# Patient Record
Sex: FEMALE | Race: WHITE | NOT HISPANIC OR LATINO | Employment: OTHER | URBAN - METROPOLITAN AREA
[De-identification: names, ages, dates, MRNs, and addresses within clinical notes are randomized per-mention and may not be internally consistent; named-entity substitution may affect disease eponyms.]

---

## 2018-07-10 ENCOUNTER — OFFICE VISIT CONVERTED (OUTPATIENT)
Dept: ORTHOPEDIC SURGERY | Facility: CLINIC | Age: 58
End: 2018-07-10
Attending: ORTHOPAEDIC SURGERY

## 2019-03-08 ENCOUNTER — HOSPITAL ENCOUNTER (OUTPATIENT)
Dept: URGENT CARE | Facility: CLINIC | Age: 59
Discharge: HOME | End: 2019-03-08
Attending: FAMILY MEDICINE

## 2019-08-26 ENCOUNTER — HOSPITAL ENCOUNTER (OUTPATIENT)
Dept: URGENT CARE | Facility: CLINIC | Age: 59
Discharge: HOME | End: 2019-08-26
Attending: EMERGENCY MEDICINE

## 2019-09-27 ENCOUNTER — HOSPITAL ENCOUNTER (OUTPATIENT)
Dept: GENERAL RADIOLOGY | Facility: HOSPITAL | Age: 59
Discharge: HOME | End: 2019-09-27
Attending: NURSE PRACTITIONER

## 2020-02-06 ENCOUNTER — HOSPITAL ENCOUNTER (OUTPATIENT)
Dept: URGENT CARE | Facility: CLINIC | Age: 60
Discharge: HOME | End: 2020-02-06
Attending: EMERGENCY MEDICINE

## 2020-09-23 ENCOUNTER — HOSPITAL ENCOUNTER (OUTPATIENT)
Dept: OTHER | Facility: HOSPITAL | Age: 60
Discharge: HOME | End: 2020-09-23
Attending: INTERNAL MEDICINE

## 2020-09-23 LAB
ALBUMIN SERPL-MCNC: 3 G/DL (ref 3.5–5)
ALBUMIN/GLOB SERPL: 0.8 {RATIO} (ref 1.4–2.6)
ALP SERPL-CCNC: 138 U/L (ref 53–141)
ALT SERPL-CCNC: 18 U/L (ref 10–40)
ANION GAP SERPL CALC-SCNC: 15 MMOL/L (ref 8–19)
AST SERPL-CCNC: 17 U/L (ref 15–50)
BASOPHILS # BLD AUTO: 0.02 10*3/UL (ref 0–0.2)
BASOPHILS NFR BLD AUTO: 0.4 % (ref 0–3)
BILIRUB SERPL-MCNC: 0.35 MG/DL (ref 0.2–1.3)
BNP SERPL-MCNC: 289 PG/ML (ref 0–900)
BUN SERPL-MCNC: 12 MG/DL (ref 5–25)
BUN/CREAT SERPL: 24 {RATIO} (ref 6–20)
CALCIUM SERPL-MCNC: 10.1 MG/DL (ref 8.7–10.4)
CHLORIDE SERPL-SCNC: 98 MMOL/L (ref 99–111)
CONV ABS IMM GRAN: 0.02 10*3/UL (ref 0–0.2)
CONV CO2: 26 MMOL/L (ref 22–32)
CONV IMMATURE GRAN: 0.4 % (ref 0–1.8)
CONV TOTAL PROTEIN: 6.7 G/DL (ref 6.3–8.2)
CREAT UR-MCNC: 0.51 MG/DL (ref 0.5–0.9)
DEPRECATED RDW RBC AUTO: 53 FL (ref 36.4–46.3)
EOSINOPHIL # BLD AUTO: 0.07 10*3/UL (ref 0–0.7)
EOSINOPHIL # BLD AUTO: 1.2 % (ref 0–7)
ERYTHROCYTE [DISTWIDTH] IN BLOOD BY AUTOMATED COUNT: 15.4 % (ref 11.7–14.4)
GFR SERPLBLD BASED ON 1.73 SQ M-ARVRAT: >60 ML/MIN/{1.73_M2}
GLOBULIN UR ELPH-MCNC: 3.7 G/DL (ref 2–3.5)
GLUCOSE SERPL-MCNC: 394 MG/DL (ref 65–99)
HCT VFR BLD AUTO: 38.6 % (ref 37–47)
HGB BLD-MCNC: 12.7 G/DL (ref 12–16)
LYMPHOCYTES # BLD AUTO: 1.18 10*3/UL (ref 1–5)
LYMPHOCYTES NFR BLD AUTO: 20.8 % (ref 20–45)
MCH RBC QN AUTO: 30.5 PG (ref 27–31)
MCHC RBC AUTO-ENTMCNC: 32.9 G/DL (ref 33–37)
MCV RBC AUTO: 92.8 FL (ref 81–99)
MONOCYTES # BLD AUTO: 0.4 10*3/UL (ref 0.2–1.2)
MONOCYTES NFR BLD AUTO: 7.1 % (ref 3–10)
NEUTROPHILS # BLD AUTO: 3.97 10*3/UL (ref 2–8)
NEUTROPHILS NFR BLD AUTO: 70.1 % (ref 30–85)
NRBC CBCN: 0 % (ref 0–0.7)
OSMOLALITY SERPL CALC.SUM OF ELEC: 296 MOSM/KG (ref 273–304)
PLATELET # BLD AUTO: 138 10*3/UL (ref 130–400)
PMV BLD AUTO: 11.3 FL (ref 9.4–12.3)
POTASSIUM SERPL-SCNC: 4.2 MMOL/L (ref 3.5–5.3)
RBC # BLD AUTO: 4.16 10*6/UL (ref 4.2–5.4)
SODIUM SERPL-SCNC: 135 MMOL/L (ref 135–147)
WBC # BLD AUTO: 5.66 10*3/UL (ref 4.8–10.8)

## 2020-11-27 ENCOUNTER — HOSPITAL ENCOUNTER (OUTPATIENT)
Dept: OTHER | Facility: HOSPITAL | Age: 60
Discharge: HOME | End: 2020-11-27
Attending: INTERNAL MEDICINE

## 2020-11-27 LAB
ALBUMIN SERPL-MCNC: 3.5 G/DL (ref 3.5–5)
ALBUMIN/GLOB SERPL: 1.1 {RATIO} (ref 1.4–2.6)
ALP SERPL-CCNC: 130 U/L (ref 43–160)
ALT SERPL-CCNC: 45 U/L (ref 10–40)
ANION GAP SERPL CALC-SCNC: 18 MMOL/L (ref 8–19)
AST SERPL-CCNC: 33 U/L (ref 15–50)
BILIRUB SERPL-MCNC: 0.57 MG/DL (ref 0.2–1.3)
BNP SERPL-MCNC: 455 PG/ML (ref 0–900)
BUN SERPL-MCNC: 19 MG/DL (ref 5–25)
BUN/CREAT SERPL: 30 {RATIO} (ref 6–20)
CALCIUM SERPL-MCNC: 9.7 MG/DL (ref 8.7–10.4)
CHLORIDE SERPL-SCNC: 97 MMOL/L (ref 99–111)
CONV CO2: 24 MMOL/L (ref 22–32)
CONV TOTAL PROTEIN: 6.8 G/DL (ref 6.3–8.2)
CREAT UR-MCNC: 0.63 MG/DL (ref 0.5–0.9)
GFR SERPLBLD BASED ON 1.73 SQ M-ARVRAT: >60 ML/MIN/{1.73_M2}
GLOBULIN UR ELPH-MCNC: 3.3 G/DL (ref 2–3.5)
GLUCOSE SERPL-MCNC: 385 MG/DL (ref 65–99)
OSMOLALITY SERPL CALC.SUM OF ELEC: 298 MOSM/KG (ref 273–304)
POTASSIUM SERPL-SCNC: 3.7 MMOL/L (ref 3.5–5.3)
SODIUM SERPL-SCNC: 135 MMOL/L (ref 135–147)

## 2020-12-12 ENCOUNTER — HOSPITAL ENCOUNTER (OUTPATIENT)
Dept: MRI IMAGING | Facility: HOSPITAL | Age: 60
Discharge: HOME | End: 2020-12-12
Attending: ORTHOPAEDIC SURGERY

## 2021-03-24 ENCOUNTER — OFFICE VISIT CONVERTED (OUTPATIENT)
Dept: NEUROLOGY | Facility: CLINIC | Age: 61
End: 2021-03-24
Attending: NURSE PRACTITIONER

## 2021-05-10 NOTE — H&P
"   History and Physical      Patient Name: Italia Olvera   Patient ID: [id number]   Sex: Female   YOB: 1960    Referring Provider: Carloz Shoemaker MD    Visit Date: March 24, 2021    Provider: LUCINA See   Location: [facility]   Location Address: [address] [place] [address]  [id number]   Location Phone: [phone]          Chief Complaint     Pt is here with c/o legs giving out and being \"paralyzed\"       History Of Present Illness  Italia Olvera is a 60 year old /White female who presents today to [facility] today referred from Carloz Shoemaker MD. States she's been having frequent falls. Legs will just \"get weak\" and \"go paralyzed\" and she will fall. Falls 1-2 times per week. States that she will be standing up and then legs will start to shake and then she will lose sensation in her legs and will fall. Endorses paresthesia in bilateral legs and arms. States she's been diagnosed with neuropathy. Is a diabetic. States she was issued a power wheelchair 11 years ago for the same complaint, but states symptoms are worsening.      Lab Review:   A1c: 13.6       Past Medical History  Arthritis; Bladder disorder; Cancer; chronic back pain; Chronic Obstructive Pulmonary Disease; Depression; Diabetes; GERD; High blood pressure; Hypertension; Knee injury; Limb Swelling; Neuropathy; Osteoarthritis of Knee; Pain: Hip; PCL/ACL tear/rupture; Reflux; Renal Failure; Right Pain: Knee; Rotator cuff tendinitis, right; Seasonal allergies; Shortness of Breath; Shoulder bursitis, right; Shoulder tendinitis         Past Surgical History  Appendectomy; Artificial Joints/Limbs; Bladder Repair; Cholecstectomy; Exploratory; Face surgery; Gallbladder; Hernia; Hip replacement, right; Hysterectomy; Joint Surgery; Knee replacement, left         Medication List  amiloride-hydrochlorothiazide 5-50 mg oral tablet; bisoprolol-hydrochlorothiazide 5-6.25 mg oral " tablet; Eliquis 5 mg oral tablet; fluconazole 200 mg oral tablet; Humulin 70/30 U-100 KwikPen 100 unit/mL (70-30) subcutaneous insulin pen; Klor-Con M20 20 mEq oral tablet,ER particles/crystals; Lasix 80 mg oral tablet; losartan 100 mg oral tablet; metformin 500 mg oral tablet extended release 24hr; Neurontin 600 mg oral tablet; nortriptyline 25 mg oral capsule; Percocet  mg oral tablet; Zanaflex 4 mg oral capsule         Allergy List  NO KNOWN DRUG ALLERGIES       Allergies Reconciled  Family Medical History  Breast Neoplasm, Malignant; Stroke; Heart Disease; Cancer, Unspecified; Diabetes, unspecified type; Throat Cancer; Colon Cancer; Lung cancer; Diabetes; Family history of certain chronic disabling diseases; arthritis; Osteoporosis         Social History  Alcohol Use (Never); Claustophobic (Unknown); lives alone; lives with spouse; .; Recreational Drug Use (Never); Tobacco (Current some day); Working         Review of Systems  · Constitutional  o Denies  o : chills, excessive sweating, fatigue, fever, sycope/passing out, weight gain, weight loss  · Eyes  o Denies  o : changes in vision, blurry vision, double vision  · HENT  o Denies  o : loss of hearing, ringing in the ears, ear aches, sore throat, nasal congestion, sinus pain, nose bleeds, seasonal allergies  · Cardiovascular  o Denies  o : blood clots, swollen legs, anemia, easy burising or bleeding, transfusions  · Respiratory  o Denies  o : shortness of breath, dry cough, productive cough, pneumonia, COPD  · Gastrointestinal  o Denies  o : difficulty swallowing, reflux  · Genitourinary  o Denies  o : incontinence  · Neurologic  o Admits  o : headache, loss of balance, falls, dizziness/vertigo, difficulty with dexterity, weakness  o Denies  o : seizure, stroke, tremor, difficulty with sleep, numbness/tingling/paresthesia , difficulty with coordination  · Musculoskeletal  o Denies  o : neck stiffness/pain, swollen lymph nodes, muscle aches, joint  "pain, weakness, spasms, sciatica, pain radiating in arm, pain radiating in leg, low back pain  · Endocrine  o Denies  o : diabetes, thyroid disorder  · Psychiatric  o Denies  o : anxiety, depression      Vitals  Date Time BP Position Site L\R Cuff Size HR RR TEMP (F) WT  HT  BMI kg/m2 BSA m2 O2 Sat FR L/min FiO2 HC       03/24/2021 11:36 /110 Sitting    98 - R   184lbs 0oz 5'  6\" 29.7 1.97             Physical Examination  · Constitutional  o Appearance  o : well-nourished, well groomed, in no apparent distress  · Eyes  o Pupils and Irises  o : Pupils equal, round, and reactive to light and accommodation bilaterally  · Respiratory  o Auscultation of Lungs  o : Lungs were clear to ascultation bilaterally. No wheezes, rhonchi or rales were appreciated.  · Cardiovascular  o Heart  o :   § Auscultation of Heart  § : Regular rate and rhythm, no murmurs, gallops or rubs were appreciated.  o Peripheral Vascular System  o :   § Extremities  § : No peripheral edema was appreciated  · Musculoskeletal  o General  o : Normal bulk and normal tone.  · Neurologic  o Mental Status Examination  o :   § Orientation  § : Alert and oriented to person, place, and time,  § Speech/Language  § : Intact naming, comprehension, and repetition. No dysarthria.  § Memory  § : Immediate recall is 3/3. Recall at 5 minutes is 3/3.   § Attention  § : Attention span is intact to serial 7 examination and finger tapping.   § Fund of Knowledge  § : Adequate fund of knowledge  o Cranial Nerves  o : Pupils are equal, round and reactive to light. Extraocular movements are intact. Visual fields are full. Fundoscopic examination reveals sharp disc bilaterally. Sensation in the V1-V3 distribution is intact and symmetric. Muscles of mastication are strong and symmetric. Muscles of facial expression are strong and symmetric. Hearing is intact. Palatal raise is intact and symmetric. Uvula is midline. Shoulder shrug is strong. Tongue protrudes in the " midline.  o Motor Examination  o :   § RUE Strength  § : strength 4/5   § LUE Strength  § : strength 4/5   § RLE Strength  § : strength 4/5   § LLE Strength  § : strength 4/5   o Reflexes  o : 1+ reflexes to BLE. Negative Bull. Negative Babinski.   o Sensation  o : Decreased pinprick to BLE, increasing mid thigh. Decreased pinprick to BUE, increasing mid upper arm.   o Gait and Station  o :   § Gait Screening  § : Ambulates with walker. Steppage gait  o Coordination  o : Intact finger to nose and heel to shin. Rapid alternating movements are intact in the upper and lower extremities.          Assessment  · Imbalance     781.2/R26.89  Will order EMG and labs for further evaluation. However exam is extremely consistent with severe diabetic polyneuropathy. Likely the cause of imbalance, falls and muscle weakness. A1c 13.6, highly unmanaged, with long history of diabetes.   · Falls     E888.9/W19.XXXA  · Muscle weakness     728.87/M62.81  · Paresthesia     782.0/R20.2      Plan  · Orders  o ACO-17: Screened for tobacco use AND received tobacco cessation intervention (4004F) - - 03/26/2021  o CBC with Auto Diff [facility] (17148) - 781.2/R26.89, E888.9/W19.XXXA, 728.87/M62.81, 782.0/R20.2 - 03/24/2021  o CMP [facility] (35869) - 781.2/R26.89, E888.9/W19.XXXA, 728.87/M62.81, 782.0/R20.2 - 03/24/2021  o Folate level (78639) - 781.2/R26.89, E888.9/W19.XXXA, 728.87/M62.81, 782.0/R20.2 - 03/24/2021  o Homocysteine (84227) - 781.2/R26.89, E888.9/W19.XXXA, 728.87/M62.81, 782.0/R20.2 - 03/24/2021  o Methylmalonic acid (41321) - 781.2/R26.89, E888.9/W19.XXXA, 728.87/M62.81, 782.0/R20.2 - 03/24/2021  o SPEP (serum protein electrophoresis) (01643) - 781.2/R26.89, E888.9/W19.XXXA, 728.87/M62.81, 782.0/R20.2 - 03/24/2021  o Vitamin B1 level (43721) - 781.2/R26.89, E888.9/W19.XXXA, 728.87/M62.81, 782.0/R20.2 - 03/24/2021  o Vitamin B12 level (15019) - 781.2/R26.89, E888.9/W19.XXXA, 728.87/M62.81, 782.0/R20.2 -  03/24/2021  · Medications  o Medications have been Reconciled  o Transition of Care or Provider Policy  · Instructions  o Encouraged to follow-up with Primary Care Provider for preventative care.  o Call or Return if symptoms worsen or persist.   o Follow up in 2 months.  o after EMG  o Total time spent with patient and coordinating patient care was 45 minutes.   · Disposition  o Call or Return if symptoms worsen or persist.            Electronically Signed by: Shirley Murguia APRN -Author on March 26, 2021 01:33:01 PM

## 2021-05-14 VITALS
BODY MASS INDEX: 29.57 KG/M2 | HEIGHT: 66 IN | DIASTOLIC BLOOD PRESSURE: 110 MMHG | WEIGHT: 184 LBS | HEART RATE: 98 BPM | SYSTOLIC BLOOD PRESSURE: 193 MMHG

## 2021-05-16 VITALS — BODY MASS INDEX: 29.25 KG/M2 | OXYGEN SATURATION: 98 % | WEIGHT: 182 LBS | HEIGHT: 66 IN | HEART RATE: 97 BPM

## 2021-06-02 ENCOUNTER — HOSPITAL ENCOUNTER (INPATIENT)
Dept: MEDSURG UNIT | Facility: HOSPITAL | Age: 61
LOS: 10 days | Discharge: NURSING, REHAB OR LONG-TERM CARE | End: 2021-06-12
Attending: INTERNAL MEDICINE | Admitting: INTERNAL MEDICINE

## 2021-06-02 DIAGNOSIS — R26.2 DIFFICULTY WALKING: Primary | ICD-10-CM

## 2021-06-02 DIAGNOSIS — Z78.9 DECREASED ACTIVITIES OF DAILY LIVING (ADL): ICD-10-CM

## 2021-06-02 LAB
ALBUMIN SERPL-MCNC: 2.9 G/DL (ref 3.5–5)
ALBUMIN/GLOB SERPL: 0.7 {RATIO} (ref 1.4–2.6)
ALP SERPL-CCNC: 144 U/L (ref 43–160)
ALT SERPL-CCNC: 39 U/L (ref 10–40)
ANION GAP SERPL CALC-SCNC: 16 MMOL/L (ref 8–19)
AST SERPL-CCNC: 60 U/L (ref 15–50)
BASOPHILS # BLD AUTO: 0.06 10*3/UL (ref 0–0.2)
BASOPHILS NFR BLD AUTO: 0.2 % (ref 0–3)
BILIRUB SERPL-MCNC: 1.08 MG/DL (ref 0.2–1.3)
BNP SERPL-MCNC: 1925 PG/ML (ref 0–900)
BUN SERPL-MCNC: 20 MG/DL (ref 5–25)
BUN/CREAT SERPL: 34 {RATIO} (ref 6–20)
CALCIUM SERPL-MCNC: 9.5 MG/DL (ref 8.7–10.4)
CHLORIDE SERPL-SCNC: 98 MMOL/L (ref 99–111)
CONV ABS IMM GRAN: 0.2 10*3/UL (ref 0–0.2)
CONV CO2: 24 MMOL/L (ref 22–32)
CONV IMMATURE GRAN: 0.7 % (ref 0–1.8)
CONV LACTIC ACID BLOOD (SECOND SPECIMEN): 1.7 MMOL/L (ref 0.7–2.1)
CONV TOTAL PROTEIN: 7.3 G/DL (ref 6.3–8.2)
CREAT UR-MCNC: 0.59 MG/DL (ref 0.5–0.9)
D-LACTATE SERPL-SCNC: 2.6 MMOL/L (ref 0.7–2.1)
DEPRECATED RDW RBC AUTO: 62.4 FL (ref 36.4–46.3)
EOSINOPHIL # BLD AUTO: 0.06 10*3/UL (ref 0–0.7)
EOSINOPHIL # BLD AUTO: 0.2 % (ref 0–7)
ERYTHROCYTE [DISTWIDTH] IN BLOOD BY AUTOMATED COUNT: 23.9 % (ref 11.7–14.4)
GFR SERPLBLD BASED ON 1.73 SQ M-ARVRAT: >60 ML/MIN/{1.73_M2}
GLOBULIN UR ELPH-MCNC: 4.4 G/DL (ref 2–3.5)
GLUCOSE BLD-MCNC: 298 MG/DL (ref 65–99)
GLUCOSE BLD-MCNC: 322 MG/DL (ref 65–99)
GLUCOSE SERPL-MCNC: 261 MG/DL (ref 65–99)
HCT VFR BLD AUTO: 50.5 % (ref 37–47)
HGB BLD-MCNC: 15 G/DL (ref 12–16)
LYMPHOCYTES # BLD AUTO: 1.02 10*3/UL (ref 1–5)
LYMPHOCYTES NFR BLD AUTO: 3.8 % (ref 20–45)
MCH RBC QN AUTO: 22.8 PG (ref 27–31)
MCHC RBC AUTO-ENTMCNC: 29.7 G/DL (ref 33–37)
MCV RBC AUTO: 76.7 FL (ref 81–99)
MONOCYTES # BLD AUTO: 1.07 10*3/UL (ref 0.2–1.2)
MONOCYTES NFR BLD AUTO: 3.9 % (ref 3–10)
NEUTROPHILS # BLD AUTO: 24.73 10*3/UL (ref 2–8)
NEUTROPHILS NFR BLD AUTO: 91.2 % (ref 30–85)
NRBC CBCN: 0 % (ref 0–0.7)
OSMOLALITY SERPL CALC.SUM OF ELEC: 290 MOSM/KG (ref 273–304)
PLATELET # BLD AUTO: 101 10*3/UL (ref 130–400)
PMV BLD AUTO: ABNORMAL FL (ref 9.4–12.3)
POTASSIUM SERPL-SCNC: 4.4 MMOL/L (ref 3.5–5.3)
PROCALCITONIN SERPL-MCNC: 0.22 NG/ML (ref 0–4)
RBC # BLD AUTO: 6.58 10*6/UL (ref 4.2–5.4)
SARS-COV-2 RNA SPEC QL NAA+PROBE: NOT DETECTED
SODIUM SERPL-SCNC: 134 MMOL/L (ref 135–147)
TROPONIN T SERPL-MCNC: 0.04 NG/ML (ref 0–0.1)
WBC # BLD AUTO: 27.14 10*3/UL (ref 4.8–10.8)

## 2021-06-02 PROCEDURE — 9990

## 2021-06-02 PROCEDURE — U0003 INFECTIOUS AGENT DETECTION BY NUCLEIC ACID (DNA OR RNA); SEVERE ACUTE RESPIRATORY SYNDROME CORONAVIRUS 2 (SARS-COV-2) (CORONAVIRUS DISEASE [COVID-19]), AMPLIFIED PROBE TECHNIQUE, MAKING USE OF HIGH THROUGHPUT TECHNOLOGIES AS DESCRIBED BY CMS-2020-01-R: HCPCS

## 2021-06-03 LAB
ALBUMIN SERPL-MCNC: 2 G/DL (ref 3.5–5)
ALBUMIN/GLOB SERPL: 0.5 {RATIO} (ref 1.4–2.6)
ALP SERPL-CCNC: 100 U/L (ref 43–160)
ALT SERPL-CCNC: 48 U/L (ref 10–40)
ANION GAP SERPL CALC-SCNC: 15 MMOL/L (ref 8–19)
APTT BLD: 27 S (ref 22.2–34.2)
AST SERPL-CCNC: 67 U/L (ref 15–50)
BASOPHILS # BLD AUTO: 0.07 10*3/UL (ref 0–0.2)
BASOPHILS NFR BLD AUTO: 0.4 % (ref 0–3)
BILIRUB SERPL-MCNC: 0.54 MG/DL (ref 0.2–1.3)
BUN SERPL-MCNC: 22 MG/DL (ref 5–25)
BUN/CREAT SERPL: 37 {RATIO} (ref 6–20)
CALCIUM SERPL-MCNC: 9.8 MG/DL (ref 8.7–10.4)
CHLORIDE SERPL-SCNC: 102 MMOL/L (ref 99–111)
CHOLEST SERPL-MCNC: 186 MG/DL (ref 107–200)
CHOLEST/HDLC SERPL: 3.2 {RATIO} (ref 3–6)
CONV ABS IMM GRAN: 0.22 10*3/UL (ref 0–0.2)
CONV CO2: 24 MMOL/L (ref 22–32)
CONV IMMATURE GRAN: 1.1 % (ref 0–1.8)
CONV TOTAL PROTEIN: 5.8 G/DL (ref 6.3–8.2)
CREAT UR-MCNC: 0.6 MG/DL (ref 0.5–0.9)
CRP SERPL-MCNC: 112.7 MG/L (ref 0–5)
D-LACTATE SERPL-SCNC: 3.5 MMOL/L (ref 0.7–2.1)
DEPRECATED RDW RBC AUTO: 61.7 FL (ref 36.4–46.3)
EOSINOPHIL # BLD AUTO: 0 % (ref 0–7)
EOSINOPHIL # BLD AUTO: 0 10*3/UL (ref 0–0.7)
ERYTHROCYTE [DISTWIDTH] IN BLOOD BY AUTOMATED COUNT: 23.6 % (ref 11.7–14.4)
ERYTHROCYTE [SEDIMENTATION RATE] IN BLOOD: 16 MM/H (ref 0–30)
EST. AVERAGE GLUCOSE BLD GHB EST-MCNC: 283 MG/DL
FERRITIN SERPL-MCNC: 168 NG/ML (ref 10–200)
GFR SERPLBLD BASED ON 1.73 SQ M-ARVRAT: >60 ML/MIN/{1.73_M2}
GLOBULIN UR ELPH-MCNC: 3.8 G/DL (ref 2–3.5)
GLUCOSE BLD-MCNC: 163 MG/DL (ref 65–99)
GLUCOSE BLD-MCNC: 230 MG/DL (ref 65–99)
GLUCOSE BLD-MCNC: 265 MG/DL (ref 65–99)
GLUCOSE BLD-MCNC: 290 MG/DL (ref 65–99)
GLUCOSE SERPL-MCNC: 196 MG/DL (ref 65–99)
HBA1C MFR BLD: 11.5 % (ref 3.5–5.7)
HCT VFR BLD AUTO: 44.9 % (ref 37–47)
HDLC SERPL-MCNC: 58 MG/DL (ref 40–60)
HGB BLD-MCNC: 13.3 G/DL (ref 12–16)
INR PPP: 1.17 (ref 2–3)
LDH SERPL-CCNC: 842 U/L (ref 120–240)
LDLC SERPL CALC-MCNC: 107 MG/DL (ref 70–100)
LYMPHOCYTES # BLD AUTO: 0.33 10*3/UL (ref 1–5)
LYMPHOCYTES NFR BLD AUTO: 1.7 % (ref 20–45)
MAGNESIUM SERPL-MCNC: 1.86 MG/DL (ref 1.6–2.3)
MCH RBC QN AUTO: 22.4 PG (ref 27–31)
MCHC RBC AUTO-ENTMCNC: 29.6 G/DL (ref 33–37)
MCV RBC AUTO: 75.6 FL (ref 81–99)
MONOCYTES # BLD AUTO: 0.96 10*3/UL (ref 0.2–1.2)
MONOCYTES NFR BLD AUTO: 4.9 % (ref 3–10)
NEUTROPHILS # BLD AUTO: 18.12 10*3/UL (ref 2–8)
NEUTROPHILS NFR BLD AUTO: 91.9 % (ref 30–85)
NRBC CBCN: 0 % (ref 0–0.7)
OSMOLALITY SERPL CALC.SUM OF ELEC: 293 MOSM/KG (ref 273–304)
PLATELET # BLD AUTO: 106 10*3/UL (ref 130–400)
PMV BLD AUTO: ABNORMAL FL (ref 9.4–12.3)
POTASSIUM SERPL-SCNC: 4.1 MMOL/L (ref 3.5–5.3)
PROCALCITONIN SERPL-MCNC: 0.4 NG/ML (ref 0–4)
PROTHROMBIN TIME: 12.5 S (ref 9.4–12)
RBC # BLD AUTO: 5.94 10*6/UL (ref 4.2–5.4)
SODIUM SERPL-SCNC: 137 MMOL/L (ref 135–147)
TRIGL SERPL-MCNC: 106 MG/DL (ref 40–150)
VLDLC SERPL-MCNC: 21 MG/DL (ref 5–37)
WBC # BLD AUTO: 19.7 10*3/UL (ref 4.8–10.8)

## 2021-06-03 PROCEDURE — 9990

## 2021-06-03 PROCEDURE — G0463 HOSPITAL OUTPT CLINIC VISIT: HCPCS

## 2021-06-04 PROBLEM — G62.9 NEUROPATHY: Status: ACTIVE | Noted: 2021-06-04

## 2021-06-04 PROBLEM — J18.9 CAP (COMMUNITY ACQUIRED PNEUMONIA): Status: ACTIVE | Noted: 2021-06-04

## 2021-06-04 PROBLEM — G89.29 CHRONIC BACK PAIN: Status: ACTIVE | Noted: 2021-06-04

## 2021-06-04 PROBLEM — F32.A DEPRESSION: Status: ACTIVE | Noted: 2021-06-04

## 2021-06-04 PROBLEM — J44.1 COPD WITH ACUTE EXACERBATION: Status: ACTIVE | Noted: 2021-06-04

## 2021-06-04 PROBLEM — E11.9 T2DM (TYPE 2 DIABETES MELLITUS): Status: ACTIVE | Noted: 2021-06-04

## 2021-06-04 PROBLEM — M54.9 CHRONIC BACK PAIN: Status: ACTIVE | Noted: 2021-06-04

## 2021-06-04 PROBLEM — J44.9 CHRONIC OBSTRUCTIVE PULMONARY DISEASE: Status: ACTIVE | Noted: 2021-06-04

## 2021-06-04 PROBLEM — I10 HYPERTENSION: Status: ACTIVE | Noted: 2021-06-04

## 2021-06-04 PROBLEM — F31.9 BIPOLAR DISORDER (HCC): Status: ACTIVE | Noted: 2021-06-04

## 2021-06-04 PROBLEM — N32.9 BLADDER DISORDER: Status: ACTIVE | Noted: 2021-06-04

## 2021-06-04 LAB
ALBUMIN SERPL-MCNC: 2.3 G/DL (ref 3.5–5)
ALBUMIN/GLOB SERPL: 0.6 {RATIO} (ref 1.4–2.6)
ALP SERPL-CCNC: 103 U/L (ref 43–160)
ALT SERPL-CCNC: 49 U/L (ref 10–40)
ANION GAP SERPL CALC-SCNC: 16 MMOL/L (ref 8–19)
AST SERPL-CCNC: 48 U/L (ref 15–50)
BILIRUB SERPL-MCNC: 0.3 MG/DL (ref 0.2–1.3)
BUN SERPL-MCNC: 29 MG/DL (ref 5–25)
BUN/CREAT SERPL: 56 {RATIO} (ref 6–20)
BURR CELLS BLD QL SMEAR: SLIGHT
C3 FRG RBC-MCNC: SLIGHT
CALCIUM SERPL-MCNC: 9.9 MG/DL (ref 8.7–10.4)
CHLORIDE SERPL-SCNC: 104 MMOL/L (ref 99–111)
CONV ANISOCYTES: NORMAL
CONV CO2: 24 MMOL/L (ref 22–32)
CONV TOTAL PROTEIN: 6.2 G/DL (ref 6.3–8.2)
CREAT UR-MCNC: 0.52 MG/DL (ref 0.5–0.9)
GFR SERPLBLD BASED ON 1.73 SQ M-ARVRAT: >60 ML/MIN/{1.73_M2}
GLOBULIN UR ELPH-MCNC: 3.9 G/DL (ref 2–3.5)
GLUCOSE BLD-MCNC: 127 MG/DL (ref 65–99)
GLUCOSE BLD-MCNC: 272 MG/DL (ref 65–99)
GLUCOSE BLD-MCNC: 352 MG/DL (ref 65–99)
GLUCOSE BLD-MCNC: 390 MG/DL (ref 65–99)
GLUCOSE BLD-MCNC: 61 MG/DL (ref 65–99)
GLUCOSE SERPL-MCNC: 173 MG/DL (ref 65–99)
INR PPP: 1.3 (ref 2–3)
Lab: <0.4 U/ML
MICROCYTES BLD QL: SLIGHT
OSMOLALITY SERPL CALC.SUM OF ELEC: 298 MOSM/KG (ref 273–304)
PLATELET # BLD AUTO: 123 10*3/UL (ref 130–400)
POTASSIUM SERPL-SCNC: 5 MMOL/L (ref 3.5–5.3)
PROTHROMBIN TIME: 13.7 S (ref 9.4–12)
SODIUM SERPL-SCNC: 139 MMOL/L (ref 135–147)

## 2021-06-04 PROCEDURE — 9990

## 2021-06-04 RX ORDER — POTASSIUM CHLORIDE 750 MG/1
20 CAPSULE, EXTENDED RELEASE ORAL DAILY
Status: DISCONTINUED | OUTPATIENT
Start: 2021-06-05 | End: 2021-06-12 | Stop reason: HOSPADM

## 2021-06-04 RX ORDER — NICOTINE 21 MG/24HR
1 PATCH, TRANSDERMAL 24 HOURS TRANSDERMAL
Status: DISCONTINUED | OUTPATIENT
Start: 2021-06-05 | End: 2021-06-12 | Stop reason: HOSPADM

## 2021-06-04 RX ORDER — SODIUM CHLORIDE 0.9 % (FLUSH) 0.9 %
3 SYRINGE (ML) INJECTION AS NEEDED
Status: DISCONTINUED | OUTPATIENT
Start: 2021-06-05 | End: 2021-06-12 | Stop reason: HOSPADM

## 2021-06-04 RX ORDER — ACETAMINOPHEN 325 MG/1
650 TABLET ORAL EVERY 4 HOURS PRN
Status: DISCONTINUED | OUTPATIENT
Start: 2021-06-05 | End: 2021-06-12 | Stop reason: HOSPADM

## 2021-06-04 RX ORDER — IPRATROPIUM BROMIDE AND ALBUTEROL SULFATE 2.5; .5 MG/3ML; MG/3ML
3 SOLUTION RESPIRATORY (INHALATION)
Status: DISCONTINUED | OUTPATIENT
Start: 2021-06-05 | End: 2021-06-05 | Stop reason: ALTCHOICE

## 2021-06-04 RX ORDER — INSULIN HUMAN 100 [IU]/ML
45 INJECTION, SUSPENSION SUBCUTANEOUS 2 TIMES DAILY
COMMUNITY
End: 2021-07-22 | Stop reason: HOSPADM

## 2021-06-04 RX ORDER — BISOPROLOL FUMARATE AND HYDROCHLOROTHIAZIDE 10; 6.25 MG/1; MG/1
1 TABLET ORAL 2 TIMES DAILY
COMMUNITY
End: 2021-07-22 | Stop reason: HOSPADM

## 2021-06-04 RX ORDER — GABAPENTIN 600 MG/1
600 TABLET ORAL 3 TIMES DAILY
COMMUNITY
End: 2021-07-22 | Stop reason: HOSPADM

## 2021-06-04 RX ORDER — ALBUTEROL SULFATE 2.5 MG/.5ML
3 SOLUTION RESPIRATORY (INHALATION) EVERY 4 HOURS PRN
COMMUNITY
End: 2021-07-22 | Stop reason: HOSPADM

## 2021-06-04 RX ORDER — HYDROXYZINE PAMOATE 25 MG/1
25 CAPSULE ORAL 3 TIMES DAILY PRN
Status: ON HOLD | COMMUNITY
End: 2021-06-14

## 2021-06-04 RX ORDER — TIZANIDINE 4 MG/1
4 TABLET ORAL 3 TIMES DAILY PRN
Status: ON HOLD | COMMUNITY
End: 2021-06-14

## 2021-06-04 RX ORDER — HYDROXYZINE PAMOATE 50 MG/1
50 CAPSULE ORAL EVERY 6 HOURS PRN
Status: DISCONTINUED | OUTPATIENT
Start: 2021-06-05 | End: 2021-06-04

## 2021-06-04 RX ORDER — AMILORIDE HYDROCHLORIDE 5 MG/1
10 TABLET ORAL DAILY
Status: ON HOLD | COMMUNITY
End: 2021-06-14

## 2021-06-04 RX ORDER — DEXTROSE MONOHYDRATE 100 MG/ML
25 INJECTION, SOLUTION INTRAVENOUS
Status: DISCONTINUED | OUTPATIENT
Start: 2021-06-05 | End: 2021-06-12 | Stop reason: HOSPADM

## 2021-06-04 RX ORDER — WARFARIN SODIUM 3 MG/1
3 TABLET ORAL
Status: DISCONTINUED | OUTPATIENT
Start: 2021-06-05 | End: 2021-06-05

## 2021-06-04 RX ORDER — OXYCODONE AND ACETAMINOPHEN 10; 325 MG/1; MG/1
1 TABLET ORAL EVERY 6 HOURS SCHEDULED
Status: DISCONTINUED | OUTPATIENT
Start: 2021-06-05 | End: 2021-06-04

## 2021-06-04 RX ORDER — METHYLPREDNISOLONE SODIUM SUCCINATE 40 MG/ML
40 INJECTION, POWDER, LYOPHILIZED, FOR SOLUTION INTRAMUSCULAR; INTRAVENOUS EVERY 6 HOURS
Status: DISCONTINUED | OUTPATIENT
Start: 2021-06-05 | End: 2021-06-07

## 2021-06-04 RX ORDER — FUROSEMIDE 80 MG
80 TABLET ORAL 2 TIMES DAILY
Status: ON HOLD | COMMUNITY
End: 2021-06-14

## 2021-06-04 RX ORDER — NORTRIPTYLINE HYDROCHLORIDE 25 MG/1
25 CAPSULE ORAL NIGHTLY
Status: DISCONTINUED | OUTPATIENT
Start: 2021-06-05 | End: 2021-06-12 | Stop reason: HOSPADM

## 2021-06-04 RX ORDER — SODIUM CHLORIDE 9 MG/ML
30 INJECTION, SOLUTION INTRAVENOUS EVERY MORNING
Status: DISCONTINUED | OUTPATIENT
Start: 2021-06-05 | End: 2021-06-12 | Stop reason: HOSPADM

## 2021-06-04 RX ORDER — LOSARTAN POTASSIUM 50 MG/1
100 TABLET ORAL
Status: DISCONTINUED | OUTPATIENT
Start: 2021-06-05 | End: 2021-06-12 | Stop reason: HOSPADM

## 2021-06-04 RX ORDER — NORTRIPTYLINE HYDROCHLORIDE 25 MG/1
25 CAPSULE ORAL NIGHTLY
Status: ON HOLD | COMMUNITY
End: 2021-06-14

## 2021-06-04 RX ORDER — SERTRALINE HYDROCHLORIDE 100 MG/1
100 TABLET, FILM COATED ORAL DAILY
COMMUNITY
End: 2021-07-22 | Stop reason: HOSPADM

## 2021-06-04 RX ORDER — WARFARIN SODIUM 2 MG/1
2 TABLET ORAL
COMMUNITY
End: 2021-07-22 | Stop reason: HOSPADM

## 2021-06-04 RX ORDER — OXYCODONE AND ACETAMINOPHEN 10; 325 MG/1; MG/1
1 TABLET ORAL
COMMUNITY
End: 2021-07-22 | Stop reason: HOSPADM

## 2021-06-04 RX ORDER — ONDANSETRON 2 MG/ML
4 INJECTION INTRAMUSCULAR; INTRAVENOUS EVERY 4 HOURS PRN
Status: DISCONTINUED | OUTPATIENT
Start: 2021-06-05 | End: 2021-06-12 | Stop reason: HOSPADM

## 2021-06-04 RX ORDER — ONDANSETRON 4 MG/1
4 TABLET, FILM COATED ORAL EVERY 6 HOURS PRN
Status: DISCONTINUED | OUTPATIENT
Start: 2021-06-05 | End: 2021-06-12 | Stop reason: HOSPADM

## 2021-06-04 RX ORDER — LOSARTAN POTASSIUM 100 MG/1
100 TABLET ORAL DAILY
COMMUNITY
End: 2021-07-22 | Stop reason: HOSPADM

## 2021-06-04 RX ORDER — HYDROXYZINE PAMOATE 25 MG/1
25 CAPSULE ORAL 3 TIMES DAILY
Status: DISCONTINUED | OUTPATIENT
Start: 2021-06-05 | End: 2021-06-12 | Stop reason: HOSPADM

## 2021-06-04 RX ORDER — ALBUTEROL SULFATE 90 UG/1
2 AEROSOL, METERED RESPIRATORY (INHALATION) EVERY 6 HOURS PRN
COMMUNITY
End: 2021-07-22 | Stop reason: HOSPADM

## 2021-06-04 RX ORDER — GUAIFENESIN/DEXTROMETHORPHAN 100-10MG/5
5 SYRUP ORAL EVERY 6 HOURS PRN
Status: DISCONTINUED | OUTPATIENT
Start: 2021-06-05 | End: 2021-06-12 | Stop reason: HOSPADM

## 2021-06-04 RX ORDER — BISOPROLOL FUMARATE 5 MG/1
10 TABLET, FILM COATED ORAL
Status: DISCONTINUED | OUTPATIENT
Start: 2021-06-05 | End: 2021-06-12 | Stop reason: HOSPADM

## 2021-06-04 RX ORDER — NICOTINE POLACRILEX 4 MG
15 LOZENGE BUCCAL
Status: DISCONTINUED | OUTPATIENT
Start: 2021-06-05 | End: 2021-06-12 | Stop reason: HOSPADM

## 2021-06-04 RX ORDER — METOLAZONE 10 MG/1
10 TABLET ORAL DAILY
COMMUNITY
End: 2021-06-12 | Stop reason: HOSPADM

## 2021-06-04 RX ORDER — OXYCODONE AND ACETAMINOPHEN 10; 325 MG/1; MG/1
1 TABLET ORAL EVERY 6 HOURS PRN
Status: DISPENSED | OUTPATIENT
Start: 2021-06-05 | End: 2021-06-11

## 2021-06-04 RX ORDER — IPRATROPIUM BROMIDE AND ALBUTEROL SULFATE 2.5; .5 MG/3ML; MG/3ML
3 SOLUTION RESPIRATORY (INHALATION) EVERY 4 HOURS PRN
Status: DISCONTINUED | OUTPATIENT
Start: 2021-06-05 | End: 2021-06-12 | Stop reason: HOSPADM

## 2021-06-04 RX ORDER — POTASSIUM CHLORIDE 1.5 G/1.77G
40 POWDER, FOR SOLUTION ORAL DAILY
Status: ON HOLD | COMMUNITY
End: 2021-06-14

## 2021-06-04 RX ORDER — SODIUM CHLORIDE 0.9 % (FLUSH) 0.9 %
3 SYRINGE (ML) INJECTION EVERY 12 HOURS SCHEDULED
Status: DISCONTINUED | OUTPATIENT
Start: 2021-06-05 | End: 2021-06-12 | Stop reason: HOSPADM

## 2021-06-04 RX ORDER — SERTRALINE HYDROCHLORIDE 25 MG/1
25 TABLET, FILM COATED ORAL DAILY
Status: DISCONTINUED | OUTPATIENT
Start: 2021-06-05 | End: 2021-06-12 | Stop reason: HOSPADM

## 2021-06-04 RX ORDER — BUMETANIDE 2 MG/1
2 TABLET ORAL DAILY
COMMUNITY
End: 2021-06-12 | Stop reason: HOSPADM

## 2021-06-04 RX ORDER — BUMETANIDE 0.25 MG/ML
2 INJECTION INTRAMUSCULAR; INTRAVENOUS DAILY
Status: DISCONTINUED | OUTPATIENT
Start: 2021-06-05 | End: 2021-06-07

## 2021-06-05 PROBLEM — Z99.81 CHRONIC RESPIRATORY FAILURE WITH HYPOXIA, ON HOME OXYGEN THERAPY: Status: ACTIVE | Noted: 2021-06-05

## 2021-06-05 PROBLEM — J96.11 CHRONIC RESPIRATORY FAILURE WITH HYPOXIA: Status: ACTIVE | Noted: 2021-06-05

## 2021-06-05 PROBLEM — J96.11 CHRONIC RESPIRATORY FAILURE WITH HYPOXIA, ON HOME OXYGEN THERAPY: Status: ACTIVE | Noted: 2021-06-05

## 2021-06-05 PROBLEM — E66.3 OVERWEIGHT (BMI 25.0-29.9): Status: ACTIVE | Noted: 2021-06-05

## 2021-06-05 PROBLEM — J96.21 ACUTE ON CHRONIC RESPIRATORY FAILURE WITH HYPOXIA: Status: ACTIVE | Noted: 2021-06-05

## 2021-06-05 LAB
ALBUMIN SERPL-MCNC: 2.6 G/DL (ref 3.5–5.2)
ALBUMIN/GLOB SERPL: 0.7 G/DL
ALP SERPL-CCNC: 184 U/L (ref 39–117)
ALT SERPL W P-5'-P-CCNC: 80 U/L (ref 1–33)
ANION GAP SERPL CALCULATED.3IONS-SCNC: 10.7 MMOL/L (ref 5–15)
AST SERPL-CCNC: 68 U/L (ref 1–32)
BILIRUB SERPL-MCNC: 0.3 MG/DL (ref 0–1.2)
BUN SERPL-MCNC: 36 MG/DL (ref 8–23)
BUN/CREAT SERPL: 63.2 (ref 7–25)
CALCIUM SPEC-SCNC: 8.7 MG/DL (ref 8.6–10.5)
CHLORIDE SERPL-SCNC: 102 MMOL/L (ref 98–107)
CO2 SERPL-SCNC: 21.3 MMOL/L (ref 22–29)
CREAT SERPL-MCNC: 0.57 MG/DL (ref 0.57–1)
CRP SERPL-MCNC: 4.04 MG/DL (ref 0–0.5)
DEPRECATED RDW RBC AUTO: 65.1 FL (ref 37–54)
ERYTHROCYTE [DISTWIDTH] IN BLOOD BY AUTOMATED COUNT: 23.7 % (ref 12.3–15.4)
GFR SERPL CREATININE-BSD FRML MDRD: 108 ML/MIN/1.73
GLOBULIN UR ELPH-MCNC: 3.6 GM/DL
GLUCOSE BLDC GLUCOMTR-MCNC: 249 MG/DL (ref 70–130)
GLUCOSE BLDC GLUCOMTR-MCNC: 256 MG/DL (ref 70–130)
GLUCOSE BLDC GLUCOMTR-MCNC: 264 MG/DL (ref 70–130)
GLUCOSE BLDC GLUCOMTR-MCNC: 358 MG/DL (ref 70–130)
GLUCOSE SERPL-MCNC: 307 MG/DL (ref 65–99)
HCT VFR BLD AUTO: 46.1 % (ref 34–46.6)
HGB BLD-MCNC: 13.5 G/DL (ref 12–15.9)
INR PPP: 2.05 (ref 2–3)
MCH RBC QN AUTO: 22.9 PG (ref 26.6–33)
MCHC RBC AUTO-ENTMCNC: 29.3 G/DL (ref 31.5–35.7)
MCV RBC AUTO: 78.1 FL (ref 79–97)
PLATELET # BLD AUTO: 158 10*3/MM3 (ref 140–450)
PMV BLD AUTO: 10.7 FL (ref 6–12)
POTASSIUM SERPL-SCNC: 4.9 MMOL/L (ref 3.5–5.2)
PROCALCITONIN SERPL-MCNC: 0.17 NG/ML (ref 0–0.25)
PROT SERPL-MCNC: 6.2 G/DL (ref 6–8.5)
PROTHROMBIN TIME: 20.8 SECONDS (ref 9.4–12)
RBC # BLD AUTO: 5.9 10*6/MM3 (ref 3.77–5.28)
SODIUM SERPL-SCNC: 134 MMOL/L (ref 136–145)
WBC # BLD AUTO: 20.08 10*3/MM3 (ref 3.4–10.8)

## 2021-06-05 PROCEDURE — 84145 PROCALCITONIN (PCT): CPT | Performed by: INTERNAL MEDICINE

## 2021-06-05 PROCEDURE — 25010000002 CEFTRIAXONE PER 250 MG: Performed by: INTERNAL MEDICINE

## 2021-06-05 PROCEDURE — 85027 COMPLETE CBC AUTOMATED: CPT | Performed by: INTERNAL MEDICINE

## 2021-06-05 PROCEDURE — 94799 UNLISTED PULMONARY SVC/PX: CPT

## 2021-06-05 PROCEDURE — 25010000002 METHYLPREDNISOLONE PER 40 MG: Performed by: INTERNAL MEDICINE

## 2021-06-05 PROCEDURE — 63710000001 INSULIN LISPRO (HUMAN) PER 5 UNITS: Performed by: INTERNAL MEDICINE

## 2021-06-05 PROCEDURE — 82962 GLUCOSE BLOOD TEST: CPT

## 2021-06-05 PROCEDURE — 86140 C-REACTIVE PROTEIN: CPT | Performed by: INTERNAL MEDICINE

## 2021-06-05 PROCEDURE — 25010000002 AZITHROMYCIN PER 500 MG: Performed by: INTERNAL MEDICINE

## 2021-06-05 PROCEDURE — 80053 COMPREHEN METABOLIC PANEL: CPT | Performed by: INTERNAL MEDICINE

## 2021-06-05 PROCEDURE — 63710000001 INSULIN DETEMIR PER 5 UNITS: Performed by: INTERNAL MEDICINE

## 2021-06-05 PROCEDURE — 85610 PROTHROMBIN TIME: CPT | Performed by: INTERNAL MEDICINE

## 2021-06-05 RX ORDER — WARFARIN SODIUM 2.5 MG/1
2.5 TABLET ORAL
Status: DISCONTINUED | OUTPATIENT
Start: 2021-06-05 | End: 2021-06-06

## 2021-06-05 RX ORDER — IPRATROPIUM BROMIDE AND ALBUTEROL SULFATE 2.5; .5 MG/3ML; MG/3ML
3 SOLUTION RESPIRATORY (INHALATION)
Status: DISCONTINUED | OUTPATIENT
Start: 2021-06-05 | End: 2021-06-05

## 2021-06-05 RX ADMIN — METHYLPREDNISOLONE SODIUM SUCCINATE 40 MG: 40 INJECTION, POWDER, FOR SOLUTION INTRAMUSCULAR; INTRAVENOUS at 06:03

## 2021-06-05 RX ADMIN — HYDROXYZINE PAMOATE 25 MG: 25 CAPSULE ORAL at 20:41

## 2021-06-05 RX ADMIN — NICOTINE 1 PATCH: 21 PATCH, EXTENDED RELEASE TRANSDERMAL at 08:26

## 2021-06-05 RX ADMIN — INSULIN LISPRO 8 UNITS: 100 INJECTION, SOLUTION INTRAVENOUS; SUBCUTANEOUS at 17:12

## 2021-06-05 RX ADMIN — METHYLPREDNISOLONE SODIUM SUCCINATE 40 MG: 40 INJECTION, POWDER, FOR SOLUTION INTRAMUSCULAR; INTRAVENOUS at 12:31

## 2021-06-05 RX ADMIN — IPRATROPIUM BROMIDE AND ALBUTEROL SULFATE 3 ML: .5; 3 SOLUTION RESPIRATORY (INHALATION) at 08:21

## 2021-06-05 RX ADMIN — CEFTRIAXONE 1 G: 10 INJECTION, POWDER, FOR SOLUTION INTRAVENOUS at 08:10

## 2021-06-05 RX ADMIN — INSULIN LISPRO 5 UNITS: 100 INJECTION, SOLUTION INTRAVENOUS; SUBCUTANEOUS at 08:24

## 2021-06-05 RX ADMIN — OXYCODONE HYDROCHLORIDE AND ACETAMINOPHEN 1 TABLET: 10; 325 TABLET ORAL at 11:59

## 2021-06-05 RX ADMIN — HYDROXYZINE PAMOATE 25 MG: 25 CAPSULE ORAL at 16:45

## 2021-06-05 RX ADMIN — OXYCODONE HYDROCHLORIDE AND ACETAMINOPHEN 1 TABLET: 10; 325 TABLET ORAL at 06:04

## 2021-06-05 RX ADMIN — LOSARTAN POTASSIUM 100 MG: 50 TABLET, FILM COATED ORAL at 08:18

## 2021-06-05 RX ADMIN — INSULIN LISPRO 8 UNITS: 100 INJECTION, SOLUTION INTRAVENOUS; SUBCUTANEOUS at 12:31

## 2021-06-05 RX ADMIN — HYDROXYZINE PAMOATE 25 MG: 25 CAPSULE ORAL at 08:35

## 2021-06-05 RX ADMIN — POTASSIUM CHLORIDE 20 MEQ: 10 CAPSULE, COATED, EXTENDED RELEASE ORAL at 08:22

## 2021-06-05 RX ADMIN — OXYCODONE HYDROCHLORIDE AND ACETAMINOPHEN 1 TABLET: 10; 325 TABLET ORAL at 22:08

## 2021-06-05 RX ADMIN — SODIUM CHLORIDE 30 ML/HR: 9 INJECTION, SOLUTION INTRAVENOUS at 07:55

## 2021-06-05 RX ADMIN — BUMETANIDE 2 MG: 0.25 INJECTION INTRAMUSCULAR; INTRAVENOUS at 08:25

## 2021-06-05 RX ADMIN — NORTRIPTYLINE HYDROCHLORIDE 25 MG: 25 CAPSULE ORAL at 20:41

## 2021-06-05 RX ADMIN — WARFARIN SODIUM 2.5 MG: 2.5 TABLET ORAL at 17:13

## 2021-06-05 RX ADMIN — BISOPROLOL FUMARATE 10 MG: 5 TABLET ORAL at 08:22

## 2021-06-05 RX ADMIN — SERTRALINE HYDROCHLORIDE 25 MG: 25 TABLET ORAL at 08:23

## 2021-06-05 RX ADMIN — Medication: at 08:20

## 2021-06-05 RX ADMIN — SODIUM CHLORIDE, PRESERVATIVE FREE 3 ML: 5 INJECTION INTRAVENOUS at 20:42

## 2021-06-05 RX ADMIN — METHYLPREDNISOLONE SODIUM SUCCINATE 40 MG: 40 INJECTION, POWDER, FOR SOLUTION INTRAMUSCULAR; INTRAVENOUS at 17:13

## 2021-06-05 RX ADMIN — INSULIN DETEMIR 50 UNITS: 100 INJECTION, SOLUTION SUBCUTANEOUS at 20:40

## 2021-06-05 RX ADMIN — SODIUM CHLORIDE, PRESERVATIVE FREE 3 ML: 5 INJECTION INTRAVENOUS at 07:54

## 2021-06-05 RX ADMIN — Medication: at 20:41

## 2021-06-05 RX ADMIN — AZITHROMYCIN 500 MG: 500 INJECTION, POWDER, LYOPHILIZED, FOR SOLUTION INTRAVENOUS at 09:03

## 2021-06-05 NOTE — H&P
Patient: JUANCHO CHATMAN     Acct: [id number]     Report: #[id number]  MR #:  [id number]     DOS: 2021     : 1960  DICTATING: Carloz Shoemaker  ***Signed***  --------------------------------------------------------------------------------------------------------------------        DATE: 21      Primary Care Provider      Carloz Shoemaker      Chief Complaint      * Shortness of breath            History of Present Illness      * The patient is a 60-year-old woman, she came to the emergency department       complaints of shortness of breath, loss of taste and loss of smell      * She has been experiencing loss of taste and decrease in sense of smell for the      past 1 week but she neglected to mention this at her office appointment       yesterday      * Symptoms worsened this morning, she felt cold but did not have fever and has       been experiencing fatigue      * Patient has longstanding history of COPD and has chronic cough but this       morning there was an increase in the intensity of the cough, shortness of       breath, chest tightness and wheezing      * In the emergency department she was noted to have hypoxia on room air which       improved after the institution of supplemental oxygen      * Hemogram revealed leukocytosis and chest x-ray demonstrated multifocal       infiltrates      * The patient was seen in the office yesterday, at that time she complained of       shortness of breath and swelling of the feet, she did not complain of fever,       chills or increasing cough or wheezing      * Chest x-ray was done in the office and it showed mild pulmonary edema and       therefore Zaroxolyn was prescribed and the usual Lasix was changed to Bumex      * Currently, the patient states that she is feeling weak and extremely cold and       has audible wheezing            VTE Prophylaxis      VTE Prophylaxis ordered:  Yes            Past Medical History      * Chronic hypoxic  respiratory failure      * COPD      * Chronic combined systolic and diastolic heart failure with ejection fraction       of 25%      * Type 2 diabetes mellitus with hyperglycemia and polyneuropathy      * Lumbar disc disease with chronic back pain      * Hyperlipidemia      * Hypertension with heart disease      * Paroxysmal atrial fibrillation            Past Surgical History      * Hysterectomy            Pyschiatric History      * Bipolar disorder            Social History      * The patient is  and lives with her       * She smokes a pack of cigarettes for the past 46 years            Family History      * Daughter has bipolar disorder            Allergies      Coded Allergies:             NO KNOWN DRUG ALLERGIES (Verified  Allergy, Unknown, 6/2/21)            Home Medications      Home Meds      Reported Medications      Insulin Human Isophan/Regular (HumuLIN 70/30 VIAL) 100 Units/Ml Vial, 45 UNITS     SUBQ BID INSULIN, #1 VIAL 0 Refills         6/2/21      aMILoride HCL (aMILoride HCL) 5 Mg Tab, 10 MG PO QDAY, #60 TAB         6/2/21      Potassium Chloride (K-Dur*) 20 Meq Tab.er.prt, 40 MEQ PO QDAY, #30 TAB 0 Refills         6/2/21      hydrOXYzine Pamoate (hydrOXYzine Pamoate) 25 Mg Cap, 25 MG PO TID PRN for     ANXIETY, #60 CAP 0 Refills         6/2/21      Furosemide* (Lasix*) 80 Mg Tablet, 80 MG PO BID@09,17, #60 TAB 0 Refills         6/2/21      MDI-Albuterol (Proair HFA) 8.5 Gm Hfa.aer.ad, 2 PUFFS INH Q6H PRN for SHORTNESS     OF BREATH, #1 MDI 0 Refills         6/2/21      Losartan Potassium (Losartan*) 100 Mg Tablet, 100 MG PO QDAY, #30 TAB 0 Refills         6/2/21      Sertraline HCl (Sertraline*) 25 Mg Tablet, 25 MG PO QDAY, TAB         6/2/21      Bumetanide (BUMETANIDE) 2 Mg Tablet, 2 MG PO QDAY, #30 TAB         6/2/21      Warfarin Sod (Coumadin*) 2 Mg Tablet, 1 TAB PO QDAY         6/2/21      metOLazone (metOLazone) 10 Mg Tablet, 10 MG PO QDAY for 5 Days, #5 TAB         6/2/21       metFORMIN HCl (metFORMIN HCl) 500 Mg Tablet, 1000 MG PO BID, #120 TAB 0 Refills         6/2/21      Gabapentin (Gabapentin) 600 Mg Tablet, 600 MG PO TID, #30 TAB 0 Refills         10/14/20      NEB-Albuterol Sulf (Albuterol) 2.5 Mg/3 Ml Vial.neb, 3 ml IH Q4H PRN for     SHORTNESS OF BREATH         8/30/20      Nortriptyline HCl (Aventyl) 25 Mg Capsule, 25 MG PO HS         8/30/20      oxyCODONE-Acetaminophen  Mg (oxyCODONE-Acetaminophen  Mg) 1 Each     Tablet, 1 TAB PO Q5H PRN for pain         8/30/20      Bisoprolol-Hctz 10-6.25 Mg (Bisoprolol-Hctz 10-6.25 Mg) 1 Each Tablet, 1 TAB PO     BID         8/30/20      tiZANidine HCl (Zanaflex) 4 Mg Tab, 4 MG PO TID PRN for MUSCLE SPASMS, TAB 0     Refills         4/18/10      Discontinued Reported Medications      Apixaban (Eliquis) 5 Mg Tablet, 5 MG PO BID for 30 Days, #60 TAB         6/2/21      aMILoride HCL (aMILoride HCL) 5 Mg Tab, 10 MG PO QDAY, #60 TAB         10/14/20      Potassium Chloride (K-Dur*) 20 Meq Tab.er.prt, 20 MEQ PO BID         8/30/20      amLODIPine (amLODIPine) 10 Mg Tablet, 10 MG PO QDAY, #30 TAB 0 Refills         5/31/18      Albuterol (Proair HFA) 8.5 Gm Inh, 2 PUFFS INH RTQ6H WA PRN for SHORTNESS OF     BREATH, INH         6/14/13      Furosemide* (Lasix*) 80 Mg Tablet, 80 MG PO BID, TAB 0 Refills         4/18/10            Review of Systems      General:  Fatigue      HEENT:  No Dysphagia      Respiratory:  Cough, Dyspnea, Sputum Changes, Wheezing, Congestion, Chest     Tightness      Cardiovascular:  No Chest Pain, No Palpitations; MARLOW; No Orthopnea      Gastrointestinal:  No Abdominal Pain      Genitourinary:  No Dysuria      Musculoskeletal:  No Joint Effusions      Endocrine:  No Heat Intolerance; Cold Intolerance      Hematologic:  No Bleeding      Psychiatric:  Anxiety      Neurologic:  No Confusion      Skin:  No Rash            Exam            Vital Signs              Date Time  Temp Pulse Resp B/P (MAP) Pulse Ox O2  Delivery O2 Flow Rate FiO2             6/3/21 07:40 98.4 83 18 166/72 (94) 95 Nasal Cannula 5.00             HEENT:  Atraumatic      Neck:  Supple      Cardiovascular:  RRR      Lungs:  Diminshed Breath Sounds, Wheezes Bilaterally      Abdomen:  Normal BS all 4 Quadrants      Constitutional:  Ill appearing      Lymphatic:  No Neck      Extremities:  No Pulses Positive all 4 Ext, No Edema      Neurological:  Mental Status WNL, Alert+Ox3      Psychiatric:  Mood Normal; No Affect Normal (Anxious)      Skin:  Rash      Genitourinary:  No Bladder Distention      Lab Results                                      Laboratory Tests      6/2/21 13:19            6/3/21 05:03            Laboratory Tests      6/2/21 20:28: Capillary Blood Glucose (Chem) 298      6/2/21 23:31: Capillary Blood Glucose (Chem) 322      6/3/21 07:30: Capillary Blood Glucose (Chem) 163            Impression/Problem List      * Community-acquired pneumonia, less likely to be secondary to COVID-19 given       the negative Covid test      * Acute exacerbation of chronic respiratory failure, secondary to above      * Acute exacerbation of COPD      * Leukocytosis      * Chronic combined systolic and diastolic heart failure      * Cirrhosis of liver      * Type 2 diabetes mellitus, uncontrolled      * Lumbar disc disease with chronic back pain      * Peripheral neuropathy      * Hypertension      * Bipolar disorder      Problems:        (1) Community acquired pneumonia      (2) Acute exacerbation of chronic obstructive bronchitis      (3) Acute-on-chronic respiratory failure      (4) COPD (chronic obstructive pulmonary disease)      (5) Diabetes            Plan      * Admit to regular floor      * Repeat Covid test in the morning as the suspicion for this is still high given      the constellation of symptoms and the rapidity of their onset      * Start Rocephin and Zithromax      * Start Solu-Medrol 40 mg IV every 6 hours      * CT scan of the chest, this  was done and confirmed the presence of multifocal       infiltrates      * Start DuoNeb every 4 hours      * Start Levemir 50 units now and nightly      * Give Humalog via sliding scale      * Start losartan 100 mg daily beginning today      * Give clonidine 0.2 mg p.o. now      * Start warfarin at 3 mg daily in the morning after pro time result is known, it      should be noted that patient was on Eliquis for atrial fibrillation, she       stopped taking this medicine a week ago as she was unable to obtain it       secondary to increase in the coinsurance from $47 to $147            Carloz Shoemaker                     Jun 2, 2021 21:21      Electronically signed by Carloz Shoemaker  06/03/2021 09:55     Disclaimer: Converted hospital document may not contain table formatting or lab diagrams. Please see [facility] for authenticated document.

## 2021-06-05 NOTE — PROGRESS NOTES
[facility]   Progress Note    Patient Name: Italia Olvera  : 1960  MRN: [id number]  Primary Care Physician: Carloz Shoemaker MD  Date of admission: 2021    Subjective   Subjective     Chief Complaint: Cough and shortness of air    History of Present Illness  Patient continues to complain of shortness of air, cough and wheezing.  She also complains of pain in the back which is in fact her primary complaint.  She states that she did not sleep well last night.  Cough is productive of yellow mucus.  Blood glucose is still high.  INR is therapeutic      Review of Systems   Constitutional: Negative for appetite change.   Respiratory: Negative for chest tightness, shortness of breath and wheezing.    Cardiovascular: Positive for chest pain and leg swelling.   Gastrointestinal: Negative.  Negative for constipation and nausea.   Musculoskeletal: Positive for myalgias.   Skin: Negative for rash.       Objective   Objective     Vitals:  Temp:  [98.4 °F (36.9 °C)-98.8 °F (37.1 °C)] 98.4 °F (36.9 °C)  Heart Rate:  [84-88] 88  Resp:  [18-20] 20  BP: (157-189)/(78-82) 157/81  Flow (L/min):  [2] 2    Physical Exam  Constitutional:       Appearance: Normal appearance. She is obese.   HENT:      Head: Normocephalic and atraumatic.   Cardiovascular:      Rate and Rhythm: Normal rate and regular rhythm.   Pulmonary:      Effort: No respiratory distress.      Breath sounds: No stridor. Wheezing present. No rales.   Chest:      Chest wall: No tenderness.   Musculoskeletal:         General: Normal range of motion.   Neurological:      Mental Status: She is alert.   Psychiatric:         Mood and Affect: Mood normal.         Behavior: Behavior normal.         Result Review    Result Review:  I have personally reviewed the results from the time of this admission to 21 12:01 PM EDT and agree with these findings:  [x]  Laboratory  []  Microbiology  []  Radiology  []  EKG/Telemetry   []  Cardiology/Vascular   []   Pathology  []  Old records  []  Other:    Assessment/Plan   Assessment / Plan   Acute on chronic respiratory failure with hypoxia.  Multifocal pneumonia  Acute exacerbation of COPD  Acute exacerbation of chronic back pain  Chronic combined systolic and diastolic heart failure with ejection fraction of 25%.  Type 2 diabetes mellitus with hyperglycemia requiring insulin  Anxiety  Bipolar disorder  Atrial fibrillation    Active Hospital Problems:  Active Hospital Problems    Diagnosis    • Chronic respiratory failure with hypoxia (CMS/HCC)    • Acute on chronic respiratory failure with hypoxia (CMS/HCC)    • Chronic respiratory failure with hypoxia, on home oxygen therapy (CMS/[facility])    • Overweight (BMI 25.0-29.9)    • CAP (community acquired pneumonia)    • COPD with acute exacerbation (CMS/HCC)    • T2DM (type 2 diabetes mellitus) (CMS/[facility])- uncontrolled    • Peripheral neuropathy    • Chronic back pain    • Bipolar disorder (CMS/[facility])    • Hypertension        Plan:  Optimize glycemic management.  Continue current pain regimen.  Continue Solu-Medrol  Decrease Coumadin to 2.5 mg daily  Increase activity                    Electronically signed by Carloz Shoemaker MD, 06/05/21, 12:01 PM EDT.

## 2021-06-06 LAB
BACTERIA UR CULT: NORMAL
GLUCOSE BLDC GLUCOMTR-MCNC: 220 MG/DL (ref 70–130)
GLUCOSE BLDC GLUCOMTR-MCNC: 233 MG/DL (ref 70–130)
GLUCOSE BLDC GLUCOMTR-MCNC: 278 MG/DL (ref 70–130)
GLUCOSE BLDC GLUCOMTR-MCNC: 372 MG/DL (ref 70–130)
INR PPP: 2.84 (ref 2–3)
PROTHROMBIN TIME: 28.8 SECONDS (ref 9.4–12)

## 2021-06-06 PROCEDURE — 85610 PROTHROMBIN TIME: CPT | Performed by: INTERNAL MEDICINE

## 2021-06-06 PROCEDURE — 63710000001 INSULIN LISPRO (HUMAN) PER 5 UNITS: Performed by: INTERNAL MEDICINE

## 2021-06-06 PROCEDURE — 25010000002 CEFTRIAXONE PER 250 MG: Performed by: INTERNAL MEDICINE

## 2021-06-06 PROCEDURE — 63710000001 INSULIN DETEMIR PER 5 UNITS: Performed by: INTERNAL MEDICINE

## 2021-06-06 PROCEDURE — 82962 GLUCOSE BLOOD TEST: CPT

## 2021-06-06 PROCEDURE — 25010000002 METHYLPREDNISOLONE PER 40 MG: Performed by: INTERNAL MEDICINE

## 2021-06-06 RX ORDER — WARFARIN SODIUM 2 MG/1
2 TABLET ORAL
Status: DISCONTINUED | OUTPATIENT
Start: 2021-06-07 | End: 2021-06-12 | Stop reason: HOSPADM

## 2021-06-06 RX ADMIN — LOSARTAN POTASSIUM 100 MG: 50 TABLET, FILM COATED ORAL at 08:04

## 2021-06-06 RX ADMIN — WARFARIN SODIUM 2.5 MG: 2.5 TABLET ORAL at 18:11

## 2021-06-06 RX ADMIN — INSULIN DETEMIR 50 UNITS: 100 INJECTION, SOLUTION SUBCUTANEOUS at 21:35

## 2021-06-06 RX ADMIN — OXYCODONE HYDROCHLORIDE AND ACETAMINOPHEN 1 TABLET: 10; 325 TABLET ORAL at 04:02

## 2021-06-06 RX ADMIN — SODIUM CHLORIDE, PRESERVATIVE FREE 3 ML: 5 INJECTION INTRAVENOUS at 08:07

## 2021-06-06 RX ADMIN — HYDROXYZINE PAMOATE 25 MG: 25 CAPSULE ORAL at 15:52

## 2021-06-06 RX ADMIN — METHYLPREDNISOLONE SODIUM SUCCINATE 40 MG: 40 INJECTION, POWDER, FOR SOLUTION INTRAMUSCULAR; INTRAVENOUS at 06:18

## 2021-06-06 RX ADMIN — INSULIN LISPRO 5 UNITS: 100 INJECTION, SOLUTION INTRAVENOUS; SUBCUTANEOUS at 17:54

## 2021-06-06 RX ADMIN — HYDROXYZINE PAMOATE 25 MG: 25 CAPSULE ORAL at 08:05

## 2021-06-06 RX ADMIN — INSULIN LISPRO 8 UNITS: 100 INJECTION, SOLUTION INTRAVENOUS; SUBCUTANEOUS at 12:07

## 2021-06-06 RX ADMIN — NICOTINE 1 PATCH: 21 PATCH, EXTENDED RELEASE TRANSDERMAL at 08:07

## 2021-06-06 RX ADMIN — SODIUM CHLORIDE, PRESERVATIVE FREE 3 ML: 5 INJECTION INTRAVENOUS at 21:36

## 2021-06-06 RX ADMIN — METHYLPREDNISOLONE SODIUM SUCCINATE 40 MG: 40 INJECTION, POWDER, FOR SOLUTION INTRAMUSCULAR; INTRAVENOUS at 12:07

## 2021-06-06 RX ADMIN — METHYLPREDNISOLONE SODIUM SUCCINATE 40 MG: 40 INJECTION, POWDER, FOR SOLUTION INTRAMUSCULAR; INTRAVENOUS at 17:54

## 2021-06-06 RX ADMIN — METHYLPREDNISOLONE SODIUM SUCCINATE 40 MG: 40 INJECTION, POWDER, FOR SOLUTION INTRAMUSCULAR; INTRAVENOUS at 01:00

## 2021-06-06 RX ADMIN — HYDROXYZINE PAMOATE 25 MG: 25 CAPSULE ORAL at 21:33

## 2021-06-06 RX ADMIN — NORTRIPTYLINE HYDROCHLORIDE 25 MG: 25 CAPSULE ORAL at 21:36

## 2021-06-06 RX ADMIN — BUMETANIDE 2 MG: 0.25 INJECTION INTRAMUSCULAR; INTRAVENOUS at 08:04

## 2021-06-06 RX ADMIN — CEFTRIAXONE 1 G: 10 INJECTION, POWDER, FOR SOLUTION INTRAVENOUS at 08:03

## 2021-06-06 RX ADMIN — OXYCODONE HYDROCHLORIDE AND ACETAMINOPHEN 1 TABLET: 10; 325 TABLET ORAL at 10:06

## 2021-06-06 RX ADMIN — SERTRALINE HYDROCHLORIDE 25 MG: 25 TABLET ORAL at 08:05

## 2021-06-06 RX ADMIN — INSULIN LISPRO 5 UNITS: 100 INJECTION, SOLUTION INTRAVENOUS; SUBCUTANEOUS at 08:05

## 2021-06-06 RX ADMIN — POTASSIUM CHLORIDE 20 MEQ: 10 CAPSULE, COATED, EXTENDED RELEASE ORAL at 08:04

## 2021-06-06 RX ADMIN — SODIUM CHLORIDE 30 ML/HR: 9 INJECTION, SOLUTION INTRAVENOUS at 06:28

## 2021-06-06 RX ADMIN — OXYCODONE HYDROCHLORIDE AND ACETAMINOPHEN 1 TABLET: 10; 325 TABLET ORAL at 17:53

## 2021-06-06 RX ADMIN — BISOPROLOL FUMARATE 10 MG: 5 TABLET ORAL at 08:05

## 2021-06-06 NOTE — PROGRESS NOTES
[facility]   Progress Note    Patient Name: Italia Olvera  : 1960  MRN: [id number]  Primary Care Physician: Carloz Shoemaker MD  Date of admission: 2021    Subjective   Subjective     Chief Complaint: Cough and shortness of air    History of Present Illness  She feels better today.  She reports decrease in cough and shortness of breath.  She does not complain of fever or chills.  Back pain is unchanged.  INR is therapeutic      Review of Systems   Constitutional: Negative for appetite change.   Respiratory: Negative for chest tightness, shortness of breath and wheezing.    Cardiovascular: Positive for chest pain and leg swelling.   Gastrointestinal: Negative.  Negative for constipation and nausea.   Musculoskeletal: Positive for myalgias.   Skin: Negative for rash.       Objective   Objective     Vitals:  Temp:  [97.7 °F (36.5 °C)-98.6 °F (37 °C)] 98.4 °F (36.9 °C)  Heart Rate:  [79-86] 79  Resp:  [16-20] 18  BP: (168-182)/(68-91) 179/78  Flow (L/min):  [1.5-2] 1.5    Physical Exam  Constitutional:       Appearance: Normal appearance. She is obese.   HENT:      Head: Normocephalic and atraumatic.   Cardiovascular:      Rate and Rhythm: Normal rate and regular rhythm.   Pulmonary:      Effort: No respiratory distress.      Breath sounds: No stridor. Wheezing present. No rales.   Chest:      Chest wall: No tenderness.   Musculoskeletal:         General: Normal range of motion.   Neurological:      Mental Status: She is alert.   Psychiatric:         Mood and Affect: Mood normal.         Behavior: Behavior normal.         Result Review    Result Review:  I have personally reviewed the results from the time of this admission to 21 12:01 PM EDT and agree with these findings:  [x]  Laboratory  []  Microbiology  []  Radiology  []  EKG/Telemetry   []  Cardiology/Vascular   []  Pathology  []  Old records  []  Other:    Assessment/Plan   Assessment / Plan   Acute on chronic respiratory failure with  hypoxia.  Multifocal pneumonia  Acute exacerbation of COPD  Acute exacerbation of chronic back pain  Chronic combined systolic and diastolic heart failure with ejection fraction of 25%.  Type 2 diabetes mellitus with hyperglycemia requiring insulin  Anxiety  Bipolar disorder  Atrial fibrillation    Active Hospital Problems:  Active Hospital Problems    Diagnosis    • Chronic respiratory failure with hypoxia (CMS/HCC)    • Acute on chronic respiratory failure with hypoxia (CMS/HCC)    • Chronic respiratory failure with hypoxia, on home oxygen therapy (CMS/HCC)    • Overweight (BMI 25.0-29.9)    • CAP (community acquired pneumonia)    • COPD with acute exacerbation (CMS/[facility])    • T2DM (type 2 diabetes mellitus) (CMS/[facility])- uncontrolled    • Peripheral neuropathy    • Chronic back pain    • Bipolar disorder (CMS/[facility])    • Hypertension        Plan:  Decrease to Coumadin 2 mg daily.  Continue Rocephin  Continue Solu-Medrol  Order physical therapy  Increase activity                  Electronically signed by Carloz Shoemaker MD, 21, 12:01 PM EDT.            [facility]   Progress Note    Patient Name: Italia Olvera  : 1960  MRN: [id number]  Primary Care Physician: Carloz Shoemaker MD  Date of admission: 2021    Subjective   Subjective             Review of Systems    Objective   Objective     Vitals:  Temp:  [97.3 °F (36.3 °C)-98.6 °F (37 °C)] 98.4 °F (36.9 °C)  Heart Rate:  [79-86] 79  Resp:  [16-20] 18  BP: (168-188)/(68-91) 179/78  Flow (L/min):  [1.5-2] 1.5    Physical Exam    Result Review    Result Review:  I have personally reviewed the results from the time of this admission to 21 5:07 PM EDT and agree with these findings:  []  Laboratory  []  Microbiology  []  Radiology  []  EKG/Telemetry   []  Cardiology/Vascular   []  Pathology  []  Old records  []  Other:    Assessment/Plan   Assessment / Plan       Active Hospital Problems:  Active Hospital Problems    Diagnosis    • Chronic  respiratory failure with hypoxia (CMS/HCC)    • Acute on chronic respiratory failure with hypoxia (CMS/HCC)    • Chronic respiratory failure with hypoxia, on home oxygen therapy (CMS/HCC)    • Overweight (BMI 25.0-29.9)    • CAP (community acquired pneumonia)    • COPD with acute exacerbation (CMS/HCC)    • T2DM (type 2 diabetes mellitus) (CMS/HCC)- uncontrolled    • Peripheral neuropathy    • Chronic back pain    • Bipolar disorder (CMS/HCC)    • Hypertension                            Electronically signed by Carloz Shoemaker MD, 06/06/21, 5:07 PM EDT.

## 2021-06-06 NOTE — PLAN OF CARE
Goal Outcome Evaluation:      Patient presented with less mood swings overnight. Appeared to remember more over the shift. Rested well overnight. Lungs unchanged. Patient weaned to 1.5 L O2 NC at 97%. BP slightly elevated overnight x2 prior to pain med administration. Denies all other complaints at this time.

## 2021-06-06 NOTE — PLAN OF CARE
Goal Outcome Evaluation:   PT STABLE THIS SHIFT. CONTINUES TO HAVE INTERMITTENT FORGETFULNESS CONCERNING HER MEDICATION SCHEDULE ESPECIALLY HER ANXIETY AND PAIN MEDICATION.  PT DOES HAVE FREQUENT MOOD SWINGS. PT CONTINUING TO HAVE LOWER EXTREMITY WEAKNESS.

## 2021-06-07 LAB
ALBUMIN SERPL-MCNC: 2.4 G/DL (ref 3.5–5.2)
ALBUMIN/GLOB SERPL: 0.7 G/DL
ALP SERPL-CCNC: 227 U/L (ref 39–117)
ALT SERPL W P-5'-P-CCNC: 107 U/L (ref 1–33)
ANION GAP SERPL CALCULATED.3IONS-SCNC: 13.2 MMOL/L (ref 5–15)
ANISOCYTOSIS BLD QL: NORMAL
AST SERPL-CCNC: 79 U/L (ref 1–32)
BACTERIA BLD CULT: NORMAL
BACTERIA BLD CULT: NORMAL
BASOPHILS # BLD AUTO: 0.02 10*3/MM3 (ref 0–0.2)
BASOPHILS NFR BLD AUTO: 0.2 % (ref 0–1.5)
BILIRUB SERPL-MCNC: 0.3 MG/DL (ref 0–1.2)
BUN SERPL-MCNC: 34 MG/DL (ref 8–23)
BUN/CREAT SERPL: 65.4 (ref 7–25)
BURR CELLS BLD QL SMEAR: NORMAL
CALCIUM SPEC-SCNC: 8.4 MG/DL (ref 8.6–10.5)
CHLORIDE SERPL-SCNC: 102 MMOL/L (ref 98–107)
CO2 SERPL-SCNC: 17.8 MMOL/L (ref 22–29)
CREAT SERPL-MCNC: 0.52 MG/DL (ref 0.57–1)
DACRYOCYTES BLD QL SMEAR: NORMAL
DEPRECATED RDW RBC AUTO: 66.5 FL (ref 37–54)
EOSINOPHIL # BLD AUTO: 0 10*3/MM3 (ref 0–0.4)
EOSINOPHIL NFR BLD AUTO: 0 % (ref 0.3–6.2)
ERYTHROCYTE [DISTWIDTH] IN BLOOD BY AUTOMATED COUNT: 24.1 % (ref 12.3–15.4)
GFR SERPL CREATININE-BSD FRML MDRD: 120 ML/MIN/1.73
GLOBULIN UR ELPH-MCNC: 3.3 GM/DL
GLUCOSE BLDC GLUCOMTR-MCNC: 282 MG/DL (ref 70–130)
GLUCOSE BLDC GLUCOMTR-MCNC: 304 MG/DL (ref 70–130)
GLUCOSE BLDC GLUCOMTR-MCNC: 304 MG/DL (ref 70–130)
GLUCOSE BLDC GLUCOMTR-MCNC: 369 MG/DL (ref 70–130)
GLUCOSE SERPL-MCNC: 323 MG/DL (ref 65–99)
HCT VFR BLD AUTO: 46.2 % (ref 34–46.6)
HGB BLD-MCNC: 13.1 G/DL (ref 12–15.9)
IMM GRANULOCYTES # BLD AUTO: 0.04 10*3/MM3 (ref 0–0.05)
IMM GRANULOCYTES NFR BLD AUTO: 0.4 % (ref 0–0.5)
INR PPP: 2.23 (ref 2–3)
LYMPHOCYTES # BLD AUTO: 0.26 10*3/MM3 (ref 0.7–3.1)
LYMPHOCYTES NFR BLD AUTO: 2.7 % (ref 19.6–45.3)
MACROCYTES BLD QL SMEAR: NORMAL
MCH RBC QN AUTO: 22.6 PG (ref 26.6–33)
MCHC RBC AUTO-ENTMCNC: 28.4 G/DL (ref 31.5–35.7)
MCV RBC AUTO: 79.8 FL (ref 79–97)
MICROCYTES BLD QL: NORMAL
MONOCYTES # BLD AUTO: 0.57 10*3/MM3 (ref 0.1–0.9)
MONOCYTES NFR BLD AUTO: 5.9 % (ref 5–12)
NEUTROPHILS NFR BLD AUTO: 8.85 10*3/MM3 (ref 1.7–7)
NEUTROPHILS NFR BLD AUTO: 90.8 % (ref 42.7–76)
NRBC BLD AUTO-RTO: 0 /100 WBC (ref 0–0.2)
OVALOCYTES BLD QL SMEAR: NORMAL
PLAT MORPH BLD: NORMAL
PLATELET # BLD AUTO: 107 10*3/MM3 (ref 140–450)
PMV BLD AUTO: ABNORMAL FL
POIKILOCYTOSIS BLD QL SMEAR: NORMAL
POTASSIUM SERPL-SCNC: 4.9 MMOL/L (ref 3.5–5.2)
PROT SERPL-MCNC: 5.7 G/DL (ref 6–8.5)
PROTHROMBIN TIME: 22.5 SECONDS (ref 9.4–12)
RBC # BLD AUTO: 5.79 10*6/MM3 (ref 3.77–5.28)
SODIUM SERPL-SCNC: 133 MMOL/L (ref 136–145)
WBC # BLD AUTO: 9.74 10*3/MM3 (ref 3.4–10.8)
WBC # BLD AUTO: 9.74 10*3/MM3 (ref 3.4–10.8)
WBC MORPH BLD: NORMAL

## 2021-06-07 PROCEDURE — 82962 GLUCOSE BLOOD TEST: CPT

## 2021-06-07 PROCEDURE — 85007 BL SMEAR W/DIFF WBC COUNT: CPT | Performed by: INTERNAL MEDICINE

## 2021-06-07 PROCEDURE — 94799 UNLISTED PULMONARY SVC/PX: CPT

## 2021-06-07 PROCEDURE — 63710000001 INSULIN LISPRO (HUMAN) PER 5 UNITS: Performed by: INTERNAL MEDICINE

## 2021-06-07 PROCEDURE — 80053 COMPREHEN METABOLIC PANEL: CPT | Performed by: INTERNAL MEDICINE

## 2021-06-07 PROCEDURE — 25010000002 METHYLPREDNISOLONE PER 40 MG: Performed by: INTERNAL MEDICINE

## 2021-06-07 PROCEDURE — 85610 PROTHROMBIN TIME: CPT | Performed by: INTERNAL MEDICINE

## 2021-06-07 PROCEDURE — 97161 PT EVAL LOW COMPLEX 20 MIN: CPT

## 2021-06-07 PROCEDURE — 63710000001 INSULIN DETEMIR PER 5 UNITS: Performed by: INTERNAL MEDICINE

## 2021-06-07 PROCEDURE — 25010000002 CEFTRIAXONE PER 250 MG: Performed by: INTERNAL MEDICINE

## 2021-06-07 PROCEDURE — 85025 COMPLETE CBC W/AUTO DIFF WBC: CPT | Performed by: INTERNAL MEDICINE

## 2021-06-07 PROCEDURE — 63710000001 PREDNISONE PER 1 MG: Performed by: INTERNAL MEDICINE

## 2021-06-07 RX ORDER — METOLAZONE 5 MG/1
10 TABLET ORAL ONCE
Status: COMPLETED | OUTPATIENT
Start: 2021-06-07 | End: 2021-06-07

## 2021-06-07 RX ORDER — LABETALOL HYDROCHLORIDE 5 MG/ML
20 INJECTION, SOLUTION INTRAVENOUS EVERY 4 HOURS PRN
Status: DISCONTINUED | OUTPATIENT
Start: 2021-06-07 | End: 2021-06-12 | Stop reason: HOSPADM

## 2021-06-07 RX ORDER — PREDNISONE 20 MG/1
20 TABLET ORAL 2 TIMES DAILY WITH MEALS
Status: DISCONTINUED | OUTPATIENT
Start: 2021-06-07 | End: 2021-06-09

## 2021-06-07 RX ORDER — FAMOTIDINE 20 MG/1
40 TABLET, FILM COATED ORAL
Status: COMPLETED | OUTPATIENT
Start: 2021-06-07 | End: 2021-06-09

## 2021-06-07 RX ORDER — LABETALOL HYDROCHLORIDE 5 MG/ML
20 INJECTION, SOLUTION INTRAVENOUS ONCE
Status: COMPLETED | OUTPATIENT
Start: 2021-06-07 | End: 2021-06-07

## 2021-06-07 RX ORDER — BUMETANIDE 1 MG/1
2 TABLET ORAL 2 TIMES DAILY
Status: DISCONTINUED | OUTPATIENT
Start: 2021-06-07 | End: 2021-06-09

## 2021-06-07 RX ADMIN — CEFTRIAXONE 1 G: 10 INJECTION, POWDER, FOR SOLUTION INTRAVENOUS at 09:32

## 2021-06-07 RX ADMIN — OXYCODONE HYDROCHLORIDE AND ACETAMINOPHEN 1 TABLET: 10; 325 TABLET ORAL at 14:48

## 2021-06-07 RX ADMIN — INSULIN LISPRO 10 UNITS: 100 INJECTION, SOLUTION INTRAVENOUS; SUBCUTANEOUS at 17:15

## 2021-06-07 RX ADMIN — METHYLPREDNISOLONE SODIUM SUCCINATE 40 MG: 40 INJECTION, POWDER, FOR SOLUTION INTRAMUSCULAR; INTRAVENOUS at 06:36

## 2021-06-07 RX ADMIN — OXYCODONE HYDROCHLORIDE AND ACETAMINOPHEN 1 TABLET: 10; 325 TABLET ORAL at 07:16

## 2021-06-07 RX ADMIN — SODIUM CHLORIDE, PRESERVATIVE FREE 3 ML: 5 INJECTION INTRAVENOUS at 08:19

## 2021-06-07 RX ADMIN — BUMETANIDE 2 MG: 0.25 INJECTION INTRAMUSCULAR; INTRAVENOUS at 08:17

## 2021-06-07 RX ADMIN — BUMETANIDE 2 MG: 1 TABLET ORAL at 17:14

## 2021-06-07 RX ADMIN — SODIUM CHLORIDE 30 ML/HR: 9 INJECTION, SOLUTION INTRAVENOUS at 07:16

## 2021-06-07 RX ADMIN — METHYLPREDNISOLONE SODIUM SUCCINATE 40 MG: 40 INJECTION, POWDER, FOR SOLUTION INTRAMUSCULAR; INTRAVENOUS at 00:56

## 2021-06-07 RX ADMIN — METOLAZONE 10 MG: 5 TABLET ORAL at 10:33

## 2021-06-07 RX ADMIN — INSULIN LISPRO 12 UNITS: 100 INJECTION, SOLUTION INTRAVENOUS; SUBCUTANEOUS at 12:28

## 2021-06-07 RX ADMIN — LOSARTAN POTASSIUM 100 MG: 50 TABLET, FILM COATED ORAL at 08:16

## 2021-06-07 RX ADMIN — INSULIN DETEMIR 75 UNITS: 100 INJECTION, SOLUTION SUBCUTANEOUS at 21:14

## 2021-06-07 RX ADMIN — PREDNISONE 20 MG: 20 TABLET ORAL at 17:14

## 2021-06-07 RX ADMIN — HYDROXYZINE PAMOATE 25 MG: 25 CAPSULE ORAL at 15:59

## 2021-06-07 RX ADMIN — HYDROXYZINE PAMOATE 25 MG: 25 CAPSULE ORAL at 08:17

## 2021-06-07 RX ADMIN — NORTRIPTYLINE HYDROCHLORIDE 25 MG: 25 CAPSULE ORAL at 21:06

## 2021-06-07 RX ADMIN — BISOPROLOL FUMARATE 10 MG: 5 TABLET ORAL at 08:16

## 2021-06-07 RX ADMIN — NICOTINE 1 PATCH: 21 PATCH, EXTENDED RELEASE TRANSDERMAL at 08:20

## 2021-06-07 RX ADMIN — SERTRALINE HYDROCHLORIDE 25 MG: 25 TABLET ORAL at 08:16

## 2021-06-07 RX ADMIN — WARFARIN SODIUM 2 MG: 2 TABLET ORAL at 15:59

## 2021-06-07 RX ADMIN — LABETALOL 20 MG/4 ML (5 MG/ML) INTRAVENOUS SYRINGE 20 MG: at 05:22

## 2021-06-07 RX ADMIN — OXYCODONE HYDROCHLORIDE AND ACETAMINOPHEN 1 TABLET: 10; 325 TABLET ORAL at 21:06

## 2021-06-07 RX ADMIN — LABETALOL 20 MG/4 ML (5 MG/ML) INTRAVENOUS SYRINGE 20 MG: at 10:34

## 2021-06-07 RX ADMIN — POTASSIUM CHLORIDE 20 MEQ: 10 CAPSULE, COATED, EXTENDED RELEASE ORAL at 08:16

## 2021-06-07 RX ADMIN — LABETALOL 20 MG/4 ML (5 MG/ML) INTRAVENOUS SYRINGE 20 MG: at 15:59

## 2021-06-07 RX ADMIN — INSULIN LISPRO 8 UNITS: 100 INJECTION, SOLUTION INTRAVENOUS; SUBCUTANEOUS at 08:17

## 2021-06-07 RX ADMIN — OXYCODONE HYDROCHLORIDE AND ACETAMINOPHEN 1 TABLET: 10; 325 TABLET ORAL at 00:57

## 2021-06-07 RX ADMIN — SODIUM CHLORIDE, PRESERVATIVE FREE 3 ML: 5 INJECTION INTRAVENOUS at 21:10

## 2021-06-07 RX ADMIN — LABETALOL 20 MG/4 ML (5 MG/ML) INTRAVENOUS SYRINGE 20 MG: at 21:05

## 2021-06-07 RX ADMIN — HYDROXYZINE PAMOATE 25 MG: 25 CAPSULE ORAL at 21:06

## 2021-06-07 NOTE — THERAPY EVALUATION
Acute Care - Physical Therapy Initial Evaluation   [place]     Patient Name: Italia Olvera  : 1960  MRN: [id number]  Today's Date: 2021           PT Assessment (last 12 hours)      PT Evaluation and Treatment     Row Name 21 1115          Physical Therapy Time and Intention    Subjective Information  no complaints  -CS     Document Type  evaluation  -CS     Mode of Treatment  individual therapy  -CS     Row Name 21 1115          General Information    Patient Profile Reviewed  yes  -CS     Prior Level of Function  independent:;w/c or scooter;min assist:;transfer;gait;ADL's  -CS     Equipment Currently Used at Home  oxygen;wheelchair, motorized;commode, 3-in-1;walker, rolling  -CS     Barriers to Rehab  physical barrier  -CS     Row Name 21 1115          Previous Level of Function/Home Environm    Household Ambulation, Premorbid Functional Level  needs assistive device for safe performance  -CS     Community Ambulation, Premorbid Functional Level  needs assistive device for safe performance  -CS     Row Name 21 1115          Living Environment    Current Living Arrangements  home/apartment/condo  -CS     Home Accessibility  wheelchair accessible  -CS     Lives With  spouse;grandchild(bruno)  -CS     Row Name 21 1115          Home Use of Assistive/Adaptive Equipment    Equipment Currently Used at Home  wheelchair, motorized;walker, rolling;oxygen  -CS     Row Name 21 1115          Cognition    Orientation Status (Cognition)  oriented x 3  -     Row Name 21 1115          Pain Scale: Word Pre/Post-Treatment    Pain: Word Scale, Pretreatment  0 - no pain  -CS     Posttreatment Pain Rating  0 - no pain  -CS     Row Name 21 1115          Range of Motion Comprehensive    General Range of Motion  bilateral lower extremity ROM WFL  -CS     Row Name 21 1115          Strength Comprehensive (MMT)    General Manual Muscle Testing (MMT) Assessment  lower  extremity strength deficits identified  -     Comment, General Manual Muscle Testing (MMT) Assessment  3+/5  -     Row Name 06/07/21 1115          Bed Mobility    Bed Mobility  bed mobility (all) activities  -     All Activities, [place] (Bed Mobility)  maximum assist (25% patient effort)  -     Assistive Device (Bed Mobility)  bed rails  -     Row Name 06/07/21 1115          Transfers    Transfers  sit-stand transfer;stand-sit transfer  -     Sit-Stand [place] (Transfers)  maximum assist (25% patient effort)  -     Stand-Sit [place] (Transfers)  maximum assist (25% patient effort)  -     Row Name 06/07/21 1115          Safety Issues, Functional Mobility    Impairments Affecting Function (Mobility)  strength;balance;endurance/activity tolerance  -     Row Name             Wound 06/03/21 1300 medial sacral spine Pressure Injury    Wound - Properties Group Placement Date: 06/03/21  -BJ Placement Time: 1300  -BJ Present on Hospital Admission: Y  -BJ Orientation: medial  -BJ Location: sacral spine  -BJ Primary Wound Type: Pressure inj  -BJ Stage, Pressure Injury : Stage 2  -BJ Additional Comments: normal saline; aquacel; mepilex changed 6/4/21  -BJ    Retired Wound - Properties Group Date first assessed: 06/03/21  -BJ Time first assessed: 1300  -BJ Present on Hospital Admission: Y  -BJ Location: sacral spine  -BJ Primary Wound Type: Pressure inj  -BJ Additional Comments: normal saline; aquacel; mepilex changed 6/4/21  -    Row Name 06/07/21 1115          Plan of Care Review    Plan of Care Reviewed With  patient  -CS     Progress  no change  -     Outcome Summary  Pt presents with limitations that impede his ability to safely and independently transfer and ambulate. The skills of a therapist will be required to safely and effectively implement the following treatment plan to restore maximal level of function.  -     Row Name 06/07/21 1115          Physical Therapy Goals    Transfer  Goal Selection (PT)  transfer, PT goal 1  -CS     Gait Training Goal Selection (PT)  gait training, PT goal 1  -CS     Strength Goal Selection (PT)  strength, PT goal 1  -CS     Row Name 06/07/21 1115          Transfer Goal 1 (PT)    Activity/Assistive Device (Transfer Goal 1, PT)  transfers, all;walker, rolling  -CS     [place]/Cues Needed (Transfer Goal 1, PT)  supervision required  -CS     Time Frame (Transfer Goal 1, PT)  10 days  -CS     Row Name 06/07/21 1115          Gait Training Goal 1 (PT)    Activity/Assistive Device (Gait Training Goal 1, PT)  gait (walking locomotion);walker, rolling  -CS     [place] (Gait Training Goal 1, PT)  contact guard assist  -CS     Distance (Gait Training Goal 1, PT)  25  -CS     Time Frame (Gait Training Goal 1, PT)  10 days  -CS     Row Name 06/07/21 1115          Strength Goal 1 (PT)    Strength Goal 1 (PT)  4+/5  -CS     Time Frame (Strength Goal 1, PT)  10 days  -CS     Row Name 06/07/21 1115          Therapy Assessment/Plan (PT)    Rehab Potential (PT)  good, to achieve stated therapy goals  -CS     Problem List (PT)  strength;mobility;balance  -CS     Row Name 06/07/21 1115          PT Evaluation Complexity    History, PT Evaluation Complexity  no personal factors and/or comorbidities  -CS     Examination of Body Systems (PT Eval Complexity)  total of 4 or more elements  -CS     Clinical Presentation (PT Evaluation Complexity)  stable  -CS     Clinical Decision Making (PT Evaluation Complexity)  low complexity  -CS     Overall Complexity (PT Evaluation Complexity)  low complexity  -CS       User Key  (r) = Recorded By, (t) = Taken By, (c) = Cosigned By    Initials Name Provider Type    Galilea Vences, RN Registered Nurse    CS Alix Miramontes, PT Physical Therapist        Physical Therapy Education                 Title: PT OT SLP Therapies (Done)     Topic: Physical Therapy (Done)     Point: Mobility training (Done)     Learning Progress  Summary           Patient Acceptance, E,TB, VU by  at 6/7/2021 1145                   Point: Home exercise program (Done)     Learning Progress Summary           Patient Acceptance, E,TB, VU by  at 6/7/2021 1145                   Point: Body mechanics (Done)     Learning Progress Summary           Patient Acceptance, E,TB, VU by  at 6/7/2021 1145                   Point: Precautions (Done)     Learning Progress Summary           Patient Acceptance, E,TB, VU by  at 6/7/2021 1145                               User Key     Initials Effective Dates Name Provider Type Discipline     04/25/21 -  Alix Miramontes, PT Physical Therapist PT              PT Recommendation and Plan  Anticipated Discharge Disposition (PT): inpatient rehabilitation facility, sub acute care setting  Planned Therapy Interventions (PT): gait training, transfer training, strengthening  Therapy Frequency (PT): daily  Plan of Care Reviewed With: patient  Progress: no change  Outcome Summary: Pt presents with limitations that impede his ability to safely and independently transfer and ambulate. The skills of a therapist will be required to safely and effectively implement the following treatment plan to restore maximal level of function.  Outcome Measures     Row Name 06/07/21 1137             How much help from another person do you currently need...    Turning from your back to your side while in flat bed without using bedrails?  2  -CS      Moving from lying on back to sitting on the side of a flat bed without bedrails?  2  -CS      Moving to and from a bed to a chair (including a wheelchair)?  2  -CS      Standing up from a chair using your arms (e.g., wheelchair, bedside chair)?  2  -CS      Climbing 3-5 steps with a railing?  1  -CS      To walk in hospital room?  2  -CS      AM-PAC 6 Clicks Score (PT)  11  -CS         Functional Assessment    Outcome Measure Options  AM-PAC 6 Clicks Basic Mobility (PT)  -        User Key  (r) =  Recorded By, (t) = Taken By, (c) = Cosigned By    Initials Name Provider Type    CS Alix Miramontes, PT Physical Therapist           Time Calculation:   PT Charges     Row Name 06/07/21 1108             Time Calculation    PT Received On  06/07/21  -CS      PT Goal Re-Cert Due Date  06/17/21  -CS         Timed Charges    20483 - PT Therapeutic Activity Minutes  --  -CS         Untimed Charges    PT Eval/Re-eval Minutes  48  -CS         Total Minutes    Timed Charges Total Minutes  --  -CS      Untimed Charges Total Minutes  48  -CS       Total Minutes  48  -CS        User Key  (r) = Recorded By, (t) = Taken By, (c) = Cosigned By    Initials Name Provider Type    Alix Powell, PT Physical Therapist        Therapy Charges for Today     Code Description Service Date Service Provider Modifiers Qty    43737174316 HC PT EVAL LOW COMPLEXITY 4 6/7/2021 Alix Miramontes PT GP 1          PT G-Codes  Outcome Measure Options: AM-PAC 6 Clicks Basic Mobility (PT)  AM-PAC 6 Clicks Score (PT): 11    Alix Miramontes PT  6/7/2021

## 2021-06-07 NOTE — PLAN OF CARE
Goal Outcome Evaluation:  Plan of Care Reviewed With: patient        Progress: no change  Outcome Summary: Pt presents with limitations that impede his ability to safely and independently transfer and ambulate. The skills of a therapist will be required to safely and effectively implement the following treatment plan to restore maximal level of function.

## 2021-06-07 NOTE — PLAN OF CARE
Problem: Fluid Imbalance (Pneumonia)  Goal: Fluid Balance  Outcome: Ongoing, Progressing     Problem: Infection (Pneumonia)  Goal: Resolution of Infection Signs and Symptoms  Outcome: Ongoing, Progressing     Problem: Respiratory Compromise (Pneumonia)  Goal: Effective Oxygenation and Ventilation  Outcome: Ongoing, Progressing     Problem: Pain Chronic (Persistent)  Goal: Acceptable Pain Control and Functional Ability  Outcome: Ongoing, Progressing     Problem: Wound  Goal: Optimal Wound Healing  Outcome: Ongoing, Progressing     Problem: Fall Injury Risk  Goal: Absence of Fall and Fall-Related Injury  Outcome: Ongoing, Progressing     Problem: Adult Inpatient Plan of Care  Goal: Plan of Care Review  Outcome: Ongoing, Progressing  Flowsheets (Taken 6/7/2021 1705)  Progress: no change  Plan of Care Reviewed With: patient  Outcome Summary: PT HAS BEEN HAVING HIGH BLOOD PRESSURE THROUGHOUT SHIFT. MD IS ADWARE OF PT HIGH BLOOD PRESSURE AND HE HAD PRN BLOOD PRESSURE TO BE GIVEN IF BP SYSTOLIC  IS ABOVE 160 Q4H. BEEN GIVING PRN BP AS ORDERED. PT STATES SHE IS NOT HAVING ANY OTHER S/S BESIDES HER ACID REFLUX AT LUNCH. MOHAN RN  Goal: Patient-Specific Goal (Individualized)  Outcome: Ongoing, Progressing  Goal: Absence of Hospital-Acquired Illness or Injury  Outcome: Ongoing, Progressing  Goal: Optimal Comfort and Wellbeing  Outcome: Ongoing, Progressing  Goal: Readiness for Transition of Care  Outcome: Ongoing, Progressing     Problem: Skin Injury Risk Increased  Goal: Skin Health and Integrity  Outcome: Ongoing, Progressing   Goal Outcome Evaluation:  Plan of Care Reviewed With: patient        Progress: no change  Outcome Summary: PT HAS BEEN HAVING HIGH BLOOD PRESSURE THROUGHOUT SHIFT. MD IS ADWARE OF PT HIGH BLOOD PRESSURE AND HE HAD PRN BLOOD PRESSURE TO BE GIVEN IF BP SYSTOLIC  IS ABOVE 160 Q4H. BEEN GIVING PRN BP AS ORDERED. PT STATES SHE IS NOT HAVING ANY OTHER S/S BESIDES HER ACID REFLUX AT LUNCH. AP RN

## 2021-06-07 NOTE — PROGRESS NOTES
[facility]   Progress Note    Patient Name: Italia Olvera  : 1960  MRN: [id number]  Primary Care Physician: Carloz Shoemaker MD  Date of admission: 2021    Subjective   Subjective     Chief Complaint:  Follow-up on multifocal pneumonia    History of Present Illness   Still complains of cough which is worse at night, shortness of breath and chest tightness is decreased  Blood pressure at 4:00 this morning was greater than 200 mmHg, labetalol 20 mg IV push was given  Complaints of back and joint pain  Blood glucose is still high  Leukocytosis which was present on admission has resolved        Review of Systems   Respiratory: Positive for cough, shortness of breath and wheezing. Negative for apnea, chest tightness and stridor.    Cardiovascular: Positive for leg swelling. Negative for chest pain.       Objective   Objective     Vitals:  Temp:  [97.5 °F (36.4 °C)-98.4 °F (36.9 °C)] 97.7 °F (36.5 °C)  Heart Rate:  [78-81] 78  Resp:  [18-20] 18  BP: (179-210)/(74-99) 200/90  Flow (L/min):  [1.5-2] 2    Physical Exam  Constitutional:       Appearance: Normal appearance.   HENT:      Head: Normocephalic and atraumatic.   Cardiovascular:      Rate and Rhythm: Normal rate and regular rhythm.      Pulses: Normal pulses.      Heart sounds: No gallop.    Pulmonary:      Effort: Pulmonary effort is normal.      Breath sounds: Wheezing present.   Skin:     General: Skin is warm.   Neurological:      Mental Status: She is alert.   Psychiatric:         Mood and Affect: Mood normal.         Behavior: Behavior normal.         Thought Content: Thought content normal.         Judgment: Judgment normal.         Result Review    Result Review:  I have personally reviewed the results from the time of this admission to 21 9:48 AM EDT and agree with these findings:  [x]  Laboratory  []  Microbiology  []  Radiology  []  EKG/Telemetry   []  Cardiology/Vascular   []  Pathology  []  Old records  []   Other:    Assessment/Plan   Assessment / Plan   Community-acquired pneumonia  Acute exacerbation of COPD  Acute on chronic respiratory failure with hypoxia  Uncontrolled blood pressure  Type 2 diabetes mellitus, uncontrolled  Chronic combined congestive heart failure  Paroxysmal atrial fibrillation  Chronic back pain  Anxiety  Bipolar disorder    Active Hospital Problems:  Active Hospital Problems    Diagnosis    • Chronic respiratory failure with hypoxia (CMS/[facility])    • Acute on chronic respiratory failure with hypoxia (CMS/[facility])    • Chronic respiratory failure with hypoxia, on home oxygen therapy (CMS/[facility])    • Overweight (BMI 25.0-29.9)    • CAP (community acquired pneumonia)    • COPD with acute exacerbation (CMS/[facility])    • T2DM (type 2 diabetes mellitus) (CMS/[facility])- uncontrolled    • Peripheral neuropathy    • Chronic back pain    • Bipolar disorder (CMS/[facility])    • Hypertension        Plan:  Stop Solu-Medrol  Start prednisone 20 mg p.o. twice daily  Continue Rocephin  Increase to Levemir 75 units at bedtime  Stop IV Bumex  Start Bumex 2 mg p.o. twice daily  Give Zaroxolyn 10 mg p.o. times once  Start labetalol 20 mg IV every 4 hours as needed if systolic blood pressures greater than 160 mmHg  Continue Coumadin at current dose  Increase activity                    Electronically signed by Carloz Shoemaker MD, 06/07/21, 9:48 AM EDT.

## 2021-06-07 NOTE — PLAN OF CARE
Goal Outcome Evaluation:        Outcome Summary: PT. BP ELEVATED, MD NOTIFIED, ONE TIME IV LABETALOL GIVEN.

## 2021-06-08 LAB
GLUCOSE BLDC GLUCOMTR-MCNC: 110 MG/DL (ref 70–130)
GLUCOSE BLDC GLUCOMTR-MCNC: 113 MG/DL (ref 70–130)
GLUCOSE BLDC GLUCOMTR-MCNC: 146 MG/DL (ref 70–130)
GLUCOSE BLDC GLUCOMTR-MCNC: 46 MG/DL (ref 70–130)
GLUCOSE BLDC GLUCOMTR-MCNC: 51 MG/DL (ref 70–130)
GLUCOSE BLDC GLUCOMTR-MCNC: 73 MG/DL (ref 70–130)
GLUCOSE BLDC GLUCOMTR-MCNC: 76 MG/DL (ref 70–130)
INR PPP: 2.46 (ref 2–3)
PROTHROMBIN TIME: 25 SECONDS (ref 9.4–12)

## 2021-06-08 PROCEDURE — 94760 N-INVAS EAR/PLS OXIMETRY 1: CPT

## 2021-06-08 PROCEDURE — 94799 UNLISTED PULMONARY SVC/PX: CPT

## 2021-06-08 PROCEDURE — 25010000002 ONDANSETRON PER 1 MG: Performed by: INTERNAL MEDICINE

## 2021-06-08 PROCEDURE — 63710000001 INSULIN DETEMIR PER 5 UNITS: Performed by: INTERNAL MEDICINE

## 2021-06-08 PROCEDURE — 63710000001 PREDNISONE PER 1 MG: Performed by: INTERNAL MEDICINE

## 2021-06-08 PROCEDURE — 82962 GLUCOSE BLOOD TEST: CPT

## 2021-06-08 PROCEDURE — 85610 PROTHROMBIN TIME: CPT | Performed by: INTERNAL MEDICINE

## 2021-06-08 PROCEDURE — 25010000002 CEFTRIAXONE PER 250 MG: Performed by: INTERNAL MEDICINE

## 2021-06-08 RX ADMIN — LABETALOL 20 MG/4 ML (5 MG/ML) INTRAVENOUS SYRINGE 20 MG: at 05:41

## 2021-06-08 RX ADMIN — POTASSIUM CHLORIDE 20 MEQ: 10 CAPSULE, COATED, EXTENDED RELEASE ORAL at 08:52

## 2021-06-08 RX ADMIN — CEFTRIAXONE 1 G: 10 INJECTION, POWDER, FOR SOLUTION INTRAVENOUS at 08:56

## 2021-06-08 RX ADMIN — LOSARTAN POTASSIUM 100 MG: 50 TABLET, FILM COATED ORAL at 08:51

## 2021-06-08 RX ADMIN — FAMOTIDINE 40 MG: 20 TABLET ORAL at 17:00

## 2021-06-08 RX ADMIN — NICOTINE 1 PATCH: 21 PATCH, EXTENDED RELEASE TRANSDERMAL at 08:51

## 2021-06-08 RX ADMIN — INSULIN DETEMIR 75 UNITS: 100 INJECTION, SOLUTION SUBCUTANEOUS at 22:13

## 2021-06-08 RX ADMIN — SODIUM CHLORIDE 30 ML/HR: 9 INJECTION, SOLUTION INTRAVENOUS at 06:51

## 2021-06-08 RX ADMIN — HYDROXYZINE PAMOATE 25 MG: 25 CAPSULE ORAL at 08:52

## 2021-06-08 RX ADMIN — PREDNISONE 20 MG: 20 TABLET ORAL at 18:00

## 2021-06-08 RX ADMIN — HYDROXYZINE PAMOATE 25 MG: 25 CAPSULE ORAL at 16:59

## 2021-06-08 RX ADMIN — SODIUM CHLORIDE, PRESERVATIVE FREE 3 ML: 5 INJECTION INTRAVENOUS at 20:50

## 2021-06-08 RX ADMIN — BUMETANIDE 2 MG: 1 TABLET ORAL at 08:52

## 2021-06-08 RX ADMIN — OXYCODONE HYDROCHLORIDE AND ACETAMINOPHEN 1 TABLET: 10; 325 TABLET ORAL at 03:50

## 2021-06-08 RX ADMIN — OXYCODONE HYDROCHLORIDE AND ACETAMINOPHEN 1 TABLET: 10; 325 TABLET ORAL at 10:13

## 2021-06-08 RX ADMIN — ONDANSETRON 4 MG: 2 INJECTION INTRAMUSCULAR; INTRAVENOUS at 12:19

## 2021-06-08 RX ADMIN — NORTRIPTYLINE HYDROCHLORIDE 25 MG: 25 CAPSULE ORAL at 20:48

## 2021-06-08 RX ADMIN — FAMOTIDINE 40 MG: 20 TABLET ORAL at 00:19

## 2021-06-08 RX ADMIN — WARFARIN SODIUM 2 MG: 2 TABLET ORAL at 17:00

## 2021-06-08 RX ADMIN — FAMOTIDINE 40 MG: 20 TABLET ORAL at 08:02

## 2021-06-08 RX ADMIN — BUMETANIDE 2 MG: 1 TABLET ORAL at 20:48

## 2021-06-08 RX ADMIN — SERTRALINE HYDROCHLORIDE 25 MG: 25 TABLET ORAL at 08:52

## 2021-06-08 RX ADMIN — PREDNISONE 20 MG: 20 TABLET ORAL at 08:02

## 2021-06-08 RX ADMIN — SODIUM CHLORIDE, PRESERVATIVE FREE 3 ML: 5 INJECTION INTRAVENOUS at 08:52

## 2021-06-08 RX ADMIN — HYDROXYZINE PAMOATE 25 MG: 25 CAPSULE ORAL at 20:49

## 2021-06-08 RX ADMIN — BISOPROLOL FUMARATE 10 MG: 5 TABLET ORAL at 08:52

## 2021-06-08 RX ADMIN — OXYCODONE HYDROCHLORIDE AND ACETAMINOPHEN 1 TABLET: 10; 325 TABLET ORAL at 21:36

## 2021-06-08 NOTE — NURSING NOTE
BLE 3+ pitting edema present with tightened skin. BLE and feet cleansed with NS and moisturizer applied. Wrapped with Kerlix and covered with Setopress at moderate compression. RLE rosario noted.   
Follow-up visit after discussion with Dr. Shoemaker last week. Patient states she is feels better. No respiratory distress noted. Oxygen by NC in place. Patient not currently with any weeping from right leg. Noticeable pitting edema remains present with significantly tightened skin. BLE and feet cleansed and then moisturizer applied. Gauze roll applied and then setopress under moderate compression applied. Recommending daily compression. Primary RN instructed on removal of dressing if any respiratory distress occurs.   Maxine Turner RN  
99

## 2021-06-08 NOTE — PLAN OF CARE
Goal Outcome Evaluation:              Outcome Summary: PT. CONT. TO HAVE ELEVATED BP, AND PRN LABETALOL GIVEN AS ORDERED. NEW ORDER RECEIVED FOR PEPCID AND PT. GIVEN 1ST DOSE THIS SHIFT. PT. CONT. TO C/O GENERALIZED PAIN AND WAS MEDICATED X2 FOR PAIN THIS SHIFT. CONT. TO ASSESS BP. AND CONT. POC. AR.RN

## 2021-06-08 NOTE — DISCHARGE PLACEMENT REQUEST
"Italia Olvera (60 y.o. Female)     Date of Birth Social Security Number Address Home Phone MRN    1960  [address] [place] [address] [phone] [id number]    [employer] Marital Status          Holiness        Admission Date Admission Type Admitting Provider Attending Provider Department, Room/Bed    21 Emergency Carloz Shoemaker MD Quadri, Syed R, MD [address], 3002/1    Discharge Date Discharge Disposition Discharge Destination                       Attending Provider: Carloz Shoemaker MD    Allergies: No Known Allergies    Isolation: None   Infection: None   Code Status: CPR    Ht: 167.6 cm (66\")   Wt: 78.8 kg (173 lb 11.6 oz)    Admission Cmt: None   Principal Problem: None                Active Insurance as of 2021     Primary Coverage     Payor Plan Insurance Group Employer/Plan Group    ANTHEM MEDICARE REPLACEMENT ANTH[employer] MEDICARE ADVANTAGE [id number]     Payor Plan Address Payor Plan Phone Number Payor Plan Fax Number Effective Dates    [address] [phone]  2020 - None Entered    [facility] [address]       Subscriber Name Subscriber Birth Date Member ID       ITALIA OLVERA 1960 [id number]                 Emergency Contacts      (Rel.) Home Phone Work Phone Mobile Phone    KASSANDRA OLVERA (Daughter) [phone] -- --    HANNA OLVERA (Spouse) [phone] -- --            Insurance Information                ANTHEM MEDICARE REPLACEMENT/ANTHEM MEDICARE ADVANTAGE Phone: [phone]    Subscriber: Wade Italia A Subscriber#: [id number]    Group#: [id number] Precert#:              History & Physical      H&P signed by Carloz Shoemaker MD at 21 0955         Patient: ITALIA OLVERA     Acct: [id number]     Report: #[id number]  MR #:  [id number]     DOS: 2021     : 1960  DICTATING: " Carloz Shoemaker  Signed  --------------------------------------------------------------------------------------------------------------------        DATE: 6/2/21      Primary Care Provider      Carloz Shoemaker      Chief Complaint      * Shortness of breath            History of Present Illness      * The patient is a 60-year-old woman, she came to the emergency department       complaints of shortness of breath, loss of taste and loss of smell      * She has been experiencing loss of taste and decrease in sense of smell for the      past 1 week but she neglected to mention this at her office appointment       yesterday      * Symptoms worsened this morning, she felt cold but did not have fever and has       been experiencing fatigue      * Patient has longstanding history of COPD and has chronic cough but this       morning there was an increase in the intensity of the cough, shortness of       breath, chest tightness and wheezing      * In the emergency department she was noted to have hypoxia on room air which       improved after the institution of supplemental oxygen      * Hemogram revealed leukocytosis and chest x-ray demonstrated multifocal       infiltrates      * The patient was seen in the office yesterday, at that time she complained of       shortness of breath and swelling of the feet, she did not complain of fever,       chills or increasing cough or wheezing      * Chest x-ray was done in the office and it showed mild pulmonary edema and       therefore Zaroxolyn was prescribed and the usual Lasix was changed to Bumex      * Currently, the patient states that she is feeling weak and extremely cold and       has audible wheezing            VTE Prophylaxis      VTE Prophylaxis ordered:  Yes            Past Medical History      * Chronic hypoxic respiratory failure      * COPD      * Chronic combined systolic and diastolic heart failure with ejection fraction       of 25%      * Type 2 diabetes mellitus with  hyperglycemia and polyneuropathy      * Lumbar disc disease with chronic back pain      * Hyperlipidemia      * Hypertension with heart disease      * Paroxysmal atrial fibrillation            Past Surgical History      * Hysterectomy            Pyschiatric History      * Bipolar disorder            Social History      * The patient is  and lives with her       * She smokes a pack of cigarettes for the past 46 years            Family History      * Daughter has bipolar disorder            Allergies      Coded Allergies:             NO KNOWN DRUG ALLERGIES (Verified  Allergy, Unknown, 6/2/21)            Home Medications      Home Meds      Reported Medications      Insulin Human Isophan/Regular (HumuLIN 70/30 VIAL) 100 Units/Ml Vial, 45 UNITS     SUBQ BID INSULIN, #1 VIAL 0 Refills         6/2/21      aMILoride HCL (aMILoride HCL) 5 Mg Tab, 10 MG PO QDAY, #60 TAB         6/2/21      Potassium Chloride (K-Dur*) 20 Meq Tab.er.prt, 40 MEQ PO QDAY, #30 TAB 0 Refills         6/2/21      hydrOXYzine Pamoate (hydrOXYzine Pamoate) 25 Mg Cap, 25 MG PO TID PRN for     ANXIETY, #60 CAP 0 Refills         6/2/21      Furosemide* (Lasix*) 80 Mg Tablet, 80 MG PO BID@09,17, #60 TAB 0 Refills         6/2/21      MDI-Albuterol (Proair HFA) 8.5 Gm Hfa.aer.ad, 2 PUFFS INH Q6H PRN for SHORTNESS     OF BREATH, #1 MDI 0 Refills         6/2/21      Losartan Potassium (Losartan*) 100 Mg Tablet, 100 MG PO QDAY, #30 TAB 0 Refills         6/2/21      Sertraline HCl (Sertraline*) 25 Mg Tablet, 25 MG PO QDAY, TAB         6/2/21      Bumetanide (BUMETANIDE) 2 Mg Tablet, 2 MG PO QDAY, #30 TAB         6/2/21      Warfarin Sod (Coumadin*) 2 Mg Tablet, 1 TAB PO QDAY         6/2/21      metOLazone (metOLazone) 10 Mg Tablet, 10 MG PO QDAY for 5 Days, #5 TAB         6/2/21      metFORMIN HCl (metFORMIN HCl) 500 Mg Tablet, 1000 MG PO BID, #120 TAB 0 Refills         6/2/21      Gabapentin (Gabapentin) 600 Mg Tablet, 600 MG PO TID, #30 TAB 0  Refills         10/14/20      NEB-Albuterol Sulf (Albuterol) 2.5 Mg/3 Ml Vial.neb, 3 ml IH Q4H PRN for     SHORTNESS OF BREATH         8/30/20      Nortriptyline HCl (Aventyl) 25 Mg Capsule, 25 MG PO HS         8/30/20      oxyCODONE-Acetaminophen  Mg (oxyCODONE-Acetaminophen  Mg) 1 Each     Tablet, 1 TAB PO Q5H PRN for pain         8/30/20      Bisoprolol-Hctz 10-6.25 Mg (Bisoprolol-Hctz 10-6.25 Mg) 1 Each Tablet, 1 TAB PO     BID         8/30/20      tiZANidine HCl (Zanaflex) 4 Mg Tab, 4 MG PO TID PRN for MUSCLE SPASMS, TAB 0     Refills         4/18/10      Discontinued Reported Medications      Apixaban (Eliquis) 5 Mg Tablet, 5 MG PO BID for 30 Days, #60 TAB         6/2/21      aMILoride HCL (aMILoride HCL) 5 Mg Tab, 10 MG PO QDAY, #60 TAB         10/14/20      Potassium Chloride (K-Dur*) 20 Meq Tab.er.prt, 20 MEQ PO BID         8/30/20      amLODIPine (amLODIPine) 10 Mg Tablet, 10 MG PO QDAY, #30 TAB 0 Refills         5/31/18      Albuterol (Proair HFA) 8.5 Gm Inh, 2 PUFFS INH RTQ6H WA PRN for SHORTNESS OF     BREATH, INH         6/14/13      Furosemide* (Lasix*) 80 Mg Tablet, 80 MG PO BID, TAB 0 Refills         4/18/10            Review of Systems      General:  Fatigue      HEENT:  No Dysphagia      Respiratory:  Cough, Dyspnea, Sputum Changes, Wheezing, Congestion, Chest     Tightness      Cardiovascular:  No Chest Pain, No Palpitations; MARLOW; No Orthopnea      Gastrointestinal:  No Abdominal Pain      Genitourinary:  No Dysuria      Musculoskeletal:  No Joint Effusions      Endocrine:  No Heat Intolerance; Cold Intolerance      Hematologic:  No Bleeding      Psychiatric:  Anxiety      Neurologic:  No Confusion      Skin:  No Rash            Exam            Vital Signs              Date Time  Temp Pulse Resp B/P (MAP) Pulse Ox O2 Delivery O2 Flow Rate FiO2             6/3/21 07:40 98.4 83 18 166/72 (94) 95 Nasal Cannula 5.00             HEENT:  Atraumatic      Neck:  Supple      Cardiovascular:   RRR      Lungs:  Diminshed Breath Sounds, Wheezes Bilaterally      Abdomen:  Normal BS all 4 Quadrants      Constitutional:  Ill appearing      Lymphatic:  No Neck      Extremities:  No Pulses Positive all 4 Ext, No Edema      Neurological:  Mental Status WNL, Alert+Ox3      Psychiatric:  Mood Normal; No Affect Normal (Anxious)      Skin:  Rash      Genitourinary:  No Bladder Distention      Lab Results                                      Laboratory Tests      6/2/21 13:19            6/3/21 05:03            Laboratory Tests      6/2/21 20:28: Capillary Blood Glucose (Chem) 298      6/2/21 23:31: Capillary Blood Glucose (Chem) 322      6/3/21 07:30: Capillary Blood Glucose (Chem) 163            Impression/Problem List      * Community-acquired pneumonia, less likely to be secondary to COVID-19 given       the negative Covid test      * Acute exacerbation of chronic respiratory failure, secondary to above      * Acute exacerbation of COPD      * Leukocytosis      * Chronic combined systolic and diastolic heart failure      * Cirrhosis of liver      * Type 2 diabetes mellitus, uncontrolled      * Lumbar disc disease with chronic back pain      * Peripheral neuropathy      * Hypertension      * Bipolar disorder      Problems:        (1) Community acquired pneumonia      (2) Acute exacerbation of chronic obstructive bronchitis      (3) Acute-on-chronic respiratory failure      (4) COPD (chronic obstructive pulmonary disease)      (5) Diabetes            Plan      * Admit to regular floor      * Repeat Covid test in the morning as the suspicion for this is still high given      the constellation of symptoms and the rapidity of their onset      * Start Rocephin and Zithromax      * Start Solu-Medrol 40 mg IV every 6 hours      * CT scan of the chest, this was done and confirmed the presence of multifocal       infiltrates      * Start DuoNeb every 4 hours      * Start Levemir 50 units now and nightly      * Give Humalog  via sliding scale      * Start losartan 100 mg daily beginning today      * Give clonidine 0.2 mg p.o. now      * Start warfarin at 3 mg daily in the morning after pro time result is known, it      should be noted that patient was on Eliquis for atrial fibrillation, she       stopped taking this medicine a week ago as she was unable to obtain it       secondary to increase in the coinsurance from $47 to $147            Carloz Shoemaker                     Jun 2, 2021 21:21      Electronically signed by Carloz Shoemaker  06/03/2021 09:55     Disclaimer: Converted hospital document may not contain table formatting or lab diagrams. Please see [facility] for authenticated document.    Electronically signed by Carloz Shoemaker MD at 06/03/21 0955       Vital Signs (last 3 days)     Date/Time   Temp   Temp src   Pulse   Resp   BP   Patient Position   SpO2    06/08/21 0736   97.5 (36.4)   Oral   69   17   174/66   Sitting   96    06/08/21 0310   98.1 (36.7)   Oral   75   18   171/74   Sitting   95    06/07/21 2300   98 (36.7)   Oral   76   18   (!) 187/79   Sitting   95    06/07/21 1929   99 (37.2)   Oral   74   18   163/71   Sitting   93    06/07/21 1641   --   --   --   --   171/69   Sitting   --    BP: gave prn blood pressure medication for 200/80.  at 06/07/21 1641    06/07/21 1558   97.8 (36.6)   Oral   86   18   (!) 200/80   Sitting   91    BP: RN AWARE OF BP  at 06/07/21 1558    06/07/21 1119   97.7 (36.5)   Oral   70   18   165/71   Sitting   90    06/07/21 1040   --   --   --   --   (!) 190/83   Sitting   --    BP: Gave PRN medication.  at 06/07/21 1040    06/07/21 0957   --   --   --   --   179/77   Sitting   --    06/07/21 0715   97.7 (36.5)   Oral   78   18   (!) 200/90   Sitting   96    BP: RN AWARE OF BP at 06/07/21 0715    06/07/21 0700   --   --   --   --   --   --   94    06/07/21 0534   --   --   --   --   --   --   92    06/07/21 0357   --   --   --   --   (!) 210/98   Sitting   --    BP: RN  notified at 21 0357    21 0339   97.7 (36.5)   Oral   79   18   (!) 203/99   Sitting   92    BP: rn notified.  at 21 0339    212   98.1 (36.7)   Oral   79   18   180/74   Sitting   91    BP: RN NOTIFIED.  at 21 2232    21 1917   97.5 (36.4)   Oral   81   18   (!) 197/81   Sitting   96    21 1446   98.4 (36.9)   Oral   79   18   179/78   --   94    21 1057   97.7 (36.5)   Oral   80   20   180/88   Sitting   97    BP: RN AWARE OF BP [facility] at 21 1057    21 0937   --   --   --   --   175/76   Sitting   --    BP: RECHECK - RN AWARE OF BP [facility] at 21 0937    21 0827   97.9 (36.6)   Oral   83   20   (!) 182/73   Sitting   97    BP: RN AWARE OF BP [facility] at 21 0827    21 0800   --   --   --   --   --   --   97    21 0413   98.3 (36.8)   Oral   83   18   (!) 181/91   Sitting   97    BP: RN NOTIFIED. at 21 0413    21 2240   98.6 (37)   Oral   83   16   168/68   Lying   94    21 2100   --   --   86   --   --   --   97    21 1909   97.3 (36.3)   Oral   84   18   (!) 188/82   Sitting   98    BP: RN NOTIFIED ABOUT BP.  at 21 1909    21 1900   --   --   --   --   --   --   94    21 1526   97.7 (36.5)   Oral   77   18   167/76   Sitting   96    21 1048   98.4 (36.9)   Oral   88   20   157/81   Lying   91    21 0821   --   --   85   18   --   Sitting   91    21 0700   98.4 (36.9)   Oral   84   18   175/82   Sitting   94    06/05/21 0328   98.8 (37.1)   Oral   84   20   (!) 189/78   Sitting   95                 Physician Progress Notes (last 48 hours) (Notes from 21 1102 through 21 1102)      Carloz Shoemaker MD at 21 0948             [facility]   Progress Note    Patient Name: Italia Olvera  : 1960  MRN: [id number]  Primary Care Physician: Carloz Shoemaker MD  Date of admission: 2021    Subjective   Subjective     Chief Complaint:  Follow-up on  multifocal pneumonia    History of Present Illness   Still complains of cough which is worse at night, shortness of breath and chest tightness is decreased  Blood pressure at 4:00 this morning was greater than 200 mmHg, labetalol 20 mg IV push was given  Complaints of back and joint pain  Blood glucose is still high  Leukocytosis which was present on admission has resolved        Review of Systems   Respiratory: Positive for cough, shortness of breath and wheezing. Negative for apnea, chest tightness and stridor.    Cardiovascular: Positive for leg swelling. Negative for chest pain.       Objective   Objective     Vitals:  Temp:  [97.5 °F (36.4 °C)-98.4 °F (36.9 °C)] 97.7 °F (36.5 °C)  Heart Rate:  [78-81] 78  Resp:  [18-20] 18  BP: (179-210)/(74-99) 200/90  Flow (L/min):  [1.5-2] 2    Physical Exam  Constitutional:       Appearance: Normal appearance.   HENT:      Head: Normocephalic and atraumatic.   Cardiovascular:      Rate and Rhythm: Normal rate and regular rhythm.      Pulses: Normal pulses.      Heart sounds: No gallop.    Pulmonary:      Effort: Pulmonary effort is normal.      Breath sounds: Wheezing present.   Skin:     General: Skin is warm.   Neurological:      Mental Status: She is alert.   Psychiatric:         Mood and Affect: Mood normal.         Behavior: Behavior normal.         Thought Content: Thought content normal.         Judgment: Judgment normal.         Result Review    Result Review:  I have personally reviewed the results from the time of this admission to 06/07/21 9:48 AM EDT and agree with these findings:  [x]  Laboratory  []  Microbiology  []  Radiology  []  EKG/Telemetry   []  Cardiology/Vascular   []  Pathology  []  Old records  []  Other:    Assessment/Plan   Assessment / Plan   Community-acquired pneumonia  Acute exacerbation of COPD  Acute on chronic respiratory failure with hypoxia  Uncontrolled blood pressure  Type 2 diabetes mellitus, uncontrolled  Chronic combined congestive  heart failure  Paroxysmal atrial fibrillation  Chronic back pain  Anxiety  Bipolar disorder    Active Hospital Problems:  Active Hospital Problems    Diagnosis    • Chronic respiratory failure with hypoxia (CMS/[facility])    • Acute on chronic respiratory failure with hypoxia (CMS/[facility])    • Chronic respiratory failure with hypoxia, on home oxygen therapy (CMS/[facility])    • Overweight (BMI 25.0-29.9)    • CAP (community acquired pneumonia)    • COPD with acute exacerbation (CMS/[facility])    • T2DM (type 2 diabetes mellitus) (CMS/[facility])- uncontrolled    • Peripheral neuropathy    • Chronic back pain    • Bipolar disorder (CMS/[facility])    • Hypertension        Plan:  Stop Solu-Medrol  Start prednisone 20 mg p.o. twice daily  Continue Rocephin  Increase to Levemir 75 units at bedtime  Stop IV Bumex  Start Bumex 2 mg p.o. twice daily  Give Zaroxolyn 10 mg p.o. times once  Start labetalol 20 mg IV every 4 hours as needed if systolic blood pressures greater than 160 mmHg  Continue Coumadin at current dose  Increase activity                    Electronically signed by Carloz Shoemaker MD, 21, 9:48 AM EDT.             Electronically signed by Carloz Shoemaker MD at 21 0954     Carloz Shoemaker MD at 21 1707             [facility]   Progress Note    Patient Name: Italia Olvera  : 1960  MRN: [id number]  Primary Care Physician: Carloz Shoemaker MD  Date of admission: 2021    Subjective   Subjective     Chief Complaint: Cough and shortness of air    History of Present Illness  She feels better today.  She reports decrease in cough and shortness of breath.  She does not complain of fever or chills.  Back pain is unchanged.  INR is therapeutic      Review of Systems   Constitutional: Negative for appetite change.   Respiratory: Negative for chest tightness, shortness of breath and wheezing.    Cardiovascular: Positive for chest pain and leg swelling.   Gastrointestinal: Negative.  Negative for constipation and nausea.    Musculoskeletal: Positive for myalgias.   Skin: Negative for rash.       Objective   Objective     Vitals:  Temp:  [97.7 °F (36.5 °C)-98.6 °F (37 °C)] 98.4 °F (36.9 °C)  Heart Rate:  [79-86] 79  Resp:  [16-20] 18  BP: (168-182)/(68-91) 179/78  Flow (L/min):  [1.5-2] 1.5    Physical Exam  Constitutional:       Appearance: Normal appearance. She is obese.   HENT:      Head: Normocephalic and atraumatic.   Cardiovascular:      Rate and Rhythm: Normal rate and regular rhythm.   Pulmonary:      Effort: No respiratory distress.      Breath sounds: No stridor. Wheezing present. No rales.   Chest:      Chest wall: No tenderness.   Musculoskeletal:         General: Normal range of motion.   Neurological:      Mental Status: She is alert.   Psychiatric:         Mood and Affect: Mood normal.         Behavior: Behavior normal.         Result Review    Result Review:  I have personally reviewed the results from the time of this admission to 06/05/21 12:01 PM EDT and agree with these findings:  [x]  Laboratory  []  Microbiology  []  Radiology  []  EKG/Telemetry   []  Cardiology/Vascular   []  Pathology  []  Old records  []  Other:    Assessment/Plan   Assessment / Plan   Acute on chronic respiratory failure with hypoxia.  Multifocal pneumonia  Acute exacerbation of COPD  Acute exacerbation of chronic back pain  Chronic combined systolic and diastolic heart failure with ejection fraction of 25%.  Type 2 diabetes mellitus with hyperglycemia requiring insulin  Anxiety  Bipolar disorder  Atrial fibrillation    Active Hospital Problems:  Active Hospital Problems    Diagnosis    • Chronic respiratory failure with hypoxia (CMS/HCC)    • Acute on chronic respiratory failure with hypoxia (CMS/HCC)    • Chronic respiratory failure with hypoxia, on home oxygen therapy (CMS/HCC)    • Overweight (BMI 25.0-29.9)    • CAP (community acquired pneumonia)    • COPD with acute exacerbation (CMS/HCC)    • T2DM (type 2 diabetes mellitus) (CMS/[facility])-  uncontrolled    • Peripheral neuropathy    • Chronic back pain    • Bipolar disorder (CMS/HCC)    • Hypertension        Plan:  Decrease to Coumadin 2 mg daily.  Continue Rocephin  Continue Solu-Medrol  Order physical therapy  Increase activity                  Electronically signed by Carloz Shoemaker MD, 21, 12:01 PM EDT.            [facility]   Progress Note    Patient Name: Italia Olvera  : 1960  MRN: [id number]  Primary Care Physician: Carloz Shoemaker MD  Date of admission: 2021    Subjective   Subjective             Review of Systems    Objective   Objective     Vitals:  Temp:  [97.3 °F (36.3 °C)-98.6 °F (37 °C)] 98.4 °F (36.9 °C)  Heart Rate:  [79-86] 79  Resp:  [16-20] 18  BP: (168-188)/(68-91) 179/78  Flow (L/min):  [1.5-2] 1.5    Physical Exam    Result Review    Result Review:  I have personally reviewed the results from the time of this admission to 21 5:07 PM EDT and agree with these findings:  []  Laboratory  []  Microbiology  []  Radiology  []  EKG/Telemetry   []  Cardiology/Vascular   []  Pathology  []  Old records  []  Other:    Assessment/Plan   Assessment / Plan       Active Hospital Problems:  Active Hospital Problems    Diagnosis    • Chronic respiratory failure with hypoxia (CMS/HCC)    • Acute on chronic respiratory failure with hypoxia (CMS/HCC)    • Chronic respiratory failure with hypoxia, on home oxygen therapy (CMS/HCC)    • Overweight (BMI 25.0-29.9)    • CAP (community acquired pneumonia)    • COPD with acute exacerbation (CMS/HCC)    • T2DM (type 2 diabetes mellitus) (CMS/HCC)- uncontrolled    • Peripheral neuropathy    • Chronic back pain    • Bipolar disorder (CMS/HCC)    • Hypertension                            Electronically signed by Carloz Shoemaker MD, 21, 5:07 PM EDT.             Electronically signed by Carloz Shoemaker MD at 21 1930       Consult Notes (last 72 hours) (Notes from 21 1103 through 21 1103)    No notes of this  type exist for this encounter.            Physical Therapy Notes (last 72 hours) (Notes from 21 1103 through 21 1103)      Alix Miramontes, PT at 21 1143  Version 1 of        Goal Outcome Evaluation:  Plan of Care Reviewed With: patient        Progress: no change  Outcome Summary: Pt presents with limitations that impede his ability to safely and independently transfer and ambulate. The skills of a therapist will be required to safely and effectively implement the following treatment plan to restore maximal level of function.    Electronically signed by Alix Miramontes PT at 21 1143     Alix Miramontes PT at 21 1146  Version 1 of 1         [facility] Care - Physical Therapy Initial Evaluation   [place]     Patient Name: Italia Olvera  : 1960  MRN: [id number]  Today's Date: 2021           PT Assessment (last 12 hours)      PT Evaluation and Treatment     Row Name 21 1115          Physical Therapy Time and Intention    Subjective Information  no complaints  -CS     Document Type  evaluation  -CS     Mode of Treatment  individual therapy  -CS     Row Name 21 1115          General Information    Patient Profile Reviewed  yes  -CS     Prior Level of Function  independent:;w/c or scooter;min assist:;transfer;gait;ADL's  -CS     Equipment Currently Used at Home  oxygen;wheelchair, motorized;commode, 3-in-1;walker, rolling  -CS     Barriers to Rehab  physical barrier  -CS     Row Name 21 1115          Previous Level of Function/Home Environm    Household Ambulation, Premorbid Functional Level  needs assistive device for safe performance  -CS     Community Ambulation, Premorbid Functional Level  needs assistive device for safe performance  -CS     Row Name 21 1115          Living Environment    Current Living Arrangements  home/apartment/condo  -CS     Home Accessibility  wheelchair accessible  -CS     Lives With  spouse;grandchild(bruno)  -CS     Row Name  06/07/21 1115          Home Use of Assistive/Adaptive Equipment    Equipment Currently Used at Home  wheelchair, motorized;walker, rolling;oxygen  -     Row Name 06/07/21 1115          Cognition    Orientation Status (Cognition)  oriented x 3  -[facility] Name 06/07/21 1115          Pain Scale: Word Pre/Post-Treatment    Pain: Word Scale, Pretreatment  0 - no pain  -     Posttreatment Pain Rating  0 - no pain  -     Row Name 06/07/21 1115          Range of Motion Comprehensive    General Range of Motion  bilateral lower extremity ROM WFL  -[facility] Name 06/07/21 1115          Strength Comprehensive (MMT)    General Manual Muscle Testing (MMT) Assessment  lower extremity strength deficits identified  -     Comment, General Manual Muscle Testing (MMT) Assessment  3+/5  -[facility] Name 06/07/21 1115          Bed Mobility    Bed Mobility  bed mobility (all) activities  -     All Activities, [place] (Bed Mobility)  maximum assist (25% patient effort)  -     Assistive Device (Bed Mobility)  bed rails  -[facility] Name 06/07/21 1115          Transfers    Transfers  sit-stand transfer;stand-sit transfer  -     Sit-Stand [place] (Transfers)  maximum assist (25% patient effort)  -     Stand-Sit [place] (Transfers)  maximum assist (25% patient effort)  -     Row Name 06/07/21 1115          Safety Issues, Functional Mobility    Impairments Affecting Function (Mobility)  strength;balance;endurance/activity tolerance  -     Row Name             Wound 06/03/21 1300 medial sacral spine Pressure Injury    Wound - Properties Group Placement Date: 06/03/21 -BJ Placement Time: 1300  -BJ Present on Hospital Admission: Y  -BJ Orientation: medial  -BJ Location: sacral spine  -BJ Primary Wound Type: Pressure inj  -BJ Stage, Pressure Injury : Stage 2  -BJ Additional Comments: normal saline; aquacel; mepilex changed 6/4/21  -BJ    Retired Wound - Properties Group Date first assessed: 06/03/21  -BJ Time  first assessed: 1300  -BJ Present on Hospital Admission: Y  -BJ Location: sacral spine  -BJ Primary Wound Type: Pressure inj  -BJ Additional Comments: normal saline; aquacel; mepilex changed 6/4/21  -BJ    Row Name 06/07/21 1115          Plan of Care Review    Plan of Care Reviewed With  patient  -CS     Progress  no change  -CS     Outcome Summary  Pt presents with limitations that impede his ability to safely and independently transfer and ambulate. The skills of a therapist will be required to safely and effectively implement the following treatment plan to restore maximal level of function.  -     Row Name 06/07/21 1115          Physical Therapy Goals    Transfer Goal Selection (PT)  transfer, PT goal 1  -CS     Gait Training Goal Selection (PT)  gait training, PT goal 1  -CS     Strength Goal Selection (PT)  strength, PT goal 1  -     Row Name 06/07/21 1115          Transfer Goal 1 (PT)    Activity/Assistive Device (Transfer Goal 1, PT)  transfers, all;walker, rolling  -CS     Lares Level/Cues Needed (Transfer Goal 1, PT)  supervision required  -CS     Time Frame (Transfer Goal 1, PT)  10 days  -[facility] Name 06/07/21 1115          Gait Training Goal 1 (PT)    Activity/Assistive Device (Gait Training Goal 1, PT)  gait (walking locomotion);walker, rolling  -CS     Lares Level (Gait Training Goal 1, PT)  contact guard assist  -CS     Distance (Gait Training Goal 1, PT)  25  -CS     Time Frame (Gait Training Goal 1, PT)  10 days  -     Row Name 06/07/21 1115          Strength Goal 1 (PT)    Strength Goal 1 (PT)  4+/5  -CS     Time Frame (Strength Goal 1, PT)  10 days  -     Row Name 06/07/21 1115          Therapy Assessment/Plan (PT)    Rehab Potential (PT)  good, to achieve stated therapy goals  -CS     Problem List (PT)  strength;mobility;balance  -CS     Row Name 06/07/21 1115          PT Evaluation Complexity    History, PT Evaluation Complexity  no personal factors and/or comorbidities   -CS     Examination of Body Systems (PT Eval Complexity)  total of 4 or more elements  -CS     Clinical Presentation (PT Evaluation Complexity)  stable  -CS     Clinical Decision Making (PT Evaluation Complexity)  low complexity  -CS     Overall Complexity (PT Evaluation Complexity)  low complexity  -CS       User Key  (r) = Recorded By, (t) = Taken By, (c) = Cosigned By    Initials Name Provider Type    Galilea Vences, RN Registered Nurse    Alix Powell PT Physical Therapist        Physical Therapy Education                 Title: PT OT SLP Therapies (Done)     Topic: Physical Therapy (Done)     Point: Mobility training (Done)     Learning Progress Summary           Patient Acceptance, E,TB, VU by  at 6/7/2021 1145                   Point: Home exercise program (Done)     Learning Progress Summary           Patient Acceptance, E,TB, VU by  at 6/7/2021 1145                   Point: Body mechanics (Done)     Learning Progress Summary           Patient Acceptance, E,TB, VU by  at 6/7/2021 1145                   Point: Precautions (Done)     Learning Progress Summary           Patient Acceptance, E,TB, VU by  at 6/7/2021 1145                               User Key     Initials Effective Dates Name Provider Type Discipline     04/25/21 -  Alix Miramontes PT Physical Therapist PT              PT Recommendation and Plan  Anticipated Discharge Disposition (PT): inpatient rehabilitation facility, sub acute care setting  Planned Therapy Interventions (PT): gait training, transfer training, strengthening  Therapy Frequency (PT): daily  Plan of Care Reviewed With: patient  Progress: no change  Outcome Summary: Pt presents with limitations that impede his ability to safely and independently transfer and ambulate. The skills of a therapist will be required to safely and effectively implement the following treatment plan to restore maximal level of function.  Outcome Measures     Row Name 06/07/21 1137              How much help from another person do you currently need...    Turning from your back to your side while in flat bed without using bedrails?  2  -CS      Moving from lying on back to sitting on the side of a flat bed without bedrails?  2  -CS      Moving to and from a bed to a chair (including a wheelchair)?  2  -CS      Standing up from a chair using your arms (e.g., wheelchair, bedside chair)?  2  -CS      Climbing 3-5 steps with a railing?  1  -CS      To walk in hospital room?  2  -CS      AM-PAC 6 Clicks Score (PT)  11  -CS         Functional Assessment    Outcome Measure Options  AM-PAC 6 Clicks Basic Mobility (PT)  -CS        User Key  (r) = Recorded By, (t) = Taken By, (c) = Cosigned By    Initials Name Provider Type    Alix Powell PT Physical Therapist           Time Calculation:   PT Charges     Row Name 06/07/21 1108             Time Calculation    PT Received On  06/07/21  -CS      PT Goal Re-Cert Due Date  06/17/21  -CS         Timed Charges    79719 - PT Therapeutic Activity Minutes  --  -CS         Untimed Charges    PT Eval/Re-eval Minutes  48  -CS         Total Minutes    Timed Charges Total Minutes  --  -CS      Untimed Charges Total Minutes  48  -CS       Total Minutes  48  -CS        User Key  (r) = Recorded By, (t) = Taken By, (c) = Cosigned By    Initials Name Provider Type    Alix Powell PT Physical Therapist        Therapy Charges for Today     Code Description Service Date Service Provider Modifiers Qty    [id number] HC PT EVAL LOW COMPLEXITY 4 6/7/2021 Alix Miramontes PT GP 1          PT G-Codes  Outcome Measure Options: AM-PAC 6 Clicks Basic Mobility (PT)  AM-PAC 6 Clicks Score (PT): 11    Alix Miramontes PT  6/7/2021      Electronically signed by Alix Miramontes PT at 06/07/21 1147       Occupational Therapy Notes (last 72 hours) (Notes from 06/05/21 1103 through 06/08/21 1103)    No notes exist for this encounter.         Speech Language Pathology Notes (last  72 hours) (Notes from 06/05/21 1103 through 06/08/21 1103)    No notes exist for this encounter.

## 2021-06-08 NOTE — PLAN OF CARE
Goal Outcome Evaluation: Blood glucose 46 this morning. Protocol followed, blood glucose returned to normal value within 30 minutes. Wound care completed. Patient tolerated well.

## 2021-06-09 LAB
GLUCOSE BLDC GLUCOMTR-MCNC: 104 MG/DL (ref 70–130)
GLUCOSE BLDC GLUCOMTR-MCNC: 109 MG/DL (ref 70–130)
GLUCOSE BLDC GLUCOMTR-MCNC: 119 MG/DL (ref 70–130)
GLUCOSE BLDC GLUCOMTR-MCNC: 130 MG/DL (ref 70–130)
GLUCOSE BLDC GLUCOMTR-MCNC: 134 MG/DL (ref 70–130)
GLUCOSE BLDC GLUCOMTR-MCNC: 27 MG/DL (ref 70–130)
GLUCOSE BLDC GLUCOMTR-MCNC: 325 MG/DL (ref 70–130)
GLUCOSE BLDC GLUCOMTR-MCNC: 395 MG/DL (ref 70–130)
GLUCOSE BLDC GLUCOMTR-MCNC: 40 MG/DL (ref 70–130)
GLUCOSE BLDC GLUCOMTR-MCNC: 45 MG/DL (ref 70–130)
GLUCOSE BLDC GLUCOMTR-MCNC: 51 MG/DL (ref 70–130)
GLUCOSE BLDC GLUCOMTR-MCNC: 56 MG/DL (ref 70–130)
GLUCOSE BLDC GLUCOMTR-MCNC: 80 MG/DL (ref 70–130)
GLUCOSE BLDC GLUCOMTR-MCNC: 80 MG/DL (ref 70–130)
GLUCOSE BLDC GLUCOMTR-MCNC: 87 MG/DL (ref 70–130)
GLUCOSE BLDC GLUCOMTR-MCNC: 92 MG/DL (ref 70–130)
INR PPP: 1.9 (ref 2–3)
PROTHROMBIN TIME: 19.5 SECONDS (ref 9.4–12)

## 2021-06-09 PROCEDURE — 25010000002 GLUCAGON (HUMAN RECOMBINANT) 1 MG RECONSTITUTED SOLUTION: Performed by: INTERNAL MEDICINE

## 2021-06-09 PROCEDURE — 25010000002 METHYLPREDNISOLONE PER 40 MG: Performed by: INTERNAL MEDICINE

## 2021-06-09 PROCEDURE — 94799 UNLISTED PULMONARY SVC/PX: CPT

## 2021-06-09 PROCEDURE — 85610 PROTHROMBIN TIME: CPT | Performed by: INTERNAL MEDICINE

## 2021-06-09 PROCEDURE — 25010000002 CEFTRIAXONE PER 250 MG: Performed by: INTERNAL MEDICINE

## 2021-06-09 PROCEDURE — 63710000001 INSULIN LISPRO (HUMAN) PER 5 UNITS: Performed by: INTERNAL MEDICINE

## 2021-06-09 PROCEDURE — 82962 GLUCOSE BLOOD TEST: CPT

## 2021-06-09 PROCEDURE — 63710000001 INSULIN DETEMIR PER 5 UNITS: Performed by: INTERNAL MEDICINE

## 2021-06-09 RX ORDER — DEXTROSE MONOHYDRATE 100 MG/ML
INJECTION, SOLUTION INTRAVENOUS
Status: COMPLETED
Start: 2021-06-09 | End: 2021-06-09

## 2021-06-09 RX ORDER — METHYLPREDNISOLONE SODIUM SUCCINATE 40 MG/ML
40 INJECTION, POWDER, LYOPHILIZED, FOR SOLUTION INTRAMUSCULAR; INTRAVENOUS EVERY 8 HOURS
Status: DISCONTINUED | OUTPATIENT
Start: 2021-06-09 | End: 2021-06-10

## 2021-06-09 RX ORDER — DEXTROSE MONOHYDRATE 100 MG/ML
75 INJECTION, SOLUTION INTRAVENOUS CONTINUOUS
Status: DISCONTINUED | OUTPATIENT
Start: 2021-06-09 | End: 2021-06-09

## 2021-06-09 RX ADMIN — GLUCAGON HYDROCHLORIDE 1 MG: KIT at 05:52

## 2021-06-09 RX ADMIN — IPRATROPIUM BROMIDE AND ALBUTEROL SULFATE 3 ML: .5; 3 SOLUTION RESPIRATORY (INHALATION) at 11:51

## 2021-06-09 RX ADMIN — HYDROXYZINE PAMOATE 25 MG: 25 CAPSULE ORAL at 20:03

## 2021-06-09 RX ADMIN — OXYCODONE HYDROCHLORIDE AND ACETAMINOPHEN 1 TABLET: 10; 325 TABLET ORAL at 12:43

## 2021-06-09 RX ADMIN — POTASSIUM CHLORIDE 20 MEQ: 10 CAPSULE, COATED, EXTENDED RELEASE ORAL at 11:56

## 2021-06-09 RX ADMIN — DEXTROSE MONOHYDRATE 25 G: 100 INJECTION, SOLUTION INTRAVENOUS at 09:15

## 2021-06-09 RX ADMIN — DEXTROSE MONOHYDRATE 25 G: 100 INJECTION, SOLUTION INTRAVENOUS at 08:10

## 2021-06-09 RX ADMIN — OXYCODONE HYDROCHLORIDE AND ACETAMINOPHEN 1 TABLET: 10; 325 TABLET ORAL at 18:55

## 2021-06-09 RX ADMIN — HYDROXYZINE PAMOATE 25 MG: 25 CAPSULE ORAL at 15:52

## 2021-06-09 RX ADMIN — DEXTROSE MONOHYDRATE 250 ML: 100 INJECTION, SOLUTION INTRAVENOUS at 08:05

## 2021-06-09 RX ADMIN — WARFARIN SODIUM 2 MG: 2 TABLET ORAL at 15:52

## 2021-06-09 RX ADMIN — SODIUM CHLORIDE, PRESERVATIVE FREE 3 ML: 5 INJECTION INTRAVENOUS at 20:29

## 2021-06-09 RX ADMIN — SERTRALINE HYDROCHLORIDE 25 MG: 25 TABLET ORAL at 11:57

## 2021-06-09 RX ADMIN — METHYLPREDNISOLONE SODIUM SUCCINATE 40 MG: 40 INJECTION, POWDER, FOR SOLUTION INTRAMUSCULAR; INTRAVENOUS at 18:55

## 2021-06-09 RX ADMIN — DEXTROSE MONOHYDRATE 25 G: 100 INJECTION, SOLUTION INTRAVENOUS at 09:11

## 2021-06-09 RX ADMIN — SODIUM CHLORIDE, PRESERVATIVE FREE 3 ML: 5 INJECTION INTRAVENOUS at 19:59

## 2021-06-09 RX ADMIN — INSULIN LISPRO 6 UNITS: 100 INJECTION, SOLUTION INTRAVENOUS; SUBCUTANEOUS at 18:57

## 2021-06-09 RX ADMIN — METHYLPREDNISOLONE SODIUM SUCCINATE 40 MG: 40 INJECTION, POWDER, FOR SOLUTION INTRAMUSCULAR; INTRAVENOUS at 11:56

## 2021-06-09 RX ADMIN — SODIUM CHLORIDE 30 ML/HR: 9 INJECTION, SOLUTION INTRAVENOUS at 11:59

## 2021-06-09 RX ADMIN — INSULIN DETEMIR 25 UNITS: 100 INJECTION, SOLUTION SUBCUTANEOUS at 20:23

## 2021-06-09 RX ADMIN — LOSARTAN POTASSIUM 100 MG: 50 TABLET, FILM COATED ORAL at 11:57

## 2021-06-09 RX ADMIN — FAMOTIDINE 40 MG: 20 TABLET ORAL at 11:58

## 2021-06-09 RX ADMIN — BISOPROLOL FUMARATE 10 MG: 5 TABLET ORAL at 11:57

## 2021-06-09 RX ADMIN — DEXTROSE MONOHYDRATE 25 G: 100 INJECTION, SOLUTION INTRAVENOUS at 06:13

## 2021-06-09 RX ADMIN — NICOTINE 1 PATCH: 21 PATCH, EXTENDED RELEASE TRANSDERMAL at 12:00

## 2021-06-09 RX ADMIN — CEFTRIAXONE 1 G: 10 INJECTION, POWDER, FOR SOLUTION INTRAVENOUS at 11:57

## 2021-06-09 RX ADMIN — SODIUM CHLORIDE, PRESERVATIVE FREE 3 ML: 5 INJECTION INTRAVENOUS at 12:02

## 2021-06-09 RX ADMIN — NORTRIPTYLINE HYDROCHLORIDE 25 MG: 25 CAPSULE ORAL at 20:03

## 2021-06-09 NOTE — PROGRESS NOTES
[facility]   Progress Note    Patient Name: Italia Olvera  : 1960  MRN: [id number]  Primary Care Physician: Carloz Shoemaker MD  Date of admission: 2021    Subjective   Subjective     Chief Complaint:  Follow-up on pneumonia    History of Present Illness   The patient developed profound hypoglycemia this morning and was noted to be lethargic and less responsive.  D10 was given with only marginal increase in blood glucose.  When I examined the patient she was still lethargic but was able to respond to my questions  Wheezing appears to have increased      Review of Systems   Constitutional: Negative for activity change and appetite change.   Endocrine: Negative for polyphagia and polyuria.   Neurological: Positive for seizures. Negative for tremors and facial asymmetry.   Psychiatric/Behavioral: Positive for confusion and decreased concentration.       Objective   Objective     Vitals:  Temp:  [97.3 °F (36.3 °C)-98.1 °F (36.7 °C)] 97.5 °F (36.4 °C)  Heart Rate:  [65-71] 71  Resp:  [16-20] 20  BP: (128-160)/(50-64) 160/50  Flow (L/min):  [2] 2    Physical Exam  Constitutional:       General: She is in acute distress.      Appearance: She is diaphoretic. She is not toxic-appearing.   HENT:      Head: Normocephalic.   Eyes:      General:         Right eye: No discharge.   Cardiovascular:      Rate and Rhythm: Normal rate. Rhythm irregular.   Pulmonary:      Effort: Pulmonary effort is normal.   Neurological:      General: No focal deficit present.      Mental Status: She is lethargic.      Sensory: No sensory deficit.         Result Review    Result Review:  I have personally reviewed the results from the time of this admission to 21 7:49 AM EDT and agree with these findings:  [x]  Laboratory  []  Microbiology  []  Radiology  []  EKG/Telemetry   []  Cardiology/Vascular   []  Pathology  []  Old records  []  Other:    Assessment/Plan   Assessment / Plan   Severe hypoglycemia  Metabolic  encephalopathy secondary to above  Type 2 diabetes mellitus  Multifocal pneumonia, community-acquired  Exacerbation of COPD  Exacerbation of chronic respiratory failure with hypoxia  Hypertension  Paroxysmal atrial fibrillation    Active Hospital Problems:  Active Hospital Problems    Diagnosis    • Chronic respiratory failure with hypoxia (CMS/HCC)    • Acute on chronic respiratory failure with hypoxia (CMS/[facility])    • Chronic respiratory failure with hypoxia, on home oxygen therapy (CMS/[facility])    • Overweight (BMI 25.0-29.9)    • CAP (community acquired pneumonia)    • COPD with acute exacerbation (CMS/[facility])    • T2DM (type 2 diabetes mellitus) (CMS/[facility])- uncontrolled    • Peripheral neuropathy    • Chronic back pain    • Bipolar disorder (CMS/[facility])    • Hypertension        Plan:  Stop subcutaneous insulin  Continuous D10 infusion  Resume Solu-Medrol and start prednisone  Continue Rocephin                Electronically signed by Carloz Shoemaker MD, 06/09/21, 7:49 AM EDT.

## 2021-06-09 NOTE — SIGNIFICANT NOTE
06/09/21 1305   Wound 06/03/21 1300 medial sacral spine Pressure Injury   Placement Date/Time: 06/03/21 1300   Present on Hospital Admission: Yes  Orientation: medial  Location: sacral spine  Primary Wound Type: Pressure Injury  Stage, Pressure Injury : Stage 2  Additional Comments: normal saline; aquacel; mepilex changed 6...   Dressing Appearance intact   Base pink;moist   Red (%), Wound Tissue Color 100   Periwound redness   Edges irregular;open   Wound Length (cm) 1 cm   Wound Width (cm) 0.5 cm   Wound Depth (cm) 0.1 cm   Dressing Care   (aquacel and optifoam in place. Peeled and resealed. )   Periwound Care barrier ointment applied   Wound Check Follow-up: Patient seen for follow-up on pressure injury on coccyx as well as to inquire about compression tolerance. Patient with compression to BLE intact. Tolerating without difficulties. Recommending to continue skin care and compression therapy. Patient with pressure injury to medial coccyx. Stage II with pink moist tissue. No drainage visible to existing dressing. Periwound is reddened and blanchable. Patient has had some incontinent episodes over the past two days. Bed saturated this assessment requiring full linen change. Skin cleansed with wipes. Barrier ointment applied to periwound and buttocks. Aquacel AG and Optifoam resealed over wound. Recommending to continue current treatment. Patient has skin protection sleeves to bilateral arms to assist with protection of thin fragile skin and also to prevent skin tears. Recommending to continue use. Maxine Turner RN

## 2021-06-09 NOTE — CONSULTS
"Nutrition Services    Patient Name: Italia Olvera  YOB: 1960  MRN: [id number]  Admission date: 6/2/2021      CLINICAL NUTRITION ASSESSMENT      Reason for Assessment  LOS     H&P:    Past Medical History:   Diagnosis Date   • Anxiety    • Asthma    • Bipolar 1 disorder (CMS/[facility])     untreated   • CHF (congestive heart failure) (CMS/[facility])    • Diabetes mellitus (CMS/[facility])    • DJD (degenerative joint disease)    • GERD (gastroesophageal reflux disease)    • Hypertension    • Osteoporosis    • Peripheral neuropathy         Current Problems:   Active Hospital Problems    Diagnosis    • Chronic respiratory failure with hypoxia (CMS/[facility])    • Acute on chronic respiratory failure with hypoxia (CMS/[facility])    • Chronic respiratory failure with hypoxia, on home oxygen therapy (CMS/[facility])    • Overweight (BMI 25.0-29.9)    • CAP (community acquired pneumonia)    • COPD with acute exacerbation (CMS/[facility])    • T2DM (type 2 diabetes mellitus) (CMS/[facility])- uncontrolled    • Peripheral neuropathy    • Chronic back pain    • Bipolar disorder (CMS/[facility])    • Hypertension         Nutrition/Diet History         Narrative     Patient reviewed related to LOS x 7 days. No nutrition intervention indicated at this time.      Anthropometrics        Current Height, Weight Height: 167.6 cm (66\")  Weight: 78.8 kg (173 lb 11.6 oz)        Admit Height, Weight Flowsheet Rows      First Filed Value   Admission Height  167.6 cm (66\") Documented at 06/04/2021 1231   Admission Weight  78.8 kg (173 lb 11.6 oz) Documented at 06/04/2021 1231             Ideal Body Weight (IBW) Ideal Body Weight (IBW) (kg): 59.58   % Ideal Body Weight % Ideal Body Weight: 132.27        Weight Hx  Wt Readings from Last 30 Encounters:   06/04/21 1231 78.8 kg (173 lb 11.6 oz)   03/24/21 0000 83.5 kg (184 lb)   07/10/18 0000 82.6 kg (182 lb)        BMI kg/m2 Body mass index is 28.04 kg/m².         Labs/Medications         Pertinent Labs Reviewed.   Results from last " 7 days   Lab Units 06/07/21  0622 06/05/21  0613 06/04/21  0851   SODIUM mmol/L 133* 134* 139   POTASSIUM mmol/L 4.9 4.9 5.0   CHLORIDE mmol/L 102 102 104   TOTAL CO2 mmol/L  --   --  24   CO2 mmol/L 17.8* 21.3*  --    BUN mg/dL 34* 36* 29*   CREATININE mg/dL 0.52* 0.57 0.52   CALCIUM mg/dL 8.4* 8.7 9.9   BILIRUBIN mg/dL 0.3 0.3 0.30   ALK PHOS U/L 227* 184* 103   ALT (SGPT) U/L 107* 80* 49*   AST (SGOT) U/L 79* 68* 48   GLUCOSE mg/dL 323* 307*  --      Results from last 7 days   Lab Units 06/07/21  0622 06/03/21  0503   MAGNESIUM mg/dL  --  1.86   HEMOGLOBIN g/dL 13.1 13.3   HEMATOCRIT % 46.2 44.9   TRIGLYCERIDES mg/dL  --  106     Coronavirus (COVID-19)   Date Value Ref Range Status   06/02/2021 NOT DETECTED NA Final     Comment:     The SARS-CoV-2 assay is a real-time, RT-PCR test intended  for the qualitative detection of nucleic acid from the  SARS-CoV-2 in respiratory specimens from individuals,  testing performed at [facility].       Lab Results   Component Value Date    HGBA1C 11.5 (H) 06/03/2021         Pertinent Medications Reviewed.       Current Nutrition Orders & Evaluation of Intake       Oral Nutrition     Current PO Diet Diet Regular; Consistent Carbohydrate   Supplement    PO Evaluation     % PO Intake %       Nutrition Diagnosis         Nutrition Dx Problem 1 No nutrition diagnosis at this time.       Nutrition Intervention        RD Action No nutrition intervention indicated.      Monitor/Evaluation        Monitor Per protocol     Electronically signed by:  Henri Talbert RD  06/09/21 13:06 EDT

## 2021-06-09 NOTE — PLAN OF CARE
Goal Outcome Evaluation:  Plan of Care Reviewed With: patient   PT VERY SLEEPY TONIGHT. PT NOW WETTING/SOILING THE BED RATHER THAN GETTING OOB TO USE BSC.. GENERALIZED C/O PAIN UNCHANGED.  PRN PERCOCET X 1. VSS.      Progress: improving

## 2021-06-09 NOTE — PROGRESS NOTES
[facility]   Progress Note    Patient Name: Italia Olvera  : 1960  MRN: [id number]  Primary Care Physician: Carloz Shoemaker MD  Date of admission: 2021    Subjective   Subjective     Chief Complaint  Follow-up on pneumonia    History of Present Illness   Cough and shortness of breath is improved  Glucose is better controlled        Review of Systems   Constitutional: Negative for activity change and appetite change.   Respiratory: Positive for cough.        Objective   Objective     Vitals:  Temp:  [97.5 °F (36.4 °C)-98.1 °F (36.7 °C)] 98.1 °F (36.7 °C)  Heart Rate:  [65-76] 67  Resp:  [16-18] 16  BP: (128-187)/(56-79) 137/58  Flow (L/min):  [2] 2    Physical Exam  HENT:      Head: Normocephalic.   Cardiovascular:      Rate and Rhythm: Normal rate and regular rhythm.   Pulmonary:      Effort: Pulmonary effort is normal.      Breath sounds: Wheezing present. No rales.   Musculoskeletal:      Cervical back: Normal range of motion.   Neurological:      Mental Status: She is alert.         Result Review    Result Review:  I have personally reviewed the results from the time of this admission to 21 8:58 PM EDT and agree with these findings:  [x]  Laboratory  []  Microbiology  []  Radiology  []  EKG/Telemetry   []  Cardiology/Vascular   []  Pathology  []  Old records  []  Other:    Assessment/Plan   Assessment / Plan       Active Hospital Problems:  Active Hospital Problems    Diagnosis    • Chronic respiratory failure with hypoxia (CMS/HCC)    • Acute on chronic respiratory failure with hypoxia (CMS/HCC)    • Chronic respiratory failure with hypoxia, on home oxygen therapy (CMS/HCC)    • Overweight (BMI 25.0-29.9)    • CAP (community acquired pneumonia)    • COPD with acute exacerbation (CMS/HCC)    • T2DM (type 2 diabetes mellitus) (CMS/[facility])- uncontrolled    • Peripheral neuropathy    • Chronic back pain    • Bipolar disorder (CMS/HCC)    • Hypertension        Plan:   Decrease  Levemir  Continue Rocephin  Continue prednisone  Increase activity  Refer to skilled nursing facilities                    Electronically signed by Carloz Shoemaker MD, 06/08/21, 8:58 PM EDT.

## 2021-06-09 NOTE — PLAN OF CARE
Goal Outcome Evaluation:         Pt hypoglycemic this shift. Received 3 bags iv D10 250 mls. IV cont D10 ordered. Pt's blood sugar at 80 30 minutes after infusion and 56 at 1 hour after D10 continuous began. Blood sugar stabilized once pt was awake enough to take in oral nutrition.

## 2021-06-10 LAB
GLUCOSE BLDC GLUCOMTR-MCNC: 302 MG/DL (ref 70–130)
GLUCOSE BLDC GLUCOMTR-MCNC: 343 MG/DL (ref 70–130)
GLUCOSE BLDC GLUCOMTR-MCNC: 391 MG/DL (ref 70–130)
GLUCOSE BLDC GLUCOMTR-MCNC: 470 MG/DL (ref 70–130)
INR PPP: 1.65 (ref 2–3)
PROTHROMBIN TIME: 17.1 SECONDS (ref 9.4–12)
SARS-COV-2 RNA RESP QL NAA+PROBE: NOT DETECTED

## 2021-06-10 PROCEDURE — 63710000001 INSULIN DETEMIR PER 5 UNITS: Performed by: INTERNAL MEDICINE

## 2021-06-10 PROCEDURE — 94799 UNLISTED PULMONARY SVC/PX: CPT

## 2021-06-10 PROCEDURE — 82962 GLUCOSE BLOOD TEST: CPT

## 2021-06-10 PROCEDURE — U0003 INFECTIOUS AGENT DETECTION BY NUCLEIC ACID (DNA OR RNA); SEVERE ACUTE RESPIRATORY SYNDROME CORONAVIRUS 2 (SARS-COV-2) (CORONAVIRUS DISEASE [COVID-19]), AMPLIFIED PROBE TECHNIQUE, MAKING USE OF HIGH THROUGHPUT TECHNOLOGIES AS DESCRIBED BY CMS-2020-01-R: HCPCS | Performed by: INTERNAL MEDICINE

## 2021-06-10 PROCEDURE — 97166 OT EVAL MOD COMPLEX 45 MIN: CPT

## 2021-06-10 PROCEDURE — 85610 PROTHROMBIN TIME: CPT | Performed by: INTERNAL MEDICINE

## 2021-06-10 PROCEDURE — 94760 N-INVAS EAR/PLS OXIMETRY 1: CPT

## 2021-06-10 PROCEDURE — 63710000001 INSULIN LISPRO (HUMAN) PER 5 UNITS: Performed by: INTERNAL MEDICINE

## 2021-06-10 PROCEDURE — 25010000002 METHYLPREDNISOLONE PER 40 MG: Performed by: INTERNAL MEDICINE

## 2021-06-10 RX ORDER — METHYLPREDNISOLONE SODIUM SUCCINATE 40 MG/ML
20 INJECTION, POWDER, LYOPHILIZED, FOR SOLUTION INTRAMUSCULAR; INTRAVENOUS EVERY 8 HOURS
Status: DISCONTINUED | OUTPATIENT
Start: 2021-06-11 | End: 2021-06-12 | Stop reason: HOSPADM

## 2021-06-10 RX ADMIN — INSULIN LISPRO 6 UNITS: 100 INJECTION, SOLUTION INTRAVENOUS; SUBCUTANEOUS at 12:55

## 2021-06-10 RX ADMIN — HYDROXYZINE PAMOATE 25 MG: 25 CAPSULE ORAL at 20:38

## 2021-06-10 RX ADMIN — OXYCODONE HYDROCHLORIDE AND ACETAMINOPHEN 1 TABLET: 10; 325 TABLET ORAL at 01:23

## 2021-06-10 RX ADMIN — SODIUM CHLORIDE 30 ML/HR: 9 INJECTION, SOLUTION INTRAVENOUS at 07:30

## 2021-06-10 RX ADMIN — LABETALOL 20 MG/4 ML (5 MG/ML) INTRAVENOUS SYRINGE 20 MG: at 05:16

## 2021-06-10 RX ADMIN — INSULIN DETEMIR 25 UNITS: 100 INJECTION, SOLUTION SUBCUTANEOUS at 20:37

## 2021-06-10 RX ADMIN — NORTRIPTYLINE HYDROCHLORIDE 25 MG: 25 CAPSULE ORAL at 20:39

## 2021-06-10 RX ADMIN — BISOPROLOL FUMARATE 10 MG: 5 TABLET ORAL at 10:54

## 2021-06-10 RX ADMIN — OXYCODONE HYDROCHLORIDE AND ACETAMINOPHEN 1 TABLET: 10; 325 TABLET ORAL at 14:03

## 2021-06-10 RX ADMIN — INSULIN LISPRO 6 UNITS: 100 INJECTION, SOLUTION INTRAVENOUS; SUBCUTANEOUS at 17:30

## 2021-06-10 RX ADMIN — HYDROXYZINE PAMOATE 25 MG: 25 CAPSULE ORAL at 16:27

## 2021-06-10 RX ADMIN — POTASSIUM CHLORIDE 20 MEQ: 10 CAPSULE, COATED, EXTENDED RELEASE ORAL at 10:54

## 2021-06-10 RX ADMIN — METHYLPREDNISOLONE SODIUM SUCCINATE 40 MG: 40 INJECTION, POWDER, FOR SOLUTION INTRAMUSCULAR; INTRAVENOUS at 12:55

## 2021-06-10 RX ADMIN — OXYCODONE HYDROCHLORIDE AND ACETAMINOPHEN 1 TABLET: 10; 325 TABLET ORAL at 07:54

## 2021-06-10 RX ADMIN — LABETALOL 20 MG/4 ML (5 MG/ML) INTRAVENOUS SYRINGE 20 MG: at 15:47

## 2021-06-10 RX ADMIN — METHYLPREDNISOLONE SODIUM SUCCINATE 40 MG: 40 INJECTION, POWDER, FOR SOLUTION INTRAMUSCULAR; INTRAVENOUS at 01:23

## 2021-06-10 RX ADMIN — OXYCODONE HYDROCHLORIDE AND ACETAMINOPHEN 1 TABLET: 10; 325 TABLET ORAL at 20:39

## 2021-06-10 RX ADMIN — NICOTINE 1 PATCH: 21 PATCH, EXTENDED RELEASE TRANSDERMAL at 10:55

## 2021-06-10 RX ADMIN — HYDROXYZINE PAMOATE 25 MG: 25 CAPSULE ORAL at 10:03

## 2021-06-10 RX ADMIN — METHYLPREDNISOLONE SODIUM SUCCINATE 40 MG: 40 INJECTION, POWDER, FOR SOLUTION INTRAMUSCULAR; INTRAVENOUS at 17:30

## 2021-06-10 RX ADMIN — INSULIN LISPRO 6 UNITS: 100 INJECTION, SOLUTION INTRAVENOUS; SUBCUTANEOUS at 10:54

## 2021-06-10 RX ADMIN — SODIUM CHLORIDE 30 ML/HR: 9 INJECTION, SOLUTION INTRAVENOUS at 05:17

## 2021-06-10 RX ADMIN — WARFARIN SODIUM 2 MG: 2 TABLET ORAL at 16:31

## 2021-06-10 RX ADMIN — SODIUM CHLORIDE, PRESERVATIVE FREE 3 ML: 5 INJECTION INTRAVENOUS at 09:00

## 2021-06-10 RX ADMIN — INSULIN DETEMIR 25 UNITS: 100 INJECTION, SOLUTION SUBCUTANEOUS at 22:02

## 2021-06-10 RX ADMIN — SODIUM CHLORIDE, PRESERVATIVE FREE 3 ML: 5 INJECTION INTRAVENOUS at 20:39

## 2021-06-10 RX ADMIN — LOSARTAN POTASSIUM 100 MG: 50 TABLET, FILM COATED ORAL at 10:54

## 2021-06-10 RX ADMIN — SERTRALINE HYDROCHLORIDE 25 MG: 25 TABLET ORAL at 10:55

## 2021-06-10 NOTE — PLAN OF CARE
Goal Outcome Evaluation:  Plan of Care Reviewed With: patient        Progress: no change  Outcome Summary: Patient presents with limitations affecting strength, activity tolerance, and balance impacting patient's ability to return home safely and independently.  The skills of a therapist will be required to safely and effectively implement the following treatment plan to restore maximal level of function

## 2021-06-10 NOTE — PROGRESS NOTES
[facility]   Progress Note    Patient Name: Italia Olvera  : 1960  MRN: [id number]  Primary Care Physician: Carloz Shoemaker MD  Date of admission: 2021    Subjective   Subjective     Chief Complaint  Follow-up on pneumonia  History of Present Illness    Feels better  She is fully alert and awake  Still complains of cough and chest tightness  Blood glucose is high  Hypoglycemia has resolved      Review of Systems   Constitutional: Negative.    Respiratory: Positive for chest tightness and shortness of breath. Negative for apnea.    Musculoskeletal: Positive for arthralgias, back pain and myalgias.   Psychiatric/Behavioral: Positive for agitation. Negative for suicidal ideas.       Objective   Objective     Vitals:  Temp:  [97.7 °F (36.5 °C)-98.6 °F (37 °C)] 97.9 °F (36.6 °C)  Heart Rate:  [68-74] 68  Resp:  [16-18] 16  BP: (144-192)/(58-83) 160/63  Flow (L/min):  [2] 2    Physical Exam    Result Review    Result Review:  I have personally reviewed the results from the time of this admission to 06/10/21 8:46 AM EDT and agree with these findings:  []  Laboratory  []  Microbiology  []  Radiology  []  EKG/Telemetry   []  Cardiology/Vascular   []  Pathology  []  Old records  []  Other:    Assessment/Plan   Assessment / Plan   Multifocal pneumonia  Acute exacerbation of COPD  Type 2 diabetes mellitus  Hypertension  Atrial fibrillation    Active Hospital Problems:  Active Hospital Problems    Diagnosis    • Chronic respiratory failure with hypoxia (CMS/HCC)    • Acute on chronic respiratory failure with hypoxia (CMS/HCC)    • Chronic respiratory failure with hypoxia, on home oxygen therapy (CMS/[facility])    • Overweight (BMI 25.0-29.9)    • CAP (community acquired pneumonia)    • COPD with acute exacerbation (CMS/HCC)    • T2DM (type 2 diabetes mellitus) (CMS/[facility])- uncontrolled    • Peripheral neuropathy    • Chronic back pain    • Bipolar disorder (CMS/HCC)    • Hypertension        Plan:   Resume  Levemir  Resume Humalog  Continue Rocephin  Decrease to Solu-Medrol 20 mg IV every 8 hours  Transfer to skilled nursing facility when approved by insurance                    Electronically signed by Carloz Shoemaker MD, 06/10/21, 8:46 AM EDT.

## 2021-06-10 NOTE — THERAPY EVALUATION
Patient Name: Italia Olvera  : 1960    MRN: [id number]                              Today's Date: 6/10/2021       Admit Date: 2021    Visit Dx:     ICD-10-CM ICD-9-CM   1. Difficulty walking  R26.2 719.7   2. Decreased activities of daily living (ADL)  Z78.9 V49.89     Patient Active Problem List   Diagnosis   • Bladder disorder   • Depression   • Hypertension   • Peripheral neuropathy   • Osteoarthritis of knee   • COPD with acute exacerbation (CMS/[facility])   • Chronic back pain   • CAP (community acquired pneumonia)   • T2DM (type 2 diabetes mellitus) (CMS/HCC)- uncontrolled   • Bipolar disorder (CMS/[facility])   • Chronic respiratory failure with hypoxia (CMS/[facility])   • Acute on chronic respiratory failure with hypoxia (CMS/[facility])   • Chronic respiratory failure with hypoxia, on home oxygen therapy (CMS/[facility])   • Overweight (BMI 25.0-29.9)     Past Medical History:   Diagnosis Date   • Anxiety    • Asthma    • Bipolar 1 disorder (CMS/[facility])     untreated   • CHF (congestive heart failure) (CMS/HCC)    • Diabetes mellitus (CMS/[facility])    • DJD (degenerative joint disease)    • GERD (gastroesophageal reflux disease)    • Hypertension    • Osteoporosis    • Peripheral neuropathy      Past Surgical History:   Procedure Laterality Date   • APPENDECTOMY     • CHOLECYSTECTOMY     • ENDOSCOPY     • HYSTERECTOMY     • JOINT REPLACEMENT       General Information     Row Name 06/10/21 0819          OT Time and Intention    Document Type  evaluation  -PG     Mode of Treatment  individual therapy;occupational therapy  -PG     Row Name 06/10/21 0819          General Information    Patient Profile Reviewed  yes  -PG     Prior Level of Function  mod assist: Patient states she has not been walking since the last 2 weeks and using wheelchair only  -PG     Existing Precautions/Restrictions  fall  -PG     Barriers to Rehab  physical barrier  -PG     Row Name 06/10/21 0819          Occupational Profile    Reason for Services/Referral  (Occupational Profile)  Decreased ADL performance  -PG     Row Name 06/10/21 0819          Living Environment    Lives With  child(bruno), adult;spouse  -PG     Row Name 06/10/21 0819          Cognition    Orientation Status (Cognition)  oriented x 3  -PG     Row Name 06/10/21 0819          Safety Issues, Functional Mobility    Safety Issues Affecting Function (Mobility)  ability to follow commands;awareness of need for assistance  -PG     Impairments Affecting Function (Mobility)  balance;endurance/activity tolerance;strength  -PG       User Key  (r) = Recorded By, (t) = Taken By, (c) = Cosigned By    Initials Name Provider Type    PG Jensen Chambers OT Occupational Therapist          Mobility/ADL's     Row Name 06/10/21 0833 06/10/21 0821       Transfers    Transfers  --  -- Stand pivot transfer to recliner  -PG    Sit-Stand [place] (Transfers)  moderate assist (50% patient effort);maximum assist (25% patient effort)  -PG  moderate assist (50% patient effort)  -PG    Row Name 06/10/21 0821          Activities of Daily Living    BADL Assessment/Intervention  -- Upper body self-care supervision, lower body dressing and bathing maximal assist/dependent, toileting dependent  -PG       User Key  (r) = Recorded By, (t) = Taken By, (c) = Cosigned By    Initials Name Provider Type    PG Jensen Chambers OT Occupational Therapist        Obj/Interventions     Row Name 06/10/21 0824          Sensory Assessment (Somatosensory)    Sensory Assessment (Somatosensory)  sensation intact  -PG     Row Name 06/10/21 0824          Vision Assessment/Intervention    Visual Impairment/Limitations  corrective lenses for reading  -PG     Row Name 06/10/21 0824          Range of Motion Comprehensive    General Range of Motion  bilateral upper extremity ROM WFL  -PG     Row Name 06/10/21 0824          Strength Comprehensive (MMT)    Comment, General Manual Muscle Testing (MMT) Assessment  Bilateral upper extremity strength 4 -/5  -PG     Row  Name 06/10/21 0824          Motor Skills    Motor Skills  coordination;functional endurance  -PG     Coordination  WFL  -PG     Functional Endurance  fair minus  -PG     Row Name 06/10/21 0824          Balance    Balance Assessment  standing static balance  -PG     Static Standing Balance  supported;moderate impairment  -PG       User Key  (r) = Recorded By, (t) = Taken By, (c) = Cosigned By    Initials Name Provider Type    PG Jensen Chambers, OT Occupational Therapist        Goals/Plan     Row Name 06/10/21 0827          Transfer Goal 1 (OT)    Activity/Assistive Device (Transfer Goal 1, OT)  transfers, all  -PG     Ward Level/Cues Needed (Transfer Goal 1, OT)  minimum assist (75% or more patient effort)  -PG     Time Frame (Transfer Goal 1, OT)  long term goal (LTG);10 days  -PG     Row Name 06/10/21 0827          Bathing Goal 1 (OT)    Activity/Device (Bathing Goal 1, OT)  bathing skills, all  -PG     Ward Level/Cues Needed (Bathing Goal 1, OT)  minimum assist (75% or more patient effort)  -PG     Time Frame (Bathing Goal 1, OT)  long term goal (LTG);10 days  -PG     Row Name 06/10/21 0827          Dressing Goal 1 (OT)    Activity/Device (Dressing Goal 1, OT)  dressing skills, all  -PG     Ward/Cues Needed (Dressing Goal 1, OT)  minimum assist (75% or more patient effort);moderate assist (50-74% patient effort)  -PG     Time Frame (Dressing Goal 1, OT)  long term goal (LTG);10 days  -PG     Row Name 06/10/21 0827          Toileting Goal 1 (OT)    Activity/Device (Toileting Goal 1, OT)  toileting skills, all  -PG     Ward Level/Cues Needed (Toileting Goal 1, OT)  minimum assist (75% or more patient effort)  -PG     Time Frame (Toileting Goal 1, OT)  long term goal (LTG);10 days  -PG     Row Name 06/10/21 0827          Grooming Goal 1 (OT)    Activity/Device (Grooming Goal 1, OT)  grooming skills, all  -PG     Ward (Grooming Goal 1, OT)  set-up required  -PG     Time Frame  (Grooming Goal 1, OT)  long term goal (LTG);10 days  -PG     Row Name 06/10/21 0827          Strength Goal 1 (OT)    Strength Goal 1 (OT)  Patient will improve bilateral upper extremity strength to 4/5 to support ADL activities  -PG     Time Frame (Strength Goal 1, OT)  long term goal (LTG);10 days  -PG     Row Name 06/10/21 0827          Therapy Assessment/Plan (OT)    Planned Therapy Interventions (OT)  activity tolerance training;patient/caregiver education/training;strengthening exercise;transfer/mobility retraining;BADL retraining  -PG       User Key  (r) = Recorded By, (t) = Taken By, (c) = Cosigned By    Initials Name Provider Type    PG Jensen Chambers, POLLY Occupational Therapist        Clinical Impression     Row Name 06/10/21 0826          Pain Assessment    Additional Documentation  Pain Scale: Numbers Pre/Post-Treatment (Group)  -PG     Row Name 06/10/21 0826          Pain Scale: Numbers Pre/Post-Treatment    Pretreatment Pain Rating  10/10  -PG     Posttreatment Pain Rating  10/10  -PG     Pain Location  back  -PG     Pre/Posttreatment Pain Comment  Nursing notified  -PG     Row Name 06/10/21 0826          Plan of Care Review    Plan of Care Reviewed With  patient  -PG     Progress  no change  -PG     Outcome Summary  Patient presents with limitations affecting strength, activity tolerance, and balance impacting patient's ability to return home safely and independently.  The skills of a therapist will be required to safely and effectively implement the following treatment plan to restore maximal level of function  -PG     Row Name 06/10/21 0826          Therapy Assessment/Plan (OT)    Patient/Family Therapy Goal Statement (OT)  Return home independently and be able to walk again  -PG     Rehab Potential (OT)  good, to achieve stated therapy goals  -PG     Criteria for Skilled Therapeutic Interventions Met (OT)  yes;meets criteria;skilled treatment is necessary  -PG     Therapy Frequency (OT)  5 times/wk   -PG     Row Name 06/10/21 0826          Therapy Plan Review/Discharge Plan (OT)    Anticipated Discharge Disposition (OT)  sub acute care setting  -PG       User Key  (r) = Recorded By, (t) = Taken By, (c) = Cosigned By    Initials Name Provider Type    PG Jensen Chambers OT Occupational Therapist        Outcome Measures     Row Name 06/10/21 0830          How much help from another is currently needed...    Putting on and taking off regular lower body clothing?  1  -PG     Bathing (including washing, rinsing, and drying)  1  -PG     Toileting (which includes using toilet bed pan or urinal)  1  -PG     Putting on and taking off regular upper body clothing  1  -PG     Taking care of personal grooming (such as brushing teeth)  2  -PG     Eating meals  3  -PG     AM-PAC 6 Clicks Score (OT)  9  -PG     Row Name 06/10/21 0830          Functional Assessment    Outcome Measure Options  AM-PAC 6 Clicks Daily Activity (OT);Optimal Instrument  -PG     Row Name 06/10/21 0830          Optimal Instrument    Optimal Instrument  Optimal - 3  -PG     Bending/Stooping  4  -PG     Standing  4  -PG     Reaching  2  -PG     From the list, choose the 3 activities you would most like to be able to do without any difficulty  Bending/stooping;Standing;Reaching  -PG     Total Score Optimal - 3  10  -PG       User Key  (r) = Recorded By, (t) = Taken By, (c) = Cosigned By    Initials Name Provider Type    PG Jensen Chambers OT Occupational Therapist        Occupational Therapy Education                 Title: PT OT SLP Therapies (Done)     Topic: Occupational Therapy (Done)     Point: Precautions (Done)     Description:   Instruct learner(s) on prescribed precautions during self-care and functional transfers.              Learning Progress Summary           Patient Acceptance, E,D, VU,DU by PG at 6/10/2021 0832                   Point: Body mechanics (Done)     Description:   Instruct learner(s) on proper positioning and spine alignment during  self-care, functional mobility activities and/or exercises.              Learning Progress Summary           Patient Acceptance, E,D, VU,DU by PG at 6/10/2021 0832                               User Key     Initials Effective Dates Name Provider Type Discipline    PG 03/31/21 -  Jensen Chambers OT Occupational Therapist OT              OT Recommendation and Plan  Planned Therapy Interventions (OT): activity tolerance training, patient/caregiver education/training, strengthening exercise, transfer/mobility retraining, BADL retraining  Therapy Frequency (OT): 5 times/wk  Plan of Care Review  Plan of Care Reviewed With: patient  Progress: no change  Outcome Summary: Patient presents with limitations affecting strength, activity tolerance, and balance impacting patient's ability to return home safely and independently.  The skills of a therapist will be required to safely and effectively implement the following treatment plan to restore maximal level of function     Time Calculation:   Time Calculation- OT     Row Name 06/10/21 0833             Time Calculation- OT    OT Received On  06/10/21  -PG      OT Goal Re-Cert Due Date  06/19/21  -PG         Untimed Charges    OT Eval/Re-eval Minutes  30  -PG         Total Minutes    Untimed Charges Total Minutes  30  -PG       Total Minutes  30  -PG        User Key  (r) = Recorded By, (t) = Taken By, (c) = Cosigned By    Initials Name Provider Type    PG Jensen Chambers OT Occupational Therapist        Therapy Charges for Today     Code Description Service Date Service Provider Modifiers Qty    [id number] HC OT EVAL MOD COMPLEXITY 2 6/10/2021 Jensen Chambers OT GO 1               Jensen Chambers OT  6/10/2021

## 2021-06-10 NOTE — PLAN OF CARE
Goal Outcome Evaluation:  Plan of Care Reviewed With: patient        Progress: improving  HTN THIS AM, MANUAL /80. IV LABETALOL X 1 GIVEN.   PATIENT CONTINUES TO REQUEST PAIN AND ANTI-ANXIETY MEDS AS FREQUENTLY AS Q1H THRU THE NIGHT.   BS AT . PT STILL REQUESTING FREQUENT SNACKS/EXTRA FOOD DESPITE TEACHING.

## 2021-06-10 NOTE — PLAN OF CARE
Goal Outcome Evaluation:      Pt stable this shift. Hyperglycemic and hypertensive. Medicated per emar. Referrals sent to rehab facilities in [place]. Pt to dc to rehab upon negative covid result and acceptance to facility.

## 2021-06-10 NOTE — DISCHARGE PLACEMENT REQUEST
"Italia Olvera (60 y.o. Female)     Date of Birth Social Security Number Address Home Phone MRN    1960  [address] [place] [address] [phone] [id number]    Restoration Marital Status          Religious        Admission Date Admission Type Admitting Provider Attending Provider Department, Room/Bed    21 Emergency Carloz Shoemaker MD Quadri, Syed R, MD [address], 3002/1    Discharge Date Discharge Disposition Discharge Destination                       Attending Provider: Carloz Shoemaker MD    Allergies: No Known Allergies    Isolation: None   Infection: COVID Screen (preop/placement) (21)   Code Status: CPR    Ht: 167.6 cm (66\")   Wt: 80.1 kg (176 lb 9.4 oz)    Admission Cmt: None   Principal Problem: None                Active Insurance as of 2021     Primary Coverage     Payor Plan Insurance Group Employer/Plan Group    [facility] MEDICARE REPLACEMENT [facility] MEDICARE ADVANTAGE [id number]     Payor Plan Address Payor Plan Phone Number Payor Plan Fax Number Effective Dates    [address] [phone]  2020 - None Entered    [facility] [id number]       Subscriber Name Subscriber Birth Date Member ID       ITALIA OLVERA 1960 [id number]                 Emergency Contacts      (Rel.) Home Phone Work Phone Mobile Phone    KASSANDRA OLVERA (Daughter) [phone] -- --    HANNA OLVERA (Spouse) [phone] -- --               Occupational Therapy Notes (last 24 hours) (Notes from 2112 through 06/10/21 09)      Jensen Chambers, OT at 06/10/21 0835          Patient Name: Italia Olvera  : 1960    MRN: [id number]                              Today's Date: 6/10/2021       Admit Date: 2021    Visit Dx:     ICD-10-CM ICD-9-CM   1. Difficulty walking  R26.2 719.7   2. Decreased activities of daily living (ADL)  Z78.9 V49.89     Patient Active Problem List   Diagnosis   • Bladder disorder   • Depression   • " Hypertension   • Peripheral neuropathy   • Osteoarthritis of knee   • COPD with acute exacerbation (CMS/[facility])   • Chronic back pain   • CAP (community acquired pneumonia)   • T2DM (type 2 diabetes mellitus) (CMS/[facility])- uncontrolled   • Bipolar disorder (CMS/[facility])   • Chronic respiratory failure with hypoxia (CMS/[facility])   • Acute on chronic respiratory failure with hypoxia (CMS/[facility])   • Chronic respiratory failure with hypoxia, on home oxygen therapy (CMS/[facility])   • Overweight (BMI 25.0-29.9)     Past Medical History:   Diagnosis Date   • Anxiety    • Asthma    • Bipolar 1 disorder (CMS/[facility])     untreated   • CHF (congestive heart failure) (CMS/[facility])    • Diabetes mellitus (CMS/[facility])    • DJD (degenerative joint disease)    • GERD (gastroesophageal reflux disease)    • Hypertension    • Osteoporosis    • Peripheral neuropathy      Past Surgical History:   Procedure Laterality Date   • APPENDECTOMY     • CHOLECYSTECTOMY     • ENDOSCOPY     • HYSTERECTOMY     • JOINT REPLACEMENT       General Information     Row Name 06/10/21 0819          OT Time and Intention    Document Type  evaluation  -PG     Mode of Treatment  individual therapy;occupational therapy  -PG     Row Name 06/10/21 0819          General Information    Patient Profile Reviewed  yes  -PG     Prior Level of Function  mod assist: Patient states she has not been walking since the last 2 weeks and using wheelchair only  -PG     Existing Precautions/Restrictions  fall  -PG     Barriers to Rehab  physical barrier  -PG     Row Name 06/10/21 0819          Occupational Profile    Reason for Services/Referral (Occupational Profile)  Decreased ADL performance  -PG     Row Name 06/10/21 0819          Living Environment    Lives With  child(bruno), adult;spouse  -PG     Row Name 06/10/21 0819          Cognition    Orientation Status (Cognition)  oriented x 3  -PG     Row Name 06/10/21 0819          Safety Issues, Functional Mobility    Safety Issues Affecting Function  (Mobility)  ability to follow commands;awareness of need for assistance  -PG     Impairments Affecting Function (Mobility)  balance;endurance/activity tolerance;strength  -PG       User Key  (r) = Recorded By, (t) = Taken By, (c) = Cosigned By    Initials Name Provider Type    PG Jensen Chambers OT Occupational Therapist          Mobility/ADL's     Row Name 06/10/21 0833 06/10/21 0821       Transfers    Transfers  --  -- Stand pivot transfer to recliner  -PG    Sit-Stand [place] (Transfers)  moderate assist (50% patient effort);maximum assist (25% patient effort)  -PG  moderate assist (50% patient effort)  -PG    Row Name 06/10/21 0821          Activities of Daily Living    BADL Assessment/Intervention  -- Upper body self-care supervision, lower body dressing and bathing maximal assist/dependent, toileting dependent  -PG       User Key  (r) = Recorded By, (t) = Taken By, (c) = Cosigned By    Initials Name Provider Type    PG Jensen Chambers OT Occupational Therapist        Obj/Interventions     Row Name 06/10/21 0824          Sensory Assessment (Somatosensory)    Sensory Assessment (Somatosensory)  sensation intact  -PG     Row Name 06/10/21 0824          Vision Assessment/Intervention    Visual Impairment/Limitations  corrective lenses for reading  -PG     Row Name 06/10/21 0824          Range of Motion Comprehensive    General Range of Motion  bilateral upper extremity ROM WFL  -PG     Row Name 06/10/21 0824          Strength Comprehensive (MMT)    Comment, General Manual Muscle Testing (MMT) Assessment  Bilateral upper extremity strength 4 -/5  -PG     Row Name 06/10/21 0824          Motor Skills    Motor Skills  coordination;functional endurance  -PG     Coordination  WFL  -PG     Functional Endurance  fair minus  -PG     Row Name 06/10/21 0824          Balance    Balance Assessment  standing static balance  -PG     Static Standing Balance  supported;moderate impairment  -PG       User Key  (r) = Recorded By,  (t) = Taken By, (c) = Cosigned By    Initials Name Provider Type    PG Jensen Chambers, OT Occupational Therapist        Goals/Plan     Row Name 06/10/21 0827          Transfer Goal 1 (OT)    Activity/Assistive Device (Transfer Goal 1, OT)  transfers, all  -PG     [place] Level/Cues Needed (Transfer Goal 1, OT)  minimum assist (75% or more patient effort)  -PG     Time Frame (Transfer Goal 1, OT)  long term goal (LTG);10 days  -PG     Row Name 06/10/21 0827          Bathing Goal 1 (OT)    Activity/Device (Bathing Goal 1, OT)  bathing skills, all  -PG     [place] Level/Cues Needed (Bathing Goal 1, OT)  minimum assist (75% or more patient effort)  -PG     Time Frame (Bathing Goal 1, OT)  long term goal (LTG);10 days  -PG     Row Name 06/10/21 0827          Dressing Goal 1 (OT)    Activity/Device (Dressing Goal 1, OT)  dressing skills, all  -PG     [place]/Cues Needed (Dressing Goal 1, OT)  minimum assist (75% or more patient effort);moderate assist (50-74% patient effort)  -PG     Time Frame (Dressing Goal 1, OT)  long term goal (LTG);10 days  -PG     Row Name 06/10/21 0827          Toileting Goal 1 (OT)    Activity/Device (Toileting Goal 1, OT)  toileting skills, all  -PG     [place] Level/Cues Needed (Toileting Goal 1, OT)  minimum assist (75% or more patient effort)  -PG     Time Frame (Toileting Goal 1, OT)  long term goal (LTG);10 days  -PG     Row Name 06/10/21 0827          Grooming Goal 1 (OT)    Activity/Device (Grooming Goal 1, OT)  grooming skills, all  -PG     [place] (Grooming Goal 1, OT)  set-up required  -PG     Time Frame (Grooming Goal 1, OT)  long term goal (LTG);10 days  -PG     Row Name 06/10/21 0827          Strength Goal 1 (OT)    Strength Goal 1 (OT)  Patient will improve bilateral upper extremity strength to 4/5 to support ADL activities  -PG     Time Frame (Strength Goal 1, OT)  long term goal (LTG);10 days  -PG     Row Name 06/10/21 0827          Therapy Assessment/Plan  (OT)    Planned Therapy Interventions (OT)  activity tolerance training;patient/caregiver education/training;strengthening exercise;transfer/mobility retraining;BADL retraining  -PG       User Key  (r) = Recorded By, (t) = Taken By, (c) = Cosigned By    Initials Name Provider Type    PG Jensen Chambers OT Occupational Therapist        Clinical Impression     Row Name 06/10/21 0826          Pain Assessment    Additional Documentation  Pain Scale: Numbers Pre/Post-Treatment (Group)  -PG     Row Name 06/10/21 0826          Pain Scale: Numbers Pre/Post-Treatment    Pretreatment Pain Rating  10/10  -PG     Posttreatment Pain Rating  10/10  -PG     Pain Location  back  -PG     Pre/Posttreatment Pain Comment  Nursing notified  -PG     [facility] Name 06/10/21 0826          Plan of Care Review    Plan of Care Reviewed With  patient  -PG     Progress  no change  -PG     Outcome Summary  Patient presents with limitations affecting strength, activity tolerance, and balance impacting patient's ability to return home safely and independently.  The skills of a therapist will be required to safely and effectively implement the following treatment plan to restore maximal level of function  -PG     Row Name 06/10/21 0826          Therapy Assessment/Plan (OT)    Patient/Family Therapy Goal Statement (OT)  Return home independently and be able to walk again  -PG     Rehab Potential (OT)  good, to achieve stated therapy goals  -PG     Criteria for Skilled Therapeutic Interventions Met (OT)  yes;meets criteria;skilled treatment is necessary  -PG     Therapy Frequency (OT)  5 times/wk  -PG     Row Name 06/10/21 0826          Therapy Plan Review/Discharge Plan (OT)    Anticipated Discharge Disposition (OT)  sub acute care setting  -PG       User Key  (r) = Recorded By, (t) = Taken By, (c) = Cosigned By    Initials Name Provider Type    PG Jensen Chambers OT Occupational Therapist        Outcome Measures     Row Name 06/10/21 0830          How much  help from another is currently needed...    Putting on and taking off regular lower body clothing?  1  -PG     Bathing (including washing, rinsing, and drying)  1  -PG     Toileting (which includes using toilet bed pan or urinal)  1  -PG     Putting on and taking off regular upper body clothing  1  -PG     Taking care of personal grooming (such as brushing teeth)  2  -PG     Eating meals  3  -PG     AM-PAC 6 Clicks Score (OT)  9  -PG     Row Name 06/10/21 0830          Functional Assessment    Outcome Measure Options  AM-PAC 6 Clicks Daily Activity (OT);Optimal Instrument  -PG     Row Name 06/10/21 0830          Optimal Instrument    Optimal Instrument  Optimal - 3  -PG     Bending/Stooping  4  -PG     Standing  4  -PG     Reaching  2  -PG     From the list, choose the 3 activities you would most like to be able to do without any difficulty  Bending/stooping;Standing;Reaching  -PG     Total Score Optimal - 3  10  -PG       User Key  (r) = Recorded By, (t) = Taken By, (c) = Cosigned By    Initials Name Provider Type    PG Jensen Chambers OT Occupational Therapist        Occupational Therapy Education                 Title: PT OT SLP Therapies (Done)     Topic: Occupational Therapy (Done)     Point: Precautions (Done)     Description:   Instruct learner(s) on prescribed precautions during self-care and functional transfers.              Learning Progress Summary           Patient Acceptance, E,DKENDRICK DU by PG at 6/10/2021 0832                   Point: Body mechanics (Done)     Description:   Instruct learner(s) on proper positioning and spine alignment during self-care, functional mobility activities and/or exercises.              Learning Progress Summary           Patient Acceptance, E,KENDRICK ACEVES DU by PG at 6/10/2021 0832                               User Key     Initials Effective Dates Name Provider Type Discipline    PG 03/31/21 -  Jensen Chambers OT Occupational Therapist OT              OT Recommendation and  Plan  Planned Therapy Interventions (OT): activity tolerance training, patient/caregiver education/training, strengthening exercise, transfer/mobility retraining, BADL retraining  Therapy Frequency (OT): 5 times/wk  Plan of Care Review  Plan of Care Reviewed With: patient  Progress: no change  Outcome Summary: Patient presents with limitations affecting strength, activity tolerance, and balance impacting patient's ability to return home safely and independently.  The skills of a therapist will be required to safely and effectively implement the following treatment plan to restore maximal level of function     Time Calculation:   Time Calculation- OT     Row Name 06/10/21 0833             Time Calculation- OT    OT Received On  06/10/21  -PG      OT Goal Re-Cert Due Date  06/19/21  -PG         Untimed Charges    OT Eval/Re-eval Minutes  30  -PG         Total Minutes    Untimed Charges Total Minutes  30  -PG       Total Minutes  30  -PG        User Key  (r) = Recorded By, (t) = Taken By, (c) = Cosigned By    Initials Name Provider Type    PG Jensen Chambers OT Occupational Therapist        Therapy Charges for Today     Code Description Service Date Service Provider Modifiers Qty    [id number]  OT EVAL MOD COMPLEXITY 2 6/10/2021 Jensen Chambers OT GO 1               Jensen Chambers OT  6/10/2021    Electronically signed by Jensen Chambers OT at 06/10/21 0835     Jensen Chambers OT at 06/10/21 0832        Goal Outcome Evaluation:  Plan of Care Reviewed With: patient        Progress: no change  Outcome Summary: Patient presents with limitations affecting strength, activity tolerance, and balance impacting patient's ability to return home safely and independently.  The skills of a therapist will be required to safely and effectively implement the following treatment plan to restore maximal level of function    Electronically signed by Jensen Chambers OT at 06/10/21 0848

## 2021-06-11 LAB
GLUCOSE BLDC GLUCOMTR-MCNC: 272 MG/DL (ref 70–130)
GLUCOSE BLDC GLUCOMTR-MCNC: 316 MG/DL (ref 70–130)
GLUCOSE BLDC GLUCOMTR-MCNC: 330 MG/DL (ref 70–130)
GLUCOSE BLDC GLUCOMTR-MCNC: 362 MG/DL (ref 70–130)
GLUCOSE BLDC GLUCOMTR-MCNC: 447 MG/DL (ref 70–130)
INR PPP: 2.16 (ref 2–3)
PROTHROMBIN TIME: 21.8 SECONDS (ref 9.4–12)

## 2021-06-11 PROCEDURE — 25010000002 METHYLPREDNISOLONE PER 40 MG: Performed by: INTERNAL MEDICINE

## 2021-06-11 PROCEDURE — 94799 UNLISTED PULMONARY SVC/PX: CPT

## 2021-06-11 PROCEDURE — 97110 THERAPEUTIC EXERCISES: CPT

## 2021-06-11 PROCEDURE — 82962 GLUCOSE BLOOD TEST: CPT

## 2021-06-11 PROCEDURE — 63710000001 INSULIN DETEMIR PER 5 UNITS: Performed by: INTERNAL MEDICINE

## 2021-06-11 PROCEDURE — 63710000001 INSULIN LISPRO (HUMAN) PER 5 UNITS: Performed by: INTERNAL MEDICINE

## 2021-06-11 PROCEDURE — 85610 PROTHROMBIN TIME: CPT | Performed by: INTERNAL MEDICINE

## 2021-06-11 RX ORDER — POLYETHYLENE GLYCOL 3350 17 G/17G
17 POWDER, FOR SOLUTION ORAL DAILY
Status: DISCONTINUED | OUTPATIENT
Start: 2021-06-11 | End: 2021-06-12 | Stop reason: HOSPADM

## 2021-06-11 RX ORDER — BISACODYL 10 MG
10 SUPPOSITORY, RECTAL RECTAL DAILY
Status: DISCONTINUED | OUTPATIENT
Start: 2021-06-11 | End: 2021-06-12 | Stop reason: HOSPADM

## 2021-06-11 RX ORDER — PREDNISONE 50 MG/1
50 TABLET ORAL DAILY
Qty: 7 TABLET | Refills: 0
Start: 2021-06-11 | End: 2021-07-22 | Stop reason: HOSPADM

## 2021-06-11 RX ADMIN — HYDROXYZINE PAMOATE 25 MG: 25 CAPSULE ORAL at 09:04

## 2021-06-11 RX ADMIN — OXYCODONE HYDROCHLORIDE AND ACETAMINOPHEN 1 TABLET: 10; 325 TABLET ORAL at 02:47

## 2021-06-11 RX ADMIN — NICOTINE 1 PATCH: 21 PATCH, EXTENDED RELEASE TRANSDERMAL at 09:00

## 2021-06-11 RX ADMIN — METHYLPREDNISOLONE SODIUM SUCCINATE 20 MG: 40 INJECTION, POWDER, FOR SOLUTION INTRAMUSCULAR; INTRAVENOUS at 09:02

## 2021-06-11 RX ADMIN — WARFARIN SODIUM 2 MG: 2 TABLET ORAL at 16:52

## 2021-06-11 RX ADMIN — LABETALOL 20 MG/4 ML (5 MG/ML) INTRAVENOUS SYRINGE 20 MG: at 01:34

## 2021-06-11 RX ADMIN — INSULIN DETEMIR 40 UNITS: 100 INJECTION, SOLUTION SUBCUTANEOUS at 21:38

## 2021-06-11 RX ADMIN — SODIUM CHLORIDE, PRESERVATIVE FREE 3 ML: 5 INJECTION INTRAVENOUS at 21:40

## 2021-06-11 RX ADMIN — HYDROXYZINE PAMOATE 25 MG: 25 CAPSULE ORAL at 21:36

## 2021-06-11 RX ADMIN — SODIUM CHLORIDE, PRESERVATIVE FREE 3 ML: 5 INJECTION INTRAVENOUS at 09:01

## 2021-06-11 RX ADMIN — OXYCODONE HYDROCHLORIDE AND ACETAMINOPHEN 1 TABLET: 10; 325 TABLET ORAL at 09:03

## 2021-06-11 RX ADMIN — INSULIN LISPRO 6 UNITS: 100 INJECTION, SOLUTION INTRAVENOUS; SUBCUTANEOUS at 12:29

## 2021-06-11 RX ADMIN — HYDROXYZINE PAMOATE 25 MG: 25 CAPSULE ORAL at 15:34

## 2021-06-11 RX ADMIN — POLYETHYLENE GLYCOL 3350 17 G: 17 POWDER, FOR SOLUTION ORAL at 09:04

## 2021-06-11 RX ADMIN — OXYCODONE HYDROCHLORIDE AND ACETAMINOPHEN 1 TABLET: 10; 325 TABLET ORAL at 21:36

## 2021-06-11 RX ADMIN — POTASSIUM CHLORIDE 20 MEQ: 10 CAPSULE, COATED, EXTENDED RELEASE ORAL at 09:02

## 2021-06-11 RX ADMIN — SERTRALINE HYDROCHLORIDE 25 MG: 25 TABLET ORAL at 09:01

## 2021-06-11 RX ADMIN — BISOPROLOL FUMARATE 10 MG: 5 TABLET ORAL at 09:03

## 2021-06-11 RX ADMIN — METHYLPREDNISOLONE SODIUM SUCCINATE 20 MG: 40 INJECTION, POWDER, FOR SOLUTION INTRAMUSCULAR; INTRAVENOUS at 01:42

## 2021-06-11 RX ADMIN — NORTRIPTYLINE HYDROCHLORIDE 25 MG: 25 CAPSULE ORAL at 21:36

## 2021-06-11 RX ADMIN — OXYCODONE HYDROCHLORIDE AND ACETAMINOPHEN 1 TABLET: 10; 325 TABLET ORAL at 15:34

## 2021-06-11 RX ADMIN — METHYLPREDNISOLONE SODIUM SUCCINATE 20 MG: 40 INJECTION, POWDER, FOR SOLUTION INTRAMUSCULAR; INTRAVENOUS at 17:40

## 2021-06-11 RX ADMIN — INSULIN LISPRO 6 UNITS: 100 INJECTION, SOLUTION INTRAVENOUS; SUBCUTANEOUS at 09:04

## 2021-06-11 RX ADMIN — INSULIN LISPRO 6 UNITS: 100 INJECTION, SOLUTION INTRAVENOUS; SUBCUTANEOUS at 17:40

## 2021-06-11 RX ADMIN — LABETALOL 20 MG/4 ML (5 MG/ML) INTRAVENOUS SYRINGE 20 MG: at 21:36

## 2021-06-11 RX ADMIN — LOSARTAN POTASSIUM 100 MG: 50 TABLET, FILM COATED ORAL at 09:03

## 2021-06-11 NOTE — ED PROVIDER NOTES
Patient: JUANCHO CHATMAN      Clinical Report - Physicians/Mid Levels   MRN: [id number]     [facility]  VisitID: [id number]     [address] [place] [address]   [phone]  60y, F     Registration Date/Time: 06/02/2021 12:49      Time Seen: 13:49 06/02/2021; upon arrival, in room.    Arrived- By ambulance.  Historian- patient.      HISTORY OF PRESENT ILLNESS  Chief Complaint: DYSPNEA.  This started today and is still present.  It has   been constant.  The dyspnea is described as moderate and is worsened by   exertion.  The patient has had a moderate dry cough.  No fever, sweating   episodes, chills or chest pain.      (Pt reposts that she is normally on 2.5 L O2 at home.    Pt reposts that she lost her taste and smell.).      REVIEW OF SYSTEMS  No chills, fever, muscle aches, sweats or photophobia.  No ear pain,   epistaxis, chest pain, abdominal pain or diarrhea.  No nausea, vomiting, back   pain, neck pain or skin rash.  No weakness or difficulty with urination.  The   patient has had a moderate cough productive of sputum.  She has had moderate   difficulty breathing.      PAST HISTORY  See nurses notes.      Problems:  COPD - Chronic Obstructive Pulmonary Disease.  Congestive Heart Failure.  Acute  Coronary  Syndrome.  Hypertension.  Hyperglycemia.      Additional Surgeries:  Appendectomy.  Cholecystectomy.  Facial.  Hip Surgery.  Hysterectomy.  Knee Surgery.      Medications:  metOLazone Oral (Tablet 10 mg).  Warfarin Sodium Oral.  Bumetanide Oral.  tiZANidine HCl Oral (Tablet 4 mg).  Cozaar Oral (Tablet 100 mg).  metFORMIN HCl Oral (Tablet 500 mg).  Nortriptyline HCl Oral (Capsule 25 mg).  Gabapentin (PHN) Oral (Tablet 600 mg) 600 MG,  BID.PRN (Route: Oral).    Allergies:  No Known Drug Allergy.      FAMILY HISTORY  No significant family medical history.      ADDITIONAL NOTES  The nursing notes have been reviewed.      PHYSICAL EXAM  Vital Signs: 06/02/2021 12:58 BP:  155/67. MAP: 96. HR: 85. RR: 22. O2   saturation: 90%. Temp: 98.4 F.  Have been reviewed.    Appearance: Alert.  No acute distress.    Eyes: Pupils equal, round and reactive to light.  Eyes normal inspection.    ENT: Airway intact.  Ears normal.  Nose normal.  Pharynx normal.  Uvula   midline.    Neck: Normal inspection.  No jugular venous distention.  Neck supple.    CVS: Normal heart rate and rhythm.  Heart sounds normal.    Respiratory: No respiratory distress.  Painless inspiration.  Moderate   bilateral rhonchi present diffusely.    Abdomen: Soft and nontender.    Skin: Skin warm and dry.  Normal skin color.  No rash.    Extremities: Bilateral 1+ pitting edema of the lower extremities.    Extremities exhibit normal ROM.    Neuro: Oriented X 3.  No motor deficit.  No sensory deficit.      LABS, X-RAYS, AND EKG  EKG: Normal sinus rhythm.  Rate: 81.  Normal QRS complex.  Normal axis.    Normal ST and T waves.  No ST elevation.  Prior EKG unavailable.  The study   has been interpreted contemporaneously by me.  The study has been   independently viewed by me.  The EKG appears to be a good tracing.  I agree   with and confirm the computer reading of the EKG.  Interpretation time: 13:49   06/02/2021.    Laboratory Tests: Laboratory tests have been ordered, with results reviewed   and considered in the medical decision making process.     CBC w Diff:    (RONDA: 06/02/2021 13:19)\     09\( MsgRcvd 06/02/2021 13:38) Final   results         **Test**                                **Result**      **Flag**      **Units**      **(Reference)**       WBC                                     27.14           H           K/ul       (4.80-10.80)         RBC                                     6.58            H           M/ul       (4.20-5.40)          HGB                                     15.0                        g/dl       (12.0-16.0)          HCT                                     50.5            H           %           (37.0-47.0)          MCV                                     76.7            L           fl         (81.0-99.0)          MCH                                     22.8            L           pcg        (27.0-31.0)          MCHC                                    29.7            L           g/dL       (33.0-37.0)          RDW                                     23.9            H           %          (11.7-14.4)          Platelets                               101             L           K/ul       (130-400)            MPV                                     see below                   fl         (9.4-12.3)           Unable to obtain result due to sample interference.         Gran percent                            91.2            H           %          (30.0-85.0)          Lymphocytes percent                     3.8             L           %          (20.0-45.0)          Monocytes percent                       3.9                         %          (3.0-10.0)           Eosinophils percent                     0.2                         %          (0.0-7.0)            Basophils percent                       0.2                         %          (0.0-3.0)            Gran# (Neutrophil #)                    24.73           H           K/ul       (2.00-8.00)          Lymph#                                  1.02                        K/ul       (1.00-5.00)          Monos#                                  1.07                        K/ul       (0.20-1.20)          Eos#                                    0.06                        K/ul       (0.00-0.70)          Baso#                                   0.06                        K/ul       (0.00-0.20)          Imm. Gran. percent                      0.7                         %          (0.0-1.8)            Imm. Gran. #                            0.20                        K/ul       (0.00-0.20)          RDW-SD                                  62.4            H            fl         (36.4-46.3)          NRBC  percent                           0.00                        %          (0.00-0.70)       Lactate, Serum:    (RONDA: 06/02/2021 13:19)\     09\( MsgRcvd 06/02/2021 13:49)   Final results         **Test**                                **Result**      **Flag**      **Units**      **(Reference)**       Lactic Acid Blood                       2.6             H           mmol/L     (0.7-2.1)         CMP:    (RONDA: 06/02/2021 13:19)\     09\( MsgRcvd 06/02/2021 13:49) Final results         **Test**                                **Result**      **Flag**      **Units**      **(Reference)**       Glucose                                 261             H           mg/dL      (65-99)              BUN                                     20                          mg/dl      (5-25)               Creatinine                              0.59                        mg/dl      (0.50-0.90)          Bun/Creat. Rat                          34              H           Ratio      (6-20)               Glomerular Filt.Rate                    >60                           ml/min/1.73m2   (>60)                Interpretative Data:------------------------------------STAGE                  GFRStage 1                90 mL/min or greaterStage 2                60-89   mL/minStage 3                30-59 mL/minStage 4                15-29   mL/minValue <60 mL/min for 3 or more months is defined as CKD.         Sodium                                  134             L           mmol/L     (135-147)            Potassium                               4.4                         mmol/L     (3.5-5.3)            Chloride                                98              L           mmol/L     ()             Carbon Dioxide                          24                          mmol/L     (22-32)              Anion Gap                               16                          mmol/l     (8-19)                Osmolality Calc.                        290                         mOsm/kg    (273-304)            Total Protein                           7.3                         g/dl       (6.3-8.2)            If Patient is receiving dextran as a blood volume expanderresult may show a   potential interference.         Albumin                                 2.9             L           g/dl       (3.5-5.0)            Globulin                                4.4             H           g/dl       (2.0-3.5)            A/G Ratio                               0.7             L           RATIO      (1.4-2.6)            Calcium                                 9.5                         mg/dl      (8.7-10.4)           Calcium value was calculated to correct for low albumin result.         Alkaline Phos.                          144                         U/L        ()             ALT (SGPT)                              39                          U/L        (10-40)              AST (SGOT)                              60              H           U/L        (15-50)              Total Bilirubin                         1.08                        mg/dL      (0.20-1.30)       B-Type proNatriuretic Peptide:    (RONDA: 06/02/2021 13:19)\     09\( MsgRcvd   06/02/2021 13:57) Final results         **Test**                                **Result**      **Flag**      **Units**      **(Reference)**       B-Type proNatriuretic Peptide           1925            H           pg/mL      (0-900)              For Adults <50 NT-proBNP values >450 pg/ml are consistentwith CHF.For adults   50 to 75 a diagnostic NT-proBNP cutoff of 900 pg/mlhas been suggested in the   absence of renal failure.For adults over 75 a diagnostic NT-proBNP cutoff of   1800 pg/mlhas been suggested in absence of renal failure.In addition, a   NT-proBNP of <300 pg/mL effectively rules outacute congestive failure with   99% negative predictive value.A cutoff of 1200 pg/ml for  patients with an   eGFR <60 yields adiagnostic sensitivity and specificity of 89% and 72%   foracute CHF.      Cardiac Panel:    (RONDA: 2021 13:19)\     09\( [facility] [place] Final   results         **Test**                                **Result**      **Flag**      **Units**      **(Reference)**       Troponin T                              0.04                        ng/mL      (0.00-0.10)          Normal   <0.01 ng/mLIndeterminate  0.01-0.10 ng/mLSerial determinations   needed to rule-in AMIAbnormal  >0.10 ng/mLElevations in Troponin-T may be   seen in renal failure andneuromuscular disease.      Chest 1V Port:    (RONDA: 2021 13:36)\     09\( MsgRcvd 2021 13:40) Final   results         **Exam**                                       Reason for Exam:                                               SPECIAL INSTRUCTIONS:                                               Transport :        Portable                                       Chief Complaint                                               --          [employer]                                         --              [employer]                                         --           PACS RADIOLOGY REPORT                                         --                                        -- Patient: JUANCHO CHATMAN   Acct: #[id number]   Report:   #[id number]                                         --                                        -- UNIT #: [id number]    DOS: 21 1258                                         -- INSURANCE:[employer] [employer] [facility] [place]   ORDER #:[id number]                    -- LOCATION:[place]     : 1960                                         --                                        -- PROVIDERS                                         -- ADMITTING:     ATTENDING:                                          -- FAMILY:     ORDERING:  MISAEL ANDREWS                                             -- OTHER:    DICTATING:  Zackary Christian MD                                         --                                        -- REQ #:[id number]   EXAM:CXR1 - CHEST 1 View AP PA                                  -- REASON FOR EXAM:  Shortness of Breath                                         -- REASON FOR VISIT:  DIFF BREATHING/CHILLS/HA/LOSS OF TASTE--MEAD                    --                                        -- *******Signed******                                            -- PROCEDURE:   CHEST AP/PA SINGLE VIEW                                         --                                        --  COMPARISON:   [facility], CT, CHEST W/ CONTRAST,   8/31/2020, 18:13.  [facility]                                          --  [facility], MR, MRI LEFT SHOULDER WO CONTRAST, 12/12/2020, 10:52.  [facility], CR,                                          --  CHEST AP/PA 1 VIEW, 10/13/2020, 21:39.                                         --                                        --  INDICATIONS:   Shortness of Breath/chills/headache/loss of   taste/rattling of lungs                                         --                                        --  FINDINGS:                                            --  Patchy basilar predominant opacities in both lungs with interstitial   thickening, likely                                          --  atypical/viral pneumonia.  No pneumothorax.  Unchanged cardiomediastinal   contours.  Stable                                          --  benign-appearing cystic changes in the proximal left humerus.  No acute   osseous abnormality is                                          --  identified.                                         --                                        --  CONCLUSION:   Patchy lung opacities, likely atypical/viral pneumonia.             --                                        --                                        --                                         --                                        --                                        --   ESEQUIEL MEJIA MD                                                --  Electronically Signed and Approved By: ESEQUIEL MEJIA MD on 6/02/2021 at   13:36                                            --                                        --                                        --                                        --                                        --                                        -- Until signed, this is an unconfirmed preliminary report that may contain           -- errors and is subject to change.                                         --                                        --                                        --                                        --                                        -- COLOT:                                         -- D:06/02/21 1336                                      EKG:    (RONDA: 06/02/2021 12:59)\     09\( MsgRcvd 06/02/2021 13:00) New Order         **Exam**                                       Reason for Exam:                                               SPECIAL INSTRUCTIONS:                                               Transport :        Portable                                       Chief Complaint                                              .      PROGRESS AND PROCEDURES  Course of Care: White blood cell count is 27,000.  Lactic acid is 2.6.  The   patient´s CMP was reviewed and shows no abnormalities of critical concern. Of   note, the patient´s sodium and potassium are acceptable. The patient´s liver   enzymes are unremarkable. The patient´s renal function (creatinine) is   preserved. The patient has a normal anion gap.  BNP is 1925.  Troponin is   negative.  Chest x-ray shows patchy lung opacities likely atypical/viral   pneumonia.  Patient was swabbed for Covid in the emergency department.  Case   was discussed with Dr. Shoemaker  who agrees with admission.      Critical care performed (45 minutes). Time is exclusive of separately   billable procedures. Time includes: direct patient care, patient   reassessment, coordination of patient care, interpretation of data   (laboratory data and pulse oximetry), review of patient's medical records,   medical consultation, family consultation regarding treatment decisions and   documentation of patient care.    Differential Diagnosis:  I considered pulmonary etiology, cardiac etiology, metabolic etiology and   other etiology as a possible cause of dyspnea in this patient. This is a   partial list of diagnoses considered.      Above considerations are based on history, physical exam, past history,   social history, family history, reassessment, laboratory data, imaging   studies data and EKG.      Differential diagnosis was discussed with patient.      CLINICAL IMPRESSION  Pneumonia.    Hypoxia.                (Electronically signed by DEVON GOYAL M.D. 06/10/2021 20:34)             Addenda for JUANCHO CHATMAN    MRN: [id number]   VisitID:   [id number]     Date:    06/02/2021 6/2/2021 13:57  By signing my name below, I, Silverio Diaz, attest that this documentation   has been prepared under the direction of Dr. Devon Goyal.    (Electronically signed by SILVERIO DIAZ  - 6/2/2021 13:57)  Disclaimer: Converted hospital document may not contain table formatting or lab diagrams. Please see [facility] for authenticated document.

## 2021-06-11 NOTE — THERAPY TREATMENT NOTE
Acute Care - Physical Therapy Treatment Note   [facility]     Patient Name: Italia Olvera  : 1960  MRN: [id number]  Today's Date: 2021           PT Assessment (last 12 hours)      PT Evaluation and Treatment     Row Name 21 1147          Physical Therapy Time and Intention    Subjective Information  complains of;weakness;fatigue;pain  -DK     Document Type  therapy note (daily note)  -DK     Mode of Treatment  individual therapy;physical therapy  -DK     Patient Effort  fair  -DK     Symptoms Noted During/After Treatment  fatigue;increased pain  -DK     Row Name 21 1147          Cognition    Affect/Mental Status (Cognitive)  confused;low arousal/lethargic  -DK     Orientation Status (Cognition)  oriented to;person;place;situation  -DK     Follows Commands (Cognition)  follows one-step commands;25-49% accuracy;delayed response/completion;physical/tactile prompts required;repetition of directions required;verbal cues/prompting required  -DK     Cognitive Function (Cognitive)  attention deficit  -DK     Attention Deficit (Cognitive)  minimal deficit;concentration;arousal/alertness  -DK     Personal Safety Interventions  nonskid shoes/slippers when out of bed;supervised activity  -DK     Row Name 21 1147          Pain Scale: Numbers Pre/Post-Treatment    Pretreatment Pain Rating  2/10  -DK     Posttreatment Pain Rating  2/10  -DK     Row Name 21 1147          Pain Scale: Word Pre/Post-Treatment    Pain: Word Scale, Pretreatment  2 - mild pain  -DK     Posttreatment Pain Rating  2 - mild pain  -DK     Pain Location - Orientation  generalized  -DK     Pain Intervention(s)  Distraction;Therapeutic presence  -DK     Row Name 21 1147          Motor Skills    Motor Skills  therapeutic exercise  -DK     Coordination  WFL  -DK     Therapeutic Exercise  hip;knee;ankle  -     Row Name 21 1147          Hip (Therapeutic Exercise)    Hip (Therapeutic Exercise)  AROM (active range  of motion)  -     Hip AROM (Therapeutic Exercise)  bilateral;flexion;extension;aBduction;aDduction;sitting;10 repetitions  -DK     Row Name 06/11/21 1147          Knee (Therapeutic Exercise)    Knee (Therapeutic Exercise)  AROM (active range of motion)  -     Knee AROM (Therapeutic Exercise)  bilateral;flexion;extension;LAQ (long arc quad);sitting;10 repetitions;2 sets  -DK     Row Name 06/11/21 1147          Ankle (Therapeutic Exercise)    Ankle (Therapeutic Exercise)  AROM (active range of motion)  -     Ankle AROM (Therapeutic Exercise)  bilateral;dorsiflexion;plantarflexion;sitting;10 repetitions;2 sets  -DK     Row Name             Wound 06/03/21 1300 medial sacral spine Pressure Injury    Wound - Properties Group Placement Date: 06/03/21 -BJ Placement Time: 1300  -BJ Present on Hospital Admission: Y  -BJ Orientation: medial  -BJ Location: sacral spine  -BJ Primary Wound Type: Pressure inj  -BJ Stage, Pressure Injury : Stage 2  -BJ Additional Comments: normal saline; aquacel; mepilex changed 6/4/21  -BJ    Retired Wound - Properties Group Date first assessed: 06/03/21 -BJ Time first assessed: 1300  -BJ Present on Hospital Admission: Y  -BJ Location: sacral spine  -BJ Primary Wound Type: Pressure inj  -BJ Additional Comments: normal saline; aquacel; mepilex changed 6/4/21  -BJ    Row Name             Wound Right proximal arm    Wound - Properties Group Side: Right  -TF Orientation: proximal  -TF Location: arm  -TF    Retired Wound - Properties Group Side: Right  -TF Location: arm  -TF    Row Name             Wound Left lower arm Abrasion    Wound - Properties Group Side: Left  -TF Orientation: lower  -TF Location: arm  -TF Primary Wound Type: Abrasion  -TF Additional Comments: multiple scattered scabs and bruising  -TF    Retired Wound - Properties Group Side: Left  -TF Location: arm  -TF Primary Wound Type: Abrasion  -TF Additional Comments: multiple scattered scabs and bruising  -TF    Row Name              Wound Right lower arm Abrasion    Wound - Properties Group Side: Right  -TF Orientation: lower  -TF Location: arm  -TF Primary Wound Type: Abrasion  -TF Additional Comments: scattered scabs and bruising  -TF    Retired Wound - Properties Group Side: Right  -TF Location: arm  -TF Primary Wound Type: Abrasion  -TF Additional Comments: scattered scabs and bruising  -TF    Row Name             Wound 06/10/21 0143 Right proximal arm Skin Tear    Wound - Properties Group Placement Date: 06/10/21  -TF Placement Time: 0143  -TF Present on Hospital Admission: N  -TF Side: Right  -TF Orientation: proximal  -TF Location: arm  -TF Primary Wound Type: Skin tear  -TF Additional Comments: tegaderm from iv removal casuse skin to tear. cleaned with ns, adapatic and mepilex applied.  -TF    Retired Wound - Properties Group Date first assessed: 06/10/21  -TF Time first assessed: 0143  -TF Present on Hospital Admission: N  -TF Side: Right  -TF Location: arm  -TF Primary Wound Type: Skin tear  -TF Additional Comments: tegaderm from iv removal casuse skin to tear. cleaned with ns, adapatic and mepilex applied.  -TF    Row Name 06/11/21 1147          Plan of Care Review    Plan of Care Reviewed With  patient  -DK     Progress  improving  -DK     Row Name 06/11/21 1147          Positioning and Restraints    Pre-Treatment Position  in bed  -DK     Post Treatment Position  bed  -DK     In Bed  sitting EOB;call light within reach;encouraged to call for assist;exit alarm on;side rails up x2  -DK     Row Name 06/11/21 1147          Therapy Assessment/Plan (PT)    Rehab Potential (PT)  fair, will monitor progress closely  -DK     Criteria for Skilled Interventions Met (PT)  skilled treatment is necessary  -DK     Problem List (PT)  balance;cognition;strength;pain;hearing  -DK     Activity Limitations Related to Problem List (PT)  unable to transfer safely;unable to ambulate safely  -DK     Row Name 06/11/21 1147          Progress Summary  (PT)    Progress Toward Functional Goals (PT)  progress toward functional goals is fair  -DK       User Key  (r) = Recorded By, (t) = Taken By, (c) = Cosigned By    Initials Name Provider Type    TF Tequila Pereira, RN Registered Nurse    Janay Briggs, PTA Physical Therapy Assistant    Galilea Vences RN Registered Nurse        Physical Therapy Education                 Title: PT OT SLP Therapies (Done)     Topic: Physical Therapy (Done)     Point: Mobility training (Done)     Learning Progress Summary           Patient Acceptance, E,D, VU,DU by PG at 6/10/2021 0832    Acceptance, E,TB, VU by CS at 6/7/2021 1145                   Point: Home exercise program (Done)     Learning Progress Summary           Patient Acceptance, E,D, VU,DU by  at 6/10/2021 0832    Acceptance, E,TB, VU by  at 6/7/2021 1145                   Point: Body mechanics (Done)     Learning Progress Summary           Patient Acceptance, E,D, VU,DU by PG at 6/10/2021 0832    Acceptance, E,TB, VU by CS at 6/7/2021 1145                   Point: Precautions (Done)     Learning Progress Summary           Patient Acceptance, E,D, VU,DU by PG at 6/10/2021 0832    Acceptance, E,TB, VU by  at 6/7/2021 1145                               User Key     Initials Effective Dates Name Provider Type Discipline    PG 03/31/21 -  Jensen Chambers OT Occupational Therapist OT    CS 04/25/21 -  Alix Miramontes, MARY Physical Therapist PT              PT Recommendation and Plan  Planned Therapy Interventions (PT): balance training, gait training, strengthening, transfer training  Therapy Frequency (PT): daily  Progress Summary (PT)  Progress Toward Functional Goals (PT): progress toward functional goals is fair  Plan of Care Reviewed With: patient  Progress: improving  Outcome Measures     Row Name 06/11/21 1147             How much help from another person do you currently need...    Turning from your back to your side while in flat bed without using  bedrails?  4  -DK      Moving from lying on back to sitting on the side of a flat bed without bedrails?  4  -DK      Moving to and from a bed to a chair (including a wheelchair)?  2  -DK      Standing up from a chair using your arms (e.g., wheelchair, bedside chair)?  2  -DK      Climbing 3-5 steps with a railing?  2  -DK      To walk in hospital room?  2  -DK      AM-PAC 6 Clicks Score (PT)  16  -DK         Functional Assessment    Outcome Measure Options  AM-PAC 6 Clicks Basic Mobility (PT)  -DK        User Key  (r) = Recorded By, (t) = Taken By, (c) = Cosigned By    Initials Name Provider Type    Janay Briggs PTA Physical Therapy Assistant           Time Calculation:   PT Charges     Row Name 06/11/21 1154             Time Calculation    PT Received On  06/11/21  -DK      PT Goal Re-Cert Due Date  06/17/21  -DK         Timed Charges    86653 - PT Therapeutic Exercise Minutes  20  -DK      49790 - PT Therapeutic Activity Minutes  4  -DK         Total Minutes    Timed Charges Total Minutes  24  -DK       Total Minutes  24  -DK        User Key  (r) = Recorded By, (t) = Taken By, (c) = Cosigned By    Initials Name Provider Type    Janay Briggs PTA Physical Therapy Assistant        Therapy Charges for Today     Code Description Service Date Service Provider Modifiers Qty    32884282917 HC PT THER PROC EA 15 MIN 6/11/2021 Janay Smith PTA GP 2          PT G-Codes  Outcome Measure Options: AM-PAC 6 Clicks Basic Mobility (PT)  AM-PAC 6 Clicks Score (PT): 16  AM-PAC 6 Clicks Score (OT): 9    Janay Smith PTA  6/11/2021

## 2021-06-11 NOTE — PLAN OF CARE
Goal Outcome Evaluation:      Pt medicated with oxycodone q6 hrs prn back pain, pt has sat up in chair since early afternoon, pt is an assist of 2 when getting up to bedside toilet.  No c/o soa.

## 2021-06-11 NOTE — PLAN OF CARE
Goal Outcome Evaluation:  Plan of Care Reviewed With: patient        Progress: improving   AT 2100, REPEAT CHECK AT 0100 . RECEIVED EXTRA 25 UNITS LEVIMIR AS ONE TIME DOSE. HYPERTENSIVE AGAIN TONIGHT. IV LABETALOL X 1, WITH MINIMAL TO NO IMPROVEMENT IN BP.  PT HAS NOT SLEPT AT ALL TONIGHT. PERSISTENTLY CALLS FOR PAIN MEDS AND FOOD. UPSET THIS AM BECAUSE STAFF HAS LIMITED HER SNACK INTAKE SECONDARY TO ELEVATED GLUCOSE LEVELS.  PLACEMENT COVID SWAB NEGATIVE.

## 2021-06-12 VITALS
SYSTOLIC BLOOD PRESSURE: 176 MMHG | RESPIRATION RATE: 18 BRPM | BODY MASS INDEX: 28.38 KG/M2 | HEART RATE: 66 BPM | TEMPERATURE: 97.9 F | DIASTOLIC BLOOD PRESSURE: 66 MMHG | HEIGHT: 66 IN | OXYGEN SATURATION: 100 % | WEIGHT: 176.59 LBS

## 2021-06-12 LAB
GLUCOSE BLDC GLUCOMTR-MCNC: 198 MG/DL (ref 70–130)
GLUCOSE BLDC GLUCOMTR-MCNC: 254 MG/DL (ref 70–130)

## 2021-06-12 PROCEDURE — 82962 GLUCOSE BLOOD TEST: CPT

## 2021-06-12 PROCEDURE — 25010000002 METHYLPREDNISOLONE PER 40 MG: Performed by: INTERNAL MEDICINE

## 2021-06-12 PROCEDURE — 63710000001 INSULIN LISPRO (HUMAN) PER 5 UNITS: Performed by: INTERNAL MEDICINE

## 2021-06-12 PROCEDURE — 94799 UNLISTED PULMONARY SVC/PX: CPT

## 2021-06-12 PROCEDURE — 94760 N-INVAS EAR/PLS OXIMETRY 1: CPT

## 2021-06-12 RX ADMIN — POTASSIUM CHLORIDE 20 MEQ: 10 CAPSULE, COATED, EXTENDED RELEASE ORAL at 08:23

## 2021-06-12 RX ADMIN — HYDROXYZINE PAMOATE 25 MG: 25 CAPSULE ORAL at 08:23

## 2021-06-12 RX ADMIN — METHYLPREDNISOLONE SODIUM SUCCINATE 20 MG: 40 INJECTION, POWDER, FOR SOLUTION INTRAMUSCULAR; INTRAVENOUS at 02:16

## 2021-06-12 RX ADMIN — BISOPROLOL FUMARATE 10 MG: 5 TABLET ORAL at 08:23

## 2021-06-12 RX ADMIN — LABETALOL 20 MG/4 ML (5 MG/ML) INTRAVENOUS SYRINGE 20 MG: at 08:24

## 2021-06-12 RX ADMIN — INSULIN LISPRO 6 UNITS: 100 INJECTION, SOLUTION INTRAVENOUS; SUBCUTANEOUS at 12:43

## 2021-06-12 RX ADMIN — LOSARTAN POTASSIUM 100 MG: 50 TABLET, FILM COATED ORAL at 08:23

## 2021-06-12 RX ADMIN — SERTRALINE HYDROCHLORIDE 25 MG: 25 TABLET ORAL at 08:23

## 2021-06-12 RX ADMIN — NICOTINE 1 PATCH: 21 PATCH, EXTENDED RELEASE TRANSDERMAL at 08:28

## 2021-06-12 RX ADMIN — METHYLPREDNISOLONE SODIUM SUCCINATE 20 MG: 40 INJECTION, POWDER, FOR SOLUTION INTRAMUSCULAR; INTRAVENOUS at 10:30

## 2021-06-12 RX ADMIN — SODIUM CHLORIDE, PRESERVATIVE FREE 3 ML: 5 INJECTION INTRAVENOUS at 08:28

## 2021-06-12 RX ADMIN — INSULIN LISPRO 6 UNITS: 100 INJECTION, SOLUTION INTRAVENOUS; SUBCUTANEOUS at 08:22

## 2021-06-12 RX ADMIN — SODIUM CHLORIDE 30 ML/HR: 9 INJECTION, SOLUTION INTRAVENOUS at 06:26

## 2021-06-12 NOTE — PLAN OF CARE
Goal Outcome Evaluation:  Plan of Care Reviewed With: patient        Progress: improving  Outcome Summary: Pt has been awake throughout night asking for medications and snacks. Pt's blood sugar has been elevated, sugar free snacks have been provided. Pt to discharge today (6/12/21) to nursing home.

## 2021-06-12 NOTE — DISCHARGE SUMMARY
[facility]         DISCHARGE SUMMARY    Patient Name: Italia Olvera  : 1960  MRN: [id number]    Date of Admission: 2021  Date of Discharge:  21  Primary Care Physician: Carloz Shoemaker MD    Consults     No orders found from 2021 to 6/3/2021.          Presenting Problem:   PNEUMONIA,COMMUNITY ACQUIRED    Active and Resolved Hospital Problems:  Active Hospital Problems    Diagnosis POA   • Chronic respiratory failure with hypoxia (CMS/HCC) [J96.11] Yes   • Acute on chronic respiratory failure with hypoxia (CMS/HCC) [J96.21] Yes   • Chronic respiratory failure with hypoxia, on home oxygen therapy (CMS/[facility]) [J96.11, Z99.81] Not Applicable   • Overweight (BMI 25.0-29.9) [E66.3] Yes   • CAP (community acquired pneumonia) [J18.9] Yes   • COPD with acute exacerbation (CMS/[facility]) [J44.1] Yes   • T2DM (type 2 diabetes mellitus) (CMS/[facility])- uncontrolled [E11.9] Yes   • Peripheral neuropathy [G62.9] Yes   • Chronic back pain [M54.9, G89.29] Yes   • Bipolar disorder (CMS/[facility]) [F31.9] Yes   • Hypertension [I10] Yes      Resolved Hospital Problems   No resolved problems to display.         Hospital Course     Hospital Course:  Italia Olvera is a 60 y.o. female , She came to the emergency department with complaints of loss of taste, loss of smell and shortness of breath.  Covid 19 test was negative.  Chest x-ray and CT scan of the chest demonstrated multifocal infiltrates.  Patient was admitted to a regular floor with a diagnosis of community-acquired pneumonia, she was started on a regimen of IV Rocephin, Zithromax and Solu-Medrol.  Bronchodilators were given.  The patient has long-standing history of uncontrolled diabetes and with the institution of Solu-Medrol blood glucose escalated and this was treated with long-acting and short-acting subcutaneous insulin.  She developed hypoglycemia and D10 infusion was started  Dose of insulin was adjusted downwards  She also has a history of  chronic and combined congestive heart failure with an ejection fraction of 25%, Bumex and Zaroxolyn was started to treat the volume overload  The patient was able to ambulate in the room but demonstrated decreased strength and endurance and therefore physical therapy evaluation was ordered and they recommended continued therapies  She was referred to skilled nursing facilities and was accepted by [facility]  Insurance approved the transfer, the patient is now clinically improved and stable and will therefore be discharged to the skilled nursing facility          Day of Discharge     Vital Signs:  Temp:  [97.6 °F (36.4 °C)-98.6 °F (37 °C)] 98 °F (36.7 °C)  Heart Rate:  [71-80] 73  Resp:  [18-20] 20  BP: (178-188)/(65-77) 180/65  Flow (L/min):  [2] 2  Physical Exam:      Pertinent  and/or Most Recent Results     LAB RESULTS:  Lab Results   Component Value Date    WBC 9.74 06/07/2021    WBC 9.74 06/07/2021    HGB 13.1 06/07/2021    HCT 46.2 06/07/2021    MCV 79.8 06/07/2021     (L) 06/07/2021     Lab Results   Component Value Date    GLUCOSE 323 (H) 06/07/2021    BUN 34 (H) 06/07/2021    CREATININE 0.52 (L) 06/07/2021    EGFRIFNONA 120 06/07/2021    BCR 65.4 (H) 06/07/2021    K 4.9 06/07/2021    CO2 17.8 (L) 06/07/2021    CALCIUM 8.4 (L) 06/07/2021    ALBUMIN 2.40 (L) 06/07/2021    LABIL2 0.6 (L) 06/04/2021    AST 79 (H) 06/07/2021     (H) 06/07/2021     Brief Urine Lab Results  (Last result in the past 365 days)      Color   Clarity   Blood   Leuk Est   Nitrite   Protein   CREAT   Urine HCG        08/30/20 1930   CLEAR TRACE NEGATIVE  Comment:  URINE MICROSCOPICS:    Only performed if any of the following are present:    Appearance is Sl Cloudy to Turbid; Protein is    30 to >/= 300 mg/dL; Occult Blood, Nitrite, or Leukocyte    Esterase is positive. Color is Red or Orange.   NEGATIVE               Microbiology Results (last 10 days)     Procedure Component Value - Date/Time    COVID PRE-OP  / PRE-PROCEDURE SCREENING ORDER (NO ISOLATION) - Swab, Nasopharynx [[id number]]  (Normal) Collected: 06/10/21 1126    Lab Status: Final result Specimen: Swab from Nasopharynx Updated: 06/10/21 1640    Narrative:      The following orders were created for panel order COVID PRE-OP / PRE-PROCEDURE SCREENING ORDER (NO ISOLATION) - Swab, Nasopharynx.  Procedure                               Abnormality         Status                     ---------                               -----------         ------                     COVID-19,CEPHEID,COR/FLY...[[id number]]  Normal              Final result                 Please view results for these tests on the individual orders.    COVID-19,CEPHEID,COR/FLY/PAD/ABNER IN-HOUSE(OR EMERGENT/ADD-ON),NP SWAB IN TRANSPORT MEDIA 3-4 HR TAT, RT-PCR - Swab, Nasopharynx [[id number]]  (Normal) Collected: 06/10/21 1126    Lab Status: Final result Specimen: Swab from Nasopharynx Updated: 06/10/21 1640     COVID19 Not Detected    Narrative:      Fact sheet for providers: https://www.fda.gov/media/526880/download     Fact sheet for patients: https://www.fda.gov/media/692618/download  Fact sheet for providers: https://www.fda.gov/media/304222/download     Fact sheet for patients: https://www.fda.gov/media/271801/download    Urine Culture - , [[id number]] Collected: 06/04/21 0610    Lab Status: Final result Updated: 06/11/21 1414     Urine Culture No Uropathogens Isolated.     Comment: No Uropathogens Isolated.           Narrative:      Site: CLEAN - URINE CULTURESource: URINE - MICRO SOURCE - URINE    CONV CORONAVIRUS COVID-19, REF - , [[id number]] Collected: 06/02/21 1455    Lab Status: Final result Updated: 06/11/21 1338     Coronavirus (COVID-19) NOT DETECTED NA      Comment: The SARS-CoV-2 assay is a real-time, RT-PCR test intended  for the qualitative detection of nucleic acid from the  SARS-CoV-2 in respiratory specimens from individuals,  testing performed at [facility].          Narrative:      Special Instructions:NEW ED ED 31Special Instructions:    Blood Culture - , [[id number]] Collected: 06/02/21 1319    Lab Status: Final result Updated: 06/11/21 1415     Blood Culture No Growth @1 Day.     Comment: No Growth @1 Day.  No Growth at Day 5.           Narrative:      PERIPHERALBloodSpecial Instructions:NEW ED ED 31SpecialInstructions:Blood    Blood Culture - , [[id number]] Collected: 06/02/21 1319    Lab Status: Final result Updated: 06/11/21 1415     Blood Culture No Growth @1 Day.     Comment: No Growth @1 Day.  No Growth at Day 5.           Narrative:      PERIPHERALBloodSpecial Instructions:NEW ED ED 31SpecialInstructions:Blood                           Labs Pending at Discharge:        Discharge Details        Discharge Medications      New Medications      Instructions Start Date   predniSONE 50 MG tablet  Commonly known as: DELTASONE   50 mg, Oral, Daily         Continue These Medications      Instructions Start Date   albuterol sulfate  (90 Base) MCG/ACT inhaler  Commonly known as: PROVENTIL HFA;VENTOLIN HFA;PROAIR HFA   2 puffs, Inhalation, Every 6 Hours PRN      Albuterol Sulfate 2.5 MG/0.5ML nebulizer solution   3 mL, Inhalation, Every 4 Hours PRN, NEB-ALBUTEROL SULF 2.5MG/3ML VIAL.NEB       aMILoride 5 MG tablet  Commonly known as: MIDAMOR   10 mg, Oral, Daily      bisoprolol-hydrochlorothiazide 10-6.25 MG per tablet  Commonly known as: ZIAC   1 tablet, Oral, 2 times daily      Cozaar 100 MG tablet  Generic drug: losartan   100 mg, Oral, Daily, LOSARTAN POTASSIUM 100MG       HumuLIN 70/30 KwikPen (70-30) 100 UNIT/ML suspension pen-injector  Generic drug: Insulin NPH Isophane & Regular   45 Units, Subcutaneous, 2 times daily      hydrOXYzine pamoate 25 MG capsule  Commonly known as: VISTARIL   25 mg, Oral, 3 Times Daily PRN      Lasix 80 MG tablet  Generic drug: furosemide   80 mg, Oral, 2 times daily      metFORMIN 500 MG tablet  Commonly known as: GLUCOPHAGE   1,000 mg,  Oral, 2 times daily      Neurontin 600 MG tablet  Generic drug: gabapentin   600 mg, Oral, 3 Times Daily      nortriptyline 25 MG capsule  Commonly known as: PAMELOR   25 mg, Oral, Nightly      Percocet  MG per tablet  Generic drug: oxyCODONE-acetaminophen   1 tablet, Oral, 1 TAB PO Q5H PRN PAIN      potassium chloride 20 MEQ packet  Commonly known as: KLOR-CON   40 mEq, Oral, Daily      sertraline 25 MG tablet  Commonly known as: ZOLOFT   25 mg, Oral, Daily      tiZANidine 4 MG tablet  Commonly known as: ZANAFLEX   4 mg, Oral, 3 Times Daily PRN      warfarin 2 MG tablet  Commonly known as: COUMADIN   2 mg, Oral, Daily Warfarin         Stop These Medications    bumetanide 2 MG tablet  Commonly known as: BUMEX     metOLazone 10 MG tablet  Commonly known as: ZAROXOLYN            No Known Allergies      Discharge Disposition:  Skilled Nursing Facility (DC - External)    Diet:  Hospital:  Diet Order   Procedures   • Diet Regular; Consistent Carbohydrate         Discharge Activity:         Future Appointments   Date Time Provider Department Center   6/29/2021  1:00 PM Ajay Roger MD [facility]   7/6/2021 10:15 AM Shirley Murguia APRN [facility]           Time spent on Discharge including face to face [id number] minutes    Electronically signed by Carloz Shoemaker MD, 06/11/21, 10:15 PM EDT.

## 2021-06-14 ENCOUNTER — HOSPITAL ENCOUNTER (INPATIENT)
Facility: HOSPITAL | Age: 61
LOS: 38 days | Discharge: NURSING, REHAB OR LONG-TERM CARE | End: 2021-07-22
Attending: EMERGENCY MEDICINE | Admitting: INTERNAL MEDICINE

## 2021-06-14 ENCOUNTER — APPOINTMENT (OUTPATIENT)
Dept: GENERAL RADIOLOGY | Facility: HOSPITAL | Age: 61
End: 2021-06-14

## 2021-06-14 DIAGNOSIS — M17.0 PRIMARY OSTEOARTHRITIS OF BOTH KNEES: Primary | ICD-10-CM

## 2021-06-14 DIAGNOSIS — R26.2 DIFFICULTY IN WALKING: ICD-10-CM

## 2021-06-14 DIAGNOSIS — E16.2 HYPOGLYCEMIA: ICD-10-CM

## 2021-06-14 DIAGNOSIS — R40.1 STUPOR: ICD-10-CM

## 2021-06-14 DIAGNOSIS — J96.01 ACUTE RESPIRATORY FAILURE WITH HYPOXIA (HCC): ICD-10-CM

## 2021-06-14 DIAGNOSIS — J18.9 MULTIFOCAL PNEUMONIA: ICD-10-CM

## 2021-06-14 DIAGNOSIS — D72.829 LEUKOCYTOSIS, UNSPECIFIED TYPE: ICD-10-CM

## 2021-06-14 LAB
ALBUMIN SERPL-MCNC: 2.6 G/DL (ref 3.5–5.2)
ALBUMIN/GLOB SERPL: 0.6 G/DL
ALP SERPL-CCNC: 206 U/L (ref 39–117)
ALT SERPL W P-5'-P-CCNC: 95 U/L (ref 1–33)
ANION GAP SERPL CALCULATED.3IONS-SCNC: 9.3 MMOL/L (ref 5–15)
ANISOCYTOSIS BLD QL: NORMAL
ARTERIAL PATENCY WRIST A: ABNORMAL
AST SERPL-CCNC: 53 U/L (ref 1–32)
BASE EXCESS BLDA CALC-SCNC: 3.7 MMOL/L (ref -2–2)
BASOPHILS # BLD AUTO: 0.07 10*3/MM3 (ref 0–0.2)
BASOPHILS NFR BLD AUTO: 0.2 % (ref 0–1.5)
BDY SITE: ABNORMAL
BILIRUB SERPL-MCNC: 1 MG/DL (ref 0–1.2)
BUN SERPL-MCNC: 28 MG/DL (ref 8–23)
BUN/CREAT SERPL: 58.3 (ref 7–25)
C3 FRG RBC-MCNC: NORMAL
CA-I BLDA-SCNC: 1.16 MMOL/L (ref 1.13–1.32)
CALCIUM SPEC-SCNC: 8.6 MG/DL (ref 8.6–10.5)
CHLORIDE BLDA-SCNC: 105 MMOL/L (ref 98–106)
CHLORIDE SERPL-SCNC: 103 MMOL/L (ref 98–107)
CO2 SERPL-SCNC: 30.7 MMOL/L (ref 22–29)
COHGB MFR BLD: 1.8 % (ref 0–1.5)
CREAT SERPL-MCNC: 0.48 MG/DL (ref 0.57–1)
D-LACTATE SERPL-SCNC: 1.8 MMOL/L (ref 0.5–2)
DEPRECATED RDW RBC AUTO: 65.2 FL (ref 37–54)
EOSINOPHIL # BLD AUTO: 0.02 10*3/MM3 (ref 0–0.4)
EOSINOPHIL NFR BLD AUTO: 0.1 % (ref 0.3–6.2)
ERYTHROCYTE [DISTWIDTH] IN BLOOD BY AUTOMATED COUNT: 23.9 % (ref 12.3–15.4)
FHHB: 6.8 % (ref 0–5)
GFR SERPL CREATININE-BSD FRML MDRD: 131 ML/MIN/1.73
GLOBULIN UR ELPH-MCNC: 4.1 GM/DL
GLUCOSE BLDA-MCNC: 70 MMOL/L (ref 65–99)
GLUCOSE BLDC GLUCOMTR-MCNC: 144 MG/DL (ref 70–130)
GLUCOSE BLDC GLUCOMTR-MCNC: 180 MG/DL (ref 70–130)
GLUCOSE BLDC GLUCOMTR-MCNC: 68 MG/DL (ref 70–130)
GLUCOSE SERPL-MCNC: 57 MG/DL (ref 65–99)
HCO3 BLDA-SCNC: 27.9 MMOL/L (ref 22–26)
HCT VFR BLD AUTO: 50.3 % (ref 34–46.6)
HGB BLD-MCNC: 14.9 G/DL (ref 12–15.9)
HGB BLDA-MCNC: 14 G/DL (ref 11.7–14.6)
HOLD SPECIMEN: NORMAL
HOLD SPECIMEN: NORMAL
IMM GRANULOCYTES # BLD AUTO: 0.29 10*3/MM3 (ref 0–0.05)
IMM GRANULOCYTES NFR BLD AUTO: 1 % (ref 0–0.5)
INHALED O2 CONCENTRATION: 100 %
INR PPP: 1.38 (ref 2–3)
LACTATE BLDA-SCNC: 1.31 MMOL/L (ref 0.5–2)
LARGE PLATELETS: NORMAL
LYMPHOCYTES # BLD AUTO: 1.64 10*3/MM3 (ref 0.7–3.1)
LYMPHOCYTES NFR BLD AUTO: 5.4 % (ref 19.6–45.3)
MCH RBC QN AUTO: 23.5 PG (ref 26.6–33)
MCHC RBC AUTO-ENTMCNC: 29.6 G/DL (ref 31.5–35.7)
MCV RBC AUTO: 79.3 FL (ref 79–97)
METHGB BLD QL: 0.3 % (ref 0–1.5)
MICROCYTES BLD QL: NORMAL
MODALITY: ABNORMAL
MONOCYTES # BLD AUTO: 1 10*3/MM3 (ref 0.1–0.9)
MONOCYTES NFR BLD AUTO: 3.3 % (ref 5–12)
NEUTROPHILS NFR BLD AUTO: 27.4 10*3/MM3 (ref 1.7–7)
NEUTROPHILS NFR BLD AUTO: 90 % (ref 42.7–76)
NOTE: ABNORMAL
NRBC BLD AUTO-RTO: 0 /100 WBC (ref 0–0.2)
NT-PROBNP SERPL-MCNC: 3494 PG/ML (ref 0–900)
OVALOCYTES BLD QL SMEAR: NORMAL
OXYHGB MFR BLDV: 91.1 % (ref 94–99)
PCO2 BLDA: 40.6 MM HG (ref 35–45)
PH BLDA: 7.46 PH UNITS (ref 7.35–7.45)
PLATELET # BLD AUTO: 177 10*3/MM3 (ref 140–450)
PMV BLD AUTO: 10.8 FL (ref 6–12)
PO2 BLD: 67 MM[HG] (ref 0–500)
PO2 BLDA: 66.7 MM HG (ref 80–100)
POTASSIUM BLDA-SCNC: 4.21 MMOL/L (ref 3.5–5)
POTASSIUM SERPL-SCNC: 4.2 MMOL/L (ref 3.5–5.2)
PROT SERPL-MCNC: 6.7 G/DL (ref 6–8.5)
PROTHROMBIN TIME: 14.5 SECONDS (ref 9.4–12)
QT INTERVAL: 421 MS
RBC # BLD AUTO: 6.34 10*6/MM3 (ref 3.77–5.28)
SAO2 % BLDCOA: 93.1 % (ref 95–99)
SMALL PLATELETS BLD QL SMEAR: ADEQUATE
SODIUM BLDA-SCNC: 140.7 MMOL/L (ref 136–146)
SODIUM SERPL-SCNC: 143 MMOL/L (ref 136–145)
TROPONIN T SERPL-MCNC: 0.02 NG/ML (ref 0–0.03)
WBC # BLD AUTO: 30.42 10*3/MM3 (ref 3.4–10.8)
WBC MORPH BLD: NORMAL
WHOLE BLOOD HOLD SPECIMEN: NORMAL

## 2021-06-14 PROCEDURE — 94799 UNLISTED PULMONARY SVC/PX: CPT

## 2021-06-14 PROCEDURE — 83050 HGB METHEMOGLOBIN QUAN: CPT | Performed by: EMERGENCY MEDICINE

## 2021-06-14 PROCEDURE — 85007 BL SMEAR W/DIFF WBC COUNT: CPT | Performed by: EMERGENCY MEDICINE

## 2021-06-14 PROCEDURE — 83605 ASSAY OF LACTIC ACID: CPT | Performed by: EMERGENCY MEDICINE

## 2021-06-14 PROCEDURE — 99285 EMERGENCY DEPT VISIT HI MDM: CPT

## 2021-06-14 PROCEDURE — 87147 CULTURE TYPE IMMUNOLOGIC: CPT | Performed by: EMERGENCY MEDICINE

## 2021-06-14 PROCEDURE — 82962 GLUCOSE BLOOD TEST: CPT

## 2021-06-14 PROCEDURE — 71045 X-RAY EXAM CHEST 1 VIEW: CPT

## 2021-06-14 PROCEDURE — 82375 ASSAY CARBOXYHB QUANT: CPT | Performed by: EMERGENCY MEDICINE

## 2021-06-14 PROCEDURE — 36600 WITHDRAWAL OF ARTERIAL BLOOD: CPT | Performed by: EMERGENCY MEDICINE

## 2021-06-14 PROCEDURE — 93005 ELECTROCARDIOGRAM TRACING: CPT | Performed by: EMERGENCY MEDICINE

## 2021-06-14 PROCEDURE — 85025 COMPLETE CBC W/AUTO DIFF WBC: CPT | Performed by: EMERGENCY MEDICINE

## 2021-06-14 PROCEDURE — 25010000002 VANCOMYCIN 5 G RECONSTITUTED SOLUTION: Performed by: EMERGENCY MEDICINE

## 2021-06-14 PROCEDURE — 25010000002 CEFEPIME PER 500 MG: Performed by: EMERGENCY MEDICINE

## 2021-06-14 PROCEDURE — 85610 PROTHROMBIN TIME: CPT | Performed by: EMERGENCY MEDICINE

## 2021-06-14 PROCEDURE — 94660 CPAP INITIATION&MGMT: CPT

## 2021-06-14 PROCEDURE — 87040 BLOOD CULTURE FOR BACTERIA: CPT | Performed by: EMERGENCY MEDICINE

## 2021-06-14 PROCEDURE — 84484 ASSAY OF TROPONIN QUANT: CPT | Performed by: EMERGENCY MEDICINE

## 2021-06-14 PROCEDURE — 80053 COMPREHEN METABOLIC PANEL: CPT | Performed by: EMERGENCY MEDICINE

## 2021-06-14 PROCEDURE — 87150 DNA/RNA AMPLIFIED PROBE: CPT | Performed by: EMERGENCY MEDICINE

## 2021-06-14 PROCEDURE — 25010000002 FUROSEMIDE PER 20 MG: Performed by: EMERGENCY MEDICINE

## 2021-06-14 PROCEDURE — 83880 ASSAY OF NATRIURETIC PEPTIDE: CPT | Performed by: EMERGENCY MEDICINE

## 2021-06-14 PROCEDURE — 82805 BLOOD GASES W/O2 SATURATION: CPT | Performed by: EMERGENCY MEDICINE

## 2021-06-14 RX ORDER — ARFORMOTEROL TARTRATE 15 UG/2ML
15 SOLUTION RESPIRATORY (INHALATION)
Status: DISCONTINUED | OUTPATIENT
Start: 2021-06-15 | End: 2021-07-22 | Stop reason: HOSPADM

## 2021-06-14 RX ORDER — SODIUM CHLORIDE 0.9 % (FLUSH) 0.9 %
10 SYRINGE (ML) INJECTION AS NEEDED
Status: DISCONTINUED | OUTPATIENT
Start: 2021-06-14 | End: 2021-06-14

## 2021-06-14 RX ORDER — GABAPENTIN 400 MG/1
400 CAPSULE ORAL EVERY 8 HOURS SCHEDULED
Status: DISCONTINUED | OUTPATIENT
Start: 2021-06-14 | End: 2021-06-27

## 2021-06-14 RX ORDER — BUMETANIDE 1 MG/1
1 TABLET ORAL 2 TIMES DAILY
Status: DISCONTINUED | OUTPATIENT
Start: 2021-06-15 | End: 2021-06-25

## 2021-06-14 RX ORDER — LOSARTAN POTASSIUM 50 MG/1
100 TABLET ORAL DAILY
Status: DISCONTINUED | OUTPATIENT
Start: 2021-06-15 | End: 2021-07-22 | Stop reason: HOSPADM

## 2021-06-14 RX ORDER — METHYLPREDNISOLONE SODIUM SUCCINATE 40 MG/ML
40 INJECTION, POWDER, LYOPHILIZED, FOR SOLUTION INTRAMUSCULAR; INTRAVENOUS EVERY 8 HOURS
Status: DISCONTINUED | OUTPATIENT
Start: 2021-06-15 | End: 2021-06-15

## 2021-06-14 RX ORDER — OXYCODONE AND ACETAMINOPHEN 10; 325 MG/1; MG/1
1 TABLET ORAL EVERY 4 HOURS PRN
Status: DISCONTINUED | OUTPATIENT
Start: 2021-06-14 | End: 2021-06-14 | Stop reason: SDUPTHER

## 2021-06-14 RX ORDER — SERTRALINE HYDROCHLORIDE 100 MG/1
100 TABLET, FILM COATED ORAL DAILY
Status: DISCONTINUED | OUTPATIENT
Start: 2021-06-15 | End: 2021-07-22 | Stop reason: HOSPADM

## 2021-06-14 RX ORDER — SODIUM CHLORIDE 0.9 % (FLUSH) 0.9 %
10 SYRINGE (ML) INJECTION AS NEEDED
Status: DISCONTINUED | OUTPATIENT
Start: 2021-06-14 | End: 2021-07-22 | Stop reason: HOSPADM

## 2021-06-14 RX ORDER — DEXTROSE MONOHYDRATE 25 G/50ML
25 INJECTION, SOLUTION INTRAVENOUS ONCE
Status: COMPLETED | OUTPATIENT
Start: 2021-06-14 | End: 2021-06-14

## 2021-06-14 RX ORDER — WARFARIN SODIUM 5 MG/1
5 TABLET ORAL
Status: DISCONTINUED | OUTPATIENT
Start: 2021-06-15 | End: 2021-06-16

## 2021-06-14 RX ORDER — OXYCODONE AND ACETAMINOPHEN 10; 325 MG/1; MG/1
1 TABLET ORAL EVERY 6 HOURS PRN
Status: DISCONTINUED | OUTPATIENT
Start: 2021-06-14 | End: 2021-06-20

## 2021-06-14 RX ORDER — DEXTROSE MONOHYDRATE 25 G/50ML
INJECTION, SOLUTION INTRAVENOUS
Status: COMPLETED
Start: 2021-06-14 | End: 2021-06-14

## 2021-06-14 RX ORDER — BUMETANIDE 2 MG/1
2 TABLET ORAL DAILY
COMMUNITY
End: 2021-07-22 | Stop reason: HOSPADM

## 2021-06-14 RX ORDER — FUROSEMIDE 10 MG/ML
80 INJECTION INTRAMUSCULAR; INTRAVENOUS ONCE
Status: COMPLETED | OUTPATIENT
Start: 2021-06-14 | End: 2021-06-14

## 2021-06-14 RX ADMIN — DEXTROSE MONOHYDRATE 25 G: 25 INJECTION, SOLUTION INTRAVENOUS at 10:22

## 2021-06-14 RX ADMIN — CEFEPIME HYDROCHLORIDE 2 G: 2 INJECTION, POWDER, FOR SOLUTION INTRAVENOUS at 11:29

## 2021-06-14 RX ADMIN — FUROSEMIDE 80 MG: 10 INJECTION, SOLUTION INTRAMUSCULAR; INTRAVENOUS at 10:21

## 2021-06-14 RX ADMIN — SERTRALINE HYDROCHLORIDE 100 MG: 100 TABLET ORAL at 22:40

## 2021-06-14 RX ADMIN — OXYCODONE HYDROCHLORIDE AND ACETAMINOPHEN 1 TABLET: 10; 325 TABLET ORAL at 20:31

## 2021-06-14 RX ADMIN — VANCOMYCIN HYDROCHLORIDE 1750 MG: 5 INJECTION, POWDER, LYOPHILIZED, FOR SOLUTION INTRAVENOUS at 11:29

## 2021-06-14 RX ADMIN — GABAPENTIN 400 MG: 400 CAPSULE ORAL at 22:31

## 2021-06-15 ENCOUNTER — APPOINTMENT (OUTPATIENT)
Dept: CT IMAGING | Facility: HOSPITAL | Age: 61
End: 2021-06-15

## 2021-06-15 ENCOUNTER — APPOINTMENT (OUTPATIENT)
Dept: CARDIOLOGY | Facility: HOSPITAL | Age: 61
End: 2021-06-15

## 2021-06-15 LAB
ANION GAP SERPL CALCULATED.3IONS-SCNC: 13.6 MMOL/L (ref 5–15)
BH CV ECHO MEAS - AI P1/2T: 332 MSEC
BH CV ECHO MEAS - AO MAX PG: 15 MMHG
BH CV ECHO MEAS - AO MEAN PG: 9 MMHG
BH CV ECHO MEAS - AO ROOT DIAM: 3.5 CM
BH CV ECHO MEAS - AO V2 MAX: 195 CM/SEC
BH CV ECHO MEAS - AVA(I,D): 1.7 CM2
BH CV ECHO MEAS - EDV(MOD-SP2): 144 ML
BH CV ECHO MEAS - EDV(MOD-SP4): 152 ML
BH CV ECHO MEAS - ESV(MOD-SP2): 55 ML
BH CV ECHO MEAS - ESV(MOD-SP4): 69 ML
BH CV ECHO MEAS - IVSD: 1.1 CM
BH CV ECHO MEAS - LA DIMENSION(2D): 4.6 CM
BH CV ECHO MEAS - LAT PEAK E' VEL: 3 CM/SEC
BH CV ECHO MEAS - LV V1 MAX: 87 CM/SEC
BH CV ECHO MEAS - LVIDD: 6 CM
BH CV ECHO MEAS - LVIDS: 5.2 CM
BH CV ECHO MEAS - LVPWD: 1.1 CM
BH CV ECHO MEAS - MED PEAK E' VEL: 3 CM/SEC
BH CV ECHO MEAS - MV A MAX VEL: 131 CM/SEC
BH CV ECHO MEAS - MV DEC TIME: 242 MSEC
BH CV ECHO MEAS - MV E MAX VEL: 103 CM/SEC
BH CV ECHO MEAS - MV E/A: 0.8
BH CV ECHO MEAS - RVSP: 19 MMHG
BH CV ECHO MEASUREMENTS AVERAGE E/E' RATIO: 34.33
BUN SERPL-MCNC: 38 MG/DL (ref 8–23)
BUN/CREAT SERPL: 63.3 (ref 7–25)
CALCIUM SPEC-SCNC: 8 MG/DL (ref 8.6–10.5)
CHLORIDE SERPL-SCNC: 98 MMOL/L (ref 98–107)
CO2 SERPL-SCNC: 21.4 MMOL/L (ref 22–29)
CREAT SERPL-MCNC: 0.6 MG/DL (ref 0.57–1)
GFR SERPL CREATININE-BSD FRML MDRD: 102 ML/MIN/1.73
GLUCOSE BLDC GLUCOMTR-MCNC: 470 MG/DL (ref 70–130)
GLUCOSE BLDC GLUCOMTR-MCNC: 496 MG/DL (ref 70–130)
GLUCOSE BLDC GLUCOMTR-MCNC: 500 MG/DL (ref 70–130)
GLUCOSE BLDC GLUCOMTR-MCNC: >500 MG/DL (ref 70–130)
GLUCOSE SERPL-MCNC: 601 MG/DL (ref 65–99)
GLUCOSE SERPL-MCNC: 748 MG/DL (ref 65–99)
HOLD SPECIMEN: NORMAL
INR PPP: 1.47 (ref 2–3)
INR PPP: 1.85 (ref 2–3)
IVRT: 71 MSEC
LEFT ATRIUM VOLUME INDEX: 49 ML/M2
MAXIMAL PREDICTED HEART RATE: 159 BPM
POTASSIUM SERPL-SCNC: 5.4 MMOL/L (ref 3.5–5.2)
PROTHROMBIN TIME: 15.4 SECONDS (ref 9.4–12)
PROTHROMBIN TIME: 18.9 SECONDS (ref 9.4–12)
SODIUM SERPL-SCNC: 133 MMOL/L (ref 136–145)
STRESS TARGET HR: 135 BPM

## 2021-06-15 PROCEDURE — 25010000002 METHYLPREDNISOLONE PER 40 MG: Performed by: INTERNAL MEDICINE

## 2021-06-15 PROCEDURE — 94660 CPAP INITIATION&MGMT: CPT

## 2021-06-15 PROCEDURE — 94799 UNLISTED PULMONARY SVC/PX: CPT

## 2021-06-15 PROCEDURE — 63710000001 INSULIN ISOPHANE HUMAN PER 5 UNITS: Performed by: INTERNAL MEDICINE

## 2021-06-15 PROCEDURE — 25010000002 VANCOMYCIN 5 G RECONSTITUTED SOLUTION: Performed by: INTERNAL MEDICINE

## 2021-06-15 PROCEDURE — 97161 PT EVAL LOW COMPLEX 20 MIN: CPT

## 2021-06-15 PROCEDURE — 94640 AIRWAY INHALATION TREATMENT: CPT

## 2021-06-15 PROCEDURE — 82962 GLUCOSE BLOOD TEST: CPT

## 2021-06-15 PROCEDURE — 71260 CT THORAX DX C+: CPT

## 2021-06-15 PROCEDURE — 93306 TTE W/DOPPLER COMPLETE: CPT

## 2021-06-15 PROCEDURE — 93306 TTE W/DOPPLER COMPLETE: CPT | Performed by: INTERNAL MEDICINE

## 2021-06-15 PROCEDURE — 63710000001 INSULIN REGULAR HUMAN PER 5 UNITS: Performed by: INTERNAL MEDICINE

## 2021-06-15 PROCEDURE — 25010000002 CEFEPIME PER 500 MG: Performed by: INTERNAL MEDICINE

## 2021-06-15 PROCEDURE — 87081 CULTURE SCREEN ONLY: CPT | Performed by: INTERNAL MEDICINE

## 2021-06-15 PROCEDURE — 63710000001 INSULIN ISOPHANE HUMAN PER 5 UNITS

## 2021-06-15 PROCEDURE — 0 IOPAMIDOL PER 1 ML: Performed by: INTERNAL MEDICINE

## 2021-06-15 PROCEDURE — 80048 BASIC METABOLIC PNL TOTAL CA: CPT | Performed by: INTERNAL MEDICINE

## 2021-06-15 PROCEDURE — 82947 ASSAY GLUCOSE BLOOD QUANT: CPT | Performed by: INTERNAL MEDICINE

## 2021-06-15 PROCEDURE — 85610 PROTHROMBIN TIME: CPT | Performed by: INTERNAL MEDICINE

## 2021-06-15 RX ORDER — METHYLPREDNISOLONE SODIUM SUCCINATE 40 MG/ML
20 INJECTION, POWDER, LYOPHILIZED, FOR SOLUTION INTRAMUSCULAR; INTRAVENOUS EVERY 8 HOURS
Status: DISCONTINUED | OUTPATIENT
Start: 2021-06-15 | End: 2021-06-21

## 2021-06-15 RX ORDER — BISOPROLOL FUMARATE AND HYDROCHLOROTHIAZIDE 10; 6.25 MG/1; MG/1
1 TABLET ORAL
Status: DISCONTINUED | OUTPATIENT
Start: 2021-06-16 | End: 2021-06-15

## 2021-06-15 RX ORDER — BISOPROLOL FUMARATE 5 MG/1
10 TABLET, FILM COATED ORAL
Status: DISCONTINUED | OUTPATIENT
Start: 2021-06-16 | End: 2021-06-20 | Stop reason: DRUGHIGH

## 2021-06-15 RX ORDER — HYDROXYZINE PAMOATE 25 MG/1
25 CAPSULE ORAL 4 TIMES DAILY PRN
Status: DISCONTINUED | OUTPATIENT
Start: 2021-06-15 | End: 2021-06-27

## 2021-06-15 RX ORDER — HYDROCHLOROTHIAZIDE 25 MG/1
6.25 TABLET ORAL DAILY
Status: DISCONTINUED | OUTPATIENT
Start: 2021-06-16 | End: 2021-06-24

## 2021-06-15 RX ORDER — NICOTINE 21 MG/24HR
1 PATCH, TRANSDERMAL 24 HOURS TRANSDERMAL
Status: DISCONTINUED | OUTPATIENT
Start: 2021-06-15 | End: 2021-06-25

## 2021-06-15 RX ADMIN — GABAPENTIN 400 MG: 400 CAPSULE ORAL at 05:34

## 2021-06-15 RX ADMIN — INSULIN HUMAN 30 UNITS: 100 INJECTION, SOLUTION PARENTERAL at 22:29

## 2021-06-15 RX ADMIN — BUMETANIDE 1 MG: 1 TABLET ORAL at 02:39

## 2021-06-15 RX ADMIN — INSULIN HUMAN 10 UNITS: 100 INJECTION, SOLUTION PARENTERAL at 17:41

## 2021-06-15 RX ADMIN — OXYCODONE HYDROCHLORIDE AND ACETAMINOPHEN 1 TABLET: 10; 325 TABLET ORAL at 11:44

## 2021-06-15 RX ADMIN — SODIUM CHLORIDE, PRESERVATIVE FREE 10 ML: 5 INJECTION INTRAVENOUS at 21:06

## 2021-06-15 RX ADMIN — OXYCODONE HYDROCHLORIDE AND ACETAMINOPHEN 1 TABLET: 10; 325 TABLET ORAL at 23:58

## 2021-06-15 RX ADMIN — BUMETANIDE 1 MG: 1 TABLET ORAL at 09:10

## 2021-06-15 RX ADMIN — WARFARIN SODIUM 5 MG: 5 TABLET ORAL at 02:39

## 2021-06-15 RX ADMIN — ARFORMOTEROL TARTRATE 15 MCG: 15 SOLUTION RESPIRATORY (INHALATION) at 19:34

## 2021-06-15 RX ADMIN — MICONAZOLE NITRATE: 2 POWDER TOPICAL at 21:06

## 2021-06-15 RX ADMIN — NICOTINE 1 PATCH: 21 PATCH, EXTENDED RELEASE TRANSDERMAL at 14:09

## 2021-06-15 RX ADMIN — SODIUM CHLORIDE, PRESERVATIVE FREE 10 ML: 5 INJECTION INTRAVENOUS at 09:11

## 2021-06-15 RX ADMIN — IOPAMIDOL 75 ML: 755 INJECTION, SOLUTION INTRAVENOUS at 11:10

## 2021-06-15 RX ADMIN — METHYLPREDNISOLONE SODIUM SUCCINATE 40 MG: 40 INJECTION, POWDER, FOR SOLUTION INTRAMUSCULAR; INTRAVENOUS at 01:01

## 2021-06-15 RX ADMIN — CEFEPIME HYDROCHLORIDE 2 G: 2 INJECTION, POWDER, FOR SOLUTION INTRAVENOUS at 18:20

## 2021-06-15 RX ADMIN — OXYCODONE HYDROCHLORIDE AND ACETAMINOPHEN 1 TABLET: 10; 325 TABLET ORAL at 05:34

## 2021-06-15 RX ADMIN — MICONAZOLE NITRATE: 2 POWDER TOPICAL at 13:10

## 2021-06-15 RX ADMIN — INSULIN HUMAN 5 UNITS: 100 INJECTION, SOLUTION PARENTERAL at 09:09

## 2021-06-15 RX ADMIN — OXYCODONE HYDROCHLORIDE AND ACETAMINOPHEN 1 TABLET: 10; 325 TABLET ORAL at 00:24

## 2021-06-15 RX ADMIN — INSULIN HUMAN 30 UNITS: 100 INJECTION, SUSPENSION SUBCUTANEOUS at 17:42

## 2021-06-15 RX ADMIN — WARFARIN SODIUM 5 MG: 5 TABLET ORAL at 17:41

## 2021-06-15 RX ADMIN — BUMETANIDE 1 MG: 1 TABLET ORAL at 21:06

## 2021-06-15 RX ADMIN — OXYCODONE HYDROCHLORIDE AND ACETAMINOPHEN 1 TABLET: 10; 325 TABLET ORAL at 17:41

## 2021-06-15 RX ADMIN — CEFEPIME HYDROCHLORIDE 2 G: 2 INJECTION, POWDER, FOR SOLUTION INTRAVENOUS at 13:11

## 2021-06-15 RX ADMIN — INSULIN HUMAN 25 UNITS: 100 INJECTION, SOLUTION PARENTERAL at 11:21

## 2021-06-15 RX ADMIN — ARFORMOTEROL TARTRATE 15 MCG: 15 SOLUTION RESPIRATORY (INHALATION) at 08:06

## 2021-06-15 RX ADMIN — HYDROXYZINE PAMOATE 25 MG: 25 CAPSULE ORAL at 23:46

## 2021-06-15 RX ADMIN — SERTRALINE HYDROCHLORIDE 100 MG: 100 TABLET ORAL at 09:10

## 2021-06-15 RX ADMIN — METHYLPREDNISOLONE SODIUM SUCCINATE 20 MG: 40 INJECTION, POWDER, FOR SOLUTION INTRAMUSCULAR; INTRAVENOUS at 17:43

## 2021-06-15 RX ADMIN — INSULIN HUMAN 30 UNITS: 100 INJECTION, SUSPENSION SUBCUTANEOUS at 11:43

## 2021-06-15 RX ADMIN — GABAPENTIN 400 MG: 400 CAPSULE ORAL at 13:08

## 2021-06-15 RX ADMIN — INSULIN HUMAN 10 UNITS: 100 INJECTION, SOLUTION PARENTERAL at 13:09

## 2021-06-15 RX ADMIN — VANCOMYCIN HYDROCHLORIDE 1250 MG: 5 INJECTION, POWDER, LYOPHILIZED, FOR SOLUTION INTRAVENOUS at 11:28

## 2021-06-15 RX ADMIN — GABAPENTIN 400 MG: 400 CAPSULE ORAL at 21:06

## 2021-06-15 RX ADMIN — METHYLPREDNISOLONE SODIUM SUCCINATE 40 MG: 40 INJECTION, POWDER, FOR SOLUTION INTRAMUSCULAR; INTRAVENOUS at 09:11

## 2021-06-15 RX ADMIN — LOSARTAN POTASSIUM 100 MG: 25 TABLET, FILM COATED ORAL at 09:10

## 2021-06-16 LAB
ANION GAP SERPL CALCULATED.3IONS-SCNC: 10.9 MMOL/L (ref 5–15)
BACTERIA BLD CULT: ABNORMAL
BACTERIA SPEC AEROBE CULT: ABNORMAL
BUN SERPL-MCNC: 34 MG/DL (ref 8–23)
BUN/CREAT SERPL: 57.6 (ref 7–25)
CALCIUM SPEC-SCNC: 7.6 MG/DL (ref 8.6–10.5)
CHLORIDE SERPL-SCNC: 109 MMOL/L (ref 98–107)
CO2 SERPL-SCNC: 21.1 MMOL/L (ref 22–29)
CREAT SERPL-MCNC: 0.59 MG/DL (ref 0.57–1)
GFR SERPL CREATININE-BSD FRML MDRD: 104 ML/MIN/1.73
GLUCOSE BLDC GLUCOMTR-MCNC: 298 MG/DL (ref 70–130)
GLUCOSE BLDC GLUCOMTR-MCNC: 322 MG/DL (ref 70–130)
GLUCOSE BLDC GLUCOMTR-MCNC: 366 MG/DL (ref 70–130)
GLUCOSE BLDC GLUCOMTR-MCNC: 370 MG/DL (ref 70–130)
GLUCOSE BLDC GLUCOMTR-MCNC: 450 MG/DL (ref 70–130)
GLUCOSE SERPL-MCNC: 466 MG/DL (ref 65–99)
GLUCOSE SERPL-MCNC: 535 MG/DL (ref 65–99)
GRAM STN SPEC: ABNORMAL
INR PPP: 3.72 (ref 2–3)
MRSA SPEC QL CULT: NORMAL
POTASSIUM SERPL-SCNC: 4.8 MMOL/L (ref 3.5–5.2)
PROTHROMBIN TIME: 37.3 SECONDS (ref 9.4–12)
SODIUM SERPL-SCNC: 141 MMOL/L (ref 136–145)
VANCOMYCIN TROUGH SERPL-MCNC: 16.18 MCG/ML (ref 5–20)

## 2021-06-16 PROCEDURE — 80048 BASIC METABOLIC PNL TOTAL CA: CPT | Performed by: INTERNAL MEDICINE

## 2021-06-16 PROCEDURE — 82962 GLUCOSE BLOOD TEST: CPT

## 2021-06-16 PROCEDURE — 94799 UNLISTED PULMONARY SVC/PX: CPT

## 2021-06-16 PROCEDURE — 63710000001 INSULIN ISOPHANE HUMAN PER 5 UNITS: Performed by: INTERNAL MEDICINE

## 2021-06-16 PROCEDURE — 63710000001 INSULIN REGULAR HUMAN PER 5 UNITS: Performed by: INTERNAL MEDICINE

## 2021-06-16 PROCEDURE — 85610 PROTHROMBIN TIME: CPT | Performed by: INTERNAL MEDICINE

## 2021-06-16 PROCEDURE — 63710000001 INSULIN ISOPHANE HUMAN PER 5 UNITS

## 2021-06-16 PROCEDURE — 25010000002 VANCOMYCIN 5 G RECONSTITUTED SOLUTION: Performed by: INTERNAL MEDICINE

## 2021-06-16 PROCEDURE — 25010000002 METHYLPREDNISOLONE PER 40 MG: Performed by: INTERNAL MEDICINE

## 2021-06-16 PROCEDURE — 63710000001 INSULIN DETEMIR PER 5 UNITS: Performed by: INTERNAL MEDICINE

## 2021-06-16 PROCEDURE — 82947 ASSAY GLUCOSE BLOOD QUANT: CPT | Performed by: INTERNAL MEDICINE

## 2021-06-16 PROCEDURE — 97110 THERAPEUTIC EXERCISES: CPT

## 2021-06-16 PROCEDURE — 25010000002 CEFEPIME 2 G/NS 100 ML SOLUTION: Performed by: INTERNAL MEDICINE

## 2021-06-16 PROCEDURE — 80202 ASSAY OF VANCOMYCIN: CPT | Performed by: INTERNAL MEDICINE

## 2021-06-16 PROCEDURE — 25010000002 CEFEPIME PER 500 MG: Performed by: INTERNAL MEDICINE

## 2021-06-16 RX ADMIN — GABAPENTIN 400 MG: 400 CAPSULE ORAL at 05:48

## 2021-06-16 RX ADMIN — VANCOMYCIN HYDROCHLORIDE 1250 MG: 5 INJECTION, POWDER, LYOPHILIZED, FOR SOLUTION INTRAVENOUS at 23:42

## 2021-06-16 RX ADMIN — INSULIN HUMAN 10 UNITS: 100 INJECTION, SOLUTION PARENTERAL at 12:51

## 2021-06-16 RX ADMIN — ARFORMOTEROL TARTRATE 15 MCG: 15 SOLUTION RESPIRATORY (INHALATION) at 19:54

## 2021-06-16 RX ADMIN — BUMETANIDE 1 MG: 1 TABLET ORAL at 21:25

## 2021-06-16 RX ADMIN — OXYCODONE HYDROCHLORIDE AND ACETAMINOPHEN 1 TABLET: 10; 325 TABLET ORAL at 16:06

## 2021-06-16 RX ADMIN — INSULIN HUMAN 45 UNITS: 100 INJECTION, SUSPENSION SUBCUTANEOUS at 17:07

## 2021-06-16 RX ADMIN — CEFEPIME HYDROCHLORIDE 2 G: 2 INJECTION, POWDER, FOR SOLUTION INTRAVENOUS at 11:11

## 2021-06-16 RX ADMIN — SERTRALINE HYDROCHLORIDE 100 MG: 100 TABLET ORAL at 09:39

## 2021-06-16 RX ADMIN — INSULIN DETEMIR 35 UNITS: 100 INJECTION, SOLUTION SUBCUTANEOUS at 22:46

## 2021-06-16 RX ADMIN — HYDROXYZINE PAMOATE 25 MG: 25 CAPSULE ORAL at 17:06

## 2021-06-16 RX ADMIN — LOSARTAN POTASSIUM 100 MG: 25 TABLET, FILM COATED ORAL at 09:39

## 2021-06-16 RX ADMIN — METHYLPREDNISOLONE SODIUM SUCCINATE 20 MG: 40 INJECTION, POWDER, FOR SOLUTION INTRAMUSCULAR; INTRAVENOUS at 16:05

## 2021-06-16 RX ADMIN — BUMETANIDE 1 MG: 1 TABLET ORAL at 09:39

## 2021-06-16 RX ADMIN — OXYCODONE HYDROCHLORIDE AND ACETAMINOPHEN 1 TABLET: 10; 325 TABLET ORAL at 09:45

## 2021-06-16 RX ADMIN — SODIUM CHLORIDE, PRESERVATIVE FREE 10 ML: 5 INJECTION INTRAVENOUS at 21:26

## 2021-06-16 RX ADMIN — INSULIN HUMAN 45 UNITS: 100 INJECTION, SUSPENSION SUBCUTANEOUS at 09:41

## 2021-06-16 RX ADMIN — HYDROXYZINE PAMOATE 25 MG: 25 CAPSULE ORAL at 12:51

## 2021-06-16 RX ADMIN — OXYCODONE HYDROCHLORIDE AND ACETAMINOPHEN 1 TABLET: 10; 325 TABLET ORAL at 22:02

## 2021-06-16 RX ADMIN — BISOPROLOL FUMARATE 10 MG: 5 TABLET ORAL at 09:39

## 2021-06-16 RX ADMIN — VANCOMYCIN HYDROCHLORIDE 1250 MG: 5 INJECTION, POWDER, LYOPHILIZED, FOR SOLUTION INTRAVENOUS at 12:51

## 2021-06-16 RX ADMIN — CEFEPIME HYDROCHLORIDE 2 G: 2 INJECTION, POWDER, FOR SOLUTION INTRAVENOUS at 05:14

## 2021-06-16 RX ADMIN — METHYLPREDNISOLONE SODIUM SUCCINATE 20 MG: 40 INJECTION, POWDER, FOR SOLUTION INTRAMUSCULAR; INTRAVENOUS at 09:40

## 2021-06-16 RX ADMIN — ARFORMOTEROL TARTRATE 15 MCG: 15 SOLUTION RESPIRATORY (INHALATION) at 07:51

## 2021-06-16 RX ADMIN — MICONAZOLE NITRATE: 2 POWDER TOPICAL at 21:27

## 2021-06-16 RX ADMIN — INSULIN HUMAN 30 UNITS: 100 INJECTION, SOLUTION PARENTERAL at 01:45

## 2021-06-16 RX ADMIN — INSULIN HUMAN 10 UNITS: 100 INJECTION, SOLUTION PARENTERAL at 09:42

## 2021-06-16 RX ADMIN — HYDROCHLOROTHIAZIDE 6.25 MG: 25 TABLET ORAL at 09:39

## 2021-06-16 RX ADMIN — GABAPENTIN 400 MG: 400 CAPSULE ORAL at 14:27

## 2021-06-16 RX ADMIN — INSULIN HUMAN 10 UNITS: 100 INJECTION, SOLUTION PARENTERAL at 17:07

## 2021-06-16 RX ADMIN — MICONAZOLE NITRATE: 2 POWDER TOPICAL at 09:42

## 2021-06-16 RX ADMIN — GABAPENTIN 400 MG: 400 CAPSULE ORAL at 21:25

## 2021-06-16 RX ADMIN — NICOTINE 1 PATCH: 21 PATCH, EXTENDED RELEASE TRANSDERMAL at 09:45

## 2021-06-16 RX ADMIN — CEFEPIME HYDROCHLORIDE 2 G: 2 INJECTION, POWDER, FOR SOLUTION INTRAVENOUS at 21:26

## 2021-06-16 RX ADMIN — VANCOMYCIN HYDROCHLORIDE 1250 MG: 5 INJECTION, POWDER, LYOPHILIZED, FOR SOLUTION INTRAVENOUS at 03:23

## 2021-06-16 RX ADMIN — METHYLPREDNISOLONE SODIUM SUCCINATE 20 MG: 40 INJECTION, POWDER, FOR SOLUTION INTRAMUSCULAR; INTRAVENOUS at 03:22

## 2021-06-17 LAB
GLUCOSE BLDC GLUCOMTR-MCNC: 125 MG/DL (ref 70–130)
GLUCOSE BLDC GLUCOMTR-MCNC: 159 MG/DL (ref 70–130)
GLUCOSE BLDC GLUCOMTR-MCNC: 168 MG/DL (ref 70–130)
GLUCOSE BLDC GLUCOMTR-MCNC: 176 MG/DL (ref 70–130)
GLUCOSE BLDC GLUCOMTR-MCNC: 201 MG/DL (ref 70–130)
GLUCOSE BLDC GLUCOMTR-MCNC: 269 MG/DL (ref 70–130)
INR PPP: 7.47 (ref 2–3)
PROTHROMBIN TIME: 77.6 SECONDS (ref 9.4–12)

## 2021-06-17 PROCEDURE — 51798 US URINE CAPACITY MEASURE: CPT

## 2021-06-17 PROCEDURE — 63710000001 INSULIN REGULAR HUMAN PER 5 UNITS: Performed by: INTERNAL MEDICINE

## 2021-06-17 PROCEDURE — 63710000001 INSULIN ISOPHANE HUMAN PER 5 UNITS: Performed by: INTERNAL MEDICINE

## 2021-06-17 PROCEDURE — 97530 THERAPEUTIC ACTIVITIES: CPT

## 2021-06-17 PROCEDURE — 25010000002 CEFEPIME PER 500 MG: Performed by: INTERNAL MEDICINE

## 2021-06-17 PROCEDURE — 63710000001 INSULIN ISOPHANE HUMAN PER 5 UNITS

## 2021-06-17 PROCEDURE — 94799 UNLISTED PULMONARY SVC/PX: CPT

## 2021-06-17 PROCEDURE — 85610 PROTHROMBIN TIME: CPT | Performed by: INTERNAL MEDICINE

## 2021-06-17 PROCEDURE — 51701 INSERT BLADDER CATHETER: CPT

## 2021-06-17 PROCEDURE — 82962 GLUCOSE BLOOD TEST: CPT

## 2021-06-17 PROCEDURE — 25010000002 METHYLPREDNISOLONE PER 40 MG: Performed by: INTERNAL MEDICINE

## 2021-06-17 PROCEDURE — 94669 MECHANICAL CHEST WALL OSCILL: CPT

## 2021-06-17 RX ADMIN — METHYLPREDNISOLONE SODIUM SUCCINATE 20 MG: 40 INJECTION, POWDER, FOR SOLUTION INTRAMUSCULAR; INTRAVENOUS at 00:59

## 2021-06-17 RX ADMIN — MICONAZOLE NITRATE: 2 POWDER TOPICAL at 21:36

## 2021-06-17 RX ADMIN — CEFEPIME HYDROCHLORIDE 2 G: 2 INJECTION, POWDER, FOR SOLUTION INTRAVENOUS at 18:40

## 2021-06-17 RX ADMIN — ARFORMOTEROL TARTRATE 15 MCG: 15 SOLUTION RESPIRATORY (INHALATION) at 18:55

## 2021-06-17 RX ADMIN — LOSARTAN POTASSIUM 100 MG: 25 TABLET, FILM COATED ORAL at 08:24

## 2021-06-17 RX ADMIN — INSULIN HUMAN 10 UNITS: 100 INJECTION, SOLUTION PARENTERAL at 08:26

## 2021-06-17 RX ADMIN — METHYLPREDNISOLONE SODIUM SUCCINATE 20 MG: 40 INJECTION, POWDER, FOR SOLUTION INTRAMUSCULAR; INTRAVENOUS at 17:19

## 2021-06-17 RX ADMIN — INSULIN HUMAN 50 UNITS: 100 INJECTION, SUSPENSION SUBCUTANEOUS at 18:41

## 2021-06-17 RX ADMIN — CEFEPIME HYDROCHLORIDE 2 G: 2 INJECTION, POWDER, FOR SOLUTION INTRAVENOUS at 10:26

## 2021-06-17 RX ADMIN — METHYLPREDNISOLONE SODIUM SUCCINATE 20 MG: 40 INJECTION, POWDER, FOR SOLUTION INTRAMUSCULAR; INTRAVENOUS at 08:24

## 2021-06-17 RX ADMIN — OXYCODONE HYDROCHLORIDE AND ACETAMINOPHEN 1 TABLET: 10; 325 TABLET ORAL at 06:32

## 2021-06-17 RX ADMIN — BUMETANIDE 1 MG: 1 TABLET ORAL at 21:36

## 2021-06-17 RX ADMIN — OXYCODONE HYDROCHLORIDE AND ACETAMINOPHEN 1 TABLET: 10; 325 TABLET ORAL at 18:40

## 2021-06-17 RX ADMIN — HYDROCHLOROTHIAZIDE 6.25 MG: 25 TABLET ORAL at 08:25

## 2021-06-17 RX ADMIN — GABAPENTIN 400 MG: 400 CAPSULE ORAL at 15:05

## 2021-06-17 RX ADMIN — MICONAZOLE NITRATE: 2 POWDER TOPICAL at 08:29

## 2021-06-17 RX ADMIN — NICOTINE 1 PATCH: 21 PATCH, EXTENDED RELEASE TRANSDERMAL at 08:24

## 2021-06-17 RX ADMIN — GABAPENTIN 400 MG: 400 CAPSULE ORAL at 21:36

## 2021-06-17 RX ADMIN — INSULIN HUMAN 45 UNITS: 100 INJECTION, SUSPENSION SUBCUTANEOUS at 08:27

## 2021-06-17 RX ADMIN — INSULIN HUMAN 12 UNITS: 100 INJECTION, SOLUTION PARENTERAL at 12:15

## 2021-06-17 RX ADMIN — BISOPROLOL FUMARATE 10 MG: 5 TABLET ORAL at 08:25

## 2021-06-17 RX ADMIN — GABAPENTIN 400 MG: 400 CAPSULE ORAL at 06:07

## 2021-06-17 RX ADMIN — SODIUM CHLORIDE, PRESERVATIVE FREE 10 ML: 5 INJECTION INTRAVENOUS at 01:00

## 2021-06-17 RX ADMIN — HYDROXYZINE PAMOATE 25 MG: 25 CAPSULE ORAL at 16:24

## 2021-06-17 RX ADMIN — ARFORMOTEROL TARTRATE 15 MCG: 15 SOLUTION RESPIRATORY (INHALATION) at 06:52

## 2021-06-17 RX ADMIN — SODIUM CHLORIDE, PRESERVATIVE FREE 10 ML: 5 INJECTION INTRAVENOUS at 08:26

## 2021-06-17 RX ADMIN — OXYCODONE HYDROCHLORIDE AND ACETAMINOPHEN 1 TABLET: 10; 325 TABLET ORAL at 12:16

## 2021-06-17 RX ADMIN — BUMETANIDE 1 MG: 1 TABLET ORAL at 08:26

## 2021-06-17 RX ADMIN — SERTRALINE HYDROCHLORIDE 100 MG: 100 TABLET ORAL at 08:26

## 2021-06-17 RX ADMIN — HYDROXYZINE PAMOATE 25 MG: 25 CAPSULE ORAL at 22:39

## 2021-06-17 RX ADMIN — SODIUM CHLORIDE, PRESERVATIVE FREE 10 ML: 5 INJECTION INTRAVENOUS at 21:36

## 2021-06-17 RX ADMIN — INSULIN HUMAN 12 UNITS: 100 INJECTION, SOLUTION PARENTERAL at 18:41

## 2021-06-17 RX ADMIN — CEFEPIME HYDROCHLORIDE 2 G: 2 INJECTION, POWDER, FOR SOLUTION INTRAVENOUS at 03:26

## 2021-06-17 RX ADMIN — HYDROXYZINE PAMOATE 25 MG: 25 CAPSULE ORAL at 08:25

## 2021-06-18 LAB
ALBUMIN SERPL-MCNC: 2.1 G/DL (ref 3.5–5.2)
ALBUMIN/GLOB SERPL: 0.7 G/DL
ALP SERPL-CCNC: 159 U/L (ref 39–117)
ALT SERPL W P-5'-P-CCNC: 69 U/L (ref 1–33)
ANION GAP SERPL CALCULATED.3IONS-SCNC: 6.5 MMOL/L (ref 5–15)
AST SERPL-CCNC: 50 U/L (ref 1–32)
BILIRUB SERPL-MCNC: 0.3 MG/DL (ref 0–1.2)
BUN SERPL-MCNC: 27 MG/DL (ref 8–23)
BUN/CREAT SERPL: 71.1 (ref 7–25)
CALCIUM SPEC-SCNC: 7.8 MG/DL (ref 8.6–10.5)
CHLORIDE SERPL-SCNC: 104 MMOL/L (ref 98–107)
CO2 SERPL-SCNC: 27.5 MMOL/L (ref 22–29)
CREAT SERPL-MCNC: 0.38 MG/DL (ref 0.57–1)
GFR SERPL CREATININE-BSD FRML MDRD: >150 ML/MIN/1.73
GLOBULIN UR ELPH-MCNC: 3.2 GM/DL
GLUCOSE BLDC GLUCOMTR-MCNC: 111 MG/DL (ref 70–130)
GLUCOSE BLDC GLUCOMTR-MCNC: 118 MG/DL (ref 70–130)
GLUCOSE BLDC GLUCOMTR-MCNC: 119 MG/DL (ref 70–130)
GLUCOSE BLDC GLUCOMTR-MCNC: 202 MG/DL (ref 70–130)
GLUCOSE BLDC GLUCOMTR-MCNC: 209 MG/DL (ref 70–130)
GLUCOSE BLDC GLUCOMTR-MCNC: 224 MG/DL (ref 70–130)
GLUCOSE BLDC GLUCOMTR-MCNC: 64 MG/DL (ref 70–130)
GLUCOSE BLDC GLUCOMTR-MCNC: 91 MG/DL (ref 70–130)
GLUCOSE BLDC GLUCOMTR-MCNC: 97 MG/DL (ref 70–130)
GLUCOSE SERPL-MCNC: 195 MG/DL (ref 65–99)
INR PPP: 2.43 (ref 2–3)
MAGNESIUM SERPL-MCNC: 1.9 MG/DL (ref 1.6–2.4)
POTASSIUM SERPL-SCNC: 3.9 MMOL/L (ref 3.5–5.2)
PROT SERPL-MCNC: 5.3 G/DL (ref 6–8.5)
PROTHROMBIN TIME: 24.5 SECONDS (ref 9.4–12)
SODIUM SERPL-SCNC: 138 MMOL/L (ref 136–145)
TSH SERPL DL<=0.05 MIU/L-ACNC: 0.47 UIU/ML (ref 0.27–4.2)

## 2021-06-18 PROCEDURE — 25010000002 METHYLPREDNISOLONE PER 40 MG: Performed by: INTERNAL MEDICINE

## 2021-06-18 PROCEDURE — 51798 US URINE CAPACITY MEASURE: CPT

## 2021-06-18 PROCEDURE — 80053 COMPREHEN METABOLIC PANEL: CPT | Performed by: INTERNAL MEDICINE

## 2021-06-18 PROCEDURE — 25010000002 CEFEPIME PER 500 MG: Performed by: INTERNAL MEDICINE

## 2021-06-18 PROCEDURE — 94760 N-INVAS EAR/PLS OXIMETRY 1: CPT

## 2021-06-18 PROCEDURE — 85610 PROTHROMBIN TIME: CPT | Performed by: INTERNAL MEDICINE

## 2021-06-18 PROCEDURE — 94799 UNLISTED PULMONARY SVC/PX: CPT

## 2021-06-18 PROCEDURE — 63710000001 INSULIN REGULAR HUMAN PER 5 UNITS: Performed by: INTERNAL MEDICINE

## 2021-06-18 PROCEDURE — 83735 ASSAY OF MAGNESIUM: CPT | Performed by: INTERNAL MEDICINE

## 2021-06-18 PROCEDURE — 82962 GLUCOSE BLOOD TEST: CPT

## 2021-06-18 PROCEDURE — 84443 ASSAY THYROID STIM HORMONE: CPT | Performed by: INTERNAL MEDICINE

## 2021-06-18 PROCEDURE — 94669 MECHANICAL CHEST WALL OSCILL: CPT

## 2021-06-18 PROCEDURE — 63710000001 INSULIN DETEMIR PER 5 UNITS: Performed by: INTERNAL MEDICINE

## 2021-06-18 RX ORDER — WARFARIN SODIUM 5 MG/1
5 TABLET ORAL
Status: DISCONTINUED | OUTPATIENT
Start: 2021-06-18 | End: 2021-06-19

## 2021-06-18 RX ORDER — DEXTROSE MONOHYDRATE 100 MG/ML
75 INJECTION, SOLUTION INTRAVENOUS CONTINUOUS
Status: DISCONTINUED | OUTPATIENT
Start: 2021-06-18 | End: 2021-06-18

## 2021-06-18 RX ADMIN — BUMETANIDE 1 MG: 1 TABLET ORAL at 09:18

## 2021-06-18 RX ADMIN — GABAPENTIN 400 MG: 400 CAPSULE ORAL at 06:00

## 2021-06-18 RX ADMIN — HYDROXYZINE PAMOATE 25 MG: 25 CAPSULE ORAL at 12:38

## 2021-06-18 RX ADMIN — CEFEPIME HYDROCHLORIDE 2 G: 2 INJECTION, POWDER, FOR SOLUTION INTRAVENOUS at 20:52

## 2021-06-18 RX ADMIN — GABAPENTIN 400 MG: 400 CAPSULE ORAL at 21:05

## 2021-06-18 RX ADMIN — CEFEPIME HYDROCHLORIDE 2 G: 2 INJECTION, POWDER, FOR SOLUTION INTRAVENOUS at 03:29

## 2021-06-18 RX ADMIN — WARFARIN SODIUM 5 MG: 5 TABLET ORAL at 17:22

## 2021-06-18 RX ADMIN — DEXTROSE MONOHYDRATE 75 ML/HR: 100 INJECTION, SOLUTION INTRAVENOUS at 01:18

## 2021-06-18 RX ADMIN — OXYCODONE HYDROCHLORIDE AND ACETAMINOPHEN 1 TABLET: 10; 325 TABLET ORAL at 18:54

## 2021-06-18 RX ADMIN — BISOPROLOL FUMARATE 10 MG: 5 TABLET ORAL at 09:18

## 2021-06-18 RX ADMIN — HYDROXYZINE PAMOATE 25 MG: 25 CAPSULE ORAL at 21:05

## 2021-06-18 RX ADMIN — INSULIN DETEMIR 25 UNITS: 100 INJECTION, SOLUTION SUBCUTANEOUS at 20:52

## 2021-06-18 RX ADMIN — ARFORMOTEROL TARTRATE 15 MCG: 15 SOLUTION RESPIRATORY (INHALATION) at 06:33

## 2021-06-18 RX ADMIN — METHYLPREDNISOLONE SODIUM SUCCINATE 20 MG: 40 INJECTION, POWDER, FOR SOLUTION INTRAMUSCULAR; INTRAVENOUS at 15:36

## 2021-06-18 RX ADMIN — HYDROXYZINE PAMOATE 25 MG: 25 CAPSULE ORAL at 06:01

## 2021-06-18 RX ADMIN — MICONAZOLE NITRATE: 2 POWDER TOPICAL at 20:54

## 2021-06-18 RX ADMIN — BUMETANIDE 1 MG: 1 TABLET ORAL at 20:52

## 2021-06-18 RX ADMIN — METHYLPREDNISOLONE SODIUM SUCCINATE 20 MG: 40 INJECTION, POWDER, FOR SOLUTION INTRAMUSCULAR; INTRAVENOUS at 01:04

## 2021-06-18 RX ADMIN — HYDROCHLOROTHIAZIDE 6.25 MG: 25 TABLET ORAL at 09:18

## 2021-06-18 RX ADMIN — NICOTINE 1 PATCH: 21 PATCH, EXTENDED RELEASE TRANSDERMAL at 09:20

## 2021-06-18 RX ADMIN — OXYCODONE HYDROCHLORIDE AND ACETAMINOPHEN 1 TABLET: 10; 325 TABLET ORAL at 04:07

## 2021-06-18 RX ADMIN — INSULIN HUMAN 6 UNITS: 100 INJECTION, SOLUTION PARENTERAL at 17:22

## 2021-06-18 RX ADMIN — INSULIN HUMAN 6 UNITS: 100 INJECTION, SOLUTION PARENTERAL at 12:38

## 2021-06-18 RX ADMIN — LOSARTAN POTASSIUM 100 MG: 25 TABLET, FILM COATED ORAL at 09:18

## 2021-06-18 RX ADMIN — OXYCODONE HYDROCHLORIDE AND ACETAMINOPHEN 1 TABLET: 10; 325 TABLET ORAL at 10:15

## 2021-06-18 RX ADMIN — SODIUM CHLORIDE, PRESERVATIVE FREE 10 ML: 5 INJECTION INTRAVENOUS at 09:22

## 2021-06-18 RX ADMIN — ARFORMOTEROL TARTRATE 15 MCG: 15 SOLUTION RESPIRATORY (INHALATION) at 20:19

## 2021-06-18 RX ADMIN — MICONAZOLE NITRATE: 2 POWDER TOPICAL at 09:21

## 2021-06-18 RX ADMIN — METHYLPREDNISOLONE SODIUM SUCCINATE 20 MG: 40 INJECTION, POWDER, FOR SOLUTION INTRAMUSCULAR; INTRAVENOUS at 09:17

## 2021-06-18 RX ADMIN — METHYLPREDNISOLONE SODIUM SUCCINATE 20 MG: 40 INJECTION, POWDER, FOR SOLUTION INTRAMUSCULAR; INTRAVENOUS at 23:43

## 2021-06-18 RX ADMIN — CEFEPIME HYDROCHLORIDE 2 G: 2 INJECTION, POWDER, FOR SOLUTION INTRAVENOUS at 11:40

## 2021-06-18 RX ADMIN — SERTRALINE HYDROCHLORIDE 100 MG: 100 TABLET ORAL at 09:18

## 2021-06-18 RX ADMIN — GABAPENTIN 400 MG: 400 CAPSULE ORAL at 15:36

## 2021-06-19 LAB
ALBUMIN SERPL-MCNC: 2.1 G/DL (ref 3.5–5.2)
ALBUMIN/GLOB SERPL: 0.7 G/DL
ALP SERPL-CCNC: 184 U/L (ref 39–117)
ALT SERPL W P-5'-P-CCNC: 68 U/L (ref 1–33)
ANION GAP SERPL CALCULATED.3IONS-SCNC: 7.1 MMOL/L (ref 5–15)
AST SERPL-CCNC: 47 U/L (ref 1–32)
BACTERIA SPEC AEROBE CULT: NORMAL
BASOPHILS # BLD AUTO: 0.03 10*3/MM3 (ref 0–0.2)
BASOPHILS NFR BLD AUTO: 0.2 % (ref 0–1.5)
BILIRUB SERPL-MCNC: 0.3 MG/DL (ref 0–1.2)
BUN SERPL-MCNC: 30 MG/DL (ref 8–23)
BUN/CREAT SERPL: 61.2 (ref 7–25)
CALCIUM SPEC-SCNC: 7.8 MG/DL (ref 8.6–10.5)
CHLORIDE SERPL-SCNC: 104 MMOL/L (ref 98–107)
CO2 SERPL-SCNC: 26.9 MMOL/L (ref 22–29)
CREAT SERPL-MCNC: 0.49 MG/DL (ref 0.57–1)
DEPRECATED RDW RBC AUTO: 63.8 FL (ref 37–54)
EOSINOPHIL # BLD AUTO: 0 10*3/MM3 (ref 0–0.4)
EOSINOPHIL NFR BLD AUTO: 0 % (ref 0.3–6.2)
ERYTHROCYTE [DISTWIDTH] IN BLOOD BY AUTOMATED COUNT: 23.2 % (ref 12.3–15.4)
GFR SERPL CREATININE-BSD FRML MDRD: 128 ML/MIN/1.73
GLOBULIN UR ELPH-MCNC: 3.2 GM/DL
GLUCOSE BLDC GLUCOMTR-MCNC: 164 MG/DL (ref 70–130)
GLUCOSE BLDC GLUCOMTR-MCNC: 194 MG/DL (ref 70–130)
GLUCOSE BLDC GLUCOMTR-MCNC: 196 MG/DL (ref 70–130)
GLUCOSE BLDC GLUCOMTR-MCNC: 265 MG/DL (ref 70–130)
GLUCOSE BLDC GLUCOMTR-MCNC: 272 MG/DL (ref 70–130)
GLUCOSE BLDC GLUCOMTR-MCNC: 294 MG/DL (ref 70–130)
GLUCOSE BLDC GLUCOMTR-MCNC: 317 MG/DL (ref 70–130)
GLUCOSE SERPL-MCNC: 188 MG/DL (ref 65–99)
HCT VFR BLD AUTO: 38.5 % (ref 34–46.6)
HGB BLD-MCNC: 11.4 G/DL (ref 12–15.9)
IMM GRANULOCYTES # BLD AUTO: 0.17 10*3/MM3 (ref 0–0.05)
IMM GRANULOCYTES NFR BLD AUTO: 0.9 % (ref 0–0.5)
INR PPP: 2.08 (ref 2–3)
LYMPHOCYTES # BLD AUTO: 0.4 10*3/MM3 (ref 0.7–3.1)
LYMPHOCYTES NFR BLD AUTO: 2.2 % (ref 19.6–45.3)
MCH RBC QN AUTO: 23.4 PG (ref 26.6–33)
MCHC RBC AUTO-ENTMCNC: 29.6 G/DL (ref 31.5–35.7)
MCV RBC AUTO: 78.9 FL (ref 79–97)
MONOCYTES # BLD AUTO: 0.67 10*3/MM3 (ref 0.1–0.9)
MONOCYTES NFR BLD AUTO: 3.7 % (ref 5–12)
NEUTROPHILS NFR BLD AUTO: 16.76 10*3/MM3 (ref 1.7–7)
NEUTROPHILS NFR BLD AUTO: 93 % (ref 42.7–76)
NRBC BLD AUTO-RTO: 0 /100 WBC (ref 0–0.2)
PLAT MORPH BLD: NORMAL
PLATELET # BLD AUTO: 59 10*3/MM3 (ref 140–450)
PMV BLD AUTO: ABNORMAL FL
POTASSIUM SERPL-SCNC: 3.9 MMOL/L (ref 3.5–5.2)
PROT SERPL-MCNC: 5.3 G/DL (ref 6–8.5)
PROTHROMBIN TIME: 21.1 SECONDS (ref 9.4–12)
RBC # BLD AUTO: 4.88 10*6/MM3 (ref 3.77–5.28)
RBC MORPH BLD: NORMAL
SODIUM SERPL-SCNC: 138 MMOL/L (ref 136–145)
WBC # BLD AUTO: 18.03 10*3/MM3 (ref 3.4–10.8)
WBC MORPH BLD: NORMAL

## 2021-06-19 PROCEDURE — 94799 UNLISTED PULMONARY SVC/PX: CPT

## 2021-06-19 PROCEDURE — 25010000002 CEFEPIME PER 500 MG: Performed by: INTERNAL MEDICINE

## 2021-06-19 PROCEDURE — 63710000001 INSULIN DETEMIR PER 5 UNITS: Performed by: INTERNAL MEDICINE

## 2021-06-19 PROCEDURE — 25010000002 METHYLPREDNISOLONE PER 40 MG: Performed by: INTERNAL MEDICINE

## 2021-06-19 PROCEDURE — 85610 PROTHROMBIN TIME: CPT | Performed by: INTERNAL MEDICINE

## 2021-06-19 PROCEDURE — 85025 COMPLETE CBC W/AUTO DIFF WBC: CPT | Performed by: INTERNAL MEDICINE

## 2021-06-19 PROCEDURE — 85007 BL SMEAR W/DIFF WBC COUNT: CPT | Performed by: INTERNAL MEDICINE

## 2021-06-19 PROCEDURE — 80053 COMPREHEN METABOLIC PANEL: CPT | Performed by: INTERNAL MEDICINE

## 2021-06-19 PROCEDURE — 97530 THERAPEUTIC ACTIVITIES: CPT

## 2021-06-19 PROCEDURE — 82962 GLUCOSE BLOOD TEST: CPT

## 2021-06-19 PROCEDURE — 63710000001 INSULIN REGULAR HUMAN PER 5 UNITS: Performed by: INTERNAL MEDICINE

## 2021-06-19 PROCEDURE — 94669 MECHANICAL CHEST WALL OSCILL: CPT

## 2021-06-19 RX ORDER — WARFARIN SODIUM 3 MG/1
6 TABLET ORAL
Status: DISCONTINUED | OUTPATIENT
Start: 2021-06-19 | End: 2021-06-20

## 2021-06-19 RX ADMIN — BUMETANIDE 1 MG: 1 TABLET ORAL at 20:46

## 2021-06-19 RX ADMIN — BISOPROLOL FUMARATE 10 MG: 5 TABLET ORAL at 08:39

## 2021-06-19 RX ADMIN — ARFORMOTEROL TARTRATE 15 MCG: 15 SOLUTION RESPIRATORY (INHALATION) at 06:34

## 2021-06-19 RX ADMIN — CEFEPIME HYDROCHLORIDE 2 G: 2 INJECTION, POWDER, FOR SOLUTION INTRAVENOUS at 15:11

## 2021-06-19 RX ADMIN — HYDROCHLOROTHIAZIDE 6.25 MG: 25 TABLET ORAL at 08:39

## 2021-06-19 RX ADMIN — OXYCODONE HYDROCHLORIDE AND ACETAMINOPHEN 1 TABLET: 10; 325 TABLET ORAL at 01:36

## 2021-06-19 RX ADMIN — GABAPENTIN 400 MG: 400 CAPSULE ORAL at 21:47

## 2021-06-19 RX ADMIN — MICONAZOLE NITRATE: 2 POWDER TOPICAL at 20:47

## 2021-06-19 RX ADMIN — BUMETANIDE 1 MG: 1 TABLET ORAL at 08:39

## 2021-06-19 RX ADMIN — LOSARTAN POTASSIUM 100 MG: 25 TABLET, FILM COATED ORAL at 08:38

## 2021-06-19 RX ADMIN — OXYCODONE HYDROCHLORIDE AND ACETAMINOPHEN 1 TABLET: 10; 325 TABLET ORAL at 17:05

## 2021-06-19 RX ADMIN — SERTRALINE HYDROCHLORIDE 100 MG: 100 TABLET ORAL at 08:38

## 2021-06-19 RX ADMIN — GABAPENTIN 400 MG: 400 CAPSULE ORAL at 15:12

## 2021-06-19 RX ADMIN — INSULIN DETEMIR 25 UNITS: 100 INJECTION, SOLUTION SUBCUTANEOUS at 20:47

## 2021-06-19 RX ADMIN — ARFORMOTEROL TARTRATE 15 MCG: 15 SOLUTION RESPIRATORY (INHALATION) at 18:42

## 2021-06-19 RX ADMIN — MICONAZOLE NITRATE: 2 POWDER TOPICAL at 13:02

## 2021-06-19 RX ADMIN — NICOTINE 1 PATCH: 21 PATCH, EXTENDED RELEASE TRANSDERMAL at 08:44

## 2021-06-19 RX ADMIN — INSULIN HUMAN 6 UNITS: 100 INJECTION, SOLUTION PARENTERAL at 08:46

## 2021-06-19 RX ADMIN — HYDROXYZINE PAMOATE 25 MG: 25 CAPSULE ORAL at 20:46

## 2021-06-19 RX ADMIN — INSULIN HUMAN 6 UNITS: 100 INJECTION, SOLUTION PARENTERAL at 13:02

## 2021-06-19 RX ADMIN — HYDROXYZINE PAMOATE 25 MG: 25 CAPSULE ORAL at 15:12

## 2021-06-19 RX ADMIN — OXYCODONE HYDROCHLORIDE AND ACETAMINOPHEN 1 TABLET: 10; 325 TABLET ORAL at 23:04

## 2021-06-19 RX ADMIN — WARFARIN SODIUM 6 MG: 3 TABLET ORAL at 18:38

## 2021-06-19 RX ADMIN — CEFEPIME HYDROCHLORIDE 2 G: 2 INJECTION, POWDER, FOR SOLUTION INTRAVENOUS at 02:52

## 2021-06-19 RX ADMIN — INSULIN HUMAN 6 UNITS: 100 INJECTION, SOLUTION PARENTERAL at 18:38

## 2021-06-19 RX ADMIN — SODIUM CHLORIDE, PRESERVATIVE FREE 10 ML: 5 INJECTION INTRAVENOUS at 08:40

## 2021-06-19 RX ADMIN — OXYCODONE HYDROCHLORIDE AND ACETAMINOPHEN 1 TABLET: 10; 325 TABLET ORAL at 08:39

## 2021-06-19 RX ADMIN — METHYLPREDNISOLONE SODIUM SUCCINATE 20 MG: 40 INJECTION, POWDER, FOR SOLUTION INTRAMUSCULAR; INTRAVENOUS at 15:12

## 2021-06-19 RX ADMIN — CEFEPIME HYDROCHLORIDE 2 G: 2 INJECTION, POWDER, FOR SOLUTION INTRAVENOUS at 21:48

## 2021-06-20 LAB
ALBUMIN SERPL-MCNC: 2.2 G/DL (ref 3.5–5.2)
ALBUMIN/GLOB SERPL: 0.7 G/DL
ALP SERPL-CCNC: 144 U/L (ref 39–117)
ALT SERPL W P-5'-P-CCNC: 68 U/L (ref 1–33)
ANION GAP SERPL CALCULATED.3IONS-SCNC: 8 MMOL/L (ref 5–15)
ANISOCYTOSIS BLD QL: NORMAL
ARTERIAL PATENCY WRIST A: ABNORMAL
AST SERPL-CCNC: 51 U/L (ref 1–32)
BASE EXCESS BLDA CALC-SCNC: 3.9 MMOL/L (ref -2–2)
BASOPHILS # BLD AUTO: 0.02 10*3/MM3 (ref 0–0.2)
BASOPHILS NFR BLD AUTO: 0.1 % (ref 0–1.5)
BDY SITE: ABNORMAL
BILIRUB SERPL-MCNC: 0.4 MG/DL (ref 0–1.2)
BUN SERPL-MCNC: 28 MG/DL (ref 8–23)
BUN/CREAT SERPL: 80 (ref 7–25)
CALCIUM SPEC-SCNC: 7.6 MG/DL (ref 8.6–10.5)
CHLORIDE SERPL-SCNC: 102 MMOL/L (ref 98–107)
CO2 SERPL-SCNC: 28 MMOL/L (ref 22–29)
COHGB MFR BLD: 0.5 % (ref 0–1.5)
CREAT SERPL-MCNC: 0.35 MG/DL (ref 0.57–1)
DEPRECATED RDW RBC AUTO: 65.5 FL (ref 37–54)
ELLIPTOCYTES BLD QL SMEAR: NORMAL
EOSINOPHIL # BLD AUTO: 0.02 10*3/MM3 (ref 0–0.4)
EOSINOPHIL NFR BLD AUTO: 0.1 % (ref 0.3–6.2)
ERYTHROCYTE [DISTWIDTH] IN BLOOD BY AUTOMATED COUNT: 23.3 % (ref 12.3–15.4)
FHHB: 4.7 % (ref 0–5)
GAS FLOW AIRWAY: 2 LPM
GFR SERPL CREATININE-BSD FRML MDRD: >150 ML/MIN/1.73
GLOBULIN UR ELPH-MCNC: 3 GM/DL
GLUCOSE BLDC GLUCOMTR-MCNC: 123 MG/DL (ref 70–130)
GLUCOSE BLDC GLUCOMTR-MCNC: 164 MG/DL (ref 70–130)
GLUCOSE BLDC GLUCOMTR-MCNC: 260 MG/DL (ref 70–130)
GLUCOSE BLDC GLUCOMTR-MCNC: 263 MG/DL (ref 70–130)
GLUCOSE BLDC GLUCOMTR-MCNC: 351 MG/DL (ref 70–130)
GLUCOSE SERPL-MCNC: 194 MG/DL (ref 65–99)
HCO3 BLDA-SCNC: 27.7 MMOL/L (ref 22–26)
HCT VFR BLD AUTO: 38.1 % (ref 34–46.6)
HGB BLD-MCNC: 11.3 G/DL (ref 12–15.9)
HGB BLDA-MCNC: 12.2 G/DL (ref 11.7–14.6)
IMM GRANULOCYTES # BLD AUTO: 0.06 10*3/MM3 (ref 0–0.05)
IMM GRANULOCYTES NFR BLD AUTO: 0.4 % (ref 0–0.5)
INHALED O2 CONCENTRATION: 28 %
INR PPP: 3.05 (ref 2–3)
LYMPHOCYTES # BLD AUTO: 0.71 10*3/MM3 (ref 0.7–3.1)
LYMPHOCYTES NFR BLD AUTO: 5.1 % (ref 19.6–45.3)
MCH RBC QN AUTO: 23.5 PG (ref 26.6–33)
MCHC RBC AUTO-ENTMCNC: 29.7 G/DL (ref 31.5–35.7)
MCV RBC AUTO: 79.4 FL (ref 79–97)
METHGB BLD QL: 0.3 % (ref 0–1.5)
MICROCYTES BLD QL: NORMAL
MODALITY: ABNORMAL
MONOCYTES # BLD AUTO: 0.59 10*3/MM3 (ref 0.1–0.9)
MONOCYTES NFR BLD AUTO: 4.2 % (ref 5–12)
NEUTROPHILS NFR BLD AUTO: 12.54 10*3/MM3 (ref 1.7–7)
NEUTROPHILS NFR BLD AUTO: 90.1 % (ref 42.7–76)
NRBC BLD AUTO-RTO: 0 /100 WBC (ref 0–0.2)
OXYHGB MFR BLDV: 94.5 % (ref 94–99)
PCO2 BLDA: 38.9 MM HG (ref 35–45)
PH BLDA: 7.47 PH UNITS (ref 7.35–7.45)
PLAT MORPH BLD: NORMAL
PLATELET # BLD AUTO: 134 10*3/MM3 (ref 140–450)
PMV BLD AUTO: 10.5 FL (ref 6–12)
PO2 BLD: 271 MM[HG] (ref 0–500)
PO2 BLDA: 75.8 MM HG (ref 80–100)
POIKILOCYTOSIS BLD QL SMEAR: NORMAL
POTASSIUM SERPL-SCNC: 4.7 MMOL/L (ref 3.5–5.2)
PROT SERPL-MCNC: 5.2 G/DL (ref 6–8.5)
PROTHROMBIN TIME: 30.6 SECONDS (ref 9.4–12)
RBC # BLD AUTO: 4.8 10*6/MM3 (ref 3.77–5.28)
SAO2 % BLDCOA: 95.3 % (ref 95–99)
SCHISTOCYTES BLD QL SMEAR: NORMAL
SODIUM SERPL-SCNC: 138 MMOL/L (ref 136–145)
WBC # BLD AUTO: 13.94 10*3/MM3 (ref 3.4–10.8)
WBC MORPH BLD: NORMAL

## 2021-06-20 PROCEDURE — 80053 COMPREHEN METABOLIC PANEL: CPT | Performed by: INTERNAL MEDICINE

## 2021-06-20 PROCEDURE — 85007 BL SMEAR W/DIFF WBC COUNT: CPT | Performed by: INTERNAL MEDICINE

## 2021-06-20 PROCEDURE — 83050 HGB METHEMOGLOBIN QUAN: CPT | Performed by: INTERNAL MEDICINE

## 2021-06-20 PROCEDURE — 82962 GLUCOSE BLOOD TEST: CPT

## 2021-06-20 PROCEDURE — 25010000002 CEFEPIME PER 500 MG: Performed by: INTERNAL MEDICINE

## 2021-06-20 PROCEDURE — 25010000002 METHYLPREDNISOLONE PER 40 MG: Performed by: INTERNAL MEDICINE

## 2021-06-20 PROCEDURE — 63710000001 INSULIN REGULAR HUMAN PER 5 UNITS: Performed by: INTERNAL MEDICINE

## 2021-06-20 PROCEDURE — 94799 UNLISTED PULMONARY SVC/PX: CPT

## 2021-06-20 PROCEDURE — 82375 ASSAY CARBOXYHB QUANT: CPT | Performed by: INTERNAL MEDICINE

## 2021-06-20 PROCEDURE — 94669 MECHANICAL CHEST WALL OSCILL: CPT

## 2021-06-20 PROCEDURE — 36600 WITHDRAWAL OF ARTERIAL BLOOD: CPT | Performed by: INTERNAL MEDICINE

## 2021-06-20 PROCEDURE — 63710000001 INSULIN DETEMIR PER 5 UNITS: Performed by: INTERNAL MEDICINE

## 2021-06-20 PROCEDURE — 82805 BLOOD GASES W/O2 SATURATION: CPT | Performed by: INTERNAL MEDICINE

## 2021-06-20 PROCEDURE — 85610 PROTHROMBIN TIME: CPT | Performed by: INTERNAL MEDICINE

## 2021-06-20 PROCEDURE — 85025 COMPLETE CBC W/AUTO DIFF WBC: CPT | Performed by: INTERNAL MEDICINE

## 2021-06-20 RX ORDER — OXYCODONE HYDROCHLORIDE AND ACETAMINOPHEN 5; 325 MG/1; MG/1
1 TABLET ORAL EVERY 6 HOURS PRN
Status: DISCONTINUED | OUTPATIENT
Start: 2021-06-20 | End: 2021-06-28

## 2021-06-20 RX ORDER — BISOPROLOL FUMARATE 5 MG/1
20 TABLET, FILM COATED ORAL
Status: DISCONTINUED | OUTPATIENT
Start: 2021-06-20 | End: 2021-06-28

## 2021-06-20 RX ORDER — BISOPROLOL FUMARATE 5 MG/1
20 TABLET, FILM COATED ORAL ONCE
Status: COMPLETED | OUTPATIENT
Start: 2021-06-20 | End: 2021-06-20

## 2021-06-20 RX ADMIN — INSULIN HUMAN 6 UNITS: 100 INJECTION, SOLUTION PARENTERAL at 12:45

## 2021-06-20 RX ADMIN — INSULIN HUMAN 6 UNITS: 100 INJECTION, SOLUTION PARENTERAL at 10:09

## 2021-06-20 RX ADMIN — GABAPENTIN 400 MG: 400 CAPSULE ORAL at 21:26

## 2021-06-20 RX ADMIN — BISOPROLOL FUMARATE 20 MG: 5 TABLET ORAL at 10:10

## 2021-06-20 RX ADMIN — INSULIN DETEMIR 25 UNITS: 100 INJECTION, SOLUTION SUBCUTANEOUS at 21:26

## 2021-06-20 RX ADMIN — OXYCODONE HYDROCHLORIDE AND ACETAMINOPHEN 1 TABLET: 5; 325 TABLET ORAL at 18:17

## 2021-06-20 RX ADMIN — LOSARTAN POTASSIUM 100 MG: 25 TABLET, FILM COATED ORAL at 10:11

## 2021-06-20 RX ADMIN — MICONAZOLE NITRATE: 2 POWDER TOPICAL at 12:23

## 2021-06-20 RX ADMIN — BUMETANIDE 1 MG: 1 TABLET ORAL at 21:26

## 2021-06-20 RX ADMIN — NICOTINE 1 PATCH: 21 PATCH, EXTENDED RELEASE TRANSDERMAL at 10:08

## 2021-06-20 RX ADMIN — HYDROCHLOROTHIAZIDE 6.25 MG: 25 TABLET ORAL at 10:09

## 2021-06-20 RX ADMIN — INSULIN HUMAN 6 UNITS: 100 INJECTION, SOLUTION PARENTERAL at 17:17

## 2021-06-20 RX ADMIN — HYDROXYZINE PAMOATE 25 MG: 25 CAPSULE ORAL at 21:26

## 2021-06-20 RX ADMIN — CEFEPIME HYDROCHLORIDE 2 G: 2 INJECTION, POWDER, FOR SOLUTION INTRAVENOUS at 02:54

## 2021-06-20 RX ADMIN — CEFEPIME HYDROCHLORIDE 2 G: 2 INJECTION, POWDER, FOR SOLUTION INTRAVENOUS at 12:19

## 2021-06-20 RX ADMIN — OXYCODONE HYDROCHLORIDE AND ACETAMINOPHEN 1 TABLET: 5; 325 TABLET ORAL at 12:19

## 2021-06-20 RX ADMIN — METHYLPREDNISOLONE SODIUM SUCCINATE 20 MG: 40 INJECTION, POWDER, FOR SOLUTION INTRAMUSCULAR; INTRAVENOUS at 17:16

## 2021-06-20 RX ADMIN — METHYLPREDNISOLONE SODIUM SUCCINATE 20 MG: 40 INJECTION, POWDER, FOR SOLUTION INTRAMUSCULAR; INTRAVENOUS at 00:23

## 2021-06-20 RX ADMIN — BISOPROLOL FUMARATE 20 MG: 5 TABLET ORAL at 04:37

## 2021-06-20 RX ADMIN — GABAPENTIN 400 MG: 400 CAPSULE ORAL at 17:15

## 2021-06-20 RX ADMIN — METHYLPREDNISOLONE SODIUM SUCCINATE 20 MG: 40 INJECTION, POWDER, FOR SOLUTION INTRAMUSCULAR; INTRAVENOUS at 10:08

## 2021-06-20 RX ADMIN — SODIUM CHLORIDE, PRESERVATIVE FREE 10 ML: 5 INJECTION INTRAVENOUS at 10:12

## 2021-06-20 RX ADMIN — ARFORMOTEROL TARTRATE 15 MCG: 15 SOLUTION RESPIRATORY (INHALATION) at 20:09

## 2021-06-20 RX ADMIN — MICONAZOLE NITRATE: 2 POWDER TOPICAL at 21:27

## 2021-06-20 RX ADMIN — BUMETANIDE 1 MG: 1 TABLET ORAL at 10:10

## 2021-06-20 RX ADMIN — CEFEPIME HYDROCHLORIDE 2 G: 2 INJECTION, POWDER, FOR SOLUTION INTRAVENOUS at 21:26

## 2021-06-20 RX ADMIN — ARFORMOTEROL TARTRATE 15 MCG: 15 SOLUTION RESPIRATORY (INHALATION) at 06:30

## 2021-06-20 RX ADMIN — SERTRALINE HYDROCHLORIDE 100 MG: 100 TABLET ORAL at 10:11

## 2021-06-21 LAB
GLUCOSE BLDC GLUCOMTR-MCNC: 323 MG/DL (ref 70–130)
GLUCOSE BLDC GLUCOMTR-MCNC: 327 MG/DL (ref 70–130)
GLUCOSE BLDC GLUCOMTR-MCNC: 365 MG/DL (ref 70–130)
GLUCOSE BLDC GLUCOMTR-MCNC: 411 MG/DL (ref 70–130)
INR PPP: 3.54 (ref 2–3)
PROTHROMBIN TIME: 35.9 SECONDS (ref 9.4–12)

## 2021-06-21 PROCEDURE — 94799 UNLISTED PULMONARY SVC/PX: CPT

## 2021-06-21 PROCEDURE — 63710000001 INSULIN DETEMIR PER 5 UNITS: Performed by: INTERNAL MEDICINE

## 2021-06-21 PROCEDURE — 25010000002 CEFEPIME PER 500 MG: Performed by: INTERNAL MEDICINE

## 2021-06-21 PROCEDURE — 94760 N-INVAS EAR/PLS OXIMETRY 1: CPT

## 2021-06-21 PROCEDURE — 63710000001 INSULIN REGULAR HUMAN PER 5 UNITS: Performed by: INTERNAL MEDICINE

## 2021-06-21 PROCEDURE — 94669 MECHANICAL CHEST WALL OSCILL: CPT

## 2021-06-21 PROCEDURE — 25010000002 METHYLPREDNISOLONE PER 40 MG: Performed by: INTERNAL MEDICINE

## 2021-06-21 PROCEDURE — 63710000001 INSULIN LISPRO (HUMAN) PER 5 UNITS: Performed by: INTERNAL MEDICINE

## 2021-06-21 PROCEDURE — 82962 GLUCOSE BLOOD TEST: CPT

## 2021-06-21 PROCEDURE — 85610 PROTHROMBIN TIME: CPT | Performed by: INTERNAL MEDICINE

## 2021-06-21 RX ORDER — PREDNISONE 20 MG/1
20 TABLET ORAL 2 TIMES DAILY WITH MEALS
Status: DISCONTINUED | OUTPATIENT
Start: 2021-06-22 | End: 2021-06-25

## 2021-06-21 RX ADMIN — OXYCODONE HYDROCHLORIDE AND ACETAMINOPHEN 1 TABLET: 5; 325 TABLET ORAL at 08:23

## 2021-06-21 RX ADMIN — OXYCODONE HYDROCHLORIDE AND ACETAMINOPHEN 1 TABLET: 5; 325 TABLET ORAL at 20:43

## 2021-06-21 RX ADMIN — HYDROXYZINE PAMOATE 25 MG: 25 CAPSULE ORAL at 23:42

## 2021-06-21 RX ADMIN — SERTRALINE HYDROCHLORIDE 100 MG: 100 TABLET ORAL at 08:18

## 2021-06-21 RX ADMIN — CEFEPIME HYDROCHLORIDE 2 G: 2 INJECTION, POWDER, FOR SOLUTION INTRAVENOUS at 18:21

## 2021-06-21 RX ADMIN — MICONAZOLE NITRATE: 2 POWDER TOPICAL at 08:23

## 2021-06-21 RX ADMIN — BUMETANIDE 1 MG: 1 TABLET ORAL at 08:19

## 2021-06-21 RX ADMIN — METHYLPREDNISOLONE SODIUM SUCCINATE 20 MG: 40 INJECTION, POWDER, FOR SOLUTION INTRAMUSCULAR; INTRAVENOUS at 00:46

## 2021-06-21 RX ADMIN — METHYLPREDNISOLONE SODIUM SUCCINATE 20 MG: 40 INJECTION, POWDER, FOR SOLUTION INTRAMUSCULAR; INTRAVENOUS at 16:27

## 2021-06-21 RX ADMIN — MICONAZOLE NITRATE: 2 POWDER TOPICAL at 20:41

## 2021-06-21 RX ADMIN — NICOTINE 1 PATCH: 21 PATCH, EXTENDED RELEASE TRANSDERMAL at 08:24

## 2021-06-21 RX ADMIN — BUMETANIDE 1 MG: 1 TABLET ORAL at 20:39

## 2021-06-21 RX ADMIN — LOSARTAN POTASSIUM 100 MG: 25 TABLET, FILM COATED ORAL at 08:30

## 2021-06-21 RX ADMIN — CEFEPIME HYDROCHLORIDE 2 G: 2 INJECTION, POWDER, FOR SOLUTION INTRAVENOUS at 03:16

## 2021-06-21 RX ADMIN — INSULIN LISPRO 10 UNITS: 100 INJECTION, SOLUTION INTRAVENOUS; SUBCUTANEOUS at 20:39

## 2021-06-21 RX ADMIN — METHYLPREDNISOLONE SODIUM SUCCINATE 20 MG: 40 INJECTION, POWDER, FOR SOLUTION INTRAMUSCULAR; INTRAVENOUS at 08:20

## 2021-06-21 RX ADMIN — CEFEPIME HYDROCHLORIDE 2 G: 2 INJECTION, POWDER, FOR SOLUTION INTRAVENOUS at 10:33

## 2021-06-21 RX ADMIN — BISOPROLOL FUMARATE 20 MG: 5 TABLET ORAL at 08:18

## 2021-06-21 RX ADMIN — INSULIN HUMAN 6 UNITS: 100 INJECTION, SOLUTION PARENTERAL at 08:30

## 2021-06-21 RX ADMIN — ARFORMOTEROL TARTRATE 15 MCG: 15 SOLUTION RESPIRATORY (INHALATION) at 19:35

## 2021-06-21 RX ADMIN — GABAPENTIN 400 MG: 400 CAPSULE ORAL at 21:57

## 2021-06-21 RX ADMIN — OXYCODONE HYDROCHLORIDE AND ACETAMINOPHEN 1 TABLET: 5; 325 TABLET ORAL at 14:15

## 2021-06-21 RX ADMIN — OXYCODONE HYDROCHLORIDE AND ACETAMINOPHEN 1 TABLET: 5; 325 TABLET ORAL at 00:46

## 2021-06-21 RX ADMIN — HYDROCHLOROTHIAZIDE 6.25 MG: 25 TABLET ORAL at 08:18

## 2021-06-21 RX ADMIN — INSULIN HUMAN 6 UNITS: 100 INJECTION, SOLUTION PARENTERAL at 17:24

## 2021-06-21 RX ADMIN — GABAPENTIN 400 MG: 400 CAPSULE ORAL at 13:17

## 2021-06-21 RX ADMIN — ARFORMOTEROL TARTRATE 15 MCG: 15 SOLUTION RESPIRATORY (INHALATION) at 07:59

## 2021-06-21 RX ADMIN — INSULIN HUMAN 6 UNITS: 100 INJECTION, SOLUTION PARENTERAL at 12:39

## 2021-06-21 RX ADMIN — INSULIN DETEMIR 35 UNITS: 100 INJECTION, SOLUTION SUBCUTANEOUS at 20:44

## 2021-06-22 PROBLEM — J96.01 ACUTE RESPIRATORY FAILURE WITH HYPOXIA: Status: ACTIVE | Noted: 2021-06-22

## 2021-06-22 PROBLEM — E16.2 HYPOGLYCEMIA: Status: ACTIVE | Noted: 2021-06-22

## 2021-06-22 LAB
GLUCOSE BLDC GLUCOMTR-MCNC: 110 MG/DL (ref 70–130)
GLUCOSE BLDC GLUCOMTR-MCNC: 131 MG/DL (ref 70–130)
GLUCOSE BLDC GLUCOMTR-MCNC: 143 MG/DL (ref 70–130)
GLUCOSE BLDC GLUCOMTR-MCNC: 298 MG/DL (ref 70–130)

## 2021-06-22 PROCEDURE — 82962 GLUCOSE BLOOD TEST: CPT

## 2021-06-22 PROCEDURE — 63710000001 INSULIN DETEMIR PER 5 UNITS: Performed by: INTERNAL MEDICINE

## 2021-06-22 PROCEDURE — 63710000001 PREDNISONE PER 1 MG: Performed by: INTERNAL MEDICINE

## 2021-06-22 PROCEDURE — 94669 MECHANICAL CHEST WALL OSCILL: CPT

## 2021-06-22 PROCEDURE — 97530 THERAPEUTIC ACTIVITIES: CPT

## 2021-06-22 PROCEDURE — 63710000001 INSULIN REGULAR HUMAN PER 5 UNITS: Performed by: INTERNAL MEDICINE

## 2021-06-22 PROCEDURE — 94799 UNLISTED PULMONARY SVC/PX: CPT

## 2021-06-22 PROCEDURE — 25010000002 CEFEPIME PER 500 MG: Performed by: INTERNAL MEDICINE

## 2021-06-22 RX ADMIN — ARFORMOTEROL TARTRATE 15 MCG: 15 SOLUTION RESPIRATORY (INHALATION) at 07:09

## 2021-06-22 RX ADMIN — OXYCODONE HYDROCHLORIDE AND ACETAMINOPHEN 1 TABLET: 5; 325 TABLET ORAL at 14:21

## 2021-06-22 RX ADMIN — OXYCODONE HYDROCHLORIDE AND ACETAMINOPHEN 1 TABLET: 5; 325 TABLET ORAL at 20:57

## 2021-06-22 RX ADMIN — BUMETANIDE 1 MG: 1 TABLET ORAL at 20:57

## 2021-06-22 RX ADMIN — ARFORMOTEROL TARTRATE 15 MCG: 15 SOLUTION RESPIRATORY (INHALATION) at 20:18

## 2021-06-22 RX ADMIN — LOSARTAN POTASSIUM 100 MG: 25 TABLET, FILM COATED ORAL at 08:44

## 2021-06-22 RX ADMIN — GABAPENTIN 400 MG: 400 CAPSULE ORAL at 06:05

## 2021-06-22 RX ADMIN — GABAPENTIN 400 MG: 400 CAPSULE ORAL at 14:20

## 2021-06-22 RX ADMIN — CEFEPIME HYDROCHLORIDE 2 G: 2 INJECTION, POWDER, FOR SOLUTION INTRAVENOUS at 11:19

## 2021-06-22 RX ADMIN — CEFEPIME HYDROCHLORIDE 2 G: 2 INJECTION, POWDER, FOR SOLUTION INTRAVENOUS at 03:15

## 2021-06-22 RX ADMIN — GABAPENTIN 400 MG: 400 CAPSULE ORAL at 20:58

## 2021-06-22 RX ADMIN — HYDROCHLOROTHIAZIDE 6.25 MG: 25 TABLET ORAL at 10:17

## 2021-06-22 RX ADMIN — BUMETANIDE 1 MG: 1 TABLET ORAL at 08:44

## 2021-06-22 RX ADMIN — PREDNISONE 20 MG: 20 TABLET ORAL at 08:44

## 2021-06-22 RX ADMIN — INSULIN DETEMIR 35 UNITS: 100 INJECTION, SOLUTION SUBCUTANEOUS at 21:00

## 2021-06-22 RX ADMIN — HYDROXYZINE PAMOATE 25 MG: 25 CAPSULE ORAL at 15:11

## 2021-06-22 RX ADMIN — INSULIN HUMAN 9 UNITS: 100 INJECTION, SOLUTION PARENTERAL at 17:20

## 2021-06-22 RX ADMIN — PREDNISONE 20 MG: 20 TABLET ORAL at 17:20

## 2021-06-22 RX ADMIN — OXYCODONE HYDROCHLORIDE AND ACETAMINOPHEN 1 TABLET: 5; 325 TABLET ORAL at 07:25

## 2021-06-22 RX ADMIN — BISOPROLOL FUMARATE 20 MG: 5 TABLET ORAL at 08:44

## 2021-06-22 RX ADMIN — NICOTINE 1 PATCH: 21 PATCH, EXTENDED RELEASE TRANSDERMAL at 09:56

## 2021-06-22 RX ADMIN — INSULIN HUMAN 9 UNITS: 100 INJECTION, SOLUTION PARENTERAL at 12:51

## 2021-06-22 RX ADMIN — INSULIN HUMAN 9 UNITS: 100 INJECTION, SOLUTION PARENTERAL at 08:43

## 2021-06-22 RX ADMIN — MICONAZOLE NITRATE: 2 POWDER TOPICAL at 08:45

## 2021-06-22 RX ADMIN — SERTRALINE HYDROCHLORIDE 100 MG: 100 TABLET ORAL at 08:44

## 2021-06-23 ENCOUNTER — APPOINTMENT (OUTPATIENT)
Dept: MRI IMAGING | Facility: HOSPITAL | Age: 61
End: 2021-06-23

## 2021-06-23 LAB
GLUCOSE BLDC GLUCOMTR-MCNC: 104 MG/DL (ref 70–130)
GLUCOSE BLDC GLUCOMTR-MCNC: 219 MG/DL (ref 70–130)
GLUCOSE BLDC GLUCOMTR-MCNC: 358 MG/DL (ref 70–130)
GLUCOSE BLDC GLUCOMTR-MCNC: 60 MG/DL (ref 70–130)
GLUCOSE BLDC GLUCOMTR-MCNC: 71 MG/DL (ref 70–130)

## 2021-06-23 PROCEDURE — 82962 GLUCOSE BLOOD TEST: CPT

## 2021-06-23 PROCEDURE — 63710000001 INSULIN REGULAR HUMAN PER 5 UNITS: Performed by: INTERNAL MEDICINE

## 2021-06-23 PROCEDURE — 72141 MRI NECK SPINE W/O DYE: CPT

## 2021-06-23 PROCEDURE — 94799 UNLISTED PULMONARY SVC/PX: CPT

## 2021-06-23 PROCEDURE — 97110 THERAPEUTIC EXERCISES: CPT

## 2021-06-23 PROCEDURE — 63710000001 INSULIN DETEMIR PER 5 UNITS: Performed by: INTERNAL MEDICINE

## 2021-06-23 PROCEDURE — 63710000001 PREDNISONE PER 1 MG: Performed by: INTERNAL MEDICINE

## 2021-06-23 PROCEDURE — 72148 MRI LUMBAR SPINE W/O DYE: CPT

## 2021-06-23 RX ADMIN — GABAPENTIN 400 MG: 400 CAPSULE ORAL at 13:35

## 2021-06-23 RX ADMIN — BISOPROLOL FUMARATE 20 MG: 5 TABLET ORAL at 09:47

## 2021-06-23 RX ADMIN — HYDROXYZINE PAMOATE 25 MG: 25 CAPSULE ORAL at 09:47

## 2021-06-23 RX ADMIN — OXYCODONE HYDROCHLORIDE AND ACETAMINOPHEN 1 TABLET: 5; 325 TABLET ORAL at 13:35

## 2021-06-23 RX ADMIN — SERTRALINE HYDROCHLORIDE 100 MG: 100 TABLET ORAL at 09:47

## 2021-06-23 RX ADMIN — NICOTINE 1 PATCH: 21 PATCH, EXTENDED RELEASE TRANSDERMAL at 09:51

## 2021-06-23 RX ADMIN — OXYCODONE HYDROCHLORIDE AND ACETAMINOPHEN 1 TABLET: 5; 325 TABLET ORAL at 22:39

## 2021-06-23 RX ADMIN — OXYCODONE HYDROCHLORIDE AND ACETAMINOPHEN 1 TABLET: 5; 325 TABLET ORAL at 03:05

## 2021-06-23 RX ADMIN — PREDNISONE 20 MG: 20 TABLET ORAL at 17:59

## 2021-06-23 RX ADMIN — GABAPENTIN 400 MG: 400 CAPSULE ORAL at 06:36

## 2021-06-23 RX ADMIN — HYDROXYZINE PAMOATE 25 MG: 25 CAPSULE ORAL at 20:53

## 2021-06-23 RX ADMIN — INSULIN DETEMIR 20 UNITS: 100 INJECTION, SOLUTION SUBCUTANEOUS at 22:39

## 2021-06-23 RX ADMIN — HYDROXYZINE PAMOATE 25 MG: 25 CAPSULE ORAL at 01:33

## 2021-06-23 RX ADMIN — ARFORMOTEROL TARTRATE 15 MCG: 15 SOLUTION RESPIRATORY (INHALATION) at 20:33

## 2021-06-23 RX ADMIN — BUMETANIDE 1 MG: 1 TABLET ORAL at 09:47

## 2021-06-23 RX ADMIN — HYDROCHLOROTHIAZIDE 6.25 MG: 25 TABLET ORAL at 09:52

## 2021-06-23 RX ADMIN — MICONAZOLE NITRATE: 2 POWDER TOPICAL at 09:46

## 2021-06-23 RX ADMIN — PREDNISONE 20 MG: 20 TABLET ORAL at 09:47

## 2021-06-23 RX ADMIN — MICONAZOLE NITRATE: 2 POWDER TOPICAL at 22:52

## 2021-06-23 RX ADMIN — ARFORMOTEROL TARTRATE 15 MCG: 15 SOLUTION RESPIRATORY (INHALATION) at 08:45

## 2021-06-23 RX ADMIN — GABAPENTIN 400 MG: 400 CAPSULE ORAL at 22:38

## 2021-06-23 RX ADMIN — INSULIN HUMAN 9 UNITS: 100 INJECTION, SOLUTION PARENTERAL at 17:59

## 2021-06-23 RX ADMIN — BUMETANIDE 1 MG: 1 TABLET ORAL at 22:38

## 2021-06-23 RX ADMIN — LOSARTAN POTASSIUM 100 MG: 25 TABLET, FILM COATED ORAL at 09:48

## 2021-06-24 ENCOUNTER — APPOINTMENT (OUTPATIENT)
Dept: MRI IMAGING | Facility: HOSPITAL | Age: 61
End: 2021-06-24

## 2021-06-24 LAB
GLUCOSE BLDC GLUCOMTR-MCNC: 152 MG/DL (ref 70–130)
GLUCOSE BLDC GLUCOMTR-MCNC: 174 MG/DL (ref 70–130)
GLUCOSE BLDC GLUCOMTR-MCNC: 204 MG/DL (ref 70–130)
GLUCOSE BLDC GLUCOMTR-MCNC: 262 MG/DL (ref 70–130)
INR PPP: 1.03 (ref 2–3)
PROTHROMBIN TIME: 11.2 SECONDS (ref 9.4–12)

## 2021-06-24 PROCEDURE — 70544 MR ANGIOGRAPHY HEAD W/O DYE: CPT

## 2021-06-24 PROCEDURE — 82175 ASSAY OF ARSENIC: CPT | Performed by: PSYCHIATRY & NEUROLOGY

## 2021-06-24 PROCEDURE — 83825 ASSAY OF MERCURY: CPT | Performed by: PSYCHIATRY & NEUROLOGY

## 2021-06-24 PROCEDURE — 63710000001 INSULIN REGULAR HUMAN PER 5 UNITS: Performed by: INTERNAL MEDICINE

## 2021-06-24 PROCEDURE — 94799 UNLISTED PULMONARY SVC/PX: CPT

## 2021-06-24 PROCEDURE — 82962 GLUCOSE BLOOD TEST: CPT

## 2021-06-24 PROCEDURE — 70551 MRI BRAIN STEM W/O DYE: CPT

## 2021-06-24 PROCEDURE — 70547 MR ANGIOGRAPHY NECK W/O DYE: CPT

## 2021-06-24 PROCEDURE — 0 GADOBENATE DIMEGLUMINE 529 MG/ML SOLUTION: Performed by: INTERNAL MEDICINE

## 2021-06-24 PROCEDURE — 86038 ANTINUCLEAR ANTIBODIES: CPT | Performed by: PSYCHIATRY & NEUROLOGY

## 2021-06-24 PROCEDURE — 83655 ASSAY OF LEAD: CPT | Performed by: PSYCHIATRY & NEUROLOGY

## 2021-06-24 PROCEDURE — 86225 DNA ANTIBODY NATIVE: CPT | Performed by: PSYCHIATRY & NEUROLOGY

## 2021-06-24 PROCEDURE — 63710000001 PREDNISONE PER 1 MG: Performed by: INTERNAL MEDICINE

## 2021-06-24 PROCEDURE — 63710000001 INSULIN DETEMIR PER 5 UNITS: Performed by: INTERNAL MEDICINE

## 2021-06-24 PROCEDURE — 94760 N-INVAS EAR/PLS OXIMETRY 1: CPT

## 2021-06-24 PROCEDURE — 85610 PROTHROMBIN TIME: CPT | Performed by: INTERNAL MEDICINE

## 2021-06-24 PROCEDURE — A9577 INJ MULTIHANCE: HCPCS | Performed by: INTERNAL MEDICINE

## 2021-06-24 RX ORDER — WARFARIN SODIUM 1 MG/1
1 TABLET ORAL
Status: COMPLETED | OUTPATIENT
Start: 2021-06-24 | End: 2021-06-24

## 2021-06-24 RX ORDER — SPIRONOLACTONE 25 MG/1
50 TABLET ORAL DAILY
Status: DISCONTINUED | OUTPATIENT
Start: 2021-06-25 | End: 2021-07-22 | Stop reason: HOSPADM

## 2021-06-24 RX ADMIN — HYDROXYZINE PAMOATE 25 MG: 25 CAPSULE ORAL at 09:06

## 2021-06-24 RX ADMIN — NICOTINE 1 PATCH: 21 PATCH, EXTENDED RELEASE TRANSDERMAL at 09:04

## 2021-06-24 RX ADMIN — ARFORMOTEROL TARTRATE 15 MCG: 15 SOLUTION RESPIRATORY (INHALATION) at 21:29

## 2021-06-24 RX ADMIN — GABAPENTIN 400 MG: 400 CAPSULE ORAL at 14:49

## 2021-06-24 RX ADMIN — PREDNISONE 20 MG: 20 TABLET ORAL at 09:04

## 2021-06-24 RX ADMIN — HYDROXYZINE PAMOATE 25 MG: 25 CAPSULE ORAL at 18:37

## 2021-06-24 RX ADMIN — MICONAZOLE NITRATE: 2 POWDER TOPICAL at 09:07

## 2021-06-24 RX ADMIN — GABAPENTIN 400 MG: 400 CAPSULE ORAL at 22:18

## 2021-06-24 RX ADMIN — OXYCODONE HYDROCHLORIDE AND ACETAMINOPHEN 1 TABLET: 5; 325 TABLET ORAL at 20:34

## 2021-06-24 RX ADMIN — SODIUM CHLORIDE, PRESERVATIVE FREE 10 ML: 5 INJECTION INTRAVENOUS at 20:34

## 2021-06-24 RX ADMIN — BUMETANIDE 1 MG: 1 TABLET ORAL at 20:34

## 2021-06-24 RX ADMIN — HYDROXYZINE PAMOATE 25 MG: 25 CAPSULE ORAL at 23:59

## 2021-06-24 RX ADMIN — PREDNISONE 20 MG: 20 TABLET ORAL at 18:38

## 2021-06-24 RX ADMIN — LOSARTAN POTASSIUM 100 MG: 25 TABLET, FILM COATED ORAL at 09:04

## 2021-06-24 RX ADMIN — SERTRALINE HYDROCHLORIDE 100 MG: 100 TABLET ORAL at 09:04

## 2021-06-24 RX ADMIN — BISOPROLOL FUMARATE 20 MG: 5 TABLET ORAL at 09:05

## 2021-06-24 RX ADMIN — ARFORMOTEROL TARTRATE 15 MCG: 15 SOLUTION RESPIRATORY (INHALATION) at 08:42

## 2021-06-24 RX ADMIN — BUMETANIDE 1 MG: 1 TABLET ORAL at 09:05

## 2021-06-24 RX ADMIN — OXYCODONE HYDROCHLORIDE AND ACETAMINOPHEN 1 TABLET: 5; 325 TABLET ORAL at 14:49

## 2021-06-24 RX ADMIN — GADOBENATE DIMEGLUMINE 18 ML: 529 INJECTION, SOLUTION INTRAVENOUS at 23:59

## 2021-06-24 RX ADMIN — WARFARIN SODIUM 1 MG: 1 TABLET ORAL at 22:17

## 2021-06-24 RX ADMIN — MICONAZOLE NITRATE 1 APPLICATION: 2 POWDER TOPICAL at 20:35

## 2021-06-24 RX ADMIN — INSULIN HUMAN 9 UNITS: 100 INJECTION, SOLUTION PARENTERAL at 09:06

## 2021-06-24 RX ADMIN — INSULIN HUMAN 9 UNITS: 100 INJECTION, SOLUTION PARENTERAL at 12:47

## 2021-06-24 RX ADMIN — INSULIN DETEMIR 20 UNITS: 100 INJECTION, SOLUTION SUBCUTANEOUS at 20:34

## 2021-06-24 RX ADMIN — HYDROCHLOROTHIAZIDE 6.25 MG: 25 TABLET ORAL at 09:05

## 2021-06-24 RX ADMIN — GABAPENTIN 400 MG: 400 CAPSULE ORAL at 06:32

## 2021-06-24 RX ADMIN — INSULIN HUMAN 9 UNITS: 100 INJECTION, SOLUTION PARENTERAL at 18:37

## 2021-06-25 LAB
ALBUMIN SERPL-MCNC: 2.2 G/DL (ref 3.5–5.2)
ALBUMIN/GLOB SERPL: 0.7 G/DL
ALP SERPL-CCNC: 176 U/L (ref 39–117)
ALT SERPL W P-5'-P-CCNC: 165 U/L (ref 1–33)
ANION GAP SERPL CALCULATED.3IONS-SCNC: 4.3 MMOL/L (ref 5–15)
AST SERPL-CCNC: 58 U/L (ref 1–32)
BILIRUB SERPL-MCNC: 0.4 MG/DL (ref 0–1.2)
BUN SERPL-MCNC: 25 MG/DL (ref 8–23)
BUN/CREAT SERPL: 92.6 (ref 7–25)
CALCIUM SPEC-SCNC: 8.1 MG/DL (ref 8.6–10.5)
CHLORIDE SERPL-SCNC: 108 MMOL/L (ref 98–107)
CO2 SERPL-SCNC: 29.7 MMOL/L (ref 22–29)
CREAT SERPL-MCNC: 0.27 MG/DL (ref 0.57–1)
DEPRECATED RDW RBC AUTO: 70 FL (ref 37–54)
ERYTHROCYTE [DISTWIDTH] IN BLOOD BY AUTOMATED COUNT: 24.2 % (ref 12.3–15.4)
GFR SERPL CREATININE-BSD FRML MDRD: >150 ML/MIN/1.73
GLOBULIN UR ELPH-MCNC: 3.1 GM/DL
GLUCOSE BLDC GLUCOMTR-MCNC: 162 MG/DL (ref 70–130)
GLUCOSE BLDC GLUCOMTR-MCNC: 296 MG/DL (ref 70–130)
GLUCOSE BLDC GLUCOMTR-MCNC: 333 MG/DL (ref 70–130)
GLUCOSE BLDC GLUCOMTR-MCNC: 91 MG/DL (ref 70–130)
GLUCOSE SERPL-MCNC: 142 MG/DL (ref 65–99)
HCT VFR BLD AUTO: 35.3 % (ref 34–46.6)
HGB BLD-MCNC: 10.5 G/DL (ref 12–15.9)
INR PPP: 0.99 (ref 2–3)
MCH RBC QN AUTO: 24 PG (ref 26.6–33)
MCHC RBC AUTO-ENTMCNC: 29.7 G/DL (ref 31.5–35.7)
MCV RBC AUTO: 80.8 FL (ref 79–97)
PLATELET # BLD AUTO: 86 10*3/MM3 (ref 140–450)
PMV BLD AUTO: 10.8 FL (ref 6–12)
POTASSIUM SERPL-SCNC: 3.6 MMOL/L (ref 3.5–5.2)
PROT SERPL-MCNC: 5.3 G/DL (ref 6–8.5)
PROTHROMBIN TIME: 10.9 SECONDS (ref 9.4–12)
RBC # BLD AUTO: 4.37 10*6/MM3 (ref 3.77–5.28)
SODIUM SERPL-SCNC: 142 MMOL/L (ref 136–145)
WBC # BLD AUTO: 7.3 10*3/MM3 (ref 3.4–10.8)

## 2021-06-25 PROCEDURE — 63710000001 INSULIN DETEMIR PER 5 UNITS: Performed by: INTERNAL MEDICINE

## 2021-06-25 PROCEDURE — 94799 UNLISTED PULMONARY SVC/PX: CPT

## 2021-06-25 PROCEDURE — 85027 COMPLETE CBC AUTOMATED: CPT | Performed by: INTERNAL MEDICINE

## 2021-06-25 PROCEDURE — 85610 PROTHROMBIN TIME: CPT | Performed by: INTERNAL MEDICINE

## 2021-06-25 PROCEDURE — 97164 PT RE-EVAL EST PLAN CARE: CPT

## 2021-06-25 PROCEDURE — 80053 COMPREHEN METABOLIC PANEL: CPT | Performed by: INTERNAL MEDICINE

## 2021-06-25 PROCEDURE — 63710000001 INSULIN REGULAR HUMAN PER 5 UNITS: Performed by: INTERNAL MEDICINE

## 2021-06-25 PROCEDURE — 63710000001 PREDNISONE PER 1 MG: Performed by: INTERNAL MEDICINE

## 2021-06-25 PROCEDURE — 97530 THERAPEUTIC ACTIVITIES: CPT

## 2021-06-25 PROCEDURE — 82962 GLUCOSE BLOOD TEST: CPT

## 2021-06-25 RX ORDER — WARFARIN SODIUM 1 MG/1
1 TABLET ORAL
Status: DISCONTINUED | OUTPATIENT
Start: 2021-06-25 | End: 2021-06-26

## 2021-06-25 RX ORDER — BUMETANIDE 1 MG/1
1 TABLET ORAL DAILY
Status: DISCONTINUED | OUTPATIENT
Start: 2021-06-25 | End: 2021-06-28

## 2021-06-25 RX ORDER — PREDNISONE 10 MG/1
10 TABLET ORAL DAILY
Status: DISCONTINUED | OUTPATIENT
Start: 2021-06-26 | End: 2021-06-29

## 2021-06-25 RX ORDER — WARFARIN SODIUM 1 MG
0.5 TABLET ORAL
Status: DISCONTINUED | OUTPATIENT
Start: 2021-06-25 | End: 2021-06-26

## 2021-06-25 RX ORDER — NICOTINE 21 MG/24HR
1 PATCH, TRANSDERMAL 24 HOURS TRANSDERMAL
Status: DISCONTINUED | OUTPATIENT
Start: 2021-06-25 | End: 2021-07-22 | Stop reason: HOSPADM

## 2021-06-25 RX ORDER — BUDESONIDE 0.5 MG/2ML
0.5 INHALANT ORAL
Status: DISCONTINUED | OUTPATIENT
Start: 2021-06-25 | End: 2021-07-10

## 2021-06-25 RX ADMIN — OXYCODONE HYDROCHLORIDE AND ACETAMINOPHEN 1 TABLET: 5; 325 TABLET ORAL at 18:00

## 2021-06-25 RX ADMIN — WARFARIN SODIUM 0.5 MG: 1 TABLET ORAL at 18:42

## 2021-06-25 RX ADMIN — INSULIN HUMAN 6 UNITS: 100 INJECTION, SOLUTION PARENTERAL at 11:35

## 2021-06-25 RX ADMIN — ARFORMOTEROL TARTRATE 15 MCG: 15 SOLUTION RESPIRATORY (INHALATION) at 21:07

## 2021-06-25 RX ADMIN — SODIUM CHLORIDE, PRESERVATIVE FREE 10 ML: 5 INJECTION INTRAVENOUS at 10:04

## 2021-06-25 RX ADMIN — OXYCODONE HYDROCHLORIDE AND ACETAMINOPHEN 1 TABLET: 5; 325 TABLET ORAL at 11:33

## 2021-06-25 RX ADMIN — INSULIN HUMAN 6 UNITS: 100 INJECTION, SOLUTION PARENTERAL at 18:01

## 2021-06-25 RX ADMIN — MICONAZOLE NITRATE: 2 POWDER TOPICAL at 21:28

## 2021-06-25 RX ADMIN — ARFORMOTEROL TARTRATE 15 MCG: 15 SOLUTION RESPIRATORY (INHALATION) at 09:21

## 2021-06-25 RX ADMIN — BISOPROLOL FUMARATE 20 MG: 5 TABLET ORAL at 09:58

## 2021-06-25 RX ADMIN — LOSARTAN POTASSIUM 100 MG: 25 TABLET, FILM COATED ORAL at 09:58

## 2021-06-25 RX ADMIN — HYDROXYZINE PAMOATE 25 MG: 25 CAPSULE ORAL at 21:24

## 2021-06-25 RX ADMIN — SPIRONOLACTONE 50 MG: 25 TABLET ORAL at 10:00

## 2021-06-25 RX ADMIN — NICOTINE 1 PATCH: 21 PATCH, EXTENDED RELEASE TRANSDERMAL at 10:02

## 2021-06-25 RX ADMIN — GABAPENTIN 400 MG: 400 CAPSULE ORAL at 05:03

## 2021-06-25 RX ADMIN — BUMETANIDE 1 MG: 1 TABLET ORAL at 09:58

## 2021-06-25 RX ADMIN — BUDESONIDE 0.5 MG: 0.5 INHALANT ORAL at 21:07

## 2021-06-25 RX ADMIN — BUDESONIDE 0.5 MG: 0.5 INHALANT ORAL at 12:39

## 2021-06-25 RX ADMIN — WARFARIN SODIUM 1 MG: 1 TABLET ORAL at 18:43

## 2021-06-25 RX ADMIN — GABAPENTIN 400 MG: 400 CAPSULE ORAL at 21:24

## 2021-06-25 RX ADMIN — INSULIN DETEMIR 17 UNITS: 100 INJECTION, SOLUTION SUBCUTANEOUS at 21:24

## 2021-06-25 RX ADMIN — GABAPENTIN 400 MG: 400 CAPSULE ORAL at 14:43

## 2021-06-25 RX ADMIN — NICOTINE 1 PATCH: 14 PATCH, EXTENDED RELEASE TRANSDERMAL at 11:38

## 2021-06-25 RX ADMIN — PREDNISONE 20 MG: 20 TABLET ORAL at 09:58

## 2021-06-25 RX ADMIN — MICONAZOLE NITRATE: 2 POWDER TOPICAL at 10:03

## 2021-06-25 RX ADMIN — SERTRALINE HYDROCHLORIDE 100 MG: 100 TABLET ORAL at 09:58

## 2021-06-26 LAB
DSDNA IGG SERPL IA-ACNC: NEGATIVE [IU]/ML
GLUCOSE BLDC GLUCOMTR-MCNC: 200 MG/DL (ref 70–130)
GLUCOSE BLDC GLUCOMTR-MCNC: 217 MG/DL (ref 70–130)
GLUCOSE BLDC GLUCOMTR-MCNC: 218 MG/DL (ref 70–130)
GLUCOSE BLDC GLUCOMTR-MCNC: 223 MG/DL (ref 70–130)
INR PPP: 0.96 (ref 2–3)
NUCLEAR IGG SER IA-RTO: NEGATIVE
PROTHROMBIN TIME: 10.6 SECONDS (ref 9.4–12)

## 2021-06-26 PROCEDURE — 94760 N-INVAS EAR/PLS OXIMETRY 1: CPT

## 2021-06-26 PROCEDURE — 85610 PROTHROMBIN TIME: CPT | Performed by: INTERNAL MEDICINE

## 2021-06-26 PROCEDURE — 63710000001 INSULIN REGULAR HUMAN PER 5 UNITS: Performed by: INTERNAL MEDICINE

## 2021-06-26 PROCEDURE — 63710000001 INSULIN DETEMIR PER 5 UNITS: Performed by: INTERNAL MEDICINE

## 2021-06-26 PROCEDURE — 94799 UNLISTED PULMONARY SVC/PX: CPT

## 2021-06-26 PROCEDURE — 63710000001 PREDNISONE PER 5 MG: Performed by: INTERNAL MEDICINE

## 2021-06-26 PROCEDURE — 82962 GLUCOSE BLOOD TEST: CPT

## 2021-06-26 RX ORDER — WARFARIN SODIUM 2 MG/1
2 TABLET ORAL
Status: DISCONTINUED | OUTPATIENT
Start: 2021-06-26 | End: 2021-06-26

## 2021-06-26 RX ADMIN — INSULIN HUMAN 6 UNITS: 100 INJECTION, SOLUTION PARENTERAL at 08:30

## 2021-06-26 RX ADMIN — NICOTINE 1 PATCH: 14 PATCH, EXTENDED RELEASE TRANSDERMAL at 08:32

## 2021-06-26 RX ADMIN — INSULIN HUMAN 6 UNITS: 100 INJECTION, SOLUTION PARENTERAL at 12:52

## 2021-06-26 RX ADMIN — SPIRONOLACTONE 50 MG: 25 TABLET ORAL at 08:31

## 2021-06-26 RX ADMIN — WARFARIN SODIUM 2 MG: 1 TABLET ORAL at 17:56

## 2021-06-26 RX ADMIN — INSULIN HUMAN 6 UNITS: 100 INJECTION, SOLUTION PARENTERAL at 17:55

## 2021-06-26 RX ADMIN — MICONAZOLE NITRATE: 2 POWDER TOPICAL at 08:32

## 2021-06-26 RX ADMIN — BUDESONIDE 0.5 MG: 0.5 INHALANT ORAL at 10:01

## 2021-06-26 RX ADMIN — GABAPENTIN 400 MG: 400 CAPSULE ORAL at 20:32

## 2021-06-26 RX ADMIN — OXYCODONE HYDROCHLORIDE AND ACETAMINOPHEN 1 TABLET: 5; 325 TABLET ORAL at 15:35

## 2021-06-26 RX ADMIN — PREDNISONE 10 MG: 10 TABLET ORAL at 08:29

## 2021-06-26 RX ADMIN — SERTRALINE HYDROCHLORIDE 100 MG: 100 TABLET ORAL at 08:30

## 2021-06-26 RX ADMIN — OXYCODONE HYDROCHLORIDE AND ACETAMINOPHEN 1 TABLET: 5; 325 TABLET ORAL at 01:32

## 2021-06-26 RX ADMIN — GABAPENTIN 400 MG: 400 CAPSULE ORAL at 14:18

## 2021-06-26 RX ADMIN — MICONAZOLE NITRATE 1 APPLICATION: 2 POWDER TOPICAL at 20:51

## 2021-06-26 RX ADMIN — GABAPENTIN 400 MG: 400 CAPSULE ORAL at 05:37

## 2021-06-26 RX ADMIN — SODIUM CHLORIDE, PRESERVATIVE FREE 10 ML: 5 INJECTION INTRAVENOUS at 20:32

## 2021-06-26 RX ADMIN — INSULIN DETEMIR 20 UNITS: 100 INJECTION, SOLUTION SUBCUTANEOUS at 20:51

## 2021-06-26 RX ADMIN — ARFORMOTEROL TARTRATE 15 MCG: 15 SOLUTION RESPIRATORY (INHALATION) at 09:55

## 2021-06-26 RX ADMIN — LOSARTAN POTASSIUM 100 MG: 25 TABLET, FILM COATED ORAL at 08:29

## 2021-06-26 RX ADMIN — OXYCODONE HYDROCHLORIDE AND ACETAMINOPHEN 1 TABLET: 5; 325 TABLET ORAL at 08:31

## 2021-06-26 RX ADMIN — ARFORMOTEROL TARTRATE 15 MCG: 15 SOLUTION RESPIRATORY (INHALATION) at 20:55

## 2021-06-26 RX ADMIN — BUDESONIDE 0.5 MG: 0.5 INHALANT ORAL at 20:55

## 2021-06-26 RX ADMIN — BUMETANIDE 1 MG: 1 TABLET ORAL at 08:30

## 2021-06-26 RX ADMIN — HYDROXYZINE PAMOATE 25 MG: 25 CAPSULE ORAL at 08:29

## 2021-06-26 RX ADMIN — HYDROXYZINE PAMOATE 25 MG: 25 CAPSULE ORAL at 20:32

## 2021-06-26 RX ADMIN — BISOPROLOL FUMARATE 20 MG: 5 TABLET ORAL at 08:30

## 2021-06-27 ENCOUNTER — APPOINTMENT (OUTPATIENT)
Dept: CT IMAGING | Facility: HOSPITAL | Age: 61
End: 2021-06-27

## 2021-06-27 LAB
ALBUMIN SERPL-MCNC: 2.1 G/DL (ref 3.5–5.2)
ALBUMIN/GLOB SERPL: 0.6 G/DL
ALP SERPL-CCNC: 168 U/L (ref 39–117)
ALT SERPL W P-5'-P-CCNC: 111 U/L (ref 1–33)
ANION GAP SERPL CALCULATED.3IONS-SCNC: 7.6 MMOL/L (ref 5–15)
ARTERIAL PATENCY WRIST A: POSITIVE
AST SERPL-CCNC: 47 U/L (ref 1–32)
BASE EXCESS BLDA CALC-SCNC: 0.7 MMOL/L (ref -2–2)
BDY SITE: ABNORMAL
BILIRUB SERPL-MCNC: 0.8 MG/DL (ref 0–1.2)
BUN SERPL-MCNC: 19 MG/DL (ref 8–23)
BUN/CREAT SERPL: 54.3 (ref 7–25)
CALCIUM SPEC-SCNC: 7.9 MG/DL (ref 8.6–10.5)
CHLORIDE SERPL-SCNC: 108 MMOL/L (ref 98–107)
CO2 SERPL-SCNC: 25.4 MMOL/L (ref 22–29)
COHGB MFR BLD: 1.3 % (ref 0–1.5)
CREAT SERPL-MCNC: 0.35 MG/DL (ref 0.57–1)
DEPRECATED RDW RBC AUTO: 74.6 FL (ref 37–54)
ERYTHROCYTE [DISTWIDTH] IN BLOOD BY AUTOMATED COUNT: 25.2 % (ref 12.3–15.4)
FHHB: 17.8 % (ref 0–5)
GAS FLOW AIRWAY: 3 LPM
GFR SERPL CREATININE-BSD FRML MDRD: >150 ML/MIN/1.73
GLOBULIN UR ELPH-MCNC: 3.8 GM/DL
GLUCOSE BLDC GLUCOMTR-MCNC: 108 MG/DL (ref 70–130)
GLUCOSE BLDC GLUCOMTR-MCNC: 190 MG/DL (ref 70–130)
GLUCOSE BLDC GLUCOMTR-MCNC: 254 MG/DL (ref 70–130)
GLUCOSE BLDC GLUCOMTR-MCNC: 96 MG/DL (ref 70–130)
GLUCOSE SERPL-MCNC: 151 MG/DL (ref 65–99)
HCO3 BLDA-SCNC: 22.4 MMOL/L (ref 22–26)
HCT VFR BLD AUTO: 35 % (ref 34–46.6)
HGB BLD-MCNC: 10.6 G/DL (ref 12–15.9)
HGB BLDA-MCNC: 10.9 G/DL (ref 11.7–14.6)
INHALED O2 CONCENTRATION: 32 %
MAGNESIUM SERPL-MCNC: 1.9 MG/DL (ref 1.6–2.4)
MCH RBC QN AUTO: 24.9 PG (ref 26.6–33)
MCHC RBC AUTO-ENTMCNC: 30.3 G/DL (ref 31.5–35.7)
MCV RBC AUTO: 82.4 FL (ref 79–97)
METHGB BLD QL: 0.3 % (ref 0–1.5)
MODALITY: ABNORMAL
OXYHGB MFR BLDV: 80.6 % (ref 94–99)
PCO2 BLDA: 26.9 MM HG (ref 35–45)
PH BLDA: 7.54 PH UNITS (ref 7.35–7.45)
PLATELET # BLD AUTO: 102 10*3/MM3 (ref 140–450)
PMV BLD AUTO: 10.7 FL (ref 6–12)
PO2 BLD: 132 MM[HG] (ref 0–500)
PO2 BLDA: 42.1 MM HG (ref 80–100)
POTASSIUM SERPL-SCNC: 4.1 MMOL/L (ref 3.5–5.2)
PROT SERPL-MCNC: 5.9 G/DL (ref 6–8.5)
RBC # BLD AUTO: 4.25 10*6/MM3 (ref 3.77–5.28)
SAO2 % BLDCOA: 81.9 % (ref 95–99)
SODIUM SERPL-SCNC: 141 MMOL/L (ref 136–145)
WBC # BLD AUTO: 10.31 10*3/MM3 (ref 3.4–10.8)

## 2021-06-27 PROCEDURE — 71275 CT ANGIOGRAPHY CHEST: CPT | Performed by: RADIOLOGY

## 2021-06-27 PROCEDURE — 94799 UNLISTED PULMONARY SVC/PX: CPT

## 2021-06-27 PROCEDURE — 80053 COMPREHEN METABOLIC PANEL: CPT | Performed by: INTERNAL MEDICINE

## 2021-06-27 PROCEDURE — 85027 COMPLETE CBC AUTOMATED: CPT | Performed by: INTERNAL MEDICINE

## 2021-06-27 PROCEDURE — 82805 BLOOD GASES W/O2 SATURATION: CPT | Performed by: INTERNAL MEDICINE

## 2021-06-27 PROCEDURE — 94762 N-INVAS EAR/PLS OXIMTRY CONT: CPT

## 2021-06-27 PROCEDURE — 94760 N-INVAS EAR/PLS OXIMETRY 1: CPT

## 2021-06-27 PROCEDURE — 71275 CT ANGIOGRAPHY CHEST: CPT

## 2021-06-27 PROCEDURE — 36600 WITHDRAWAL OF ARTERIAL BLOOD: CPT | Performed by: INTERNAL MEDICINE

## 2021-06-27 PROCEDURE — 82962 GLUCOSE BLOOD TEST: CPT

## 2021-06-27 PROCEDURE — 63710000001 INSULIN DETEMIR PER 5 UNITS: Performed by: INTERNAL MEDICINE

## 2021-06-27 PROCEDURE — 82375 ASSAY CARBOXYHB QUANT: CPT | Performed by: INTERNAL MEDICINE

## 2021-06-27 PROCEDURE — 83050 HGB METHEMOGLOBIN QUAN: CPT | Performed by: INTERNAL MEDICINE

## 2021-06-27 PROCEDURE — 83735 ASSAY OF MAGNESIUM: CPT | Performed by: INTERNAL MEDICINE

## 2021-06-27 PROCEDURE — 25010000002 ENOXAPARIN PER 10 MG: Performed by: PSYCHIATRY & NEUROLOGY

## 2021-06-27 PROCEDURE — 97110 THERAPEUTIC EXERCISES: CPT

## 2021-06-27 PROCEDURE — 63710000001 PREDNISONE PER 5 MG: Performed by: INTERNAL MEDICINE

## 2021-06-27 PROCEDURE — 0 IOPAMIDOL PER 1 ML: Performed by: INTERNAL MEDICINE

## 2021-06-27 RX ORDER — HYDROXYZINE PAMOATE 25 MG/1
25 CAPSULE ORAL 3 TIMES DAILY PRN
Status: DISCONTINUED | OUTPATIENT
Start: 2021-06-27 | End: 2021-06-27

## 2021-06-27 RX ORDER — ALBUTEROL SULFATE 2.5 MG/3ML
SOLUTION RESPIRATORY (INHALATION)
Status: COMPLETED
Start: 2021-06-27 | End: 2021-06-27

## 2021-06-27 RX ORDER — GABAPENTIN 100 MG/1
100 CAPSULE ORAL EVERY 8 HOURS SCHEDULED
Status: DISCONTINUED | OUTPATIENT
Start: 2021-06-27 | End: 2021-07-09

## 2021-06-27 RX ADMIN — BUDESONIDE 0.5 MG: 0.5 INHALANT ORAL at 09:40

## 2021-06-27 RX ADMIN — BUDESONIDE 0.5 MG: 0.5 INHALANT ORAL at 21:55

## 2021-06-27 RX ADMIN — LOSARTAN POTASSIUM 100 MG: 25 TABLET, FILM COATED ORAL at 09:49

## 2021-06-27 RX ADMIN — SPIRONOLACTONE 50 MG: 25 TABLET ORAL at 09:49

## 2021-06-27 RX ADMIN — OXYCODONE HYDROCHLORIDE AND ACETAMINOPHEN 1 TABLET: 5; 325 TABLET ORAL at 07:11

## 2021-06-27 RX ADMIN — PREDNISONE 10 MG: 10 TABLET ORAL at 09:49

## 2021-06-27 RX ADMIN — ARFORMOTEROL TARTRATE 15 MCG: 15 SOLUTION RESPIRATORY (INHALATION) at 09:31

## 2021-06-27 RX ADMIN — SODIUM CHLORIDE, PRESERVATIVE FREE 10 ML: 5 INJECTION INTRAVENOUS at 09:50

## 2021-06-27 RX ADMIN — INSULIN DETEMIR 20 UNITS: 100 INJECTION, SOLUTION SUBCUTANEOUS at 23:16

## 2021-06-27 RX ADMIN — ALBUTEROL SULFATE 2.5 MG: 2.5 SOLUTION RESPIRATORY (INHALATION) at 09:46

## 2021-06-27 RX ADMIN — GABAPENTIN 100 MG: 100 CAPSULE ORAL at 23:20

## 2021-06-27 RX ADMIN — BUMETANIDE 1 MG: 1 TABLET ORAL at 09:49

## 2021-06-27 RX ADMIN — ENOXAPARIN SODIUM 40 MG: 40 INJECTION SUBCUTANEOUS at 09:49

## 2021-06-27 RX ADMIN — BISOPROLOL FUMARATE 20 MG: 5 TABLET ORAL at 09:48

## 2021-06-27 RX ADMIN — ARFORMOTEROL TARTRATE 15 MCG: 15 SOLUTION RESPIRATORY (INHALATION) at 21:55

## 2021-06-27 RX ADMIN — GABAPENTIN 100 MG: 100 CAPSULE ORAL at 23:19

## 2021-06-27 RX ADMIN — NICOTINE 1 PATCH: 14 PATCH, EXTENDED RELEASE TRANSDERMAL at 09:47

## 2021-06-27 RX ADMIN — IOPAMIDOL 100 ML: 755 INJECTION, SOLUTION INTRAVENOUS at 17:53

## 2021-06-27 RX ADMIN — MICONAZOLE NITRATE: 2 POWDER TOPICAL at 23:21

## 2021-06-27 RX ADMIN — MICONAZOLE NITRATE: 2 POWDER TOPICAL at 09:49

## 2021-06-27 RX ADMIN — GABAPENTIN 100 MG: 100 CAPSULE ORAL at 14:49

## 2021-06-27 RX ADMIN — SERTRALINE HYDROCHLORIDE 100 MG: 100 TABLET ORAL at 09:48

## 2021-06-28 ENCOUNTER — APPOINTMENT (OUTPATIENT)
Dept: INTERVENTIONAL RADIOLOGY/VASCULAR | Facility: HOSPITAL | Age: 61
End: 2021-06-28

## 2021-06-28 LAB
APPEARANCE CSF: ABNORMAL
APPEARANCE CSF: CLEAR
ARSENIC BLD-MCNC: 7 UG/L (ref 2–23)
COLOR CSF: ABNORMAL
COLOR CSF: COLORLESS
CRYPTOC AG CSF QL LA: NEGATIVE
CRYPTOC AG CSF QL: NEGATIVE
GLUCOSE BLDC GLUCOMTR-MCNC: 181 MG/DL (ref 70–130)
GLUCOSE BLDC GLUCOMTR-MCNC: 207 MG/DL (ref 70–130)
GLUCOSE BLDC GLUCOMTR-MCNC: 284 MG/DL (ref 70–130)
GLUCOSE BLDC GLUCOMTR-MCNC: 355 MG/DL (ref 70–130)
GLUCOSE CSF-MCNC: 118 MG/DL (ref 40–70)
LEAD BLDV-MCNC: <1 UG/DL (ref 0–4)
MERCURY BLD-MCNC: <1 UG/L (ref 0–14.9)
NEUTROPHILS NFR CSF MICRO: 100 % (ref 0–5)
NUC CELL # CSF MANUAL: 2.5 /MM3 (ref 0–5)
NUC CELL # CSF MANUAL: 52.5 /MM3 (ref 0–5)
PROT CSF-MCNC: 29.7 MG/DL (ref 15–45)
RBC # CSF MANUAL: 150 /MM3
RBC # CSF MANUAL: ABNORMAL /MM3
TUBE # CSF: 1
TUBE # CSF: 4
XANTHOCHROMIA FLD QL: ABNORMAL
XANTHOCHROMIA FLD QL: NORMAL

## 2021-06-28 PROCEDURE — 86617 LYME DISEASE ANTIBODY: CPT | Performed by: PSYCHIATRY & NEUROLOGY

## 2021-06-28 PROCEDURE — 89051 BODY FLUID CELL COUNT: CPT | Performed by: PSYCHIATRY & NEUROLOGY

## 2021-06-28 PROCEDURE — 87899 AGENT NOS ASSAY W/OPTIC: CPT | Performed by: PSYCHIATRY & NEUROLOGY

## 2021-06-28 PROCEDURE — 25010000002 IMMUNE GLOBULIN (HUMAN) 30 GM/300ML SOLUTION: Performed by: PSYCHIATRY & NEUROLOGY

## 2021-06-28 PROCEDURE — 25010000002 DIPHENHYDRAMINE PER 50 MG: Performed by: PSYCHIATRY & NEUROLOGY

## 2021-06-28 PROCEDURE — 25010000002 FUROSEMIDE PER 20 MG: Performed by: INTERNAL MEDICINE

## 2021-06-28 PROCEDURE — 25010000002 HYDRALAZINE PER 20 MG: Performed by: INTERNAL MEDICINE

## 2021-06-28 PROCEDURE — 63710000001 PREDNISONE PER 5 MG: Performed by: INTERNAL MEDICINE

## 2021-06-28 PROCEDURE — 87327 CRYPTOCOCCUS NEOFORM AG IA: CPT | Performed by: PSYCHIATRY & NEUROLOGY

## 2021-06-28 PROCEDURE — 82438 ASSAY OTHER FLUID CHLORIDES: CPT | Performed by: PSYCHIATRY & NEUROLOGY

## 2021-06-28 PROCEDURE — 83916 OLIGOCLONAL BANDS: CPT | Performed by: PSYCHIATRY & NEUROLOGY

## 2021-06-28 PROCEDURE — 87476 LYME DIS DNA AMP PROBE: CPT | Performed by: PSYCHIATRY & NEUROLOGY

## 2021-06-28 PROCEDURE — 89050 BODY FLUID CELL COUNT: CPT | Performed by: PSYCHIATRY & NEUROLOGY

## 2021-06-28 PROCEDURE — 87075 CULTR BACTERIA EXCEPT BLOOD: CPT | Performed by: PSYCHIATRY & NEUROLOGY

## 2021-06-28 PROCEDURE — 87206 SMEAR FLUORESCENT/ACID STAI: CPT | Performed by: PSYCHIATRY & NEUROLOGY

## 2021-06-28 PROCEDURE — 84157 ASSAY OF PROTEIN OTHER: CPT | Performed by: PSYCHIATRY & NEUROLOGY

## 2021-06-28 PROCEDURE — 87070 CULTURE OTHR SPECIMN AEROBIC: CPT | Performed by: PSYCHIATRY & NEUROLOGY

## 2021-06-28 PROCEDURE — 82784 ASSAY IGA/IGD/IGG/IGM EACH: CPT | Performed by: PSYCHIATRY & NEUROLOGY

## 2021-06-28 PROCEDURE — 25010000002 DEXAMETHASONE PER 1 MG: Performed by: PSYCHIATRY & NEUROLOGY

## 2021-06-28 PROCEDURE — 94799 UNLISTED PULMONARY SVC/PX: CPT

## 2021-06-28 PROCEDURE — 82962 GLUCOSE BLOOD TEST: CPT

## 2021-06-28 PROCEDURE — 86780 TREPONEMA PALLIDUM: CPT | Performed by: PSYCHIATRY & NEUROLOGY

## 2021-06-28 PROCEDURE — 62328 DX LMBR SPI PNXR W/FLUOR/CT: CPT | Performed by: RADIOLOGY

## 2021-06-28 PROCEDURE — 63710000001 INSULIN REGULAR HUMAN PER 5 UNITS: Performed by: INTERNAL MEDICINE

## 2021-06-28 PROCEDURE — 86592 SYPHILIS TEST NON-TREP QUAL: CPT | Performed by: PSYCHIATRY & NEUROLOGY

## 2021-06-28 PROCEDURE — 87116 MYCOBACTERIA CULTURE: CPT | Performed by: PSYCHIATRY & NEUROLOGY

## 2021-06-28 PROCEDURE — 97110 THERAPEUTIC EXERCISES: CPT

## 2021-06-28 PROCEDURE — 82945 GLUCOSE OTHER FLUID: CPT | Performed by: PSYCHIATRY & NEUROLOGY

## 2021-06-28 PROCEDURE — 82040 ASSAY OF SERUM ALBUMIN: CPT | Performed by: PSYCHIATRY & NEUROLOGY

## 2021-06-28 PROCEDURE — 87015 SPECIMEN INFECT AGNT CONCNTJ: CPT | Performed by: PSYCHIATRY & NEUROLOGY

## 2021-06-28 PROCEDURE — 87102 FUNGUS ISOLATION CULTURE: CPT | Performed by: PSYCHIATRY & NEUROLOGY

## 2021-06-28 PROCEDURE — 63710000001 INSULIN DETEMIR PER 5 UNITS: Performed by: INTERNAL MEDICINE

## 2021-06-28 PROCEDURE — 30233S1 TRANSFUSION OF NONAUTOLOGOUS GLOBULIN INTO PERIPHERAL VEIN, PERCUTANEOUS APPROACH: ICD-10-PCS | Performed by: PSYCHIATRY & NEUROLOGY

## 2021-06-28 PROCEDURE — 86618 LYME DISEASE ANTIBODY: CPT | Performed by: PSYCHIATRY & NEUROLOGY

## 2021-06-28 PROCEDURE — 82042 OTHER SOURCE ALBUMIN QUAN EA: CPT | Performed by: PSYCHIATRY & NEUROLOGY

## 2021-06-28 PROCEDURE — 87205 SMEAR GRAM STAIN: CPT | Performed by: PSYCHIATRY & NEUROLOGY

## 2021-06-28 RX ORDER — LIDOCAINE HYDROCHLORIDE 20 MG/ML
INJECTION, SOLUTION INFILTRATION; PERINEURAL
Status: COMPLETED
Start: 2021-06-28 | End: 2021-06-28

## 2021-06-28 RX ORDER — DIPHENHYDRAMINE HYDROCHLORIDE 50 MG/ML
25 INJECTION INTRAMUSCULAR; INTRAVENOUS DAILY
Status: DISCONTINUED | OUTPATIENT
Start: 2021-06-29 | End: 2021-06-29

## 2021-06-28 RX ORDER — HYDRALAZINE HYDROCHLORIDE 20 MG/ML
20 INJECTION INTRAMUSCULAR; INTRAVENOUS EVERY 4 HOURS PRN
Status: DISCONTINUED | OUTPATIENT
Start: 2021-06-28 | End: 2021-07-22 | Stop reason: HOSPADM

## 2021-06-28 RX ORDER — DEXAMETHASONE SODIUM PHOSPHATE 10 MG/ML
10 INJECTION INTRAMUSCULAR; INTRAVENOUS ONCE
Status: COMPLETED | OUTPATIENT
Start: 2021-06-28 | End: 2021-06-28

## 2021-06-28 RX ORDER — METOLAZONE 5 MG/1
20 TABLET ORAL DAILY
Status: COMPLETED | OUTPATIENT
Start: 2021-06-28 | End: 2021-06-29

## 2021-06-28 RX ORDER — DEXAMETHASONE SODIUM PHOSPHATE 10 MG/ML
10 INJECTION INTRAMUSCULAR; INTRAVENOUS DAILY
Status: DISCONTINUED | OUTPATIENT
Start: 2021-06-29 | End: 2021-06-29

## 2021-06-28 RX ORDER — FUROSEMIDE 10 MG/ML
40 INJECTION INTRAMUSCULAR; INTRAVENOUS EVERY 6 HOURS
Status: DISCONTINUED | OUTPATIENT
Start: 2021-06-28 | End: 2021-06-29

## 2021-06-28 RX ORDER — OXYCODONE HYDROCHLORIDE AND ACETAMINOPHEN 5; 325 MG/1; MG/1
1 TABLET ORAL EVERY 8 HOURS PRN
Status: DISCONTINUED | OUTPATIENT
Start: 2021-06-28 | End: 2021-07-04

## 2021-06-28 RX ORDER — BISOPROLOL FUMARATE 5 MG/1
10 TABLET, FILM COATED ORAL
Status: DISCONTINUED | OUTPATIENT
Start: 2021-06-28 | End: 2021-06-29

## 2021-06-28 RX ORDER — DIPHENHYDRAMINE HYDROCHLORIDE 50 MG/ML
25 INJECTION INTRAMUSCULAR; INTRAVENOUS ONCE
Status: COMPLETED | OUTPATIENT
Start: 2021-06-28 | End: 2021-06-28

## 2021-06-28 RX ADMIN — METOLAZONE 20 MG: 5 TABLET ORAL at 13:42

## 2021-06-28 RX ADMIN — FUROSEMIDE 40 MG: 10 INJECTION INTRAMUSCULAR; INTRAVENOUS at 20:27

## 2021-06-28 RX ADMIN — DEXAMETHASONE SODIUM PHOSPHATE 10 MG: 10 INJECTION INTRAMUSCULAR; INTRAVENOUS at 17:11

## 2021-06-28 RX ADMIN — GABAPENTIN 100 MG: 100 CAPSULE ORAL at 14:49

## 2021-06-28 RX ADMIN — HYDRALAZINE HYDROCHLORIDE 20 MG: 20 INJECTION INTRAMUSCULAR; INTRAVENOUS at 21:22

## 2021-06-28 RX ADMIN — INSULIN DETEMIR 20 UNITS: 100 INJECTION, SOLUTION SUBCUTANEOUS at 20:27

## 2021-06-28 RX ADMIN — SERTRALINE HYDROCHLORIDE 100 MG: 100 TABLET ORAL at 09:23

## 2021-06-28 RX ADMIN — SPIRONOLACTONE 50 MG: 25 TABLET ORAL at 09:22

## 2021-06-28 RX ADMIN — BUDESONIDE 0.5 MG: 0.5 INHALANT ORAL at 21:12

## 2021-06-28 RX ADMIN — INSULIN HUMAN 6 UNITS: 100 INJECTION, SOLUTION PARENTERAL at 09:23

## 2021-06-28 RX ADMIN — NICOTINE 1 PATCH: 14 PATCH, EXTENDED RELEASE TRANSDERMAL at 09:25

## 2021-06-28 RX ADMIN — LIDOCAINE HYDROCHLORIDE 10 ML: 20 INJECTION, SOLUTION INFILTRATION; PERINEURAL at 11:20

## 2021-06-28 RX ADMIN — MICONAZOLE NITRATE: 2 POWDER TOPICAL at 09:55

## 2021-06-28 RX ADMIN — PREDNISONE 10 MG: 10 TABLET ORAL at 09:22

## 2021-06-28 RX ADMIN — BISOPROLOL FUMARATE 10 MG: 5 TABLET ORAL at 09:55

## 2021-06-28 RX ADMIN — INSULIN HUMAN 6 UNITS: 100 INJECTION, SOLUTION PARENTERAL at 17:48

## 2021-06-28 RX ADMIN — MICONAZOLE NITRATE: 2 POWDER TOPICAL at 20:28

## 2021-06-28 RX ADMIN — INSULIN HUMAN 6 UNITS: 100 INJECTION, SOLUTION PARENTERAL at 13:42

## 2021-06-28 RX ADMIN — SODIUM BICARBONATE 1 ML: 84 INJECTION, SOLUTION INTRAVENOUS at 11:20

## 2021-06-28 RX ADMIN — BUDESONIDE 0.5 MG: 0.5 INHALANT ORAL at 08:32

## 2021-06-28 RX ADMIN — LOSARTAN POTASSIUM 100 MG: 25 TABLET, FILM COATED ORAL at 09:23

## 2021-06-28 RX ADMIN — OXYCODONE HYDROCHLORIDE AND ACETAMINOPHEN 1 TABLET: 5; 325 TABLET ORAL at 13:42

## 2021-06-28 RX ADMIN — FUROSEMIDE 40 MG: 10 INJECTION INTRAMUSCULAR; INTRAVENOUS at 14:49

## 2021-06-28 RX ADMIN — IMMUNE GLOBULIN INFUSION (HUMAN) 30 G: 100 INJECTION, SOLUTION INTRAVENOUS; SUBCUTANEOUS at 14:36

## 2021-06-28 RX ADMIN — ARFORMOTEROL TARTRATE 15 MCG: 15 SOLUTION RESPIRATORY (INHALATION) at 08:32

## 2021-06-28 RX ADMIN — FUROSEMIDE 40 MG: 10 INJECTION INTRAMUSCULAR; INTRAVENOUS at 09:55

## 2021-06-28 RX ADMIN — DIPHENHYDRAMINE HYDROCHLORIDE 25 MG: 50 INJECTION INTRAMUSCULAR; INTRAVENOUS at 17:12

## 2021-06-28 RX ADMIN — ARFORMOTEROL TARTRATE 15 MCG: 15 SOLUTION RESPIRATORY (INHALATION) at 21:12

## 2021-06-29 ENCOUNTER — APPOINTMENT (OUTPATIENT)
Dept: GENERAL RADIOLOGY | Facility: HOSPITAL | Age: 61
End: 2021-06-29

## 2021-06-29 PROBLEM — I10 ESSENTIAL HYPERTENSION: Status: ACTIVE | Noted: 2021-06-29

## 2021-06-29 PROBLEM — E10.9 DM (DIABETES MELLITUS), TYPE 1: Status: ACTIVE | Noted: 2021-06-29

## 2021-06-29 PROBLEM — I49.8 VENTRICULAR BIGEMINY: Status: ACTIVE | Noted: 2021-06-29

## 2021-06-29 PROBLEM — I50.23 ACUTE ON CHRONIC SYSTOLIC CHF (CONGESTIVE HEART FAILURE): Status: ACTIVE | Noted: 2021-06-29

## 2021-06-29 LAB
ALB CSF/SERPL: 5 {RATIO} (ref 0–8)
ALB CSF/SERPL: 5 {RATIO} (ref 0–8)
ALBUMIN CSF-MCNC: 10 MG/DL (ref 8–37)
ALBUMIN CSF-MCNC: 11 MG/DL (ref 8–37)
ALBUMIN CSF-MCNC: 11 MG/DL (ref 8–37)
ALBUMIN SERPL-MCNC: 2.2 G/DL (ref 3.8–4.8)
ALBUMIN SERPL-MCNC: 2.4 G/DL (ref 3.5–5.2)
ALBUMIN SERPL-MCNC: 2.4 G/DL (ref 3.8–4.8)
ALBUMIN SERPL-MCNC: 2.4 G/DL (ref 3.8–4.8)
ALBUMIN/GLOB SERPL: 0.5 G/DL
ALP SERPL-CCNC: 181 U/L (ref 39–117)
ALT SERPL W P-5'-P-CCNC: 83 U/L (ref 1–33)
ANION GAP SERPL CALCULATED.3IONS-SCNC: 10.7 MMOL/L (ref 5–15)
ARTERIAL PATENCY WRIST A: POSITIVE
AST SERPL-CCNC: 26 U/L (ref 1–32)
BASE EXCESS BLDA CALC-SCNC: 4.2 MMOL/L (ref -2–2)
BASOPHILS # BLD AUTO: 0.01 10*3/MM3 (ref 0–0.2)
BASOPHILS NFR BLD AUTO: 0.2 % (ref 0–1.5)
BDY SITE: ABNORMAL
BILIRUB SERPL-MCNC: 0.6 MG/DL (ref 0–1.2)
BUN SERPL-MCNC: 23 MG/DL (ref 8–23)
BUN/CREAT SERPL: 52.3 (ref 7–25)
CALCIUM SPEC-SCNC: 9.1 MG/DL (ref 8.6–10.5)
CHLORIDE CSF-SCNC: 132 MMOL/L (ref 118–131)
CHLORIDE SERPL-SCNC: 102 MMOL/L (ref 98–107)
CK MB SERPL-CCNC: 2 NG/ML
CK SERPL-CCNC: 39 U/L (ref 20–180)
CO2 SERPL-SCNC: 24.3 MMOL/L (ref 22–29)
COHGB MFR BLD: 0.7 % (ref 0–1.5)
CREAT SERPL-MCNC: 0.44 MG/DL (ref 0.57–1)
DEPRECATED RDW RBC AUTO: 69 FL (ref 37–54)
EOSINOPHIL # BLD AUTO: 0 10*3/MM3 (ref 0–0.4)
EOSINOPHIL NFR BLD AUTO: 0 % (ref 0.3–6.2)
ERYTHROCYTE [DISTWIDTH] IN BLOOD BY AUTOMATED COUNT: 24.8 % (ref 12.3–15.4)
FHHB: 4.1 % (ref 0–5)
GAS FLOW AIRWAY: 4.5 LPM
GFR SERPL CREATININE-BSD FRML MDRD: 145 ML/MIN/1.73
GLOBULIN UR ELPH-MCNC: 4.9 GM/DL
GLUCOSE BLDC GLUCOMTR-MCNC: 324 MG/DL (ref 70–130)
GLUCOSE BLDC GLUCOMTR-MCNC: 349 MG/DL (ref 70–130)
GLUCOSE BLDC GLUCOMTR-MCNC: 356 MG/DL (ref 70–130)
GLUCOSE BLDC GLUCOMTR-MCNC: 378 MG/DL (ref 70–130)
GLUCOSE BLDC GLUCOMTR-MCNC: 434 MG/DL (ref 70–130)
GLUCOSE BLDC GLUCOMTR-MCNC: 473 MG/DL (ref 70–130)
GLUCOSE BLDC GLUCOMTR-MCNC: >500 MG/DL (ref 70–130)
GLUCOSE SERPL-MCNC: 372 MG/DL (ref 65–99)
HCO3 BLDA-SCNC: 27.1 MMOL/L (ref 22–26)
HCT VFR BLD AUTO: 36.4 % (ref 34–46.6)
HGB BLD-MCNC: 11.3 G/DL (ref 12–15.9)
HGB BLDA-MCNC: 11.8 G/DL (ref 11.7–14.6)
IGG CSF-MCNC: 2.2 MG/DL (ref 0–6.7)
IGG SERPL-MCNC: 900 MG/DL (ref 586–1602)
IGG SERPL-MCNC: 908 MG/DL (ref 586–1602)
IGG SERPL-MCNC: 919 MG/DL (ref 586–1602)
IGG SYNTH RATE SER+CSF CALC-MRATE: -1.6 MG/DAY
IGG SYNTH RATE SER+CSF CALC-MRATE: -1.8 MG/DAY
IGG SYNTH RATE SER+CSF CALC-MRATE: -1.9 MG/DAY
IGG/ALB CLEAR SER+CSF-RTO: 0.5 (ref 0–0.7)
IGG/ALB CLEAR SER+CSF-RTO: 0.5 (ref 0–0.7)
IGG/ALB CSF: 0.2 {RATIO} (ref 0–0.25)
IGG/ALB CSF: 0.2 {RATIO} (ref 0–0.25)
IMM GRANULOCYTES # BLD AUTO: 0.04 10*3/MM3 (ref 0–0.05)
IMM GRANULOCYTES NFR BLD AUTO: 0.6 % (ref 0–0.5)
INR PPP: 1.01 (ref 2–3)
LYMPHOCYTES # BLD AUTO: 0.45 10*3/MM3 (ref 0.7–3.1)
LYMPHOCYTES NFR BLD AUTO: 6.9 % (ref 19.6–45.3)
MAGNESIUM SERPL-MCNC: 1.9 MG/DL (ref 1.6–2.4)
MCH RBC QN AUTO: 24.4 PG (ref 26.6–33)
MCHC RBC AUTO-ENTMCNC: 31 G/DL (ref 31.5–35.7)
MCV RBC AUTO: 78.6 FL (ref 79–97)
METHGB BLD QL: 0.3 % (ref 0–1.5)
MODALITY: ABNORMAL
MONOCYTES # BLD AUTO: 0.1 10*3/MM3 (ref 0.1–0.9)
MONOCYTES NFR BLD AUTO: 1.5 % (ref 5–12)
NEUTROPHILS NFR BLD AUTO: 5.88 10*3/MM3 (ref 1.7–7)
NEUTROPHILS NFR BLD AUTO: 90.8 % (ref 42.7–76)
NRBC BLD AUTO-RTO: 0 /100 WBC (ref 0–0.2)
NT-PROBNP SERPL-MCNC: ABNORMAL PG/ML (ref 0–900)
OLIGOCLONAL BANDS.IT SER+CSF QL: ABNORMAL
OLIGOCLONAL BANDS.IT SER+CSF QL: NORMAL
OXYHGB MFR BLDV: 94.9 % (ref 94–99)
PCO2 BLDA: 34.6 MM HG (ref 35–45)
PH BLDA: 7.51 PH UNITS (ref 7.35–7.45)
PHOSPHATE SERPL-MCNC: 3.8 MG/DL (ref 2.5–4.5)
PLATELET # BLD AUTO: 119 10*3/MM3 (ref 140–450)
PMV BLD AUTO: 10.4 FL (ref 6–12)
PO2 BLDA: 84.9 MM HG (ref 80–100)
POTASSIUM SERPL-SCNC: 3.8 MMOL/L (ref 3.5–5.2)
PROT SERPL-MCNC: 7.3 G/DL (ref 6–8.5)
PROTHROMBIN TIME: 11.1 SECONDS (ref 9.4–12)
RBC # BLD AUTO: 4.63 10*6/MM3 (ref 3.77–5.28)
SAO2 % BLDCOA: 95.9 % (ref 95–99)
SODIUM SERPL-SCNC: 137 MMOL/L (ref 136–145)
TROPONIN T SERPL-MCNC: <0.01 NG/ML (ref 0–0.03)
TROPONIN T SERPL-MCNC: <0.01 NG/ML (ref 0–0.03)
WBC # BLD AUTO: 6.48 10*3/MM3 (ref 3.4–10.8)

## 2021-06-29 PROCEDURE — 85610 PROTHROMBIN TIME: CPT | Performed by: INTERNAL MEDICINE

## 2021-06-29 PROCEDURE — 63710000001 INSULIN DETEMIR PER 5 UNITS: Performed by: INTERNAL MEDICINE

## 2021-06-29 PROCEDURE — 83735 ASSAY OF MAGNESIUM: CPT | Performed by: INTERNAL MEDICINE

## 2021-06-29 PROCEDURE — 82962 GLUCOSE BLOOD TEST: CPT

## 2021-06-29 PROCEDURE — 71045 X-RAY EXAM CHEST 1 VIEW: CPT

## 2021-06-29 PROCEDURE — 63710000001 PREDNISONE PER 5 MG: Performed by: INTERNAL MEDICINE

## 2021-06-29 PROCEDURE — 85025 COMPLETE CBC W/AUTO DIFF WBC: CPT | Performed by: INTERNAL MEDICINE

## 2021-06-29 PROCEDURE — 93010 ELECTROCARDIOGRAM REPORT: CPT | Performed by: INTERNAL MEDICINE

## 2021-06-29 PROCEDURE — 25010000002 FUROSEMIDE PER 20 MG: Performed by: INTERNAL MEDICINE

## 2021-06-29 PROCEDURE — 82553 CREATINE MB FRACTION: CPT | Performed by: INTERNAL MEDICINE

## 2021-06-29 PROCEDURE — 94799 UNLISTED PULMONARY SVC/PX: CPT

## 2021-06-29 PROCEDURE — 84484 ASSAY OF TROPONIN QUANT: CPT | Performed by: INTERNAL MEDICINE

## 2021-06-29 PROCEDURE — 25010000002 HYDRALAZINE PER 20 MG: Performed by: INTERNAL MEDICINE

## 2021-06-29 PROCEDURE — 25010000002 DIPHENHYDRAMINE PER 50 MG: Performed by: PSYCHIATRY & NEUROLOGY

## 2021-06-29 PROCEDURE — 83050 HGB METHEMOGLOBIN QUAN: CPT | Performed by: INTERNAL MEDICINE

## 2021-06-29 PROCEDURE — 80053 COMPREHEN METABOLIC PANEL: CPT | Performed by: INTERNAL MEDICINE

## 2021-06-29 PROCEDURE — 83880 ASSAY OF NATRIURETIC PEPTIDE: CPT | Performed by: INTERNAL MEDICINE

## 2021-06-29 PROCEDURE — 93005 ELECTROCARDIOGRAM TRACING: CPT | Performed by: INTERNAL MEDICINE

## 2021-06-29 PROCEDURE — 82805 BLOOD GASES W/O2 SATURATION: CPT | Performed by: INTERNAL MEDICINE

## 2021-06-29 PROCEDURE — 82375 ASSAY CARBOXYHB QUANT: CPT | Performed by: INTERNAL MEDICINE

## 2021-06-29 PROCEDURE — 82550 ASSAY OF CK (CPK): CPT | Performed by: INTERNAL MEDICINE

## 2021-06-29 PROCEDURE — 25010000002 DEXAMETHASONE PER 1 MG: Performed by: PSYCHIATRY & NEUROLOGY

## 2021-06-29 PROCEDURE — 84100 ASSAY OF PHOSPHORUS: CPT | Performed by: INTERNAL MEDICINE

## 2021-06-29 PROCEDURE — 63710000001 INSULIN REGULAR HUMAN PER 5 UNITS: Performed by: INTERNAL MEDICINE

## 2021-06-29 PROCEDURE — 87040 BLOOD CULTURE FOR BACTERIA: CPT | Performed by: INTERNAL MEDICINE

## 2021-06-29 PROCEDURE — 94660 CPAP INITIATION&MGMT: CPT

## 2021-06-29 PROCEDURE — 25010000002 ENOXAPARIN PER 10 MG: Performed by: PSYCHIATRY & NEUROLOGY

## 2021-06-29 PROCEDURE — 36600 WITHDRAWAL OF ARTERIAL BLOOD: CPT | Performed by: INTERNAL MEDICINE

## 2021-06-29 RX ORDER — METOLAZONE 5 MG/1
20 TABLET ORAL ONCE
Status: DISCONTINUED | OUTPATIENT
Start: 2021-06-29 | End: 2021-06-29 | Stop reason: SDUPTHER

## 2021-06-29 RX ORDER — DEXTROSE MONOHYDRATE 25 G/50ML
10-50 INJECTION, SOLUTION INTRAVENOUS
Status: DISCONTINUED | OUTPATIENT
Start: 2021-06-29 | End: 2021-07-22 | Stop reason: HOSPADM

## 2021-06-29 RX ORDER — BISOPROLOL FUMARATE 5 MG/1
5 TABLET, FILM COATED ORAL
Status: DISCONTINUED | OUTPATIENT
Start: 2021-06-30 | End: 2021-07-05

## 2021-06-29 RX ORDER — SODIUM CHLORIDE 0.9 % (FLUSH) 0.9 %
3 SYRINGE (ML) INJECTION EVERY 12 HOURS SCHEDULED
Status: DISCONTINUED | OUTPATIENT
Start: 2021-06-29 | End: 2021-07-22 | Stop reason: HOSPADM

## 2021-06-29 RX ORDER — DEXTROSE MONOHYDRATE 100 MG/ML
50-250 INJECTION, SOLUTION INTRAVENOUS
Status: DISCONTINUED | OUTPATIENT
Start: 2021-06-29 | End: 2021-07-05

## 2021-06-29 RX ORDER — FUROSEMIDE 10 MG/ML
20 INJECTION INTRAMUSCULAR; INTRAVENOUS ONCE
Status: COMPLETED | OUTPATIENT
Start: 2021-06-29 | End: 2021-06-29

## 2021-06-29 RX ORDER — FUROSEMIDE 10 MG/ML
80 INJECTION INTRAMUSCULAR; INTRAVENOUS EVERY 12 HOURS
Status: DISCONTINUED | OUTPATIENT
Start: 2021-06-29 | End: 2021-07-01

## 2021-06-29 RX ORDER — SODIUM CHLORIDE 0.9 % (FLUSH) 0.9 %
10 SYRINGE (ML) INJECTION AS NEEDED
Status: DISCONTINUED | OUTPATIENT
Start: 2021-06-29 | End: 2021-07-22 | Stop reason: HOSPADM

## 2021-06-29 RX ORDER — NICOTINE POLACRILEX 4 MG
15 LOZENGE BUCCAL
Status: DISCONTINUED | OUTPATIENT
Start: 2021-06-29 | End: 2021-07-22 | Stop reason: HOSPADM

## 2021-06-29 RX ADMIN — OXYCODONE HYDROCHLORIDE AND ACETAMINOPHEN 1 TABLET: 5; 325 TABLET ORAL at 22:00

## 2021-06-29 RX ADMIN — ARFORMOTEROL TARTRATE 15 MCG: 15 SOLUTION RESPIRATORY (INHALATION) at 20:55

## 2021-06-29 RX ADMIN — ENOXAPARIN SODIUM 40 MG: 40 INJECTION SUBCUTANEOUS at 08:49

## 2021-06-29 RX ADMIN — FUROSEMIDE 40 MG: 10 INJECTION INTRAMUSCULAR; INTRAVENOUS at 03:56

## 2021-06-29 RX ADMIN — METOLAZONE 20 MG: 5 TABLET ORAL at 08:51

## 2021-06-29 RX ADMIN — FUROSEMIDE 80 MG: 10 INJECTION, SOLUTION INTRAMUSCULAR; INTRAVENOUS at 14:23

## 2021-06-29 RX ADMIN — GABAPENTIN 100 MG: 100 CAPSULE ORAL at 14:23

## 2021-06-29 RX ADMIN — PREDNISONE 10 MG: 10 TABLET ORAL at 08:53

## 2021-06-29 RX ADMIN — DEXAMETHASONE SODIUM PHOSPHATE 10 MG: 10 INJECTION INTRAMUSCULAR; INTRAVENOUS at 08:54

## 2021-06-29 RX ADMIN — SERTRALINE HYDROCHLORIDE 100 MG: 100 TABLET ORAL at 08:52

## 2021-06-29 RX ADMIN — INSULIN HUMAN 50 UNITS: 100 INJECTION, SOLUTION PARENTERAL at 23:34

## 2021-06-29 RX ADMIN — GABAPENTIN 100 MG: 100 CAPSULE ORAL at 22:00

## 2021-06-29 RX ADMIN — SERTRALINE HYDROCHLORIDE 100 MG: 100 TABLET ORAL at 08:53

## 2021-06-29 RX ADMIN — BUDESONIDE 0.5 MG: 0.5 INHALANT ORAL at 09:12

## 2021-06-29 RX ADMIN — OXYCODONE HYDROCHLORIDE AND ACETAMINOPHEN 1 TABLET: 5; 325 TABLET ORAL at 14:23

## 2021-06-29 RX ADMIN — INSULIN HUMAN 6 UNITS: 100 INJECTION, SOLUTION PARENTERAL at 09:22

## 2021-06-29 RX ADMIN — INSULIN HUMAN 6 UNITS: 100 INJECTION, SOLUTION PARENTERAL at 17:21

## 2021-06-29 RX ADMIN — DIPHENHYDRAMINE HYDROCHLORIDE 25 MG: 50 INJECTION INTRAMUSCULAR; INTRAVENOUS at 08:54

## 2021-06-29 RX ADMIN — SPIRONOLACTONE 50 MG: 25 TABLET ORAL at 08:52

## 2021-06-29 RX ADMIN — LOSARTAN POTASSIUM 100 MG: 25 TABLET, FILM COATED ORAL at 08:52

## 2021-06-29 RX ADMIN — INSULIN DETEMIR 35 UNITS: 100 INJECTION, SOLUTION SUBCUTANEOUS at 20:50

## 2021-06-29 RX ADMIN — ARFORMOTEROL TARTRATE 15 MCG: 15 SOLUTION RESPIRATORY (INHALATION) at 09:11

## 2021-06-29 RX ADMIN — MICONAZOLE NITRATE: 2 POWDER TOPICAL at 20:48

## 2021-06-29 RX ADMIN — OXYCODONE HYDROCHLORIDE AND ACETAMINOPHEN 1 TABLET: 5; 325 TABLET ORAL at 03:21

## 2021-06-29 RX ADMIN — BUDESONIDE 0.5 MG: 0.5 INHALANT ORAL at 20:55

## 2021-06-29 RX ADMIN — SODIUM CHLORIDE, PRESERVATIVE FREE 3 ML: 5 INJECTION INTRAVENOUS at 23:38

## 2021-06-29 RX ADMIN — MICONAZOLE NITRATE: 2 POWDER TOPICAL at 09:05

## 2021-06-29 RX ADMIN — INSULIN HUMAN 6 UNITS: 100 INJECTION, SOLUTION PARENTERAL at 12:05

## 2021-06-29 RX ADMIN — INSULIN HUMAN 25 UNITS: 100 INJECTION, SOLUTION PARENTERAL at 17:24

## 2021-06-29 RX ADMIN — HYDRALAZINE HYDROCHLORIDE 20 MG: 20 INJECTION INTRAMUSCULAR; INTRAVENOUS at 02:06

## 2021-06-29 RX ADMIN — FUROSEMIDE 20 MG: 10 INJECTION INTRAMUSCULAR; INTRAVENOUS at 01:21

## 2021-06-30 ENCOUNTER — APPOINTMENT (OUTPATIENT)
Dept: GENERAL RADIOLOGY | Facility: HOSPITAL | Age: 61
End: 2021-06-30

## 2021-06-30 LAB
ALBUMIN SERPL-MCNC: 2.6 G/DL (ref 3.5–5.2)
ALBUMIN/GLOB SERPL: 0.5 G/DL
ALP SERPL-CCNC: 190 U/L (ref 39–117)
ALT SERPL W P-5'-P-CCNC: 66 U/L (ref 1–33)
ANION GAP SERPL CALCULATED.3IONS-SCNC: 10.2 MMOL/L (ref 5–15)
ARTERIAL PATENCY WRIST A: ABNORMAL
AST SERPL-CCNC: 21 U/L (ref 1–32)
B BURGDOR AB CSF IA-ACNC: 0.14 LIV
B BURGDOR AB CSF IA-ACNC: 0.14 LIV
BASE EXCESS BLDA CALC-SCNC: 3.3 MMOL/L (ref -2–2)
BDY SITE: ABNORMAL
BILIRUB SERPL-MCNC: 0.3 MG/DL (ref 0–1.2)
BUN SERPL-MCNC: 38 MG/DL (ref 8–23)
BUN/CREAT SERPL: 71.7 (ref 7–25)
CA-I BLDA-SCNC: 1.27 MMOL/L (ref 1.13–1.32)
CALCIUM SPEC-SCNC: 9.3 MG/DL (ref 8.6–10.5)
CHLORIDE BLDA-SCNC: 104 MMOL/L (ref 98–106)
CHLORIDE SERPL-SCNC: 100 MMOL/L (ref 98–107)
CO2 SERPL-SCNC: 25.8 MMOL/L (ref 22–29)
COHGB MFR BLD: 0.1 % (ref 0–1.5)
CREAT SERPL-MCNC: 0.53 MG/DL (ref 0.57–1)
FHHB: 3.9 % (ref 0–5)
GAS FLOW AIRWAY: 4 LPM
GFR SERPL CREATININE-BSD FRML MDRD: 117 ML/MIN/1.73
GLOBULIN UR ELPH-MCNC: 4.9 GM/DL
GLUCOSE BLDA-MCNC: 249 MMOL/L (ref 65–99)
GLUCOSE BLDC GLUCOMTR-MCNC: 104 MG/DL (ref 70–130)
GLUCOSE BLDC GLUCOMTR-MCNC: 135 MG/DL (ref 70–130)
GLUCOSE BLDC GLUCOMTR-MCNC: 158 MG/DL (ref 70–130)
GLUCOSE BLDC GLUCOMTR-MCNC: 175 MG/DL (ref 70–130)
GLUCOSE BLDC GLUCOMTR-MCNC: 184 MG/DL (ref 70–130)
GLUCOSE BLDC GLUCOMTR-MCNC: 186 MG/DL (ref 70–130)
GLUCOSE BLDC GLUCOMTR-MCNC: 196 MG/DL (ref 70–130)
GLUCOSE BLDC GLUCOMTR-MCNC: 202 MG/DL (ref 70–130)
GLUCOSE BLDC GLUCOMTR-MCNC: 214 MG/DL (ref 70–130)
GLUCOSE BLDC GLUCOMTR-MCNC: 226 MG/DL (ref 70–130)
GLUCOSE BLDC GLUCOMTR-MCNC: 240 MG/DL (ref 70–130)
GLUCOSE BLDC GLUCOMTR-MCNC: 250 MG/DL (ref 70–130)
GLUCOSE BLDC GLUCOMTR-MCNC: 266 MG/DL (ref 70–130)
GLUCOSE BLDC GLUCOMTR-MCNC: 348 MG/DL (ref 70–130)
GLUCOSE BLDC GLUCOMTR-MCNC: 370 MG/DL (ref 70–130)
GLUCOSE BLDC GLUCOMTR-MCNC: 420 MG/DL (ref 70–130)
GLUCOSE BLDC GLUCOMTR-MCNC: 457 MG/DL (ref 70–130)
GLUCOSE BLDC GLUCOMTR-MCNC: 67 MG/DL (ref 70–130)
GLUCOSE BLDC GLUCOMTR-MCNC: 68 MG/DL (ref 70–130)
GLUCOSE SERPL-MCNC: 407 MG/DL (ref 65–99)
HCO3 BLDA-SCNC: 27 MMOL/L (ref 22–26)
HGB BLDA-MCNC: 10.6 G/DL (ref 11.7–14.6)
INHALED O2 CONCENTRATION: 36 %
LACTATE BLDA-SCNC: 1.75 MMOL/L (ref 0.5–2)
METHGB BLD QL: 0.3 % (ref 0–1.5)
MODALITY: ABNORMAL
NOTE: ABNORMAL
OXYHGB MFR BLDV: 95.7 % (ref 94–99)
PCO2 BLDA: 37.7 MM HG (ref 35–45)
PH BLDA: 7.47 PH UNITS (ref 7.35–7.45)
PO2 BLD: 256 MM[HG] (ref 0–500)
PO2 BLDA: 92.3 MM HG (ref 80–100)
POTASSIUM BLDA-SCNC: 2.96 MMOL/L (ref 3.5–5)
POTASSIUM SERPL-SCNC: 3.8 MMOL/L (ref 3.5–5.2)
PROT SERPL-MCNC: 7.5 G/DL (ref 6–8.5)
QT INTERVAL: 466 MS
REAGIN AB CSF QL: NON REACTIVE
SAO2 % BLDCOA: 96.1 % (ref 95–99)
SODIUM BLDA-SCNC: 138.5 MMOL/L (ref 136–146)
SODIUM SERPL-SCNC: 136 MMOL/L (ref 136–145)

## 2021-06-30 PROCEDURE — 82962 GLUCOSE BLOOD TEST: CPT

## 2021-06-30 PROCEDURE — 25010000002 FUROSEMIDE PER 20 MG: Performed by: INTERNAL MEDICINE

## 2021-06-30 PROCEDURE — 94799 UNLISTED PULMONARY SVC/PX: CPT

## 2021-06-30 PROCEDURE — 36600 WITHDRAWAL OF ARTERIAL BLOOD: CPT | Performed by: INTERNAL MEDICINE

## 2021-06-30 PROCEDURE — 97110 THERAPEUTIC EXERCISES: CPT

## 2021-06-30 PROCEDURE — 25010000002 METHYLPREDNISOLONE PER 125 MG: Performed by: INTERNAL MEDICINE

## 2021-06-30 PROCEDURE — 82805 BLOOD GASES W/O2 SATURATION: CPT | Performed by: INTERNAL MEDICINE

## 2021-06-30 PROCEDURE — 71045 X-RAY EXAM CHEST 1 VIEW: CPT

## 2021-06-30 PROCEDURE — 80053 COMPREHEN METABOLIC PANEL: CPT | Performed by: INTERNAL MEDICINE

## 2021-06-30 PROCEDURE — 83050 HGB METHEMOGLOBIN QUAN: CPT | Performed by: INTERNAL MEDICINE

## 2021-06-30 PROCEDURE — 82375 ASSAY CARBOXYHB QUANT: CPT | Performed by: INTERNAL MEDICINE

## 2021-06-30 PROCEDURE — 25010000002 ENOXAPARIN PER 10 MG: Performed by: INTERNAL MEDICINE

## 2021-06-30 PROCEDURE — 94760 N-INVAS EAR/PLS OXIMETRY 1: CPT

## 2021-06-30 RX ORDER — POTASSIUM CHLORIDE 1.5 G/1.77G
20 POWDER, FOR SOLUTION ORAL DAILY
Status: DISCONTINUED | OUTPATIENT
Start: 2021-06-30 | End: 2021-07-02

## 2021-06-30 RX ADMIN — OXYCODONE HYDROCHLORIDE AND ACETAMINOPHEN 1 TABLET: 5; 325 TABLET ORAL at 10:17

## 2021-06-30 RX ADMIN — BISOPROLOL FUMARATE 5 MG: 5 TABLET ORAL at 00:00

## 2021-06-30 RX ADMIN — OXYCODONE HYDROCHLORIDE AND ACETAMINOPHEN 1 TABLET: 5; 325 TABLET ORAL at 18:20

## 2021-06-30 RX ADMIN — SODIUM CHLORIDE 1000 MG: 9 INJECTION, SOLUTION INTRAVENOUS at 00:00

## 2021-06-30 RX ADMIN — ARFORMOTEROL TARTRATE 15 MCG: 15 SOLUTION RESPIRATORY (INHALATION) at 20:49

## 2021-06-30 RX ADMIN — BUDESONIDE 0.5 MG: 0.5 INHALANT ORAL at 08:22

## 2021-06-30 RX ADMIN — FUROSEMIDE 80 MG: 10 INJECTION, SOLUTION INTRAMUSCULAR; INTRAVENOUS at 15:37

## 2021-06-30 RX ADMIN — LOSARTAN POTASSIUM 100 MG: 25 TABLET, FILM COATED ORAL at 09:40

## 2021-06-30 RX ADMIN — INSULIN HUMAN 5.6 UNITS/HR: 1 INJECTION, SOLUTION INTRAVENOUS at 10:00

## 2021-06-30 RX ADMIN — NICOTINE 1 PATCH: 14 PATCH, EXTENDED RELEASE TRANSDERMAL at 09:41

## 2021-06-30 RX ADMIN — SODIUM CHLORIDE, PRESERVATIVE FREE 3 ML: 5 INJECTION INTRAVENOUS at 09:51

## 2021-06-30 RX ADMIN — GABAPENTIN 100 MG: 100 CAPSULE ORAL at 05:42

## 2021-06-30 RX ADMIN — MICONAZOLE NITRATE: 2 POWDER TOPICAL at 09:51

## 2021-06-30 RX ADMIN — GABAPENTIN 100 MG: 100 CAPSULE ORAL at 13:47

## 2021-06-30 RX ADMIN — FUROSEMIDE 80 MG: 10 INJECTION, SOLUTION INTRAMUSCULAR; INTRAVENOUS at 03:41

## 2021-06-30 RX ADMIN — ARFORMOTEROL TARTRATE 15 MCG: 15 SOLUTION RESPIRATORY (INHALATION) at 08:23

## 2021-06-30 RX ADMIN — BUDESONIDE 0.5 MG: 0.5 INHALANT ORAL at 20:49

## 2021-06-30 RX ADMIN — SERTRALINE HYDROCHLORIDE 100 MG: 100 TABLET ORAL at 09:40

## 2021-06-30 RX ADMIN — INSULIN HUMAN 8.3 UNITS/HR: 1 INJECTION, SOLUTION INTRAVENOUS at 02:29

## 2021-06-30 RX ADMIN — SPIRONOLACTONE 50 MG: 25 TABLET ORAL at 09:40

## 2021-06-30 RX ADMIN — ENOXAPARIN SODIUM 40 MG: 40 INJECTION SUBCUTANEOUS at 09:40

## 2021-07-01 ENCOUNTER — APPOINTMENT (OUTPATIENT)
Dept: GENERAL RADIOLOGY | Facility: HOSPITAL | Age: 61
End: 2021-07-01

## 2021-07-01 LAB
ALBUMIN SERPL-MCNC: 2.2 G/DL (ref 3.5–5.2)
ALBUMIN/GLOB SERPL: 0.6 G/DL
ALP SERPL-CCNC: 141 U/L (ref 39–117)
ALT SERPL W P-5'-P-CCNC: 49 U/L (ref 1–33)
ANION GAP SERPL CALCULATED.3IONS-SCNC: 8.4 MMOL/L (ref 5–15)
ARTERIAL PATENCY WRIST A: ABNORMAL
AST SERPL-CCNC: 28 U/L (ref 1–32)
BACTERIA SPEC AEROBE CULT: NORMAL
BASE EXCESS BLDA CALC-SCNC: 6.7 MMOL/L (ref -2–2)
BDY SITE: ABNORMAL
BILIRUB SERPL-MCNC: 0.2 MG/DL (ref 0–1.2)
BUN SERPL-MCNC: 45 MG/DL (ref 8–23)
BUN/CREAT SERPL: 104.7 (ref 7–25)
CA-I BLDA-SCNC: 1.2 MMOL/L (ref 1.13–1.32)
CALCIUM SPEC-SCNC: 8.8 MG/DL (ref 8.6–10.5)
CHLORIDE BLDA-SCNC: 105 MMOL/L (ref 98–106)
CHLORIDE SERPL-SCNC: 104 MMOL/L (ref 98–107)
CO2 SERPL-SCNC: 28.6 MMOL/L (ref 22–29)
COHGB MFR BLD: 0.3 % (ref 0–1.5)
CREAT SERPL-MCNC: 0.43 MG/DL (ref 0.57–1)
FHHB: 2.4 % (ref 0–5)
GAS FLOW AIRWAY: 2 LPM
GFR SERPL CREATININE-BSD FRML MDRD: 149 ML/MIN/1.73
GLOBULIN UR ELPH-MCNC: 4 GM/DL
GLUCOSE BLDA-MCNC: 96 MMOL/L (ref 65–99)
GLUCOSE BLDC GLUCOMTR-MCNC: 101 MG/DL (ref 70–130)
GLUCOSE BLDC GLUCOMTR-MCNC: 104 MG/DL (ref 70–130)
GLUCOSE BLDC GLUCOMTR-MCNC: 105 MG/DL (ref 70–130)
GLUCOSE BLDC GLUCOMTR-MCNC: 113 MG/DL (ref 70–130)
GLUCOSE BLDC GLUCOMTR-MCNC: 115 MG/DL (ref 70–130)
GLUCOSE BLDC GLUCOMTR-MCNC: 123 MG/DL (ref 70–130)
GLUCOSE BLDC GLUCOMTR-MCNC: 133 MG/DL (ref 70–130)
GLUCOSE BLDC GLUCOMTR-MCNC: 138 MG/DL (ref 70–130)
GLUCOSE BLDC GLUCOMTR-MCNC: 139 MG/DL (ref 70–130)
GLUCOSE BLDC GLUCOMTR-MCNC: 146 MG/DL (ref 70–130)
GLUCOSE BLDC GLUCOMTR-MCNC: 150 MG/DL (ref 70–130)
GLUCOSE BLDC GLUCOMTR-MCNC: 154 MG/DL (ref 70–130)
GLUCOSE BLDC GLUCOMTR-MCNC: 160 MG/DL (ref 70–130)
GLUCOSE BLDC GLUCOMTR-MCNC: 163 MG/DL (ref 70–130)
GLUCOSE BLDC GLUCOMTR-MCNC: 185 MG/DL (ref 70–130)
GLUCOSE BLDC GLUCOMTR-MCNC: 196 MG/DL (ref 70–130)
GLUCOSE BLDC GLUCOMTR-MCNC: 212 MG/DL (ref 70–130)
GLUCOSE BLDC GLUCOMTR-MCNC: 233 MG/DL (ref 70–130)
GLUCOSE BLDC GLUCOMTR-MCNC: 238 MG/DL (ref 70–130)
GLUCOSE BLDC GLUCOMTR-MCNC: 85 MG/DL (ref 70–130)
GLUCOSE BLDC GLUCOMTR-MCNC: 87 MG/DL (ref 70–130)
GLUCOSE BLDC GLUCOMTR-MCNC: 88 MG/DL (ref 70–130)
GLUCOSE BLDC GLUCOMTR-MCNC: 95 MG/DL (ref 70–130)
GLUCOSE SERPL-MCNC: 88 MG/DL (ref 65–99)
GRAM STN SPEC: NORMAL
GRAM STN SPEC: NORMAL
HCO3 BLDA-SCNC: 30.3 MMOL/L (ref 22–26)
HGB BLDA-MCNC: 10.6 G/DL (ref 11.7–14.6)
INHALED O2 CONCENTRATION: 28 %
LACTATE BLDA-SCNC: 1.21 MMOL/L (ref 0.5–2)
METHGB BLD QL: 0 % (ref 0–1.5)
MODALITY: ABNORMAL
OXYHGB MFR BLDV: 97.3 % (ref 94–99)
PCO2 BLDA: 39.5 MM HG (ref 35–45)
PH BLDA: 7.5 PH UNITS (ref 7.35–7.45)
PO2 BLD: 428 MM[HG] (ref 0–500)
PO2 BLDA: 119.7 MM HG (ref 80–100)
POTASSIUM BLDA-SCNC: 3.4 MMOL/L (ref 3.5–5)
POTASSIUM SERPL-SCNC: 3.3 MMOL/L (ref 3.5–5.2)
PROT SERPL-MCNC: 6.2 G/DL (ref 6–8.5)
SAO2 % BLDCOA: 97.6 % (ref 95–99)
SODIUM BLDA-SCNC: 140.7 MMOL/L (ref 136–146)
SODIUM SERPL-SCNC: 141 MMOL/L (ref 136–145)
T PALLIDUM AB CSF QL IF: NON REACTIVE

## 2021-07-01 PROCEDURE — 94799 UNLISTED PULMONARY SVC/PX: CPT

## 2021-07-01 PROCEDURE — 82375 ASSAY CARBOXYHB QUANT: CPT | Performed by: INTERNAL MEDICINE

## 2021-07-01 PROCEDURE — 82962 GLUCOSE BLOOD TEST: CPT

## 2021-07-01 PROCEDURE — 83050 HGB METHEMOGLOBIN QUAN: CPT | Performed by: INTERNAL MEDICINE

## 2021-07-01 PROCEDURE — 25010000002 FUROSEMIDE PER 20 MG: Performed by: INTERNAL MEDICINE

## 2021-07-01 PROCEDURE — 36600 WITHDRAWAL OF ARTERIAL BLOOD: CPT | Performed by: INTERNAL MEDICINE

## 2021-07-01 PROCEDURE — 82805 BLOOD GASES W/O2 SATURATION: CPT | Performed by: INTERNAL MEDICINE

## 2021-07-01 PROCEDURE — 80053 COMPREHEN METABOLIC PANEL: CPT | Performed by: INTERNAL MEDICINE

## 2021-07-01 PROCEDURE — 25010000002 ENOXAPARIN PER 10 MG: Performed by: INTERNAL MEDICINE

## 2021-07-01 PROCEDURE — 25010000003 METHYLPREDNISOLONE PER 125 MG: Performed by: INTERNAL MEDICINE

## 2021-07-01 PROCEDURE — 76000 FLUOROSCOPY <1 HR PHYS/QHP: CPT

## 2021-07-01 PROCEDURE — 97110 THERAPEUTIC EXERCISES: CPT

## 2021-07-01 PROCEDURE — 94660 CPAP INITIATION&MGMT: CPT

## 2021-07-01 RX ORDER — WARFARIN SODIUM 2 MG/1
2 TABLET ORAL
Status: DISCONTINUED | OUTPATIENT
Start: 2021-07-01 | End: 2021-07-02

## 2021-07-01 RX ORDER — FUROSEMIDE 10 MG/ML
40 INJECTION INTRAMUSCULAR; INTRAVENOUS
Status: DISCONTINUED | OUTPATIENT
Start: 2021-07-01 | End: 2021-07-02

## 2021-07-01 RX ORDER — HYDROXYZINE HYDROCHLORIDE 25 MG/1
25 TABLET, FILM COATED ORAL 3 TIMES DAILY PRN
Status: DISCONTINUED | OUTPATIENT
Start: 2021-07-01 | End: 2021-07-22 | Stop reason: HOSPADM

## 2021-07-01 RX ADMIN — SODIUM CHLORIDE 1000 MG: 9 INJECTION, SOLUTION INTRAVENOUS at 16:05

## 2021-07-01 RX ADMIN — OXYCODONE HYDROCHLORIDE AND ACETAMINOPHEN 1 TABLET: 5; 325 TABLET ORAL at 16:57

## 2021-07-01 RX ADMIN — OXYCODONE HYDROCHLORIDE AND ACETAMINOPHEN 1 TABLET: 5; 325 TABLET ORAL at 02:29

## 2021-07-01 RX ADMIN — GABAPENTIN 100 MG: 100 CAPSULE ORAL at 14:08

## 2021-07-01 RX ADMIN — BUDESONIDE 0.5 MG: 0.5 INHALANT ORAL at 19:27

## 2021-07-01 RX ADMIN — ENOXAPARIN SODIUM 40 MG: 40 INJECTION SUBCUTANEOUS at 09:13

## 2021-07-01 RX ADMIN — LOSARTAN POTASSIUM 100 MG: 25 TABLET, FILM COATED ORAL at 09:12

## 2021-07-01 RX ADMIN — BUDESONIDE 0.5 MG: 0.5 INHALANT ORAL at 08:48

## 2021-07-01 RX ADMIN — FUROSEMIDE 80 MG: 10 INJECTION, SOLUTION INTRAMUSCULAR; INTRAVENOUS at 02:29

## 2021-07-01 RX ADMIN — WARFARIN SODIUM 2 MG: 2 TABLET ORAL at 12:44

## 2021-07-01 RX ADMIN — SPIRONOLACTONE 50 MG: 25 TABLET ORAL at 09:12

## 2021-07-01 RX ADMIN — FUROSEMIDE 40 MG: 10 INJECTION, SOLUTION INTRAMUSCULAR; INTRAVENOUS at 18:20

## 2021-07-01 RX ADMIN — HYDROXYZINE HYDROCHLORIDE 25 MG: 25 TABLET, FILM COATED ORAL at 12:45

## 2021-07-01 RX ADMIN — BISOPROLOL FUMARATE 5 MG: 5 TABLET ORAL at 09:12

## 2021-07-01 RX ADMIN — OXYCODONE HYDROCHLORIDE AND ACETAMINOPHEN 1 TABLET: 5; 325 TABLET ORAL at 09:35

## 2021-07-01 RX ADMIN — GABAPENTIN 100 MG: 100 CAPSULE ORAL at 21:06

## 2021-07-01 RX ADMIN — GABAPENTIN 100 MG: 100 CAPSULE ORAL at 06:08

## 2021-07-01 RX ADMIN — MICONAZOLE NITRATE: 2 POWDER TOPICAL at 21:07

## 2021-07-01 RX ADMIN — NICOTINE 1 PATCH: 14 PATCH, EXTENDED RELEASE TRANSDERMAL at 09:12

## 2021-07-01 RX ADMIN — MICONAZOLE NITRATE: 2 POWDER TOPICAL at 11:02

## 2021-07-01 RX ADMIN — ARFORMOTEROL TARTRATE 15 MCG: 15 SOLUTION RESPIRATORY (INHALATION) at 08:48

## 2021-07-01 RX ADMIN — SODIUM CHLORIDE, PRESERVATIVE FREE 3 ML: 5 INJECTION INTRAVENOUS at 09:13

## 2021-07-01 RX ADMIN — SERTRALINE HYDROCHLORIDE 100 MG: 100 TABLET ORAL at 09:11

## 2021-07-01 RX ADMIN — ARFORMOTEROL TARTRATE 15 MCG: 15 SOLUTION RESPIRATORY (INHALATION) at 19:28

## 2021-07-02 LAB
ANION GAP SERPL CALCULATED.3IONS-SCNC: 9.3 MMOL/L (ref 5–15)
ARTERIAL PATENCY WRIST A: ABNORMAL
B BURGDOR DNA SPEC QL NAA+PROBE: NEGATIVE
B BURGDOR IGG PATRN CSF IB-IMP: NEGATIVE
B BURGDOR IGM PATRN CSF IB-IMP: NEGATIVE
B BURGDOR18KD IGG CSF QL IB: NORMAL
B BURGDOR23KD IGG CSF QL IB: NORMAL
B BURGDOR23KD IGM CSF QL IB: NORMAL
B BURGDOR28KD IGG CSF QL IB: NORMAL
B BURGDOR30KD IGG CSF QL IB: NORMAL
B BURGDOR39KD IGG CSF QL IB: NORMAL
B BURGDOR39KD IGM CSF QL IB: NORMAL
B BURGDOR41KD IGG CSF QL IB: NORMAL
B BURGDOR41KD IGM CSF QL IB: NORMAL
B BURGDOR45KD IGG CSF QL IB: NORMAL
B BURGDOR58KD IGG CSF QL IB: NORMAL
B BURGDOR66KD IGG CSF QL IB: NORMAL
B BURGDOR93KD IGG CSF QL IB: NORMAL
BASE EXCESS BLDA CALC-SCNC: 4.3 MMOL/L (ref -2–2)
BDY SITE: ABNORMAL
BUN SERPL-MCNC: 45 MG/DL (ref 8–23)
BUN/CREAT SERPL: 118.4 (ref 7–25)
CA-I BLDA-SCNC: 1.2 MMOL/L (ref 1.13–1.32)
CALCIUM SPEC-SCNC: 8.6 MG/DL (ref 8.6–10.5)
CHLORIDE BLDA-SCNC: 104 MMOL/L (ref 98–106)
CHLORIDE SERPL-SCNC: 103 MMOL/L (ref 98–107)
CO2 SERPL-SCNC: 27.7 MMOL/L (ref 22–29)
COHGB MFR BLD: 0.9 % (ref 0–1.5)
CREAT SERPL-MCNC: 0.38 MG/DL (ref 0.57–1)
DEPRECATED RDW RBC AUTO: 68.5 FL (ref 37–54)
ERYTHROCYTE [DISTWIDTH] IN BLOOD BY AUTOMATED COUNT: 23.8 % (ref 12.3–15.4)
FHHB: 3.9 % (ref 0–5)
GFR SERPL CREATININE-BSD FRML MDRD: >150 ML/MIN/1.73
GLUCOSE BLDA-MCNC: 125 MMOL/L (ref 65–99)
GLUCOSE BLDC GLUCOMTR-MCNC: 116 MG/DL (ref 70–130)
GLUCOSE BLDC GLUCOMTR-MCNC: 116 MG/DL (ref 70–130)
GLUCOSE BLDC GLUCOMTR-MCNC: 118 MG/DL (ref 70–130)
GLUCOSE BLDC GLUCOMTR-MCNC: 121 MG/DL (ref 70–130)
GLUCOSE BLDC GLUCOMTR-MCNC: 124 MG/DL (ref 70–130)
GLUCOSE BLDC GLUCOMTR-MCNC: 129 MG/DL (ref 70–130)
GLUCOSE BLDC GLUCOMTR-MCNC: 130 MG/DL (ref 70–130)
GLUCOSE BLDC GLUCOMTR-MCNC: 133 MG/DL (ref 70–130)
GLUCOSE BLDC GLUCOMTR-MCNC: 133 MG/DL (ref 70–130)
GLUCOSE BLDC GLUCOMTR-MCNC: 134 MG/DL (ref 70–130)
GLUCOSE BLDC GLUCOMTR-MCNC: 146 MG/DL (ref 70–130)
GLUCOSE BLDC GLUCOMTR-MCNC: 164 MG/DL (ref 70–130)
GLUCOSE BLDC GLUCOMTR-MCNC: 165 MG/DL (ref 70–130)
GLUCOSE BLDC GLUCOMTR-MCNC: 175 MG/DL (ref 70–130)
GLUCOSE BLDC GLUCOMTR-MCNC: 196 MG/DL (ref 70–130)
GLUCOSE BLDC GLUCOMTR-MCNC: 204 MG/DL (ref 70–130)
GLUCOSE BLDC GLUCOMTR-MCNC: 205 MG/DL (ref 70–130)
GLUCOSE BLDC GLUCOMTR-MCNC: 225 MG/DL (ref 70–130)
GLUCOSE BLDC GLUCOMTR-MCNC: 65 MG/DL (ref 70–130)
GLUCOSE SERPL-MCNC: 139 MG/DL (ref 65–99)
HCO3 BLDA-SCNC: 28.4 MMOL/L (ref 22–26)
HCT VFR BLD AUTO: 34.9 % (ref 34–46.6)
HGB BLD-MCNC: 10.7 G/DL (ref 12–15.9)
HGB BLDA-MCNC: 14.6 G/DL (ref 11.7–14.6)
INHALED O2 CONCENTRATION: 35 %
INR PPP: 1.09 (ref 2–3)
LACTATE BLDA-SCNC: 1.64 MMOL/L (ref 0.5–2)
MAGNESIUM SERPL-MCNC: 1.9 MG/DL (ref 1.6–2.4)
MCH RBC QN AUTO: 24.8 PG (ref 26.6–33)
MCHC RBC AUTO-ENTMCNC: 30.7 G/DL (ref 31.5–35.7)
MCV RBC AUTO: 81 FL (ref 79–97)
METHGB BLD QL: 0.3 % (ref 0–1.5)
MODALITY: ABNORMAL
NOTE: ABNORMAL
OXYHGB MFR BLDV: 94.9 % (ref 94–99)
PCO2 BLDA: 40.8 MM HG (ref 35–45)
PH BLDA: 7.46 PH UNITS (ref 7.35–7.45)
PLATELET # BLD AUTO: 138 10*3/MM3 (ref 140–450)
PMV BLD AUTO: 11.4 FL (ref 6–12)
PO2 BLD: 255 MM[HG] (ref 0–500)
PO2 BLDA: 89.1 MM HG (ref 80–100)
POTASSIUM BLDA-SCNC: 3.5 MMOL/L (ref 3.5–5)
POTASSIUM SERPL-SCNC: 3.5 MMOL/L (ref 3.5–5.2)
PROTHROMBIN TIME: 11.7 SECONDS (ref 9.4–12)
RBC # BLD AUTO: 4.31 10*6/MM3 (ref 3.77–5.28)
SAO2 % BLDCOA: 96.1 % (ref 95–99)
SODIUM BLDA-SCNC: 141.1 MMOL/L (ref 136–146)
SODIUM SERPL-SCNC: 140 MMOL/L (ref 136–145)
URATE SERPL-MCNC: 3.5 MG/DL (ref 2.4–5.7)
WBC # BLD AUTO: 6.8 10*3/MM3 (ref 3.4–10.8)

## 2021-07-02 PROCEDURE — 25010000003 METHYLPREDNISOLONE PER 125 MG: Performed by: INTERNAL MEDICINE

## 2021-07-02 PROCEDURE — 25010000002 FUROSEMIDE PER 20 MG: Performed by: INTERNAL MEDICINE

## 2021-07-02 PROCEDURE — 94799 UNLISTED PULMONARY SVC/PX: CPT

## 2021-07-02 PROCEDURE — 25010000002 ENOXAPARIN PER 10 MG: Performed by: INTERNAL MEDICINE

## 2021-07-02 PROCEDURE — 82962 GLUCOSE BLOOD TEST: CPT

## 2021-07-02 PROCEDURE — 85610 PROTHROMBIN TIME: CPT | Performed by: INTERNAL MEDICINE

## 2021-07-02 PROCEDURE — 94660 CPAP INITIATION&MGMT: CPT

## 2021-07-02 PROCEDURE — 82805 BLOOD GASES W/O2 SATURATION: CPT | Performed by: INTERNAL MEDICINE

## 2021-07-02 PROCEDURE — 63710000001 INSULIN DETEMIR PER 5 UNITS: Performed by: INTERNAL MEDICINE

## 2021-07-02 PROCEDURE — 85027 COMPLETE CBC AUTOMATED: CPT | Performed by: INTERNAL MEDICINE

## 2021-07-02 PROCEDURE — 80048 BASIC METABOLIC PNL TOTAL CA: CPT | Performed by: INTERNAL MEDICINE

## 2021-07-02 PROCEDURE — 83735 ASSAY OF MAGNESIUM: CPT | Performed by: INTERNAL MEDICINE

## 2021-07-02 PROCEDURE — 36600 WITHDRAWAL OF ARTERIAL BLOOD: CPT | Performed by: INTERNAL MEDICINE

## 2021-07-02 PROCEDURE — 84550 ASSAY OF BLOOD/URIC ACID: CPT | Performed by: INTERNAL MEDICINE

## 2021-07-02 PROCEDURE — 63710000001 PREDNISONE PER 5 MG: Performed by: INTERNAL MEDICINE

## 2021-07-02 PROCEDURE — 83050 HGB METHEMOGLOBIN QUAN: CPT | Performed by: INTERNAL MEDICINE

## 2021-07-02 PROCEDURE — 82375 ASSAY CARBOXYHB QUANT: CPT | Performed by: INTERNAL MEDICINE

## 2021-07-02 RX ORDER — POTASSIUM CHLORIDE 1.5 G/1.77G
40 POWDER, FOR SOLUTION ORAL DAILY
Status: DISCONTINUED | OUTPATIENT
Start: 2021-07-03 | End: 2021-07-22 | Stop reason: HOSPADM

## 2021-07-02 RX ORDER — WARFARIN SODIUM 2.5 MG/1
2.5 TABLET ORAL
Status: DISCONTINUED | OUTPATIENT
Start: 2021-07-02 | End: 2021-07-07

## 2021-07-02 RX ORDER — FUROSEMIDE 40 MG/1
40 TABLET ORAL DAILY
Status: DISCONTINUED | OUTPATIENT
Start: 2021-07-03 | End: 2021-07-05

## 2021-07-02 RX ADMIN — ARFORMOTEROL TARTRATE 15 MCG: 15 SOLUTION RESPIRATORY (INHALATION) at 21:05

## 2021-07-02 RX ADMIN — ARFORMOTEROL TARTRATE 15 MCG: 15 SOLUTION RESPIRATORY (INHALATION) at 08:30

## 2021-07-02 RX ADMIN — BUDESONIDE 0.5 MG: 0.5 INHALANT ORAL at 21:05

## 2021-07-02 RX ADMIN — SODIUM CHLORIDE 1000 MG: 9 INJECTION, SOLUTION INTRAVENOUS at 10:50

## 2021-07-02 RX ADMIN — POTASSIUM CHLORIDE 20 MEQ: 1.5 POWDER, FOR SOLUTION ORAL at 08:52

## 2021-07-02 RX ADMIN — OXYCODONE HYDROCHLORIDE AND ACETAMINOPHEN 1 TABLET: 5; 325 TABLET ORAL at 22:26

## 2021-07-02 RX ADMIN — BUDESONIDE 0.5 MG: 0.5 INHALANT ORAL at 08:30

## 2021-07-02 RX ADMIN — GABAPENTIN 100 MG: 100 CAPSULE ORAL at 06:02

## 2021-07-02 RX ADMIN — OXYCODONE HYDROCHLORIDE AND ACETAMINOPHEN 1 TABLET: 5; 325 TABLET ORAL at 13:59

## 2021-07-02 RX ADMIN — WARFARIN SODIUM 2.5 MG: 2.5 TABLET ORAL at 18:12

## 2021-07-02 RX ADMIN — GABAPENTIN 100 MG: 100 CAPSULE ORAL at 22:26

## 2021-07-02 RX ADMIN — OXYCODONE HYDROCHLORIDE AND ACETAMINOPHEN 1 TABLET: 5; 325 TABLET ORAL at 06:02

## 2021-07-02 RX ADMIN — BISOPROLOL FUMARATE 5 MG: 5 TABLET ORAL at 08:51

## 2021-07-02 RX ADMIN — MICONAZOLE NITRATE: 2 POWDER TOPICAL at 08:53

## 2021-07-02 RX ADMIN — SODIUM CHLORIDE, PRESERVATIVE FREE 3 ML: 5 INJECTION INTRAVENOUS at 08:54

## 2021-07-02 RX ADMIN — FUROSEMIDE 40 MG: 10 INJECTION, SOLUTION INTRAMUSCULAR; INTRAVENOUS at 18:11

## 2021-07-02 RX ADMIN — ENOXAPARIN SODIUM 40 MG: 40 INJECTION SUBCUTANEOUS at 08:52

## 2021-07-02 RX ADMIN — FUROSEMIDE 40 MG: 10 INJECTION, SOLUTION INTRAMUSCULAR; INTRAVENOUS at 08:52

## 2021-07-02 RX ADMIN — LOSARTAN POTASSIUM 100 MG: 25 TABLET, FILM COATED ORAL at 08:52

## 2021-07-02 RX ADMIN — GABAPENTIN 100 MG: 100 CAPSULE ORAL at 13:59

## 2021-07-02 RX ADMIN — INSULIN DETEMIR 25 UNITS: 100 INJECTION, SOLUTION SUBCUTANEOUS at 23:33

## 2021-07-02 RX ADMIN — SPIRONOLACTONE 50 MG: 25 TABLET ORAL at 08:51

## 2021-07-02 RX ADMIN — SERTRALINE HYDROCHLORIDE 100 MG: 100 TABLET ORAL at 08:52

## 2021-07-02 RX ADMIN — PREDNISONE 60 MG: 50 TABLET ORAL at 12:50

## 2021-07-02 RX ADMIN — MICONAZOLE NITRATE: 2 POWDER TOPICAL at 22:26

## 2021-07-02 RX ADMIN — NICOTINE 1 PATCH: 14 PATCH, EXTENDED RELEASE TRANSDERMAL at 08:54

## 2021-07-03 LAB
ARTERIAL PATENCY WRIST A: POSITIVE
BASE EXCESS BLDA CALC-SCNC: 5.3 MMOL/L (ref -2–2)
BDY SITE: ABNORMAL
CA-I BLDA-SCNC: 1.17 MMOL/L (ref 1.13–1.32)
CHLORIDE BLDA-SCNC: 107 MMOL/L (ref 98–106)
COHGB MFR BLD: 0.5 % (ref 0–1.5)
FHHB: 3.7 % (ref 0–5)
GAS FLOW AIRWAY: 4 LPM
GLUCOSE BLDA-MCNC: 195 MMOL/L (ref 65–99)
GLUCOSE BLDC GLUCOMTR-MCNC: 126 MG/DL (ref 70–130)
GLUCOSE BLDC GLUCOMTR-MCNC: 137 MG/DL (ref 70–130)
GLUCOSE BLDC GLUCOMTR-MCNC: 148 MG/DL (ref 70–130)
GLUCOSE BLDC GLUCOMTR-MCNC: 165 MG/DL (ref 70–130)
GLUCOSE BLDC GLUCOMTR-MCNC: 188 MG/DL (ref 70–130)
GLUCOSE BLDC GLUCOMTR-MCNC: 189 MG/DL (ref 70–130)
GLUCOSE BLDC GLUCOMTR-MCNC: 197 MG/DL (ref 70–130)
GLUCOSE BLDC GLUCOMTR-MCNC: 202 MG/DL (ref 70–130)
GLUCOSE BLDC GLUCOMTR-MCNC: 250 MG/DL (ref 70–130)
GLUCOSE BLDC GLUCOMTR-MCNC: 261 MG/DL (ref 70–130)
GLUCOSE BLDC GLUCOMTR-MCNC: 289 MG/DL (ref 70–130)
GLUCOSE BLDC GLUCOMTR-MCNC: 302 MG/DL (ref 70–130)
HCO3 BLDA-SCNC: 29.3 MMOL/L (ref 22–26)
HGB BLDA-MCNC: 11.8 G/DL (ref 11.7–14.6)
INHALED O2 CONCENTRATION: 36 %
INR PPP: 1.19 (ref 2–3)
LACTATE BLDA-SCNC: 2.61 MMOL/L (ref 0.5–2)
METHGB BLD QL: 0.3 % (ref 0–1.5)
MODALITY: ABNORMAL
NOTE: ABNORMAL
OXYHGB MFR BLDV: 95.5 % (ref 94–99)
PCO2 BLDA: 40.8 MM HG (ref 35–45)
PH BLDA: 7.47 PH UNITS (ref 7.35–7.45)
PO2 BLD: 257 MM[HG] (ref 0–500)
PO2 BLDA: 92.6 MM HG (ref 80–100)
POTASSIUM BLDA-SCNC: 3.32 MMOL/L (ref 3.5–5)
PROTHROMBIN TIME: 12.7 SECONDS (ref 9.4–12)
SAO2 % BLDCOA: 96.3 % (ref 95–99)
SODIUM BLDA-SCNC: 141.1 MMOL/L (ref 136–146)

## 2021-07-03 PROCEDURE — 25010000002 ENOXAPARIN PER 10 MG: Performed by: INTERNAL MEDICINE

## 2021-07-03 PROCEDURE — 99233 SBSQ HOSP IP/OBS HIGH 50: CPT | Performed by: INTERNAL MEDICINE

## 2021-07-03 PROCEDURE — 94660 CPAP INITIATION&MGMT: CPT

## 2021-07-03 PROCEDURE — 82962 GLUCOSE BLOOD TEST: CPT

## 2021-07-03 PROCEDURE — 63710000001 PREDNISONE PER 5 MG: Performed by: INTERNAL MEDICINE

## 2021-07-03 PROCEDURE — 94799 UNLISTED PULMONARY SVC/PX: CPT

## 2021-07-03 PROCEDURE — 36600 WITHDRAWAL OF ARTERIAL BLOOD: CPT | Performed by: INTERNAL MEDICINE

## 2021-07-03 PROCEDURE — 82375 ASSAY CARBOXYHB QUANT: CPT | Performed by: INTERNAL MEDICINE

## 2021-07-03 PROCEDURE — 63710000001 INSULIN LISPRO (HUMAN) PER 5 UNITS: Performed by: INTERNAL MEDICINE

## 2021-07-03 PROCEDURE — 63710000001 INSULIN DETEMIR PER 5 UNITS: Performed by: INTERNAL MEDICINE

## 2021-07-03 PROCEDURE — 83050 HGB METHEMOGLOBIN QUAN: CPT | Performed by: INTERNAL MEDICINE

## 2021-07-03 PROCEDURE — 85610 PROTHROMBIN TIME: CPT | Performed by: INTERNAL MEDICINE

## 2021-07-03 PROCEDURE — 82805 BLOOD GASES W/O2 SATURATION: CPT | Performed by: INTERNAL MEDICINE

## 2021-07-03 PROCEDURE — 94640 AIRWAY INHALATION TREATMENT: CPT

## 2021-07-03 RX ORDER — FAMOTIDINE 20 MG/1
20 TABLET, FILM COATED ORAL
Status: DISCONTINUED | OUTPATIENT
Start: 2021-07-03 | End: 2021-07-22 | Stop reason: HOSPADM

## 2021-07-03 RX ADMIN — FUROSEMIDE 40 MG: 40 TABLET ORAL at 09:10

## 2021-07-03 RX ADMIN — ARFORMOTEROL TARTRATE 15 MCG: 15 SOLUTION RESPIRATORY (INHALATION) at 07:38

## 2021-07-03 RX ADMIN — OXYCODONE HYDROCHLORIDE AND ACETAMINOPHEN 1 TABLET: 5; 325 TABLET ORAL at 15:06

## 2021-07-03 RX ADMIN — POTASSIUM CHLORIDE 40 MEQ: 1.5 POWDER, FOR SOLUTION ORAL at 09:13

## 2021-07-03 RX ADMIN — FAMOTIDINE 20 MG: 20 TABLET ORAL at 17:06

## 2021-07-03 RX ADMIN — GABAPENTIN 100 MG: 100 CAPSULE ORAL at 06:17

## 2021-07-03 RX ADMIN — INSULIN LISPRO 7 UNITS: 100 INJECTION, SOLUTION INTRAVENOUS; SUBCUTANEOUS at 17:54

## 2021-07-03 RX ADMIN — GABAPENTIN 100 MG: 100 CAPSULE ORAL at 21:03

## 2021-07-03 RX ADMIN — ARFORMOTEROL TARTRATE 15 MCG: 15 SOLUTION RESPIRATORY (INHALATION) at 21:11

## 2021-07-03 RX ADMIN — BUDESONIDE 0.5 MG: 0.5 INHALANT ORAL at 07:39

## 2021-07-03 RX ADMIN — NICOTINE 1 PATCH: 14 PATCH, EXTENDED RELEASE TRANSDERMAL at 09:11

## 2021-07-03 RX ADMIN — WARFARIN SODIUM 2.5 MG: 2.5 TABLET ORAL at 17:06

## 2021-07-03 RX ADMIN — ENOXAPARIN SODIUM 40 MG: 40 INJECTION SUBCUTANEOUS at 09:13

## 2021-07-03 RX ADMIN — PREDNISONE 60 MG: 50 TABLET ORAL at 08:18

## 2021-07-03 RX ADMIN — BISOPROLOL FUMARATE 5 MG: 5 TABLET ORAL at 09:09

## 2021-07-03 RX ADMIN — MICONAZOLE NITRATE: 2 POWDER TOPICAL at 21:07

## 2021-07-03 RX ADMIN — SODIUM CHLORIDE, PRESERVATIVE FREE 3 ML: 5 INJECTION INTRAVENOUS at 09:12

## 2021-07-03 RX ADMIN — INSULIN DETEMIR 25 UNITS: 100 INJECTION, SOLUTION SUBCUTANEOUS at 21:04

## 2021-07-03 RX ADMIN — LOSARTAN POTASSIUM 100 MG: 25 TABLET, FILM COATED ORAL at 09:10

## 2021-07-03 RX ADMIN — INSULIN LISPRO 7 UNITS: 100 INJECTION, SOLUTION INTRAVENOUS; SUBCUTANEOUS at 13:24

## 2021-07-03 RX ADMIN — HYDROXYZINE HYDROCHLORIDE 25 MG: 25 TABLET, FILM COATED ORAL at 21:03

## 2021-07-03 RX ADMIN — INSULIN LISPRO 7 UNITS: 100 INJECTION, SOLUTION INTRAVENOUS; SUBCUTANEOUS at 09:10

## 2021-07-03 RX ADMIN — SODIUM CHLORIDE, PRESERVATIVE FREE 3 ML: 5 INJECTION INTRAVENOUS at 21:03

## 2021-07-03 RX ADMIN — FAMOTIDINE 20 MG: 20 TABLET ORAL at 08:18

## 2021-07-03 RX ADMIN — MICONAZOLE NITRATE 1 APPLICATION: 2 POWDER TOPICAL at 09:17

## 2021-07-03 RX ADMIN — INSULIN DETEMIR 25 UNITS: 100 INJECTION, SOLUTION SUBCUTANEOUS at 09:13

## 2021-07-03 RX ADMIN — GABAPENTIN 100 MG: 100 CAPSULE ORAL at 14:24

## 2021-07-03 RX ADMIN — SPIRONOLACTONE 50 MG: 25 TABLET ORAL at 09:12

## 2021-07-03 RX ADMIN — BUDESONIDE 0.5 MG: 0.5 INHALANT ORAL at 21:11

## 2021-07-03 RX ADMIN — SERTRALINE HYDROCHLORIDE 100 MG: 100 TABLET ORAL at 09:12

## 2021-07-04 ENCOUNTER — APPOINTMENT (OUTPATIENT)
Dept: CT IMAGING | Facility: HOSPITAL | Age: 61
End: 2021-07-04

## 2021-07-04 LAB
ALBUMIN SERPL-MCNC: 2.5 G/DL (ref 3.5–5.2)
ALBUMIN/GLOB SERPL: 0.7 G/DL
ALP SERPL-CCNC: 148 U/L (ref 39–117)
ALT SERPL W P-5'-P-CCNC: 41 U/L (ref 1–33)
ANION GAP SERPL CALCULATED.3IONS-SCNC: 5 MMOL/L (ref 5–15)
ARTERIAL PATENCY WRIST A: POSITIVE
AST SERPL-CCNC: 30 U/L (ref 1–32)
BACTERIA SPEC AEROBE CULT: NORMAL
BACTERIA SPEC AEROBE CULT: NORMAL
BACTERIA SPEC ANAEROBE CULT: NORMAL
BASE EXCESS BLDA CALC-SCNC: 8.1 MMOL/L (ref -2–2)
BDY SITE: ABNORMAL
BILIRUB SERPL-MCNC: 0.3 MG/DL (ref 0–1.2)
BUN SERPL-MCNC: 37 MG/DL (ref 8–23)
BUN/CREAT SERPL: 71.2 (ref 7–25)
CALCIUM SPEC-SCNC: 8.3 MG/DL (ref 8.6–10.5)
CHLORIDE SERPL-SCNC: 110 MMOL/L (ref 98–107)
CO2 SERPL-SCNC: 28 MMOL/L (ref 22–29)
COHGB MFR BLD: 0.3 % (ref 0–1.5)
CREAT SERPL-MCNC: 0.52 MG/DL (ref 0.57–1)
FHHB: 6.7 % (ref 0–5)
GAS FLOW AIRWAY: 4 LPM
GFR SERPL CREATININE-BSD FRML MDRD: 120 ML/MIN/1.73
GLOBULIN UR ELPH-MCNC: 3.6 GM/DL
GLUCOSE BLDC GLUCOMTR-MCNC: 121 MG/DL (ref 70–130)
GLUCOSE BLDC GLUCOMTR-MCNC: 138 MG/DL (ref 70–130)
GLUCOSE BLDC GLUCOMTR-MCNC: 140 MG/DL (ref 70–130)
GLUCOSE BLDC GLUCOMTR-MCNC: 183 MG/DL (ref 70–130)
GLUCOSE BLDC GLUCOMTR-MCNC: 91 MG/DL (ref 70–130)
GLUCOSE SERPL-MCNC: 64 MG/DL (ref 65–99)
HCO3 BLDA-SCNC: 32.1 MMOL/L (ref 22–26)
HGB BLDA-MCNC: 11.3 G/DL (ref 11.7–14.6)
INHALED O2 CONCENTRATION: 36 %
INR PPP: 1.53 (ref 2–3)
LACTATE BLDA-SCNC: ABNORMAL MMOL/L
METHGB BLD QL: 0.3 % (ref 0–1.5)
MODALITY: ABNORMAL
OXYHGB MFR BLDV: 92.7 % (ref 94–99)
PCO2 BLDA: 42.1 MM HG (ref 35–45)
PH BLDA: 7.5 PH UNITS (ref 7.35–7.45)
PO2 BLD: 186 MM[HG] (ref 0–500)
PO2 BLDA: 67.1 MM HG (ref 80–100)
POTASSIUM SERPL-SCNC: 3.6 MMOL/L (ref 3.5–5.2)
PROT SERPL-MCNC: 6.1 G/DL (ref 6–8.5)
PROTHROMBIN TIME: 15.9 SECONDS (ref 9.4–12)
SAO2 % BLDCOA: 93.3 % (ref 95–99)
SODIUM SERPL-SCNC: 143 MMOL/L (ref 136–145)

## 2021-07-04 PROCEDURE — 99233 SBSQ HOSP IP/OBS HIGH 50: CPT | Performed by: INTERNAL MEDICINE

## 2021-07-04 PROCEDURE — 63710000001 INSULIN LISPRO (HUMAN) PER 5 UNITS: Performed by: INTERNAL MEDICINE

## 2021-07-04 PROCEDURE — 63710000001 PREDNISONE PER 5 MG: Performed by: INTERNAL MEDICINE

## 2021-07-04 PROCEDURE — 94799 UNLISTED PULMONARY SVC/PX: CPT

## 2021-07-04 PROCEDURE — 94660 CPAP INITIATION&MGMT: CPT

## 2021-07-04 PROCEDURE — 80053 COMPREHEN METABOLIC PANEL: CPT | Performed by: INTERNAL MEDICINE

## 2021-07-04 PROCEDURE — 85610 PROTHROMBIN TIME: CPT | Performed by: INTERNAL MEDICINE

## 2021-07-04 PROCEDURE — 83050 HGB METHEMOGLOBIN QUAN: CPT | Performed by: INTERNAL MEDICINE

## 2021-07-04 PROCEDURE — 82805 BLOOD GASES W/O2 SATURATION: CPT | Performed by: INTERNAL MEDICINE

## 2021-07-04 PROCEDURE — 82375 ASSAY CARBOXYHB QUANT: CPT | Performed by: INTERNAL MEDICINE

## 2021-07-04 PROCEDURE — 51703 INSERT BLADDER CATH COMPLEX: CPT

## 2021-07-04 PROCEDURE — 82962 GLUCOSE BLOOD TEST: CPT

## 2021-07-04 PROCEDURE — 36600 WITHDRAWAL OF ARTERIAL BLOOD: CPT | Performed by: INTERNAL MEDICINE

## 2021-07-04 PROCEDURE — 63710000001 INSULIN DETEMIR PER 5 UNITS: Performed by: INTERNAL MEDICINE

## 2021-07-04 PROCEDURE — 70450 CT HEAD/BRAIN W/O DYE: CPT

## 2021-07-04 PROCEDURE — 25010000002 HYDRALAZINE PER 20 MG: Performed by: INTERNAL MEDICINE

## 2021-07-04 RX ORDER — OXYCODONE HYDROCHLORIDE AND ACETAMINOPHEN 5; 325 MG/1; MG/1
1 TABLET ORAL EVERY 6 HOURS PRN
Status: DISCONTINUED | OUTPATIENT
Start: 2021-07-04 | End: 2021-07-22 | Stop reason: HOSPADM

## 2021-07-04 RX ORDER — POLYETHYLENE GLYCOL 3350 17 G/17G
17 POWDER, FOR SOLUTION ORAL 2 TIMES DAILY
Status: DISCONTINUED | OUTPATIENT
Start: 2021-07-04 | End: 2021-07-22 | Stop reason: HOSPADM

## 2021-07-04 RX ORDER — AMOXICILLIN 250 MG
1 CAPSULE ORAL 2 TIMES DAILY
Status: DISCONTINUED | OUTPATIENT
Start: 2021-07-04 | End: 2021-07-22 | Stop reason: HOSPADM

## 2021-07-04 RX ADMIN — BUDESONIDE 0.5 MG: 0.5 INHALANT ORAL at 08:37

## 2021-07-04 RX ADMIN — WARFARIN SODIUM 2.5 MG: 2.5 TABLET ORAL at 17:51

## 2021-07-04 RX ADMIN — SPIRONOLACTONE 50 MG: 25 TABLET ORAL at 09:37

## 2021-07-04 RX ADMIN — MICONAZOLE NITRATE: 2 POWDER TOPICAL at 09:39

## 2021-07-04 RX ADMIN — FUROSEMIDE 40 MG: 40 TABLET ORAL at 09:37

## 2021-07-04 RX ADMIN — NICOTINE 1 PATCH: 14 PATCH, EXTENDED RELEASE TRANSDERMAL at 09:38

## 2021-07-04 RX ADMIN — DOCUSATE SODIUM 50MG AND SENNOSIDES 8.6MG 1 TABLET: 8.6; 5 TABLET, FILM COATED ORAL at 22:09

## 2021-07-04 RX ADMIN — POLYETHYLENE GLYCOL 3350 17 G: 17 POWDER, FOR SOLUTION ORAL at 22:09

## 2021-07-04 RX ADMIN — FAMOTIDINE 20 MG: 20 TABLET ORAL at 17:51

## 2021-07-04 RX ADMIN — PREDNISONE 60 MG: 50 TABLET ORAL at 09:37

## 2021-07-04 RX ADMIN — INSULIN DETEMIR 25 UNITS: 100 INJECTION, SOLUTION SUBCUTANEOUS at 09:36

## 2021-07-04 RX ADMIN — INSULIN DETEMIR 25 UNITS: 100 INJECTION, SOLUTION SUBCUTANEOUS at 22:07

## 2021-07-04 RX ADMIN — ARFORMOTEROL TARTRATE 15 MCG: 15 SOLUTION RESPIRATORY (INHALATION) at 08:37

## 2021-07-04 RX ADMIN — MICONAZOLE NITRATE: 2 POWDER TOPICAL at 22:10

## 2021-07-04 RX ADMIN — BISOPROLOL FUMARATE 5 MG: 5 TABLET ORAL at 09:36

## 2021-07-04 RX ADMIN — POLYETHYLENE GLYCOL 3350 17 G: 17 POWDER, FOR SOLUTION ORAL at 14:58

## 2021-07-04 RX ADMIN — BUDESONIDE 0.5 MG: 0.5 INHALANT ORAL at 20:42

## 2021-07-04 RX ADMIN — SODIUM CHLORIDE, PRESERVATIVE FREE 3 ML: 5 INJECTION INTRAVENOUS at 22:10

## 2021-07-04 RX ADMIN — GABAPENTIN 100 MG: 100 CAPSULE ORAL at 06:30

## 2021-07-04 RX ADMIN — DOCUSATE SODIUM 50MG AND SENNOSIDES 8.6MG 1 TABLET: 8.6; 5 TABLET, FILM COATED ORAL at 14:58

## 2021-07-04 RX ADMIN — HYDROXYZINE HYDROCHLORIDE 25 MG: 25 TABLET, FILM COATED ORAL at 12:18

## 2021-07-04 RX ADMIN — GABAPENTIN 100 MG: 100 CAPSULE ORAL at 14:58

## 2021-07-04 RX ADMIN — OXYCODONE HYDROCHLORIDE AND ACETAMINOPHEN 1 TABLET: 5; 325 TABLET ORAL at 07:25

## 2021-07-04 RX ADMIN — SERTRALINE HYDROCHLORIDE 100 MG: 100 TABLET ORAL at 09:37

## 2021-07-04 RX ADMIN — LOSARTAN POTASSIUM 100 MG: 25 TABLET, FILM COATED ORAL at 09:37

## 2021-07-04 RX ADMIN — INSULIN LISPRO 7 UNITS: 100 INJECTION, SOLUTION INTRAVENOUS; SUBCUTANEOUS at 17:52

## 2021-07-04 RX ADMIN — GABAPENTIN 100 MG: 100 CAPSULE ORAL at 22:09

## 2021-07-04 RX ADMIN — OXYCODONE HYDROCHLORIDE AND ACETAMINOPHEN 1 TABLET: 5; 325 TABLET ORAL at 14:58

## 2021-07-04 RX ADMIN — SODIUM CHLORIDE, PRESERVATIVE FREE 3 ML: 5 INJECTION INTRAVENOUS at 09:38

## 2021-07-04 RX ADMIN — HYDRALAZINE HYDROCHLORIDE 20 MG: 20 INJECTION INTRAMUSCULAR; INTRAVENOUS at 03:59

## 2021-07-04 RX ADMIN — POTASSIUM CHLORIDE 40 MEQ: 1.5 POWDER, FOR SOLUTION ORAL at 09:40

## 2021-07-04 RX ADMIN — ARFORMOTEROL TARTRATE 15 MCG: 15 SOLUTION RESPIRATORY (INHALATION) at 20:42

## 2021-07-04 RX ADMIN — FAMOTIDINE 20 MG: 20 TABLET ORAL at 09:36

## 2021-07-05 LAB
GLUCOSE BLDC GLUCOMTR-MCNC: 142 MG/DL (ref 70–130)
GLUCOSE BLDC GLUCOMTR-MCNC: 171 MG/DL (ref 70–130)
GLUCOSE BLDC GLUCOMTR-MCNC: 209 MG/DL (ref 70–130)
GLUCOSE BLDC GLUCOMTR-MCNC: 267 MG/DL (ref 70–130)
GLUCOSE BLDC GLUCOMTR-MCNC: 34 MG/DL (ref 70–130)
GLUCOSE BLDC GLUCOMTR-MCNC: 353 MG/DL (ref 70–130)
GLUCOSE BLDC GLUCOMTR-MCNC: 395 MG/DL (ref 70–130)
GLUCOSE BLDC GLUCOMTR-MCNC: 402 MG/DL (ref 70–130)
GLUCOSE BLDC GLUCOMTR-MCNC: 42 MG/DL (ref 70–130)
GLUCOSE BLDC GLUCOMTR-MCNC: 42 MG/DL (ref 70–130)
GLUCOSE BLDC GLUCOMTR-MCNC: 44 MG/DL (ref 70–130)
GLUCOSE BLDC GLUCOMTR-MCNC: 79 MG/DL (ref 70–130)
GLUCOSE BLDC GLUCOMTR-MCNC: 87 MG/DL (ref 70–130)
INR PPP: 1.48 (ref 2–3)
PROTHROMBIN TIME: 15.5 SECONDS (ref 9.4–12)

## 2021-07-05 PROCEDURE — 94799 UNLISTED PULMONARY SVC/PX: CPT

## 2021-07-05 PROCEDURE — 94760 N-INVAS EAR/PLS OXIMETRY 1: CPT

## 2021-07-05 PROCEDURE — 94660 CPAP INITIATION&MGMT: CPT

## 2021-07-05 PROCEDURE — 63710000001 PREDNISONE PER 5 MG: Performed by: INTERNAL MEDICINE

## 2021-07-05 PROCEDURE — 63710000001 INSULIN LISPRO (HUMAN) PER 5 UNITS: Performed by: INTERNAL MEDICINE

## 2021-07-05 PROCEDURE — 85610 PROTHROMBIN TIME: CPT | Performed by: INTERNAL MEDICINE

## 2021-07-05 PROCEDURE — 82962 GLUCOSE BLOOD TEST: CPT

## 2021-07-05 PROCEDURE — 25010000002 HYDRALAZINE PER 20 MG: Performed by: INTERNAL MEDICINE

## 2021-07-05 PROCEDURE — 63710000001 INSULIN DETEMIR PER 5 UNITS: Performed by: INTERNAL MEDICINE

## 2021-07-05 RX ORDER — DEXTROSE MONOHYDRATE 100 MG/ML
50-250 INJECTION, SOLUTION INTRAVENOUS
Status: DISCONTINUED | OUTPATIENT
Start: 2021-07-05 | End: 2021-07-22 | Stop reason: HOSPADM

## 2021-07-05 RX ORDER — FUROSEMIDE 40 MG/1
80 TABLET ORAL DAILY
Status: DISCONTINUED | OUTPATIENT
Start: 2021-07-06 | End: 2021-07-09

## 2021-07-05 RX ORDER — DEXTROSE MONOHYDRATE 100 MG/ML
50 INJECTION, SOLUTION INTRAVENOUS CONTINUOUS
Status: DISCONTINUED | OUTPATIENT
Start: 2021-07-05 | End: 2021-07-05

## 2021-07-05 RX ORDER — BISOPROLOL FUMARATE 5 MG/1
10 TABLET, FILM COATED ORAL
Status: DISCONTINUED | OUTPATIENT
Start: 2021-07-06 | End: 2021-07-08

## 2021-07-05 RX ADMIN — DEXTROSE MONOHYDRATE 50 ML/HR: 100 INJECTION, SOLUTION INTRAVENOUS at 07:50

## 2021-07-05 RX ADMIN — INSULIN LISPRO 7 UNITS: 100 INJECTION, SOLUTION INTRAVENOUS; SUBCUTANEOUS at 17:18

## 2021-07-05 RX ADMIN — POTASSIUM CHLORIDE 40 MEQ: 1.5 POWDER, FOR SOLUTION ORAL at 09:59

## 2021-07-05 RX ADMIN — BUDESONIDE 0.5 MG: 0.5 INHALANT ORAL at 20:36

## 2021-07-05 RX ADMIN — BUDESONIDE 0.5 MG: 0.5 INHALANT ORAL at 09:28

## 2021-07-05 RX ADMIN — DEXTROSE MONOHYDRATE 250 ML: 100 INJECTION, SOLUTION INTRAVENOUS at 03:30

## 2021-07-05 RX ADMIN — ARFORMOTEROL TARTRATE 15 MCG: 15 SOLUTION RESPIRATORY (INHALATION) at 09:28

## 2021-07-05 RX ADMIN — FUROSEMIDE 40 MG: 40 TABLET ORAL at 09:59

## 2021-07-05 RX ADMIN — FAMOTIDINE 20 MG: 20 TABLET ORAL at 06:41

## 2021-07-05 RX ADMIN — WARFARIN SODIUM 2.5 MG: 2.5 TABLET ORAL at 17:19

## 2021-07-05 RX ADMIN — BISOPROLOL FUMARATE 5 MG: 5 TABLET ORAL at 10:43

## 2021-07-05 RX ADMIN — INSULIN LISPRO 7 UNITS: 100 INJECTION, SOLUTION INTRAVENOUS; SUBCUTANEOUS at 12:15

## 2021-07-05 RX ADMIN — NICOTINE 1 PATCH: 14 PATCH, EXTENDED RELEASE TRANSDERMAL at 10:05

## 2021-07-05 RX ADMIN — OXYCODONE HYDROCHLORIDE AND ACETAMINOPHEN 1 TABLET: 5; 325 TABLET ORAL at 10:51

## 2021-07-05 RX ADMIN — PREDNISONE 60 MG: 50 TABLET ORAL at 09:57

## 2021-07-05 RX ADMIN — DOCUSATE SODIUM 50MG AND SENNOSIDES 8.6MG 1 TABLET: 8.6; 5 TABLET, FILM COATED ORAL at 09:58

## 2021-07-05 RX ADMIN — FAMOTIDINE 20 MG: 20 TABLET ORAL at 17:18

## 2021-07-05 RX ADMIN — ARFORMOTEROL TARTRATE 15 MCG: 15 SOLUTION RESPIRATORY (INHALATION) at 20:36

## 2021-07-05 RX ADMIN — LOSARTAN POTASSIUM 100 MG: 25 TABLET, FILM COATED ORAL at 09:58

## 2021-07-05 RX ADMIN — GABAPENTIN 100 MG: 100 CAPSULE ORAL at 06:41

## 2021-07-05 RX ADMIN — GABAPENTIN 100 MG: 100 CAPSULE ORAL at 21:39

## 2021-07-05 RX ADMIN — MICONAZOLE NITRATE: 2 POWDER TOPICAL at 21:39

## 2021-07-05 RX ADMIN — SERTRALINE HYDROCHLORIDE 100 MG: 100 TABLET ORAL at 09:59

## 2021-07-05 RX ADMIN — HYDRALAZINE HYDROCHLORIDE 20 MG: 20 INJECTION INTRAMUSCULAR; INTRAVENOUS at 16:05

## 2021-07-05 RX ADMIN — SPIRONOLACTONE 50 MG: 25 TABLET ORAL at 09:58

## 2021-07-05 RX ADMIN — POLYETHYLENE GLYCOL 3350 17 G: 17 POWDER, FOR SOLUTION ORAL at 21:39

## 2021-07-05 RX ADMIN — POLYETHYLENE GLYCOL 3350 17 G: 17 POWDER, FOR SOLUTION ORAL at 09:59

## 2021-07-05 RX ADMIN — MICONAZOLE NITRATE: 2 POWDER TOPICAL at 10:06

## 2021-07-05 RX ADMIN — DEXTROSE 15 G: 15 GEL ORAL at 07:03

## 2021-07-05 RX ADMIN — INSULIN DETEMIR 25 UNITS: 100 INJECTION, SOLUTION SUBCUTANEOUS at 21:38

## 2021-07-05 RX ADMIN — DOCUSATE SODIUM 50MG AND SENNOSIDES 8.6MG 1 TABLET: 8.6; 5 TABLET, FILM COATED ORAL at 21:39

## 2021-07-05 RX ADMIN — OXYCODONE HYDROCHLORIDE AND ACETAMINOPHEN 1 TABLET: 5; 325 TABLET ORAL at 23:56

## 2021-07-06 LAB
GLUCOSE BLDC GLUCOMTR-MCNC: 114 MG/DL (ref 70–130)
GLUCOSE BLDC GLUCOMTR-MCNC: 163 MG/DL (ref 70–130)
GLUCOSE BLDC GLUCOMTR-MCNC: 290 MG/DL (ref 70–130)
GLUCOSE BLDC GLUCOMTR-MCNC: 370 MG/DL (ref 70–130)
INR PPP: 1.68 (ref 2–3)
PROTHROMBIN TIME: 17.3 SECONDS (ref 9.4–12)

## 2021-07-06 PROCEDURE — 63710000001 INSULIN DETEMIR PER 5 UNITS: Performed by: INTERNAL MEDICINE

## 2021-07-06 PROCEDURE — 97530 THERAPEUTIC ACTIVITIES: CPT

## 2021-07-06 PROCEDURE — 25010000002 HYDRALAZINE PER 20 MG: Performed by: INTERNAL MEDICINE

## 2021-07-06 PROCEDURE — 94799 UNLISTED PULMONARY SVC/PX: CPT

## 2021-07-06 PROCEDURE — 63710000001 INSULIN LISPRO (HUMAN) PER 5 UNITS: Performed by: INTERNAL MEDICINE

## 2021-07-06 PROCEDURE — 97110 THERAPEUTIC EXERCISES: CPT

## 2021-07-06 PROCEDURE — 63710000001 PREDNISONE PER 5 MG: Performed by: INTERNAL MEDICINE

## 2021-07-06 PROCEDURE — 82962 GLUCOSE BLOOD TEST: CPT

## 2021-07-06 PROCEDURE — 85610 PROTHROMBIN TIME: CPT | Performed by: INTERNAL MEDICINE

## 2021-07-06 RX ADMIN — INSULIN DETEMIR 25 UNITS: 100 INJECTION, SOLUTION SUBCUTANEOUS at 08:52

## 2021-07-06 RX ADMIN — LOSARTAN POTASSIUM 100 MG: 25 TABLET, FILM COATED ORAL at 08:51

## 2021-07-06 RX ADMIN — PREDNISONE 60 MG: 50 TABLET ORAL at 08:51

## 2021-07-06 RX ADMIN — FAMOTIDINE 20 MG: 20 TABLET ORAL at 06:32

## 2021-07-06 RX ADMIN — HYDRALAZINE HYDROCHLORIDE 20 MG: 20 INJECTION INTRAMUSCULAR; INTRAVENOUS at 21:02

## 2021-07-06 RX ADMIN — SODIUM CHLORIDE, PRESERVATIVE FREE 3 ML: 5 INJECTION INTRAVENOUS at 21:03

## 2021-07-06 RX ADMIN — GABAPENTIN 100 MG: 100 CAPSULE ORAL at 05:55

## 2021-07-06 RX ADMIN — NICOTINE 1 PATCH: 14 PATCH, EXTENDED RELEASE TRANSDERMAL at 09:39

## 2021-07-06 RX ADMIN — GABAPENTIN 100 MG: 100 CAPSULE ORAL at 21:03

## 2021-07-06 RX ADMIN — INSULIN LISPRO 7 UNITS: 100 INJECTION, SOLUTION INTRAVENOUS; SUBCUTANEOUS at 08:52

## 2021-07-06 RX ADMIN — BISOPROLOL FUMARATE 10 MG: 5 TABLET ORAL at 08:51

## 2021-07-06 RX ADMIN — POTASSIUM CHLORIDE 40 MEQ: 1.5 POWDER, FOR SOLUTION ORAL at 08:50

## 2021-07-06 RX ADMIN — MICONAZOLE NITRATE 1 APPLICATION: 2 POWDER TOPICAL at 08:52

## 2021-07-06 RX ADMIN — POLYETHYLENE GLYCOL 3350 17 G: 17 POWDER, FOR SOLUTION ORAL at 21:02

## 2021-07-06 RX ADMIN — FUROSEMIDE 80 MG: 40 TABLET ORAL at 08:51

## 2021-07-06 RX ADMIN — ARFORMOTEROL TARTRATE 15 MCG: 15 SOLUTION RESPIRATORY (INHALATION) at 21:12

## 2021-07-06 RX ADMIN — FAMOTIDINE 20 MG: 20 TABLET ORAL at 17:30

## 2021-07-06 RX ADMIN — BUDESONIDE 0.5 MG: 0.5 INHALANT ORAL at 10:30

## 2021-07-06 RX ADMIN — SERTRALINE HYDROCHLORIDE 100 MG: 100 TABLET ORAL at 08:51

## 2021-07-06 RX ADMIN — WARFARIN SODIUM 2.5 MG: 2.5 TABLET ORAL at 17:30

## 2021-07-06 RX ADMIN — GABAPENTIN 100 MG: 100 CAPSULE ORAL at 14:24

## 2021-07-06 RX ADMIN — ARFORMOTEROL TARTRATE 15 MCG: 15 SOLUTION RESPIRATORY (INHALATION) at 10:30

## 2021-07-06 RX ADMIN — SPIRONOLACTONE 50 MG: 25 TABLET ORAL at 08:51

## 2021-07-06 RX ADMIN — MICONAZOLE NITRATE: 2 POWDER TOPICAL at 22:55

## 2021-07-06 RX ADMIN — SODIUM CHLORIDE, PRESERVATIVE FREE 3 ML: 5 INJECTION INTRAVENOUS at 08:52

## 2021-07-06 RX ADMIN — OXYCODONE HYDROCHLORIDE AND ACETAMINOPHEN 1 TABLET: 5; 325 TABLET ORAL at 14:31

## 2021-07-06 RX ADMIN — DOCUSATE SODIUM 50MG AND SENNOSIDES 8.6MG 1 TABLET: 8.6; 5 TABLET, FILM COATED ORAL at 21:02

## 2021-07-06 RX ADMIN — BUDESONIDE 0.5 MG: 0.5 INHALANT ORAL at 21:12

## 2021-07-06 RX ADMIN — INSULIN LISPRO 7 UNITS: 100 INJECTION, SOLUTION INTRAVENOUS; SUBCUTANEOUS at 17:30

## 2021-07-06 RX ADMIN — INSULIN DETEMIR 25 UNITS: 100 INJECTION, SOLUTION SUBCUTANEOUS at 21:03

## 2021-07-07 LAB
GLUCOSE BLDC GLUCOMTR-MCNC: 106 MG/DL (ref 70–130)
GLUCOSE BLDC GLUCOMTR-MCNC: 122 MG/DL (ref 70–130)
GLUCOSE BLDC GLUCOMTR-MCNC: 162 MG/DL (ref 70–130)
GLUCOSE BLDC GLUCOMTR-MCNC: 168 MG/DL (ref 70–130)
GLUCOSE BLDC GLUCOMTR-MCNC: 95 MG/DL (ref 70–130)
GLUCOSE BLDC GLUCOMTR-MCNC: 96 MG/DL (ref 70–130)
INR PPP: 2.48 (ref 2–3)
PROTHROMBIN TIME: 25 SECONDS (ref 9.4–12)
SARS-COV-2 RNA RESP QL NAA+PROBE: NOT DETECTED

## 2021-07-07 PROCEDURE — 25010000002 HYDRALAZINE PER 20 MG: Performed by: INTERNAL MEDICINE

## 2021-07-07 PROCEDURE — 63710000001 INSULIN DETEMIR PER 5 UNITS: Performed by: INTERNAL MEDICINE

## 2021-07-07 PROCEDURE — 82962 GLUCOSE BLOOD TEST: CPT

## 2021-07-07 PROCEDURE — 94799 UNLISTED PULMONARY SVC/PX: CPT

## 2021-07-07 PROCEDURE — 85610 PROTHROMBIN TIME: CPT | Performed by: INTERNAL MEDICINE

## 2021-07-07 PROCEDURE — 63710000001 PREDNISONE PER 5 MG: Performed by: INTERNAL MEDICINE

## 2021-07-07 PROCEDURE — U0003 INFECTIOUS AGENT DETECTION BY NUCLEIC ACID (DNA OR RNA); SEVERE ACUTE RESPIRATORY SYNDROME CORONAVIRUS 2 (SARS-COV-2) (CORONAVIRUS DISEASE [COVID-19]), AMPLIFIED PROBE TECHNIQUE, MAKING USE OF HIGH THROUGHPUT TECHNOLOGIES AS DESCRIBED BY CMS-2020-01-R: HCPCS | Performed by: INTERNAL MEDICINE

## 2021-07-07 PROCEDURE — 63710000001 INSULIN LISPRO (HUMAN) PER 5 UNITS: Performed by: INTERNAL MEDICINE

## 2021-07-07 PROCEDURE — 97530 THERAPEUTIC ACTIVITIES: CPT

## 2021-07-07 RX ORDER — WARFARIN SODIUM 3 MG
1.5 TABLET ORAL
Status: DISCONTINUED | OUTPATIENT
Start: 2021-07-07 | End: 2021-07-08

## 2021-07-07 RX ADMIN — INSULIN LISPRO 7 UNITS: 100 INJECTION, SOLUTION INTRAVENOUS; SUBCUTANEOUS at 17:38

## 2021-07-07 RX ADMIN — WARFARIN SODIUM 1.5 MG: 3 TABLET ORAL at 17:38

## 2021-07-07 RX ADMIN — GABAPENTIN 100 MG: 100 CAPSULE ORAL at 06:14

## 2021-07-07 RX ADMIN — INSULIN LISPRO 7 UNITS: 100 INJECTION, SOLUTION INTRAVENOUS; SUBCUTANEOUS at 09:19

## 2021-07-07 RX ADMIN — ARFORMOTEROL TARTRATE 15 MCG: 15 SOLUTION RESPIRATORY (INHALATION) at 21:27

## 2021-07-07 RX ADMIN — SODIUM CHLORIDE, PRESERVATIVE FREE: 5 INJECTION INTRAVENOUS at 09:17

## 2021-07-07 RX ADMIN — FAMOTIDINE 20 MG: 20 TABLET ORAL at 17:38

## 2021-07-07 RX ADMIN — POLYETHYLENE GLYCOL 3350 17 G: 17 POWDER, FOR SOLUTION ORAL at 09:18

## 2021-07-07 RX ADMIN — DOCUSATE SODIUM 50MG AND SENNOSIDES 8.6MG 1 TABLET: 8.6; 5 TABLET, FILM COATED ORAL at 22:09

## 2021-07-07 RX ADMIN — SERTRALINE HYDROCHLORIDE 100 MG: 100 TABLET ORAL at 09:18

## 2021-07-07 RX ADMIN — INSULIN DETEMIR 25 UNITS: 100 INJECTION, SOLUTION SUBCUTANEOUS at 22:07

## 2021-07-07 RX ADMIN — FUROSEMIDE 80 MG: 40 TABLET ORAL at 09:15

## 2021-07-07 RX ADMIN — ARFORMOTEROL TARTRATE 15 MCG: 15 SOLUTION RESPIRATORY (INHALATION) at 10:13

## 2021-07-07 RX ADMIN — OXYCODONE HYDROCHLORIDE AND ACETAMINOPHEN 1 TABLET: 5; 325 TABLET ORAL at 23:07

## 2021-07-07 RX ADMIN — SPIRONOLACTONE 50 MG: 25 TABLET ORAL at 09:14

## 2021-07-07 RX ADMIN — GABAPENTIN 100 MG: 100 CAPSULE ORAL at 22:09

## 2021-07-07 RX ADMIN — INSULIN DETEMIR 25 UNITS: 100 INJECTION, SOLUTION SUBCUTANEOUS at 09:20

## 2021-07-07 RX ADMIN — POTASSIUM CHLORIDE 40 MEQ: 1.5 POWDER, FOR SOLUTION ORAL at 09:16

## 2021-07-07 RX ADMIN — SODIUM CHLORIDE, PRESERVATIVE FREE 3 ML: 5 INJECTION INTRAVENOUS at 22:09

## 2021-07-07 RX ADMIN — DOCUSATE SODIUM 50MG AND SENNOSIDES 8.6MG 1 TABLET: 8.6; 5 TABLET, FILM COATED ORAL at 09:16

## 2021-07-07 RX ADMIN — BUDESONIDE 0.5 MG: 0.5 INHALANT ORAL at 10:13

## 2021-07-07 RX ADMIN — PREDNISONE 60 MG: 50 TABLET ORAL at 09:14

## 2021-07-07 RX ADMIN — BISOPROLOL FUMARATE 10 MG: 5 TABLET ORAL at 09:15

## 2021-07-07 RX ADMIN — GABAPENTIN 100 MG: 100 CAPSULE ORAL at 16:22

## 2021-07-07 RX ADMIN — MICONAZOLE NITRATE: 2 POWDER TOPICAL at 09:19

## 2021-07-07 RX ADMIN — OXYCODONE HYDROCHLORIDE AND ACETAMINOPHEN 1 TABLET: 5; 325 TABLET ORAL at 13:14

## 2021-07-07 RX ADMIN — LOSARTAN POTASSIUM 100 MG: 25 TABLET, FILM COATED ORAL at 09:16

## 2021-07-07 RX ADMIN — MICONAZOLE NITRATE: 2 POWDER TOPICAL at 22:10

## 2021-07-07 RX ADMIN — HYDRALAZINE HYDROCHLORIDE 20 MG: 20 INJECTION INTRAMUSCULAR; INTRAVENOUS at 16:22

## 2021-07-07 RX ADMIN — INSULIN LISPRO 7 UNITS: 100 INJECTION, SOLUTION INTRAVENOUS; SUBCUTANEOUS at 12:41

## 2021-07-07 RX ADMIN — BUDESONIDE 0.5 MG: 0.5 INHALANT ORAL at 21:27

## 2021-07-07 RX ADMIN — FAMOTIDINE 20 MG: 20 TABLET ORAL at 09:15

## 2021-07-08 ENCOUNTER — APPOINTMENT (OUTPATIENT)
Dept: GENERAL RADIOLOGY | Facility: HOSPITAL | Age: 61
End: 2021-07-08

## 2021-07-08 ENCOUNTER — APPOINTMENT (OUTPATIENT)
Dept: CT IMAGING | Facility: HOSPITAL | Age: 61
End: 2021-07-08

## 2021-07-08 LAB
ALBUMIN SERPL-MCNC: 2 G/DL (ref 3.5–5.2)
ALBUMIN/GLOB SERPL: 0.5 G/DL
ALP SERPL-CCNC: 123 U/L (ref 39–117)
ALT SERPL W P-5'-P-CCNC: 27 U/L (ref 1–33)
ANION GAP SERPL CALCULATED.3IONS-SCNC: 10.4 MMOL/L (ref 5–15)
AST SERPL-CCNC: 30 U/L (ref 1–32)
BILIRUB SERPL-MCNC: 0.4 MG/DL (ref 0–1.2)
BUN SERPL-MCNC: 31 MG/DL (ref 8–23)
BUN/CREAT SERPL: 88.6 (ref 7–25)
CALCIUM SPEC-SCNC: 8.7 MG/DL (ref 8.6–10.5)
CHLORIDE SERPL-SCNC: 108 MMOL/L (ref 98–107)
CO2 SERPL-SCNC: 21.6 MMOL/L (ref 22–29)
CREAT SERPL-MCNC: 0.35 MG/DL (ref 0.57–1)
GFR SERPL CREATININE-BSD FRML MDRD: >150 ML/MIN/1.73
GLOBULIN UR ELPH-MCNC: 4.4 GM/DL
GLUCOSE BLDC GLUCOMTR-MCNC: 118 MG/DL (ref 70–130)
GLUCOSE BLDC GLUCOMTR-MCNC: 125 MG/DL (ref 70–130)
GLUCOSE BLDC GLUCOMTR-MCNC: 146 MG/DL (ref 70–130)
GLUCOSE BLDC GLUCOMTR-MCNC: 55 MG/DL (ref 70–130)
GLUCOSE BLDC GLUCOMTR-MCNC: 72 MG/DL (ref 70–130)
GLUCOSE SERPL-MCNC: 72 MG/DL (ref 65–99)
INR PPP: 2.96 (ref 2–3)
NT-PROBNP SERPL-MCNC: ABNORMAL PG/ML (ref 0–900)
POTASSIUM SERPL-SCNC: 4 MMOL/L (ref 3.5–5.2)
PROT SERPL-MCNC: 6.4 G/DL (ref 6–8.5)
PROTHROMBIN TIME: 30 SECONDS (ref 9.4–12)
SODIUM SERPL-SCNC: 140 MMOL/L (ref 136–145)

## 2021-07-08 PROCEDURE — 63710000001 INSULIN LISPRO (HUMAN) PER 5 UNITS: Performed by: INTERNAL MEDICINE

## 2021-07-08 PROCEDURE — 82962 GLUCOSE BLOOD TEST: CPT

## 2021-07-08 PROCEDURE — 83880 ASSAY OF NATRIURETIC PEPTIDE: CPT | Performed by: INTERNAL MEDICINE

## 2021-07-08 PROCEDURE — 63710000001 PREDNISONE PER 5 MG: Performed by: INTERNAL MEDICINE

## 2021-07-08 PROCEDURE — 94799 UNLISTED PULMONARY SVC/PX: CPT

## 2021-07-08 PROCEDURE — 93010 ELECTROCARDIOGRAM REPORT: CPT | Performed by: INTERNAL MEDICINE

## 2021-07-08 PROCEDURE — 97110 THERAPEUTIC EXERCISES: CPT

## 2021-07-08 PROCEDURE — 25010000002 HYDRALAZINE PER 20 MG: Performed by: INTERNAL MEDICINE

## 2021-07-08 PROCEDURE — 71250 CT THORAX DX C-: CPT

## 2021-07-08 PROCEDURE — 63710000001 INSULIN DETEMIR PER 5 UNITS: Performed by: INTERNAL MEDICINE

## 2021-07-08 PROCEDURE — 80053 COMPREHEN METABOLIC PANEL: CPT | Performed by: INTERNAL MEDICINE

## 2021-07-08 PROCEDURE — 93005 ELECTROCARDIOGRAM TRACING: CPT | Performed by: INTERNAL MEDICINE

## 2021-07-08 PROCEDURE — 85610 PROTHROMBIN TIME: CPT | Performed by: INTERNAL MEDICINE

## 2021-07-08 PROCEDURE — 71046 X-RAY EXAM CHEST 2 VIEWS: CPT

## 2021-07-08 RX ORDER — BISOPROLOL FUMARATE 5 MG/1
20 TABLET, FILM COATED ORAL
Status: DISCONTINUED | OUTPATIENT
Start: 2021-07-09 | End: 2021-07-22 | Stop reason: HOSPADM

## 2021-07-08 RX ORDER — NICOTINE 21 MG/24HR
1 PATCH, TRANSDERMAL 24 HOURS TRANSDERMAL
Qty: 30 PATCH | Refills: 0 | Status: CANCELLED | OUTPATIENT
Start: 2021-07-09 | End: 2021-08-08

## 2021-07-08 RX ORDER — BISOPROLOL FUMARATE 10 MG/1
10 TABLET, FILM COATED ORAL
Qty: 30 TABLET | Refills: 0 | Status: CANCELLED | OUTPATIENT
Start: 2021-07-09 | End: 2021-08-08

## 2021-07-08 RX ORDER — POTASSIUM CHLORIDE 1.5 G/1.77G
20 POWDER, FOR SOLUTION ORAL 2 TIMES DAILY
Qty: 60 PACKET | Refills: 0 | Status: CANCELLED | OUTPATIENT
Start: 2021-07-08 | End: 2021-08-07

## 2021-07-08 RX ORDER — WARFARIN SODIUM 1 MG/1
1 TABLET ORAL DAILY
Qty: 15 TABLET | Refills: 0 | Status: CANCELLED | OUTPATIENT
Start: 2021-07-08 | End: 2021-07-18

## 2021-07-08 RX ORDER — BISOPROLOL FUMARATE 10 MG/1
20 TABLET, FILM COATED ORAL
Qty: 60 TABLET | Refills: 0 | Status: CANCELLED | OUTPATIENT
Start: 2021-07-09 | End: 2021-08-08

## 2021-07-08 RX ORDER — PREDNISONE 20 MG/1
20 TABLET ORAL 2 TIMES DAILY
Qty: 14 TABLET | Refills: 0 | Status: CANCELLED | OUTPATIENT
Start: 2021-07-08

## 2021-07-08 RX ORDER — METOLAZONE 5 MG/1
10 TABLET ORAL ONCE
Status: COMPLETED | OUTPATIENT
Start: 2021-07-08 | End: 2021-07-08

## 2021-07-08 RX ORDER — INSULIN HUMAN 100 [IU]/ML
30 INJECTION, SUSPENSION SUBCUTANEOUS 2 TIMES DAILY
Qty: 18 ML | Refills: 0 | Status: CANCELLED | OUTPATIENT
Start: 2021-07-08 | End: 2021-08-07

## 2021-07-08 RX ORDER — AMOXICILLIN 250 MG
1 CAPSULE ORAL 2 TIMES DAILY
Qty: 60 TABLET | Refills: 1 | Status: CANCELLED | OUTPATIENT
Start: 2021-07-08

## 2021-07-08 RX ORDER — ARFORMOTEROL TARTRATE 15 UG/2ML
15 SOLUTION RESPIRATORY (INHALATION)
Qty: 120 ML | Refills: 0 | Status: CANCELLED | OUTPATIENT
Start: 2021-07-08 | End: 2021-08-07

## 2021-07-08 RX ORDER — GABAPENTIN 100 MG/1
100 CAPSULE ORAL 3 TIMES DAILY
Qty: 90 CAPSULE | Refills: 0 | Status: CANCELLED | OUTPATIENT
Start: 2021-07-08 | End: 2021-08-07

## 2021-07-08 RX ORDER — HYDROXYZINE HYDROCHLORIDE 25 MG/1
25 TABLET, FILM COATED ORAL EVERY 6 HOURS PRN
Qty: 80 TABLET | Refills: 0 | Status: CANCELLED | OUTPATIENT
Start: 2021-07-08 | End: 2021-07-28

## 2021-07-08 RX ORDER — BUDESONIDE 0.5 MG/2ML
0.5 INHALANT ORAL
Qty: 14 ML | Refills: 0 | Status: CANCELLED | OUTPATIENT
Start: 2021-07-08 | End: 2021-07-12

## 2021-07-08 RX ORDER — OXYCODONE HYDROCHLORIDE AND ACETAMINOPHEN 5; 325 MG/1; MG/1
1 TABLET ORAL EVERY 6 HOURS PRN
Qty: 30 TABLET | Refills: 0 | Status: CANCELLED | OUTPATIENT
Start: 2021-07-08 | End: 2021-07-13

## 2021-07-08 RX ORDER — BISOPROLOL FUMARATE 10 MG/1
20 TABLET, FILM COATED ORAL DAILY
Qty: 60 TABLET | Refills: 0 | Status: CANCELLED | OUTPATIENT
Start: 2021-07-08 | End: 2021-08-07

## 2021-07-08 RX ORDER — ALBUTEROL SULFATE 2.5 MG/3ML
2.5 SOLUTION RESPIRATORY (INHALATION)
Status: DISCONTINUED | OUTPATIENT
Start: 2021-07-08 | End: 2021-07-16

## 2021-07-08 RX ORDER — SPIRONOLACTONE 50 MG/1
50 TABLET, FILM COATED ORAL DAILY
Qty: 30 TABLET | Refills: 0 | Status: CANCELLED | OUTPATIENT
Start: 2021-07-09

## 2021-07-08 RX ORDER — FUROSEMIDE 80 MG
80 TABLET ORAL DAILY
Qty: 30 TABLET | Refills: 0 | Status: CANCELLED | OUTPATIENT
Start: 2021-07-09 | End: 2021-08-08

## 2021-07-08 RX ADMIN — SPIRONOLACTONE 50 MG: 25 TABLET ORAL at 09:05

## 2021-07-08 RX ADMIN — DOCUSATE SODIUM 50MG AND SENNOSIDES 8.6MG 1 TABLET: 8.6; 5 TABLET, FILM COATED ORAL at 09:06

## 2021-07-08 RX ADMIN — INSULIN DETEMIR 25 UNITS: 100 INJECTION, SOLUTION SUBCUTANEOUS at 21:25

## 2021-07-08 RX ADMIN — OXYCODONE HYDROCHLORIDE AND ACETAMINOPHEN 1 TABLET: 5; 325 TABLET ORAL at 21:33

## 2021-07-08 RX ADMIN — MICONAZOLE NITRATE: 2 POWDER TOPICAL at 09:07

## 2021-07-08 RX ADMIN — FUROSEMIDE 80 MG: 40 TABLET ORAL at 09:07

## 2021-07-08 RX ADMIN — MICONAZOLE NITRATE: 2 POWDER TOPICAL at 21:28

## 2021-07-08 RX ADMIN — GABAPENTIN 100 MG: 100 CAPSULE ORAL at 14:33

## 2021-07-08 RX ADMIN — NICOTINE 1 PATCH: 14 PATCH, EXTENDED RELEASE TRANSDERMAL at 09:04

## 2021-07-08 RX ADMIN — HYDRALAZINE HYDROCHLORIDE 20 MG: 20 INJECTION INTRAMUSCULAR; INTRAVENOUS at 12:27

## 2021-07-08 RX ADMIN — GABAPENTIN 100 MG: 100 CAPSULE ORAL at 06:08

## 2021-07-08 RX ADMIN — SODIUM CHLORIDE, PRESERVATIVE FREE 3 ML: 5 INJECTION INTRAVENOUS at 21:27

## 2021-07-08 RX ADMIN — PREDNISONE 60 MG: 50 TABLET ORAL at 09:05

## 2021-07-08 RX ADMIN — FAMOTIDINE 20 MG: 20 TABLET ORAL at 09:05

## 2021-07-08 RX ADMIN — FAMOTIDINE 20 MG: 20 TABLET ORAL at 16:49

## 2021-07-08 RX ADMIN — ARFORMOTEROL TARTRATE 15 MCG: 15 SOLUTION RESPIRATORY (INHALATION) at 09:36

## 2021-07-08 RX ADMIN — GABAPENTIN 100 MG: 100 CAPSULE ORAL at 21:27

## 2021-07-08 RX ADMIN — SERTRALINE HYDROCHLORIDE 100 MG: 100 TABLET ORAL at 09:05

## 2021-07-08 RX ADMIN — BUDESONIDE 0.5 MG: 0.5 INHALANT ORAL at 09:36

## 2021-07-08 RX ADMIN — ALBUTEROL SULFATE 2.5 MG: 2.5 SOLUTION RESPIRATORY (INHALATION) at 17:15

## 2021-07-08 RX ADMIN — LOSARTAN POTASSIUM 100 MG: 25 TABLET, FILM COATED ORAL at 09:06

## 2021-07-08 RX ADMIN — INSULIN LISPRO 7 UNITS: 100 INJECTION, SOLUTION INTRAVENOUS; SUBCUTANEOUS at 09:03

## 2021-07-08 RX ADMIN — DOCUSATE SODIUM 50MG AND SENNOSIDES 8.6MG 1 TABLET: 8.6; 5 TABLET, FILM COATED ORAL at 21:27

## 2021-07-08 RX ADMIN — METOLAZONE 10 MG: 5 TABLET ORAL at 12:10

## 2021-07-08 RX ADMIN — INSULIN DETEMIR 25 UNITS: 100 INJECTION, SOLUTION SUBCUTANEOUS at 09:02

## 2021-07-08 RX ADMIN — BISOPROLOL FUMARATE 10 MG: 5 TABLET ORAL at 09:08

## 2021-07-08 RX ADMIN — ALBUTEROL SULFATE 2.5 MG: 2.5 SOLUTION RESPIRATORY (INHALATION) at 12:01

## 2021-07-08 RX ADMIN — POLYETHYLENE GLYCOL 3350 17 G: 17 POWDER, FOR SOLUTION ORAL at 21:27

## 2021-07-08 RX ADMIN — SODIUM CHLORIDE, PRESERVATIVE FREE 3 ML: 5 INJECTION INTRAVENOUS at 09:03

## 2021-07-08 RX ADMIN — POTASSIUM CHLORIDE 40 MEQ: 1.5 POWDER, FOR SOLUTION ORAL at 09:07

## 2021-07-09 ENCOUNTER — ANESTHESIA EVENT (OUTPATIENT)
Dept: GASTROENTEROLOGY | Facility: HOSPITAL | Age: 61
End: 2021-07-09

## 2021-07-09 LAB
ALBUMIN SERPL-MCNC: 1.9 G/DL (ref 3.5–5.2)
ALBUMIN/GLOB SERPL: 0.4 G/DL
ALP SERPL-CCNC: 131 U/L (ref 39–117)
ALT SERPL W P-5'-P-CCNC: 28 U/L (ref 1–33)
AMMONIA BLD-SCNC: 97 UMOL/L (ref 11–51)
ANION GAP SERPL CALCULATED.3IONS-SCNC: 8.4 MMOL/L (ref 5–15)
ARTERIAL PATENCY WRIST A: POSITIVE
AST SERPL-CCNC: 33 U/L (ref 1–32)
BASE EXCESS BLDA CALC-SCNC: 1.8 MMOL/L (ref -2–2)
BDY SITE: ABNORMAL
BILIRUB SERPL-MCNC: 0.5 MG/DL (ref 0–1.2)
BUN SERPL-MCNC: 23 MG/DL (ref 8–23)
BUN/CREAT SERPL: 100 (ref 7–25)
CA-I BLDA-SCNC: 1.22 MMOL/L (ref 1.13–1.32)
CALCIUM SPEC-SCNC: 8.6 MG/DL (ref 8.6–10.5)
CHLORIDE BLDA-SCNC: 106 MMOL/L (ref 98–106)
CHLORIDE SERPL-SCNC: 105 MMOL/L (ref 98–107)
CO2 SERPL-SCNC: 25.6 MMOL/L (ref 22–29)
COHGB MFR BLD: 0.8 % (ref 0–1.5)
CREAT SERPL-MCNC: 0.23 MG/DL (ref 0.57–1)
D-LACTATE SERPL-SCNC: 1.3 MMOL/L (ref 0.5–2)
DEPRECATED RDW RBC AUTO: 71.2 FL (ref 37–54)
ERYTHROCYTE [DISTWIDTH] IN BLOOD BY AUTOMATED COUNT: 23.8 % (ref 12.3–15.4)
FHHB: 8.8 % (ref 0–5)
GFR SERPL CREATININE-BSD FRML MDRD: >150 ML/MIN/1.73
GLOBULIN UR ELPH-MCNC: 4.3 GM/DL
GLUCOSE BLDA-MCNC: 172 MMOL/L (ref 65–99)
GLUCOSE BLDC GLUCOMTR-MCNC: 151 MG/DL (ref 70–130)
GLUCOSE BLDC GLUCOMTR-MCNC: 169 MG/DL (ref 70–130)
GLUCOSE BLDC GLUCOMTR-MCNC: 188 MG/DL (ref 70–130)
GLUCOSE BLDC GLUCOMTR-MCNC: 20 MG/DL (ref 70–130)
GLUCOSE BLDC GLUCOMTR-MCNC: 209 MG/DL (ref 70–130)
GLUCOSE BLDC GLUCOMTR-MCNC: 271 MG/DL (ref 70–130)
GLUCOSE BLDC GLUCOMTR-MCNC: 73 MG/DL (ref 70–130)
GLUCOSE BLDC GLUCOMTR-MCNC: 74 MG/DL (ref 70–130)
GLUCOSE SERPL-MCNC: 108 MG/DL (ref 65–99)
HCO3 BLDA-SCNC: 25.4 MMOL/L (ref 22–26)
HCT VFR BLD AUTO: 34.3 % (ref 34–46.6)
HGB BLD-MCNC: 10.5 G/DL (ref 12–15.9)
HGB BLDA-MCNC: 10.9 G/DL (ref 11.7–14.6)
INHALED O2 CONCENTRATION: 35 %
INR PPP: 3.63 (ref 2–3)
LACTATE BLDA-SCNC: 1.33 MMOL/L (ref 0.5–2)
MCH RBC QN AUTO: 25.2 PG (ref 26.6–33)
MCHC RBC AUTO-ENTMCNC: 30.6 G/DL (ref 31.5–35.7)
MCV RBC AUTO: 82.5 FL (ref 79–97)
METHGB BLD QL: 0.3 % (ref 0–1.5)
MODALITY: ABNORMAL
NOTE: ABNORMAL
OXYHGB MFR BLDV: 90.1 % (ref 94–99)
PCO2 BLDA: 35.9 MM HG (ref 35–45)
PH BLDA: 7.47 PH UNITS (ref 7.35–7.45)
PLATELET # BLD AUTO: 238 10*3/MM3 (ref 140–450)
PMV BLD AUTO: 11.5 FL (ref 6–12)
PO2 BLD: 178 MM[HG] (ref 0–500)
PO2 BLDA: 62.3 MM HG (ref 80–100)
POTASSIUM BLDA-SCNC: 3.14 MMOL/L (ref 3.5–5)
POTASSIUM SERPL-SCNC: 3.5 MMOL/L (ref 3.5–5.2)
PROCALCITONIN SERPL-MCNC: 0.26 NG/ML (ref 0–0.25)
PROT SERPL-MCNC: 6.2 G/DL (ref 6–8.5)
PROTHROMBIN TIME: 36.8 SECONDS (ref 9.4–12)
QT INTERVAL: 426 MS
RBC # BLD AUTO: 4.16 10*6/MM3 (ref 3.77–5.28)
SAO2 % BLDCOA: 91.1 % (ref 95–99)
SODIUM BLDA-SCNC: 134.1 MMOL/L (ref 136–146)
SODIUM SERPL-SCNC: 139 MMOL/L (ref 136–145)
WBC # BLD AUTO: 16.83 10*3/MM3 (ref 3.4–10.8)

## 2021-07-09 PROCEDURE — 99233 SBSQ HOSP IP/OBS HIGH 50: CPT | Performed by: INTERNAL MEDICINE

## 2021-07-09 PROCEDURE — 94799 UNLISTED PULMONARY SVC/PX: CPT

## 2021-07-09 PROCEDURE — 85610 PROTHROMBIN TIME: CPT | Performed by: INTERNAL MEDICINE

## 2021-07-09 PROCEDURE — 25010000002 CEFEPIME PER 500 MG: Performed by: INTERNAL MEDICINE

## 2021-07-09 PROCEDURE — 36600 WITHDRAWAL OF ARTERIAL BLOOD: CPT | Performed by: INTERNAL MEDICINE

## 2021-07-09 PROCEDURE — 25010000002 GLUCAGON (HUMAN RECOMBINANT) 1 MG RECONSTITUTED SOLUTION: Performed by: INTERNAL MEDICINE

## 2021-07-09 PROCEDURE — 82805 BLOOD GASES W/O2 SATURATION: CPT | Performed by: INTERNAL MEDICINE

## 2021-07-09 PROCEDURE — 83050 HGB METHEMOGLOBIN QUAN: CPT | Performed by: INTERNAL MEDICINE

## 2021-07-09 PROCEDURE — 25010000002 VANCOMYCIN 5 G RECONSTITUTED SOLUTION: Performed by: INTERNAL MEDICINE

## 2021-07-09 PROCEDURE — 25010000003 PHYTONADIONE 10 MG/ML SOLUTION 1 ML AMPULE: Performed by: INTERNAL MEDICINE

## 2021-07-09 PROCEDURE — 25010000002 FUROSEMIDE PER 20 MG: Performed by: INTERNAL MEDICINE

## 2021-07-09 PROCEDURE — 63710000001 PREDNISONE PER 5 MG: Performed by: INTERNAL MEDICINE

## 2021-07-09 PROCEDURE — 63710000001 INSULIN DETEMIR PER 5 UNITS: Performed by: INTERNAL MEDICINE

## 2021-07-09 PROCEDURE — 83605 ASSAY OF LACTIC ACID: CPT | Performed by: INTERNAL MEDICINE

## 2021-07-09 PROCEDURE — 84145 PROCALCITONIN (PCT): CPT | Performed by: INTERNAL MEDICINE

## 2021-07-09 PROCEDURE — 25010000002 METHYLPREDNISOLONE PER 40 MG: Performed by: INTERNAL MEDICINE

## 2021-07-09 PROCEDURE — 82375 ASSAY CARBOXYHB QUANT: CPT | Performed by: INTERNAL MEDICINE

## 2021-07-09 PROCEDURE — 85705 THROMBOPLASTIN INHIBITION: CPT | Performed by: INTERNAL MEDICINE

## 2021-07-09 PROCEDURE — 82962 GLUCOSE BLOOD TEST: CPT

## 2021-07-09 PROCEDURE — 94660 CPAP INITIATION&MGMT: CPT

## 2021-07-09 PROCEDURE — 82140 ASSAY OF AMMONIA: CPT | Performed by: INTERNAL MEDICINE

## 2021-07-09 PROCEDURE — 80053 COMPREHEN METABOLIC PANEL: CPT | Performed by: INTERNAL MEDICINE

## 2021-07-09 PROCEDURE — 85027 COMPLETE CBC AUTOMATED: CPT | Performed by: INTERNAL MEDICINE

## 2021-07-09 RX ORDER — CLONIDINE HYDROCHLORIDE 0.1 MG/1
0.1 TABLET ORAL NIGHTLY
Status: DISCONTINUED | OUTPATIENT
Start: 2021-07-09 | End: 2021-07-12

## 2021-07-09 RX ORDER — LACTULOSE 10 G/15ML
30 SOLUTION ORAL 3 TIMES DAILY
Status: DISCONTINUED | OUTPATIENT
Start: 2021-07-09 | End: 2021-07-22 | Stop reason: HOSPADM

## 2021-07-09 RX ORDER — FUROSEMIDE 10 MG/ML
40 INJECTION INTRAMUSCULAR; INTRAVENOUS EVERY 12 HOURS
Status: DISCONTINUED | OUTPATIENT
Start: 2021-07-09 | End: 2021-07-12

## 2021-07-09 RX ORDER — METHYLPREDNISOLONE SODIUM SUCCINATE 40 MG/ML
40 INJECTION, POWDER, LYOPHILIZED, FOR SOLUTION INTRAMUSCULAR; INTRAVENOUS EVERY 8 HOURS
Status: DISCONTINUED | OUTPATIENT
Start: 2021-07-09 | End: 2021-07-12

## 2021-07-09 RX ADMIN — SERTRALINE HYDROCHLORIDE 100 MG: 100 TABLET ORAL at 10:04

## 2021-07-09 RX ADMIN — FUROSEMIDE 40 MG: 10 INJECTION, SOLUTION INTRAMUSCULAR; INTRAVENOUS at 12:38

## 2021-07-09 RX ADMIN — LACTULOSE 30 G: 20 SOLUTION ORAL at 12:40

## 2021-07-09 RX ADMIN — OXYCODONE HYDROCHLORIDE AND ACETAMINOPHEN 1 TABLET: 5; 325 TABLET ORAL at 20:07

## 2021-07-09 RX ADMIN — MICONAZOLE NITRATE: 2 POWDER TOPICAL at 10:07

## 2021-07-09 RX ADMIN — INSULIN DETEMIR 10 UNITS: 100 INJECTION, SOLUTION SUBCUTANEOUS at 20:06

## 2021-07-09 RX ADMIN — FUROSEMIDE 80 MG: 40 TABLET ORAL at 10:05

## 2021-07-09 RX ADMIN — CEFEPIME HYDROCHLORIDE 2 G: 2 INJECTION, POWDER, FOR SOLUTION INTRAVENOUS at 17:05

## 2021-07-09 RX ADMIN — VANCOMYCIN HYDROCHLORIDE 1500 MG: 5 INJECTION, POWDER, LYOPHILIZED, FOR SOLUTION INTRAVENOUS at 09:05

## 2021-07-09 RX ADMIN — BUDESONIDE 0.5 MG: 0.5 INHALANT ORAL at 20:44

## 2021-07-09 RX ADMIN — FAMOTIDINE 20 MG: 20 TABLET ORAL at 17:05

## 2021-07-09 RX ADMIN — BUDESONIDE 0.5 MG: 0.5 INHALANT ORAL at 07:10

## 2021-07-09 RX ADMIN — GLUCAGON HYDROCHLORIDE 1 MG: KIT at 05:54

## 2021-07-09 RX ADMIN — ALBUTEROL SULFATE 2.5 MG: 2.5 SOLUTION RESPIRATORY (INHALATION) at 20:44

## 2021-07-09 RX ADMIN — POTASSIUM CHLORIDE 40 MEQ: 1.5 POWDER, FOR SOLUTION ORAL at 10:04

## 2021-07-09 RX ADMIN — POLYETHYLENE GLYCOL 3350 17 G: 17 POWDER, FOR SOLUTION ORAL at 20:07

## 2021-07-09 RX ADMIN — ALBUTEROL SULFATE 2.5 MG: 2.5 SOLUTION RESPIRATORY (INHALATION) at 23:31

## 2021-07-09 RX ADMIN — ARFORMOTEROL TARTRATE 15 MCG: 15 SOLUTION RESPIRATORY (INHALATION) at 07:09

## 2021-07-09 RX ADMIN — LOSARTAN POTASSIUM 100 MG: 25 TABLET, FILM COATED ORAL at 12:38

## 2021-07-09 RX ADMIN — METHYLPREDNISOLONE SODIUM SUCCINATE 40 MG: 40 INJECTION, POWDER, FOR SOLUTION INTRAMUSCULAR; INTRAVENOUS at 12:38

## 2021-07-09 RX ADMIN — VANCOMYCIN HYDROCHLORIDE 1500 MG: 5 INJECTION, POWDER, LYOPHILIZED, FOR SOLUTION INTRAVENOUS at 21:38

## 2021-07-09 RX ADMIN — SPIRONOLACTONE 50 MG: 25 TABLET ORAL at 10:05

## 2021-07-09 RX ADMIN — LACTULOSE 30 G: 20 SOLUTION ORAL at 20:07

## 2021-07-09 RX ADMIN — METHYLPREDNISOLONE SODIUM SUCCINATE 40 MG: 40 INJECTION, POWDER, FOR SOLUTION INTRAMUSCULAR; INTRAVENOUS at 20:07

## 2021-07-09 RX ADMIN — NICOTINE 1 PATCH: 14 PATCH, EXTENDED RELEASE TRANSDERMAL at 10:03

## 2021-07-09 RX ADMIN — ARFORMOTEROL TARTRATE 15 MCG: 15 SOLUTION RESPIRATORY (INHALATION) at 20:44

## 2021-07-09 RX ADMIN — ALBUTEROL SULFATE 2.5 MG: 2.5 SOLUTION RESPIRATORY (INHALATION) at 07:09

## 2021-07-09 RX ADMIN — CLONIDINE HYDROCHLORIDE 0.1 MG: 0.1 TABLET ORAL at 21:42

## 2021-07-09 RX ADMIN — PHYTONADIONE 5 MG: 10 INJECTION, EMULSION INTRAMUSCULAR; INTRAVENOUS; SUBCUTANEOUS at 23:28

## 2021-07-09 RX ADMIN — PREDNISONE 60 MG: 50 TABLET ORAL at 10:06

## 2021-07-09 RX ADMIN — FAMOTIDINE 20 MG: 20 TABLET ORAL at 10:05

## 2021-07-09 RX ADMIN — POLYETHYLENE GLYCOL 3350 17 G: 17 POWDER, FOR SOLUTION ORAL at 10:02

## 2021-07-09 RX ADMIN — CEFEPIME HYDROCHLORIDE 2 G: 2 INJECTION, POWDER, FOR SOLUTION INTRAVENOUS at 12:38

## 2021-07-09 RX ADMIN — INSULIN DETEMIR 10 UNITS: 100 INJECTION, SOLUTION SUBCUTANEOUS at 10:02

## 2021-07-09 RX ADMIN — ALBUTEROL SULFATE 2.5 MG: 2.5 SOLUTION RESPIRATORY (INHALATION) at 13:33

## 2021-07-09 RX ADMIN — DOCUSATE SODIUM 50MG AND SENNOSIDES 8.6MG 1 TABLET: 8.6; 5 TABLET, FILM COATED ORAL at 10:05

## 2021-07-09 RX ADMIN — MICONAZOLE NITRATE: 2 POWDER TOPICAL at 21:38

## 2021-07-09 RX ADMIN — SODIUM CHLORIDE, PRESERVATIVE FREE 3 ML: 5 INJECTION INTRAVENOUS at 20:08

## 2021-07-09 RX ADMIN — DOCUSATE SODIUM 50MG AND SENNOSIDES 8.6MG 1 TABLET: 8.6; 5 TABLET, FILM COATED ORAL at 20:07

## 2021-07-09 RX ADMIN — SODIUM CHLORIDE: 9 INJECTION, SOLUTION INTRAVENOUS at 23:00

## 2021-07-09 RX ADMIN — BISOPROLOL FUMARATE 20 MG: 5 TABLET ORAL at 10:04

## 2021-07-10 ENCOUNTER — ANESTHESIA (OUTPATIENT)
Dept: PERIOP | Facility: HOSPITAL | Age: 61
End: 2021-07-10

## 2021-07-10 ENCOUNTER — ANESTHESIA EVENT (OUTPATIENT)
Dept: PERIOP | Facility: HOSPITAL | Age: 61
End: 2021-07-10

## 2021-07-10 ENCOUNTER — ANESTHESIA (OUTPATIENT)
Dept: GASTROENTEROLOGY | Facility: HOSPITAL | Age: 61
End: 2021-07-10

## 2021-07-10 ENCOUNTER — PREP FOR SURGERY (OUTPATIENT)
Dept: OTHER | Facility: HOSPITAL | Age: 61
End: 2021-07-10

## 2021-07-10 LAB
ALBUMIN SERPL-MCNC: 2.3 G/DL (ref 3.5–5.2)
AMMONIA BLD-SCNC: 41 UMOL/L (ref 11–51)
ANION GAP SERPL CALCULATED.3IONS-SCNC: 12.5 MMOL/L (ref 5–15)
ANION GAP SERPL CALCULATED.3IONS-SCNC: 12.5 MMOL/L (ref 5–15)
ANION GAP SERPL CALCULATED.3IONS-SCNC: 13.4 MMOL/L (ref 5–15)
ANISOCYTOSIS BLD QL: NORMAL
BASOPHILS # BLD AUTO: 0.04 10*3/MM3 (ref 0–0.2)
BASOPHILS NFR BLD AUTO: 0.4 % (ref 0–1.5)
BUN SERPL-MCNC: 29 MG/DL (ref 8–23)
BUN SERPL-MCNC: 29 MG/DL (ref 8–23)
BUN SERPL-MCNC: 30 MG/DL (ref 8–23)
BUN/CREAT SERPL: 81.1 (ref 7–25)
BUN/CREAT SERPL: 85.3 (ref 7–25)
BUN/CREAT SERPL: 90.6 (ref 7–25)
BURR CELLS BLD QL SMEAR: NORMAL
CALCIUM SPEC-SCNC: 8.4 MG/DL (ref 8.6–10.5)
CALCIUM SPEC-SCNC: 8.5 MG/DL (ref 8.6–10.5)
CALCIUM SPEC-SCNC: 8.6 MG/DL (ref 8.6–10.5)
CHLORIDE SERPL-SCNC: 106 MMOL/L (ref 98–107)
CHLORIDE SERPL-SCNC: 106 MMOL/L (ref 98–107)
CHLORIDE SERPL-SCNC: 109 MMOL/L (ref 98–107)
CO2 SERPL-SCNC: 20.5 MMOL/L (ref 22–29)
CO2 SERPL-SCNC: 23.6 MMOL/L (ref 22–29)
CO2 SERPL-SCNC: 24.5 MMOL/L (ref 22–29)
CREAT SERPL-MCNC: 0.32 MG/DL (ref 0.57–1)
CREAT SERPL-MCNC: 0.34 MG/DL (ref 0.57–1)
CREAT SERPL-MCNC: 0.37 MG/DL (ref 0.57–1)
DEPRECATED RDW RBC AUTO: 68.8 FL (ref 37–54)
EOSINOPHIL # BLD AUTO: 0 10*3/MM3 (ref 0–0.4)
EOSINOPHIL NFR BLD AUTO: 0 % (ref 0.3–6.2)
ERYTHROCYTE [DISTWIDTH] IN BLOOD BY AUTOMATED COUNT: 22.6 % (ref 12.3–15.4)
GFR SERPL CREATININE-BSD FRML MDRD: >150 ML/MIN/1.73
GLUCOSE BLDC GLUCOMTR-MCNC: 179 MG/DL (ref 70–130)
GLUCOSE BLDC GLUCOMTR-MCNC: 213 MG/DL (ref 70–130)
GLUCOSE BLDC GLUCOMTR-MCNC: 320 MG/DL (ref 70–130)
GLUCOSE BLDC GLUCOMTR-MCNC: 326 MG/DL (ref 70–130)
GLUCOSE BLDC GLUCOMTR-MCNC: 350 MG/DL (ref 70–130)
GLUCOSE SERPL-MCNC: 215 MG/DL (ref 65–99)
GLUCOSE SERPL-MCNC: 330 MG/DL (ref 65–99)
GLUCOSE SERPL-MCNC: 369 MG/DL (ref 65–99)
HCT VFR BLD AUTO: 30.6 % (ref 34–46.6)
HGB BLD-MCNC: 9.5 G/DL (ref 12–15.9)
HYPOCHROMIA BLD QL: NORMAL
IMM GRANULOCYTES # BLD AUTO: 0.11 10*3/MM3 (ref 0–0.05)
IMM GRANULOCYTES NFR BLD AUTO: 1.2 % (ref 0–0.5)
INR PPP: 2.78 (ref 2–3)
LYMPHOCYTES # BLD AUTO: 0.4 10*3/MM3 (ref 0.7–3.1)
LYMPHOCYTES NFR BLD AUTO: 4.4 % (ref 19.6–45.3)
MAGNESIUM SERPL-MCNC: 1.8 MG/DL (ref 1.6–2.4)
MCH RBC QN AUTO: 25.7 PG (ref 26.6–33)
MCHC RBC AUTO-ENTMCNC: 31 G/DL (ref 31.5–35.7)
MCV RBC AUTO: 82.7 FL (ref 79–97)
MONOCYTES # BLD AUTO: 0.29 10*3/MM3 (ref 0.1–0.9)
MONOCYTES NFR BLD AUTO: 3.2 % (ref 5–12)
NEUTROPHILS NFR BLD AUTO: 8.17 10*3/MM3 (ref 1.7–7)
NEUTROPHILS NFR BLD AUTO: 90.8 % (ref 42.7–76)
NRBC BLD AUTO-RTO: 0 /100 WBC (ref 0–0.2)
OVALOCYTES BLD QL SMEAR: NORMAL
PHOSPHATE SERPL-MCNC: 2.9 MG/DL (ref 2.5–4.5)
PLAT MORPH BLD: NORMAL
PLATELET # BLD AUTO: 154 10*3/MM3 (ref 140–450)
PMV BLD AUTO: 11.1 FL (ref 6–12)
POIKILOCYTOSIS BLD QL SMEAR: NORMAL
POTASSIUM SERPL-SCNC: 2.5 MMOL/L (ref 3.5–5.2)
POTASSIUM SERPL-SCNC: 2.7 MMOL/L (ref 3.5–5.2)
POTASSIUM SERPL-SCNC: 3.1 MMOL/L (ref 3.5–5.2)
PROCALCITONIN SERPL-MCNC: 1.13 NG/ML (ref 0–0.25)
PROTHROMBIN TIME: 27.9 SECONDS (ref 9.4–12)
QT INTERVAL: 428 MS
RBC # BLD AUTO: 3.7 10*6/MM3 (ref 3.77–5.28)
SODIUM SERPL-SCNC: 142 MMOL/L (ref 136–145)
SODIUM SERPL-SCNC: 143 MMOL/L (ref 136–145)
SODIUM SERPL-SCNC: 143 MMOL/L (ref 136–145)
VANCOMYCIN SERPL-MCNC: 18.8 MCG/ML (ref 5–40)
WBC # BLD AUTO: 9.01 10*3/MM3 (ref 3.4–10.8)
WBC MORPH BLD: NORMAL

## 2021-07-10 PROCEDURE — 94799 UNLISTED PULMONARY SVC/PX: CPT

## 2021-07-10 PROCEDURE — 36600 WITHDRAWAL OF ARTERIAL BLOOD: CPT | Performed by: INTERNAL MEDICINE

## 2021-07-10 PROCEDURE — 80069 RENAL FUNCTION PANEL: CPT | Performed by: INTERNAL MEDICINE

## 2021-07-10 PROCEDURE — 93010 ELECTROCARDIOGRAM REPORT: CPT | Performed by: INTERNAL MEDICINE

## 2021-07-10 PROCEDURE — 87102 FUNGUS ISOLATION CULTURE: CPT | Performed by: INTERNAL MEDICINE

## 2021-07-10 PROCEDURE — 99233 SBSQ HOSP IP/OBS HIGH 50: CPT | Performed by: INTERNAL MEDICINE

## 2021-07-10 PROCEDURE — 82962 GLUCOSE BLOOD TEST: CPT

## 2021-07-10 PROCEDURE — 25010000002 VANCOMYCIN 5 G RECONSTITUTED SOLUTION: Performed by: INTERNAL MEDICINE

## 2021-07-10 PROCEDURE — 009U3ZX DRAINAGE OF SPINAL CANAL, PERCUTANEOUS APPROACH, DIAGNOSTIC: ICD-10-PCS | Performed by: RADIOLOGY

## 2021-07-10 PROCEDURE — 0BC38ZZ EXTIRPATION OF MATTER FROM RIGHT MAIN BRONCHUS, VIA NATURAL OR ARTIFICIAL OPENING ENDOSCOPIC: ICD-10-PCS | Performed by: INTERNAL MEDICINE

## 2021-07-10 PROCEDURE — 87071 CULTURE AEROBIC QUANT OTHER: CPT | Performed by: INTERNAL MEDICINE

## 2021-07-10 PROCEDURE — 87205 SMEAR GRAM STAIN: CPT | Performed by: INTERNAL MEDICINE

## 2021-07-10 PROCEDURE — 84145 PROCALCITONIN (PCT): CPT | Performed by: INTERNAL MEDICINE

## 2021-07-10 PROCEDURE — 25010000002 CEFEPIME PER 500 MG: Performed by: INTERNAL MEDICINE

## 2021-07-10 PROCEDURE — 80202 ASSAY OF VANCOMYCIN: CPT | Performed by: INTERNAL MEDICINE

## 2021-07-10 PROCEDURE — 87206 SMEAR FLUORESCENT/ACID STAI: CPT | Performed by: INTERNAL MEDICINE

## 2021-07-10 PROCEDURE — 25010000003 POTASSIUM CHLORIDE 10 MEQ/100ML SOLUTION: Performed by: INTERNAL MEDICINE

## 2021-07-10 PROCEDURE — 85025 COMPLETE CBC W/AUTO DIFF WBC: CPT | Performed by: INTERNAL MEDICINE

## 2021-07-10 PROCEDURE — 63710000001 INSULIN DETEMIR PER 5 UNITS: Performed by: INTERNAL MEDICINE

## 2021-07-10 PROCEDURE — 94760 N-INVAS EAR/PLS OXIMETRY 1: CPT

## 2021-07-10 PROCEDURE — 88108 CYTOPATH CONCENTRATE TECH: CPT | Performed by: INTERNAL MEDICINE

## 2021-07-10 PROCEDURE — 83735 ASSAY OF MAGNESIUM: CPT | Performed by: INTERNAL MEDICINE

## 2021-07-10 PROCEDURE — 94660 CPAP INITIATION&MGMT: CPT

## 2021-07-10 PROCEDURE — 80048 BASIC METABOLIC PNL TOTAL CA: CPT | Performed by: INTERNAL MEDICINE

## 2021-07-10 PROCEDURE — 93005 ELECTROCARDIOGRAM TRACING: CPT | Performed by: ANESTHESIOLOGY

## 2021-07-10 PROCEDURE — 25010000002 HYDRALAZINE PER 20 MG: Performed by: INTERNAL MEDICINE

## 2021-07-10 PROCEDURE — 85007 BL SMEAR W/DIFF WBC COUNT: CPT | Performed by: INTERNAL MEDICINE

## 2021-07-10 PROCEDURE — 82375 ASSAY CARBOXYHB QUANT: CPT | Performed by: INTERNAL MEDICINE

## 2021-07-10 PROCEDURE — 25010000002 ONDANSETRON PER 1 MG: Performed by: NURSE ANESTHETIST, CERTIFIED REGISTERED

## 2021-07-10 PROCEDURE — 25010000002 METHYLPREDNISOLONE PER 40 MG: Performed by: INTERNAL MEDICINE

## 2021-07-10 PROCEDURE — 0BC78ZZ EXTIRPATION OF MATTER FROM LEFT MAIN BRONCHUS, VIA NATURAL OR ARTIFICIAL OPENING ENDOSCOPIC: ICD-10-PCS | Performed by: INTERNAL MEDICINE

## 2021-07-10 PROCEDURE — 31624 DX BRONCHOSCOPE/LAVAGE: CPT | Performed by: INTERNAL MEDICINE

## 2021-07-10 PROCEDURE — 83050 HGB METHEMOGLOBIN QUAN: CPT | Performed by: INTERNAL MEDICINE

## 2021-07-10 PROCEDURE — 0B9B8ZX DRAINAGE OF LEFT LOWER LOBE BRONCHUS, VIA NATURAL OR ARTIFICIAL OPENING ENDOSCOPIC, DIAGNOSTIC: ICD-10-PCS | Performed by: INTERNAL MEDICINE

## 2021-07-10 PROCEDURE — 25010000002 FUROSEMIDE PER 20 MG: Performed by: INTERNAL MEDICINE

## 2021-07-10 PROCEDURE — 25010000002 DEXAMETHASONE PER 1 MG: Performed by: NURSE ANESTHETIST, CERTIFIED REGISTERED

## 2021-07-10 PROCEDURE — 25010000002 PROPOFOL 10 MG/ML EMULSION: Performed by: NURSE ANESTHETIST, CERTIFIED REGISTERED

## 2021-07-10 PROCEDURE — 31645 BRNCHSC W/THER ASPIR 1ST: CPT | Performed by: INTERNAL MEDICINE

## 2021-07-10 PROCEDURE — 87116 MYCOBACTERIA CULTURE: CPT | Performed by: INTERNAL MEDICINE

## 2021-07-10 PROCEDURE — 85610 PROTHROMBIN TIME: CPT | Performed by: INTERNAL MEDICINE

## 2021-07-10 PROCEDURE — 82805 BLOOD GASES W/O2 SATURATION: CPT | Performed by: INTERNAL MEDICINE

## 2021-07-10 PROCEDURE — 25010000002 FENTANYL CITRATE (PF) 50 MCG/ML SOLUTION: Performed by: NURSE ANESTHETIST, CERTIFIED REGISTERED

## 2021-07-10 PROCEDURE — 82140 ASSAY OF AMMONIA: CPT | Performed by: INTERNAL MEDICINE

## 2021-07-10 RX ORDER — BUDESONIDE 0.5 MG/2ML
0.5 INHALANT ORAL
Status: DISPENSED | OUTPATIENT
Start: 2021-07-10 | End: 2021-07-11

## 2021-07-10 RX ORDER — ONDANSETRON 2 MG/ML
4 INJECTION INTRAMUSCULAR; INTRAVENOUS ONCE AS NEEDED
Status: DISCONTINUED | OUTPATIENT
Start: 2021-07-10 | End: 2021-07-10 | Stop reason: HOSPADM

## 2021-07-10 RX ORDER — SODIUM CHLORIDE 0.9 % (FLUSH) 0.9 %
10 SYRINGE (ML) INJECTION AS NEEDED
Status: DISCONTINUED | OUTPATIENT
Start: 2021-07-10 | End: 2021-07-22 | Stop reason: HOSPADM

## 2021-07-10 RX ORDER — PROPOFOL 10 MG/ML
VIAL (ML) INTRAVENOUS AS NEEDED
Status: DISCONTINUED | OUTPATIENT
Start: 2021-07-10 | End: 2021-07-10 | Stop reason: SURG

## 2021-07-10 RX ORDER — PROMETHAZINE HYDROCHLORIDE 12.5 MG/1
25 TABLET ORAL ONCE AS NEEDED
Status: DISCONTINUED | OUTPATIENT
Start: 2021-07-10 | End: 2021-07-10 | Stop reason: HOSPADM

## 2021-07-10 RX ORDER — SODIUM CHLORIDE, SODIUM LACTATE, POTASSIUM CHLORIDE, CALCIUM CHLORIDE 600; 310; 30; 20 MG/100ML; MG/100ML; MG/100ML; MG/100ML
9 INJECTION, SOLUTION INTRAVENOUS CONTINUOUS PRN
Status: DISCONTINUED | OUTPATIENT
Start: 2021-07-10 | End: 2021-07-22 | Stop reason: HOSPADM

## 2021-07-10 RX ORDER — MAGNESIUM HYDROXIDE 1200 MG/15ML
LIQUID ORAL AS NEEDED
Status: DISCONTINUED | OUTPATIENT
Start: 2021-07-10 | End: 2021-07-10 | Stop reason: HOSPADM

## 2021-07-10 RX ORDER — ROCURONIUM BROMIDE 10 MG/ML
INJECTION, SOLUTION INTRAVENOUS AS NEEDED
Status: DISCONTINUED | OUTPATIENT
Start: 2021-07-10 | End: 2021-07-10 | Stop reason: SURG

## 2021-07-10 RX ORDER — DEXAMETHASONE SODIUM PHOSPHATE 4 MG/ML
INJECTION, SOLUTION INTRA-ARTICULAR; INTRALESIONAL; INTRAMUSCULAR; INTRAVENOUS; SOFT TISSUE AS NEEDED
Status: DISCONTINUED | OUTPATIENT
Start: 2021-07-10 | End: 2021-07-10 | Stop reason: SURG

## 2021-07-10 RX ORDER — LIDOCAINE HYDROCHLORIDE 20 MG/ML
INJECTION, SOLUTION INFILTRATION; PERINEURAL AS NEEDED
Status: DISCONTINUED | OUTPATIENT
Start: 2021-07-10 | End: 2021-07-10 | Stop reason: SURG

## 2021-07-10 RX ORDER — POTASSIUM CHLORIDE 7.45 MG/ML
10 INJECTION INTRAVENOUS
Status: COMPLETED | OUTPATIENT
Start: 2021-07-10 | End: 2021-07-10

## 2021-07-10 RX ORDER — OXYCODONE HYDROCHLORIDE 5 MG/1
5 TABLET ORAL
Status: DISCONTINUED | OUTPATIENT
Start: 2021-07-10 | End: 2021-07-10 | Stop reason: HOSPADM

## 2021-07-10 RX ORDER — SODIUM CHLORIDE 9 MG/ML
INJECTION, SOLUTION INTRAVENOUS CONTINUOUS PRN
Status: DISCONTINUED | OUTPATIENT
Start: 2021-07-09 | End: 2021-07-10 | Stop reason: SURG

## 2021-07-10 RX ORDER — FENTANYL CITRATE 50 UG/ML
INJECTION, SOLUTION INTRAMUSCULAR; INTRAVENOUS AS NEEDED
Status: DISCONTINUED | OUTPATIENT
Start: 2021-07-10 | End: 2021-07-10 | Stop reason: SURG

## 2021-07-10 RX ORDER — SODIUM CHLORIDE 0.9 % (FLUSH) 0.9 %
10 SYRINGE (ML) INJECTION EVERY 12 HOURS SCHEDULED
Status: DISCONTINUED | OUTPATIENT
Start: 2021-07-10 | End: 2021-07-22 | Stop reason: HOSPADM

## 2021-07-10 RX ORDER — POTASSIUM CHLORIDE 7.45 MG/ML
10 INJECTION INTRAVENOUS
Status: DISCONTINUED | OUTPATIENT
Start: 2021-07-10 | End: 2021-07-10 | Stop reason: SDUPTHER

## 2021-07-10 RX ORDER — POTASSIUM CHLORIDE 750 MG/1
20 CAPSULE, EXTENDED RELEASE ORAL 2 TIMES DAILY WITH MEALS
Status: DISPENSED | OUTPATIENT
Start: 2021-07-10 | End: 2021-07-12

## 2021-07-10 RX ORDER — SODIUM CHLORIDE, SODIUM LACTATE, POTASSIUM CHLORIDE, CALCIUM CHLORIDE 600; 310; 30; 20 MG/100ML; MG/100ML; MG/100ML; MG/100ML
30 INJECTION, SOLUTION INTRAVENOUS CONTINUOUS
Status: DISCONTINUED | OUTPATIENT
Start: 2021-07-10 | End: 2021-07-11

## 2021-07-10 RX ORDER — MEPERIDINE HYDROCHLORIDE 25 MG/ML
12.5 INJECTION INTRAMUSCULAR; INTRAVENOUS; SUBCUTANEOUS
Status: DISCONTINUED | OUTPATIENT
Start: 2021-07-10 | End: 2021-07-10 | Stop reason: HOSPADM

## 2021-07-10 RX ORDER — ONDANSETRON 2 MG/ML
INJECTION INTRAMUSCULAR; INTRAVENOUS AS NEEDED
Status: DISCONTINUED | OUTPATIENT
Start: 2021-07-10 | End: 2021-07-10 | Stop reason: SURG

## 2021-07-10 RX ORDER — PROMETHAZINE HYDROCHLORIDE 25 MG/1
25 SUPPOSITORY RECTAL ONCE AS NEEDED
Status: DISCONTINUED | OUTPATIENT
Start: 2021-07-10 | End: 2021-07-10 | Stop reason: HOSPADM

## 2021-07-10 RX ADMIN — POTASSIUM CHLORIDE 10 MEQ: 10 INJECTION, SOLUTION INTRAVENOUS at 09:47

## 2021-07-10 RX ADMIN — MICONAZOLE NITRATE: 2 POWDER TOPICAL at 18:39

## 2021-07-10 RX ADMIN — MICONAZOLE NITRATE: 2 POWDER TOPICAL at 20:13

## 2021-07-10 RX ADMIN — OXYCODONE HYDROCHLORIDE AND ACETAMINOPHEN 1 TABLET: 5; 325 TABLET ORAL at 21:45

## 2021-07-10 RX ADMIN — SODIUM CHLORIDE, PRESERVATIVE FREE 10 ML: 5 INJECTION INTRAVENOUS at 20:09

## 2021-07-10 RX ADMIN — POLYETHYLENE GLYCOL 3350 17 G: 17 POWDER, FOR SOLUTION ORAL at 20:08

## 2021-07-10 RX ADMIN — METHYLPREDNISOLONE SODIUM SUCCINATE 40 MG: 40 INJECTION, POWDER, FOR SOLUTION INTRAMUSCULAR; INTRAVENOUS at 20:08

## 2021-07-10 RX ADMIN — CEFEPIME HYDROCHLORIDE 2 G: 2 INJECTION, POWDER, FOR SOLUTION INTRAVENOUS at 12:24

## 2021-07-10 RX ADMIN — PROPOFOL 100 MG: 10 INJECTION, EMULSION INTRAVENOUS at 14:35

## 2021-07-10 RX ADMIN — HYDRALAZINE HYDROCHLORIDE 20 MG: 20 INJECTION INTRAMUSCULAR; INTRAVENOUS at 11:25

## 2021-07-10 RX ADMIN — SODIUM CHLORIDE, PRESERVATIVE FREE 3 ML: 5 INJECTION INTRAVENOUS at 08:31

## 2021-07-10 RX ADMIN — POTASSIUM CHLORIDE 10 MEQ: 10 INJECTION, SOLUTION INTRAVENOUS at 10:55

## 2021-07-10 RX ADMIN — DOCUSATE SODIUM 50MG AND SENNOSIDES 8.6MG 1 TABLET: 8.6; 5 TABLET, FILM COATED ORAL at 20:09

## 2021-07-10 RX ADMIN — SODIUM CHLORIDE, PRESERVATIVE FREE 3 ML: 5 INJECTION INTRAVENOUS at 20:12

## 2021-07-10 RX ADMIN — VANCOMYCIN HYDROCHLORIDE 1250 MG: 5 INJECTION, POWDER, LYOPHILIZED, FOR SOLUTION INTRAVENOUS at 21:46

## 2021-07-10 RX ADMIN — DEXAMETHASONE SODIUM PHOSPHATE 4 MG: 4 INJECTION INTRA-ARTICULAR; INTRALESIONAL; INTRAMUSCULAR; INTRAVENOUS; SOFT TISSUE at 14:35

## 2021-07-10 RX ADMIN — METHYLPREDNISOLONE SODIUM SUCCINATE 40 MG: 40 INJECTION, POWDER, FOR SOLUTION INTRAMUSCULAR; INTRAVENOUS at 03:21

## 2021-07-10 RX ADMIN — FUROSEMIDE 40 MG: 10 INJECTION, SOLUTION INTRAMUSCULAR; INTRAVENOUS at 01:09

## 2021-07-10 RX ADMIN — ALBUTEROL SULFATE 2.5 MG: 2.5 SOLUTION RESPIRATORY (INHALATION) at 09:16

## 2021-07-10 RX ADMIN — ROCURONIUM BROMIDE 50 MG: 10 INJECTION INTRAVENOUS at 14:35

## 2021-07-10 RX ADMIN — LIDOCAINE HYDROCHLORIDE 100 MG: 20 INJECTION, SOLUTION INFILTRATION; PERINEURAL at 14:35

## 2021-07-10 RX ADMIN — POTASSIUM CHLORIDE 10 MEQ: 10 INJECTION, SOLUTION INTRAVENOUS at 12:21

## 2021-07-10 RX ADMIN — POTASSIUM CHLORIDE 10 MEQ: 10 INJECTION, SOLUTION INTRAVENOUS at 08:30

## 2021-07-10 RX ADMIN — FUROSEMIDE 40 MG: 10 INJECTION, SOLUTION INTRAMUSCULAR; INTRAVENOUS at 11:21

## 2021-07-10 RX ADMIN — INSULIN DETEMIR 10 UNITS: 100 INJECTION, SOLUTION SUBCUTANEOUS at 20:10

## 2021-07-10 RX ADMIN — FENTANYL CITRATE 100 MCG: 50 INJECTION INTRAMUSCULAR; INTRAVENOUS at 14:35

## 2021-07-10 RX ADMIN — ALBUTEROL SULFATE 2.5 MG: 2.5 SOLUTION RESPIRATORY (INHALATION) at 02:52

## 2021-07-10 RX ADMIN — CEFEPIME HYDROCHLORIDE 2 G: 2 INJECTION, POWDER, FOR SOLUTION INTRAVENOUS at 01:16

## 2021-07-10 RX ADMIN — METHYLPREDNISOLONE SODIUM SUCCINATE 40 MG: 40 INJECTION, POWDER, FOR SOLUTION INTRAMUSCULAR; INTRAVENOUS at 20:13

## 2021-07-10 RX ADMIN — SUGAMMADEX 200 MG: 100 INJECTION, SOLUTION INTRAVENOUS at 14:47

## 2021-07-10 RX ADMIN — CLONIDINE HYDROCHLORIDE 0.1 MG: 0.1 TABLET ORAL at 20:09

## 2021-07-10 RX ADMIN — ARFORMOTEROL TARTRATE 15 MCG: 15 SOLUTION RESPIRATORY (INHALATION) at 09:16

## 2021-07-10 RX ADMIN — HYDRALAZINE HYDROCHLORIDE 20 MG: 20 INJECTION INTRAMUSCULAR; INTRAVENOUS at 06:28

## 2021-07-10 RX ADMIN — ALBUTEROL SULFATE 2.5 MG: 2.5 SOLUTION RESPIRATORY (INHALATION) at 12:48

## 2021-07-10 RX ADMIN — METHYLPREDNISOLONE SODIUM SUCCINATE 40 MG: 40 INJECTION, POWDER, FOR SOLUTION INTRAMUSCULAR; INTRAVENOUS at 11:21

## 2021-07-10 RX ADMIN — ALBUTEROL SULFATE 2.5 MG: 2.5 SOLUTION RESPIRATORY (INHALATION) at 23:55

## 2021-07-10 RX ADMIN — VANCOMYCIN HYDROCHLORIDE 1250 MG: 5 INJECTION, POWDER, LYOPHILIZED, FOR SOLUTION INTRAVENOUS at 13:10

## 2021-07-10 RX ADMIN — CEFEPIME HYDROCHLORIDE 2 G: 2 INJECTION, POWDER, FOR SOLUTION INTRAVENOUS at 16:41

## 2021-07-10 RX ADMIN — BUDESONIDE 0.5 MG: 0.5 INHALANT ORAL at 09:16

## 2021-07-10 RX ADMIN — PROPOFOL 175 MCG/KG/MIN: 10 INJECTION, EMULSION INTRAVENOUS at 14:35

## 2021-07-10 RX ADMIN — INSULIN DETEMIR 10 UNITS: 100 INJECTION, SOLUTION SUBCUTANEOUS at 08:33

## 2021-07-10 RX ADMIN — FUROSEMIDE 40 MG: 10 INJECTION, SOLUTION INTRAMUSCULAR; INTRAVENOUS at 23:46

## 2021-07-10 RX ADMIN — NICOTINE 1 PATCH: 14 PATCH, EXTENDED RELEASE TRANSDERMAL at 11:24

## 2021-07-10 RX ADMIN — ONDANSETRON 4 MG: 2 INJECTION INTRAMUSCULAR; INTRAVENOUS at 14:35

## 2021-07-10 NOTE — ANESTHESIA PREPROCEDURE EVALUATION
Anesthesia Evaluation     Nursing notes reviewed   NPO Solid Status: Waived due to emergency  NPO Liquid Status: Waived due to emergency           Airway   Mallampati: III  TM distance: >3 FB  Neck ROM: limited  Possible difficult intubation  Dental    (+) edentulous    Pulmonary    (+) pneumonia worsening , COPD, asthma,shortness of breath, rhonchi, decreased breath sounds,   Cardiovascular     ECG reviewed  PT is on anticoagulation therapy  Rhythm: regular  Rate: normal    (+) hypertension, CHF ,       Neuro/Psych  GI/Hepatic/Renal/Endo    (+)  GERD,  renal disease, diabetes mellitus type 2 poorly controlled,     Musculoskeletal     Abdominal    Substance History      OB/GYN          Other   arthritis,    history of cancer                    Anesthesia Plan    ASA 4 - emergent     general   total IV anesthesia and Rapid sequence(BiPAP ready in PACU; to resume BiPAP therapy postop.)  intravenous induction   Postoperative Plan: Expected vent after surgery  Anesthetic plan, all risks, benefits, and alternatives have been provided, discussed and informed consent has been obtained with: patient.    Plan discussed with CRNA and attending.

## 2021-07-10 NOTE — ANESTHESIA POSTPROCEDURE EVALUATION
Patient: Italia Olvera    Procedure Summary     Date: 07/10/21 Room / Location: [facility] OR    Anesthesia Start: 1407 Anesthesia Stop: 1508    Procedure: BRONCHOSCOPY WITH BRONCHOALVEOLAR LAVAGE, POSSIBLE BIOPSY, BRUSHING, WASHING, AIRWAY INSPECTION (N/A Bronchus) Diagnosis:       Multifocal pneumonia      (Multifocal pneumonia [J18.9])    Surgeons: Rodrigo Reyes MD Provider: Key Jiang MD    Anesthesia Type: general ASA Status: 4 - Emergent          Anesthesia Type: general    Vitals  Vitals Value Taken Time   /62 07/10/21 1545   Temp 36.6 °C (97.8 °F) 07/10/21 1530   Pulse 71 07/10/21 1545   Resp 26 07/10/21 1530   SpO2 95 % 07/10/21 1545   Vitals shown include unvalidated device data.        Post Anesthesia Care and Evaluation    Patient location during evaluation: bedside  Patient participation: complete - patient participated  Level of consciousness: awake  Pain score: 0  Pain management: adequate  Airway patency: patent  Anesthetic complications: No anesthetic complications  PONV Status: none  Cardiovascular status: acceptable and stable  Respiratory status: acceptable and BIPAP (Placed on BiPAP as previously indicated prior to OR)  Hydration status: acceptable    Comments: Patient was placed on BiPAP postoperatively as it had been ordered while on the floor but patient had been noncompliant with use.

## 2021-07-11 ENCOUNTER — APPOINTMENT (OUTPATIENT)
Dept: GENERAL RADIOLOGY | Facility: HOSPITAL | Age: 61
End: 2021-07-11

## 2021-07-11 LAB
ANION GAP SERPL CALCULATED.3IONS-SCNC: 13.9 MMOL/L (ref 5–15)
BUN SERPL-MCNC: 45 MG/DL (ref 8–23)
BUN/CREAT SERPL: 56.3 (ref 7–25)
CALCIUM SPEC-SCNC: 8.2 MG/DL (ref 8.6–10.5)
CHLORIDE SERPL-SCNC: 102 MMOL/L (ref 98–107)
CO2 SERPL-SCNC: 21.1 MMOL/L (ref 22–29)
CREAT SERPL-MCNC: 0.8 MG/DL (ref 0.57–1)
GFR SERPL CREATININE-BSD FRML MDRD: 73 ML/MIN/1.73
GLUCOSE BLDC GLUCOMTR-MCNC: 323 MG/DL (ref 70–130)
GLUCOSE BLDC GLUCOMTR-MCNC: 390 MG/DL (ref 70–130)
GLUCOSE BLDC GLUCOMTR-MCNC: 399 MG/DL (ref 70–130)
GLUCOSE BLDC GLUCOMTR-MCNC: 412 MG/DL (ref 70–130)
GLUCOSE BLDC GLUCOMTR-MCNC: 429 MG/DL (ref 70–130)
GLUCOSE BLDC GLUCOMTR-MCNC: 433 MG/DL (ref 70–130)
GLUCOSE BLDC GLUCOMTR-MCNC: 460 MG/DL (ref 70–130)
GLUCOSE BLDC GLUCOMTR-MCNC: 461 MG/DL (ref 70–130)
GLUCOSE SERPL-MCNC: 527 MG/DL (ref 65–99)
INR PPP: 2.12 (ref 2–3)
POTASSIUM SERPL-SCNC: 3.6 MMOL/L (ref 3.5–5.2)
PROTHROMBIN TIME: 21.7 SECONDS (ref 9.4–12)
SODIUM SERPL-SCNC: 137 MMOL/L (ref 136–145)
VANCOMYCIN SERPL-MCNC: 28.4 MCG/ML (ref 5–40)

## 2021-07-11 PROCEDURE — 80048 BASIC METABOLIC PNL TOTAL CA: CPT | Performed by: INTERNAL MEDICINE

## 2021-07-11 PROCEDURE — 94799 UNLISTED PULMONARY SVC/PX: CPT

## 2021-07-11 PROCEDURE — 94669 MECHANICAL CHEST WALL OSCILL: CPT

## 2021-07-11 PROCEDURE — 94760 N-INVAS EAR/PLS OXIMETRY 1: CPT

## 2021-07-11 PROCEDURE — 82962 GLUCOSE BLOOD TEST: CPT

## 2021-07-11 PROCEDURE — 25010000002 CEFEPIME PER 500 MG: Performed by: INTERNAL MEDICINE

## 2021-07-11 PROCEDURE — 51703 INSERT BLADDER CATH COMPLEX: CPT

## 2021-07-11 PROCEDURE — 80202 ASSAY OF VANCOMYCIN: CPT | Performed by: INTERNAL MEDICINE

## 2021-07-11 PROCEDURE — 94660 CPAP INITIATION&MGMT: CPT

## 2021-07-11 PROCEDURE — 25010000002 METHYLPREDNISOLONE PER 40 MG: Performed by: INTERNAL MEDICINE

## 2021-07-11 PROCEDURE — 25010000002 FUROSEMIDE PER 20 MG: Performed by: INTERNAL MEDICINE

## 2021-07-11 PROCEDURE — 63710000001 INSULIN LISPRO (HUMAN) PER 5 UNITS: Performed by: INTERNAL MEDICINE

## 2021-07-11 PROCEDURE — 63710000001 INSULIN DETEMIR PER 5 UNITS: Performed by: INTERNAL MEDICINE

## 2021-07-11 PROCEDURE — 99233 SBSQ HOSP IP/OBS HIGH 50: CPT | Performed by: INTERNAL MEDICINE

## 2021-07-11 PROCEDURE — 71045 X-RAY EXAM CHEST 1 VIEW: CPT | Performed by: RADIOLOGY

## 2021-07-11 PROCEDURE — 71045 X-RAY EXAM CHEST 1 VIEW: CPT

## 2021-07-11 PROCEDURE — 85610 PROTHROMBIN TIME: CPT | Performed by: INTERNAL MEDICINE

## 2021-07-11 RX ORDER — SODIUM CHLORIDE 9 MG/ML
30 INJECTION, SOLUTION INTRAVENOUS CONTINUOUS
Status: DISCONTINUED | OUTPATIENT
Start: 2021-07-11 | End: 2021-07-22 | Stop reason: HOSPADM

## 2021-07-11 RX ADMIN — ARFORMOTEROL TARTRATE 15 MCG: 15 SOLUTION RESPIRATORY (INHALATION) at 07:19

## 2021-07-11 RX ADMIN — ALBUTEROL SULFATE 2.5 MG: 2.5 SOLUTION RESPIRATORY (INHALATION) at 23:30

## 2021-07-11 RX ADMIN — OXYCODONE HYDROCHLORIDE AND ACETAMINOPHEN 1 TABLET: 5; 325 TABLET ORAL at 04:36

## 2021-07-11 RX ADMIN — ALBUTEROL SULFATE 2.5 MG: 2.5 SOLUTION RESPIRATORY (INHALATION) at 07:19

## 2021-07-11 RX ADMIN — SPIRONOLACTONE 50 MG: 25 TABLET ORAL at 08:33

## 2021-07-11 RX ADMIN — FUROSEMIDE 40 MG: 10 INJECTION, SOLUTION INTRAMUSCULAR; INTRAVENOUS at 23:22

## 2021-07-11 RX ADMIN — POTASSIUM CHLORIDE 20 MEQ: 10 CAPSULE, COATED, EXTENDED RELEASE ORAL at 17:45

## 2021-07-11 RX ADMIN — FAMOTIDINE 20 MG: 20 TABLET ORAL at 07:00

## 2021-07-11 RX ADMIN — METHYLPREDNISOLONE SODIUM SUCCINATE 40 MG: 40 INJECTION, POWDER, FOR SOLUTION INTRAMUSCULAR; INTRAVENOUS at 11:16

## 2021-07-11 RX ADMIN — CEFEPIME HYDROCHLORIDE 2 G: 2 INJECTION, POWDER, FOR SOLUTION INTRAVENOUS at 08:36

## 2021-07-11 RX ADMIN — INSULIN LISPRO 7 UNITS: 100 INJECTION, SOLUTION INTRAVENOUS; SUBCUTANEOUS at 06:59

## 2021-07-11 RX ADMIN — NICOTINE 1 PATCH: 14 PATCH, EXTENDED RELEASE TRANSDERMAL at 08:41

## 2021-07-11 RX ADMIN — OXYCODONE HYDROCHLORIDE AND ACETAMINOPHEN 1 TABLET: 5; 325 TABLET ORAL at 11:48

## 2021-07-11 RX ADMIN — SODIUM CHLORIDE, PRESERVATIVE FREE 3 ML: 5 INJECTION INTRAVENOUS at 20:15

## 2021-07-11 RX ADMIN — SODIUM CHLORIDE, PRESERVATIVE FREE 3 ML: 5 INJECTION INTRAVENOUS at 23:22

## 2021-07-11 RX ADMIN — SERTRALINE HYDROCHLORIDE 100 MG: 100 TABLET ORAL at 08:35

## 2021-07-11 RX ADMIN — ALBUTEROL SULFATE 2.5 MG: 2.5 SOLUTION RESPIRATORY (INHALATION) at 03:00

## 2021-07-11 RX ADMIN — SODIUM CHLORIDE 30 ML: 9 INJECTION, SOLUTION INTRAVENOUS at 21:42

## 2021-07-11 RX ADMIN — MICONAZOLE NITRATE 1 APPLICATION: 2 POWDER TOPICAL at 08:53

## 2021-07-11 RX ADMIN — METHYLPREDNISOLONE SODIUM SUCCINATE 40 MG: 40 INJECTION, POWDER, FOR SOLUTION INTRAMUSCULAR; INTRAVENOUS at 04:27

## 2021-07-11 RX ADMIN — LOSARTAN POTASSIUM 100 MG: 25 TABLET, FILM COATED ORAL at 08:34

## 2021-07-11 RX ADMIN — INSULIN LISPRO 7 UNITS: 100 INJECTION, SOLUTION INTRAVENOUS; SUBCUTANEOUS at 17:45

## 2021-07-11 RX ADMIN — INSULIN DETEMIR 10 UNITS: 100 INJECTION, SOLUTION SUBCUTANEOUS at 20:23

## 2021-07-11 RX ADMIN — CEFEPIME HYDROCHLORIDE 2 G: 2 INJECTION, POWDER, FOR SOLUTION INTRAVENOUS at 00:24

## 2021-07-11 RX ADMIN — ALBUTEROL SULFATE 2.5 MG: 2.5 SOLUTION RESPIRATORY (INHALATION) at 19:55

## 2021-07-11 RX ADMIN — MICONAZOLE NITRATE: 2 POWDER TOPICAL at 20:24

## 2021-07-11 RX ADMIN — INSULIN LISPRO 5 UNITS: 100 INJECTION, SOLUTION INTRAVENOUS; SUBCUTANEOUS at 11:49

## 2021-07-11 RX ADMIN — CEFEPIME HYDROCHLORIDE 2 G: 2 INJECTION, POWDER, FOR SOLUTION INTRAVENOUS at 15:57

## 2021-07-11 RX ADMIN — POTASSIUM CHLORIDE 20 MEQ: 10 CAPSULE, COATED, EXTENDED RELEASE ORAL at 08:34

## 2021-07-11 RX ADMIN — FAMOTIDINE 20 MG: 20 TABLET ORAL at 17:45

## 2021-07-11 RX ADMIN — SODIUM CHLORIDE, PRESERVATIVE FREE 10 ML: 5 INJECTION INTRAVENOUS at 08:36

## 2021-07-11 RX ADMIN — FUROSEMIDE 40 MG: 10 INJECTION, SOLUTION INTRAMUSCULAR; INTRAVENOUS at 11:16

## 2021-07-11 RX ADMIN — BISOPROLOL FUMARATE 20 MG: 5 TABLET ORAL at 08:34

## 2021-07-11 RX ADMIN — CLONIDINE HYDROCHLORIDE 0.1 MG: 0.1 TABLET ORAL at 20:23

## 2021-07-11 RX ADMIN — ALBUTEROL SULFATE 2.5 MG: 2.5 SOLUTION RESPIRATORY (INHALATION) at 16:38

## 2021-07-11 RX ADMIN — INSULIN DETEMIR 10 UNITS: 100 INJECTION, SOLUTION SUBCUTANEOUS at 08:44

## 2021-07-11 RX ADMIN — METHYLPREDNISOLONE SODIUM SUCCINATE 40 MG: 40 INJECTION, POWDER, FOR SOLUTION INTRAMUSCULAR; INTRAVENOUS at 20:14

## 2021-07-11 RX ADMIN — ARFORMOTEROL TARTRATE 15 MCG: 15 SOLUTION RESPIRATORY (INHALATION) at 19:55

## 2021-07-11 RX ADMIN — INSULIN LISPRO 9 UNITS: 100 INJECTION, SOLUTION INTRAVENOUS; SUBCUTANEOUS at 21:41

## 2021-07-11 RX ADMIN — OXYCODONE HYDROCHLORIDE AND ACETAMINOPHEN 1 TABLET: 5; 325 TABLET ORAL at 20:15

## 2021-07-11 RX ADMIN — CEFEPIME HYDROCHLORIDE 2 G: 2 INJECTION, POWDER, FOR SOLUTION INTRAVENOUS at 23:35

## 2021-07-11 RX ADMIN — POTASSIUM CHLORIDE 40 MEQ: 1.5 POWDER, FOR SOLUTION ORAL at 08:37

## 2021-07-11 RX ADMIN — BUDESONIDE 0.5 MG: 0.5 INHALANT ORAL at 07:19

## 2021-07-12 LAB
ARTERIAL PATENCY WRIST A: POSITIVE
BACTERIA SPEC AEROBE CULT: NORMAL
BASE EXCESS BLDA CALC-SCNC: 3 MMOL/L (ref -2–2)
BDY SITE: ABNORMAL
COHGB MFR BLD: 0.4 % (ref 0–1.5)
FHHB: 7.2 % (ref 0–5)
GAS FLOW AIRWAY: 10 LPM
GLUCOSE BLDC GLUCOMTR-MCNC: 292 MG/DL (ref 70–130)
GLUCOSE BLDC GLUCOMTR-MCNC: 323 MG/DL (ref 70–130)
GLUCOSE BLDC GLUCOMTR-MCNC: 388 MG/DL (ref 70–130)
GLUCOSE BLDC GLUCOMTR-MCNC: 391 MG/DL (ref 70–130)
GLUCOSE BLDC GLUCOMTR-MCNC: 398 MG/DL (ref 70–130)
GLUCOSE BLDC GLUCOMTR-MCNC: 486 MG/DL (ref 70–130)
GLUCOSE BLDC GLUCOMTR-MCNC: 492 MG/DL (ref 70–130)
GLUCOSE BLDC GLUCOMTR-MCNC: 498 MG/DL (ref 70–130)
GLUCOSE BLDC GLUCOMTR-MCNC: >500 MG/DL (ref 70–130)
GLUCOSE SERPL-MCNC: 564 MG/DL (ref 65–99)
GRAM STN SPEC: NORMAL
HCO3 BLDA-SCNC: 25.8 MMOL/L (ref 22–26)
HGB BLDA-MCNC: 10.9 G/DL (ref 11.7–14.6)
INR PPP: 1.77 (ref 2–3)
LACTATE BLDA-SCNC: 2.38 MMOL/L (ref 0.5–2)
METHGB BLD QL: 0.3 % (ref 0–1.5)
MODALITY: ABNORMAL
OXYHGB MFR BLDV: 92.1 % (ref 94–99)
PCO2 BLDA: 33 MM HG (ref 35–45)
PH BLDA: 7.51 PH UNITS (ref 7.35–7.45)
PO2 BLDA: 66.6 MM HG (ref 80–100)
PROTHROMBIN TIME: 18.1 SECONDS (ref 9.4–12)
SAO2 % BLDCOA: 92.7 % (ref 95–99)

## 2021-07-12 PROCEDURE — 25010000002 VANCOMYCIN 5 G RECONSTITUTED SOLUTION: Performed by: INTERNAL MEDICINE

## 2021-07-12 PROCEDURE — 25010000002 METHYLPREDNISOLONE PER 40 MG: Performed by: INTERNAL MEDICINE

## 2021-07-12 PROCEDURE — 94799 UNLISTED PULMONARY SVC/PX: CPT

## 2021-07-12 PROCEDURE — 63710000001 INSULIN LISPRO (HUMAN) PER 5 UNITS: Performed by: INTERNAL MEDICINE

## 2021-07-12 PROCEDURE — 63710000001 INSULIN DETEMIR PER 5 UNITS: Performed by: INTERNAL MEDICINE

## 2021-07-12 PROCEDURE — 82962 GLUCOSE BLOOD TEST: CPT

## 2021-07-12 PROCEDURE — 85610 PROTHROMBIN TIME: CPT | Performed by: INTERNAL MEDICINE

## 2021-07-12 PROCEDURE — 82947 ASSAY GLUCOSE BLOOD QUANT: CPT | Performed by: INTERNAL MEDICINE

## 2021-07-12 PROCEDURE — 94660 CPAP INITIATION&MGMT: CPT

## 2021-07-12 PROCEDURE — 25010000002 FUROSEMIDE PER 20 MG: Performed by: INTERNAL MEDICINE

## 2021-07-12 PROCEDURE — 97110 THERAPEUTIC EXERCISES: CPT

## 2021-07-12 PROCEDURE — 25010000002 CEFEPIME PER 500 MG: Performed by: INTERNAL MEDICINE

## 2021-07-12 PROCEDURE — 25010000002 HYDRALAZINE PER 20 MG: Performed by: INTERNAL MEDICINE

## 2021-07-12 PROCEDURE — 99232 SBSQ HOSP IP/OBS MODERATE 35: CPT | Performed by: INTERNAL MEDICINE

## 2021-07-12 PROCEDURE — 94760 N-INVAS EAR/PLS OXIMETRY 1: CPT

## 2021-07-12 RX ORDER — CLONIDINE HYDROCHLORIDE 0.2 MG/1
0.2 TABLET ORAL NIGHTLY
Status: DISCONTINUED | OUTPATIENT
Start: 2021-07-12 | End: 2021-07-17

## 2021-07-12 RX ORDER — PREDNISONE 20 MG/1
40 TABLET ORAL
Status: DISCONTINUED | OUTPATIENT
Start: 2021-07-13 | End: 2021-07-22 | Stop reason: HOSPADM

## 2021-07-12 RX ORDER — FUROSEMIDE 10 MG/ML
80 INJECTION INTRAMUSCULAR; INTRAVENOUS EVERY 12 HOURS
Status: DISCONTINUED | OUTPATIENT
Start: 2021-07-12 | End: 2021-07-19

## 2021-07-12 RX ADMIN — CLONIDINE HYDROCHLORIDE 0.2 MG: 0.1 TABLET ORAL at 20:17

## 2021-07-12 RX ADMIN — POTASSIUM CHLORIDE 40 MEQ: 1.5 POWDER, FOR SOLUTION ORAL at 08:31

## 2021-07-12 RX ADMIN — INSULIN DETEMIR 15 UNITS: 100 INJECTION, SOLUTION SUBCUTANEOUS at 20:18

## 2021-07-12 RX ADMIN — SODIUM CHLORIDE, PRESERVATIVE FREE 10 ML: 5 INJECTION INTRAVENOUS at 20:18

## 2021-07-12 RX ADMIN — SERTRALINE HYDROCHLORIDE 100 MG: 100 TABLET ORAL at 08:31

## 2021-07-12 RX ADMIN — ALBUTEROL SULFATE 2.5 MG: 2.5 SOLUTION RESPIRATORY (INHALATION) at 02:24

## 2021-07-12 RX ADMIN — FAMOTIDINE 20 MG: 20 TABLET ORAL at 08:30

## 2021-07-12 RX ADMIN — FUROSEMIDE 40 MG: 10 INJECTION, SOLUTION INTRAMUSCULAR; INTRAVENOUS at 11:00

## 2021-07-12 RX ADMIN — INSULIN LISPRO 6 UNITS: 100 INJECTION, SOLUTION INTRAVENOUS; SUBCUTANEOUS at 12:10

## 2021-07-12 RX ADMIN — SPIRONOLACTONE 50 MG: 25 TABLET ORAL at 08:30

## 2021-07-12 RX ADMIN — CEFEPIME HYDROCHLORIDE 2 G: 2 INJECTION, POWDER, FOR SOLUTION INTRAVENOUS at 16:45

## 2021-07-12 RX ADMIN — HYDRALAZINE HYDROCHLORIDE 20 MG: 20 INJECTION INTRAMUSCULAR; INTRAVENOUS at 08:31

## 2021-07-12 RX ADMIN — INSULIN LISPRO 7 UNITS: 100 INJECTION, SOLUTION INTRAVENOUS; SUBCUTANEOUS at 17:44

## 2021-07-12 RX ADMIN — METHYLPREDNISOLONE SODIUM SUCCINATE 40 MG: 40 INJECTION, POWDER, FOR SOLUTION INTRAMUSCULAR; INTRAVENOUS at 11:00

## 2021-07-12 RX ADMIN — MICONAZOLE NITRATE: 2 POWDER TOPICAL at 10:14

## 2021-07-12 RX ADMIN — MICONAZOLE NITRATE: 2 POWDER TOPICAL at 22:15

## 2021-07-12 RX ADMIN — NICOTINE 1 PATCH: 14 PATCH, EXTENDED RELEASE TRANSDERMAL at 09:08

## 2021-07-12 RX ADMIN — INSULIN LISPRO 4 UNITS: 100 INJECTION, SOLUTION INTRAVENOUS; SUBCUTANEOUS at 08:30

## 2021-07-12 RX ADMIN — LACTULOSE 30 G: 20 SOLUTION ORAL at 16:44

## 2021-07-12 RX ADMIN — LOSARTAN POTASSIUM 100 MG: 25 TABLET, FILM COATED ORAL at 08:31

## 2021-07-12 RX ADMIN — ALBUTEROL SULFATE 2.5 MG: 2.5 SOLUTION RESPIRATORY (INHALATION) at 04:51

## 2021-07-12 RX ADMIN — ARFORMOTEROL TARTRATE 15 MCG: 15 SOLUTION RESPIRATORY (INHALATION) at 19:30

## 2021-07-12 RX ADMIN — ALBUTEROL SULFATE 2.5 MG: 2.5 SOLUTION RESPIRATORY (INHALATION) at 13:40

## 2021-07-12 RX ADMIN — ALBUTEROL SULFATE 2.5 MG: 2.5 SOLUTION RESPIRATORY (INHALATION) at 22:06

## 2021-07-12 RX ADMIN — INSULIN LISPRO 10 UNITS: 100 INJECTION, SOLUTION INTRAVENOUS; SUBCUTANEOUS at 22:14

## 2021-07-12 RX ADMIN — OXYCODONE HYDROCHLORIDE AND ACETAMINOPHEN 1 TABLET: 5; 325 TABLET ORAL at 20:17

## 2021-07-12 RX ADMIN — INSULIN DETEMIR 10 UNITS: 100 INJECTION, SOLUTION SUBCUTANEOUS at 08:30

## 2021-07-12 RX ADMIN — ARFORMOTEROL TARTRATE 15 MCG: 15 SOLUTION RESPIRATORY (INHALATION) at 08:09

## 2021-07-12 RX ADMIN — SODIUM CHLORIDE, PRESERVATIVE FREE 3 ML: 5 INJECTION INTRAVENOUS at 22:41

## 2021-07-12 RX ADMIN — FAMOTIDINE 20 MG: 20 TABLET ORAL at 17:44

## 2021-07-12 RX ADMIN — ALBUTEROL SULFATE 2.5 MG: 2.5 SOLUTION RESPIRATORY (INHALATION) at 08:08

## 2021-07-12 RX ADMIN — ALBUTEROL SULFATE 2.5 MG: 2.5 SOLUTION RESPIRATORY (INHALATION) at 10:41

## 2021-07-12 RX ADMIN — CEFEPIME HYDROCHLORIDE 2 G: 2 INJECTION, POWDER, FOR SOLUTION INTRAVENOUS at 08:33

## 2021-07-12 RX ADMIN — BISOPROLOL FUMARATE 20 MG: 5 TABLET ORAL at 08:31

## 2021-07-12 RX ADMIN — METHYLPREDNISOLONE SODIUM SUCCINATE 40 MG: 40 INJECTION, POWDER, FOR SOLUTION INTRAMUSCULAR; INTRAVENOUS at 03:37

## 2021-07-12 RX ADMIN — ALBUTEROL SULFATE 2.5 MG: 2.5 SOLUTION RESPIRATORY (INHALATION) at 19:30

## 2021-07-12 RX ADMIN — SODIUM CHLORIDE, PRESERVATIVE FREE 10 ML: 5 INJECTION INTRAVENOUS at 00:08

## 2021-07-12 RX ADMIN — ALBUTEROL SULFATE 2.5 MG: 2.5 SOLUTION RESPIRATORY (INHALATION) at 16:15

## 2021-07-12 RX ADMIN — VANCOMYCIN HYDROCHLORIDE 1000 MG: 5 INJECTION, POWDER, LYOPHILIZED, FOR SOLUTION INTRAVENOUS at 05:55

## 2021-07-12 RX ADMIN — INSULIN LISPRO 9 UNITS: 100 INJECTION, SOLUTION INTRAVENOUS; SUBCUTANEOUS at 01:05

## 2021-07-13 LAB
ALBUMIN SERPL-MCNC: 2.3 G/DL (ref 3.5–5.2)
ALBUMIN/GLOB SERPL: 0.6 G/DL
ALP SERPL-CCNC: 148 U/L (ref 39–117)
ALT SERPL W P-5'-P-CCNC: 46 U/L (ref 1–33)
ANION GAP SERPL CALCULATED.3IONS-SCNC: 10.1 MMOL/L (ref 5–15)
APTT SCREEN TO CONFIRM RATIO: 1.01 RATIO (ref 0–1.4)
AST SERPL-CCNC: 32 U/L (ref 1–32)
BILIRUB SERPL-MCNC: 0.3 MG/DL (ref 0–1.2)
BUN SERPL-MCNC: 40 MG/DL (ref 8–23)
BUN/CREAT SERPL: 114.3 (ref 7–25)
CALCIUM SPEC-SCNC: 8.9 MG/DL (ref 8.6–10.5)
CHLORIDE SERPL-SCNC: 99 MMOL/L (ref 98–107)
CO2 SERPL-SCNC: 23.9 MMOL/L (ref 22–29)
CONFIRM APTT/NORMAL: 117.7 SEC (ref 0–55)
CREAT SERPL-MCNC: 0.35 MG/DL (ref 0.57–1)
GFR SERPL CREATININE-BSD FRML MDRD: >150 ML/MIN/1.73
GLOBULIN UR ELPH-MCNC: 3.7 GM/DL
GLUCOSE BLDC GLUCOMTR-MCNC: 219 MG/DL (ref 70–130)
GLUCOSE BLDC GLUCOMTR-MCNC: 240 MG/DL (ref 70–130)
GLUCOSE BLDC GLUCOMTR-MCNC: 281 MG/DL (ref 70–130)
GLUCOSE BLDC GLUCOMTR-MCNC: 284 MG/DL (ref 70–130)
GLUCOSE BLDC GLUCOMTR-MCNC: 452 MG/DL (ref 70–130)
GLUCOSE SERPL-MCNC: 342 MG/DL (ref 65–99)
INR PPP: 1.25 (ref 2–3)
POTASSIUM SERPL-SCNC: 3.9 MMOL/L (ref 3.5–5.2)
PROT SERPL-MCNC: 6 G/DL (ref 6–8.5)
PROTHROMBIN TIME: 13.2 SECONDS (ref 9.4–12)
SODIUM SERPL-SCNC: 133 MMOL/L (ref 136–145)

## 2021-07-13 PROCEDURE — 63710000001 PREDNISONE PER 1 MG: Performed by: INTERNAL MEDICINE

## 2021-07-13 PROCEDURE — 25010000002 CEFEPIME PER 500 MG: Performed by: INTERNAL MEDICINE

## 2021-07-13 PROCEDURE — 63710000001 INSULIN DETEMIR PER 5 UNITS: Performed by: INTERNAL MEDICINE

## 2021-07-13 PROCEDURE — 63710000001 INSULIN LISPRO (HUMAN) PER 5 UNITS: Performed by: INTERNAL MEDICINE

## 2021-07-13 PROCEDURE — 94799 UNLISTED PULMONARY SVC/PX: CPT

## 2021-07-13 PROCEDURE — 94669 MECHANICAL CHEST WALL OSCILL: CPT

## 2021-07-13 PROCEDURE — 99232 SBSQ HOSP IP/OBS MODERATE 35: CPT | Performed by: INTERNAL MEDICINE

## 2021-07-13 PROCEDURE — 85610 PROTHROMBIN TIME: CPT | Performed by: INTERNAL MEDICINE

## 2021-07-13 PROCEDURE — 25010000002 FUROSEMIDE PER 20 MG: Performed by: INTERNAL MEDICINE

## 2021-07-13 PROCEDURE — 25010000002 HYDRALAZINE PER 20 MG: Performed by: INTERNAL MEDICINE

## 2021-07-13 PROCEDURE — 80053 COMPREHEN METABOLIC PANEL: CPT | Performed by: INTERNAL MEDICINE

## 2021-07-13 PROCEDURE — 94660 CPAP INITIATION&MGMT: CPT

## 2021-07-13 PROCEDURE — 82962 GLUCOSE BLOOD TEST: CPT

## 2021-07-13 RX ADMIN — ALBUTEROL SULFATE 2.5 MG: 2.5 SOLUTION RESPIRATORY (INHALATION) at 20:38

## 2021-07-13 RX ADMIN — FAMOTIDINE 20 MG: 20 TABLET ORAL at 09:13

## 2021-07-13 RX ADMIN — LOSARTAN POTASSIUM 100 MG: 25 TABLET, FILM COATED ORAL at 09:12

## 2021-07-13 RX ADMIN — SODIUM CHLORIDE, PRESERVATIVE FREE 10 ML: 5 INJECTION INTRAVENOUS at 09:23

## 2021-07-13 RX ADMIN — ARFORMOTEROL TARTRATE 15 MCG: 15 SOLUTION RESPIRATORY (INHALATION) at 20:39

## 2021-07-13 RX ADMIN — SODIUM CHLORIDE, PRESERVATIVE FREE: 5 INJECTION INTRAVENOUS at 20:30

## 2021-07-13 RX ADMIN — BISOPROLOL FUMARATE 20 MG: 5 TABLET ORAL at 09:12

## 2021-07-13 RX ADMIN — ALBUTEROL SULFATE 2.5 MG: 2.5 SOLUTION RESPIRATORY (INHALATION) at 04:04

## 2021-07-13 RX ADMIN — NICOTINE 1 PATCH: 14 PATCH, EXTENDED RELEASE TRANSDERMAL at 09:23

## 2021-07-13 RX ADMIN — CEFEPIME HYDROCHLORIDE 2 G: 2 INJECTION, POWDER, FOR SOLUTION INTRAVENOUS at 23:29

## 2021-07-13 RX ADMIN — INSULIN LISPRO 3 UNITS: 100 INJECTION, SOLUTION INTRAVENOUS; SUBCUTANEOUS at 11:51

## 2021-07-13 RX ADMIN — ALBUTEROL SULFATE 2.5 MG: 2.5 SOLUTION RESPIRATORY (INHALATION) at 14:06

## 2021-07-13 RX ADMIN — INSULIN LISPRO 12 UNITS: 100 INJECTION, SOLUTION INTRAVENOUS; SUBCUTANEOUS at 21:18

## 2021-07-13 RX ADMIN — INSULIN DETEMIR 15 UNITS: 100 INJECTION, SOLUTION SUBCUTANEOUS at 09:12

## 2021-07-13 RX ADMIN — CEFEPIME HYDROCHLORIDE 2 G: 2 INJECTION, POWDER, FOR SOLUTION INTRAVENOUS at 09:23

## 2021-07-13 RX ADMIN — FUROSEMIDE 80 MG: 10 INJECTION, SOLUTION INTRAMUSCULAR; INTRAVENOUS at 23:00

## 2021-07-13 RX ADMIN — LACTULOSE 40 G: 20 SOLUTION ORAL at 20:29

## 2021-07-13 RX ADMIN — MICONAZOLE NITRATE: 2 POWDER TOPICAL at 09:23

## 2021-07-13 RX ADMIN — HYDRALAZINE HYDROCHLORIDE 20 MG: 20 INJECTION INTRAMUSCULAR; INTRAVENOUS at 03:48

## 2021-07-13 RX ADMIN — SODIUM CHLORIDE, PRESERVATIVE FREE 3 ML: 5 INJECTION INTRAVENOUS at 09:23

## 2021-07-13 RX ADMIN — SPIRONOLACTONE 50 MG: 25 TABLET ORAL at 09:13

## 2021-07-13 RX ADMIN — SERTRALINE HYDROCHLORIDE 100 MG: 100 TABLET ORAL at 09:12

## 2021-07-13 RX ADMIN — CEFEPIME HYDROCHLORIDE 2 G: 2 INJECTION, POWDER, FOR SOLUTION INTRAVENOUS at 00:05

## 2021-07-13 RX ADMIN — CEFEPIME HYDROCHLORIDE 2 G: 2 INJECTION, POWDER, FOR SOLUTION INTRAVENOUS at 16:13

## 2021-07-13 RX ADMIN — POTASSIUM CHLORIDE 40 MEQ: 1.5 POWDER, FOR SOLUTION ORAL at 09:11

## 2021-07-13 RX ADMIN — INSULIN LISPRO 4 UNITS: 100 INJECTION, SOLUTION INTRAVENOUS; SUBCUTANEOUS at 17:20

## 2021-07-13 RX ADMIN — POLYETHYLENE GLYCOL 3350 17 G: 17 POWDER, FOR SOLUTION ORAL at 20:29

## 2021-07-13 RX ADMIN — INSULIN DETEMIR 15 UNITS: 100 INJECTION, SOLUTION SUBCUTANEOUS at 20:29

## 2021-07-13 RX ADMIN — LACTULOSE 30 G: 20 SOLUTION ORAL at 09:12

## 2021-07-13 RX ADMIN — ALBUTEROL SULFATE 2.5 MG: 2.5 SOLUTION RESPIRATORY (INHALATION) at 07:18

## 2021-07-13 RX ADMIN — DOCUSATE SODIUM 50MG AND SENNOSIDES 8.6MG 1 TABLET: 8.6; 5 TABLET, FILM COATED ORAL at 20:30

## 2021-07-13 RX ADMIN — FUROSEMIDE 80 MG: 10 INJECTION, SOLUTION INTRAMUSCULAR; INTRAVENOUS at 00:04

## 2021-07-13 RX ADMIN — ARFORMOTEROL TARTRATE 15 MCG: 15 SOLUTION RESPIRATORY (INHALATION) at 07:18

## 2021-07-13 RX ADMIN — LACTULOSE 30 G: 20 SOLUTION ORAL at 16:13

## 2021-07-13 RX ADMIN — FUROSEMIDE 80 MG: 10 INJECTION, SOLUTION INTRAMUSCULAR; INTRAVENOUS at 11:51

## 2021-07-13 RX ADMIN — ALBUTEROL SULFATE 2.5 MG: 2.5 SOLUTION RESPIRATORY (INHALATION) at 16:39

## 2021-07-13 RX ADMIN — DOCUSATE SODIUM 50MG AND SENNOSIDES 8.6MG 1 TABLET: 8.6; 5 TABLET, FILM COATED ORAL at 09:12

## 2021-07-13 RX ADMIN — MICONAZOLE NITRATE: 2 POWDER TOPICAL at 20:49

## 2021-07-13 RX ADMIN — ALBUTEROL SULFATE 2.5 MG: 2.5 SOLUTION RESPIRATORY (INHALATION) at 01:17

## 2021-07-13 RX ADMIN — CLONIDINE HYDROCHLORIDE 0.2 MG: 0.1 TABLET ORAL at 20:30

## 2021-07-13 RX ADMIN — INSULIN LISPRO 3 UNITS: 100 INJECTION, SOLUTION INTRAVENOUS; SUBCUTANEOUS at 09:11

## 2021-07-13 RX ADMIN — FAMOTIDINE 20 MG: 20 TABLET ORAL at 17:20

## 2021-07-13 RX ADMIN — PREDNISONE 40 MG: 20 TABLET ORAL at 09:13

## 2021-07-14 LAB
GLUCOSE BLDC GLUCOMTR-MCNC: 106 MG/DL (ref 70–130)
GLUCOSE BLDC GLUCOMTR-MCNC: 191 MG/DL (ref 70–130)
GLUCOSE BLDC GLUCOMTR-MCNC: 372 MG/DL (ref 70–130)
GLUCOSE BLDC GLUCOMTR-MCNC: 400 MG/DL (ref 70–130)
GLUCOSE BLDC GLUCOMTR-MCNC: 78 MG/DL (ref 70–130)
INR PPP: 1.21 (ref 2–3)
PROTHROMBIN TIME: 13 SECONDS (ref 9.4–12)

## 2021-07-14 PROCEDURE — 63710000001 PREDNISONE PER 1 MG: Performed by: INTERNAL MEDICINE

## 2021-07-14 PROCEDURE — 82962 GLUCOSE BLOOD TEST: CPT

## 2021-07-14 PROCEDURE — 94669 MECHANICAL CHEST WALL OSCILL: CPT

## 2021-07-14 PROCEDURE — 63710000001 INSULIN LISPRO (HUMAN) PER 5 UNITS: Performed by: INTERNAL MEDICINE

## 2021-07-14 PROCEDURE — 63710000001 INSULIN DETEMIR PER 5 UNITS: Performed by: INTERNAL MEDICINE

## 2021-07-14 PROCEDURE — 85610 PROTHROMBIN TIME: CPT | Performed by: INTERNAL MEDICINE

## 2021-07-14 PROCEDURE — 25010000002 CEFEPIME PER 500 MG: Performed by: INTERNAL MEDICINE

## 2021-07-14 PROCEDURE — 99232 SBSQ HOSP IP/OBS MODERATE 35: CPT | Performed by: INTERNAL MEDICINE

## 2021-07-14 PROCEDURE — 94799 UNLISTED PULMONARY SVC/PX: CPT

## 2021-07-14 PROCEDURE — 94760 N-INVAS EAR/PLS OXIMETRY 1: CPT

## 2021-07-14 PROCEDURE — 94660 CPAP INITIATION&MGMT: CPT

## 2021-07-14 PROCEDURE — 94640 AIRWAY INHALATION TREATMENT: CPT

## 2021-07-14 PROCEDURE — 25010000002 FUROSEMIDE PER 20 MG: Performed by: INTERNAL MEDICINE

## 2021-07-14 RX ADMIN — ARFORMOTEROL TARTRATE 15 MCG: 15 SOLUTION RESPIRATORY (INHALATION) at 08:12

## 2021-07-14 RX ADMIN — CEFEPIME HYDROCHLORIDE 2 G: 2 INJECTION, POWDER, FOR SOLUTION INTRAVENOUS at 09:05

## 2021-07-14 RX ADMIN — SODIUM CHLORIDE, PRESERVATIVE FREE 3 ML: 5 INJECTION INTRAVENOUS at 20:19

## 2021-07-14 RX ADMIN — PREDNISONE 40 MG: 20 TABLET ORAL at 09:07

## 2021-07-14 RX ADMIN — SODIUM CHLORIDE, PRESERVATIVE FREE 10 ML: 5 INJECTION INTRAVENOUS at 09:09

## 2021-07-14 RX ADMIN — DOCUSATE SODIUM 50MG AND SENNOSIDES 8.6MG 1 TABLET: 8.6; 5 TABLET, FILM COATED ORAL at 20:18

## 2021-07-14 RX ADMIN — ALBUTEROL SULFATE 2.5 MG: 2.5 SOLUTION RESPIRATORY (INHALATION) at 03:24

## 2021-07-14 RX ADMIN — POTASSIUM CHLORIDE 40 MEQ: 1.5 POWDER, FOR SOLUTION ORAL at 09:06

## 2021-07-14 RX ADMIN — INSULIN LISPRO 2 UNITS: 100 INJECTION, SOLUTION INTRAVENOUS; SUBCUTANEOUS at 12:09

## 2021-07-14 RX ADMIN — MICONAZOLE NITRATE: 2 POWDER TOPICAL at 20:20

## 2021-07-14 RX ADMIN — NICOTINE 1 PATCH: 14 PATCH, EXTENDED RELEASE TRANSDERMAL at 09:06

## 2021-07-14 RX ADMIN — OXYCODONE HYDROCHLORIDE AND ACETAMINOPHEN 1 TABLET: 5; 325 TABLET ORAL at 17:32

## 2021-07-14 RX ADMIN — FAMOTIDINE 20 MG: 20 TABLET ORAL at 17:11

## 2021-07-14 RX ADMIN — LOSARTAN POTASSIUM 100 MG: 25 TABLET, FILM COATED ORAL at 10:11

## 2021-07-14 RX ADMIN — DOCUSATE SODIUM 50MG AND SENNOSIDES 8.6MG 1 TABLET: 8.6; 5 TABLET, FILM COATED ORAL at 09:07

## 2021-07-14 RX ADMIN — SPIRONOLACTONE 50 MG: 25 TABLET ORAL at 09:08

## 2021-07-14 RX ADMIN — INSULIN LISPRO 6 UNITS: 100 INJECTION, SOLUTION INTRAVENOUS; SUBCUTANEOUS at 17:11

## 2021-07-14 RX ADMIN — POLYETHYLENE GLYCOL 3350 17 G: 17 POWDER, FOR SOLUTION ORAL at 20:19

## 2021-07-14 RX ADMIN — SERTRALINE HYDROCHLORIDE 100 MG: 100 TABLET ORAL at 09:09

## 2021-07-14 RX ADMIN — ALBUTEROL SULFATE 2.5 MG: 2.5 SOLUTION RESPIRATORY (INHALATION) at 00:43

## 2021-07-14 RX ADMIN — CEFEPIME HYDROCHLORIDE 2 G: 2 INJECTION, POWDER, FOR SOLUTION INTRAVENOUS at 16:05

## 2021-07-14 RX ADMIN — CLONIDINE HYDROCHLORIDE 0.2 MG: 0.1 TABLET ORAL at 20:19

## 2021-07-14 RX ADMIN — ARFORMOTEROL TARTRATE 15 MCG: 15 SOLUTION RESPIRATORY (INHALATION) at 19:14

## 2021-07-14 RX ADMIN — FUROSEMIDE 80 MG: 10 INJECTION, SOLUTION INTRAMUSCULAR; INTRAVENOUS at 23:12

## 2021-07-14 RX ADMIN — SODIUM CHLORIDE, PRESERVATIVE FREE 3 ML: 5 INJECTION INTRAVENOUS at 09:09

## 2021-07-14 RX ADMIN — FUROSEMIDE 80 MG: 10 INJECTION, SOLUTION INTRAMUSCULAR; INTRAVENOUS at 12:08

## 2021-07-14 RX ADMIN — BISOPROLOL FUMARATE 20 MG: 5 TABLET ORAL at 09:07

## 2021-07-14 RX ADMIN — ALBUTEROL SULFATE 2.5 MG: 2.5 SOLUTION RESPIRATORY (INHALATION) at 15:19

## 2021-07-14 RX ADMIN — FAMOTIDINE 20 MG: 20 TABLET ORAL at 06:30

## 2021-07-14 RX ADMIN — ALBUTEROL SULFATE 2.5 MG: 2.5 SOLUTION RESPIRATORY (INHALATION) at 08:09

## 2021-07-14 RX ADMIN — ALBUTEROL SULFATE 2.5 MG: 2.5 SOLUTION RESPIRATORY (INHALATION) at 19:14

## 2021-07-14 RX ADMIN — MICONAZOLE NITRATE: 2 POWDER TOPICAL at 09:08

## 2021-07-14 RX ADMIN — LACTULOSE 30 G: 20 SOLUTION ORAL at 20:19

## 2021-07-14 RX ADMIN — LACTULOSE 30 G: 20 SOLUTION ORAL at 09:08

## 2021-07-14 RX ADMIN — INSULIN DETEMIR 20 UNITS: 100 INJECTION, SOLUTION SUBCUTANEOUS at 20:20

## 2021-07-15 LAB
GLUCOSE BLDC GLUCOMTR-MCNC: 135 MG/DL (ref 70–130)
GLUCOSE BLDC GLUCOMTR-MCNC: 139 MG/DL (ref 70–130)
GLUCOSE BLDC GLUCOMTR-MCNC: 209 MG/DL (ref 70–130)
GLUCOSE BLDC GLUCOMTR-MCNC: 231 MG/DL (ref 70–130)
GLUCOSE BLDC GLUCOMTR-MCNC: 277 MG/DL (ref 70–130)
INR PPP: 1.18 (ref 2–3)
PROTHROMBIN TIME: 12.6 SECONDS (ref 9.4–12)

## 2021-07-15 PROCEDURE — 25010000002 CEFEPIME PER 500 MG: Performed by: INTERNAL MEDICINE

## 2021-07-15 PROCEDURE — 63710000001 INSULIN LISPRO (HUMAN) PER 5 UNITS: Performed by: INTERNAL MEDICINE

## 2021-07-15 PROCEDURE — 85610 PROTHROMBIN TIME: CPT | Performed by: INTERNAL MEDICINE

## 2021-07-15 PROCEDURE — 63710000001 PREDNISONE PER 1 MG: Performed by: INTERNAL MEDICINE

## 2021-07-15 PROCEDURE — 63710000001 INSULIN DETEMIR PER 5 UNITS: Performed by: INTERNAL MEDICINE

## 2021-07-15 PROCEDURE — 99232 SBSQ HOSP IP/OBS MODERATE 35: CPT | Performed by: INTERNAL MEDICINE

## 2021-07-15 PROCEDURE — 25010000002 FUROSEMIDE PER 20 MG: Performed by: INTERNAL MEDICINE

## 2021-07-15 PROCEDURE — 94660 CPAP INITIATION&MGMT: CPT

## 2021-07-15 PROCEDURE — 94799 UNLISTED PULMONARY SVC/PX: CPT

## 2021-07-15 PROCEDURE — 82962 GLUCOSE BLOOD TEST: CPT

## 2021-07-15 PROCEDURE — 97164 PT RE-EVAL EST PLAN CARE: CPT

## 2021-07-15 PROCEDURE — 97530 THERAPEUTIC ACTIVITIES: CPT

## 2021-07-15 RX ORDER — SODIUM CHLORIDE 9 MG/ML
30 INJECTION, SOLUTION INTRAVENOUS EVERY MORNING
Status: DISCONTINUED | OUTPATIENT
Start: 2021-07-16 | End: 2021-07-22 | Stop reason: HOSPADM

## 2021-07-15 RX ORDER — WARFARIN SODIUM 3 MG
1.5 TABLET ORAL
Status: DISCONTINUED | OUTPATIENT
Start: 2021-07-15 | End: 2021-07-17

## 2021-07-15 RX ADMIN — OXYCODONE HYDROCHLORIDE AND ACETAMINOPHEN 1 TABLET: 5; 325 TABLET ORAL at 09:05

## 2021-07-15 RX ADMIN — CEFEPIME HYDROCHLORIDE 2 G: 2 INJECTION, POWDER, FOR SOLUTION INTRAVENOUS at 00:24

## 2021-07-15 RX ADMIN — MICONAZOLE NITRATE 1 APPLICATION: 2 POWDER TOPICAL at 09:06

## 2021-07-15 RX ADMIN — INSULIN DETEMIR 20 UNITS: 100 INJECTION, SOLUTION SUBCUTANEOUS at 09:24

## 2021-07-15 RX ADMIN — POTASSIUM CHLORIDE 40 MEQ: 1.5 POWDER, FOR SOLUTION ORAL at 09:03

## 2021-07-15 RX ADMIN — BISOPROLOL FUMARATE 20 MG: 5 TABLET ORAL at 09:04

## 2021-07-15 RX ADMIN — CEFEPIME HYDROCHLORIDE 2 G: 2 INJECTION, POWDER, FOR SOLUTION INTRAVENOUS at 16:46

## 2021-07-15 RX ADMIN — DOCUSATE SODIUM 50MG AND SENNOSIDES 8.6MG 1 TABLET: 8.6; 5 TABLET, FILM COATED ORAL at 20:44

## 2021-07-15 RX ADMIN — DOCUSATE SODIUM 50MG AND SENNOSIDES 8.6MG 1 TABLET: 8.6; 5 TABLET, FILM COATED ORAL at 09:05

## 2021-07-15 RX ADMIN — INSULIN LISPRO 4 UNITS: 100 INJECTION, SOLUTION INTRAVENOUS; SUBCUTANEOUS at 16:51

## 2021-07-15 RX ADMIN — FUROSEMIDE 80 MG: 10 INJECTION, SOLUTION INTRAMUSCULAR; INTRAVENOUS at 23:16

## 2021-07-15 RX ADMIN — ALBUTEROL SULFATE 2.5 MG: 2.5 SOLUTION RESPIRATORY (INHALATION) at 20:26

## 2021-07-15 RX ADMIN — SODIUM CHLORIDE, PRESERVATIVE FREE 10 ML: 5 INJECTION INTRAVENOUS at 10:24

## 2021-07-15 RX ADMIN — OXYCODONE HYDROCHLORIDE AND ACETAMINOPHEN 1 TABLET: 5; 325 TABLET ORAL at 22:45

## 2021-07-15 RX ADMIN — CLONIDINE HYDROCHLORIDE 0.2 MG: 0.1 TABLET ORAL at 20:44

## 2021-07-15 RX ADMIN — WARFARIN SODIUM 1.5 MG: 3 TABLET ORAL at 17:42

## 2021-07-15 RX ADMIN — POLYETHYLENE GLYCOL 3350 17 G: 17 POWDER, FOR SOLUTION ORAL at 09:03

## 2021-07-15 RX ADMIN — LOSARTAN POTASSIUM 100 MG: 25 TABLET, FILM COATED ORAL at 09:03

## 2021-07-15 RX ADMIN — ALBUTEROL SULFATE 2.5 MG: 2.5 SOLUTION RESPIRATORY (INHALATION) at 06:20

## 2021-07-15 RX ADMIN — NICOTINE 1 PATCH: 14 PATCH, EXTENDED RELEASE TRANSDERMAL at 09:03

## 2021-07-15 RX ADMIN — POLYETHYLENE GLYCOL 3350 17 G: 17 POWDER, FOR SOLUTION ORAL at 20:43

## 2021-07-15 RX ADMIN — SPIRONOLACTONE 50 MG: 25 TABLET ORAL at 09:04

## 2021-07-15 RX ADMIN — MICONAZOLE NITRATE: 2 POWDER TOPICAL at 20:46

## 2021-07-15 RX ADMIN — SODIUM CHLORIDE, PRESERVATIVE FREE 10 ML: 5 INJECTION INTRAVENOUS at 09:07

## 2021-07-15 RX ADMIN — CEFEPIME HYDROCHLORIDE 2 G: 2 INJECTION, POWDER, FOR SOLUTION INTRAVENOUS at 09:02

## 2021-07-15 RX ADMIN — INSULIN DETEMIR 20 UNITS: 100 INJECTION, SOLUTION SUBCUTANEOUS at 20:45

## 2021-07-15 RX ADMIN — FUROSEMIDE 80 MG: 10 INJECTION, SOLUTION INTRAMUSCULAR; INTRAVENOUS at 10:24

## 2021-07-15 RX ADMIN — FAMOTIDINE 20 MG: 20 TABLET ORAL at 09:04

## 2021-07-15 RX ADMIN — PREDNISONE 40 MG: 20 TABLET ORAL at 09:04

## 2021-07-15 RX ADMIN — FAMOTIDINE 20 MG: 20 TABLET ORAL at 16:47

## 2021-07-15 RX ADMIN — OXYCODONE HYDROCHLORIDE AND ACETAMINOPHEN 1 TABLET: 5; 325 TABLET ORAL at 16:46

## 2021-07-15 RX ADMIN — LACTULOSE 30 G: 20 SOLUTION ORAL at 20:43

## 2021-07-15 RX ADMIN — ALBUTEROL SULFATE 2.5 MG: 2.5 SOLUTION RESPIRATORY (INHALATION) at 03:41

## 2021-07-15 RX ADMIN — ALBUTEROL SULFATE 2.5 MG: 2.5 SOLUTION RESPIRATORY (INHALATION) at 13:06

## 2021-07-15 RX ADMIN — ARFORMOTEROL TARTRATE 15 MCG: 15 SOLUTION RESPIRATORY (INHALATION) at 20:26

## 2021-07-15 RX ADMIN — LACTULOSE 30 G: 20 SOLUTION ORAL at 09:02

## 2021-07-15 RX ADMIN — SERTRALINE HYDROCHLORIDE 100 MG: 100 TABLET ORAL at 09:05

## 2021-07-15 RX ADMIN — ALBUTEROL SULFATE 2.5 MG: 2.5 SOLUTION RESPIRATORY (INHALATION) at 22:55

## 2021-07-15 RX ADMIN — ARFORMOTEROL TARTRATE 15 MCG: 15 SOLUTION RESPIRATORY (INHALATION) at 06:20

## 2021-07-15 RX ADMIN — SODIUM CHLORIDE, PRESERVATIVE FREE 3 ML: 5 INJECTION INTRAVENOUS at 20:44

## 2021-07-16 LAB
ALBUMIN SERPL-MCNC: 2.8 G/DL (ref 3.5–5.2)
ALBUMIN/GLOB SERPL: 0.8 G/DL
ALP SERPL-CCNC: 143 U/L (ref 39–117)
ALT SERPL W P-5'-P-CCNC: 58 U/L (ref 1–33)
ANION GAP SERPL CALCULATED.3IONS-SCNC: 8.8 MMOL/L (ref 5–15)
AST SERPL-CCNC: 47 U/L (ref 1–32)
BILIRUB SERPL-MCNC: 0.6 MG/DL (ref 0–1.2)
BUN SERPL-MCNC: 35 MG/DL (ref 8–23)
BUN/CREAT SERPL: 116.7 (ref 7–25)
CALCIUM SPEC-SCNC: 8.5 MG/DL (ref 8.6–10.5)
CHLORIDE SERPL-SCNC: 104 MMOL/L (ref 98–107)
CO2 SERPL-SCNC: 27.2 MMOL/L (ref 22–29)
CREAT SERPL-MCNC: 0.3 MG/DL (ref 0.57–1)
GFR SERPL CREATININE-BSD FRML MDRD: >150 ML/MIN/1.73
GLOBULIN UR ELPH-MCNC: 3.3 GM/DL
GLUCOSE BLDC GLUCOMTR-MCNC: 195 MG/DL (ref 70–130)
GLUCOSE BLDC GLUCOMTR-MCNC: 273 MG/DL (ref 70–130)
GLUCOSE BLDC GLUCOMTR-MCNC: 95 MG/DL (ref 70–130)
GLUCOSE BLDC GLUCOMTR-MCNC: 95 MG/DL (ref 70–130)
GLUCOSE BLDC GLUCOMTR-MCNC: 97 MG/DL (ref 70–130)
GLUCOSE SERPL-MCNC: 158 MG/DL (ref 65–99)
INR PPP: 1.11 (ref 2–3)
POTASSIUM SERPL-SCNC: 4.7 MMOL/L (ref 3.5–5.2)
PROT SERPL-MCNC: 6.1 G/DL (ref 6–8.5)
PROTHROMBIN TIME: 11.9 SECONDS (ref 9.4–12)
SODIUM SERPL-SCNC: 140 MMOL/L (ref 136–145)

## 2021-07-16 PROCEDURE — 63710000001 INSULIN LISPRO (HUMAN) PER 5 UNITS: Performed by: INTERNAL MEDICINE

## 2021-07-16 PROCEDURE — 94799 UNLISTED PULMONARY SVC/PX: CPT

## 2021-07-16 PROCEDURE — 97110 THERAPEUTIC EXERCISES: CPT

## 2021-07-16 PROCEDURE — 25010000002 FUROSEMIDE PER 20 MG: Performed by: INTERNAL MEDICINE

## 2021-07-16 PROCEDURE — 82962 GLUCOSE BLOOD TEST: CPT

## 2021-07-16 PROCEDURE — 63710000001 INSULIN DETEMIR PER 5 UNITS: Performed by: INTERNAL MEDICINE

## 2021-07-16 PROCEDURE — 85610 PROTHROMBIN TIME: CPT | Performed by: INTERNAL MEDICINE

## 2021-07-16 PROCEDURE — 94660 CPAP INITIATION&MGMT: CPT

## 2021-07-16 PROCEDURE — 63710000001 PREDNISONE PER 1 MG: Performed by: INTERNAL MEDICINE

## 2021-07-16 PROCEDURE — 80053 COMPREHEN METABOLIC PANEL: CPT | Performed by: INTERNAL MEDICINE

## 2021-07-16 PROCEDURE — 99232 SBSQ HOSP IP/OBS MODERATE 35: CPT | Performed by: INTERNAL MEDICINE

## 2021-07-16 RX ORDER — ALBUTEROL SULFATE 2.5 MG/3ML
2.5 SOLUTION RESPIRATORY (INHALATION)
Status: DISCONTINUED | OUTPATIENT
Start: 2021-07-16 | End: 2021-07-22 | Stop reason: HOSPADM

## 2021-07-16 RX ADMIN — INSULIN DETEMIR 20 UNITS: 100 INJECTION, SOLUTION SUBCUTANEOUS at 09:16

## 2021-07-16 RX ADMIN — NICOTINE 1 PATCH: 14 PATCH, EXTENDED RELEASE TRANSDERMAL at 09:05

## 2021-07-16 RX ADMIN — ALBUTEROL SULFATE 2.5 MG: 2.5 SOLUTION RESPIRATORY (INHALATION) at 04:13

## 2021-07-16 RX ADMIN — BISOPROLOL FUMARATE 20 MG: 5 TABLET ORAL at 09:03

## 2021-07-16 RX ADMIN — SODIUM CHLORIDE, PRESERVATIVE FREE 10 ML: 5 INJECTION INTRAVENOUS at 09:06

## 2021-07-16 RX ADMIN — LACTULOSE 30 G: 20 SOLUTION ORAL at 09:03

## 2021-07-16 RX ADMIN — FAMOTIDINE 20 MG: 20 TABLET ORAL at 06:33

## 2021-07-16 RX ADMIN — ALBUTEROL SULFATE 2.5 MG: 2.5 SOLUTION RESPIRATORY (INHALATION) at 07:35

## 2021-07-16 RX ADMIN — SODIUM CHLORIDE, PRESERVATIVE FREE 3 ML: 5 INJECTION INTRAVENOUS at 22:18

## 2021-07-16 RX ADMIN — POLYETHYLENE GLYCOL 3350 17 G: 17 POWDER, FOR SOLUTION ORAL at 09:06

## 2021-07-16 RX ADMIN — LOSARTAN POTASSIUM 100 MG: 25 TABLET, FILM COATED ORAL at 09:06

## 2021-07-16 RX ADMIN — PREDNISONE 40 MG: 20 TABLET ORAL at 09:06

## 2021-07-16 RX ADMIN — INSULIN DETEMIR 20 UNITS: 100 INJECTION, SOLUTION SUBCUTANEOUS at 21:04

## 2021-07-16 RX ADMIN — CLONIDINE HYDROCHLORIDE 0.2 MG: 0.1 TABLET ORAL at 21:01

## 2021-07-16 RX ADMIN — OXYCODONE HYDROCHLORIDE AND ACETAMINOPHEN 1 TABLET: 5; 325 TABLET ORAL at 22:23

## 2021-07-16 RX ADMIN — WARFARIN SODIUM 1.5 MG: 3 TABLET ORAL at 17:30

## 2021-07-16 RX ADMIN — MICONAZOLE NITRATE 1 APPLICATION: 2 POWDER TOPICAL at 21:09

## 2021-07-16 RX ADMIN — MICONAZOLE NITRATE 1 APPLICATION: 2 POWDER TOPICAL at 09:07

## 2021-07-16 RX ADMIN — INSULIN LISPRO 2 UNITS: 100 INJECTION, SOLUTION INTRAVENOUS; SUBCUTANEOUS at 17:30

## 2021-07-16 RX ADMIN — FAMOTIDINE 20 MG: 20 TABLET ORAL at 17:30

## 2021-07-16 RX ADMIN — DOCUSATE SODIUM 50MG AND SENNOSIDES 8.6MG 1 TABLET: 8.6; 5 TABLET, FILM COATED ORAL at 09:06

## 2021-07-16 RX ADMIN — DOCUSATE SODIUM 50MG AND SENNOSIDES 8.6MG 1 TABLET: 8.6; 5 TABLET, FILM COATED ORAL at 21:03

## 2021-07-16 RX ADMIN — SODIUM CHLORIDE, PRESERVATIVE FREE 10 ML: 5 INJECTION INTRAVENOUS at 11:36

## 2021-07-16 RX ADMIN — SERTRALINE HYDROCHLORIDE 100 MG: 100 TABLET ORAL at 09:06

## 2021-07-16 RX ADMIN — SPIRONOLACTONE 50 MG: 25 TABLET ORAL at 09:06

## 2021-07-16 RX ADMIN — ALBUTEROL SULFATE 2.5 MG: 2.5 SOLUTION RESPIRATORY (INHALATION) at 01:16

## 2021-07-16 RX ADMIN — ARFORMOTEROL TARTRATE 15 MCG: 15 SOLUTION RESPIRATORY (INHALATION) at 07:35

## 2021-07-16 RX ADMIN — POTASSIUM CHLORIDE 40 MEQ: 1.5 POWDER, FOR SOLUTION ORAL at 09:06

## 2021-07-16 RX ADMIN — FUROSEMIDE 80 MG: 10 INJECTION, SOLUTION INTRAMUSCULAR; INTRAVENOUS at 11:36

## 2021-07-17 LAB
GLUCOSE BLDC GLUCOMTR-MCNC: 155 MG/DL (ref 70–130)
GLUCOSE BLDC GLUCOMTR-MCNC: 307 MG/DL (ref 70–130)
GLUCOSE BLDC GLUCOMTR-MCNC: 317 MG/DL (ref 70–130)
GLUCOSE BLDC GLUCOMTR-MCNC: 359 MG/DL (ref 70–130)
INR PPP: 1.08 (ref 2–3)
PROTHROMBIN TIME: 11.7 SECONDS (ref 9.4–12)

## 2021-07-17 PROCEDURE — 85610 PROTHROMBIN TIME: CPT | Performed by: INTERNAL MEDICINE

## 2021-07-17 PROCEDURE — 63710000001 PREDNISONE PER 1 MG: Performed by: INTERNAL MEDICINE

## 2021-07-17 PROCEDURE — 94669 MECHANICAL CHEST WALL OSCILL: CPT

## 2021-07-17 PROCEDURE — 63710000001 INSULIN DETEMIR PER 5 UNITS: Performed by: INTERNAL MEDICINE

## 2021-07-17 PROCEDURE — 94799 UNLISTED PULMONARY SVC/PX: CPT

## 2021-07-17 PROCEDURE — 82962 GLUCOSE BLOOD TEST: CPT

## 2021-07-17 PROCEDURE — 97110 THERAPEUTIC EXERCISES: CPT

## 2021-07-17 PROCEDURE — 63710000001 INSULIN LISPRO (HUMAN) PER 5 UNITS: Performed by: INTERNAL MEDICINE

## 2021-07-17 PROCEDURE — 99232 SBSQ HOSP IP/OBS MODERATE 35: CPT | Performed by: INTERNAL MEDICINE

## 2021-07-17 PROCEDURE — 25010000002 FUROSEMIDE PER 20 MG: Performed by: INTERNAL MEDICINE

## 2021-07-17 RX ORDER — CLONIDINE HYDROCHLORIDE 0.1 MG/1
0.1 TABLET ORAL NIGHTLY
Status: DISCONTINUED | OUTPATIENT
Start: 2021-07-17 | End: 2021-07-22 | Stop reason: HOSPADM

## 2021-07-17 RX ORDER — WARFARIN SODIUM 2.5 MG/1
2.5 TABLET ORAL
Status: DISCONTINUED | OUTPATIENT
Start: 2021-07-17 | End: 2021-07-19

## 2021-07-17 RX ADMIN — LOSARTAN POTASSIUM 100 MG: 25 TABLET, FILM COATED ORAL at 08:40

## 2021-07-17 RX ADMIN — DOCUSATE SODIUM 50MG AND SENNOSIDES 8.6MG 1 TABLET: 8.6; 5 TABLET, FILM COATED ORAL at 08:39

## 2021-07-17 RX ADMIN — SODIUM CHLORIDE, PRESERVATIVE FREE 3 ML: 5 INJECTION INTRAVENOUS at 08:44

## 2021-07-17 RX ADMIN — POLYETHYLENE GLYCOL 3350 17 G: 17 POWDER, FOR SOLUTION ORAL at 08:39

## 2021-07-17 RX ADMIN — INSULIN LISPRO 2 UNITS: 100 INJECTION, SOLUTION INTRAVENOUS; SUBCUTANEOUS at 08:43

## 2021-07-17 RX ADMIN — MICONAZOLE NITRATE 1 APPLICATION: 2 POWDER TOPICAL at 20:08

## 2021-07-17 RX ADMIN — SODIUM CHLORIDE, PRESERVATIVE FREE 3 ML: 5 INJECTION INTRAVENOUS at 20:05

## 2021-07-17 RX ADMIN — ARFORMOTEROL TARTRATE 15 MCG: 15 SOLUTION RESPIRATORY (INHALATION) at 07:40

## 2021-07-17 RX ADMIN — CLONIDINE HYDROCHLORIDE 0.1 MG: 0.1 TABLET ORAL at 20:04

## 2021-07-17 RX ADMIN — OXYCODONE HYDROCHLORIDE AND ACETAMINOPHEN 1 TABLET: 5; 325 TABLET ORAL at 06:32

## 2021-07-17 RX ADMIN — INSULIN DETEMIR 20 UNITS: 100 INJECTION, SOLUTION SUBCUTANEOUS at 20:05

## 2021-07-17 RX ADMIN — PREDNISONE 40 MG: 20 TABLET ORAL at 07:47

## 2021-07-17 RX ADMIN — SODIUM CHLORIDE, PRESERVATIVE FREE 10 ML: 5 INJECTION INTRAVENOUS at 08:39

## 2021-07-17 RX ADMIN — SODIUM CHLORIDE, PRESERVATIVE FREE 10 ML: 5 INJECTION INTRAVENOUS at 20:09

## 2021-07-17 RX ADMIN — POTASSIUM CHLORIDE 40 MEQ: 1.5 POWDER, FOR SOLUTION ORAL at 08:39

## 2021-07-17 RX ADMIN — ALBUTEROL SULFATE 2.5 MG: 2.5 SOLUTION RESPIRATORY (INHALATION) at 12:48

## 2021-07-17 RX ADMIN — BISOPROLOL FUMARATE 20 MG: 5 TABLET ORAL at 08:41

## 2021-07-17 RX ADMIN — MICONAZOLE NITRATE 1 APPLICATION: 2 POWDER TOPICAL at 08:42

## 2021-07-17 RX ADMIN — DOCUSATE SODIUM 50MG AND SENNOSIDES 8.6MG 1 TABLET: 8.6; 5 TABLET, FILM COATED ORAL at 20:04

## 2021-07-17 RX ADMIN — INSULIN DETEMIR 20 UNITS: 100 INJECTION, SOLUTION SUBCUTANEOUS at 10:33

## 2021-07-17 RX ADMIN — INSULIN LISPRO 5 UNITS: 100 INJECTION, SOLUTION INTRAVENOUS; SUBCUTANEOUS at 12:42

## 2021-07-17 RX ADMIN — WARFARIN SODIUM 2.5 MG: 2.5 TABLET ORAL at 17:49

## 2021-07-17 RX ADMIN — SPIRONOLACTONE 50 MG: 25 TABLET ORAL at 08:40

## 2021-07-17 RX ADMIN — ARFORMOTEROL TARTRATE 15 MCG: 15 SOLUTION RESPIRATORY (INHALATION) at 19:01

## 2021-07-17 RX ADMIN — ALBUTEROL SULFATE 2.5 MG: 2.5 SOLUTION RESPIRATORY (INHALATION) at 19:04

## 2021-07-17 RX ADMIN — FAMOTIDINE 20 MG: 20 TABLET ORAL at 07:47

## 2021-07-17 RX ADMIN — LACTULOSE 30 G: 20 SOLUTION ORAL at 08:38

## 2021-07-17 RX ADMIN — OXYCODONE HYDROCHLORIDE AND ACETAMINOPHEN 1 TABLET: 5; 325 TABLET ORAL at 23:46

## 2021-07-17 RX ADMIN — FUROSEMIDE 80 MG: 10 INJECTION, SOLUTION INTRAMUSCULAR; INTRAVENOUS at 00:00

## 2021-07-17 RX ADMIN — INSULIN LISPRO 6 UNITS: 100 INJECTION, SOLUTION INTRAVENOUS; SUBCUTANEOUS at 17:48

## 2021-07-17 RX ADMIN — SERTRALINE HYDROCHLORIDE 100 MG: 100 TABLET ORAL at 08:41

## 2021-07-17 RX ADMIN — FUROSEMIDE 80 MG: 10 INJECTION, SOLUTION INTRAMUSCULAR; INTRAVENOUS at 10:32

## 2021-07-17 RX ADMIN — NICOTINE 1 PATCH: 14 PATCH, EXTENDED RELEASE TRANSDERMAL at 08:43

## 2021-07-17 RX ADMIN — FAMOTIDINE 20 MG: 20 TABLET ORAL at 17:48

## 2021-07-17 RX ADMIN — SODIUM CHLORIDE, PRESERVATIVE FREE 10 ML: 5 INJECTION INTRAVENOUS at 00:04

## 2021-07-17 RX ADMIN — FUROSEMIDE 80 MG: 10 INJECTION, SOLUTION INTRAMUSCULAR; INTRAVENOUS at 23:39

## 2021-07-17 RX ADMIN — ALBUTEROL SULFATE 2.5 MG: 2.5 SOLUTION RESPIRATORY (INHALATION) at 07:35

## 2021-07-18 ENCOUNTER — APPOINTMENT (OUTPATIENT)
Dept: GENERAL RADIOLOGY | Facility: HOSPITAL | Age: 61
End: 2021-07-18

## 2021-07-18 LAB
ANION GAP SERPL CALCULATED.3IONS-SCNC: 6.9 MMOL/L (ref 5–15)
ANISOCYTOSIS BLD QL: NORMAL
BASOPHILS # BLD AUTO: 0.02 10*3/MM3 (ref 0–0.2)
BASOPHILS NFR BLD AUTO: 0.2 % (ref 0–1.5)
BUN SERPL-MCNC: 40 MG/DL (ref 8–23)
BUN/CREAT SERPL: 121.2 (ref 7–25)
CALCIUM SPEC-SCNC: 8.6 MG/DL (ref 8.6–10.5)
CHLORIDE SERPL-SCNC: 105 MMOL/L (ref 98–107)
CO2 SERPL-SCNC: 26.1 MMOL/L (ref 22–29)
CREAT SERPL-MCNC: 0.33 MG/DL (ref 0.57–1)
DEPRECATED RDW RBC AUTO: 67.7 FL (ref 37–54)
EOSINOPHIL # BLD AUTO: 0.01 10*3/MM3 (ref 0–0.4)
EOSINOPHIL NFR BLD AUTO: 0.1 % (ref 0.3–6.2)
ERYTHROCYTE [DISTWIDTH] IN BLOOD BY AUTOMATED COUNT: 22.5 % (ref 12.3–15.4)
GFR SERPL CREATININE-BSD FRML MDRD: >150 ML/MIN/1.73
GLUCOSE BLDC GLUCOMTR-MCNC: 179 MG/DL (ref 70–130)
GLUCOSE BLDC GLUCOMTR-MCNC: 182 MG/DL (ref 70–130)
GLUCOSE BLDC GLUCOMTR-MCNC: 270 MG/DL (ref 70–130)
GLUCOSE BLDC GLUCOMTR-MCNC: 303 MG/DL (ref 70–130)
GLUCOSE SERPL-MCNC: 290 MG/DL (ref 65–99)
HCT VFR BLD AUTO: 35.2 % (ref 34–46.6)
HGB BLD-MCNC: 10.8 G/DL (ref 12–15.9)
HYPOCHROMIA BLD QL: NORMAL
IMM GRANULOCYTES # BLD AUTO: 0.09 10*3/MM3 (ref 0–0.05)
IMM GRANULOCYTES NFR BLD AUTO: 0.9 % (ref 0–0.5)
INR PPP: 1.13 (ref 2–3)
LYMPHOCYTES # BLD AUTO: 1.36 10*3/MM3 (ref 0.7–3.1)
LYMPHOCYTES NFR BLD AUTO: 13.8 % (ref 19.6–45.3)
MACROCYTES BLD QL SMEAR: NORMAL
MCH RBC QN AUTO: 25.5 PG (ref 26.6–33)
MCHC RBC AUTO-ENTMCNC: 30.7 G/DL (ref 31.5–35.7)
MCV RBC AUTO: 83 FL (ref 79–97)
MICROCYTES BLD QL: NORMAL
MONOCYTES # BLD AUTO: 0.7 10*3/MM3 (ref 0.1–0.9)
MONOCYTES NFR BLD AUTO: 7.1 % (ref 5–12)
NEUTROPHILS NFR BLD AUTO: 7.65 10*3/MM3 (ref 1.7–7)
NEUTROPHILS NFR BLD AUTO: 77.9 % (ref 42.7–76)
NRBC BLD AUTO-RTO: 0 /100 WBC (ref 0–0.2)
OVALOCYTES BLD QL SMEAR: NORMAL
PLAT MORPH BLD: NORMAL
PLATELET # BLD AUTO: 154 10*3/MM3 (ref 140–450)
PMV BLD AUTO: 10.9 FL (ref 6–12)
POTASSIUM SERPL-SCNC: 4.2 MMOL/L (ref 3.5–5.2)
PROTHROMBIN TIME: 12.1 SECONDS (ref 9.4–12)
RBC # BLD AUTO: 4.24 10*6/MM3 (ref 3.77–5.28)
SODIUM SERPL-SCNC: 138 MMOL/L (ref 136–145)
WBC # BLD AUTO: 9.83 10*3/MM3 (ref 3.4–10.8)
WBC MORPH BLD: NORMAL

## 2021-07-18 PROCEDURE — 71045 X-RAY EXAM CHEST 1 VIEW: CPT

## 2021-07-18 PROCEDURE — 25010000002 FUROSEMIDE PER 20 MG: Performed by: INTERNAL MEDICINE

## 2021-07-18 PROCEDURE — 97110 THERAPEUTIC EXERCISES: CPT

## 2021-07-18 PROCEDURE — 94799 UNLISTED PULMONARY SVC/PX: CPT

## 2021-07-18 PROCEDURE — 82962 GLUCOSE BLOOD TEST: CPT

## 2021-07-18 PROCEDURE — 63710000001 INSULIN DETEMIR PER 5 UNITS: Performed by: INTERNAL MEDICINE

## 2021-07-18 PROCEDURE — 63710000001 PREDNISONE PER 1 MG: Performed by: INTERNAL MEDICINE

## 2021-07-18 PROCEDURE — 99232 SBSQ HOSP IP/OBS MODERATE 35: CPT | Performed by: INTERNAL MEDICINE

## 2021-07-18 PROCEDURE — 63710000001 INSULIN LISPRO (HUMAN) PER 5 UNITS: Performed by: INTERNAL MEDICINE

## 2021-07-18 PROCEDURE — 85025 COMPLETE CBC W/AUTO DIFF WBC: CPT | Performed by: INTERNAL MEDICINE

## 2021-07-18 PROCEDURE — 80048 BASIC METABOLIC PNL TOTAL CA: CPT | Performed by: INTERNAL MEDICINE

## 2021-07-18 PROCEDURE — 94669 MECHANICAL CHEST WALL OSCILL: CPT

## 2021-07-18 PROCEDURE — 85007 BL SMEAR W/DIFF WBC COUNT: CPT | Performed by: INTERNAL MEDICINE

## 2021-07-18 PROCEDURE — 85610 PROTHROMBIN TIME: CPT | Performed by: INTERNAL MEDICINE

## 2021-07-18 RX ADMIN — DOCUSATE SODIUM 50MG AND SENNOSIDES 8.6MG 1 TABLET: 8.6; 5 TABLET, FILM COATED ORAL at 08:19

## 2021-07-18 RX ADMIN — SODIUM CHLORIDE, PRESERVATIVE FREE 3 ML: 5 INJECTION INTRAVENOUS at 22:01

## 2021-07-18 RX ADMIN — PREDNISONE 40 MG: 20 TABLET ORAL at 08:19

## 2021-07-18 RX ADMIN — SERTRALINE HYDROCHLORIDE 100 MG: 100 TABLET ORAL at 08:20

## 2021-07-18 RX ADMIN — INSULIN DETEMIR 20 UNITS: 100 INJECTION, SOLUTION SUBCUTANEOUS at 21:54

## 2021-07-18 RX ADMIN — WARFARIN SODIUM 2.5 MG: 2.5 TABLET ORAL at 17:56

## 2021-07-18 RX ADMIN — POLYETHYLENE GLYCOL 3350 17 G: 17 POWDER, FOR SOLUTION ORAL at 08:18

## 2021-07-18 RX ADMIN — CLONIDINE HYDROCHLORIDE 0.1 MG: 0.1 TABLET ORAL at 21:52

## 2021-07-18 RX ADMIN — SODIUM CHLORIDE, PRESERVATIVE FREE 3 ML: 5 INJECTION INTRAVENOUS at 08:20

## 2021-07-18 RX ADMIN — FAMOTIDINE 20 MG: 20 TABLET ORAL at 17:57

## 2021-07-18 RX ADMIN — BISOPROLOL FUMARATE 20 MG: 5 TABLET ORAL at 08:19

## 2021-07-18 RX ADMIN — INSULIN DETEMIR 20 UNITS: 100 INJECTION, SOLUTION SUBCUTANEOUS at 09:19

## 2021-07-18 RX ADMIN — FUROSEMIDE 80 MG: 10 INJECTION, SOLUTION INTRAMUSCULAR; INTRAVENOUS at 23:18

## 2021-07-18 RX ADMIN — SODIUM CHLORIDE, PRESERVATIVE FREE 10 ML: 5 INJECTION INTRAVENOUS at 08:20

## 2021-07-18 RX ADMIN — LACTULOSE 30 G: 20 SOLUTION ORAL at 08:18

## 2021-07-18 RX ADMIN — ALBUTEROL SULFATE 2.5 MG: 2.5 SOLUTION RESPIRATORY (INHALATION) at 12:02

## 2021-07-18 RX ADMIN — ARFORMOTEROL TARTRATE 15 MCG: 15 SOLUTION RESPIRATORY (INHALATION) at 18:49

## 2021-07-18 RX ADMIN — NICOTINE 1 PATCH: 14 PATCH, EXTENDED RELEASE TRANSDERMAL at 08:21

## 2021-07-18 RX ADMIN — SODIUM CHLORIDE, PRESERVATIVE FREE 3 ML: 5 INJECTION INTRAVENOUS at 21:53

## 2021-07-18 RX ADMIN — ALBUTEROL SULFATE 2.5 MG: 2.5 SOLUTION RESPIRATORY (INHALATION) at 07:41

## 2021-07-18 RX ADMIN — FUROSEMIDE 80 MG: 10 INJECTION, SOLUTION INTRAMUSCULAR; INTRAVENOUS at 12:41

## 2021-07-18 RX ADMIN — LOSARTAN POTASSIUM 100 MG: 25 TABLET, FILM COATED ORAL at 08:19

## 2021-07-18 RX ADMIN — POTASSIUM CHLORIDE 40 MEQ: 1.5 POWDER, FOR SOLUTION ORAL at 08:18

## 2021-07-18 RX ADMIN — MICONAZOLE NITRATE 1 APPLICATION: 2 POWDER TOPICAL at 21:59

## 2021-07-18 RX ADMIN — ALBUTEROL SULFATE 2.5 MG: 2.5 SOLUTION RESPIRATORY (INHALATION) at 00:08

## 2021-07-18 RX ADMIN — ALBUTEROL SULFATE 2.5 MG: 2.5 SOLUTION RESPIRATORY (INHALATION) at 18:49

## 2021-07-18 RX ADMIN — FAMOTIDINE 20 MG: 20 TABLET ORAL at 08:20

## 2021-07-18 RX ADMIN — INSULIN LISPRO 4 UNITS: 100 INJECTION, SOLUTION INTRAVENOUS; SUBCUTANEOUS at 12:40

## 2021-07-18 RX ADMIN — SPIRONOLACTONE 50 MG: 25 TABLET ORAL at 08:20

## 2021-07-18 RX ADMIN — INSULIN LISPRO 2 UNITS: 100 INJECTION, SOLUTION INTRAVENOUS; SUBCUTANEOUS at 09:19

## 2021-07-18 RX ADMIN — INSULIN LISPRO 2 UNITS: 100 INJECTION, SOLUTION INTRAVENOUS; SUBCUTANEOUS at 17:57

## 2021-07-18 RX ADMIN — MICONAZOLE NITRATE 1 APPLICATION: 2 POWDER TOPICAL at 08:21

## 2021-07-18 RX ADMIN — ARFORMOTEROL TARTRATE 15 MCG: 15 SOLUTION RESPIRATORY (INHALATION) at 07:41

## 2021-07-19 LAB
GLUCOSE BLDC GLUCOMTR-MCNC: 189 MG/DL (ref 70–130)
GLUCOSE BLDC GLUCOMTR-MCNC: 287 MG/DL (ref 70–130)
GLUCOSE BLDC GLUCOMTR-MCNC: 356 MG/DL (ref 70–130)
GLUCOSE BLDC GLUCOMTR-MCNC: 434 MG/DL (ref 70–130)
GLUCOSE BLDC GLUCOMTR-MCNC: 439 MG/DL (ref 70–130)
INR PPP: 1.37 (ref 2–3)
PROTHROMBIN TIME: 14.4 SECONDS (ref 9.4–12)

## 2021-07-19 PROCEDURE — 94799 UNLISTED PULMONARY SVC/PX: CPT

## 2021-07-19 PROCEDURE — 25010000002 FUROSEMIDE PER 20 MG: Performed by: INTERNAL MEDICINE

## 2021-07-19 PROCEDURE — 85610 PROTHROMBIN TIME: CPT | Performed by: INTERNAL MEDICINE

## 2021-07-19 PROCEDURE — 94669 MECHANICAL CHEST WALL OSCILL: CPT

## 2021-07-19 PROCEDURE — 94760 N-INVAS EAR/PLS OXIMETRY 1: CPT

## 2021-07-19 PROCEDURE — 63710000001 INSULIN DETEMIR PER 5 UNITS: Performed by: INTERNAL MEDICINE

## 2021-07-19 PROCEDURE — 82962 GLUCOSE BLOOD TEST: CPT

## 2021-07-19 PROCEDURE — 99233 SBSQ HOSP IP/OBS HIGH 50: CPT | Performed by: INTERNAL MEDICINE

## 2021-07-19 PROCEDURE — 63710000001 PREDNISONE PER 1 MG: Performed by: INTERNAL MEDICINE

## 2021-07-19 PROCEDURE — 63710000001 INSULIN LISPRO (HUMAN) PER 5 UNITS: Performed by: INTERNAL MEDICINE

## 2021-07-19 RX ORDER — FUROSEMIDE 10 MG/ML
40 INJECTION INTRAMUSCULAR; INTRAVENOUS EVERY 12 HOURS
Status: DISCONTINUED | OUTPATIENT
Start: 2021-07-20 | End: 2021-07-22 | Stop reason: HOSPADM

## 2021-07-19 RX ORDER — WARFARIN SODIUM 2 MG/1
2 TABLET ORAL
Status: DISCONTINUED | OUTPATIENT
Start: 2021-07-19 | End: 2021-07-22 | Stop reason: HOSPADM

## 2021-07-19 RX ADMIN — INSULIN LISPRO 4 UNITS: 100 INJECTION, SOLUTION INTRAVENOUS; SUBCUTANEOUS at 12:04

## 2021-07-19 RX ADMIN — ALBUTEROL SULFATE 2.5 MG: 2.5 SOLUTION RESPIRATORY (INHALATION) at 18:31

## 2021-07-19 RX ADMIN — SERTRALINE HYDROCHLORIDE 100 MG: 100 TABLET ORAL at 08:12

## 2021-07-19 RX ADMIN — INSULIN LISPRO 7 UNITS: 100 INJECTION, SOLUTION INTRAVENOUS; SUBCUTANEOUS at 17:05

## 2021-07-19 RX ADMIN — SPIRONOLACTONE 50 MG: 25 TABLET ORAL at 08:12

## 2021-07-19 RX ADMIN — WARFARIN SODIUM 2 MG: 2 TABLET ORAL at 18:07

## 2021-07-19 RX ADMIN — DOCUSATE SODIUM 50MG AND SENNOSIDES 8.6MG 1 TABLET: 8.6; 5 TABLET, FILM COATED ORAL at 22:18

## 2021-07-19 RX ADMIN — INSULIN DETEMIR 20 UNITS: 100 INJECTION, SOLUTION SUBCUTANEOUS at 08:11

## 2021-07-19 RX ADMIN — PREDNISONE 40 MG: 20 TABLET ORAL at 08:12

## 2021-07-19 RX ADMIN — LACTULOSE 10 G: 20 SOLUTION ORAL at 16:07

## 2021-07-19 RX ADMIN — OXYCODONE HYDROCHLORIDE AND ACETAMINOPHEN 1 TABLET: 5; 325 TABLET ORAL at 22:34

## 2021-07-19 RX ADMIN — MICONAZOLE NITRATE: 2 POWDER TOPICAL at 22:20

## 2021-07-19 RX ADMIN — ARFORMOTEROL TARTRATE 15 MCG: 15 SOLUTION RESPIRATORY (INHALATION) at 18:31

## 2021-07-19 RX ADMIN — FAMOTIDINE 20 MG: 20 TABLET ORAL at 17:05

## 2021-07-19 RX ADMIN — LOSARTAN POTASSIUM 100 MG: 25 TABLET, FILM COATED ORAL at 08:12

## 2021-07-19 RX ADMIN — NICOTINE 1 PATCH: 14 PATCH, EXTENDED RELEASE TRANSDERMAL at 08:22

## 2021-07-19 RX ADMIN — SODIUM CHLORIDE, PRESERVATIVE FREE 3 ML: 5 INJECTION INTRAVENOUS at 22:19

## 2021-07-19 RX ADMIN — INSULIN DETEMIR 25 UNITS: 100 INJECTION, SOLUTION SUBCUTANEOUS at 22:21

## 2021-07-19 RX ADMIN — LACTULOSE 30 G: 20 SOLUTION ORAL at 22:19

## 2021-07-19 RX ADMIN — SODIUM CHLORIDE 30 ML: 9 INJECTION, SOLUTION INTRAVENOUS at 06:33

## 2021-07-19 RX ADMIN — CLONIDINE HYDROCHLORIDE 0.1 MG: 0.1 TABLET ORAL at 22:18

## 2021-07-19 RX ADMIN — SODIUM CHLORIDE 30 ML/HR: 9 INJECTION, SOLUTION INTRAVENOUS at 08:21

## 2021-07-19 RX ADMIN — FUROSEMIDE 40 MG: 10 INJECTION, SOLUTION INTRAMUSCULAR; INTRAVENOUS at 22:19

## 2021-07-19 RX ADMIN — OXYCODONE HYDROCHLORIDE AND ACETAMINOPHEN 1 TABLET: 5; 325 TABLET ORAL at 08:38

## 2021-07-19 RX ADMIN — MICONAZOLE NITRATE: 2 POWDER TOPICAL at 08:21

## 2021-07-19 RX ADMIN — SODIUM CHLORIDE, PRESERVATIVE FREE 10 ML: 5 INJECTION INTRAVENOUS at 22:21

## 2021-07-19 RX ADMIN — BISOPROLOL FUMARATE 20 MG: 5 TABLET ORAL at 08:12

## 2021-07-19 RX ADMIN — FUROSEMIDE 80 MG: 10 INJECTION, SOLUTION INTRAMUSCULAR; INTRAVENOUS at 12:04

## 2021-07-19 RX ADMIN — SODIUM CHLORIDE, PRESERVATIVE FREE 3 ML: 5 INJECTION INTRAVENOUS at 08:21

## 2021-07-19 RX ADMIN — FAMOTIDINE 20 MG: 20 TABLET ORAL at 06:33

## 2021-07-20 LAB
ALBUMIN SERPL-MCNC: 2.5 G/DL (ref 3.5–5.2)
ALBUMIN/GLOB SERPL: 0.7 G/DL
ALP SERPL-CCNC: 210 U/L (ref 39–117)
ALT SERPL W P-5'-P-CCNC: 123 U/L (ref 1–33)
AMMONIA BLD-SCNC: 89 UMOL/L (ref 11–51)
ANION GAP SERPL CALCULATED.3IONS-SCNC: 11.1 MMOL/L (ref 5–15)
ANISOCYTOSIS BLD QL: NORMAL
AST SERPL-CCNC: 104 U/L (ref 1–32)
BASOPHILS # BLD AUTO: 0.02 10*3/MM3 (ref 0–0.2)
BASOPHILS NFR BLD AUTO: 0.2 % (ref 0–1.5)
BILIRUB SERPL-MCNC: 0.3 MG/DL (ref 0–1.2)
BUN SERPL-MCNC: 43 MG/DL (ref 8–23)
BUN/CREAT SERPL: 113.2 (ref 7–25)
CALCIUM SPEC-SCNC: 8.6 MG/DL (ref 8.6–10.5)
CHLORIDE SERPL-SCNC: 100 MMOL/L (ref 98–107)
CO2 SERPL-SCNC: 25.9 MMOL/L (ref 22–29)
CREAT SERPL-MCNC: 0.38 MG/DL (ref 0.57–1)
DEPRECATED RDW RBC AUTO: 69.6 FL (ref 37–54)
ELLIPTOCYTES BLD QL SMEAR: NORMAL
EOSINOPHIL # BLD AUTO: 0.03 10*3/MM3 (ref 0–0.4)
EOSINOPHIL NFR BLD AUTO: 0.3 % (ref 0.3–6.2)
ERYTHROCYTE [DISTWIDTH] IN BLOOD BY AUTOMATED COUNT: 22.6 % (ref 12.3–15.4)
GFR SERPL CREATININE-BSD FRML MDRD: >150 ML/MIN/1.73
GLOBULIN UR ELPH-MCNC: 3.6 GM/DL
GLUCOSE BLDC GLUCOMTR-MCNC: 114 MG/DL (ref 70–130)
GLUCOSE BLDC GLUCOMTR-MCNC: 152 MG/DL (ref 70–130)
GLUCOSE BLDC GLUCOMTR-MCNC: 263 MG/DL (ref 70–130)
GLUCOSE BLDC GLUCOMTR-MCNC: 281 MG/DL (ref 70–130)
GLUCOSE SERPL-MCNC: 273 MG/DL (ref 65–99)
HCT VFR BLD AUTO: 34.4 % (ref 34–46.6)
HGB BLD-MCNC: 10.2 G/DL (ref 12–15.9)
IMM GRANULOCYTES # BLD AUTO: 0.06 10*3/MM3 (ref 0–0.05)
IMM GRANULOCYTES NFR BLD AUTO: 0.5 % (ref 0–0.5)
INR PPP: 1.49 (ref 2–3)
LYMPHOCYTES # BLD AUTO: 1.72 10*3/MM3 (ref 0.7–3.1)
LYMPHOCYTES NFR BLD AUTO: 15.5 % (ref 19.6–45.3)
MACROCYTES BLD QL SMEAR: NORMAL
MCH RBC QN AUTO: 25.3 PG (ref 26.6–33)
MCHC RBC AUTO-ENTMCNC: 29.7 G/DL (ref 31.5–35.7)
MCV RBC AUTO: 85.4 FL (ref 79–97)
MICROCYTES BLD QL: NORMAL
MONOCYTES # BLD AUTO: 0.91 10*3/MM3 (ref 0.1–0.9)
MONOCYTES NFR BLD AUTO: 8.2 % (ref 5–12)
NEUTROPHILS NFR BLD AUTO: 75.3 % (ref 42.7–76)
NEUTROPHILS NFR BLD AUTO: 8.38 10*3/MM3 (ref 1.7–7)
NRBC BLD AUTO-RTO: 0 /100 WBC (ref 0–0.2)
PLAT MORPH BLD: NORMAL
PLATELET # BLD AUTO: 166 10*3/MM3 (ref 140–450)
PMV BLD AUTO: 10.6 FL (ref 6–12)
POIKILOCYTOSIS BLD QL SMEAR: NORMAL
POTASSIUM SERPL-SCNC: 4.1 MMOL/L (ref 3.5–5.2)
PROT SERPL-MCNC: 6.1 G/DL (ref 6–8.5)
PROTHROMBIN TIME: 15.6 SECONDS (ref 9.4–12)
RBC # BLD AUTO: 4.03 10*6/MM3 (ref 3.77–5.28)
SODIUM SERPL-SCNC: 137 MMOL/L (ref 136–145)
WBC # BLD AUTO: 11.12 10*3/MM3 (ref 3.4–10.8)
WBC MORPH BLD: NORMAL

## 2021-07-20 PROCEDURE — 82962 GLUCOSE BLOOD TEST: CPT

## 2021-07-20 PROCEDURE — 94799 UNLISTED PULMONARY SVC/PX: CPT

## 2021-07-20 PROCEDURE — 94660 CPAP INITIATION&MGMT: CPT

## 2021-07-20 PROCEDURE — 94640 AIRWAY INHALATION TREATMENT: CPT

## 2021-07-20 PROCEDURE — 63710000001 PREDNISONE PER 1 MG: Performed by: INTERNAL MEDICINE

## 2021-07-20 PROCEDURE — 25010000002 FUROSEMIDE PER 20 MG: Performed by: INTERNAL MEDICINE

## 2021-07-20 PROCEDURE — 85007 BL SMEAR W/DIFF WBC COUNT: CPT | Performed by: INTERNAL MEDICINE

## 2021-07-20 PROCEDURE — 63710000001 INSULIN DETEMIR PER 5 UNITS: Performed by: INTERNAL MEDICINE

## 2021-07-20 PROCEDURE — 80053 COMPREHEN METABOLIC PANEL: CPT | Performed by: INTERNAL MEDICINE

## 2021-07-20 PROCEDURE — 85610 PROTHROMBIN TIME: CPT | Performed by: INTERNAL MEDICINE

## 2021-07-20 PROCEDURE — 99233 SBSQ HOSP IP/OBS HIGH 50: CPT | Performed by: INTERNAL MEDICINE

## 2021-07-20 PROCEDURE — 82140 ASSAY OF AMMONIA: CPT | Performed by: INTERNAL MEDICINE

## 2021-07-20 PROCEDURE — 63710000001 INSULIN LISPRO (HUMAN) PER 5 UNITS: Performed by: INTERNAL MEDICINE

## 2021-07-20 PROCEDURE — 94669 MECHANICAL CHEST WALL OSCILL: CPT

## 2021-07-20 PROCEDURE — 97530 THERAPEUTIC ACTIVITIES: CPT

## 2021-07-20 PROCEDURE — 85025 COMPLETE CBC W/AUTO DIFF WBC: CPT | Performed by: INTERNAL MEDICINE

## 2021-07-20 RX ADMIN — NICOTINE 1 PATCH: 14 PATCH, EXTENDED RELEASE TRANSDERMAL at 13:31

## 2021-07-20 RX ADMIN — WARFARIN SODIUM 2 MG: 2 TABLET ORAL at 17:48

## 2021-07-20 RX ADMIN — SODIUM CHLORIDE, PRESERVATIVE FREE 3 ML: 5 INJECTION INTRAVENOUS at 09:11

## 2021-07-20 RX ADMIN — SPIRONOLACTONE 50 MG: 25 TABLET ORAL at 13:30

## 2021-07-20 RX ADMIN — ALBUTEROL SULFATE 2.5 MG: 2.5 SOLUTION RESPIRATORY (INHALATION) at 00:28

## 2021-07-20 RX ADMIN — MICONAZOLE NITRATE: 2 POWDER TOPICAL at 20:14

## 2021-07-20 RX ADMIN — POLYETHYLENE GLYCOL 3350 17 G: 17 POWDER, FOR SOLUTION ORAL at 20:14

## 2021-07-20 RX ADMIN — FUROSEMIDE 40 MG: 10 INJECTION INTRAMUSCULAR; INTRAVENOUS at 11:26

## 2021-07-20 RX ADMIN — FAMOTIDINE 20 MG: 20 TABLET ORAL at 17:01

## 2021-07-20 RX ADMIN — SODIUM CHLORIDE, PRESERVATIVE FREE 10 ML: 5 INJECTION INTRAVENOUS at 20:13

## 2021-07-20 RX ADMIN — FUROSEMIDE 40 MG: 10 INJECTION INTRAMUSCULAR; INTRAVENOUS at 23:29

## 2021-07-20 RX ADMIN — INSULIN DETEMIR 25 UNITS: 100 INJECTION, SOLUTION SUBCUTANEOUS at 20:15

## 2021-07-20 RX ADMIN — ALBUTEROL SULFATE 2.5 MG: 2.5 SOLUTION RESPIRATORY (INHALATION) at 06:53

## 2021-07-20 RX ADMIN — ALBUTEROL SULFATE 2.5 MG: 2.5 SOLUTION RESPIRATORY (INHALATION) at 14:13

## 2021-07-20 RX ADMIN — FAMOTIDINE 20 MG: 20 TABLET ORAL at 13:26

## 2021-07-20 RX ADMIN — INSULIN LISPRO 2 UNITS: 100 INJECTION, SOLUTION INTRAVENOUS; SUBCUTANEOUS at 09:08

## 2021-07-20 RX ADMIN — ALBUTEROL SULFATE 2.5 MG: 2.5 SOLUTION RESPIRATORY (INHALATION) at 19:51

## 2021-07-20 RX ADMIN — DOCUSATE SODIUM 50MG AND SENNOSIDES 8.6MG 1 TABLET: 8.6; 5 TABLET, FILM COATED ORAL at 20:13

## 2021-07-20 RX ADMIN — ARFORMOTEROL TARTRATE 15 MCG: 15 SOLUTION RESPIRATORY (INHALATION) at 19:51

## 2021-07-20 RX ADMIN — LACTULOSE 30 G: 20 SOLUTION ORAL at 20:13

## 2021-07-20 RX ADMIN — PREDNISONE 40 MG: 20 TABLET ORAL at 13:28

## 2021-07-20 RX ADMIN — CLONIDINE HYDROCHLORIDE 0.1 MG: 0.1 TABLET ORAL at 20:13

## 2021-07-20 RX ADMIN — BISOPROLOL FUMARATE 20 MG: 5 TABLET ORAL at 13:24

## 2021-07-20 RX ADMIN — SERTRALINE HYDROCHLORIDE 100 MG: 100 TABLET ORAL at 13:29

## 2021-07-20 RX ADMIN — INSULIN LISPRO 4 UNITS: 100 INJECTION, SOLUTION INTRAVENOUS; SUBCUTANEOUS at 17:00

## 2021-07-20 RX ADMIN — ARFORMOTEROL TARTRATE 15 MCG: 15 SOLUTION RESPIRATORY (INHALATION) at 06:53

## 2021-07-20 RX ADMIN — LOSARTAN POTASSIUM 100 MG: 25 TABLET, FILM COATED ORAL at 13:27

## 2021-07-21 LAB
GLUCOSE BLDC GLUCOMTR-MCNC: 127 MG/DL (ref 70–130)
GLUCOSE BLDC GLUCOMTR-MCNC: 152 MG/DL (ref 70–130)
GLUCOSE BLDC GLUCOMTR-MCNC: 368 MG/DL (ref 70–130)
INR PPP: 1.57 (ref 2–3)
PROTHROMBIN TIME: 16.2 SECONDS (ref 9.4–12)

## 2021-07-21 PROCEDURE — 94799 UNLISTED PULMONARY SVC/PX: CPT

## 2021-07-21 PROCEDURE — U0003 INFECTIOUS AGENT DETECTION BY NUCLEIC ACID (DNA OR RNA); SEVERE ACUTE RESPIRATORY SYNDROME CORONAVIRUS 2 (SARS-COV-2) (CORONAVIRUS DISEASE [COVID-19]), AMPLIFIED PROBE TECHNIQUE, MAKING USE OF HIGH THROUGHPUT TECHNOLOGIES AS DESCRIBED BY CMS-2020-01-R: HCPCS | Performed by: INTERNAL MEDICINE

## 2021-07-21 PROCEDURE — 99232 SBSQ HOSP IP/OBS MODERATE 35: CPT | Performed by: INTERNAL MEDICINE

## 2021-07-21 PROCEDURE — 82962 GLUCOSE BLOOD TEST: CPT

## 2021-07-21 PROCEDURE — 25010000002 FUROSEMIDE PER 20 MG: Performed by: INTERNAL MEDICINE

## 2021-07-21 PROCEDURE — 94669 MECHANICAL CHEST WALL OSCILL: CPT

## 2021-07-21 PROCEDURE — 25010000002 HYDRALAZINE PER 20 MG: Performed by: INTERNAL MEDICINE

## 2021-07-21 PROCEDURE — 63710000001 INSULIN LISPRO (HUMAN) PER 5 UNITS: Performed by: INTERNAL MEDICINE

## 2021-07-21 PROCEDURE — 94660 CPAP INITIATION&MGMT: CPT

## 2021-07-21 PROCEDURE — 63710000001 INSULIN DETEMIR PER 5 UNITS: Performed by: INTERNAL MEDICINE

## 2021-07-21 PROCEDURE — 63710000001 PREDNISONE PER 1 MG: Performed by: INTERNAL MEDICINE

## 2021-07-21 PROCEDURE — 85610 PROTHROMBIN TIME: CPT | Performed by: INTERNAL MEDICINE

## 2021-07-21 RX ADMIN — ALBUTEROL SULFATE 2.5 MG: 2.5 SOLUTION RESPIRATORY (INHALATION) at 19:36

## 2021-07-21 RX ADMIN — SODIUM CHLORIDE, PRESERVATIVE FREE 3 ML: 5 INJECTION INTRAVENOUS at 20:58

## 2021-07-21 RX ADMIN — OXYCODONE HYDROCHLORIDE AND ACETAMINOPHEN 1 TABLET: 5; 325 TABLET ORAL at 12:01

## 2021-07-21 RX ADMIN — LACTULOSE 20 G: 20 SOLUTION ORAL at 16:18

## 2021-07-21 RX ADMIN — FAMOTIDINE 20 MG: 20 TABLET ORAL at 17:05

## 2021-07-21 RX ADMIN — ARFORMOTEROL TARTRATE 15 MCG: 15 SOLUTION RESPIRATORY (INHALATION) at 08:05

## 2021-07-21 RX ADMIN — MICONAZOLE NITRATE: 2 POWDER TOPICAL at 10:39

## 2021-07-21 RX ADMIN — LOSARTAN POTASSIUM 100 MG: 25 TABLET, FILM COATED ORAL at 10:11

## 2021-07-21 RX ADMIN — LACTULOSE 30 G: 20 SOLUTION ORAL at 20:58

## 2021-07-21 RX ADMIN — SODIUM CHLORIDE, PRESERVATIVE FREE 10 ML: 5 INJECTION INTRAVENOUS at 21:00

## 2021-07-21 RX ADMIN — ALBUTEROL SULFATE 2.5 MG: 2.5 SOLUTION RESPIRATORY (INHALATION) at 14:04

## 2021-07-21 RX ADMIN — WARFARIN SODIUM 2 MG: 2 TABLET ORAL at 17:14

## 2021-07-21 RX ADMIN — ALBUTEROL SULFATE 2.5 MG: 2.5 SOLUTION RESPIRATORY (INHALATION) at 00:38

## 2021-07-21 RX ADMIN — LACTULOSE 30 G: 20 SOLUTION ORAL at 10:28

## 2021-07-21 RX ADMIN — INSULIN DETEMIR 25 UNITS: 100 INJECTION, SOLUTION SUBCUTANEOUS at 21:00

## 2021-07-21 RX ADMIN — FAMOTIDINE 20 MG: 20 TABLET ORAL at 10:09

## 2021-07-21 RX ADMIN — ALBUTEROL SULFATE 2.5 MG: 2.5 SOLUTION RESPIRATORY (INHALATION) at 08:04

## 2021-07-21 RX ADMIN — PREDNISONE 40 MG: 20 TABLET ORAL at 10:11

## 2021-07-21 RX ADMIN — NICOTINE 1 PATCH: 14 PATCH, EXTENDED RELEASE TRANSDERMAL at 10:12

## 2021-07-21 RX ADMIN — MICONAZOLE NITRATE: 2 POWDER TOPICAL at 21:00

## 2021-07-21 RX ADMIN — HYDRALAZINE HYDROCHLORIDE 20 MG: 20 INJECTION INTRAMUSCULAR; INTRAVENOUS at 12:01

## 2021-07-21 RX ADMIN — SODIUM CHLORIDE, PRESERVATIVE FREE 3 ML: 5 INJECTION INTRAVENOUS at 08:27

## 2021-07-21 RX ADMIN — SPIRONOLACTONE 50 MG: 25 TABLET ORAL at 10:11

## 2021-07-21 RX ADMIN — RIFAXIMIN 550 MG: 550 TABLET ORAL at 23:13

## 2021-07-21 RX ADMIN — INSULIN LISPRO 6 UNITS: 100 INJECTION, SOLUTION INTRAVENOUS; SUBCUTANEOUS at 17:05

## 2021-07-21 RX ADMIN — INSULIN LISPRO 2 UNITS: 100 INJECTION, SOLUTION INTRAVENOUS; SUBCUTANEOUS at 08:27

## 2021-07-21 RX ADMIN — BISOPROLOL FUMARATE 20 MG: 5 TABLET ORAL at 10:11

## 2021-07-21 RX ADMIN — FUROSEMIDE 40 MG: 10 INJECTION INTRAMUSCULAR; INTRAVENOUS at 23:13

## 2021-07-21 RX ADMIN — DOCUSATE SODIUM 50MG AND SENNOSIDES 8.6MG 1 TABLET: 8.6; 5 TABLET, FILM COATED ORAL at 20:58

## 2021-07-21 RX ADMIN — SERTRALINE HYDROCHLORIDE 100 MG: 100 TABLET ORAL at 10:10

## 2021-07-21 RX ADMIN — CLONIDINE HYDROCHLORIDE 0.1 MG: 0.1 TABLET ORAL at 20:58

## 2021-07-21 RX ADMIN — DOCUSATE SODIUM 50MG AND SENNOSIDES 8.6MG 1 TABLET: 8.6; 5 TABLET, FILM COATED ORAL at 10:12

## 2021-07-21 RX ADMIN — POLYETHYLENE GLYCOL 3350 17 G: 17 POWDER, FOR SOLUTION ORAL at 20:57

## 2021-07-21 RX ADMIN — ARFORMOTEROL TARTRATE 15 MCG: 15 SOLUTION RESPIRATORY (INHALATION) at 19:36

## 2021-07-21 RX ADMIN — FUROSEMIDE 40 MG: 10 INJECTION INTRAMUSCULAR; INTRAVENOUS at 11:32

## 2021-07-22 VITALS
WEIGHT: 119.71 LBS | HEART RATE: 68 BPM | RESPIRATION RATE: 20 BRPM | HEIGHT: 65 IN | OXYGEN SATURATION: 96 % | DIASTOLIC BLOOD PRESSURE: 48 MMHG | TEMPERATURE: 98.6 F | BODY MASS INDEX: 19.94 KG/M2 | SYSTOLIC BLOOD PRESSURE: 133 MMHG

## 2021-07-22 PROBLEM — J96.01 ACUTE RESPIRATORY FAILURE WITH HYPOXIA: Status: RESOLVED | Noted: 2021-06-22 | Resolved: 2021-07-22

## 2021-07-22 PROBLEM — J18.9 MULTIFOCAL PNEUMONIA: Status: RESOLVED | Noted: 2021-06-04 | Resolved: 2021-07-22

## 2021-07-22 PROBLEM — E16.2 HYPOGLYCEMIA: Status: RESOLVED | Noted: 2021-06-22 | Resolved: 2021-07-22

## 2021-07-22 PROBLEM — R40.1 STUPOR: Status: RESOLVED | Noted: 2021-06-14 | Resolved: 2021-07-22

## 2021-07-22 PROBLEM — I49.8 VENTRICULAR BIGEMINY: Status: RESOLVED | Noted: 2021-06-29 | Resolved: 2021-07-22

## 2021-07-22 LAB
GLUCOSE BLDC GLUCOMTR-MCNC: 170 MG/DL (ref 70–130)
GLUCOSE BLDC GLUCOMTR-MCNC: 224 MG/DL (ref 70–130)
GLUCOSE BLDC GLUCOMTR-MCNC: 405 MG/DL (ref 70–130)
INR PPP: 1.63 (ref 2–3)
PROTHROMBIN TIME: 16.9 SECONDS (ref 9.4–12)
SARS-COV-2 RNA RESP QL NAA+PROBE: NOT DETECTED

## 2021-07-22 PROCEDURE — 94660 CPAP INITIATION&MGMT: CPT

## 2021-07-22 PROCEDURE — 63710000001 PREDNISONE PER 1 MG: Performed by: INTERNAL MEDICINE

## 2021-07-22 PROCEDURE — 94760 N-INVAS EAR/PLS OXIMETRY 1: CPT

## 2021-07-22 PROCEDURE — 25010000002 FUROSEMIDE PER 20 MG: Performed by: INTERNAL MEDICINE

## 2021-07-22 PROCEDURE — 94799 UNLISTED PULMONARY SVC/PX: CPT

## 2021-07-22 PROCEDURE — 85610 PROTHROMBIN TIME: CPT | Performed by: INTERNAL MEDICINE

## 2021-07-22 PROCEDURE — 63710000001 INSULIN LISPRO (HUMAN) PER 5 UNITS: Performed by: INTERNAL MEDICINE

## 2021-07-22 PROCEDURE — 94669 MECHANICAL CHEST WALL OSCILL: CPT

## 2021-07-22 PROCEDURE — 82962 GLUCOSE BLOOD TEST: CPT

## 2021-07-22 PROCEDURE — 99232 SBSQ HOSP IP/OBS MODERATE 35: CPT | Performed by: INTERNAL MEDICINE

## 2021-07-22 RX ORDER — ALBUTEROL SULFATE 2.5 MG/3ML
2.5 SOLUTION RESPIRATORY (INHALATION) EVERY 4 HOURS PRN
Qty: 56 EACH | Refills: 12
Start: 2021-07-22 | End: 2021-08-05

## 2021-07-22 RX ORDER — BISOPROLOL FUMARATE 10 MG/1
20 TABLET, FILM COATED ORAL
Qty: 60 TABLET | Refills: 0 | Status: SHIPPED | OUTPATIENT
Start: 2021-07-23 | End: 2021-08-19 | Stop reason: HOSPADM

## 2021-07-22 RX ORDER — PREDNISONE 20 MG/1
20 TABLET ORAL DAILY
Qty: 5 TABLET | Refills: 0
Start: 2021-07-22 | End: 2021-07-27

## 2021-07-22 RX ORDER — WARFARIN SODIUM 1 MG/1
TABLET ORAL
Qty: 19 TABLET
Start: 2021-07-22 | End: 2021-11-28

## 2021-07-22 RX ORDER — POTASSIUM CHLORIDE 1.5 G/1.77G
40 POWDER, FOR SOLUTION ORAL DAILY
Qty: 60 PACKET | Refills: 0 | Status: SHIPPED | OUTPATIENT
Start: 2021-07-23 | End: 2021-08-19 | Stop reason: HOSPADM

## 2021-07-22 RX ORDER — SPIRONOLACTONE 50 MG/1
50 TABLET, FILM COATED ORAL DAILY
Qty: 30 TABLET | Refills: 0 | Status: SHIPPED | OUTPATIENT
Start: 2021-07-23 | End: 2021-08-19 | Stop reason: HOSPADM

## 2021-07-22 RX ORDER — LOSARTAN POTASSIUM 100 MG/1
100 TABLET ORAL DAILY
Qty: 30 TABLET | Refills: 0 | Status: SHIPPED | OUTPATIENT
Start: 2021-07-23 | End: 2022-11-29

## 2021-07-22 RX ORDER — FUROSEMIDE 80 MG
80 TABLET ORAL 2 TIMES DAILY
Qty: 60 TABLET | Refills: 0 | Status: SHIPPED | OUTPATIENT
Start: 2021-07-22 | End: 2021-08-19 | Stop reason: HOSPADM

## 2021-07-22 RX ORDER — LACTULOSE 10 G/15ML
30 SOLUTION ORAL 3 TIMES DAILY
Qty: 4050 ML | Refills: 0
Start: 2021-07-22 | End: 2021-08-21

## 2021-07-22 RX ORDER — POLYETHYLENE GLYCOL 3350 17 G/17G
17 POWDER, FOR SOLUTION ORAL 2 TIMES DAILY
Qty: 60 PACKET | Refills: 0 | Status: SHIPPED | OUTPATIENT
Start: 2021-07-22 | End: 2021-08-21

## 2021-07-22 RX ORDER — ARFORMOTEROL TARTRATE 15 UG/2ML
15 SOLUTION RESPIRATORY (INHALATION)
Qty: 120 ML
Start: 2021-07-22 | End: 2021-08-21

## 2021-07-22 RX ORDER — FAMOTIDINE 20 MG/1
20 TABLET, FILM COATED ORAL
Qty: 60 TABLET | Refills: 0 | Status: SHIPPED | OUTPATIENT
Start: 2021-07-22 | End: 2021-08-21

## 2021-07-22 RX ORDER — OXYCODONE HYDROCHLORIDE AND ACETAMINOPHEN 5; 325 MG/1; MG/1
1 TABLET ORAL EVERY 6 HOURS PRN
Qty: 56 TABLET | Refills: 0
Start: 2021-07-22 | End: 2021-08-05

## 2021-07-22 RX ADMIN — DOCUSATE SODIUM 50MG AND SENNOSIDES 8.6MG 1 TABLET: 8.6; 5 TABLET, FILM COATED ORAL at 09:42

## 2021-07-22 RX ADMIN — FUROSEMIDE 40 MG: 10 INJECTION INTRAMUSCULAR; INTRAVENOUS at 09:42

## 2021-07-22 RX ADMIN — POLYETHYLENE GLYCOL 3350 17 G: 17 POWDER, FOR SOLUTION ORAL at 09:44

## 2021-07-22 RX ADMIN — LOSARTAN POTASSIUM 100 MG: 25 TABLET, FILM COATED ORAL at 09:43

## 2021-07-22 RX ADMIN — POTASSIUM CHLORIDE 40 MEQ: 1.5 POWDER, FOR SOLUTION ORAL at 09:44

## 2021-07-22 RX ADMIN — FAMOTIDINE 20 MG: 20 TABLET ORAL at 09:42

## 2021-07-22 RX ADMIN — LACTULOSE 30 G: 20 SOLUTION ORAL at 09:42

## 2021-07-22 RX ADMIN — SODIUM CHLORIDE, PRESERVATIVE FREE 3 ML: 5 INJECTION INTRAVENOUS at 09:41

## 2021-07-22 RX ADMIN — RIFAXIMIN 550 MG: 550 TABLET ORAL at 09:43

## 2021-07-22 RX ADMIN — HYDROXYZINE HYDROCHLORIDE 25 MG: 25 TABLET, FILM COATED ORAL at 16:36

## 2021-07-22 RX ADMIN — NICOTINE 1 PATCH: 14 PATCH, EXTENDED RELEASE TRANSDERMAL at 09:44

## 2021-07-22 RX ADMIN — SPIRONOLACTONE 50 MG: 25 TABLET ORAL at 09:43

## 2021-07-22 RX ADMIN — ALBUTEROL SULFATE 2.5 MG: 2.5 SOLUTION RESPIRATORY (INHALATION) at 12:16

## 2021-07-22 RX ADMIN — OXYCODONE HYDROCHLORIDE AND ACETAMINOPHEN 1 TABLET: 5; 325 TABLET ORAL at 13:31

## 2021-07-22 RX ADMIN — MICONAZOLE NITRATE: 2 POWDER TOPICAL at 09:45

## 2021-07-22 RX ADMIN — WARFARIN SODIUM 2 MG: 2 TABLET ORAL at 16:36

## 2021-07-22 RX ADMIN — FAMOTIDINE 20 MG: 20 TABLET ORAL at 16:35

## 2021-07-22 RX ADMIN — ALBUTEROL SULFATE 2.5 MG: 2.5 SOLUTION RESPIRATORY (INHALATION) at 00:34

## 2021-07-22 RX ADMIN — BISOPROLOL FUMARATE 20 MG: 5 TABLET ORAL at 09:43

## 2021-07-22 RX ADMIN — OXYCODONE HYDROCHLORIDE AND ACETAMINOPHEN 1 TABLET: 5; 325 TABLET ORAL at 00:30

## 2021-07-22 RX ADMIN — SERTRALINE HYDROCHLORIDE 100 MG: 100 TABLET ORAL at 09:42

## 2021-07-22 RX ADMIN — INSULIN LISPRO 2 UNITS: 100 INJECTION, SOLUTION INTRAVENOUS; SUBCUTANEOUS at 09:41

## 2021-07-22 RX ADMIN — SODIUM CHLORIDE, PRESERVATIVE FREE 10 ML: 5 INJECTION INTRAVENOUS at 09:45

## 2021-07-22 RX ADMIN — PREDNISONE 40 MG: 20 TABLET ORAL at 09:51

## 2021-07-22 RX ADMIN — INSULIN LISPRO 3 UNITS: 100 INJECTION, SOLUTION INTRAVENOUS; SUBCUTANEOUS at 13:30

## 2021-07-29 LAB — FUNGUS WND CULT: NORMAL

## 2021-08-01 NOTE — PROGRESS NOTES
"Enter Query Response Below      Query Response:     Acute on chronic systolic and diastolic heart failure         If applicable, please update the problem list.    Patient: Italia Olvera        : 1960  Account: [id number]           Admit Date: 2021        Options to Respond to Query:    1. Access the Encounter     a. From the To-Do Side bar, click Respond With Note.     b. Click New Note     c. Answer query within the yellow box.                d. Update the Problem List if applicable.     ,     Patient admitted with Bacterial Pneumonia, Metabolic Encephalopathy, Acute on Chronic Hypoxic Respiratory Failure,   Acute on Chronic Heart Failure    History and Physical : Chronic Diastolic Heart Failure      : Progress note : Dr. Shoemaker : Acute exacerbation of chronic and combined congestive heart failure     :  Progress note: Dr. Shoemaker :  Acute exacerbation of chronic systolic and diastolic heart failure     :  Progress note : Dr. Shoemaker :  Assessment/ Plan : Chronic Diastolic heart failure , Lost 73 pounds in 31 days  Active Hospital Problems:  Acute on Chronic  Systolic CHF ( congested heart failure )       Discharge Summary : \" 2D echocardiogram showed an ejection fraction of 35% which in fact is improved from the previous recording\"   Active Hospital Problems: Acute on Chronic Systolic Heart Failure      06/15/21 Echocardiogram : Left ventricular ejection fraction appears to be 51 - 55%.    Treatment included : Lasix intravenously, Lasix oral, Aldactone oral      Conflicting documentation is noted in the Medical Record. Please clarify the following :  > Acute on Chronic Systolic Heart Failure   > Acute on Chronic Combined Systolic and Diastolic Heart failure   > Other Explanation : please specify ____________  > Unable to further specify     By submitting this query, we are merely seeking further clarification of documentation to accurately reflect all conditions that you " are monitoring, evaluating, treating or that extend the hospitalization or utilize additional resources of care. Please utilize your independent clinical judgment when addressing the question(s) above.     This query and your response, once completed, will be entered into the legal medical record.    Sincerely,  Nadia Daugherty  Clinical Documentation Integrity Program   [email or web address]

## 2021-08-01 NOTE — PROGRESS NOTES
Enter Query Response Below      Query Response:     Type 2 diabetes mellitus         If applicable, please update the problem list.    Patient: Italia Olvera        : 1960  Account: [id number]           Admit Date: 2021        Options to Respond to Query:    1. Access the Encounter     a. From the To-Do Side bar, click Respond With Note.     b. Click New Note     c. Answer query within the yellow box.                d. Update the Problem List if applicable.     Dr. Shoemaker,     Patient admitted with Bacterial Pneumonia, Metabolic Encephalopathy, Acute on Chronic Hypoxic Respiratory Failure,   Acute on Chronic Heart Failure    History and Physical :  Type 2 diabetes mellitus     Discharge Summary : Active Hospital Problems:  DM (diabetes mellitus), type 1     Treatment has included : Levemir, Humulin R, Humalog    Conflicting documentation is noted in the Medical Record. Please clarify the following :  > Diabetes Mellitus, Type 1  > Diabetes Mellitus, Type 2  > Other Explanation : please specify ______________  > Unable to further specify     By submitting this query, we are merely seeking further clarification of documentation to accurately reflect all conditions that you are monitoring, evaluating, treating or that extend the hospitalization or utilize additional resources of care. Please utilize your independent clinical judgment when addressing the question(s) above.     This query and your response, once completed, will be entered into the legal medical record.    Sincerely,  Nadia Daugherty  Clinical Documentation Integrity Program   [email or web address]

## 2021-08-09 LAB
FUNGUS WND CULT: NORMAL
MYCOBACTERIUM SPEC CULT: NORMAL
NIGHT BLUE STAIN TISS: NORMAL

## 2021-08-12 ENCOUNTER — APPOINTMENT (OUTPATIENT)
Dept: GENERAL RADIOLOGY | Facility: HOSPITAL | Age: 61
End: 2021-08-12

## 2021-08-12 ENCOUNTER — HOSPITAL ENCOUNTER (INPATIENT)
Facility: HOSPITAL | Age: 61
LOS: 7 days | Discharge: HOME HEALTH | End: 2021-08-19
Attending: EMERGENCY MEDICINE | Admitting: INTERNAL MEDICINE

## 2021-08-12 DIAGNOSIS — R26.2 DIFFICULTY IN WALKING: ICD-10-CM

## 2021-08-12 DIAGNOSIS — Z78.9 DECREASED ACTIVITIES OF DAILY LIVING (ADL): ICD-10-CM

## 2021-08-12 DIAGNOSIS — N39.0 UTI (URINARY TRACT INFECTION), BACTERIAL: ICD-10-CM

## 2021-08-12 DIAGNOSIS — A41.9 SEPSIS, DUE TO UNSPECIFIED ORGANISM, UNSPECIFIED WHETHER ACUTE ORGAN DYSFUNCTION PRESENT (HCC): Primary | ICD-10-CM

## 2021-08-12 DIAGNOSIS — J18.9 PNEUMONIA OF BOTH LUNGS DUE TO INFECTIOUS ORGANISM, UNSPECIFIED PART OF LUNG: ICD-10-CM

## 2021-08-12 DIAGNOSIS — A49.9 UTI (URINARY TRACT INFECTION), BACTERIAL: ICD-10-CM

## 2021-08-12 LAB
ALBUMIN SERPL-MCNC: 2.9 G/DL (ref 3.5–5.2)
ALBUMIN/GLOB SERPL: 0.7 G/DL
ALP SERPL-CCNC: 122 U/L (ref 39–117)
ALT SERPL W P-5'-P-CCNC: 14 U/L (ref 1–33)
ANION GAP SERPL CALCULATED.3IONS-SCNC: 13.7 MMOL/L (ref 5–15)
AST SERPL-CCNC: 19 U/L (ref 1–32)
BACTERIA UR QL AUTO: ABNORMAL /HPF
BASOPHILS # BLD AUTO: 0.05 10*3/MM3 (ref 0–0.2)
BASOPHILS NFR BLD AUTO: 0.3 % (ref 0–1.5)
BILIRUB SERPL-MCNC: 0.4 MG/DL (ref 0–1.2)
BILIRUB UR QL STRIP: NEGATIVE
BUN SERPL-MCNC: 22 MG/DL (ref 8–23)
BUN/CREAT SERPL: 52.4 (ref 7–25)
CALCIUM SPEC-SCNC: 9.5 MG/DL (ref 8.6–10.5)
CHLORIDE SERPL-SCNC: 101 MMOL/L (ref 98–107)
CLARITY UR: ABNORMAL
CO2 SERPL-SCNC: 25.3 MMOL/L (ref 22–29)
COLOR UR: YELLOW
CREAT SERPL-MCNC: 0.42 MG/DL (ref 0.57–1)
D-LACTATE SERPL-SCNC: 2.1 MMOL/L (ref 0.5–2)
D-LACTATE SERPL-SCNC: 2.9 MMOL/L (ref 0.5–2)
DEPRECATED RDW RBC AUTO: 62.1 FL (ref 37–54)
EOSINOPHIL # BLD AUTO: 0.01 10*3/MM3 (ref 0–0.4)
EOSINOPHIL NFR BLD AUTO: 0.1 % (ref 0.3–6.2)
ERYTHROCYTE [DISTWIDTH] IN BLOOD BY AUTOMATED COUNT: 19.8 % (ref 12.3–15.4)
GFR SERPL CREATININE-BSD FRML MDRD: >150 ML/MIN/1.73
GLOBULIN UR ELPH-MCNC: 3.9 GM/DL
GLUCOSE SERPL-MCNC: 73 MG/DL (ref 65–99)
GLUCOSE UR STRIP-MCNC: NEGATIVE MG/DL
HCT VFR BLD AUTO: 33 % (ref 34–46.6)
HGB BLD-MCNC: 10.6 G/DL (ref 12–15.9)
HGB UR QL STRIP.AUTO: ABNORMAL
HOLD SPECIMEN: NORMAL
HOLD SPECIMEN: NORMAL
HYALINE CASTS UR QL AUTO: ABNORMAL /LPF
IMM GRANULOCYTES # BLD AUTO: 0.12 10*3/MM3 (ref 0–0.05)
IMM GRANULOCYTES NFR BLD AUTO: 0.6 % (ref 0–0.5)
INR PPP: 1.04 (ref 2–3)
KETONES UR QL STRIP: NEGATIVE
LEUKOCYTE ESTERASE UR QL STRIP.AUTO: ABNORMAL
LYMPHOCYTES # BLD AUTO: 1.84 10*3/MM3 (ref 0.7–3.1)
LYMPHOCYTES NFR BLD AUTO: 9.7 % (ref 19.6–45.3)
MAGNESIUM SERPL-MCNC: 1.3 MG/DL (ref 1.6–2.4)
MCH RBC QN AUTO: 27.3 PG (ref 26.6–33)
MCHC RBC AUTO-ENTMCNC: 32.1 G/DL (ref 31.5–35.7)
MCV RBC AUTO: 85.1 FL (ref 79–97)
MONOCYTES # BLD AUTO: 1.07 10*3/MM3 (ref 0.1–0.9)
MONOCYTES NFR BLD AUTO: 5.7 % (ref 5–12)
NEUTROPHILS NFR BLD AUTO: 15.79 10*3/MM3 (ref 1.7–7)
NEUTROPHILS NFR BLD AUTO: 83.6 % (ref 42.7–76)
NITRITE UR QL STRIP: POSITIVE
NRBC BLD AUTO-RTO: 0 /100 WBC (ref 0–0.2)
PH UR STRIP.AUTO: 7 [PH] (ref 5–8)
PLATELET # BLD AUTO: 174 10*3/MM3 (ref 140–450)
PMV BLD AUTO: 10.3 FL (ref 6–12)
POTASSIUM SERPL-SCNC: 4 MMOL/L (ref 3.5–5.2)
PROT SERPL-MCNC: 6.8 G/DL (ref 6–8.5)
PROT UR QL STRIP: ABNORMAL
PROTHROMBIN TIME: 11.3 SECONDS (ref 9.4–12)
RBC # BLD AUTO: 3.88 10*6/MM3 (ref 3.77–5.28)
RBC # UR: ABNORMAL /HPF
REF LAB TEST METHOD: ABNORMAL
SODIUM SERPL-SCNC: 140 MMOL/L (ref 136–145)
SP GR UR STRIP: 1.01 (ref 1–1.03)
SQUAMOUS #/AREA URNS HPF: ABNORMAL /HPF
TROPONIN T SERPL-MCNC: 0.02 NG/ML (ref 0–0.03)
UROBILINOGEN UR QL STRIP: ABNORMAL
WBC # BLD AUTO: 18.88 10*3/MM3 (ref 3.4–10.8)
WBC UR QL AUTO: ABNORMAL /HPF
WHOLE BLOOD HOLD SPECIMEN: NORMAL
YEAST URNS QL MICRO: ABNORMAL /HPF

## 2021-08-12 PROCEDURE — U0003 INFECTIOUS AGENT DETECTION BY NUCLEIC ACID (DNA OR RNA); SEVERE ACUTE RESPIRATORY SYNDROME CORONAVIRUS 2 (SARS-COV-2) (CORONAVIRUS DISEASE [COVID-19]), AMPLIFIED PROBE TECHNIQUE, MAKING USE OF HIGH THROUGHPUT TECHNOLOGIES AS DESCRIBED BY CMS-2020-01-R: HCPCS | Performed by: EMERGENCY MEDICINE

## 2021-08-12 PROCEDURE — 87040 BLOOD CULTURE FOR BACTERIA: CPT | Performed by: EMERGENCY MEDICINE

## 2021-08-12 PROCEDURE — 87077 CULTURE AEROBIC IDENTIFY: CPT | Performed by: EMERGENCY MEDICINE

## 2021-08-12 PROCEDURE — 81001 URINALYSIS AUTO W/SCOPE: CPT | Performed by: EMERGENCY MEDICINE

## 2021-08-12 PROCEDURE — 82962 GLUCOSE BLOOD TEST: CPT

## 2021-08-12 PROCEDURE — 25010000002 CEFTRIAXONE PER 250 MG: Performed by: EMERGENCY MEDICINE

## 2021-08-12 PROCEDURE — 85025 COMPLETE CBC W/AUTO DIFF WBC: CPT | Performed by: EMERGENCY MEDICINE

## 2021-08-12 PROCEDURE — 25010000002 AZITHROMYCIN PER 500 MG: Performed by: EMERGENCY MEDICINE

## 2021-08-12 PROCEDURE — 71045 X-RAY EXAM CHEST 1 VIEW: CPT | Performed by: RADIOLOGY

## 2021-08-12 PROCEDURE — 83735 ASSAY OF MAGNESIUM: CPT | Performed by: EMERGENCY MEDICINE

## 2021-08-12 PROCEDURE — 87186 SC STD MICRODIL/AGAR DIL: CPT | Performed by: EMERGENCY MEDICINE

## 2021-08-12 PROCEDURE — 71045 X-RAY EXAM CHEST 1 VIEW: CPT

## 2021-08-12 PROCEDURE — 99285 EMERGENCY DEPT VISIT HI MDM: CPT

## 2021-08-12 PROCEDURE — 85610 PROTHROMBIN TIME: CPT | Performed by: EMERGENCY MEDICINE

## 2021-08-12 PROCEDURE — 84484 ASSAY OF TROPONIN QUANT: CPT | Performed by: EMERGENCY MEDICINE

## 2021-08-12 PROCEDURE — 83605 ASSAY OF LACTIC ACID: CPT | Performed by: EMERGENCY MEDICINE

## 2021-08-12 PROCEDURE — 80053 COMPREHEN METABOLIC PANEL: CPT | Performed by: EMERGENCY MEDICINE

## 2021-08-12 PROCEDURE — 87150 DNA/RNA AMPLIFIED PROBE: CPT | Performed by: EMERGENCY MEDICINE

## 2021-08-12 RX ORDER — ONDANSETRON 2 MG/ML
4 INJECTION INTRAMUSCULAR; INTRAVENOUS EVERY 4 HOURS PRN
Status: DISCONTINUED | OUTPATIENT
Start: 2021-08-12 | End: 2021-08-19 | Stop reason: HOSPADM

## 2021-08-12 RX ORDER — OXYCODONE AND ACETAMINOPHEN 10; 325 MG/1; MG/1
1 TABLET ORAL EVERY 6 HOURS PRN
Status: ON HOLD | COMMUNITY
End: 2022-03-11 | Stop reason: SDUPTHER

## 2021-08-12 RX ORDER — FAMOTIDINE 20 MG/1
40 TABLET, FILM COATED ORAL DAILY
Status: DISCONTINUED | OUTPATIENT
Start: 2021-08-12 | End: 2021-08-19 | Stop reason: HOSPADM

## 2021-08-12 RX ORDER — SODIUM CHLORIDE 0.9 % (FLUSH) 0.9 %
10 SYRINGE (ML) INJECTION EVERY 12 HOURS SCHEDULED
Status: DISCONTINUED | OUTPATIENT
Start: 2021-08-12 | End: 2021-08-19 | Stop reason: HOSPADM

## 2021-08-12 RX ORDER — NALOXONE HCL 0.4 MG/ML
0.4 VIAL (ML) INJECTION
Status: DISCONTINUED | OUTPATIENT
Start: 2021-08-12 | End: 2021-08-19 | Stop reason: HOSPADM

## 2021-08-12 RX ORDER — BISACODYL 5 MG/1
5 TABLET, DELAYED RELEASE ORAL DAILY PRN
Status: DISCONTINUED | OUTPATIENT
Start: 2021-08-12 | End: 2021-08-19 | Stop reason: HOSPADM

## 2021-08-12 RX ORDER — MORPHINE SULFATE 2 MG/ML
2 INJECTION, SOLUTION INTRAMUSCULAR; INTRAVENOUS
Status: ACTIVE | OUTPATIENT
Start: 2021-08-12 | End: 2021-08-15

## 2021-08-12 RX ORDER — SODIUM CHLORIDE 0.9 % (FLUSH) 0.9 %
10 SYRINGE (ML) INJECTION AS NEEDED
Status: DISCONTINUED | OUTPATIENT
Start: 2021-08-12 | End: 2021-08-19 | Stop reason: HOSPADM

## 2021-08-12 RX ORDER — ALPRAZOLAM 0.25 MG/1
0.25 TABLET ORAL EVERY 6 HOURS PRN
Status: DISCONTINUED | OUTPATIENT
Start: 2021-08-12 | End: 2021-08-19 | Stop reason: HOSPADM

## 2021-08-12 RX ORDER — ACETAMINOPHEN 325 MG/1
650 TABLET ORAL EVERY 4 HOURS PRN
Status: DISCONTINUED | OUTPATIENT
Start: 2021-08-12 | End: 2021-08-13

## 2021-08-12 RX ORDER — ALUMINA, MAGNESIA, AND SIMETHICONE 2400; 2400; 240 MG/30ML; MG/30ML; MG/30ML
15 SUSPENSION ORAL EVERY 6 HOURS PRN
Status: DISCONTINUED | OUTPATIENT
Start: 2021-08-12 | End: 2021-08-19 | Stop reason: HOSPADM

## 2021-08-12 RX ORDER — AMOXICILLIN 250 MG
2 CAPSULE ORAL 2 TIMES DAILY
Status: DISCONTINUED | OUTPATIENT
Start: 2021-08-12 | End: 2021-08-19 | Stop reason: HOSPADM

## 2021-08-12 RX ORDER — POLYETHYLENE GLYCOL 3350 17 G/17G
17 POWDER, FOR SOLUTION ORAL DAILY PRN
Status: DISCONTINUED | OUTPATIENT
Start: 2021-08-12 | End: 2021-08-19 | Stop reason: HOSPADM

## 2021-08-12 RX ORDER — HYDROCODONE BITARTRATE AND ACETAMINOPHEN 5; 325 MG/1; MG/1
1 TABLET ORAL EVERY 4 HOURS PRN
Status: DISCONTINUED | OUTPATIENT
Start: 2021-08-12 | End: 2021-08-14

## 2021-08-12 RX ORDER — DEXTROSE, SODIUM CHLORIDE, AND POTASSIUM CHLORIDE 5; .45; .15 G/100ML; G/100ML; G/100ML
100 INJECTION INTRAVENOUS CONTINUOUS
Status: DISCONTINUED | OUTPATIENT
Start: 2021-08-12 | End: 2021-08-14

## 2021-08-12 RX ORDER — TEMAZEPAM 15 MG/1
15 CAPSULE ORAL NIGHTLY PRN
Status: DISCONTINUED | OUTPATIENT
Start: 2021-08-12 | End: 2021-08-13

## 2021-08-12 RX ORDER — PANTOPRAZOLE SODIUM 40 MG/1
40 TABLET, DELAYED RELEASE ORAL DAILY
Status: ON HOLD | COMMUNITY

## 2021-08-12 RX ORDER — BISACODYL 10 MG
10 SUPPOSITORY, RECTAL RECTAL DAILY PRN
Status: DISCONTINUED | OUTPATIENT
Start: 2021-08-12 | End: 2021-08-19 | Stop reason: HOSPADM

## 2021-08-12 RX ADMIN — HYDROCODONE BITARTRATE AND ACETAMINOPHEN 1 TABLET: 5; 325 TABLET ORAL at 22:05

## 2021-08-12 RX ADMIN — POTASSIUM CHLORIDE, DEXTROSE MONOHYDRATE AND SODIUM CHLORIDE 100 ML/HR: 150; 5; 450 INJECTION, SOLUTION INTRAVENOUS at 23:04

## 2021-08-12 RX ADMIN — SODIUM CHLORIDE, PRESERVATIVE FREE 10 ML: 5 INJECTION INTRAVENOUS at 22:07

## 2021-08-12 RX ADMIN — ACETAMINOPHEN 650 MG: 325 TABLET ORAL at 22:58

## 2021-08-12 RX ADMIN — AZITHROMYCIN 500 MG: 500 INJECTION, POWDER, LYOPHILIZED, FOR SOLUTION INTRAVENOUS at 19:58

## 2021-08-12 RX ADMIN — SODIUM CHLORIDE 1761 ML: 9 INJECTION, SOLUTION INTRAVENOUS at 19:08

## 2021-08-12 RX ADMIN — SODIUM CHLORIDE 1 G: 9 INJECTION INTRAMUSCULAR; INTRAVENOUS; SUBCUTANEOUS at 19:09

## 2021-08-12 NOTE — ED NOTES
Patient's  at bedside to assist with patient staying in bed.     Angeles Morin RN  08/12/21 [id number]

## 2021-08-12 NOTE — ED PROVIDER NOTES
Subjective   59-year-old female presents to the emergency department today for altered mental status with low blood sugar at the scene.  Per nursing report, EMS found blood sugar to be in 60s to low 70s.  Patient voices no specific complaints, only stating that she has diffuse pain from her head to her feet.  She states this is a common thing for her.          Review of Systems   Unable to perform ROS: Mental status change   Respiratory: Positive for cough and shortness of breath.    Cardiovascular: Negative for chest pain.   Gastrointestinal: Negative for diarrhea and vomiting.   Genitourinary: Negative for difficulty urinating.   Neurological: Negative for numbness.   Denies unilateral weakness.    Past Medical History:   Diagnosis Date   • Acid reflux    • ACL tear 09/01/2015    PCL/ACL TEAR/RUPTURE   • Acute shoulder bursitis, right 08/23/2015   • Anxiety    • Arthritis    • Asthma    • Bipolar 1 disorder (CMS/[facility])     untreated   • Bladder disorder    • Cancer (CMS/[facility])    • CHF (congestive heart failure) (CMS/[facility])    • Chronic back pain    • COPD (chronic obstructive pulmonary disease) (CMS/[facility])    • Depression    • Diabetes mellitus (CMS/[facility])    • DJD (degenerative joint disease)    • GERD (gastroesophageal reflux disease)    • HBP (high blood pressure)    • Hip pain 09/15/2015   • Hypertension    • Knee injury 08/19/2015   • Knee pain, right 09/15/2015   • Limb swelling    • Neuropathy    • Osteoarthritis, knee 09/01/2015   • Osteoporosis    • Peripheral neuropathy    • Renal failure 1994   • Seasonal allergies    • Shoulder tendonitis 08/23/2015   • SOB (shortness of breath)    • Tendinitis of right rotator cuff 08/23/2015       No Known Allergies    Past Surgical History:   Procedure Laterality Date   • APPENDECTOMY     • BACK SURGERY     • BLADDER REPAIR     • BRONCHOSCOPY N/A 7/10/2021    Procedure: BRONCHOSCOPY WITH BRONCHOALVEOLAR LAVAGE, POSSIBLE BIOPSY, BRUSHING, WASHING, AIRWAY INSPECTION;  Surgeon:  Rodrigo Reyes MD;  Location: [facility] MAIN OR;  Service: Pulmonary;  Laterality: N/A;   • BRONCHOSCOPY N/A 7/10/2021    Procedure: BRONCHOSCOPY;  Surgeon: Rodrigo Reyes MD;  Location: [facility] ENDOSCOPY;  Service: Pulmonary;  Laterality: N/A;   • CHOLECYSTECTOMY     • ENDOSCOPY     • FRACTURE SURGERY     • GALLBLADDER SURGERY     • HERNIA REPAIR     • HYSTERECTOMY     • JOINT REPLACEMENT     • OTHER SURGICAL HISTORY      ARTIFICIAL JOINTS/LIMBS   • OTHER SURGICAL HISTORY      FACE SURGERY, UNSPECIFIED   • TOTAL HIP ARTHROPLASTY Right    • TOTAL KNEE ARTHROPLASTY Left        Family History   Problem Relation Age of Onset   • Stroke Mother    • Throat cancer Mother    • Arthritis Mother    • Osteoporosis Mother    • Heart disease Father    • Breast cancer Sister    • Colon cancer Sister    • Lung cancer Sister    • Arthritis Sister    • Osteoporosis Sister    • Heart disease Brother    • Diabetes Brother    • Arthritis Brother    • Arthritis Daughter        Social History     Socioeconomic History   • Marital status:      Spouse name: Not on file   • Number of children: Not on file   • Years of education: Not on file   • Highest education level: Not on file   Tobacco Use   • Smoking status: Former Smoker     Packs/day: 0.50     Years: 50.00     Pack years: 25.00     Types: Cigarettes     Start date: 6/15/1979     Quit date: 2021     Years since quittin.2   • Smokeless tobacco: Never Used   • Tobacco comment: SMOKED FOR 31 PLUS YEARS   Vaping Use   • Vaping Use: Never used   Substance and Sexual Activity   • Alcohol use: Never   • Drug use: Never   • Sexual activity: Defer           Objective   Physical Exam  Constitutional:       Appearance: She is well-developed.   HENT:      Head: Normocephalic and atraumatic.   Eyes:      Extraocular Movements: Extraocular movements intact.      Pupils: Pupils are equal, round, and reactive to light.   Cardiovascular:      Rate and Rhythm: Normal rate and regular  rhythm.   Pulmonary:      Effort: Pulmonary effort is normal.      Breath sounds: Normal breath sounds. No wheezing or rhonchi.   Abdominal:      General: Bowel sounds are normal.      Palpations: Abdomen is soft.   Musculoskeletal:         General: Normal range of motion.   Skin:     General: Skin is warm and dry.          Neurological:      Mental Status: She is alert. She is disoriented.      GCS: GCS eye subscore is 4. GCS verbal subscore is 5. GCS motor subscore is 6.      Cranial Nerves: No cranial nerve deficit, dysarthria or facial asymmetry.      Sensory: No sensory deficit.      Motor: No weakness.   Psychiatric:         Speech: Speech normal.       Procedures           ED Course      19:50 EDT  Case discussed with hospitalist Dr. Garnett for admit                                     MDM  Number of Diagnoses or Management Options  Pneumonia of both lungs due to infectious organism, unspecified part of lung  Sepsis, due to unspecified organism, unspecified whether acute organ dysfunction present (CMS/[facility])  UTI (urinary tract infection), bacterial  Diagnosis management comments: Sepsis criteria was met in the emergency department and the Sepsis protocol (including antibiotic administration) was initiated.      SIRS criteria considered:   1.  Temperature > 100.4 or <98.6    2.  Heart Rate > 90    3.  Respiratory Rate > 22    4.  WBC > 12K or <4K.             Severe Sepsis:     Respiratory: Mechanical Ventilation or Bipap  Hypotension: SBP > 90 or MAP < 65  Renal: Creatinine > 2  Metabolic: Lactic Acid > 2  Hematologic: Platelets < 100K or INR > 1.5  Hepatic: BILI  >  2  CNS: Sudden AMS     Septic Shock:     Severe Sepsis + Persistent hypotension or Lactic Acid > 4     Normal saline bolus, Antibiotics, and final disposition was based on these definitions.        Sepsis was recognized at 18:40    Antibiotics were ordered.       30 cc/kg bolus was indicated.       The patient was ordered 30 mL/kg of  fluids.    Total Critical Care time of 35 minutes. Total critical care time documented does not include time spent on separately billed procedures for services of nurses or physician assistants. I personally saw and examined the patient. I have reviewed all diagnostic interpretations and treatment plans as written. I was present for the key portions of any procedures performed and the inclusive time noted in any critical care statement. Critical care time includes patient management by me, time spent at the patients bedside,  time to review lab and imaging results, discussing patient care, documentation in the medical record, and time spent with family or caregiver.         Amount and/or Complexity of Data Reviewed  Clinical lab tests: ordered and reviewed  Tests in the radiology section of CPT®: ordered and reviewed  Decide to obtain previous medical records or to obtain history from someone other than the patient: yes  Obtain history from someone other than the patient: yes  Discuss the patient with other providers: yes    Critical Care  Total time providing critical care: 30-74 minutes    Patient Progress  Patient progress: stable      Final diagnoses:   Sepsis, due to unspecified organism, unspecified whether acute organ dysfunction present (CMS/[facility])   UTI (urinary tract infection), bacterial   Pneumonia of both lungs due to infectious organism, unspecified part of lung       ED Disposition  ED Disposition     ED Disposition Condition Comment    Decision to Admit  Level of Care: Telemetry [5]   Diagnosis: Sepsis, due to unspecified organism, unspecified whether acute organ dysfunction present (CMS/[facility]) [[id number]]   Admitting Physician: RAN HAMM [[id number]]   Attending Physician: RAN HAMM [[id number]]   Isolate for COVID?: Yes [1]   Certification: I Certify That Inpatient Hospital Services Are Medically Necessary For Greater Than 2 Midnights            No follow-up provider specified.       Medication List      No  changes were made to your prescriptions during this visit.          Jose De Jesus De Jesus MD  08/12/21 [id number]       Jose De Jesus De Jesus MD  08/12/21 0541

## 2021-08-12 NOTE — ED NOTES
In/out cath performed to obtain urine specimen, sent to lab.     Angeles Morin, RN  08/12/21 [id number]

## 2021-08-12 NOTE — ED NOTES
Provider notified of patient being combative and holding off on ekg at this time.      Laura Erickson  08/12/21 [id number]

## 2021-08-13 PROBLEM — N39.0 ACUTE UTI (URINARY TRACT INFECTION): Status: ACTIVE | Noted: 2021-08-13

## 2021-08-13 PROBLEM — E16.2 HYPOGLYCEMIA: Status: ACTIVE | Noted: 2021-08-13

## 2021-08-13 LAB
BACTERIA BLD CULT: ABNORMAL
D-LACTATE SERPL-SCNC: 2 MMOL/L (ref 0.5–2)
GLUCOSE BLDC GLUCOMTR-MCNC: 113 MG/DL (ref 70–99)
GLUCOSE BLDC GLUCOMTR-MCNC: 145 MG/DL (ref 70–99)
GLUCOSE BLDC GLUCOMTR-MCNC: 154 MG/DL (ref 70–99)
GLUCOSE BLDC GLUCOMTR-MCNC: 179 MG/DL (ref 70–99)
NT-PROBNP SERPL-MCNC: 4517 PG/ML (ref 0–900)
PROCALCITONIN SERPL-MCNC: 0.15 NG/ML (ref 0–0.25)
SARS-COV-2 RNA RESP QL NAA+PROBE: NOT DETECTED
WHOLE BLOOD HOLD SPECIMEN: NORMAL

## 2021-08-13 PROCEDURE — 83605 ASSAY OF LACTIC ACID: CPT | Performed by: INTERNAL MEDICINE

## 2021-08-13 PROCEDURE — 84145 PROCALCITONIN (PCT): CPT | Performed by: INTERNAL MEDICINE

## 2021-08-13 PROCEDURE — 82962 GLUCOSE BLOOD TEST: CPT

## 2021-08-13 PROCEDURE — 63710000001 INSULIN LISPRO (HUMAN) PER 5 UNITS: Performed by: INTERNAL MEDICINE

## 2021-08-13 PROCEDURE — 25010000002 CEFEPIME PER 500 MG: Performed by: INTERNAL MEDICINE

## 2021-08-13 PROCEDURE — 25010000002 ENOXAPARIN PER 10 MG: Performed by: INTERNAL MEDICINE

## 2021-08-13 PROCEDURE — 83880 ASSAY OF NATRIURETIC PEPTIDE: CPT | Performed by: INTERNAL MEDICINE

## 2021-08-13 RX ORDER — OXYCODONE HYDROCHLORIDE AND ACETAMINOPHEN 5; 325 MG/1; MG/1
1 TABLET ORAL EVERY 6 HOURS PRN
Status: DISCONTINUED | OUTPATIENT
Start: 2021-08-13 | End: 2021-08-19 | Stop reason: HOSPADM

## 2021-08-13 RX ORDER — ZOLPIDEM TARTRATE 5 MG/1
5 TABLET ORAL NIGHTLY PRN
Status: DISCONTINUED | OUTPATIENT
Start: 2021-08-13 | End: 2021-08-19 | Stop reason: HOSPADM

## 2021-08-13 RX ORDER — DEXTROSE MONOHYDRATE 100 MG/ML
25 INJECTION, SOLUTION INTRAVENOUS
Status: DISCONTINUED | OUTPATIENT
Start: 2021-08-13 | End: 2021-08-19 | Stop reason: HOSPADM

## 2021-08-13 RX ORDER — BISOPROLOL FUMARATE 5 MG/1
20 TABLET, FILM COATED ORAL
Status: DISCONTINUED | OUTPATIENT
Start: 2021-08-13 | End: 2021-08-19 | Stop reason: HOSPADM

## 2021-08-13 RX ORDER — HYDROXYZINE PAMOATE 25 MG/1
25 CAPSULE ORAL 3 TIMES DAILY PRN
Status: DISCONTINUED | OUTPATIENT
Start: 2021-08-13 | End: 2021-08-19 | Stop reason: HOSPADM

## 2021-08-13 RX ORDER — IPRATROPIUM BROMIDE AND ALBUTEROL SULFATE 2.5; .5 MG/3ML; MG/3ML
3 SOLUTION RESPIRATORY (INHALATION)
Status: DISCONTINUED | OUTPATIENT
Start: 2021-08-13 | End: 2021-08-19 | Stop reason: HOSPADM

## 2021-08-13 RX ORDER — ACETAMINOPHEN 325 MG/1
650 TABLET ORAL EVERY 6 HOURS PRN
Status: DISCONTINUED | OUTPATIENT
Start: 2021-08-13 | End: 2021-08-19 | Stop reason: HOSPADM

## 2021-08-13 RX ORDER — SERTRALINE HYDROCHLORIDE 100 MG/1
100 TABLET, FILM COATED ORAL DAILY
Status: DISCONTINUED | OUTPATIENT
Start: 2021-08-13 | End: 2021-08-19 | Stop reason: HOSPADM

## 2021-08-13 RX ORDER — WARFARIN SODIUM 2 MG/1
2 TABLET ORAL
Status: DISCONTINUED | OUTPATIENT
Start: 2021-08-13 | End: 2021-08-17

## 2021-08-13 RX ORDER — GUAIFENESIN/DEXTROMETHORPHAN 100-10MG/5
5 SYRUP ORAL EVERY 4 HOURS PRN
Status: DISCONTINUED | OUTPATIENT
Start: 2021-08-13 | End: 2021-08-19 | Stop reason: HOSPADM

## 2021-08-13 RX ORDER — NICOTINE POLACRILEX 4 MG
15 LOZENGE BUCCAL
Status: DISCONTINUED | OUTPATIENT
Start: 2021-08-13 | End: 2021-08-19 | Stop reason: HOSPADM

## 2021-08-13 RX ADMIN — SERTRALINE HYDROCHLORIDE 100 MG: 100 TABLET ORAL at 10:10

## 2021-08-13 RX ADMIN — ENOXAPARIN SODIUM 40 MG: 40 INJECTION SUBCUTANEOUS at 10:09

## 2021-08-13 RX ADMIN — DOCUSATE SODIUM 50MG AND SENNOSIDES 8.6MG 2 TABLET: 8.6; 5 TABLET, FILM COATED ORAL at 10:09

## 2021-08-13 RX ADMIN — HYDROXYZINE PAMOATE 25 MG: 25 CAPSULE ORAL at 10:09

## 2021-08-13 RX ADMIN — SODIUM CHLORIDE, PRESERVATIVE FREE 10 ML: 5 INJECTION INTRAVENOUS at 21:53

## 2021-08-13 RX ADMIN — INSULIN LISPRO 2 UNITS: 100 INJECTION, SOLUTION INTRAVENOUS; SUBCUTANEOUS at 13:28

## 2021-08-13 RX ADMIN — POTASSIUM CHLORIDE, DEXTROSE MONOHYDRATE AND SODIUM CHLORIDE 100 ML/HR: 150; 5; 450 INJECTION, SOLUTION INTRAVENOUS at 23:46

## 2021-08-13 RX ADMIN — WARFARIN SODIUM 2 MG: 2 TABLET ORAL at 18:35

## 2021-08-13 RX ADMIN — DOCUSATE SODIUM 50MG AND SENNOSIDES 8.6MG 2 TABLET: 8.6; 5 TABLET, FILM COATED ORAL at 21:53

## 2021-08-13 RX ADMIN — POTASSIUM CHLORIDE, DEXTROSE MONOHYDRATE AND SODIUM CHLORIDE 100 ML/HR: 150; 5; 450 INJECTION, SOLUTION INTRAVENOUS at 11:38

## 2021-08-13 RX ADMIN — CEFEPIME HYDROCHLORIDE 2 G: 2 INJECTION, POWDER, FOR SOLUTION INTRAVENOUS at 22:41

## 2021-08-13 RX ADMIN — BISOPROLOL FUMARATE 20 MG: 5 TABLET, FILM COATED ORAL at 10:12

## 2021-08-13 RX ADMIN — INSULIN LISPRO 5 UNITS: 100 INJECTION, SOLUTION INTRAVENOUS; SUBCUTANEOUS at 13:28

## 2021-08-13 RX ADMIN — INSULIN LISPRO 5 UNITS: 100 INJECTION, SOLUTION INTRAVENOUS; SUBCUTANEOUS at 17:54

## 2021-08-13 RX ADMIN — FAMOTIDINE 40 MG: 20 TABLET ORAL at 10:09

## 2021-08-13 RX ADMIN — ENOXAPARIN SODIUM 60 MG: 60 INJECTION SUBCUTANEOUS at 22:41

## 2021-08-13 RX ADMIN — CEFEPIME HYDROCHLORIDE 2 G: 2 INJECTION, POWDER, FOR SOLUTION INTRAVENOUS at 14:05

## 2021-08-13 RX ADMIN — HYDROCODONE BITARTRATE AND ACETAMINOPHEN 1 TABLET: 5; 325 TABLET ORAL at 12:20

## 2021-08-13 NOTE — SIGNIFICANT NOTE
08/13/21 1245   Wound 06/22/21 1201 Right third toe Blisters   Placement Date/Time: 06/22/21 1201   Present on Hospital Admission: No  Side: Right  Location: third toe  Primary Wound Type: Blisters   Dressing Appearance open to air   Base scab;red   Wound 06/14/21 1151 medial coccyx Pressure Injury   Placement Date/Time: 06/14/21 1151   Present on Hospital Admission: Yes  Orientation: medial  Location: coccyx  Primary Wound Type: Pressure Injury  Stage, Pressure Injury : Stage 2   Dressing Appearance dressing loose   Base red;yellow;moist   Red (%), Wound Tissue Color 75   Yellow (%), Wound Tissue Color 25   Periwound pink;blanchable   Edges open   Wound Length (cm) 2.4 cm   Wound Width (cm) 0.9 cm   Wound Depth (cm) 0.3 cm   Drainage Characteristics/Odor serosanguineous   Drainage Amount small   Care, Wound cleansed with;sterile normal saline   Dressing Care dressing applied;hydrofiber;border dressing;silicone;silver impregnated   Wound 08/13/21 0300 Left lower arm Skin Tear   Placement Date/Time: 08/13/21 0300   Present on Hospital Admission: Yes  Side: Left  Orientation: lower  Location: arm  Primary Wound Type: Skin Tear   Dressing Appearance moist drainage;intact   Base moist;red   Edges open   Drainage Characteristics/Odor serosanguineous   Drainage Amount small   Care, Wound cleansed with;sterile normal saline   Dressing Care dressing changed;gauze;non-adherent   Wound 08/13/21 0300 Right distal arm Skin Tear   Placement Date/Time: 08/13/21 0300   Present on Hospital Admission: Yes  Side: Right  Orientation: distal  Location: arm  Primary Wound Type: Skin Tear   Dressing Appearance open to air   Base red;scab   Periwound ecchymotic;redness   Wound Consult: Patient admitted with Sepsis. Hx of Reflux, Arthritis, Psychosocial disorders, Ca, CHF, COPD, DM, DJD, HTN, Neuropathy, OA, Osteoporosis, Renal Failure. Patient currently in enhanced airborne isolation precautions. Patient with pressure injury Stage III  to coccyx. Wound bed red and moist with thin layer of yellow slough noted. Patient with loose dressing, incontinent of stool this assessment. External female catheter in place but moistened pad under patient. Patient cleansed and dry pad applied. Wound cleansed with NS and gauze, silver impregnated hydrofiber applied over open wound and then secured with silicone border dressing. Patient with crusting to right third toe. No open wounds and no drainage noted. Patient with skin tears to left arm with dressing in place. Removed dressing. Patient is becoming increasingly agitated and not wanting to be touched or bothered. Allowed completion of wound care but with frequent verbalization of being aggravated. Skin tears to left lower arm cleansed with NS and gauze, Adaptic applied over open areas and covered with gauze roll to avoid adhesive to arm.   Skin protection / moisture prevention measures recommended.   Maxine Turner RN

## 2021-08-13 NOTE — H&P
[facility]   HISTORY AND PHYSICAL    Patient Name: Italia Olvera  : 1960  MRN: [id number]  Primary Care Physician:  Carloz Shoemaker MD  Date of admission: 2021    Subjective   Subjective     Chief Complaint:   Confusion and weakness    HPI:    Italia Olvera is a 60 y.o. female brought into ER by EMS for weakness and confusion.  Sugar was slightly low around 60.  Patient work-up showed sepsis with a high temperature, high white cell count and possible UTI.  Chest x-ray also showed some infiltrates.  Patient admitted to medical service, Covid rule out was initiated by ER.  When I saw patient this morning she is awake alert but not answering questions appropriately.She has no clothes on.  Does not know why she is here.  Although she is appeared to be oriented knows her name and place.  She also knows that she is in hospital and she answers to some of the questions.  She reports that she smokes 1 to 2 packs/day and drinks also on a regular basis but not telling any further details.  She denies any fever chills now  Review of Systems   Constitutional: Positive for fatigue and fever.   HENT: Negative.    Eyes: Negative.    Respiratory: Positive for cough, chest tightness and wheezing.    Cardiovascular: Negative for chest pain and leg swelling.   Gastrointestinal: Positive for abdominal pain. Negative for blood in stool, diarrhea and nausea.   Endocrine: Negative for polydipsia, polyphagia and polyuria.   Genitourinary: Positive for dysuria and urgency. Negative for flank pain and hematuria.   Musculoskeletal: Positive for arthralgias and back pain.   Neurological: Positive for weakness and light-headedness.   Psychiatric/Behavioral: Positive for agitation and behavioral problems.   :    Personal History     Past Medical History:   Diagnosis Date   • Acid reflux    • ACL tear 2015    PCL/ACL TEAR/RUPTURE   • Acute shoulder bursitis, right 2015   • Anxiety    • Arthritis    • Asthma     • Bipolar 1 disorder (CMS/[facility])     untreated   • Bladder disorder    • Cancer (CMS/[facility])    • CHF (congestive heart failure) (CMS/[facility])    • Chronic back pain    • COPD (chronic obstructive pulmonary disease) (CMS/[facility])    • Depression    • Diabetes mellitus (CMS/[facility])    • DJD (degenerative joint disease)    • GERD (gastroesophageal reflux disease)    • HBP (high blood pressure)    • Hip pain 09/15/2015   • Hypertension    • Knee injury 08/19/2015   • Knee pain, right 09/15/2015   • Limb swelling    • Neuropathy    • Osteoarthritis, knee 09/01/2015   • Osteoporosis    • Peripheral neuropathy    • Renal failure 1994   • Seasonal allergies    • Shoulder tendonitis 08/23/2015   • SOB (shortness of breath)    • Tendinitis of right rotator cuff 08/23/2015       Past Surgical History:   Procedure Laterality Date   • APPENDECTOMY     • BACK SURGERY     • BLADDER REPAIR     • BRONCHOSCOPY N/A 7/10/2021    Procedure: BRONCHOSCOPY WITH BRONCHOALVEOLAR LAVAGE, POSSIBLE BIOPSY, BRUSHING, WASHING, AIRWAY INSPECTION;  Surgeon: Rodrigo Reyes MD;  Location: [facility] MAIN OR;  Service: Pulmonary;  Laterality: N/A;   • BRONCHOSCOPY N/A 7/10/2021    Procedure: BRONCHOSCOPY;  Surgeon: Rodrigo Reyes MD;  Location: [facility] ENDOSCOPY;  Service: Pulmonary;  Laterality: N/A;   • CHOLECYSTECTOMY     • ENDOSCOPY     • FRACTURE SURGERY     • GALLBLADDER SURGERY     • HERNIA REPAIR     • HYSTERECTOMY     • JOINT REPLACEMENT     • OTHER SURGICAL HISTORY      ARTIFICIAL JOINTS/LIMBS   • OTHER SURGICAL HISTORY      FACE SURGERY, UNSPECIFIED   • TOTAL HIP ARTHROPLASTY Right    • TOTAL KNEE ARTHROPLASTY Left        Family History: family history includes Arthritis in her brother, daughter, mother, and sister; Breast cancer in her sister; Colon cancer in her sister; Diabetes in her brother; Heart disease in her brother and father; Lung cancer in her sister; Osteoporosis in her mother and sister; Stroke in her mother; Throat cancer in her mother.  Otherwise pertinent FHx was reviewed and not pertinent to current issue.    Social History:  reports that she quit smoking about 2 months ago. Her smoking use included cigarettes. She started smoking about 42 years ago. She has a 25.00 pack-year smoking history. She has never used smokeless tobacco. She reports that she does not drink alcohol and does not use drugs.    Home Medications:  apixaban, arformoterol, bisoprolol, famotidine, furosemide, insulin detemir, lactulose, losartan, metFORMIN, oxyCODONE-acetaminophen, pantoprazole, polyethylene glycol, potassium chloride, sertraline, spironolactone, and warfarin      Allergies:  No Known Allergies    Objective   Objective     Vitals:   Temp:  [97.7 °F (36.5 °C)-101.2 °F (38.4 °C)] 97.7 °F (36.5 °C)  Heart Rate:  [72-98] 72  Resp:  [14-22] 18  BP: (106-171)/() 146/60  Physical Exam.    Elderly female, looks much older than her stated age, not in acute distress tired and fatigued looking.  HEENT is essentially unremarkable for dry mucosa.  Neck supple.  Heart regular.  Lungs diminished breath sounds with bibasilar crackles.  Abdomen is obese and soft nontender.  Extremities no edema.  Neurologically awake alert and oriented .  Moving all extremities equally.        Result Review    Result Review:  I have personally reviewed the results from the time of this admission to 8/13/2021 09:32 EDT and agree with these findings:  [x]  Laboratory  []  Microbiology  [x]  Radiology  []  EKG/Telemetry   []  Cardiology/Vascular   []  Pathology  []  Old records  []  Other:    CBC:    WBC   Date Value Ref Range Status   08/12/2021 18.88 (H) 3.40 - 10.80 10*3/mm3 Final     RBC   Date Value Ref Range Status   08/12/2021 3.88 3.77 - 5.28 10*6/mm3 Final     Hemoglobin   Date Value Ref Range Status   08/12/2021 10.6 (L) 12.0 - 15.9 g/dL Final     Hematocrit   Date Value Ref Range Status   08/12/2021 33.0 (L) 34.0 - 46.6 % Final     MCV   Date Value Ref Range Status   08/12/2021  85.1 79.0 - 97.0 fL Final     MCH   Date Value Ref Range Status   08/12/2021 27.3 26.6 - 33.0 pg Final     MCHC   Date Value Ref Range Status   08/12/2021 32.1 31.5 - 35.7 g/dL Final     RDW   Date Value Ref Range Status   08/12/2021 19.8 (H) 12.3 - 15.4 % Final     RDW-SD   Date Value Ref Range Status   08/12/2021 62.1 (H) 37.0 - 54.0 fl Final     MPV   Date Value Ref Range Status   08/12/2021 10.3 6.0 - 12.0 fL Final     Platelets   Date Value Ref Range Status   08/12/2021 174 140 - 450 10*3/mm3 Final     Neutrophil %   Date Value Ref Range Status   08/12/2021 83.6 (H) 42.7 - 76.0 % Final     Lymphocyte %   Date Value Ref Range Status   08/12/2021 9.7 (L) 19.6 - 45.3 % Final     Monocyte %   Date Value Ref Range Status   08/12/2021 5.7 5.0 - 12.0 % Final     Eosinophil %   Date Value Ref Range Status   08/12/2021 0.1 (L) 0.3 - 6.2 % Final     Basophil %   Date Value Ref Range Status   08/12/2021 0.3 0.0 - 1.5 % Final     Immature Grans %   Date Value Ref Range Status   08/12/2021 0.6 (H) 0.0 - 0.5 % Final     Neutrophils, Absolute   Date Value Ref Range Status   08/12/2021 15.79 (H) 1.70 - 7.00 10*3/mm3 Final     Lymphocytes, Absolute   Date Value Ref Range Status   08/12/2021 1.84 0.70 - 3.10 10*3/mm3 Final     Monocytes, Absolute   Date Value Ref Range Status   08/12/2021 1.07 (H) 0.10 - 0.90 10*3/mm3 Final     Eosinophils, Absolute   Date Value Ref Range Status   08/12/2021 0.01 0.00 - 0.40 10*3/mm3 Final     Basophils, Absolute   Date Value Ref Range Status   08/12/2021 0.05 0.00 - 0.20 10*3/mm3 Final     Immature Grans, Absolute   Date Value Ref Range Status   08/12/2021 0.12 (H) 0.00 - 0.05 10*3/mm3 Final     nRBC   Date Value Ref Range Status   08/12/2021 0.0 0.0 - 0.2 /100 WBC Final        BMP:    Lab Results   Component Value Date    GLUCOSE 73 08/12/2021    BUN 22 08/12/2021    CREATININE 0.42 (L) 08/12/2021    EGFRIFNONA >150 08/12/2021    BCR 52.4 (H) 08/12/2021    K 4.0 08/12/2021    CO2 25.3  08/12/2021    CALCIUM 9.5 08/12/2021    ALBUMIN 2.90 (L) 08/12/2021    LABIL2 0.6 (L) 06/04/2021    AST 19 08/12/2021    ALT 14 08/12/2021        No radiology results for the last day           Assessment/Plan   Assessment / Plan       Current Diagnosis:  Active Hospital Problems    Diagnosis    • Hypoglycemia    • Acute UTI (urinary tract infection)    • Sepsis (CMS/HCC)    • Multifocal pneumonia      Plan:   Admitted with sepsis, heart rate and respiratory rate and white cell count elevated.  Patient's lactic acid was high.  Given fluids she is improved.  She still confused, going back looking at her history she has recurrent admissions with similar problems and confusion.  She was admitted a month ago with similar problem and multifocal pneumonia.  Covid is being ruled out.  Continue antibiotic and fluids.  Restart sliding scale.  Monitor sugars.  Patient on Coumadin, unable to tell me why she is on.  Will restart Coumadin.  Monitor pro time.  Patient unable to give detailed history unable to gather much information as patient appears to be somewhat agitated.  Known history of bipolar disorder and compliance issues from the past  .  We will continue to get more information and update as ago.  Check labs for now and monitor her closely.  Social service and discharge planner updated and consulted      DVT prophylaxis:  Medical and mechanical DVT prophylaxis orders are present.    GI Prophylaxis:    Pepcid    CODE STATUS:    Level Of Support Discussed With: Patient  Code Status: CPR  Medical Interventions (Level of Support Prior to Arrest): Full    Admission Status:  I believe this patient meets inpatient status.             I have dictated this note utilizing Dragon Dictation.             Please note that portions of this note were completed with a voice recognition program.             Part of this note may be an electronic transcription/translation of spoken language to printed text         using the Dragon  Dictation System.       Electronically signed by Rudy Garnett MD, 08/13/21, 9:21 AM EDT.    Total time spent with in evaluation and management: 45 minutes.        Addendum      Patient cultures coming positive for gram-negative rods  Antibiotic change per pharmacy recommendations.  Coumadin started and bridging with Lovenox initiated  Monitor labs closely.

## 2021-08-13 NOTE — PLAN OF CARE
Goal Outcome Evaluation:  Plan of Care Reviewed With: patient        Progress: no change  Outcome Summary: new admit overnight. covid test pending

## 2021-08-13 NOTE — NURSING NOTE
Pt is combative at times. She did let me take her blood sugar and give her meds.  Family says she has been acting like this a few days.  Dr saez is aware of her refusing care at  times

## 2021-08-13 NOTE — CONSULTS
"Discharge Planning Assessment  [facility]     Patient Name: Italia Olvera  MRN: [id number]  Today's Date: 8/13/2021    Admit Date: 8/12/2021    Discharge Needs Assessment     Row Name 08/13/21 1418       Living Environment    Lives With  spouse;grandchild(bruno)    Name(s) of Who Lives With Patient  Lloyd Olvera ()    Unique Family Situation  9 year old granddaughter also lives in the home    Current Living Arrangements  home/apartment/condo    Primary Care Provided by  spouse/significant other;homecare agency    Provides Primary Care For  no one, unable/limited ability to care for self    Family Caregiver if Needed  spouse    Quality of Family Relationships  supportive    Able to Return to Prior Arrangements  yes       Resource/Environmental Concerns    Resource/Environmental Concerns  none       Transition Planning    Patient/Family Anticipates Transition to  home with help/services    Patient/Family Anticipated Services at Transition  home health care    Transportation Anticipated  family or friend will provide       Discharge Needs Assessment    Readmission Within the Last 30 Days  previous discharge plan unsuccessful    Current Outpatient/Agency/Support Group  homecare agency    Equipment Currently Used at Home  nebulizer;rollator;ramp;wheelchair;oxygen    Concerns to be Addressed  basic needs;discharge planning    Anticipated Changes Related to Illness  inability to care for self    Equipment Needed After Discharge  none        Discharge Plan     Row Name 08/13/21 1420       Plan    Plan   placed call to pt's , Lloyd, due to pt confusion and agitation. Pt recently admitted to [facility] 6/14 for confusion and lethargy and discharged to [facility] 07/22. Lloyd stated that pt had only been home from [place] for 4 days and was \"in worse shape than when she left the hospital.\" Lloyd expressed concerns with pt's medical condition and is hopeful that she will get back to her baseline. Per " Lloyd, pt is normally alert and oriented with days of confusion. SW provided emotional support. Discussed discharge plans with Lolyd, who stated he feels that pt would be better off returning home with [facility] as they helped her more than inpatient rehab did. Pt used [facility] in the past. SW will follow.        Continued Care and Services - Admitted Since 8/12/2021    Coordination has not been started for this encounter.     Selected Continued Care - Prior Encounters Includes selections from prior encounters from 5/14/2021 to 8/13/2021    Discharged on 7/22/2021 Admission date: 6/14/2021 - Discharge disposition: Skilled Nursing Facility (DC - External)    Destination     Service Provider Selected Services Address Phone Fax Patient Preferred    [facility] [address] [address] [phone] --       Internal Comment last updated by Augusta Luna 7/5/2021 1128    Pre-cert started for [facility]                                 Demographic Summary     Row Name 08/13/21 1412       General Information    Admission Type  inpatient    Arrived From  emergency department    Referral Source  admission list    Reason for Consult  discharge planning    Preferred Language  English        Functional Status     Row Name 08/13/21 1412       Functional Status    Usual Activity Tolerance  moderate    Current Activity Tolerance  poor       Functional Status, IADL    Medications  completely dependent    Meal Preparation  completely dependent    Housekeeping  completely dependent    Laundry  completely dependent    Shopping  completely dependent       Employment/    Employment Status  unemployed    Current or Previous Occupation  service industry        Psychosocial     Row Name 08/13/21 1417       Emotion Mood WDL    Emotion/Mood/Affect WDL  emotion mood    Emotion/Mood  irritable       Intellectual Performance WDL    Level of Consciousness  Confusion        Coping/Stress    Sources of Support  adult child(bruno);spouse    Reaction to Health Status  unable to assess    Understanding of Condition and Treatment  unable to assess        Abuse/Neglect    No documentation.       Legal     Row Name 08/13/21 1418       Legal    Criminal Activity/Legal Involvement  none        Substance Abuse    No documentation.       Patient Forms    No documentation.           JESUS Mack

## 2021-08-13 NOTE — CONSULTS
Nutrition Services    Patient Name: Italia Olvera  YOB: 1960  MRN: [id number]  Admission date: 8/12/2021      CLINICAL NUTRITION ASSESSMENT      Reason for Assessment  Nonhealing wound or pressure ulcer     H&P:    Past Medical History:   Diagnosis Date   • Acid reflux    • ACL tear 09/01/2015    PCL/ACL TEAR/RUPTURE   • Acute shoulder bursitis, right 08/23/2015   • Anxiety    • Arthritis    • Asthma    • Bipolar 1 disorder (CMS/[facility])     untreated   • Bladder disorder    • Cancer (CMS/[facility])    • CHF (congestive heart failure) (CMS/[facility])    • Chronic back pain    • COPD (chronic obstructive pulmonary disease) (CMS/[facility])    • Depression    • Diabetes mellitus (CMS/[facility])    • DJD (degenerative joint disease)    • GERD (gastroesophageal reflux disease)    • HBP (high blood pressure)    • Hip pain 09/15/2015   • Hypertension    • Knee injury 08/19/2015   • Knee pain, right 09/15/2015   • Limb swelling    • Neuropathy    • Osteoarthritis, knee 09/01/2015   • Osteoporosis    • Peripheral neuropathy    • Renal failure 1994   • Seasonal allergies    • Shoulder tendonitis 08/23/2015   • SOB (shortness of breath)    • Tendinitis of right rotator cuff 08/23/2015        Current Problems:   Active Hospital Problems    Diagnosis    • Sepsis (CMS/[facility])         Nutrition/Diet History         Narrative     Patient reviewed related to pressure injury. Stage II at medial coccyx. Wound photos reviewed. Recommend Jeremy to promote wound healing. May also benefit from Boost Glucose Control to promote calorie and protein intake as patient has had significant weight loss over the last month. No po intake documented at this time. Patient has altered mental status and is combative per RN documentation. Not appropriate for education at this time and in isolation for covid r/o. RD will order Jeremy and Boost and continue to monitor and follow per protocol.     Anthropometrics        Current Height, Weight Height: 162.6 cm  "(64\")  Weight: 60.2 kg (132 lb 11.5 oz)   Current BMI Body mass index is 22.78 kg/m².       Weight Hx  Wt Readings from Last 30 Encounters:   08/13/21 0230 60.2 kg (132 lb 11.5 oz)   08/12/21 1712 58.7 kg (129 lb 6.6 oz)   07/22/21 0517 54.3 kg (119 lb 11.4 oz)   07/21/21 0500 53.1 kg (117 lb 1 oz)   07/20/21 0525 54.3 kg (119 lb 11.4 oz)   07/18/21 0414 54.2 kg (119 lb 7.8 oz)   07/17/21 0518 53.3 kg (117 lb 8.1 oz)   07/16/21 0551 51 kg (112 lb 7 oz)   07/15/21 0515 54.5 kg (120 lb 2.4 oz)   07/14/21 0452 57.1 kg (125 lb 14.1 oz)   07/12/21 0500 51.8 kg (114 lb 3.2 oz)   07/11/21 0500 52 kg (114 lb 10.2 oz)   07/10/21 0541 57.4 kg (126 lb 8.7 oz)   07/09/21 0500 60 kg (132 lb 4.4 oz)   07/08/21 0500 60.7 kg (133 lb 13.1 oz)   07/07/21 0449 63 kg (138 lb 14.2 oz)   07/06/21 0500 73.8 kg (162 lb 11.2 oz)   07/05/21 0500 73.5 kg (162 lb 0.6 oz)   07/04/21 0500 73.4 kg (161 lb 13.1 oz)   07/03/21 0508 70.7 kg (155 lb 13.8 oz)   07/01/21 0500 74.2 kg (163 lb 9.3 oz)   06/29/21 0600 75.4 kg (166 lb 3.6 oz)   06/15/21 0431 84.3 kg (185 lb 13.6 oz)   06/14/21 0958 88.6 kg (195 lb 5.2 oz)   06/12/21 0518 80.1 kg (176 lb 9.4 oz)   06/10/21 0503 80.1 kg (176 lb 9.4 oz)   06/04/21 1231 78.8 kg (173 lb 11.6 oz)   03/24/21 0000 83.5 kg (184 lb)   07/10/18 0000 82.6 kg (182 lb)            Wt Change Observation 14.8% weight loss/1 month     Estimated/Assessed Needs       Energy Requirements    EST Needs (kcal/day) 7296-7724 kcal        Protein Requirements    EST Daily Needs (g/day) 70-80 g protein       Fluid Requirements     Estimated Needs (mL/day) 4265-7556 ml      Labs/Medications         Pertinent Labs Reviewed.   Results from last 7 days   Lab Units 08/12/21  1728   SODIUM mmol/L 140   POTASSIUM mmol/L 4.0   CHLORIDE mmol/L 101   CO2 mmol/L 25.3   BUN mg/dL 22   CREATININE mg/dL 0.42*   CALCIUM mg/dL 9.5   BILIRUBIN mg/dL 0.4   ALK PHOS U/L 122*   ALT (SGPT) U/L 14   AST (SGOT) U/L 19   GLUCOSE mg/dL 73     Results from " last 7 days   Lab Units 08/12/21  1728   MAGNESIUM mg/dL 1.3*   HEMOGLOBIN g/dL 10.6*   HEMATOCRIT % 33.0*     COVID19   Date Value Ref Range Status   07/21/2021 Not Detected Not Detected - Ref. Range Final     Lab Results   Component Value Date    HGBA1C 11.5 (H) 06/03/2021         Pertinent Medications Reviewed.     Current Nutrition Orders & Evaluation of Intake       Oral Nutrition     Current PO Diet Diet Regular; Cardiac, Consistent Carbohydrate   Supplement Boost Glucose Control BID, Jeremy BID      Nutrition Diagnosis         Nutrition Dx Problem 1 Increased protein needs related to increased demand for protein  as evidenced by stage II pressure injury, multiple skin tears, blisters on toes.       Nutrition Intervention         Jeremy BID to promote wound healing. Boost Glucose Control BID to promote calorie and protein intake.      Monitor/Evaluation        Monitor monitor/eval: Per protocol, I&O, PO intake, Supplement intake, Pertinent labs, Weight, Skin status, Symptoms, POC/GOC       Electronically signed by:  Henri Talbert RD  08/13/21 08:28 EDT

## 2021-08-14 LAB
ALBUMIN SERPL-MCNC: 1.9 G/DL (ref 3.5–5.2)
ALBUMIN/GLOB SERPL: 0.5 G/DL
ALP SERPL-CCNC: 112 U/L (ref 39–117)
ALT SERPL W P-5'-P-CCNC: 16 U/L (ref 1–33)
AMMONIA BLD-SCNC: 26 UMOL/L (ref 11–51)
AMPHET+METHAMPHET UR QL: NEGATIVE
ANION GAP SERPL CALCULATED.3IONS-SCNC: 9.7 MMOL/L (ref 5–15)
AST SERPL-CCNC: 28 U/L (ref 1–32)
BARBITURATES UR QL SCN: NEGATIVE
BASOPHILS # BLD AUTO: 0.03 10*3/MM3 (ref 0–0.2)
BASOPHILS NFR BLD AUTO: 0.4 % (ref 0–1.5)
BENZODIAZ UR QL SCN: NEGATIVE
BILIRUB SERPL-MCNC: 0.5 MG/DL (ref 0–1.2)
BUN SERPL-MCNC: 10 MG/DL (ref 8–23)
BUN/CREAT SERPL: 38.5 (ref 7–25)
CALCIUM SPEC-SCNC: 8.1 MG/DL (ref 8.6–10.5)
CANNABINOIDS SERPL QL: NEGATIVE
CHLORIDE SERPL-SCNC: 109 MMOL/L (ref 98–107)
CO2 SERPL-SCNC: 17.3 MMOL/L (ref 22–29)
COCAINE UR QL: NEGATIVE
CREAT SERPL-MCNC: 0.26 MG/DL (ref 0.57–1)
DEPRECATED RDW RBC AUTO: 64.1 FL (ref 37–54)
EOSINOPHIL # BLD AUTO: 0.02 10*3/MM3 (ref 0–0.4)
EOSINOPHIL NFR BLD AUTO: 0.3 % (ref 0.3–6.2)
ERYTHROCYTE [DISTWIDTH] IN BLOOD BY AUTOMATED COUNT: 19.2 % (ref 12.3–15.4)
GFR SERPL CREATININE-BSD FRML MDRD: >150 ML/MIN/1.73
GLOBULIN UR ELPH-MCNC: 3.7 GM/DL
GLUCOSE BLDC GLUCOMTR-MCNC: 114 MG/DL (ref 70–99)
GLUCOSE BLDC GLUCOMTR-MCNC: 159 MG/DL (ref 70–99)
GLUCOSE BLDC GLUCOMTR-MCNC: 173 MG/DL (ref 70–99)
GLUCOSE BLDC GLUCOMTR-MCNC: 236 MG/DL (ref 70–99)
GLUCOSE SERPL-MCNC: 148 MG/DL (ref 65–99)
HCT VFR BLD AUTO: 28.7 % (ref 34–46.6)
HGB BLD-MCNC: 8.7 G/DL (ref 12–15.9)
IMM GRANULOCYTES # BLD AUTO: 0.04 10*3/MM3 (ref 0–0.05)
IMM GRANULOCYTES NFR BLD AUTO: 0.5 % (ref 0–0.5)
INR PPP: 1.08 (ref 2–3)
LYMPHOCYTES # BLD AUTO: 1.15 10*3/MM3 (ref 0.7–3.1)
LYMPHOCYTES NFR BLD AUTO: 14.6 % (ref 19.6–45.3)
MCH RBC QN AUTO: 27.6 PG (ref 26.6–33)
MCHC RBC AUTO-ENTMCNC: 30.3 G/DL (ref 31.5–35.7)
MCV RBC AUTO: 91.1 FL (ref 79–97)
METHADONE UR QL SCN: NEGATIVE
MONOCYTES # BLD AUTO: 0.65 10*3/MM3 (ref 0.1–0.9)
MONOCYTES NFR BLD AUTO: 8.3 % (ref 5–12)
NEUTROPHILS NFR BLD AUTO: 5.98 10*3/MM3 (ref 1.7–7)
NEUTROPHILS NFR BLD AUTO: 75.9 % (ref 42.7–76)
NRBC BLD AUTO-RTO: 0 /100 WBC (ref 0–0.2)
OPIATES UR QL: NEGATIVE
OXYCODONE UR QL SCN: POSITIVE
PLATELET # BLD AUTO: 124 10*3/MM3 (ref 140–450)
PMV BLD AUTO: 10.6 FL (ref 6–12)
POTASSIUM SERPL-SCNC: 3.9 MMOL/L (ref 3.5–5.2)
PROT SERPL-MCNC: 5.6 G/DL (ref 6–8.5)
PROTHROMBIN TIME: 11.7 SECONDS (ref 9.4–12)
RBC # BLD AUTO: 3.15 10*6/MM3 (ref 3.77–5.28)
SODIUM SERPL-SCNC: 136 MMOL/L (ref 136–145)
WBC # BLD AUTO: 7.87 10*3/MM3 (ref 3.4–10.8)

## 2021-08-14 PROCEDURE — 94799 UNLISTED PULMONARY SVC/PX: CPT

## 2021-08-14 PROCEDURE — 25010000002 ENOXAPARIN PER 10 MG: Performed by: INTERNAL MEDICINE

## 2021-08-14 PROCEDURE — 80053 COMPREHEN METABOLIC PANEL: CPT | Performed by: INTERNAL MEDICINE

## 2021-08-14 PROCEDURE — 80307 DRUG TEST PRSMV CHEM ANLYZR: CPT | Performed by: INTERNAL MEDICINE

## 2021-08-14 PROCEDURE — 82140 ASSAY OF AMMONIA: CPT | Performed by: INTERNAL MEDICINE

## 2021-08-14 PROCEDURE — 87086 URINE CULTURE/COLONY COUNT: CPT | Performed by: INTERNAL MEDICINE

## 2021-08-14 PROCEDURE — 85610 PROTHROMBIN TIME: CPT | Performed by: INTERNAL MEDICINE

## 2021-08-14 PROCEDURE — 99233 SBSQ HOSP IP/OBS HIGH 50: CPT | Performed by: INTERNAL MEDICINE

## 2021-08-14 PROCEDURE — 87040 BLOOD CULTURE FOR BACTERIA: CPT | Performed by: INTERNAL MEDICINE

## 2021-08-14 PROCEDURE — 82962 GLUCOSE BLOOD TEST: CPT

## 2021-08-14 PROCEDURE — 25010000002 CEFEPIME PER 500 MG: Performed by: INTERNAL MEDICINE

## 2021-08-14 PROCEDURE — 85025 COMPLETE CBC W/AUTO DIFF WBC: CPT | Performed by: INTERNAL MEDICINE

## 2021-08-14 PROCEDURE — 63710000001 INSULIN LISPRO (HUMAN) PER 5 UNITS: Performed by: INTERNAL MEDICINE

## 2021-08-14 RX ORDER — NICOTINE 21 MG/24HR
1 PATCH, TRANSDERMAL 24 HOURS TRANSDERMAL
Status: DISCONTINUED | OUTPATIENT
Start: 2021-08-14 | End: 2021-08-19 | Stop reason: HOSPADM

## 2021-08-14 RX ORDER — LACTULOSE 10 G/15ML
30 SOLUTION ORAL 3 TIMES DAILY
Status: DISCONTINUED | OUTPATIENT
Start: 2021-08-14 | End: 2021-08-14

## 2021-08-14 RX ORDER — LACTULOSE 10 G/15ML
30 SOLUTION ORAL DAILY
Status: DISCONTINUED | OUTPATIENT
Start: 2021-08-15 | End: 2021-08-19 | Stop reason: HOSPADM

## 2021-08-14 RX ADMIN — NICOTINE 1 PATCH: 21 PATCH, EXTENDED RELEASE TRANSDERMAL at 14:03

## 2021-08-14 RX ADMIN — INSULIN LISPRO 2 UNITS: 100 INJECTION, SOLUTION INTRAVENOUS; SUBCUTANEOUS at 10:31

## 2021-08-14 RX ADMIN — BISOPROLOL FUMARATE 20 MG: 5 TABLET, FILM COATED ORAL at 10:32

## 2021-08-14 RX ADMIN — SODIUM CHLORIDE, PRESERVATIVE FREE 10 ML: 5 INJECTION INTRAVENOUS at 10:32

## 2021-08-14 RX ADMIN — POTASSIUM CHLORIDE, DEXTROSE MONOHYDRATE AND SODIUM CHLORIDE 100 ML/HR: 150; 5; 450 INJECTION, SOLUTION INTRAVENOUS at 10:34

## 2021-08-14 RX ADMIN — INSULIN LISPRO 5 UNITS: 100 INJECTION, SOLUTION INTRAVENOUS; SUBCUTANEOUS at 10:34

## 2021-08-14 RX ADMIN — INSULIN LISPRO 5 UNITS: 100 INJECTION, SOLUTION INTRAVENOUS; SUBCUTANEOUS at 14:05

## 2021-08-14 RX ADMIN — CEFEPIME HYDROCHLORIDE 2 G: 2 INJECTION, POWDER, FOR SOLUTION INTRAVENOUS at 10:36

## 2021-08-14 RX ADMIN — SODIUM CHLORIDE, PRESERVATIVE FREE 10 ML: 5 INJECTION INTRAVENOUS at 21:33

## 2021-08-14 RX ADMIN — IPRATROPIUM BROMIDE AND ALBUTEROL SULFATE 3 ML: .5; 2.5 SOLUTION RESPIRATORY (INHALATION) at 20:12

## 2021-08-14 RX ADMIN — ENOXAPARIN SODIUM 60 MG: 60 INJECTION SUBCUTANEOUS at 23:02

## 2021-08-14 RX ADMIN — HYDROCODONE BITARTRATE AND ACETAMINOPHEN 1 TABLET: 5; 325 TABLET ORAL at 15:58

## 2021-08-14 RX ADMIN — FAMOTIDINE 40 MG: 20 TABLET ORAL at 10:34

## 2021-08-14 RX ADMIN — WARFARIN SODIUM 2 MG: 2 TABLET ORAL at 17:51

## 2021-08-14 RX ADMIN — ENOXAPARIN SODIUM 60 MG: 60 INJECTION SUBCUTANEOUS at 10:31

## 2021-08-14 RX ADMIN — OXYCODONE HYDROCHLORIDE AND ACETAMINOPHEN 1 TABLET: 5; 325 TABLET ORAL at 23:02

## 2021-08-14 RX ADMIN — SERTRALINE HYDROCHLORIDE 100 MG: 100 TABLET ORAL at 10:32

## 2021-08-14 RX ADMIN — CEFEPIME HYDROCHLORIDE 2 G: 2 INJECTION, POWDER, FOR SOLUTION INTRAVENOUS at 21:26

## 2021-08-14 RX ADMIN — INSULIN LISPRO 2 UNITS: 100 INJECTION, SOLUTION INTRAVENOUS; SUBCUTANEOUS at 14:03

## 2021-08-14 NOTE — PLAN OF CARE
Goal Outcome Evaluation:           Progress: no change  Outcome Summary: COVID negative. Deisolated per MD order today. Patient remains confused. Straight cath done for sample per order. Vital signs stable. On room air. Continue plan of care.

## 2021-08-14 NOTE — PROGRESS NOTES
[facility]   Hospitalist Progress Note  Date: 2021  Patient Name: Italia Olvera  : 1960  MRN: [id number]  Date of admission: 2021      Subjective   Subjective     Chief Complaint: Weakness and confusion    Summary:   Italia Olvera is a 60 y.o. female brought into ER by EMS for weakness and confusion.  Sugar was slightly low around 60.  Patient work-up showed sepsis with a high temperature, high white cell count and possible UTI.  Chest x-ray also showed some infiltrates.  Patient admitted to medical service, Covid rule out was initiated by ER.  When I saw patient this morning she is awake alert but not answering questions appropriately.She has no clothes on.  Does not know why she is here.  Although she is appeared to be oriented knows her name and place.  She also knows that she is in hospital and she answers to some of the questions.  She reports that she smokes 1 to 2 packs/day and drinks also on a regular basis but not telling any further details.  She denies any fever chills now  Patient on Coumadin for proximal A. Fib.  States she has been taking on regular basis  .  Blood culture grew Pseudomonas.    Interval Followup:   Vitals are stable no more fever.  Awake alert oriented x2.  Not oriented to time.  Feels weak.  Denies any diarrhea having soft BM.  No other complaints.  Denies urinary complaints.        Review of Systems   All systems were reviewed and negative except for: Summary and interval follow-up    Objective   Objective     Vitals:   Temp:  [98 °F (36.7 °C)-98.1 °F (36.7 °C)] 98.1 °F (36.7 °C)  Heart Rate:  [59-70] 59  Resp:  [16-18] 16  BP: (104-153)/(41-64) 104/63  Physical Exam    Constitutional: Awake, alert, no acute distress.  HEENT is essentially unremarkable for dry mucosa.  Neck supple.  Heart regular.  Lungs diminished breath sounds with bibasilar crackles.  Abdomen is obese and soft nontender.  Extremities no edema.  Neurologically awake alert and oriented  .  Moving all extremities equally.        Result Review    Result Review:  I have personally reviewed the results from the time of this admission to 8/14/2021 15:58 EDT and agree with these findings:  [x]  Laboratory  [x]  Microbiology  [x]  Radiology  [x]  EKG/Telemetry   []  Cardiology/Vascular   []  Pathology  [x]  Old records  [x]  Other: Medications      Assessment/Plan   Assessment / Plan     Assessment:  Acute metabolic encephalopathy  Sepsis  Bacteremia due to Pseudomonas.  Likely UTI.  Multifocal pneumonia involving right upper lobe and left lower lobe.  Chronic ongoing tobacco abuse.  Anemia.  Thrombocytopenia.  History of COPD.  Bipolar disorder.  Diabetes mellitus.  CHF EF not known.  Hypertension.  Protein calorie malnutrition with albumin of 1.9.  Paroxysmal A. fib on chronic anticoagulation with Coumadin.  Subtherapeutic INR on admission  Chronic hypoxemic respiratory patient home oxygen.  Her O2 sat on room air is adequate.       Plan;  Continue IV antibiotic with cefepime.  Nicotine patch.  Make home lactulose as needed as patient having soft BM.  Sliding scale insulin.  Check urine drug screen.  Await subsequent culture data including blood and urine.  DC IV fluid  COVID-19 negative.  DC isolation.  Dietitian consulted  PT OT.  Monitor PT/INR.  Patient does not want to go to rehab during this admission and would like to go back home    Discussed plan with RN.    DVT prophylaxis:  Medical and mechanical DVT prophylaxis orders are present.    CODE STATUS:   Level Of Support Discussed With: Patient  Code Status: CPR  Medical Interventions (Level of Support Prior to Arrest): Full      Part of this note may be an electronic transcription/translation of spoken language to printed text using the Dragon Dictation System.     Electronically signed by Sami Caba MD, 08/14/21, 3:58 PM EDT.

## 2021-08-14 NOTE — PLAN OF CARE
Goal Outcome Evaluation:      Patient more cooperative, participating in care. Refused 0300 vitals, but allowed glucose checks, meds, assessment, and bed changes. Family requesting updates with rounds. Will continue to monitor.

## 2021-08-15 LAB
ANION GAP SERPL CALCULATED.3IONS-SCNC: 9.5 MMOL/L (ref 5–15)
BACTERIA SPEC AEROBE CULT: ABNORMAL
BASOPHILS # BLD AUTO: 0.02 10*3/MM3 (ref 0–0.2)
BASOPHILS NFR BLD AUTO: 0.3 % (ref 0–1.5)
BUN SERPL-MCNC: 13 MG/DL (ref 8–23)
BUN/CREAT SERPL: 48.1 (ref 7–25)
CALCIUM SPEC-SCNC: 8.3 MG/DL (ref 8.6–10.5)
CHLORIDE SERPL-SCNC: 108 MMOL/L (ref 98–107)
CO2 SERPL-SCNC: 20.5 MMOL/L (ref 22–29)
CREAT SERPL-MCNC: 0.27 MG/DL (ref 0.57–1)
DEPRECATED RDW RBC AUTO: 59.3 FL (ref 37–54)
EOSINOPHIL # BLD AUTO: 0.01 10*3/MM3 (ref 0–0.4)
EOSINOPHIL NFR BLD AUTO: 0.2 % (ref 0.3–6.2)
ERYTHROCYTE [DISTWIDTH] IN BLOOD BY AUTOMATED COUNT: 18.6 % (ref 12.3–15.4)
GFR SERPL CREATININE-BSD FRML MDRD: >150 ML/MIN/1.73
GLUCOSE BLDC GLUCOMTR-MCNC: 136 MG/DL (ref 70–99)
GLUCOSE BLDC GLUCOMTR-MCNC: 165 MG/DL (ref 70–99)
GLUCOSE BLDC GLUCOMTR-MCNC: 167 MG/DL (ref 70–99)
GLUCOSE BLDC GLUCOMTR-MCNC: 222 MG/DL (ref 70–99)
GLUCOSE SERPL-MCNC: 148 MG/DL (ref 65–99)
GRAM STN SPEC: ABNORMAL
HBA1C MFR BLD: 7.99 % (ref 4.8–5.6)
HCT VFR BLD AUTO: 26.5 % (ref 34–46.6)
HGB BLD-MCNC: 8.2 G/DL (ref 12–15.9)
IMM GRANULOCYTES # BLD AUTO: 0.04 10*3/MM3 (ref 0–0.05)
IMM GRANULOCYTES NFR BLD AUTO: 0.7 % (ref 0–0.5)
INR PPP: 1.12 (ref 2–3)
IRON 24H UR-MRATE: 26 MCG/DL (ref 37–145)
IRON SATN MFR SERPL: 13 % (ref 20–50)
ISOLATED FROM: ABNORMAL
LYMPHOCYTES # BLD AUTO: 1.08 10*3/MM3 (ref 0.7–3.1)
LYMPHOCYTES NFR BLD AUTO: 18.6 % (ref 19.6–45.3)
MCH RBC QN AUTO: 27.1 PG (ref 26.6–33)
MCHC RBC AUTO-ENTMCNC: 30.9 G/DL (ref 31.5–35.7)
MCV RBC AUTO: 87.5 FL (ref 79–97)
MONOCYTES # BLD AUTO: 0.42 10*3/MM3 (ref 0.1–0.9)
MONOCYTES NFR BLD AUTO: 7.2 % (ref 5–12)
NEUTROPHILS NFR BLD AUTO: 4.23 10*3/MM3 (ref 1.7–7)
NEUTROPHILS NFR BLD AUTO: 73 % (ref 42.7–76)
NRBC BLD AUTO-RTO: 0 /100 WBC (ref 0–0.2)
PLATELET # BLD AUTO: 121 10*3/MM3 (ref 140–450)
PMV BLD AUTO: 10.5 FL (ref 6–12)
POTASSIUM SERPL-SCNC: 3.8 MMOL/L (ref 3.5–5.2)
PROTHROMBIN TIME: 12.1 SECONDS (ref 9.4–12)
RBC # BLD AUTO: 3.03 10*6/MM3 (ref 3.77–5.28)
SODIUM SERPL-SCNC: 138 MMOL/L (ref 136–145)
TIBC SERPL-MCNC: 197 MCG/DL (ref 298–536)
TRANSFERRIN SERPL-MCNC: 132 MG/DL (ref 200–360)
WBC # BLD AUTO: 5.8 10*3/MM3 (ref 3.4–10.8)

## 2021-08-15 PROCEDURE — 84466 ASSAY OF TRANSFERRIN: CPT | Performed by: INTERNAL MEDICINE

## 2021-08-15 PROCEDURE — 25010000002 IRON SUCROSE PER 1 MG: Performed by: INTERNAL MEDICINE

## 2021-08-15 PROCEDURE — 80048 BASIC METABOLIC PNL TOTAL CA: CPT | Performed by: INTERNAL MEDICINE

## 2021-08-15 PROCEDURE — 94799 UNLISTED PULMONARY SVC/PX: CPT

## 2021-08-15 PROCEDURE — 85610 PROTHROMBIN TIME: CPT | Performed by: INTERNAL MEDICINE

## 2021-08-15 PROCEDURE — 25010000002 ENOXAPARIN PER 10 MG: Performed by: INTERNAL MEDICINE

## 2021-08-15 PROCEDURE — 99233 SBSQ HOSP IP/OBS HIGH 50: CPT | Performed by: INTERNAL MEDICINE

## 2021-08-15 PROCEDURE — 85025 COMPLETE CBC W/AUTO DIFF WBC: CPT | Performed by: INTERNAL MEDICINE

## 2021-08-15 PROCEDURE — 82962 GLUCOSE BLOOD TEST: CPT

## 2021-08-15 PROCEDURE — 83036 HEMOGLOBIN GLYCOSYLATED A1C: CPT | Performed by: INTERNAL MEDICINE

## 2021-08-15 PROCEDURE — 63710000001 INSULIN LISPRO (HUMAN) PER 5 UNITS: Performed by: INTERNAL MEDICINE

## 2021-08-15 PROCEDURE — 83540 ASSAY OF IRON: CPT | Performed by: INTERNAL MEDICINE

## 2021-08-15 PROCEDURE — 25010000002 CEFEPIME PER 500 MG: Performed by: INTERNAL MEDICINE

## 2021-08-15 RX ORDER — WARFARIN SODIUM 5 MG/1
5 TABLET ORAL
Status: COMPLETED | OUTPATIENT
Start: 2021-08-15 | End: 2021-08-15

## 2021-08-15 RX ADMIN — SODIUM CHLORIDE, PRESERVATIVE FREE 10 ML: 5 INJECTION INTRAVENOUS at 19:51

## 2021-08-15 RX ADMIN — ENOXAPARIN SODIUM 60 MG: 60 INJECTION SUBCUTANEOUS at 09:51

## 2021-08-15 RX ADMIN — INSULIN LISPRO 4 UNITS: 100 INJECTION, SOLUTION INTRAVENOUS; SUBCUTANEOUS at 13:15

## 2021-08-15 RX ADMIN — CEFEPIME HYDROCHLORIDE 2 G: 2 INJECTION, POWDER, FOR SOLUTION INTRAVENOUS at 05:01

## 2021-08-15 RX ADMIN — SERTRALINE HYDROCHLORIDE 100 MG: 100 TABLET ORAL at 09:52

## 2021-08-15 RX ADMIN — OXYCODONE HYDROCHLORIDE AND ACETAMINOPHEN 1 TABLET: 5; 325 TABLET ORAL at 20:23

## 2021-08-15 RX ADMIN — IPRATROPIUM BROMIDE AND ALBUTEROL SULFATE 3 ML: .5; 2.5 SOLUTION RESPIRATORY (INHALATION) at 19:07

## 2021-08-15 RX ADMIN — WARFARIN SODIUM 2 MG: 2 TABLET ORAL at 17:45

## 2021-08-15 RX ADMIN — CEFEPIME HYDROCHLORIDE 2 G: 2 INJECTION, POWDER, FOR SOLUTION INTRAVENOUS at 12:34

## 2021-08-15 RX ADMIN — FAMOTIDINE 40 MG: 20 TABLET ORAL at 09:52

## 2021-08-15 RX ADMIN — OXYCODONE HYDROCHLORIDE AND ACETAMINOPHEN 1 TABLET: 5; 325 TABLET ORAL at 13:46

## 2021-08-15 RX ADMIN — IRON SUCROSE 400 MG: 20 INJECTION, SOLUTION INTRAVENOUS at 13:42

## 2021-08-15 RX ADMIN — INSULIN LISPRO 5 UNITS: 100 INJECTION, SOLUTION INTRAVENOUS; SUBCUTANEOUS at 17:45

## 2021-08-15 RX ADMIN — SODIUM CHLORIDE, PRESERVATIVE FREE 10 ML: 5 INJECTION INTRAVENOUS at 09:52

## 2021-08-15 RX ADMIN — INSULIN LISPRO 5 UNITS: 100 INJECTION, SOLUTION INTRAVENOUS; SUBCUTANEOUS at 13:16

## 2021-08-15 RX ADMIN — NICOTINE 1 PATCH: 21 PATCH, EXTENDED RELEASE TRANSDERMAL at 09:53

## 2021-08-15 RX ADMIN — INSULIN LISPRO 2 UNITS: 100 INJECTION, SOLUTION INTRAVENOUS; SUBCUTANEOUS at 17:45

## 2021-08-15 RX ADMIN — ENOXAPARIN SODIUM 60 MG: 60 INJECTION SUBCUTANEOUS at 23:00

## 2021-08-15 RX ADMIN — WARFARIN SODIUM 5 MG: 5 TABLET ORAL at 13:15

## 2021-08-15 RX ADMIN — CEFEPIME HYDROCHLORIDE 2 G: 2 INJECTION, POWDER, FOR SOLUTION INTRAVENOUS at 19:51

## 2021-08-15 NOTE — PLAN OF CARE
Goal Outcome Evaluation:           Progress: improving  Outcome Summary: No change in condition today. Pain treated with PRN Percocet, which was effective. Patient remains disoriented to time and situation. Vital signs stable. Continue plan of care.

## 2021-08-15 NOTE — PLAN OF CARE
Goal Outcome Evaluation:      Patient orientation seems somewhat improved, patient able to state name, date of birth, and that they were at [facility], still appears to be not oriented to situation. Patient impulsive, picking at skin, pulled out iv access, new line started in Right AC. Will continue to monitor.

## 2021-08-15 NOTE — PROGRESS NOTES
[facility]   Hospitalist Progress Note  Date: 8/15/2021  Patient Name: Italia Olvera  : 1960  MRN: [id number]  Date of admission: 2021      Subjective   Subjective     Chief Complaint: Weakness and confusion    Summary:   Italia Olvera is a 60 y.o. female brought into ER by EMS for weakness and confusion.  Sugar was slightly low around 60.  Patient work-up showed sepsis with a high temperature, high white cell count and possible UTI.  Chest x-ray also showed some infiltrates.  Patient admitted to medical service, Covid rule out was initiated by ER.  When I saw patient this morning she is awake alert but not answering questions appropriately.She has no clothes on.  Does not know why she is here.  Although she is appeared to be oriented knows her name and place.  She also knows that she is in hospital and she answers to some of the questions.  She reports that she smokes 1 to 2 packs/day and drinks also on a regular basis but not telling any further details.  She denies any fever chills now  Patient on Coumadin for proximal A. Fib.  States she has been taking on regular basis  .  Blood culture grew Pseudomonas.    Interval Followup:   Vitals are stable no more fever.  Awake alert oriented x3   Overall feels much better  Feels weak.  Denies any diarrhea having soft BM.  No other complaints.  Denies urinary complaints.        Review of Systems   All systems were reviewed and negative except for: Summary and interval follow-up    Objective   Objective     Vitals:   Temp:  [97.9 °F (36.6 °C)-98.9 °F (37.2 °C)] 97.9 °F (36.6 °C)  Heart Rate:  [53-85] 62  Resp:  [18-20] 18  BP: (102-171)/(39-68) 136/51  Physical Exam    Constitutional: Awake, alert, no acute distress.  HEENT is essentially unremarkable for dry mucosa.  Neck supple.  Heart regular.  Lungs diminished breath sounds with bibasilar crackles.  Abdomen is obese and soft nontender.  Extremities no edema.  Neurologically awake alert and  oriented .  Moving all extremities equally.        Result Review    Result Review:  I have personally reviewed the results from the time of this admission to 8/15/2021 16:00 EDT and agree with these findings:  [x]  Laboratory  [x]  Microbiology  [x]  Radiology  [x]  EKG/Telemetry   []  Cardiology/Vascular   []  Pathology  [x]  Old records  [x]  Other: Medications      Assessment/Plan   Assessment / Plan     Assessment:  Acute metabolic encephalopathy.  Resolved  Sepsis  Bacteremia due to Pseudomonas.  Likely UTI.  Multifocal pneumonia involving right upper lobe and left lower lobe.  Chronic ongoing tobacco abuse.  Anemia.  Iron deficient.  S/p IV iron transfusion  Thrombocytopenia.  History of COPD.  Bipolar disorder.  Diabetes mellitus.  CHF EF not known.  Hypertension.  Protein calorie malnutrition with albumin of 1.9.  Paroxysmal A. fib on chronic anticoagulation with Coumadin.  Subtherapeutic INR on admission  Chronic hypoxemic respiratory patient home oxygen.  Her O2 sat on room air is adequate.       Plan;  Continue IV antibiotic with cefepime.  Nicotine patch.  Make home lactulose as needed as patient having soft BM.  Sliding scale insulin.  urine drug screen noted with oxycodone.  Await subsequent culture data including blood and urine.  COVID-19 negative.  DC isolation.  Dietitian consulted  PT OT.  Monitor PT/INR.  Extra dose of Coumadin today.  Continue bridging Lovenox  Patient does not want to go to rehab during this admission and would like to go back home.  Wound care nurse consulted  Discussed with patient's  Mr. Desai August 15.  Discussed plan with RN.  Discharge home in next 2 to 3 days if subsequent blood cultures are negative on p.o. Levaquin.  Will need to monitor QT interval.    DVT prophylaxis:  Medical and mechanical DVT prophylaxis orders are present.    CODE STATUS:   Level Of Support Discussed With: Patient  Code Status: CPR  Medical Interventions (Level of Support Prior to  Arrest): Full      Part of this note may be an electronic transcription/translation of spoken language to printed text using the Dragon Dictation System.       Electronically signed by Sami Caba MD, 08/15/21, 4:08 PM EDT.

## 2021-08-16 LAB
ANION GAP SERPL CALCULATED.3IONS-SCNC: 9.3 MMOL/L (ref 5–15)
BACTERIA SPEC AEROBE CULT: NO GROWTH
BASOPHILS # BLD AUTO: 0.02 10*3/MM3 (ref 0–0.2)
BASOPHILS NFR BLD AUTO: 0.3 % (ref 0–1.5)
BUN SERPL-MCNC: 12 MG/DL (ref 8–23)
BUN/CREAT SERPL: 40 (ref 7–25)
CALCIUM SPEC-SCNC: 8.4 MG/DL (ref 8.6–10.5)
CHLORIDE SERPL-SCNC: 110 MMOL/L (ref 98–107)
CO2 SERPL-SCNC: 19.7 MMOL/L (ref 22–29)
CREAT SERPL-MCNC: 0.3 MG/DL (ref 0.57–1)
DEPRECATED RDW RBC AUTO: 59.8 FL (ref 37–54)
EOSINOPHIL # BLD AUTO: 0.02 10*3/MM3 (ref 0–0.4)
EOSINOPHIL NFR BLD AUTO: 0.3 % (ref 0.3–6.2)
ERYTHROCYTE [DISTWIDTH] IN BLOOD BY AUTOMATED COUNT: 18.6 % (ref 12.3–15.4)
GFR SERPL CREATININE-BSD FRML MDRD: >150 ML/MIN/1.73
GLUCOSE BLDC GLUCOMTR-MCNC: 125 MG/DL (ref 70–99)
GLUCOSE BLDC GLUCOMTR-MCNC: 223 MG/DL (ref 70–99)
GLUCOSE SERPL-MCNC: 149 MG/DL (ref 65–99)
HCT VFR BLD AUTO: 27.4 % (ref 34–46.6)
HGB BLD-MCNC: 8.6 G/DL (ref 12–15.9)
IMM GRANULOCYTES # BLD AUTO: 0.03 10*3/MM3 (ref 0–0.05)
IMM GRANULOCYTES NFR BLD AUTO: 0.5 % (ref 0–0.5)
INR PPP: 2.32 (ref 2–3)
LYMPHOCYTES # BLD AUTO: 1.1 10*3/MM3 (ref 0.7–3.1)
LYMPHOCYTES NFR BLD AUTO: 18.4 % (ref 19.6–45.3)
MCH RBC QN AUTO: 27.6 PG (ref 26.6–33)
MCHC RBC AUTO-ENTMCNC: 31.4 G/DL (ref 31.5–35.7)
MCV RBC AUTO: 87.8 FL (ref 79–97)
MONOCYTES # BLD AUTO: 0.41 10*3/MM3 (ref 0.1–0.9)
MONOCYTES NFR BLD AUTO: 6.8 % (ref 5–12)
NEUTROPHILS NFR BLD AUTO: 4.41 10*3/MM3 (ref 1.7–7)
NEUTROPHILS NFR BLD AUTO: 73.7 % (ref 42.7–76)
NRBC BLD AUTO-RTO: 0 /100 WBC (ref 0–0.2)
PLATELET # BLD AUTO: 145 10*3/MM3 (ref 140–450)
PMV BLD AUTO: 10.4 FL (ref 6–12)
POTASSIUM SERPL-SCNC: 4.3 MMOL/L (ref 3.5–5.2)
PROTHROMBIN TIME: 23.6 SECONDS (ref 9.4–12)
RBC # BLD AUTO: 3.12 10*6/MM3 (ref 3.77–5.28)
SODIUM SERPL-SCNC: 139 MMOL/L (ref 136–145)
WBC # BLD AUTO: 5.99 10*3/MM3 (ref 3.4–10.8)

## 2021-08-16 PROCEDURE — 82962 GLUCOSE BLOOD TEST: CPT

## 2021-08-16 PROCEDURE — 63710000001 INSULIN LISPRO (HUMAN) PER 5 UNITS: Performed by: INTERNAL MEDICINE

## 2021-08-16 PROCEDURE — 85025 COMPLETE CBC W/AUTO DIFF WBC: CPT | Performed by: INTERNAL MEDICINE

## 2021-08-16 PROCEDURE — 25010000002 ENOXAPARIN PER 10 MG: Performed by: INTERNAL MEDICINE

## 2021-08-16 PROCEDURE — 94640 AIRWAY INHALATION TREATMENT: CPT

## 2021-08-16 PROCEDURE — 80048 BASIC METABOLIC PNL TOTAL CA: CPT | Performed by: INTERNAL MEDICINE

## 2021-08-16 PROCEDURE — 94760 N-INVAS EAR/PLS OXIMETRY 1: CPT

## 2021-08-16 PROCEDURE — 94799 UNLISTED PULMONARY SVC/PX: CPT

## 2021-08-16 PROCEDURE — 25010000002 CEFEPIME PER 500 MG: Performed by: INTERNAL MEDICINE

## 2021-08-16 PROCEDURE — 97165 OT EVAL LOW COMPLEX 30 MIN: CPT

## 2021-08-16 PROCEDURE — 85610 PROTHROMBIN TIME: CPT | Performed by: INTERNAL MEDICINE

## 2021-08-16 RX ADMIN — SODIUM CHLORIDE, PRESERVATIVE FREE 10 ML: 5 INJECTION INTRAVENOUS at 12:59

## 2021-08-16 RX ADMIN — INSULIN LISPRO 5 UNITS: 100 INJECTION, SOLUTION INTRAVENOUS; SUBCUTANEOUS at 17:27

## 2021-08-16 RX ADMIN — CEFEPIME HYDROCHLORIDE 2 G: 2 INJECTION, POWDER, FOR SOLUTION INTRAVENOUS at 04:51

## 2021-08-16 RX ADMIN — IPRATROPIUM BROMIDE AND ALBUTEROL SULFATE 3 ML: .5; 2.5 SOLUTION RESPIRATORY (INHALATION) at 18:51

## 2021-08-16 RX ADMIN — OXYCODONE HYDROCHLORIDE AND ACETAMINOPHEN 1 TABLET: 5; 325 TABLET ORAL at 21:00

## 2021-08-16 RX ADMIN — CEFEPIME HYDROCHLORIDE 2 G: 2 INJECTION, POWDER, FOR SOLUTION INTRAVENOUS at 19:55

## 2021-08-16 RX ADMIN — CEFEPIME HYDROCHLORIDE 2 G: 2 INJECTION, POWDER, FOR SOLUTION INTRAVENOUS at 12:59

## 2021-08-16 RX ADMIN — IPRATROPIUM BROMIDE AND ALBUTEROL SULFATE 3 ML: .5; 2.5 SOLUTION RESPIRATORY (INHALATION) at 03:53

## 2021-08-16 RX ADMIN — ENOXAPARIN SODIUM 60 MG: 60 INJECTION SUBCUTANEOUS at 22:41

## 2021-08-16 RX ADMIN — WARFARIN SODIUM 2 MG: 2 TABLET ORAL at 17:27

## 2021-08-16 RX ADMIN — DOCUSATE SODIUM 50MG AND SENNOSIDES 8.6MG 2 TABLET: 8.6; 5 TABLET, FILM COATED ORAL at 21:00

## 2021-08-16 NOTE — CASE MANAGEMENT/SOCIAL WORK
RN CM arranged for NIPPV to be set up by Shira ahmadi last hospital admission.  Pt discharged to [facility] from [facility].  RT CM spoke to pt's , Lloyd, who reports she had the device at [facility], but left it there because he was not certain if it belonged to the pt or the facility.  RT CM will follow up with EDUARDO Silva Derrick, who was unavailable today, to see if device can be set up on d/c from this hospital admission.

## 2021-08-16 NOTE — PLAN OF CARE
"Goal Outcome Evaluation:  Plan of Care Reviewed With: patient        Progress: no change (First session for evaluation)  Outcome Summary: Patient presents with deficits of cognition/safety awareness, endurance and likely balance deficits impacting ADL and transfer independence.  Will DC OT at patient request/declined further sessions.  \"Get out\", \" go away\"  "

## 2021-08-16 NOTE — SIGNIFICANT NOTE
08/16/21 1105   Wound 06/22/21 1201 Right third toe Blisters   Placement Date/Time: 06/22/21 1201   Present on Hospital Admission: No  Side: Right  Location: third toe  Primary Wound Type: Blisters   Dressing Appearance open to air   Base scab;dry   Drainage Amount none   Wound 06/14/21 1151 medial coccyx Pressure Injury   Placement Date/Time: 06/14/21 1151   Present on Hospital Admission: Yes  Orientation: medial  Location: coccyx  Primary Wound Type: Pressure Injury  Stage, Pressure Injury : Stage 2   Dressing Appearance dressing loose   Base red;moist;yellow   Periwound blanchable;pink   Edges open;irregular   Wound Length (cm) 2.4 cm   Wound Width (cm) 0.7 cm   Wound Depth (cm) 0.3 cm   Drainage Characteristics/Odor serosanguineous   Drainage Amount small   Care, Wound cleansed with;sterile half normal saline   Dressing Care dressing applied;border dressing;hydrofiber;silicone;silver impregnated   Wound 08/13/21 0300 Left lower arm Skin Tear   Placement Date/Time: 08/13/21 0300   Present on Hospital Admission: Yes  Side: Left  Orientation: lower  Location: arm  Primary Wound Type: Skin Tear   Dressing Appearance intact;dry   Base dressing in place, unable to visualize   Wound 08/13/21 0300 Right distal arm Skin Tear   Placement Date/Time: 08/13/21 0300   Present on Hospital Admission: Yes  Side: Right  Orientation: distal  Location: arm  Primary Wound Type: Skin Tear   Dressing Appearance intact   Base dressing in place, unable to visualize   Wound 08/16/21 1105 Right posterior gluteal Traumatic   Placement Date/Time: 08/16/21 1105   Side: Right  Orientation: posterior  Location: gluteal  Primary Wound Type: Traumatic   Dressing Appearance dressing loose   Base red;moist   Periwound intact;blanchable;pink   Edges irregular;open   Wound Length (cm) 1.7 cm   Wound Width (cm) 0.2 cm   Drainage Amount scant   Care, Wound cleansed with;sterile normal saline   Dressing Care dressing applied;border  dressing;hydrofiber;silver impregnated;silicone   Wound Check / Follow-up. Patient lying on left side upon arrival to room. Bed linens completely saturated in urine. Patient unaware. Full linen change required and completed. Patient with loose dressings to coccyx as well as to right posterior gluteal. Wounds cleansed with NS and gauze and new dressings applied with silver impregnated hydrofiber and silicone border dressing. Posterior gluteal wound new from last assessment. Linear in form and over fatty tissue. Red moist tissue. Patient further with gauze dressings to bilateral arms with scattered tears to bilateral arms that are both under and outside of gauze dressings. Recommending to continue skin tear protocol to those areas. Patient allows wound care and bed change but is using profanities and being disagreeable however patient does allow for any care to be performed.   Patient verbalizes that she is unable to tell when she needs to urinate as evidenced by urinating during assessment with no verbal warning from patient but urine observed running off bed.   Maxine Turner RN

## 2021-08-16 NOTE — THERAPY EVALUATION
Patient Name: Italia Olvera  : 1960    MRN: [id number]                              Today's Date: 2021       Admit Date: 2021    Visit Dx:     ICD-10-CM ICD-9-CM   1. Sepsis, due to unspecified organism, unspecified whether acute organ dysfunction present (CMS/[facility])  A41.9 038.9     995.91   2. UTI (urinary tract infection), bacterial  N39.0 599.0    A49.9 041.9   3. Pneumonia of both lungs due to infectious organism, unspecified part of lung  J18.9 483.8   4. Decreased activities of daily living (ADL)  Z78.9 V49.89     Patient Active Problem List   Diagnosis   • Bladder disorder   • Depression   • Hypertension   • Peripheral neuropathy   • Osteoarthritis of knee   • COPD with acute exacerbation (CMS/[facility])   • Chronic back pain   • Multifocal pneumonia   • T2DM (type 2 diabetes mellitus) (CMS/[facility])- uncontrolled   • Bipolar disorder (CMS/[facility])   • Chronic respiratory failure with hypoxia (CMS/HCC)   • Acute on chronic respiratory failure with hypoxia (CMS/[facility])   • Chronic respiratory failure with hypoxia, on home oxygen therapy (CMS/[facility])   • Overweight (BMI 25.0-29.9)   • Acute on chronic systolic CHF (congestive heart failure) (CMS/[facility])   • Essential hypertension   • DM (diabetes mellitus), type 1 (CMS/[facility])   • Sepsis (CMS/[facility])   • Hypoglycemia   • Acute UTI (urinary tract infection)     Past Medical History:   Diagnosis Date   • Acid reflux    • ACL tear 2015    PCL/ACL TEAR/RUPTURE   • Acute shoulder bursitis, right 2015   • Anxiety    • Arthritis    • Asthma    • Bipolar 1 disorder (CMS/[facility])     untreated   • Bladder disorder    • Cancer (CMS/[facility])    • CHF (congestive heart failure) (CMS/[facility])    • Chronic back pain    • COPD (chronic obstructive pulmonary disease) (CMS/[facility])    • Depression    • Diabetes mellitus (CMS/[facility])    • DJD (degenerative joint disease)    • GERD (gastroesophageal reflux disease)    • HBP (high blood pressure)    • Hip pain 09/15/2015   • Hypertension    •  Knee injury 08/19/2015   • Knee pain, right 09/15/2015   • Limb swelling    • Neuropathy    • Osteoarthritis, knee 09/01/2015   • Osteoporosis    • Peripheral neuropathy    • Renal failure 1994   • Seasonal allergies    • Shoulder tendonitis 08/23/2015   • SOB (shortness of breath)    • Tendinitis of right rotator cuff 08/23/2015     Past Surgical History:   Procedure Laterality Date   • APPENDECTOMY     • BACK SURGERY     • BLADDER REPAIR     • BRONCHOSCOPY N/A 7/10/2021    Procedure: BRONCHOSCOPY WITH BRONCHOALVEOLAR LAVAGE, POSSIBLE BIOPSY, BRUSHING, WASHING, AIRWAY INSPECTION;  Surgeon: Rodrigo Reyes MD;  Location: [facility] MAIN OR;  Service: Pulmonary;  Laterality: N/A;   • BRONCHOSCOPY N/A 7/10/2021    Procedure: BRONCHOSCOPY;  Surgeon: Rodrigo Reyes MD;  Location: [facility] ENDOSCOPY;  Service: Pulmonary;  Laterality: N/A;   • CHOLECYSTECTOMY     • ENDOSCOPY     • FRACTURE SURGERY     • GALLBLADDER SURGERY     • HERNIA REPAIR     • HYSTERECTOMY     • JOINT REPLACEMENT     • OTHER SURGICAL HISTORY      ARTIFICIAL JOINTS/LIMBS   • OTHER SURGICAL HISTORY      FACE SURGERY, UNSPECIFIED   • TOTAL HIP ARTHROPLASTY Right    • TOTAL KNEE ARTHROPLASTY Left      General Information     Row Name 08/16/21 1047          OT Time and Intention    Document Type  evaluation  -AV     Mode of Treatment  individual therapy;occupational therapy  -AV     Row Name 08/16/21 1047          General Information    Patient Profile Reviewed  yes  -AV     Prior Level of Function  -- Prior functional status unknown as patient not providing information/not found in chart.  -AV     Barriers to Rehab  uncooperative  -AV     Row Name 08/16/21 1047          Occupational Profile    Reason for Services/Referral (Occupational Profile)  Patient is a 60 year old female admitted to [facility] on August 12, 2021 with confusion and weakness.  She is currently on 3 W/room air.   OT consulted to evaluate for ADL needs.  No previous OT services  for current condition.  -AV     Row Name 08/16/21 1047          Living Environment    Lives With  spouse 9-year-old grandchild  -AV     Row Name 08/16/21 1047          Home Main Entrance    Number of Stairs, Main Entrance  none Ramped entrance  -AV     Row Name 08/16/21 1047          Cognition    Orientation Status (Cognition)  -- Arouses easily with cues. uncooperative.  Able to follow commands when desired.  -AV     Row Name 08/16/21 1047          Safety Issues, Functional Mobility    Impairments Affecting Function (Mobility)  endurance/activity tolerance  -AV       User Key  (r) = Recorded By, (t) = Taken By, (c) = Cosigned By    Initials Name Provider Type    Easton Dowd, POLLY Occupational Therapist          Mobility/ADL's     Row Name 08/16/21 1050          Transfers    Comment (Transfers)  Patient adamantly declined testing  -AV     Row Name 08/16/21 1050          Activities of Daily Living    BADL Assessment/Intervention  -- Patient able to feed self independently with set up and requires assistance for further ADLs.  Incontinent.  Further specifics unknown.  -AV       User Key  (r) = Recorded By, (t) = Taken By, (c) = Cosigned By    Initials Name Provider Type    Easton Dowd, POLLY Occupational Therapist        Obj/Interventions     Row Name 08/16/21 1050          Sensory Assessment (Somatosensory)    Sensory Assessment (Somatosensory)  UE sensation intact  -AV     Row Name 08/16/21 1050          Vision Assessment/Intervention    Visual Impairment/Limitations  WFL Opens eyes and only brief intervals  -AV     Row Name 08/16/21 1050          Range of Motion Comprehensive    General Range of Motion  bilateral upper extremity ROM WFL  -AV     Comment, General Range of Motion  Proximal upper extremity testing declined  -AV     Row Name 08/16/21 1050          Motor Skills    Motor Skills  coordination;functional endurance  -AV     Coordination  -- Unable to assess due to decreased cooperation  -AV      "Functional Endurance  Poor  -AV     Row Name 08/16/21 1050          Balance    Balance Assessment  -- Patient declined testing  -AV       User Key  (r) = Recorded By, (t) = Taken By, (c) = Cosigned By    Initials Name Provider Type    Easton Dowd, OT Occupational Therapist        Goals/Plan    No documentation.       Clinical Impression     Row Name 08/16/21 1051          Pain Assessment    Additional Documentation  Pain Scale: FACES Pre/Post-Treatment (Group)  -AV     Row Name 08/16/21 1051          Pain Scale: FACES Pre/Post-Treatment    Pain: FACES Scale, Pretreatment  0-->no hurt  -AV     Posttreatment Pain Rating  0-->no hurt  -AV     Row Name 08/16/21 1051          Plan of Care Review    Plan of Care Reviewed With  patient  -AV     Progress  no change First session for evaluation  -AV     Outcome Summary  Patient presents with deficits of cognition/safety awareness, endurance and likely balance deficits impacting ADL and transfer independence.  Will DC OT at patient request/declined further sessions.  \"Get out\", \" go away\"  -AV     Row Name 08/16/21 1051          Therapy Assessment/Plan (OT)    Patient/Family Therapy Goal Statement (OT)  NA  -AV     Rehab Potential (OT)  -- NA  -AV     Criteria for Skilled Therapeutic Interventions Met (OT)  meets criteria;patient/family refuse skilled intervention at this time Patient refusing further OT services during hospitalization  -AV     Therapy Frequency (OT)  evaluation only  -AV     Row Name 08/16/21 1051          Therapy Plan Review/Discharge Plan (OT)    Anticipated Discharge Disposition (OT)  inpatient rehabilitation facility;sub acute care setting Patient would benefit from rehab upon hospital discharge if agreeable.  -AV     Row Name 08/16/21 1051          Vital Signs    O2 Delivery Pre Treatment  room air  -AV     O2 Delivery Intra Treatment  room air  -AV     O2 Delivery Post Treatment  room air  -AV       User Key  (r) = Recorded By, (t) = Taken By, " (c) = Cosigned By    Initials Name Provider Type    Easton Dowd OT Occupational Therapist        Outcome Measures     Row Name 08/16/21 1053          How much help from another is currently needed...    Putting on and taking off regular lower body clothing?  2  -AV     Bathing (including washing, rinsing, and drying)  2  -AV     Toileting (which includes using toilet bed pan or urinal)  2  -AV     Putting on and taking off regular upper body clothing  2  -AV     Taking care of personal grooming (such as brushing teeth)  2  -AV     Eating meals  4  -AV     AM-PAC 6 Clicks Score (OT)  14  -AV     Row Name 08/16/21 1053          Functional Assessment    Outcome Measure Options  AM-PAC 6 Clicks Daily Activity (OT);Optimal Instrument  -AV     Row Name 08/16/21 1053          Optimal Instrument    Optimal Instrument  Optimal - 3  -AV     Bending/Stooping  3  -AV     Standing  5  -AV     Reaching  2  -AV     From the list, choose the 3 activities you would most like to be able to do without any difficulty  Bending/stooping;Standing;Reaching  -AV     Total Score Optimal - 3  10  -AV       User Key  (r) = Recorded By, (t) = Taken By, (c) = Cosigned By    Initials Name Provider Type    Easton Dowd OT Occupational Therapist          Occupational Therapy Education                 Title: PT OT SLP Therapies (Done)     Topic: Occupational Therapy (Done)     Point: ADL training (Done)     Description:   Instruct learner(s) on proper safety adaptation and remediation techniques during self care or transfers.   Instruct in proper use of assistive devices.              Learning Progress Summary           Patient Refuses, E, VU by ALISHA at 8/16/2021 1054    Comment: Declined all further OT services during hospitalization                   Point: Home exercise program (Done)     Description:   Instruct learner(s) on appropriate technique for monitoring, assisting and/or progressing therapeutic exercises/activities.            "   Learning Progress Summary           Patient Refuses, E, VU by AV at 8/16/2021 1054    Comment: Declined all further OT services during hospitalization                   Point: Precautions (Done)     Description:   Instruct learner(s) on prescribed precautions during self-care and functional transfers.              Learning Progress Summary           Patient Refuses, E, VU by AV at 8/16/2021 1054    Comment: Declined all further OT services during hospitalization                   Point: Body mechanics (Done)     Description:   Instruct learner(s) on proper positioning and spine alignment during self-care, functional mobility activities and/or exercises.              Learning Progress Summary           Patient Refuses, E, VU by AV at 8/16/2021 1054    Comment: Declined all further OT services during hospitalization                               User Key     Initials Effective Dates Name Provider Type Discipline     06/16/21 -  Easton Marks OT Occupational Therapist OT              OT Recommendation and Plan  Therapy Frequency (OT): evaluation only  Plan of Care Review  Plan of Care Reviewed With: patient  Progress: no change (First session for evaluation)  Outcome Summary: Patient presents with deficits of cognition/safety awareness, endurance and likely balance deficits impacting ADL and transfer independence.  Will DC OT at patient request/declined further sessions.  \"Get out\", \" go away\"     Time Calculation:   Time Calculation- OT     Row Name 08/16/21 1055             Time Calculation- OT    OT Received On  08/16/21  -AV         Untimed Charges    OT Eval/Re-eval Minutes  25  -AV         Total Minutes    Untimed Charges Total Minutes  25  -AV       Total Minutes  25  -AV        User Key  (r) = Recorded By, (t) = Taken By, (c) = Cosigned By    Initials Name Provider Type     Easton Marks OT Occupational Therapist        Therapy Charges for Today     Code Description Service Date Service Provider " Modifiers Qty    [id number] HC OT EVAL LOW COMPLEXITY 2 8/16/2021 Easton Marks, OT GO 1               Easton Marks OT  8/16/2021

## 2021-08-16 NOTE — PLAN OF CARE
Goal Outcome Evaluation:  Patient transferred from [address] [place] [address] this shift. Since transfer patient has been uncooperative with staff and not wanting to participate in care as well as refusing blood work. Physical condition unchanged, but confusion has worsened with transfer. Ned, rn

## 2021-08-17 LAB
ALBUMIN SERPL-MCNC: 2.6 G/DL (ref 3.5–5.2)
ALBUMIN/GLOB SERPL: 0.7 G/DL
ALP SERPL-CCNC: 152 U/L (ref 39–117)
ALT SERPL W P-5'-P-CCNC: 17 U/L (ref 1–33)
ANION GAP SERPL CALCULATED.3IONS-SCNC: 7.6 MMOL/L (ref 5–15)
AST SERPL-CCNC: 21 U/L (ref 1–32)
BACTERIA SPEC AEROBE CULT: NORMAL
BASOPHILS # BLD AUTO: 0.03 10*3/MM3 (ref 0–0.2)
BASOPHILS NFR BLD AUTO: 0.4 % (ref 0–1.5)
BILIRUB SERPL-MCNC: 0.2 MG/DL (ref 0–1.2)
BUN SERPL-MCNC: 16 MG/DL (ref 8–23)
BUN/CREAT SERPL: 22.2 (ref 7–25)
CALCIUM SPEC-SCNC: 8.5 MG/DL (ref 8.6–10.5)
CHLORIDE SERPL-SCNC: 106 MMOL/L (ref 98–107)
CO2 SERPL-SCNC: 21.4 MMOL/L (ref 22–29)
CREAT SERPL-MCNC: 0.72 MG/DL (ref 0.57–1)
DEPRECATED RDW RBC AUTO: 58.1 FL (ref 37–54)
EOSINOPHIL # BLD AUTO: 0.03 10*3/MM3 (ref 0–0.4)
EOSINOPHIL NFR BLD AUTO: 0.4 % (ref 0.3–6.2)
ERYTHROCYTE [DISTWIDTH] IN BLOOD BY AUTOMATED COUNT: 18.4 % (ref 12.3–15.4)
GFR SERPL CREATININE-BSD FRML MDRD: 82 ML/MIN/1.73
GLOBULIN UR ELPH-MCNC: 3.8 GM/DL
GLUCOSE BLDC GLUCOMTR-MCNC: 180 MG/DL (ref 70–99)
GLUCOSE BLDC GLUCOMTR-MCNC: 226 MG/DL (ref 70–99)
GLUCOSE BLDC GLUCOMTR-MCNC: 231 MG/DL (ref 70–99)
GLUCOSE BLDC GLUCOMTR-MCNC: 253 MG/DL (ref 70–99)
GLUCOSE SERPL-MCNC: 210 MG/DL (ref 65–99)
HCT VFR BLD AUTO: 26.5 % (ref 34–46.6)
HGB BLD-MCNC: 8.4 G/DL (ref 12–15.9)
IMM GRANULOCYTES # BLD AUTO: 0.05 10*3/MM3 (ref 0–0.05)
IMM GRANULOCYTES NFR BLD AUTO: 0.6 % (ref 0–0.5)
INR PPP: 3.17 (ref 2–3)
LYMPHOCYTES # BLD AUTO: 1.11 10*3/MM3 (ref 0.7–3.1)
LYMPHOCYTES NFR BLD AUTO: 13.5 % (ref 19.6–45.3)
MCH RBC QN AUTO: 27.5 PG (ref 26.6–33)
MCHC RBC AUTO-ENTMCNC: 31.7 G/DL (ref 31.5–35.7)
MCV RBC AUTO: 86.6 FL (ref 79–97)
MONOCYTES # BLD AUTO: 0.56 10*3/MM3 (ref 0.1–0.9)
MONOCYTES NFR BLD AUTO: 6.8 % (ref 5–12)
NEUTROPHILS NFR BLD AUTO: 6.42 10*3/MM3 (ref 1.7–7)
NEUTROPHILS NFR BLD AUTO: 78.3 % (ref 42.7–76)
NRBC BLD AUTO-RTO: 0 /100 WBC (ref 0–0.2)
PLATELET # BLD AUTO: 204 10*3/MM3 (ref 140–450)
PMV BLD AUTO: 10.9 FL (ref 6–12)
POTASSIUM SERPL-SCNC: 4.7 MMOL/L (ref 3.5–5.2)
PROT SERPL-MCNC: 6.4 G/DL (ref 6–8.5)
PROTHROMBIN TIME: 32.1 SECONDS (ref 9.4–12)
RBC # BLD AUTO: 3.06 10*6/MM3 (ref 3.77–5.28)
SODIUM SERPL-SCNC: 135 MMOL/L (ref 136–145)
WBC # BLD AUTO: 8.2 10*3/MM3 (ref 3.4–10.8)

## 2021-08-17 PROCEDURE — 94799 UNLISTED PULMONARY SVC/PX: CPT

## 2021-08-17 PROCEDURE — 97161 PT EVAL LOW COMPLEX 20 MIN: CPT

## 2021-08-17 PROCEDURE — 80053 COMPREHEN METABOLIC PANEL: CPT | Performed by: INTERNAL MEDICINE

## 2021-08-17 PROCEDURE — 82962 GLUCOSE BLOOD TEST: CPT

## 2021-08-17 PROCEDURE — 63710000001 INSULIN LISPRO (HUMAN) PER 5 UNITS: Performed by: INTERNAL MEDICINE

## 2021-08-17 PROCEDURE — 85610 PROTHROMBIN TIME: CPT | Performed by: INTERNAL MEDICINE

## 2021-08-17 PROCEDURE — 63710000001 INSULIN DETEMIR PER 5 UNITS: Performed by: INTERNAL MEDICINE

## 2021-08-17 PROCEDURE — 25010000002 CEFEPIME PER 500 MG: Performed by: INTERNAL MEDICINE

## 2021-08-17 PROCEDURE — 85025 COMPLETE CBC W/AUTO DIFF WBC: CPT | Performed by: INTERNAL MEDICINE

## 2021-08-17 RX ORDER — WARFARIN SODIUM 1 MG/1
1 TABLET ORAL
Status: DISCONTINUED | OUTPATIENT
Start: 2021-08-17 | End: 2021-08-19 | Stop reason: HOSPADM

## 2021-08-17 RX ADMIN — IPRATROPIUM BROMIDE AND ALBUTEROL SULFATE 3 ML: .5; 2.5 SOLUTION RESPIRATORY (INHALATION) at 00:39

## 2021-08-17 RX ADMIN — OXYCODONE HYDROCHLORIDE AND ACETAMINOPHEN 1 TABLET: 5; 325 TABLET ORAL at 16:34

## 2021-08-17 RX ADMIN — NICOTINE 1 PATCH: 21 PATCH, EXTENDED RELEASE TRANSDERMAL at 08:48

## 2021-08-17 RX ADMIN — INSULIN DETEMIR 15 UNITS: 100 INJECTION, SOLUTION SUBCUTANEOUS at 20:19

## 2021-08-17 RX ADMIN — CEFEPIME HYDROCHLORIDE 2 G: 2 INJECTION, POWDER, FOR SOLUTION INTRAVENOUS at 20:19

## 2021-08-17 RX ADMIN — CEFEPIME HYDROCHLORIDE 2 G: 2 INJECTION, POWDER, FOR SOLUTION INTRAVENOUS at 12:18

## 2021-08-17 RX ADMIN — INSULIN LISPRO 5 UNITS: 100 INJECTION, SOLUTION INTRAVENOUS; SUBCUTANEOUS at 08:48

## 2021-08-17 RX ADMIN — BISOPROLOL FUMARATE 20 MG: 5 TABLET, FILM COATED ORAL at 08:48

## 2021-08-17 RX ADMIN — INSULIN LISPRO 2 UNITS: 100 INJECTION, SOLUTION INTRAVENOUS; SUBCUTANEOUS at 17:40

## 2021-08-17 RX ADMIN — ALPRAZOLAM 0.25 MG: 0.25 TABLET ORAL at 20:19

## 2021-08-17 RX ADMIN — CEFEPIME HYDROCHLORIDE 2 G: 2 INJECTION, POWDER, FOR SOLUTION INTRAVENOUS at 03:55

## 2021-08-17 RX ADMIN — INSULIN LISPRO 5 UNITS: 100 INJECTION, SOLUTION INTRAVENOUS; SUBCUTANEOUS at 12:19

## 2021-08-17 RX ADMIN — WARFARIN SODIUM 1 MG: 2 TABLET ORAL at 17:39

## 2021-08-17 RX ADMIN — SODIUM CHLORIDE, PRESERVATIVE FREE 10 ML: 5 INJECTION INTRAVENOUS at 20:19

## 2021-08-17 RX ADMIN — IPRATROPIUM BROMIDE AND ALBUTEROL SULFATE 3 ML: .5; 2.5 SOLUTION RESPIRATORY (INHALATION) at 12:40

## 2021-08-17 RX ADMIN — INSULIN LISPRO 4 UNITS: 100 INJECTION, SOLUTION INTRAVENOUS; SUBCUTANEOUS at 12:18

## 2021-08-17 RX ADMIN — OXYCODONE HYDROCHLORIDE AND ACETAMINOPHEN 1 TABLET: 5; 325 TABLET ORAL at 04:02

## 2021-08-17 RX ADMIN — INSULIN LISPRO 5 UNITS: 100 INJECTION, SOLUTION INTRAVENOUS; SUBCUTANEOUS at 17:39

## 2021-08-17 RX ADMIN — INSULIN LISPRO 6 UNITS: 100 INJECTION, SOLUTION INTRAVENOUS; SUBCUTANEOUS at 08:48

## 2021-08-17 RX ADMIN — SERTRALINE HYDROCHLORIDE 100 MG: 100 TABLET ORAL at 08:48

## 2021-08-17 RX ADMIN — SODIUM CHLORIDE, PRESERVATIVE FREE 10 ML: 5 INJECTION INTRAVENOUS at 08:50

## 2021-08-17 NOTE — PLAN OF CARE
Goal Outcome Evaluation:  Plan of Care Reviewed With: patient        Progress: improving  Outcome Summary: Patient more agreeable with medications and care from staff, sitting on the side of the bed with complaints of pain, resolved with pain medication

## 2021-08-17 NOTE — THERAPY EVALUATION
Acute Care - Physical Therapy Initial Evaluation   [place]     Patient Name: Italia Olvera  : 1960  MRN: [id number]  Today's Date: 2021   Admit date: 2021     Referring Physician: Rudy Garnett MD     Surgery Date:* No surgery found *             Visit Dx:     ICD-10-CM ICD-9-CM   1. Sepsis, due to unspecified organism, unspecified whether acute organ dysfunction present (CMS/[facility])  A41.9 038.9     995.91   2. UTI (urinary tract infection), bacterial  N39.0 599.0    A49.9 041.9   3. Pneumonia of both lungs due to infectious organism, unspecified part of lung  J18.9 483.8   4. Decreased activities of daily living (ADL)  Z78.9 V49.89   5. Difficulty in walking  R26.2 719.7     Patient Active Problem List   Diagnosis   • Bladder disorder   • Depression   • Hypertension   • Peripheral neuropathy   • Osteoarthritis of knee   • COPD with acute exacerbation (CMS/[facility])   • Chronic back pain   • Multifocal pneumonia   • T2DM (type 2 diabetes mellitus) (CMS/[facility])- uncontrolled   • Bipolar disorder (CMS/[facility])   • Chronic respiratory failure with hypoxia (CMS/[facility])   • Acute on chronic respiratory failure with hypoxia (CMS/[facility])   • Chronic respiratory failure with hypoxia, on home oxygen therapy (CMS/[facility])   • Overweight (BMI 25.0-29.9)   • Acute on chronic systolic CHF (congestive heart failure) (CMS/[facility])   • Essential hypertension   • DM (diabetes mellitus), type 1 (CMS/[facility])   • Sepsis (CMS/[facility])   • Hypoglycemia   • Acute UTI (urinary tract infection)     Past Medical History:   Diagnosis Date   • Acid reflux    • ACL tear 2015    PCL/ACL TEAR/RUPTURE   • Acute shoulder bursitis, right 2015   • Anxiety    • Arthritis    • Asthma    • Bipolar 1 disorder (CMS/[facility])     untreated   • Bladder disorder    • Cancer (CMS/[facility])    • CHF (congestive heart failure) (CMS/[facility])    • Chronic back pain    • COPD (chronic obstructive pulmonary disease) (CMS/[facility])    • Depression    • Diabetes mellitus (CMS/[facility])    •  DJD (degenerative joint disease)    • GERD (gastroesophageal reflux disease)    • HBP (high blood pressure)    • Hip pain 09/15/2015   • Hypertension    • Knee injury 08/19/2015   • Knee pain, right 09/15/2015   • Limb swelling    • Neuropathy    • Osteoarthritis, knee 09/01/2015   • Osteoporosis    • Peripheral neuropathy    • Renal failure 1994   • Seasonal allergies    • Shoulder tendonitis 08/23/2015   • SOB (shortness of breath)    • Tendinitis of right rotator cuff 08/23/2015     Past Surgical History:   Procedure Laterality Date   • APPENDECTOMY     • BACK SURGERY     • BLADDER REPAIR     • BRONCHOSCOPY N/A 7/10/2021    Procedure: BRONCHOSCOPY WITH BRONCHOALVEOLAR LAVAGE, POSSIBLE BIOPSY, BRUSHING, WASHING, AIRWAY INSPECTION;  Surgeon: Rodrigo Reyes MD;  Location: [facility] MAIN OR;  Service: Pulmonary;  Laterality: N/A;   • BRONCHOSCOPY N/A 7/10/2021    Procedure: BRONCHOSCOPY;  Surgeon: Rodrigo Reyes MD;  Location: [facility] ENDOSCOPY;  Service: Pulmonary;  Laterality: N/A;   • CHOLECYSTECTOMY     • ENDOSCOPY     • FRACTURE SURGERY     • GALLBLADDER SURGERY     • HERNIA REPAIR     • HYSTERECTOMY     • JOINT REPLACEMENT     • OTHER SURGICAL HISTORY      ARTIFICIAL JOINTS/LIMBS   • OTHER SURGICAL HISTORY      FACE SURGERY, UNSPECIFIED   • TOTAL HIP ARTHROPLASTY Right    • TOTAL KNEE ARTHROPLASTY Left         PT Assessment (last 12 hours)      PT Evaluation and Treatment     Row Name 08/17/21 1400          Physical Therapy Time and Intention    Subjective Information  no complaints  -AYDEN     Document Type  evaluation  -AYDEN     Mode of Treatment  individual therapy;physical therapy  -AYDEN     Row Name 08/17/21 1400          General Information    Patient Observations  alert;cooperative;poorly cooperative  -AYDEN     Prior Level of Function  min assist:;all household mobility  -AYDEN     Equipment Currently Used at Home  wheelchair, motorized  -AYDEN     Existing Precautions/Restrictions  fall  -AYDEN     Barriers to Rehab  none  identified  -AYDEN     Row Name 08/17/21 1400          Living Environment    Current Living Arrangements  home/apartment/condo  -AYDEN     Lives With  spouse  -AYDEN     Row Name 08/17/21 1400          Range of Motion (ROM)    Range of Motion  ROM is WFL  -AYDEN     Row Name 08/17/21 1400          Bed Mobility    Bed Mobility  bed mobility (all) activities  -AYDEN     All Activities, [place] (Bed Mobility)  minimum assist (75% patient effort)  -AYDEN     Row Name 08/17/21 1400          Transfers    Transfers  sit-stand transfer  -AYDEN     Sit-Stand [place] (Transfers)  moderate assist (50% patient effort)  -AYDEN     Row Name 08/17/21 1400          Safety Issues, Functional Mobility    Impairments Affecting Function (Mobility)  endurance/activity tolerance;balance;strength  -AYDEN     Row Name 08/17/21 1400          Balance    Balance Assessment  standing dynamic balance  -AYDEN     Dynamic Standing Balance  moderate impairment  -AYDEN     Row Name             Wound 06/22/21 1201 Right third toe Blisters    Wound - Properties Group Placement Date: 06/22/21  -CB Placement Time: 1201  -CB Present on Hospital Admission: N  -CB Side: Right  -CB Location: third toe  -CB Primary Wound Type: Blisters  -CB    Retired Wound - Properties Group Date first assessed: 06/22/21  -CB Time first assessed: 1201  -CB Present on Hospital Admission: N  -CB Side: Right  -CB Location: third toe  -CB Primary Wound Type: Blisters  -CB    Row Name             Wound 06/14/21 1151 medial coccyx Pressure Injury    Wound - Properties Group Placement Date: 06/14/21  -CB Placement Time: 1151  -CB Present on Hospital Admission: Y  -CB Orientation: medial  -CB Location: coccyx  -CB Primary Wound Type: Pressure inj  -CB Stage, Pressure Injury : Stage 2  -CB    Retired Wound - Properties Group Date first assessed: 06/14/21  -CB Time first assessed: 1151  -CB Present on Hospital Admission: Y  -CB Location: coccyx  -CB Primary Wound Type: Pressure inj  -CB    Row Name              Wound 08/13/21 0300 Left lower arm Skin Tear    Wound - Properties Group Placement Date: 08/13/21  -AB Placement Time: 0300  -AB Present on Hospital Admission: Y  -AB Side: Left  -AB Orientation: lower  -AB Location: arm  -AB Primary Wound Type: Skin tear  -AB    Retired Wound - Properties Group Date first assessed: 08/13/21  -AB Time first assessed: 0300  -AB Present on Hospital Admission: Y  -AB Side: Left  -AB Location: arm  -AB Primary Wound Type: Skin tear  -AB    Row Name             Wound 08/13/21 0300 Right distal arm Skin Tear    Wound - Properties Group Placement Date: 08/13/21  -AB Placement Time: 0300  -AB Present on Hospital Admission: Y  -AB Side: Right  -AB Orientation: distal  -AB Location: arm  -AB Primary Wound Type: Skin tear  -AB    Retired Wound - Properties Group Date first assessed: 08/13/21  -AB Time first assessed: 0300  -AB Present on Hospital Admission: Y  -AB Side: Right  -AB Location: arm  -AB Primary Wound Type: Skin tear  -AB    Row Name             Wound 08/16/21 1105 Right posterior gluteal Traumatic    Wound - Properties Group Placement Date: 08/16/21  -FL Placement Time: 1105  -FL Side: Right  -FL Orientation: posterior  -FL Location: gluteal  -FL Primary Wound Type: Traumatic  -FL    Retired Wound - Properties Group Date first assessed: 08/16/21  -FL Time first assessed: 1105  -FL Side: Right  -FL Location: gluteal  -FL Primary Wound Type: Traumatic  -FL    Row Name 08/17/21 1400          Plan of Care Review    Plan of Care Reviewed With  patient  -AYDEN     Outcome Summary  Patient would benefit from skilled physical therapy services however, patient is declining all inpatient therapy services at this time.  Please reorder if patient becomes more agreeable to treatment.  -AYDEN     Row Name 08/17/21 1400          Therapy Assessment/Plan (PT)    Criteria for Skilled Interventions Met (PT)  skilled treatment is necessary however pt is refusing services  -AYDEN     Row Name 08/17/21  1400          PT Evaluation Complexity    History, PT Evaluation Complexity  no personal factors and/or comorbidities  -AYDEN     Examination of Body Systems (PT Eval Complexity)  total of 4 or more elements  -AYDEN     Clinical Presentation (PT Evaluation Complexity)  stable  -AYDEN     Clinical Decision Making (PT Evaluation Complexity)  low complexity  -AYDEN     Overall Complexity (PT Evaluation Complexity)  low complexity  -AYDEN       User Key  (r) = Recorded By, (t) = Taken By, (c) = Cosigned By    Initials Name Provider Type    Graciela Durán, RN Registered Nurse    Hilaria Card, RN Registered Nurse    Maxine Tiwari RN Registered Nurse    Manny Mcgee, PT Physical Therapist          PT Recommendation and Plan  Anticipated Discharge Disposition (PT): sub acute care setting, home with 24/7 care  Therapy Frequency (PT): evaluation only  Plan of Care Reviewed With: patient  Outcome Summary: Patient would benefit from skilled physical therapy services however, patient is declining all inpatient therapy services at this time.  Please reorder if patient becomes more agreeable to treatment.  Outcome Measures     Row Name 08/17/21 1400             How much help from another person do you currently need...    Turning from your back to your side while in flat bed without using bedrails?  3  -AYDEN      Moving from lying on back to sitting on the side of a flat bed without bedrails?  3  -AYDEN      Moving to and from a bed to a chair (including a wheelchair)?  2  -AYDEN      Standing up from a chair using your arms (e.g., wheelchair, bedside chair)?  2  -AYDEN      Climbing 3-5 steps with a railing?  2  -AYDEN      To walk in hospital room?  2  -AYDEN      AM-PAC 6 Clicks Score (PT)  14  -AYDEN         Functional Assessment    Outcome Measure Options  AM-PAC 6 Clicks Basic Mobility (PT)  -AYDEN        User Key  (r) = Recorded By, (t) = Taken By, (c) = Cosigned By    Initials Name Provider Type    Manny Mcgee, PT Physical  Therapist           Time Calculation:   PT Charges     Row Name 08/17/21 1418             Time Calculation    PT Received On  08/17/21  -AYDEN         Untimed Charges    PT Eval/Re-eval Minutes  20  -AYDEN         Total Minutes    Untimed Charges Total Minutes  20  -AYDEN       Total Minutes  20  -AYDEN        User Key  (r) = Recorded By, (t) = Taken By, (c) = Cosigned By    Initials Name Provider Type    Manny Mcgee, PT Physical Therapist        Therapy Charges for Today     Code Description Service Date Service Provider Modifiers Qty    28768527858 HC PT EVAL LOW COMPLEXITY 2 8/17/2021 Manny Sevilla PT GP 1          PT G-Codes  Outcome Measure Options: AM-PAC 6 Clicks Basic Mobility (PT)  AM-PAC 6 Clicks Score (PT): 14  AM-PAC 6 Clicks Score (OT): 14    Manny Sevilla PT  8/17/2021

## 2021-08-17 NOTE — PLAN OF CARE
Goal Outcome Evaluation:  Plan of Care Reviewed With: patient           Outcome Summary: Patient would benefit from skilled physical therapy services however, patient is declining all inpatient therapy services at this time.  Please reorder if patient becomes more agreeable to treatment.

## 2021-08-17 NOTE — PLAN OF CARE
Goal Outcome Evaluation:           Progress: improving  Outcome Summary: Patient much more cooperative today. States she is ready to go home. Has been sitting up on the side of the bed all day.

## 2021-08-17 NOTE — PROGRESS NOTES
Psychiatric     Progress Note    Patient Name: Italia Olvera  : 1960  MRN: [id number]  Primary Care Physician:  Carloz Shoemaker MD  Date of admission: 2021      Subjective   Brief summary.  Follow-up on sepsis    HPI:  Patient is awake alert, sitting at bedside.  Wants to go home.  No chest pain or shortness of breath.  Denies abdominal pain    Review of Systems     No fever chills.  No agitation today.  No urinary symptoms      Objective     Vitals:   Temp:  [98.1 °F (36.7 °C)-98.7 °F (37.1 °C)] 98.5 °F (36.9 °C)  Heart Rate:  [64-88] 64  Resp:  [16-18] 18  BP: (113-169)/(47-72) 113/52    Physical Exam :   Elderly looking female looks older than her stated age not in acute distress.  Awake alert and oriented.  Heart regular.  Lungs clear.  Abdomen soft.  Extremities no edema    Result Review      Result Review:  I have personally reviewed the results from the time of this admission to 2021 19:19 EDT and agree with these findings:  []  Laboratory  []  Microbiology  []  Radiology  []  EKG/Telemetry   []  Cardiology/Vascular   []  Pathology  []  Old records  []  Other:           Assessment / Plan       Active Hospital Problems:  Active Hospital Problems    Diagnosis    • Hypoglycemia    • Acute UTI (urinary tract infection)    • Sepsis (CMS/HCC)    • Multifocal pneumonia      Plan:   Hypoglycemia resolved.  Blood sugars increased.  INR high will reduce Coumadin dose  .  Recheck INR tomorrow.  Increase activity.  Refusing nursing home.  Will discharge home in 1 to 2 days       DVT prophylaxis:  Medical and mechanical DVT prophylaxis orders are present.    CODE STATUS:   Level Of Support Discussed With: Patient  Code Status: CPR  Medical Interventions (Level of Support Prior to Arrest): Full            Electronically signed by Rudy Garnett MD, 21, 7:19 PM EDT.

## 2021-08-18 PROBLEM — D64.9 ANEMIA: Chronic | Status: ACTIVE | Noted: 2021-08-18

## 2021-08-18 LAB
ANION GAP SERPL CALCULATED.3IONS-SCNC: 7.7 MMOL/L (ref 5–15)
BASOPHILS # BLD AUTO: 0.02 10*3/MM3 (ref 0–0.2)
BASOPHILS NFR BLD AUTO: 0.3 % (ref 0–1.5)
BUN SERPL-MCNC: 14 MG/DL (ref 8–23)
BUN/CREAT SERPL: 35.9 (ref 7–25)
CALCIUM SPEC-SCNC: 8.4 MG/DL (ref 8.6–10.5)
CHLORIDE SERPL-SCNC: 109 MMOL/L (ref 98–107)
CO2 SERPL-SCNC: 19.3 MMOL/L (ref 22–29)
CREAT SERPL-MCNC: 0.39 MG/DL (ref 0.57–1)
DEPRECATED RDW RBC AUTO: 59 FL (ref 37–54)
EOSINOPHIL # BLD AUTO: 0.02 10*3/MM3 (ref 0–0.4)
EOSINOPHIL NFR BLD AUTO: 0.3 % (ref 0.3–6.2)
ERYTHROCYTE [DISTWIDTH] IN BLOOD BY AUTOMATED COUNT: 18.6 % (ref 12.3–15.4)
FERRITIN SERPL-MCNC: 530.3 NG/ML (ref 13–150)
GFR SERPL CREATININE-BSD FRML MDRD: >150 ML/MIN/1.73
GLUCOSE BLDC GLUCOMTR-MCNC: 111 MG/DL (ref 70–99)
GLUCOSE BLDC GLUCOMTR-MCNC: 117 MG/DL (ref 70–99)
GLUCOSE BLDC GLUCOMTR-MCNC: 135 MG/DL (ref 70–99)
GLUCOSE BLDC GLUCOMTR-MCNC: 153 MG/DL (ref 70–99)
GLUCOSE SERPL-MCNC: 171 MG/DL (ref 65–99)
HCT VFR BLD AUTO: 24.5 % (ref 34–46.6)
HGB BLD-MCNC: 7.8 G/DL (ref 12–15.9)
IMM GRANULOCYTES # BLD AUTO: 0.03 10*3/MM3 (ref 0–0.05)
IMM GRANULOCYTES NFR BLD AUTO: 0.4 % (ref 0–0.5)
INR PPP: 2.59 (ref 2–3)
IRON 24H UR-MRATE: 31 MCG/DL (ref 37–145)
IRON SATN MFR SERPL: 15 % (ref 20–50)
LDH SERPL-CCNC: 205 U/L (ref 135–214)
LYMPHOCYTES # BLD AUTO: 1.11 10*3/MM3 (ref 0.7–3.1)
LYMPHOCYTES NFR BLD AUTO: 15.2 % (ref 19.6–45.3)
MCH RBC QN AUTO: 27.7 PG (ref 26.6–33)
MCHC RBC AUTO-ENTMCNC: 31.8 G/DL (ref 31.5–35.7)
MCV RBC AUTO: 86.9 FL (ref 79–97)
MONOCYTES # BLD AUTO: 0.57 10*3/MM3 (ref 0.1–0.9)
MONOCYTES NFR BLD AUTO: 7.8 % (ref 5–12)
NEUTROPHILS NFR BLD AUTO: 5.54 10*3/MM3 (ref 1.7–7)
NEUTROPHILS NFR BLD AUTO: 76 % (ref 42.7–76)
NRBC BLD AUTO-RTO: 0 /100 WBC (ref 0–0.2)
PLATELET # BLD AUTO: 165 10*3/MM3 (ref 140–450)
PMV BLD AUTO: 10 FL (ref 6–12)
POTASSIUM SERPL-SCNC: 4.1 MMOL/L (ref 3.5–5.2)
PROTHROMBIN TIME: 26.3 SECONDS (ref 9.4–12)
RBC # BLD AUTO: 2.82 10*6/MM3 (ref 3.77–5.28)
RETICS # AUTO: 0.07 10*6/MM3 (ref 0.02–0.13)
RETICS/RBC NFR AUTO: 2.54 % (ref 0.7–1.9)
SODIUM SERPL-SCNC: 136 MMOL/L (ref 136–145)
TIBC SERPL-MCNC: 201 MCG/DL (ref 298–536)
TRANSFERRIN SERPL-MCNC: 135 MG/DL (ref 200–360)
WBC # BLD AUTO: 7.29 10*3/MM3 (ref 3.4–10.8)

## 2021-08-18 PROCEDURE — 82607 VITAMIN B-12: CPT | Performed by: INTERNAL MEDICINE

## 2021-08-18 PROCEDURE — 85045 AUTOMATED RETICULOCYTE COUNT: CPT | Performed by: INTERNAL MEDICINE

## 2021-08-18 PROCEDURE — 25010000002 IRON SUCROSE PER 1 MG: Performed by: INTERNAL MEDICINE

## 2021-08-18 PROCEDURE — 63710000001 INSULIN DETEMIR PER 5 UNITS: Performed by: INTERNAL MEDICINE

## 2021-08-18 PROCEDURE — 82962 GLUCOSE BLOOD TEST: CPT

## 2021-08-18 PROCEDURE — 80048 BASIC METABOLIC PNL TOTAL CA: CPT | Performed by: INTERNAL MEDICINE

## 2021-08-18 PROCEDURE — 85610 PROTHROMBIN TIME: CPT | Performed by: INTERNAL MEDICINE

## 2021-08-18 PROCEDURE — 63710000001 INSULIN LISPRO (HUMAN) PER 5 UNITS: Performed by: INTERNAL MEDICINE

## 2021-08-18 PROCEDURE — 83540 ASSAY OF IRON: CPT | Performed by: INTERNAL MEDICINE

## 2021-08-18 PROCEDURE — 83615 LACTATE (LD) (LDH) ENZYME: CPT | Performed by: INTERNAL MEDICINE

## 2021-08-18 PROCEDURE — 82746 ASSAY OF FOLIC ACID SERUM: CPT | Performed by: INTERNAL MEDICINE

## 2021-08-18 PROCEDURE — 84466 ASSAY OF TRANSFERRIN: CPT | Performed by: INTERNAL MEDICINE

## 2021-08-18 PROCEDURE — 25010000002 CEFEPIME PER 500 MG: Performed by: INTERNAL MEDICINE

## 2021-08-18 PROCEDURE — 85025 COMPLETE CBC W/AUTO DIFF WBC: CPT | Performed by: INTERNAL MEDICINE

## 2021-08-18 PROCEDURE — 82728 ASSAY OF FERRITIN: CPT | Performed by: INTERNAL MEDICINE

## 2021-08-18 RX ADMIN — SODIUM CHLORIDE, PRESERVATIVE FREE 10 ML: 5 INJECTION INTRAVENOUS at 09:08

## 2021-08-18 RX ADMIN — NICOTINE 1 PATCH: 21 PATCH, EXTENDED RELEASE TRANSDERMAL at 09:07

## 2021-08-18 RX ADMIN — INSULIN LISPRO 8 UNITS: 100 INJECTION, SOLUTION INTRAVENOUS; SUBCUTANEOUS at 13:31

## 2021-08-18 RX ADMIN — SODIUM CHLORIDE, PRESERVATIVE FREE 10 ML: 5 INJECTION INTRAVENOUS at 21:00

## 2021-08-18 RX ADMIN — CEFEPIME HYDROCHLORIDE 2 G: 2 INJECTION, POWDER, FOR SOLUTION INTRAVENOUS at 20:59

## 2021-08-18 RX ADMIN — ALPRAZOLAM 0.25 MG: 0.25 TABLET ORAL at 20:59

## 2021-08-18 RX ADMIN — INSULIN LISPRO 2 UNITS: 100 INJECTION, SOLUTION INTRAVENOUS; SUBCUTANEOUS at 09:06

## 2021-08-18 RX ADMIN — ZOLPIDEM TARTRATE 5 MG: 5 TABLET ORAL at 23:29

## 2021-08-18 RX ADMIN — OXYCODONE HYDROCHLORIDE AND ACETAMINOPHEN 1 TABLET: 5; 325 TABLET ORAL at 09:08

## 2021-08-18 RX ADMIN — SERTRALINE HYDROCHLORIDE 100 MG: 100 TABLET ORAL at 09:07

## 2021-08-18 RX ADMIN — WARFARIN SODIUM 1 MG: 2 TABLET ORAL at 17:46

## 2021-08-18 RX ADMIN — INSULIN LISPRO 8 UNITS: 100 INJECTION, SOLUTION INTRAVENOUS; SUBCUTANEOUS at 09:07

## 2021-08-18 RX ADMIN — CEFEPIME HYDROCHLORIDE 2 G: 2 INJECTION, POWDER, FOR SOLUTION INTRAVENOUS at 13:31

## 2021-08-18 RX ADMIN — INSULIN DETEMIR 15 UNITS: 100 INJECTION, SOLUTION SUBCUTANEOUS at 21:00

## 2021-08-18 RX ADMIN — LACTULOSE 30 G: 20 SOLUTION ORAL at 09:05

## 2021-08-18 RX ADMIN — CEFEPIME HYDROCHLORIDE 2 G: 2 INJECTION, POWDER, FOR SOLUTION INTRAVENOUS at 03:39

## 2021-08-18 RX ADMIN — INSULIN LISPRO 8 UNITS: 100 INJECTION, SOLUTION INTRAVENOUS; SUBCUTANEOUS at 17:46

## 2021-08-18 RX ADMIN — FAMOTIDINE 40 MG: 20 TABLET ORAL at 09:07

## 2021-08-18 RX ADMIN — OXYCODONE HYDROCHLORIDE AND ACETAMINOPHEN 1 TABLET: 5; 325 TABLET ORAL at 20:59

## 2021-08-18 RX ADMIN — IRON SUCROSE 300 MG: 20 INJECTION, SOLUTION INTRAVENOUS at 15:06

## 2021-08-18 RX ADMIN — BISOPROLOL FUMARATE 20 MG: 5 TABLET, FILM COATED ORAL at 09:06

## 2021-08-18 NOTE — PLAN OF CARE
Goal Outcome Evaluation:  No change this shift. Patient did not require po pain medications this shift. Ned, rn

## 2021-08-18 NOTE — PROGRESS NOTES
[facility]     Progress Note    Patient Name: Italia Olvera  : 1960  MRN: [id number]  Primary Care Physician:  Carloz Shoemaker MD  Date of admission: 2021      Subjective   Brief summary.  Follow-up on sepsis UTI and weakness      HPI:  More alert and awake.  Wants to go home.  Feels extremely weak and tired.  No chest pain or shortness of breath.      Review of Systems     Fatigue.  No chest pain.  Denies abdominal pain.  No blood in the stools or black-colored stools      Objective     Vitals:   Temp:  [97.9 °F (36.6 °C)-98.3 °F (36.8 °C)] 98.2 °F (36.8 °C)  Heart Rate:  [53-70] 63  Resp:  [18] 18  BP: (126-162)/(40-61) 145/58    Physical Exam :   Elderly female not in acute distress.  Heart is regular.  Lungs clear.  Abdomen soft nontender.  Positive for pallor.  Extremities no edema    Result Review      Result Review:  I have personally reviewed the results from the time of this admission to 2021 18:40 EDT and agree with these findings:  [x]  Laboratory  []  Microbiology  []  Radiology  []  EKG/Telemetry   []  Cardiology/Vascular   []  Pathology  []  Old records  []  Other:           Assessment / Plan       Active Hospital Problems:  Active Hospital Problems    Diagnosis    • Anemia    • Hypoglycemia    • Acute UTI (urinary tract infection)    • Sepsis (CMS/HCC)    • Multifocal pneumonia      Plan:   Patient with iron deficiency anemia.  No active bleed.  Will check iron ferritin and B12.  Start patient on Venofer.  Continue PT OT.  Refusing nursing home placement.  Discharge home tomorrow if no active bleed her hemoglobin remained stable       DVT prophylaxis:  Medical and mechanical DVT prophylaxis orders are present.    CODE STATUS:   Level Of Support Discussed With: Patient  Code Status: CPR  Medical Interventions (Level of Support Prior to Arrest): Full            Electronically signed by Rudy Garnett MD, 21, 6:39 PM EDT.

## 2021-08-18 NOTE — PLAN OF CARE
Goal Outcome Evaluation:  Plan of Care Reviewed With: patient        Progress: improving  Outcome Summary: Pt. calm and cooperative. Possible D/C to home today.

## 2021-08-19 VITALS
RESPIRATION RATE: 16 BRPM | OXYGEN SATURATION: 94 % | HEIGHT: 64 IN | WEIGHT: 132.72 LBS | TEMPERATURE: 97.8 F | SYSTOLIC BLOOD PRESSURE: 157 MMHG | DIASTOLIC BLOOD PRESSURE: 91 MMHG | HEART RATE: 70 BPM | BODY MASS INDEX: 22.66 KG/M2

## 2021-08-19 PROBLEM — A41.9 SEPSIS: Status: RESOLVED | Noted: 2021-08-12 | Resolved: 2021-08-19

## 2021-08-19 PROBLEM — N39.0 ACUTE UTI (URINARY TRACT INFECTION): Status: RESOLVED | Noted: 2021-08-13 | Resolved: 2021-08-19

## 2021-08-19 PROBLEM — E16.2 HYPOGLYCEMIA: Status: RESOLVED | Noted: 2021-08-13 | Resolved: 2021-08-19

## 2021-08-19 LAB
ANION GAP SERPL CALCULATED.3IONS-SCNC: 8.7 MMOL/L (ref 5–15)
BACTERIA SPEC AEROBE CULT: NORMAL
BACTERIA SPEC AEROBE CULT: NORMAL
BASOPHILS # BLD AUTO: 0.03 10*3/MM3 (ref 0–0.2)
BASOPHILS NFR BLD AUTO: 0.5 % (ref 0–1.5)
BUN SERPL-MCNC: 16 MG/DL (ref 8–23)
BUN/CREAT SERPL: 34 (ref 7–25)
CALCIUM SPEC-SCNC: 8.7 MG/DL (ref 8.6–10.5)
CHLORIDE SERPL-SCNC: 110 MMOL/L (ref 98–107)
CO2 SERPL-SCNC: 19.3 MMOL/L (ref 22–29)
CREAT SERPL-MCNC: 0.47 MG/DL (ref 0.57–1)
DEPRECATED RDW RBC AUTO: 62.1 FL (ref 37–54)
EOSINOPHIL # BLD AUTO: 0.03 10*3/MM3 (ref 0–0.4)
EOSINOPHIL NFR BLD AUTO: 0.5 % (ref 0.3–6.2)
ERYTHROCYTE [DISTWIDTH] IN BLOOD BY AUTOMATED COUNT: 19.4 % (ref 12.3–15.4)
FOLATE SERPL-MCNC: 7.14 NG/ML (ref 4.78–24.2)
GFR SERPL CREATININE-BSD FRML MDRD: 135 ML/MIN/1.73
GLUCOSE BLDC GLUCOMTR-MCNC: 123 MG/DL (ref 70–99)
GLUCOSE SERPL-MCNC: 135 MG/DL (ref 65–99)
HCT VFR BLD AUTO: 26.4 % (ref 34–46.6)
HGB BLD-MCNC: 8.1 G/DL (ref 12–15.9)
IMM GRANULOCYTES # BLD AUTO: 0.04 10*3/MM3 (ref 0–0.05)
IMM GRANULOCYTES NFR BLD AUTO: 0.6 % (ref 0–0.5)
INR PPP: 1.87 (ref 2–3)
LYMPHOCYTES # BLD AUTO: 1.17 10*3/MM3 (ref 0.7–3.1)
LYMPHOCYTES NFR BLD AUTO: 18.2 % (ref 19.6–45.3)
MCH RBC QN AUTO: 27.4 PG (ref 26.6–33)
MCHC RBC AUTO-ENTMCNC: 30.7 G/DL (ref 31.5–35.7)
MCV RBC AUTO: 89.2 FL (ref 79–97)
MONOCYTES # BLD AUTO: 0.55 10*3/MM3 (ref 0.1–0.9)
MONOCYTES NFR BLD AUTO: 8.5 % (ref 5–12)
NEUTROPHILS NFR BLD AUTO: 4.62 10*3/MM3 (ref 1.7–7)
NEUTROPHILS NFR BLD AUTO: 71.7 % (ref 42.7–76)
NRBC BLD AUTO-RTO: 0 /100 WBC (ref 0–0.2)
PLATELET # BLD AUTO: 170 10*3/MM3 (ref 140–450)
PMV BLD AUTO: 10.2 FL (ref 6–12)
POTASSIUM SERPL-SCNC: 4.2 MMOL/L (ref 3.5–5.2)
PROTHROMBIN TIME: 19.2 SECONDS (ref 9.4–12)
RBC # BLD AUTO: 2.96 10*6/MM3 (ref 3.77–5.28)
SODIUM SERPL-SCNC: 138 MMOL/L (ref 136–145)
VIT B12 BLD-MCNC: 883 PG/ML (ref 211–946)
WBC # BLD AUTO: 6.44 10*3/MM3 (ref 3.4–10.8)

## 2021-08-19 PROCEDURE — 80048 BASIC METABOLIC PNL TOTAL CA: CPT | Performed by: INTERNAL MEDICINE

## 2021-08-19 PROCEDURE — 82962 GLUCOSE BLOOD TEST: CPT

## 2021-08-19 PROCEDURE — 63710000001 INSULIN LISPRO (HUMAN) PER 5 UNITS: Performed by: INTERNAL MEDICINE

## 2021-08-19 PROCEDURE — 85025 COMPLETE CBC W/AUTO DIFF WBC: CPT | Performed by: INTERNAL MEDICINE

## 2021-08-19 PROCEDURE — 25010000002 CEFEPIME PER 500 MG: Performed by: INTERNAL MEDICINE

## 2021-08-19 PROCEDURE — 85610 PROTHROMBIN TIME: CPT | Performed by: INTERNAL MEDICINE

## 2021-08-19 RX ORDER — CEFDINIR 300 MG/1
300 CAPSULE ORAL 2 TIMES DAILY
Qty: 10 CAPSULE | Refills: 0 | Status: SHIPPED | OUTPATIENT
Start: 2021-08-19 | End: 2021-08-24

## 2021-08-19 RX ORDER — NICOTINE 21 MG/24HR
1 PATCH, TRANSDERMAL 24 HOURS TRANSDERMAL
Qty: 30 PATCH | Refills: 0 | Status: SHIPPED | OUTPATIENT
Start: 2021-08-19 | End: 2021-09-18

## 2021-08-19 RX ORDER — IPRATROPIUM BROMIDE AND ALBUTEROL SULFATE 2.5; .5 MG/3ML; MG/3ML
3 SOLUTION RESPIRATORY (INHALATION)
Qty: 360 ML | Refills: 0 | Status: SHIPPED | OUTPATIENT
Start: 2021-08-19 | End: 2022-11-29

## 2021-08-19 RX ADMIN — SODIUM CHLORIDE, PRESERVATIVE FREE 10 ML: 5 INJECTION INTRAVENOUS at 10:17

## 2021-08-19 RX ADMIN — DOCUSATE SODIUM 50MG AND SENNOSIDES 8.6MG 2 TABLET: 8.6; 5 TABLET, FILM COATED ORAL at 10:08

## 2021-08-19 RX ADMIN — NICOTINE 1 PATCH: 21 PATCH, EXTENDED RELEASE TRANSDERMAL at 10:16

## 2021-08-19 RX ADMIN — BISOPROLOL FUMARATE 20 MG: 5 TABLET, FILM COATED ORAL at 10:07

## 2021-08-19 RX ADMIN — INSULIN LISPRO 8 UNITS: 100 INJECTION, SOLUTION INTRAVENOUS; SUBCUTANEOUS at 09:30

## 2021-08-19 RX ADMIN — FAMOTIDINE 40 MG: 20 TABLET ORAL at 10:07

## 2021-08-19 RX ADMIN — OXYCODONE HYDROCHLORIDE AND ACETAMINOPHEN 1 TABLET: 5; 325 TABLET ORAL at 11:33

## 2021-08-19 RX ADMIN — SERTRALINE HYDROCHLORIDE 100 MG: 100 TABLET ORAL at 10:08

## 2021-08-19 RX ADMIN — CEFEPIME HYDROCHLORIDE 2 G: 2 INJECTION, POWDER, FOR SOLUTION INTRAVENOUS at 04:28

## 2021-08-19 NOTE — NURSING NOTE
Dressing changes done to BUE and coccyx. Dressings removed from BUE. 5 dime-sized skin tears noted, 3 to the LUE and 2 to the RUE. Sites cleansed with NS and covered with vaseline gauze followed by dry guaze and kerlix. Coccyx was open to air. Site cleansed with saline and covered by Aquacel Ag cut to the size of the ulcer and then covered with optifoam.Pt. tolerate procedure well. Dressings clean, dry and intact. Pt. Was c/o pain in her cocyx of 6/10 prior to dressing change and was therefore medicated after dressing change completed.

## 2021-08-19 NOTE — CONSULTS
"Nutrition Services    Patient Name: Italia Olvera  YOB: 1960  MRN: [id number]  Admission date: 8/12/2021      CLINICAL NUTRITION ASSESSMENT      Reason for Assessment  Nonhealing wound or pressure ulcer     H&P:    Past Medical History:   Diagnosis Date   • Acid reflux    • ACL tear 09/01/2015    PCL/ACL TEAR/RUPTURE   • Acute shoulder bursitis, right 08/23/2015   • Anxiety    • Arthritis    • Asthma    • Bipolar 1 disorder (CMS/[facility])     untreated   • Bladder disorder    • Cancer (CMS/[facility])    • CHF (congestive heart failure) (CMS/[facility])    • Chronic back pain    • COPD (chronic obstructive pulmonary disease) (CMS/[facility])    • Depression    • Diabetes mellitus (CMS/[facility])    • DJD (degenerative joint disease)    • GERD (gastroesophageal reflux disease)    • HBP (high blood pressure)    • Hip pain 09/15/2015   • Hypertension    • Knee injury 08/19/2015   • Knee pain, right 09/15/2015   • Limb swelling    • Neuropathy    • Osteoarthritis, knee 09/01/2015   • Osteoporosis    • Peripheral neuropathy    • Renal failure 1994   • Seasonal allergies    • Shoulder tendonitis 08/23/2015   • SOB (shortness of breath)    • Tendinitis of right rotator cuff 08/23/2015        Current Problems:   Active Hospital Problems    Diagnosis    • Anemia    • Hypoglycemia    • Acute UTI (urinary tract infection)    • Sepsis (CMS/[facility])    • Multifocal pneumonia         Nutrition/Diet History         Narrative     Following pt for length of stay. Pt w/ 75% meal intake.  Stage II at medial coccyx. Wound photos reviewed. Will continue Jeremy BID and Boost Glucose Control BID (380kcal, 32gPro). Patient has had significant weight loss over the last month. Will continue to monitor and follow per protocol.     Anthropometrics        Current Height, Weight Height: 162.6 cm (64\")  Weight: 60.2 kg (132 lb 11.5 oz)   Current BMI Body mass index is 22.78 kg/m².       Weight Hx  Wt Readings from Last 30 Encounters:   08/13/21 0230 60.2 kg (132 " lb 11.5 oz)   08/12/21 1712 58.7 kg (129 lb 6.6 oz)   07/22/21 0517 54.3 kg (119 lb 11.4 oz)   07/21/21 0500 53.1 kg (117 lb 1 oz)   07/20/21 0525 54.3 kg (119 lb 11.4 oz)   07/18/21 0414 54.2 kg (119 lb 7.8 oz)   07/17/21 0518 53.3 kg (117 lb 8.1 oz)   07/16/21 0551 51 kg (112 lb 7 oz)   07/15/21 0515 54.5 kg (120 lb 2.4 oz)   07/14/21 0452 57.1 kg (125 lb 14.1 oz)   07/12/21 0500 51.8 kg (114 lb 3.2 oz)   07/11/21 0500 52 kg (114 lb 10.2 oz)   07/10/21 0541 57.4 kg (126 lb 8.7 oz)   07/09/21 0500 60 kg (132 lb 4.4 oz)   07/08/21 0500 60.7 kg (133 lb 13.1 oz)   07/07/21 0449 63 kg (138 lb 14.2 oz)   07/06/21 0500 73.8 kg (162 lb 11.2 oz)   07/05/21 0500 73.5 kg (162 lb 0.6 oz)   07/04/21 0500 73.4 kg (161 lb 13.1 oz)   07/03/21 0508 70.7 kg (155 lb 13.8 oz)   07/01/21 0500 74.2 kg (163 lb 9.3 oz)   06/29/21 0600 75.4 kg (166 lb 3.6 oz)   06/15/21 0431 84.3 kg (185 lb 13.6 oz)   06/14/21 0958 88.6 kg (195 lb 5.2 oz)   06/12/21 0518 80.1 kg (176 lb 9.4 oz)   06/10/21 0503 80.1 kg (176 lb 9.4 oz)   06/04/21 1231 78.8 kg (173 lb 11.6 oz)   03/24/21 0000 83.5 kg (184 lb)   07/10/18 0000 82.6 kg (182 lb)            Wt Change Observation 14.8% weight loss/1 month     Estimated/Assessed Needs       Energy Requirements    EST Needs (kcal/day) 2182-3348 kcal        Protein Requirements    EST Daily Needs (g/day) 70-80 g protein       Fluid Requirements     Estimated Needs (mL/day) 4284-1785 ml      Labs/Medications         Pertinent Labs Reviewed.   Results from last 7 days   Lab Units 08/19/21  0512 08/18/21  0735 08/17/21  1650 08/15/21  0633 08/14/21  0547 08/12/21  1728 08/12/21  1728   SODIUM mmol/L 138 136 135*   < > 136   < > 140   POTASSIUM mmol/L 4.2 4.1 4.7   < > 3.9   < > 4.0   CHLORIDE mmol/L 110* 109* 106   < > 109*   < > 101   CO2 mmol/L 19.3* 19.3* 21.4*   < > 17.3*   < > 25.3   BUN mg/dL 16 14 16   < > 10   < > 22   CREATININE mg/dL 0.47* 0.39* 0.72   < > 0.26*   < > 0.42*   CALCIUM mg/dL 8.7 8.4* 8.5*   < >  8.1*   < > 9.5   BILIRUBIN mg/dL  --   --  0.2  --  0.5  --  0.4   ALK PHOS U/L  --   --  152*  --  112  --  122*   ALT (SGPT) U/L  --   --  17  --  16  --  14   AST (SGOT) U/L  --   --  21  --  28  --  19   GLUCOSE mg/dL 135* 171* 210*   < > 148*   < > 73    < > = values in this interval not displayed.     Results from last 7 days   Lab Units 08/19/21  0512 08/14/21  0547 08/12/21  1728   MAGNESIUM mg/dL  --   --  1.3*   HEMOGLOBIN g/dL 8.1*   < > 10.6*   HEMATOCRIT % 26.4*   < > 33.0*    < > = values in this interval not displayed.     COVID19   Date Value Ref Range Status   08/12/2021 Not Detected Not Detected - Ref. Range Final     Lab Results   Component Value Date    HGBA1C 7.99 (H) 08/15/2021         Pertinent Medications Reviewed.     Current Nutrition Orders & Evaluation of Intake       Oral Nutrition     Current PO Diet Diet Regular; Cardiac, Consistent Carbohydrate   Supplement Boost Glucose Control BID, Jeremy BID      Nutrition Diagnosis         Nutrition Dx Problem 1 Increased protein needs related to increased demand for protein  as evidenced by stage II pressure injury, multiple skin tears, blisters on toes.       Nutrition Intervention         Jeremy BID  Boost GC BID     Monitor/Evaluation        Monitor monitor/eval: Per protocol, I&O, PO intake, Supplement intake, Pertinent labs, Weight, Skin status, Symptoms, POC/GOC       Electronically signed by:  Roro Cazares RD  08/19/21 09:07 EDT

## 2021-08-19 NOTE — DISCHARGE SUMMARY
[facility]         DISCHARGE SUMMARY    Patient Name: Italia Olvera  : 1960  MRN: [id number]    Date of Admission: 2021  Date of Discharge: 2021  Primary Care Physician: Carloz Shoemaker MD    Consults     Date and Time Order Name Status Description    2021  7:22 PM Hospitalist (on-call MD unless specified) Completed           Presenting Problem:   UTI (urinary tract infection), bacterial [N39.0, A49.9]  Pneumonia of both lungs due to infectious organism, unspecified part of lung [J18.9]  Sepsis, due to unspecified organism, unspecified whether acute organ dysfunction present (CMS/HCC) [A41.9]    Active and Resolved Hospital Problems:  Active Hospital Problems    Diagnosis POA   • Anemia [D64.9] Yes   • Multifocal pneumonia [J18.9] Yes      Resolved Hospital Problems    Diagnosis POA   • Hypoglycemia [E16.2] Yes   • Acute UTI (urinary tract infection) [N39.0] Yes   • Sepsis (CMS/HCC) [A41.9] Yes         Hospital Course     Hospital Course:  Italia Olvera is a 60 y.o. female admitted to hospital on  with confusion and weakness, patient has recurrent admissions for last several months, work-up revealed sepsis and pneumonia as well as possible UTI.  Patient was admitted to rule out Covid and Covid was ruled out.  Please see H&P for details.  Patient was started on antibiotics and fluids.  She was lethargic and weak.  Very poor historian, in fact patient does not want to talk and give much history.  She did not want to do much activity either.  Clinically she improved.Her labs improved.  Her one set of blood cultures grew Pseudomonas.  Possibly contamination.  But considering her symptomatology we treated her as sepsis.  Patient continued to improve.  Last 48 hours she is feeling much better eating better awake alert and oriented, I recommended her to go to nursing home and rehab but refused wants to go home.  Discharge planner as well  contacted  patient and explained but patient wants to go home.  As she is doing better and clinically stable she has been treated for 7 days she will be discharged home today.    (Note patient was discharged on Omnicef, but post discharge pharmacy called and recommended Levaquin.  I was unable to access chart and opened the chart as chart was locked by pharmacy and could not send prescription.  Prescription for Levaquin called to pharmacy)      DISCHARGE Follow Up Recommendations for labs and diagnostics:   Patient stable ready for discharge.  Advised to take medications as prescribed on discharge.  Recommended follow-up with consultants as advised.  Patient advised to return to ER if symptoms get worse.  Patient advised to follow-up with me/PCP post discharge in 1 week.      Day of Discharge     Vital Signs:  Temp:  [97.8 °F (36.6 °C)-98.7 °F (37.1 °C)] 97.8 °F (36.6 °C)  Heart Rate:  [53-70] 70  Resp:  [14-18] 16  BP: (138-168)/(40-91) 157/91  Physical Exam:  Elderly female looks much older than her stated age not in acute distress.  Heart is regular.  Lungs clear.  Abdomen soft.  Neuro awake alert and oriented x3.  Extremities no edema      Pertinent  and/or Most Recent Results     LAB RESULTS:      Lab 08/19/21  0512 08/18/21  1445 08/18/21  0735 08/18/21  0734 08/17/21  1650 08/16/21  1522 08/15/21  0633 08/14/21  0547 08/13/21  1400 08/12/21  2023 08/12/21  1729   WBC 6.44  --  7.29  --  8.20 5.99 5.80   < >  --   --   --    HEMOGLOBIN 8.1*  --  7.8*  --  8.4* 8.6* 8.2*   < >  --   --   --    HEMATOCRIT 26.4*  --  24.5*  --  26.5* 27.4* 26.5*   < >  --   --   --    PLATELETS 170  --  165  --  204 145 121*   < >  --   --   --    NEUTROS ABS 4.62  --  5.54  --  6.42 4.41 4.23   < >  --   --   --    IMMATURE GRANS (ABS) 0.04  --  0.03  --  0.05 0.03 0.04   < >  --   --   --    LYMPHS ABS 1.17  --  1.11  --  1.11 1.10 1.08   < >  --   --   --    MONOS ABS 0.55  --  0.57  --  0.56 0.41 0.42   < >  --   --   --    EOS ABS 0.03   --  0.02  --  0.03 0.02 0.01   < >  --   --   --    MCV 89.2  --  86.9  --  86.6 87.8 87.5   < >  --   --   --    PROCALCITONIN  --   --   --   --   --   --   --   --  0.15  --   --    LACTATE  --   --   --   --   --   --   --   --  2.0 2.1* 2.9*   LDH  --  205  --   --   --   --   --   --   --   --   --    PROTIME 19.2*  --   --  26.3* 32.1* 23.6* 12.1*   < >  --   --   --     < > = values in this interval not displayed.         Lab 08/19/21  0512 08/18/21  0735 08/17/21  1650 08/16/21  1522 08/15/21  0633 08/14/21  0547 08/12/21  1728   SODIUM 138 136 135* 139 138   < > 140   POTASSIUM 4.2 4.1 4.7 4.3 3.8   < > 4.0   CHLORIDE 110* 109* 106 110* 108*   < > 101   CO2 19.3* 19.3* 21.4* 19.7* 20.5*   < > 25.3   ANION GAP 8.7 7.7 7.6 9.3 9.5   < > 13.7   BUN 16 14 16 12 13   < > 22   CREATININE 0.47* 0.39* 0.72 0.30* 0.27*   < > 0.42*   GLUCOSE 135* 171* 210* 149* 148*   < > 73   CALCIUM 8.7 8.4* 8.5* 8.4* 8.3*   < > 9.5   MAGNESIUM  --   --   --   --   --   --  1.3*   HEMOGLOBIN A1C  --   --   --   --  7.99*  --   --     < > = values in this interval not displayed.         Lab 08/17/21  1650 08/14/21  0547 08/12/21  1728   TOTAL PROTEIN 6.4 5.6* 6.8   ALBUMIN 2.60* 1.90* 2.90*   GLOBULIN 3.8 3.7 3.9   ALT (SGPT) 17 16 14   AST (SGOT) 21 28 19   BILIRUBIN 0.2 0.5 0.4   ALK PHOS 152* 112 122*         Lab 08/19/21  0512 08/18/21  0734 08/17/21  1650 08/16/21  1522 08/15/21  0633 08/14/21  0548 08/13/21  1400 08/12/21  1728   PROBNP  --   --   --   --   --   --  4,517.0*  --    TROPONIN T  --   --   --   --   --   --   --  0.017   PROTIME 19.2* 26.3* 32.1* 23.6* 12.1*   < >  --  11.3   INR 1.87* 2.59 3.17* 2.32 1.12*   < >  --  1.04*    < > = values in this interval not displayed.             Lab 08/18/21  1445   IRON 31*   IRON SATURATION 15*   TIBC 201*   TRANSFERRIN 135*   FERRITIN 530.30*   FOLATE 7.14   VITAMIN B 12 883         Brief Urine Lab Results  (Last result in the past 365 days)      Color   Clarity   Blood    Leuk Est   Nitrite   Protein   CREAT   Urine HCG        08/12/21 1743 Yellow Turbid Small (1+) Large (3+) Positive 100 mg/dL (2+)             Microbiology Results (last 10 days)     Procedure Component Value - Date/Time    Urine Culture - Urine, Urine, Catheter In/Out [[id number]]  (Normal) Collected: 08/14/21 1518    Lab Status: Final result Specimen: Urine, Catheter In/Out Updated: 08/16/21 1203     Urine Culture No growth    Blood Culture - Blood, Hand, Right [[id number]] Collected: 08/14/21 1347    Lab Status: Preliminary result Specimen: Blood from Hand, Right Updated: 08/18/21 1430     Blood Culture No growth at 4 days    Blood Culture - Blood, Arm, Left [[id number]] Collected: 08/14/21 1340    Lab Status: Preliminary result Specimen: Blood from Arm, Left Updated: 08/18/21 1430     Blood Culture No growth at 4 days    COVID-19,CEPHEID/RAUL/BDMAX,COR/FLY/PAD/ABNER IN-HOUSE(OR EMERGENT/ADD-ON),NP SWAB IN TRANSPORT MEDIA 3-4 HR TAT, RT-PCR - Swab, Nasopharynx [[id number]]  (Normal) Collected: 08/12/21 1813    Lab Status: Final result Specimen: Swab from Nasopharynx Updated: 08/13/21 0936     COVID19 Not Detected    Narrative:      Fact sheet for providers: https://www.fda.gov/media/574671/download     Fact sheet for patients: https://www.fda.gov/media/999305/download  Fact sheet for providers: https://www.fda.gov/media/371163/download     Fact sheet for patients: https://www.fda.gov/media/684236/download    Blood Culture - Blood, Arm, Left [[id number]] Collected: 08/12/21 1729    Lab Status: Final result Specimen: Blood from Arm, Left Updated: 08/17/21 1745     Blood Culture No growth at 5 days    Blood Culture - Blood, Arm, Right [[id number]]  (Abnormal)  (Susceptibility) Collected: 08/12/21 1729    Lab Status: Final result Specimen: Blood from Arm, Right Updated: 08/15/21 0645     Blood Culture Pseudomonas aeruginosa     Isolated from Aerobic Bottle     Gram Stain Aerobic Bottle Gram negative bacilli    Susceptibility       Pseudomonas aeruginosa      BAY      Cefepime Susceptible      Ceftazidime Susceptible      Ciprofloxacin Susceptible      Levofloxacin Susceptible      Piperacillin + Tazobactam Susceptible               Linear View                   Blood Culture ID, PCR - Blood, Arm, Right [[id number]]  (Abnormal) Collected: 08/12/21 1729    Lab Status: Edited Result - FINAL Specimen: Blood from Arm, Right Updated: 08/13/21 1304     BCID, PCR Pseudomonas aeruginosa. Identification by BCID PCR.     Comment: Corrected result. Previous result was Staphylococcus species, not aureus. mecA (methicillin resistance gene) detected. Identification by BCID PCR. on 8/13/2021 at 1153 EDT.                          Results for orders placed during the hospital encounter of 06/14/21    Adult Transthoracic Echo Complete W/ Cont if Necessary Per Protocol    Interpretation Summary  · Left ventricular ejection fraction appears to be 51 - 55%.  · The left ventricular cavity is mildly dilated.  · Left ventricular wall thickness is consistent with moderate concentric hypertrophy.  · The right atrial cavity is mildly dilated.  · There is moderate calcification of the aortic valve.  · Estimated right ventricular systolic pressure from tricuspid regurgitation is normal (<35 mmHg).      Labs Pending at Discharge:  Pending Labs     Order Current Status    Blood Culture - Blood, Arm, Left Preliminary result    Blood Culture - Blood, Hand, Right Preliminary result            Discharge Details        Discharge Medications      New Medications      Instructions Start Date   cefdinir 300 MG capsule  Commonly known as: OMNICEF   300 mg, Oral, 2 Times Daily      ipratropium-albuterol 0.5-2.5 mg/3 ml nebulizer  Commonly known as: DUO-NEB   3 mL, Nebulization, 4 Times Daily - RT      nicotine 21 MG/24HR patch  Commonly known as: NICODERM CQ   1 patch, Transdermal, Every 24 Hours Scheduled         Continue These Medications      Instructions Start Date    arformoterol 15 MCG/2ML nebulizer solution  Commonly known as: BROVANA   15 mcg, Nebulization, 2 Times Daily - RT      famotidine 20 MG tablet  Commonly known as: PEPCID   20 mg, Oral, 2 Times Daily Before Meals      insulin detemir 100 UNIT/ML injection  Commonly known as: LEVEMIR   25 Units, Subcutaneous, Nightly      lactulose 10 GM/15ML solution  Commonly known as: CHRONULAC   30 g, Oral, 3 Times Daily      losartan 100 MG tablet  Commonly known as: COZAAR   100 mg, Oral, Daily      metFORMIN 500 MG tablet  Commonly known as: GLUCOPHAGE   1,000 mg, Oral, 2 times daily      oxyCODONE-acetaminophen 5-325 MG per tablet  Commonly known as: PERCOCET   1 tablet, Oral, Every 6 Hours PRN      pantoprazole 40 MG EC tablet  Commonly known as: PROTONIX   40 mg, Oral, Daily      polyethylene glycol 17 g packet  Commonly known as: MIRALAX   17 g, Oral, 2 Times Daily      sertraline 100 MG tablet  Commonly known as: ZOLOFT   100 mg, Oral, Daily PRN      warfarin 1 MG tablet  Commonly known as: Coumadin   1 tablet daily         Stop These Medications    bisoprolol 10 MG tablet  Commonly known as: ZEBeta     Eliquis 5 MG tablet tablet  Generic drug: apixaban     furosemide 80 MG tablet  Commonly known as: Lasix     potassium chloride 20 MEQ packet  Commonly known as: KLOR-CON     spironolactone 50 MG tablet  Commonly known as: ALDACTONE            No Known Allergies      [place] [facility] [place]    Diet:\  Heart healthy        Discharge Activity:   As tolerates.  PT and OT at home        No future appointments.        Time spent on Discharge including face to face service: 44 minutes.            I have dictated this note utilizing Dragon Dictation.             Please note that portions of this note were completed with a voice recognition program.             Part of this note may be an electronic transcription/translation of spoken language to printed text         using the Dragon Dictation System.        Electronically signed by Rudy Garnett MD, 08/19/21, 9:37 AM EDT.

## 2021-08-19 NOTE — NURSING NOTE
Provided discharge instruction on new medications. Date of follow up visit. And all other medications. Pt confirmed and provided teach back.

## 2021-08-20 ENCOUNTER — HOME CARE VISIT (OUTPATIENT)
Dept: HOME HEALTH SERVICES | Facility: HOME HEALTHCARE | Age: 61
End: 2021-08-20

## 2021-08-20 ENCOUNTER — HOME CARE VISIT (OUTPATIENT)
Dept: HOME HEALTH SERVICES | Facility: CLINIC | Age: 61
End: 2021-08-20

## 2021-08-20 ENCOUNTER — READMISSION MANAGEMENT (OUTPATIENT)
Dept: CALL CENTER | Facility: HOSPITAL | Age: 61
End: 2021-08-20

## 2021-08-20 ENCOUNTER — HOME HEALTH ADMISSION (OUTPATIENT)
Dept: HOME HEALTH SERVICES | Facility: HOME HEALTHCARE | Age: 61
End: 2021-08-20

## 2021-08-20 ENCOUNTER — TRANSCRIBE ORDERS (OUTPATIENT)
Dept: HOME HEALTH SERVICES | Facility: HOME HEALTHCARE | Age: 61
End: 2021-08-20

## 2021-08-20 VITALS
DIASTOLIC BLOOD PRESSURE: 62 MMHG | TEMPERATURE: 97.4 F | HEIGHT: 66 IN | SYSTOLIC BLOOD PRESSURE: 140 MMHG | RESPIRATION RATE: 18 BRPM | OXYGEN SATURATION: 99 % | BODY MASS INDEX: 21.42 KG/M2 | HEART RATE: 63 BPM

## 2021-08-20 DIAGNOSIS — N39.0 URINARY TRACT INFECTION WITHOUT HEMATURIA, SITE UNSPECIFIED: Primary | ICD-10-CM

## 2021-08-20 LAB — GLUCOSE BLDC GLUCOMTR-MCNC: 76 MG/DL (ref 70–99)

## 2021-08-20 PROCEDURE — G0299 HHS/HOSPICE OF RN EA 15 MIN: HCPCS

## 2021-08-20 NOTE — OUTREACH NOTE
Prep Survey      Responses   [facility] patient discharged from?  [facility]   Is LACE score < 7 ?  No   Emergency Room discharge w/ pulse ox?  No   Eligibility  Readm Mgmt   Discharge diagnosis  Sepsis/ uti   Does the patient have one of the following disease processes/diagnoses(primary or secondary)?  Sepsis   Does the patient have Home health ordered?  Yes   What is the Home health agency?   [facility].   Is there a DME ordered?  No   Prep survey completed?  Yes          Ly Valle RN

## 2021-08-21 LAB
MYCOBACTERIUM SPEC CULT: NORMAL
NIGHT BLUE STAIN TISS: NORMAL
NIGHT BLUE STAIN TISS: NORMAL

## 2021-08-21 NOTE — CASE COMMUNICATION
SOC on 8/20/21 pt. recently admitted with Sepsis, UTI, and pneumonia. She is max assist of two for transfers and ADL's. Pt. family is requesting PT and Aide services. She has several skin tears to her arms and open wound to her coccxy. SN will see pt. 1wk1, 2wk3, 1wk2 for C/P assess, med teaching, and wound care.

## 2021-08-21 NOTE — HOME HEALTH
History: 61 y/o admitted with multifocal pnuemonia and sepsis. She was given ABT's and fluids. Pt. had some confusion and has hx of noncompliance with bioploar disorder. Covid 19 was ruled out. Pt. is homebound d/t very limited mobily and weakness. She is max assist of two people for transfers. SN services are ordered for Wound care, C/P assess, and med teaching.     Mrs. Olvera is sitting in the recliner when SN arrives for the visit. Her daughter and  are present as well. She is alert to person and place. She is very forgetful and gets irriated easily with her . Lungs are diminshed throughout. She wears oxygen at night. She has complaints of pain all over and takes medication in the home for discomfort. VS are wnl. She has 3+ pitting edema to BLE's. Dressings to both arms are loose and have dried bloody drainage. Pt. advised she itches all the time and has been scratching her arms. Dressings removed. Several scattered skin tears to both upper and lower arms that are bleeding. Areas cleanse and pat dry. Vaseline gauze applied and wrapped with kerlix. Pt. tolerated well. Pt. is unable to stand for SN to assess her coccyx. Pt. daughter, son, and  have been lifting pt. to transferring to the bed and toilet. Pt. assisted on her side in the recliner per request to assess her skin. She has pressure sore stage 2 to her coccyx. Dressing was loose. Scant amount of serous drainage. Wound measured. Partial thickness loss of dermis without slough. Wound bed is pink with slight yellow tissue. Periwound is red as well as her groin. Aquacel AG applied to wound bed and covered with optifoam. Pt.  is using ointment on reddened areas. Pt. /daughter are comfortable with dressing changes in the home when SN is not present. SN educated family on importance of repositioning every two hrs and keeping pt. clean and dry to prevent further skin breakdown. Family are requesting PT in the home and have  tried outpt. therapy without success. SN looked at all medication bottles. Pt.  has meds he still needs to  from the pharmacy. SN called and left message with PCP office regarding pt. lasix and potassium D/C'd at discharge and her cont. with large amount of edema today.Awaiting any new orders.

## 2021-08-23 ENCOUNTER — HOME CARE VISIT (OUTPATIENT)
Dept: HOME HEALTH SERVICES | Facility: CLINIC | Age: 61
End: 2021-08-23

## 2021-08-23 VITALS
HEART RATE: 95 BPM | TEMPERATURE: 97.5 F | RESPIRATION RATE: 20 BRPM | OXYGEN SATURATION: 96 % | SYSTOLIC BLOOD PRESSURE: 122 MMHG | DIASTOLIC BLOOD PRESSURE: 2 MMHG

## 2021-08-23 LAB
CYTO UR: NORMAL
LAB AP CASE REPORT: NORMAL
LAB AP CLINICAL INFORMATION: NORMAL
PATH REPORT.FINAL DX SPEC: NORMAL
PATH REPORT.GROSS SPEC: NORMAL
STAT OF ADQ CVX/VAG CYTO-IMP: NORMAL

## 2021-08-23 PROCEDURE — G0151 HHCP-SERV OF PT,EA 15 MIN: HCPCS

## 2021-08-23 PROCEDURE — G0299 HHS/HOSPICE OF RN EA 15 MIN: HCPCS

## 2021-08-23 NOTE — HOME HEALTH
Mrs. Olvera is sitting in the recliner when SN arrives for the visit. Her  and daughter are present. She is more alert today to person and place. Lungs are diminished throughout. VS are wnl. She has complaints of generalized discomfort and manages her pain with medication. SN called MD this am regarding pt. BS being high over the weekend. Pt. daughter advised they have called a new insulin into the pharmacy along with medication for her itching and reddness to her groin poss. yeast. Pt. daughter is going to  the meds today. Pt. BS today was 209. Pt. did not have dressings on her arms today. Mulitple scattered skin tears to her arms. Both arms cleanse with saline and pat dry. Vaseline gauze applied and wrapped each arm with kerlix. Pt./daughter is aware pt. needs to keep these dressings on for areas to heal. Pt. periarea and bottom is very gualded today. Pt.  has been using protective ointment and is awaiting the nystatin from the pharmacy. SN discussed with family importance of keeping pt. dry and repostioning. Pt. assisted to her side for SN to assess wound to coccyx. Dressing removed. Scant amount of serous drainage, no odor. Wound has no slough but white tissue to center. Wound measured. Aquacel ag applied to woundbed and covered with optifoam dressing. Pt. tolerated well. Edema to her extremities looks better today than last visit. SN reviewed importance of keeping her feet elevated. SN contacted pt. MD Friday and today regarding her lasix being d/c'd from discharge at hospital. Awaiting return call. Pt. daughter cont. to give her lasix at this time because of her swelling. Med list to be updated once new meds are in the home. Pt.  adivsed that she did take a few steps today. Next visit scheduled with pt/CG for this week. They are aware how to contact SN if needed prior to next visit.

## 2021-08-24 ENCOUNTER — READMISSION MANAGEMENT (OUTPATIENT)
Dept: CALL CENTER | Facility: HOSPITAL | Age: 61
End: 2021-08-24

## 2021-08-24 NOTE — OUTREACH NOTE
Sepsis Week 1 Survey      Responses   [facility] [place] patient discharged from?  [facility]   Does the patient have one of the following disease processes/diagnoses(primary or secondary)?  Sepsis   Week 1 attempt successful?  No   Unsuccessful attempts  Attempt 1          Jeanie Real LPN

## 2021-08-25 ENCOUNTER — HOME CARE VISIT (OUTPATIENT)
Dept: HOME HEALTH SERVICES | Facility: CLINIC | Age: 61
End: 2021-08-25

## 2021-08-25 VITALS
RESPIRATION RATE: 18 BRPM | DIASTOLIC BLOOD PRESSURE: 62 MMHG | SYSTOLIC BLOOD PRESSURE: 120 MMHG | HEART RATE: 67 BPM | TEMPERATURE: 97.2 F | OXYGEN SATURATION: 99 %

## 2021-08-25 VITALS — DIASTOLIC BLOOD PRESSURE: 63 MMHG | OXYGEN SATURATION: 98 % | HEART RATE: 43 BPM | SYSTOLIC BLOOD PRESSURE: 94 MMHG

## 2021-08-25 PROCEDURE — G0299 HHS/HOSPICE OF RN EA 15 MIN: HCPCS

## 2021-08-26 ENCOUNTER — HOME CARE VISIT (OUTPATIENT)
Dept: HOME HEALTH SERVICES | Facility: CLINIC | Age: 61
End: 2021-08-26

## 2021-08-26 VITALS
HEART RATE: 76 BPM | SYSTOLIC BLOOD PRESSURE: 150 MMHG | TEMPERATURE: 98.2 F | DIASTOLIC BLOOD PRESSURE: 76 MMHG | RESPIRATION RATE: 18 BRPM

## 2021-08-26 PROCEDURE — G0156 HHCP-SVS OF AIDE,EA 15 MIN: HCPCS

## 2021-08-26 NOTE — HOME HEALTH
Mrs. Olvera is lying in the recliner when SN arrives for the visit. Her daughter and  are present. She is alert, oriented to person and place. Lungs are diminshed in lower lobes and clear in upper. Her edema looks alot better to her lower extremities today. She has complaints of back pain and manages her pain with medication. She has multiple scattered skin tears to her arms that are healing. Pt. has removed the kerlix from her arms. Vaseline gauze applied to areas. SN discussed with pt. importance of being compliant and keeping dressings on. Pt. assisted onto her side for SN to assess her wound to coccyx. Dressing removed. No drainage noted. Wound bed is pink and healing. No odor. Aquacel ag applied and covered with dressing. Pt. tolerated well. Reddness and gualding to her bottom and periarea is slightly better today. She has started nystatin ointment. Pt. BS was 209 today and has started on a new insulin as well. She is taking Vistaril for her itching and is helping. Med list updated and meds reviewed. SN reviewed skin care and safety. Next visit scheduled for next week. They are aware how to contact SN if needed prior to next visit.

## 2021-08-26 NOTE — HOME HEALTH
Patient lying in chair upon arrival. Caregiver (daughter) present for visit. Instructed daughter on ways to perform bed bath. Caregiver changed patch to open area on coccyx. Patient had several incontinent episodes during the night.  Will return 1 time next week for personal care.

## 2021-08-27 ENCOUNTER — HOME CARE VISIT (OUTPATIENT)
Dept: HOME HEALTH SERVICES | Facility: HOME HEALTHCARE | Age: 61
End: 2021-08-27

## 2021-08-30 ENCOUNTER — HOME CARE VISIT (OUTPATIENT)
Dept: HOME HEALTH SERVICES | Facility: CLINIC | Age: 61
End: 2021-08-30

## 2021-08-30 VITALS
DIASTOLIC BLOOD PRESSURE: 62 MMHG | TEMPERATURE: 96.9 F | HEART RATE: 80 BPM | OXYGEN SATURATION: 95 % | SYSTOLIC BLOOD PRESSURE: 140 MMHG | RESPIRATION RATE: 18 BRPM

## 2021-08-30 PROCEDURE — G0299 HHS/HOSPICE OF RN EA 15 MIN: HCPCS

## 2021-08-30 NOTE — HOME HEALTH
Mrs. Olvera is asleep in the recliner when SN arrives for the visit. Her daughter and  are present. She is alert to person, place, and situation. Lungs are clear and VS are wnl. Her edema looks alot better today. She has some edema to her lt. foot and toes. She has complaints of back pain and manages her pain with medication. Skin tears to her arms are healing. She cont. with area to RLE and RUE. Area cleanse and vaseline gauze applied. Pt. refuses to leave kerlix on d/t them making her itch more. She has new areas to toes on her rt. foot that looks like blisters that have started to open. Her toes and top of foot is red. Pt. daughter has been cleaning areas and keeping them covered. Pt. assisted on to her side for SN to assess her coccyx. Dressing is clean and intact. Dressing removed. No drainage noted. Wound cleanse and pat dry. Wound measured. Wound bed is pink. Periwound has reddness as well as her groin. It is looking better from last visit. Pt. daughter has been applying ointment/nystatin to reddened areas. Aquacel AG applied to wound bed and covered with dressing. Pt. tolerated well. Pt. BS today was 177. SN disccused importance of pt. eating when taking her insulin. SN performed in and out cath for UA today and venipunture to her rt. arm to obtain labs. She tolerated well.  Pt. verbalizes understanding. SN called Dr. Shoemaker office to report reddness and new areas to toes on rt. foot. No answer at the office. Meds reviewed. Next visit scheduled with pt. for this week. They are aware how to contact SN if needed prior to next visit.

## 2021-08-30 NOTE — HOME HEALTH
P.T. CALLED TO ARRANGE VISIT THIS DATE AND SPOKE WITH DAUGHTER.  DAUGHTER ASKED MOTHER WHETHER SHE WANTED P;.T. TO COME OUT AND SHE SAID NO.  P.T. ASKED DAUGHTER WHETHER PATIENT WISHED TO CONTINUE P.T. AND SHE ASKED HER MOTHER AGAIN AND SHE SAID NO SHE DID NOT WISH TO CONTINUE P.T.  P.T. REMINDED DAUGHTER AND PATIENT THAT THE AIDE WOULD ALSO NOT BE ABLE TO COME BACK IS PHYSICAL THERAPY DOES NOT AND SHE SAID THEY UNDERSTAND THAT AND WERE OK WITH IT.  P.T. THEN TOLD THE DAUGHTER THAT PATIENT WOULD BE DISCHARGED FROM P.T.

## 2021-09-01 ENCOUNTER — HOME CARE VISIT (OUTPATIENT)
Dept: HOME HEALTH SERVICES | Facility: CLINIC | Age: 61
End: 2021-09-01

## 2021-09-01 VITALS
RESPIRATION RATE: 18 BRPM | HEART RATE: 114 BPM | DIASTOLIC BLOOD PRESSURE: 60 MMHG | OXYGEN SATURATION: 99 % | SYSTOLIC BLOOD PRESSURE: 100 MMHG | TEMPERATURE: 97.3 F

## 2021-09-01 PROCEDURE — G0299 HHS/HOSPICE OF RN EA 15 MIN: HCPCS

## 2021-09-01 NOTE — HOME HEALTH
Mrs. Olvera is asleep in the recliner when SN arrives for [facility] visit. Her daughter answers the door for SN. Pt. awakens easily and is alert to person and place. Lungs are clear and VS are wnl. She has complaints of back and right foot pain and is taking pain medication as needed. She has 2+ edema to her rt. foot and cont. with open blister like areas to her toes. Reddness continues to her toes and up her foot. No warmth noted. Skin tears to both arms are scabbed over today. No wound care performed. Pt. assisted onto her side to assess wound to coccyx. Dressing removed. Stage 2 area with no drainage noted. Wound bed is pink and healing. Wound measured and cleanse/pat dry. Periwound cont. with some reddness and groin as well. No yeast noted. Opticel AG applied to wound bed and covered with optifoam dressing. Pt. tolerated well. Pt. BS are doing well at this time. SN reviewed importance of pt. eating when taking her insulin. Pt. daughter contact MD office while SN in home regarding pt. rt. foot. Awaiting new orders. Dr. Shoemaker office request pt. to go to inpatient rehab d/t pt. very weak, pt. advises she will think about it.  No med changes today. Skin integrity and repostitioning reviewed with daughter and pt. Next visit scheudled for next week. They are aware how to contact SN if needed prior to next visit.

## 2021-09-08 ENCOUNTER — HOME CARE VISIT (OUTPATIENT)
Dept: HOME HEALTH SERVICES | Facility: CLINIC | Age: 61
End: 2021-09-08

## 2021-09-08 VITALS
SYSTOLIC BLOOD PRESSURE: 130 MMHG | HEART RATE: 80 BPM | OXYGEN SATURATION: 96 % | TEMPERATURE: 97.2 F | DIASTOLIC BLOOD PRESSURE: 52 MMHG | RESPIRATION RATE: 16 BRPM

## 2021-09-08 PROCEDURE — G0299 HHS/HOSPICE OF RN EA 15 MIN: HCPCS

## 2021-09-08 NOTE — HOME HEALTH
Mrs. Olvera is lying in her recliner when SN arrives for the visit. Her daughter and  are present. She is alert, oriented x3. She is forgetful at times and gets easily aggitated. Lungs are clear and VS are wnl. She has complaints of generalized discomfort and manages her pain with medication. Skin tears to arms are scabbed over. She has 3+ edema to her rt. foot. Her toes are very swollen and red up to about her ankle. Slight warmth noted. She has blister like areas to her toes as well. No edema to lt. foot. She cont with open area to her coccyx that is slowly healing. Dressing removed. Wound cleanse and pat dry. No drainage or odor. Wound measured. Opticell ag applied to wound bed and covered with optifoam. Pt. tolerated well. She complaint of itching to her vaginal area and poss. yeast. White discharge noted from area. SN called and spoke with Dr. Shoemaker about her foot and he is calling medication in to her pharmacy. Also MD office notified of pt. having poss. yeast infection and needing her muscle relaxers refilled. Pt. advises she is NOT going to a inpatient rehab and wants to try therapy in the home again. Pt/Family is aware she has to participate and not refuse for therapy to cont. Pt./Family verbalize understanding. Med list updated. Next visit scheduled for this Friday. Home safety reviewed and skin care. Pt. BS this morning was 163 and she is eating about two meals a day with snacks per her daughter. Daughter is aware how to contact SN if needed prior to next visit.

## 2021-09-10 ENCOUNTER — TRANSCRIBE ORDERS (OUTPATIENT)
Dept: ADMINISTRATIVE | Facility: HOSPITAL | Age: 61
End: 2021-09-10

## 2021-09-10 ENCOUNTER — HOSPITAL ENCOUNTER (OUTPATIENT)
Dept: GENERAL RADIOLOGY | Facility: HOSPITAL | Age: 61
Discharge: HOME | End: 2021-09-10
Admitting: INTERNAL MEDICINE

## 2021-09-10 ENCOUNTER — HOME CARE VISIT (OUTPATIENT)
Dept: HOME HEALTH SERVICES | Facility: CLINIC | Age: 61
End: 2021-09-10

## 2021-09-10 VITALS
TEMPERATURE: 98.2 F | SYSTOLIC BLOOD PRESSURE: 120 MMHG | OXYGEN SATURATION: 94 % | HEART RATE: 80 BPM | DIASTOLIC BLOOD PRESSURE: 54 MMHG

## 2021-09-10 DIAGNOSIS — J18.9 UNRESOLVED PNEUMONIA: Primary | ICD-10-CM

## 2021-09-10 DIAGNOSIS — J18.9 UNRESOLVED PNEUMONIA: ICD-10-CM

## 2021-09-10 PROCEDURE — 71046 X-RAY EXAM CHEST 2 VIEWS: CPT

## 2021-09-10 PROCEDURE — G0299 HHS/HOSPICE OF RN EA 15 MIN: HCPCS

## 2021-09-10 NOTE — HOME HEALTH
Mrs. Olvera is lying in the recliner when SN arrives for the visit. Her daughter and  are present. She is alert, oriented X3. She is forgetful at times and get aggitated easily by her family. Rt. lobe has a rub and congestion today. Other lobes are clear. Pt. advises she has been having chills. No fever today. VS are wnl. She wears oxygen at nighttime. She has complaints of generalized discomfort and takes muscle relaxers for her pain. Her edema to her rt. foot is better today. She does have swelling and reddness to her toes and reddness to top of her foot. No warmth noted. Blisters to toes are scabbing over. Pt. starting new ABT last week for her foot. Dressing removed from pt. coccyx. No drainage noted. Wound bed is pink. Periwound has slight reddness as well as her groin. Wound measured then cleanse and pat dry. Opticell AG applied to wound med and covered with optifoam dressing. She tolerated well. SN reviewed importance of repostitioning to relieve pressure. Pt. has order for chest xray from Dr. Shoemaker. Pt. daughter is taking her to get xray today. SN/daughter assisted pt. to the car in her wheelchair. Next visits scheduled with pt. for next week. They are aware how to contact SN if needed prior to next visit.

## 2021-09-17 ENCOUNTER — HOME CARE VISIT (OUTPATIENT)
Dept: HOME HEALTH SERVICES | Facility: CLINIC | Age: 61
End: 2021-09-17

## 2021-09-17 VITALS
TEMPERATURE: 97.6 F | SYSTOLIC BLOOD PRESSURE: 140 MMHG | HEART RATE: 61 BPM | OXYGEN SATURATION: 93 % | RESPIRATION RATE: 18 BRPM | DIASTOLIC BLOOD PRESSURE: 60 MMHG

## 2021-09-17 PROCEDURE — G0299 HHS/HOSPICE OF RN EA 15 MIN: HCPCS

## 2021-09-18 NOTE — HOME HEALTH
Mrs. Olvera is lying in the recliner when SN arrives for the visit. Her  and daughter are present. Pt. is alert x3 today. Lungs have slight rhonci to RUL that clears with cough. All other lobes are clear. VS are wnl. Edema to her rt. foot is better today. She cont. with slight reddness from toes up her rt. foot that looks some better today. No warmth. Blisters to her toes are scabbing over. Pt. cont with her ABT and will finish it tomw. Skin tears to both arms are scabbed over as well. No dressings needed. Pt. assisted to her side for SN to assess her coccyx wound. Dressing removed. No drainage noted. Pt. daughter advised it has been having some bloody/yellow drainage. Wound cleanse and pat dry. Wound measured and is slightly larger today. Wound bed is yellow with periwound pink. Opticell AG applied to wound bed and covered with dressing. Pt. tolerated well. SN reviewed with pt and family to reposition pt. on her side frequently to relieve pressure off that area. They verbalize understanding. SN performed venipuncture to pt. Rt. foream for PT/INR today. Blood obtained and Pt. tolerated well. Med list updated and meds reviewed. Next visit scheduled with pt. for next week. They are aware how to contact SN if needed prior to next visit. Lab taken to [facility].

## 2021-09-22 ENCOUNTER — HOME CARE VISIT (OUTPATIENT)
Dept: HOME HEALTH SERVICES | Facility: CLINIC | Age: 61
End: 2021-09-22

## 2021-09-22 VITALS
RESPIRATION RATE: 18 BRPM | DIASTOLIC BLOOD PRESSURE: 40 MMHG | TEMPERATURE: 97 F | HEART RATE: 94 BPM | SYSTOLIC BLOOD PRESSURE: 122 MMHG | OXYGEN SATURATION: 95 %

## 2021-09-22 PROCEDURE — G0299 HHS/HOSPICE OF RN EA 15 MIN: HCPCS

## 2021-09-22 NOTE — HOME HEALTH
Mrs. Olvera is lying in the recliner when  arrives for the visit. Her daughter and  are present. She is alert to person and place. She has forgetfullnes. Lungs are clear. B/P was low today. She has had diarrhea x4 days per her daughter. Pt. had episode while SN was their. Stool has a strong odor and is mucousy. Pt. complaints of pain to her bottom. Coccyx is reddened and groin. Wound to coccyx cleanse and pat dry. Wound bed is yellow and is not healing. Opticell AG applied to wound bed and covered with Optifoam. Pt. daughter is applying A&D ointment for reddness. SKin tears to her arms are healed. No edema noted. Her rt. foot continues red from toes up to middle of her foot. No warmth. Blisters to toes have scabbed over.  called and spoke with Crystal at Dr. Shoemaker office regarding pt. condition today. SN is to collect stool specimen for poss. CDiff.  discussed with MD office that wound to her coccyx is not healing and her foot cont. with reddness. MD office to call  back with any new orders from Dr. Shoemaker. SN encouraged pt. to keep intake of fluids d/t her diarrhea and B/P low. Pt./CG verbalizes understanding. SN performed venipuncture to Lt. Foream to obtain PT/INR. Pt. tolerated well. Med List updated pt. currently takin 1.5mg of coumadin daily. Next visit scheduled with pt. for next week. They are aware how to contact  if needed prior. Lab taken to [facility].

## 2021-09-24 ENCOUNTER — HOME CARE VISIT (OUTPATIENT)
Dept: HOME HEALTH SERVICES | Facility: CLINIC | Age: 61
End: 2021-09-24

## 2021-09-24 ENCOUNTER — HOME CARE VISIT (OUTPATIENT)
Dept: HOME HEALTH SERVICES | Facility: HOME HEALTHCARE | Age: 61
End: 2021-09-24

## 2021-09-24 PROCEDURE — G0151 HHCP-SERV OF PT,EA 15 MIN: HCPCS

## 2021-09-25 VITALS — HEART RATE: 63 BPM | SYSTOLIC BLOOD PRESSURE: 163 MMHG | OXYGEN SATURATION: 98 % | DIASTOLIC BLOOD PRESSURE: 81 MMHG

## 2021-09-25 NOTE — HOME HEALTH
PATIENT IS BEING SEEN FOR DECREASED STRENGTH, MOBILITY, SAFETY AND BALANCE.  SHE HAS CELLULITIS ON R FOOT/LE, WOUND ON HER SACRUM AND REDESS ON HER BUTTOCKS.  NSG IS SEEING HER FOR WOUND CARE.  SHE HABITUALLY STAYS IN HER RECLINER, SLEEPS IN HER RECLINER AND HAS GREAT DIFFICULTY TRANSFERRING INTO W/C, ETC.  SHE HAS  MANUAL W/C AND A POWER W/C, A BEDSIDE COMMODE, A HOSP BED AND A LIFT CHAIR. SHE LIVES WITH SPOUSE WHO IS HELPFUL AND HAS A SON AND DAUGHTER WHO LIVE NEAR HER AND ARE HELPFUL.  SHE HAS NOT BEEN ABLE TO UTILIZE HER HOSP BED DUE TO SLIDING OUT OF IT WHEN THE HEAD IS RAISED.  SHE NEEDS THEHEAD RAISED DUE TO BREATHING DIFFICULTIES.  RAILS ARE ONLY HALF RAILS AND CANNOT BE CONVERTED TO FULL RAILS .  GABINO. ADVISED CALLING THE [facility]MENT [facility] AND REQUESTING A CHANGE AS SHE IS NOT USING THE HOSP BED.  ALSO, EDUCATION GIVEN ABOUT APPROPRIATE LINENS FOR BED TO CONTROL HER MOVEMENTS IN THE BED SOMEWHAT.  SHE WOULD BENEFIT FROM SLEEPING IN HER BED AND BEING TURNED REGULARLY SO HER SACRAL WOUND WOULD HEAL.  ALSO, GABINO. EDUCATED PATIENT AND DAUGHTER ON NEED FOR ROHO CUSHION WHICH THEY CAN ORDER ONLINE TO RELIEVE PRESSURE ON HER  BUTTOCKS AND SACRUM.  THIS COULD BE USED INHER W/C AND HER RECLINER.

## 2021-09-28 ENCOUNTER — HOME CARE VISIT (OUTPATIENT)
Dept: HOME HEALTH SERVICES | Facility: HOME HEALTHCARE | Age: 61
End: 2021-09-28

## 2021-09-28 NOTE — CASE COMMUNICATION
PATIENT DECLINED TO BE SEEN TODAY BY P.T. DUE TO HAVING NAUSEA AND VOMITTING  DURING THE NIGHT AND THIS MORNING.  WILL CHECK WITH HER LATER IN THE WEEK FOR NEXT VISIT

## 2021-09-29 ENCOUNTER — HOME CARE VISIT (OUTPATIENT)
Dept: HOME HEALTH SERVICES | Facility: CLINIC | Age: 61
End: 2021-09-29

## 2021-09-29 VITALS
HEART RATE: 89 BPM | RESPIRATION RATE: 18 BRPM | SYSTOLIC BLOOD PRESSURE: 120 MMHG | TEMPERATURE: 97.9 F | OXYGEN SATURATION: 97 % | DIASTOLIC BLOOD PRESSURE: 62 MMHG

## 2021-09-29 PROCEDURE — G0299 HHS/HOSPICE OF RN EA 15 MIN: HCPCS

## 2021-09-29 NOTE — HOME HEALTH
Mrs. Olvera is sitting in her recliner when SN arrives. Her  is present as well. Lungs are clear and VS are wnl. She has complaints of generalized discomfort and takes pain medication when needed. No edema noted today. Her rt. foot cont. with reddness from her toes to top of her foot. She has some reddness up her leg as well below the knee. No warmth noted. Blisters to toes are starting to open back up. Pt. daughter is applying neosporin to areas. No wound care order for Home care to her foot. Skin tears to both arms are healed and scabbed over. Pt. assisted on to her side to assess wound to coccyx. Dressing removed. No drainage noted today. Wound cleanse with wound cleanser and pat dry. Wound measured 3x2x0.3cm. Full thickness tissue loss ulcer. Subcutaneous fat visible. No slough noted today. Wound bed has yellow tissue. No tunneling noted at this time. Reddness to periwound. Opticell AG applied to woundbed and covered with iodform. Pt. tolerated with mild discomfort. SN discussed with pt. that wound is not healing with current treatment. Pt. refuses to be seen at a wound care center d/t transportation and having to leave the home. Pt. is not complaint with repositioning to relieve pressure off the area as well. SN discussed with pt. that pt. has to be compliant for home care to cont. and for wound to heal. Pt. verbalizes understanding.  called and spoke with Dr. Mckeon office regarding pt. wound not healing and her refusing wound center treatment. Also spoke with office regarding condition of pt. right foot and leg today. Dr. Shoemaker office is calling pt. today to set up a telehealth visit for today or tomw to review the next treatment plan.  spoke with pt. Daughter Kristi via telephone regarding her wound not healing and telehealth visit being scheduled. She verbalizes understanding. No med changes today. Home safety and skin care reviewed. Venipuncture performed to pt. Lt. arm for PT/INR today. Lab  obtained and pt. tolerated well. Next visits scheduled with pt. for next week. She is aware how to contact SN if needed prior to next visit.

## 2021-09-30 ENCOUNTER — HOME CARE VISIT (OUTPATIENT)
Dept: HOME HEALTH SERVICES | Facility: CLINIC | Age: 61
End: 2021-09-30

## 2021-09-30 VITALS — HEART RATE: 61 BPM | OXYGEN SATURATION: 97 % | SYSTOLIC BLOOD PRESSURE: 163 MMHG | DIASTOLIC BLOOD PRESSURE: 86 MMHG

## 2021-09-30 PROCEDURE — G0151 HHCP-SERV OF PT,EA 15 MIN: HCPCS

## 2021-09-30 NOTE — HOME HEALTH
PATIENT IS BEING SEEN FOR EXTREME WEAKNESS AND POOR ENDURANCE AS WELL AS PAIN.  NSG IS SEEING PATIENT FOR WOUNDS AND ABNORMAL LABS.  SHE IS CONFINED TO HER W/C AND RECLINER MOST OF THE TIME AND HAS DIFFICULT TIME TRANSFERRING.  HER  AND DAUGHTER (WHO LIVES NEARBY) ASSIST HER.  SHE REPORTS THAT SHE HAS HAD DIARRHEA AND VOMITTING DURING PAST FEW DAYS WHICH MAKES HER EVEN MORE WEAK AND DEHYDRATED.

## 2021-10-05 ENCOUNTER — HOME CARE VISIT (OUTPATIENT)
Dept: HOME HEALTH SERVICES | Facility: HOME HEALTHCARE | Age: 61
End: 2021-10-05

## 2021-10-05 NOTE — CASE COMMUNICATION
P.T. CALLED PATIENT TO ARRANGE PLANNED VISIT FOR TODAY.  PATIENT SAID THAT SHE HAS BEEN UP ALL NIGHT VOMITTING AND BEING SICK AND WANTED TO WAIT UNTIL THURSDAY TO BE SEEN.  P.T. TO CALL HER BACK THURSDAY

## 2021-10-06 ENCOUNTER — HOME CARE VISIT (OUTPATIENT)
Dept: HOME HEALTH SERVICES | Facility: CLINIC | Age: 61
End: 2021-10-06

## 2021-10-06 VITALS
SYSTOLIC BLOOD PRESSURE: 110 MMHG | HEART RATE: 63 BPM | RESPIRATION RATE: 18 BRPM | OXYGEN SATURATION: 97 % | DIASTOLIC BLOOD PRESSURE: 52 MMHG | TEMPERATURE: 97.6 F

## 2021-10-06 PROCEDURE — G0299 HHS/HOSPICE OF RN EA 15 MIN: HCPCS

## 2021-10-06 NOTE — HOME HEALTH
Mrs. Olvera is sitting in her recliner when SN arrives for the visit. Her  and daughter are present as well. She is alert oriented to person and place. She is confused on what day it is today. She is easily redirected. Lungs are clear. B/P is lower today. Pt. encouraged to increase her fluids. All other VS are wnl.  No edema noted today. Her rt. foot looks alot better today. Mild reddness to toes and blisters are scabbing over. Pt. started Silvadene ointment per MD to her toes and her coccyx wound on 9/30/21. Ointment applied. Pt. assisted on her side to assess her coccyx wound. Dressing removed no drainage noted. Wound cleanse and pat dry. Wound measured and is not healing. Wound bed has yellow tissue. Periwound is slightly red. Silvadene ointment applied and covered with dry dressing. Pt. tolerated well. SN reviewed with pt. and family importance of repositioning off her bottom to help with healing and to increase protein in her diet. Pt. BS was high today per her daughter. Pt. did take her insulin this morning when SN was present. Pt. was refusing per her daughter to take her shot. ADA diet reviewed as well and importance to follow for wound healing. Med list updated. Venipuncture performed to Lt. forearm for PT/INR today. Blood obtained and pt. tolerated well. Next visit scheduled with pt and family for next week. They are aware how to contact SN if needed prior to next visit.

## 2021-10-07 ENCOUNTER — HOME CARE VISIT (OUTPATIENT)
Dept: HOME HEALTH SERVICES | Facility: CLINIC | Age: 61
End: 2021-10-07

## 2021-10-07 VITALS — DIASTOLIC BLOOD PRESSURE: 85 MMHG | SYSTOLIC BLOOD PRESSURE: 164 MMHG | OXYGEN SATURATION: 99 % | HEART RATE: 56 BPM

## 2021-10-07 PROCEDURE — G0151 HHCP-SERV OF PT,EA 15 MIN: HCPCS

## 2021-10-08 NOTE — HOME HEALTH
PATIENT IS BEING SEEN FOR EXTREME WEAKNESS, POOR ENDURANCE AND SEVERELY IMPAIRED MOBILITY.  SHE SAYS SHE HAS BEEN DOING HER EXERCISES.  BUT, SHE SAYS SHE HAS BEEN TROUBLED WITH NAUSEA AND DIARRHEA ON SOME DAYS.  FAMILY IS SUPPORTI VE.

## 2021-10-13 ENCOUNTER — HOSPITAL ENCOUNTER (EMERGENCY)
Facility: HOSPITAL | Age: 61
Discharge: HOME | End: 2021-10-13
Attending: EMERGENCY MEDICINE | Admitting: EMERGENCY MEDICINE

## 2021-10-13 ENCOUNTER — HOME CARE VISIT (OUTPATIENT)
Dept: HOME HEALTH SERVICES | Facility: CLINIC | Age: 61
End: 2021-10-13

## 2021-10-13 ENCOUNTER — APPOINTMENT (OUTPATIENT)
Dept: GENERAL RADIOLOGY | Facility: HOSPITAL | Age: 61
End: 2021-10-13

## 2021-10-13 VITALS
OXYGEN SATURATION: 97 % | RESPIRATION RATE: 20 BRPM | TEMPERATURE: 97.4 F | HEART RATE: 106 BPM | SYSTOLIC BLOOD PRESSURE: 210 MMHG | DIASTOLIC BLOOD PRESSURE: 90 MMHG

## 2021-10-13 VITALS
RESPIRATION RATE: 20 BRPM | HEIGHT: 66 IN | HEART RATE: 113 BPM | BODY MASS INDEX: 22.82 KG/M2 | TEMPERATURE: 98.5 F | WEIGHT: 142 LBS | DIASTOLIC BLOOD PRESSURE: 106 MMHG | OXYGEN SATURATION: 95 % | SYSTOLIC BLOOD PRESSURE: 196 MMHG

## 2021-10-13 DIAGNOSIS — R60.0 PEDAL EDEMA: Primary | ICD-10-CM

## 2021-10-13 LAB
ALBUMIN SERPL-MCNC: 3.6 G/DL (ref 3.5–5.2)
ALBUMIN/GLOB SERPL: 0.9 G/DL
ALP SERPL-CCNC: 175 U/L (ref 39–117)
ALT SERPL W P-5'-P-CCNC: 25 U/L (ref 1–33)
ANION GAP SERPL CALCULATED.3IONS-SCNC: 15.4 MMOL/L (ref 5–15)
AST SERPL-CCNC: 38 U/L (ref 1–32)
BASOPHILS # BLD AUTO: 0.05 10*3/MM3 (ref 0–0.2)
BASOPHILS NFR BLD AUTO: 0.5 % (ref 0–1.5)
BILIRUB SERPL-MCNC: 0.6 MG/DL (ref 0–1.2)
BUN SERPL-MCNC: 7 MG/DL (ref 8–23)
BUN/CREAT SERPL: 13.5 (ref 7–25)
CALCIUM SPEC-SCNC: 9.2 MG/DL (ref 8.6–10.5)
CHLORIDE SERPL-SCNC: 100 MMOL/L (ref 98–107)
CK MB SERPL-CCNC: 1.36 NG/ML
CK SERPL-CCNC: 30 U/L (ref 20–180)
CO2 SERPL-SCNC: 21.6 MMOL/L (ref 22–29)
CREAT SERPL-MCNC: 0.52 MG/DL (ref 0.57–1)
DEPRECATED RDW RBC AUTO: 49 FL (ref 37–54)
EOSINOPHIL # BLD AUTO: 0.17 10*3/MM3 (ref 0–0.4)
EOSINOPHIL NFR BLD AUTO: 1.7 % (ref 0.3–6.2)
ERYTHROCYTE [DISTWIDTH] IN BLOOD BY AUTOMATED COUNT: 16.2 % (ref 12.3–15.4)
GFR SERPL CREATININE-BSD FRML MDRD: 120 ML/MIN/1.73
GLOBULIN UR ELPH-MCNC: 4.1 GM/DL
GLUCOSE SERPL-MCNC: 233 MG/DL (ref 65–99)
HCT VFR BLD AUTO: 38.8 % (ref 34–46.6)
HGB BLD-MCNC: 13 G/DL (ref 12–15.9)
HOLD SPECIMEN: NORMAL
HOLD SPECIMEN: NORMAL
IMM GRANULOCYTES # BLD AUTO: 0.03 10*3/MM3 (ref 0–0.05)
IMM GRANULOCYTES NFR BLD AUTO: 0.3 % (ref 0–0.5)
INR PPP: 1.1 (ref 2–3)
LIPASE SERPL-CCNC: 24 U/L (ref 13–60)
LYMPHOCYTES # BLD AUTO: 1.89 10*3/MM3 (ref 0.7–3.1)
LYMPHOCYTES NFR BLD AUTO: 18.8 % (ref 19.6–45.3)
MAGNESIUM SERPL-MCNC: 1.4 MG/DL (ref 1.6–2.4)
MCH RBC QN AUTO: 27.9 PG (ref 26.6–33)
MCHC RBC AUTO-ENTMCNC: 33.5 G/DL (ref 31.5–35.7)
MCV RBC AUTO: 83.3 FL (ref 79–97)
MONOCYTES # BLD AUTO: 0.5 10*3/MM3 (ref 0.1–0.9)
MONOCYTES NFR BLD AUTO: 5 % (ref 5–12)
NEUTROPHILS NFR BLD AUTO: 7.42 10*3/MM3 (ref 1.7–7)
NEUTROPHILS NFR BLD AUTO: 73.7 % (ref 42.7–76)
NRBC BLD AUTO-RTO: 0 /100 WBC (ref 0–0.2)
NT-PROBNP SERPL-MCNC: 7224 PG/ML (ref 0–900)
PLATELET # BLD AUTO: 220 10*3/MM3 (ref 140–450)
PMV BLD AUTO: 10.5 FL (ref 6–12)
POTASSIUM SERPL-SCNC: 4 MMOL/L (ref 3.5–5.2)
PROT SERPL-MCNC: 7.7 G/DL (ref 6–8.5)
PROTHROMBIN TIME: 11.8 SECONDS (ref 9.4–12)
RBC # BLD AUTO: 4.66 10*6/MM3 (ref 3.77–5.28)
SODIUM SERPL-SCNC: 137 MMOL/L (ref 136–145)
TROPONIN T SERPL-MCNC: 0.01 NG/ML (ref 0–0.03)
WBC # BLD AUTO: 10.06 10*3/MM3 (ref 3.4–10.8)
WHOLE BLOOD HOLD SPECIMEN: NORMAL
WHOLE BLOOD HOLD SPECIMEN: NORMAL

## 2021-10-13 PROCEDURE — 83690 ASSAY OF LIPASE: CPT

## 2021-10-13 PROCEDURE — G0299 HHS/HOSPICE OF RN EA 15 MIN: HCPCS

## 2021-10-13 PROCEDURE — 83735 ASSAY OF MAGNESIUM: CPT

## 2021-10-13 PROCEDURE — 96374 THER/PROPH/DIAG INJ IV PUSH: CPT

## 2021-10-13 PROCEDURE — 85025 COMPLETE CBC W/AUTO DIFF WBC: CPT

## 2021-10-13 PROCEDURE — 84484 ASSAY OF TROPONIN QUANT: CPT | Performed by: EMERGENCY MEDICINE

## 2021-10-13 PROCEDURE — 83880 ASSAY OF NATRIURETIC PEPTIDE: CPT

## 2021-10-13 PROCEDURE — 71045 X-RAY EXAM CHEST 1 VIEW: CPT

## 2021-10-13 PROCEDURE — 85610 PROTHROMBIN TIME: CPT

## 2021-10-13 PROCEDURE — G0151 HHCP-SERV OF PT,EA 15 MIN: HCPCS

## 2021-10-13 PROCEDURE — 99283 EMERGENCY DEPT VISIT LOW MDM: CPT

## 2021-10-13 PROCEDURE — 82553 CREATINE MB FRACTION: CPT

## 2021-10-13 PROCEDURE — 36415 COLL VENOUS BLD VENIPUNCTURE: CPT

## 2021-10-13 PROCEDURE — 25010000002 FUROSEMIDE PER 20 MG: Performed by: EMERGENCY MEDICINE

## 2021-10-13 PROCEDURE — 80053 COMPREHEN METABOLIC PANEL: CPT

## 2021-10-13 PROCEDURE — 96375 TX/PRO/DX INJ NEW DRUG ADDON: CPT

## 2021-10-13 PROCEDURE — 93005 ELECTROCARDIOGRAM TRACING: CPT

## 2021-10-13 PROCEDURE — 93010 ELECTROCARDIOGRAM REPORT: CPT | Performed by: INTERNAL MEDICINE

## 2021-10-13 PROCEDURE — 93005 ELECTROCARDIOGRAM TRACING: CPT | Performed by: EMERGENCY MEDICINE

## 2021-10-13 PROCEDURE — 25010000002 MORPHINE PER 10 MG: Performed by: EMERGENCY MEDICINE

## 2021-10-13 PROCEDURE — 82550 ASSAY OF CK (CPK): CPT

## 2021-10-13 PROCEDURE — 93010 ELECTROCARDIOGRAM REPORT: CPT | Performed by: SPECIALIST

## 2021-10-13 RX ORDER — MORPHINE SULFATE 2 MG/ML
2 INJECTION, SOLUTION INTRAMUSCULAR; INTRAVENOUS ONCE
Status: COMPLETED | OUTPATIENT
Start: 2021-10-13 | End: 2021-10-13

## 2021-10-13 RX ORDER — FUROSEMIDE 10 MG/ML
40 INJECTION INTRAMUSCULAR; INTRAVENOUS ONCE
Status: COMPLETED | OUTPATIENT
Start: 2021-10-13 | End: 2021-10-13

## 2021-10-13 RX ORDER — HYDROCODONE BITARTRATE AND ACETAMINOPHEN 7.5; 325 MG/1; MG/1
1 TABLET ORAL ONCE
Status: COMPLETED | OUTPATIENT
Start: 2021-10-13 | End: 2021-10-13

## 2021-10-13 RX ORDER — SODIUM CHLORIDE 0.9 % (FLUSH) 0.9 %
10 SYRINGE (ML) INJECTION AS NEEDED
Status: DISCONTINUED | OUTPATIENT
Start: 2021-10-13 | End: 2021-10-13 | Stop reason: HOSPADM

## 2021-10-13 RX ADMIN — HYDROCODONE BITARTRATE AND ACETAMINOPHEN 1 TABLET: 7.5; 325 TABLET ORAL at 19:56

## 2021-10-13 RX ADMIN — NITROGLYCERIN 0.5 INCH: 20 OINTMENT TOPICAL at 19:59

## 2021-10-13 RX ADMIN — MORPHINE SULFATE 2 MG: 2 INJECTION, SOLUTION INTRAMUSCULAR; INTRAVENOUS at 20:13

## 2021-10-13 RX ADMIN — FUROSEMIDE 40 MG: 10 INJECTION, SOLUTION INTRAMUSCULAR; INTRAVENOUS at 19:57

## 2021-10-13 NOTE — HOME HEALTH
Mrs. Olvera is sitting in her recliner when SN arrives. Her  is present as well. She is alert to person and place. Lungs have slight congestion to RUL but clears with cough. All other lobes are clear. She cont. to smoke in the home. B/P today was 210/90. Pt. advises her pain all over is 10/10 especially in her legs and she hasnt been able to sleep for 3-4 nights. Pt. had contacted her PCP he ordered her more Percocet but her muscle relaxers cannot be refilled until next week. BLE's have 2+ edema with slight reddness from toes to her ankles. No warmth noted. Blisters  to right foot toes are scabbing over. Her toes are very tender to touch. Toes cleanse and pat dry. Silvadene applied to areas. Pt. assisted on her side to assess coccyx wound. Dressing removed from coccyx. Scant amount of serous drainage noted, no odor. Pressure wound cleanse with wound cleanser and pat dry. Wound measured 3.5x2.5x0.2cm. Wound bed has yellow tissue to wound bed. No necrosis noted. Periwound is pink. Silvadene ointment applied to wound bed and covered with foam dressing. Pt. tolerated well. Wound is not healing with current treatment. Pt. sits in recliner during day and night. She refuses to sleep in her bed to be able to reposition off the area. SN discussed importance of repositioing for wound healing. She verbalizes understanding. Pt. is aware wound is not healing and needs to be seen at her PCP office for in person visit. Pt. advises she will make a appointment for next week. She is aware that she has to be compliant for home care to cont. SN called and spoke with Jennyfer at Dr. Shoemaker office regarding her B/P, swelling, reddness to lower extremities, and wound not healing to coccyx. Jennyfer advises she will notify Dr. Shoemaker. Pt. is aware to be seen in the ER if needed. No med changes today. Pt. is aware how to contact SN if needed prior to discharge.

## 2021-10-13 NOTE — ED PROVIDER NOTES
Time: 6:42 PM EDT  Arrived by: private car  Chief Complaint: leg pain  History provided by: patient/spouse  History is limited by: N/A     History of Present Illness:  Patient is a 60 y.o. year old female that presents to the emergency department with leg pain that started 3-4 days ago. Pt notes she was admitted for pneumonia 2 weeks ago and that's when her legs started swelling. Pt has chronic chest pain and shortness of breath that's unchanged. Pt locates the chest pain in left side and substernal intermittently radiating to her abdomen. Pt denies anything making the pain better or worse and describes it as achy and sharp. Pt has wound care that comes out to her home and they sent her to the ED. Per  the redness in right leg has improved since onset.       Leg Pain  Location:  Leg  Time since incident:  4 days  Injury: no    Leg location:  L leg and R leg  Pain details:     Radiates to:  Does not radiate    Severity:  Mild    Onset quality:  Gradual    Duration:  4 days    Timing:  Constant    Progression:  Worsening  Chronicity:  New  Dislocation: no    Foreign body present:  No foreign bodies  Prior injury to area:  No  Relieved by:  None tried  Worsened by:  Nothing  Ineffective treatments:  None tried  Associated symptoms: swelling    Associated symptoms: no back pain, no fever and no neck pain        Similar Symptoms Previously: no  Recently seen: yes      Patient Care Team  Primary Care Provider: Carloz Shoemaker MD    Past Medical History:     No Known Allergies  Past Medical History:   Diagnosis Date   • Acid reflux    • ACL tear 09/01/2015    PCL/ACL TEAR/RUPTURE   • Acute shoulder bursitis, right 08/23/2015   • Anxiety    • Arthritis    • Asthma    • Bipolar 1 disorder (Formerly [facility])     untreated   • Bladder disorder    • Cancer (Formerly [facility])    • CHF (congestive heart failure) (Formerly [facility])    • Chronic back pain    • COPD (chronic obstructive pulmonary disease) (Formerly [facility])    • Depression    • Diabetes mellitus (Formerly [facility])    • DJD  (degenerative joint disease)    • GERD (gastroesophageal reflux disease)    • HBP (high blood pressure)    • Hip pain 09/15/2015   • Hypertension    • Knee injury 08/19/2015   • Knee pain, right 09/15/2015   • Limb swelling    • Neuropathy    • Osteoarthritis, knee 09/01/2015   • Osteoporosis    • Peripheral neuropathy    • Renal failure 1994   • Seasonal allergies    • Shoulder tendonitis 08/23/2015   • SOB (shortness of breath)    • Tendinitis of right rotator cuff 08/23/2015     Past Surgical History:   Procedure Laterality Date   • APPENDECTOMY     • BACK SURGERY     • BLADDER REPAIR     • BRONCHOSCOPY N/A 7/10/2021    Procedure: BRONCHOSCOPY WITH BRONCHOALVEOLAR LAVAGE, POSSIBLE BIOPSY, BRUSHING, WASHING, AIRWAY INSPECTION;  Surgeon: Rodrigo Reyes MD;  Location: [facility] MAIN OR;  Service: Pulmonary;  Laterality: N/A;   • BRONCHOSCOPY N/A 7/10/2021    Procedure: BRONCHOSCOPY;  Surgeon: Rodrigo Reyes MD;  Location: [facility] ENDOSCOPY;  Service: Pulmonary;  Laterality: N/A;   • CHOLECYSTECTOMY     • ENDOSCOPY     • FRACTURE SURGERY     • GALLBLADDER SURGERY     • HERNIA REPAIR     • HYSTERECTOMY     • JOINT REPLACEMENT     • OTHER SURGICAL HISTORY      ARTIFICIAL JOINTS/LIMBS   • OTHER SURGICAL HISTORY      FACE SURGERY, UNSPECIFIED   • TOTAL HIP ARTHROPLASTY Right    • TOTAL KNEE ARTHROPLASTY Left      Family History   Problem Relation Age of Onset   • Stroke Mother    • Throat cancer Mother    • Arthritis Mother    • Osteoporosis Mother    • Heart disease Father    • Breast cancer Sister    • Colon cancer Sister    • Lung cancer Sister    • Arthritis Sister    • Osteoporosis Sister    • Heart disease Brother    • Diabetes Brother    • Arthritis Brother    • Arthritis Daughter        Home Medications:  Prior to Admission medications    Medication Sig Start Date End Date Taking? Authorizing Provider   BISOPROLOL-HYDROCHLOROTHIAZIDE PO Take 10 mg by mouth 2 (two) times a day. 8/24/21   Provider, MD Patria    hydrOXYzine (ATARAX) 10 MG/5ML syrup Take 10 mg by mouth 4 (Four) Times a Day. 8/23/21   Patria Hanson MD   insulin detemir (LEVEMIR) 100 UNIT/ML injection Inject 25 Units under the skin into the appropriate area as directed Every Night for 30 days. 7/22/21 8/21/21  Carloz Shoemaker MD   insulin detemir (Levemir) 100 UNIT/ML injection Inject 25 Units under the skin into the appropriate area as directed Every Night. 8/19/21   Patria Hanson MD   insulin lispro (humaLOG) 100 UNIT/ML injection Inject 5 Units under the skin into the appropriate area as directed 3 (Three) Times a Day Before Meals. 8/23/21   Patria Hanson MD   ipratropium-albuterol (DUO-NEB) 0.5-2.5 mg/3 ml nebulizer Take 3 mL by nebulization 4 (Four) Times a Day for 30 days. 8/19/21 9/18/21  Rudy Garnett MD   losartan (COZAAR) 100 MG tablet Take 1 tablet by mouth Daily for 30 days. 7/23/21 8/22/21  Carloz Shoemaker MD   metFORMIN (GLUCOPHAGE) 500 MG tablet Take 1,000 mg by mouth 2 (two) times a day.    Patria Hanson MD   metOLazone (ZAROXOLYN) 10 MG tablet Take 10 mg by mouth Daily. 10 days 9/3/21   Patria Hanson MD   O2 (OXYGEN) Inhale 3 L/min Every Night. 8/18/21   Patria Hanson MD   oxyCODONE-acetaminophen (PERCOCET) 5-325 MG per tablet Take 1 tablet by mouth Every 6 (Six) Hours As Needed.    Patria Hanson MD   pantoprazole (PROTONIX) 40 MG EC tablet Take 40 mg by mouth Daily.    Patria Hanson MD   sertraline (ZOLOFT) 100 MG tablet Take 100 mg by mouth Daily As Needed. 7/27/21   Patria Hanson MD   silver sulfadiazine (SILVADENE, SSD) 1 % cream Apply 1 application topically to the appropriate area as directed 2 (Two) Times a Day. 9/30/21   Patria Hanson MD   warfarin (Coumadin) 1 MG tablet 1 tablet daily  Patient not taking: Reported on 10/7/2021 7/22/21   Carloz Shoemaker MD   warfarin (COUMADIN) 2 MG tablet Take 2 mg by mouth Every Night. 10/7/21   Carloz Shoemaker MD     "    Social History:   Social History     Tobacco Use   • Smoking status: Former Smoker     Packs/day: 0.50     Years: 50.00     Pack years: 25.00     Types: Cigarettes     Start date: 6/15/1979     Quit date: 2021     Years since quittin.3   • Smokeless tobacco: Never Used   • Tobacco comment: SMOKED FOR 31 PLUS YEARS   Vaping Use   • Vaping Use: Never used   Substance Use Topics   • Alcohol use: Never   • Drug use: Never     Recent travel: not applicable     Review of Systems:  Review of Systems   Constitutional: Negative for chills and fever.   HENT: Negative for nosebleeds.    Eyes: Negative for redness.   Respiratory: Negative for cough and shortness of breath.    Cardiovascular: Positive for leg swelling (bilateral). Negative for chest pain.   Gastrointestinal: Negative for diarrhea and vomiting.   Genitourinary: Negative for dysuria and frequency.   Musculoskeletal: Negative for back pain and neck pain.   Skin: Positive for color change. Negative for rash.   Neurological: Negative for seizures and syncope.        Physical Exam:  BP (!) 196/106   Pulse 113   Temp 98.5 °F (36.9 °C)   Resp 20   Ht 167.6 cm (66\")   Wt 64.4 kg (142 lb)   SpO2 95%   Breastfeeding No   BMI 22.92 kg/m²     Physical Exam  Vitals and nursing note reviewed.   Constitutional:       General: She is not in acute distress.     Appearance: Normal appearance. She is not toxic-appearing.   HENT:      Head: Normocephalic and atraumatic.      Nose: Nose normal.      Mouth/Throat:      Mouth: Mucous membranes are moist.   Eyes:      General: Lids are normal. No scleral icterus.     Conjunctiva/sclera: Conjunctivae normal.   Cardiovascular:      Rate and Rhythm: Normal rate and regular rhythm.      Pulses: Normal pulses.      Heart sounds: Normal heart sounds. No murmur heard.      Pulmonary:      Effort: Pulmonary effort is normal. No respiratory distress.      Breath sounds: Normal breath sounds and air entry. No decreased air " movement. No decreased breath sounds, wheezing, rhonchi or rales.   Chest:      Chest wall: No tenderness.   Abdominal:      Palpations: Abdomen is soft.      Tenderness: There is no abdominal tenderness. There is no guarding or rebound.   Musculoskeletal:         General: No tenderness. Normal range of motion.      Cervical back: Normal range of motion and neck supple. No tenderness.      Right lower le+ Edema (mid leg down) present.      Left lower le+ Edema present.   Skin:     General: Skin is warm and dry.      Findings: Erythema present.      Comments: Chronic appearing erythema and healing wounds to right distal foot and toes.    Neurological:      Mental Status: She is alert and oriented to person, place, and time.      Sensory: Sensation is intact.      Motor: Motor function is intact.   Psychiatric:         Behavior: Behavior normal.                Medications in the Emergency Department:  Medications   sodium chloride 0.9 % flush 10 mL (has no administration in time range)   furosemide (LASIX) injection 40 mg (40 mg Intravenous Given 10/13/21 1957)   nitroglycerin (NITROSTAT) ointment 0.5 inch (0.5 inches Topical Given 10/1959)   HYDROcodone-acetaminophen (NORCO) 7.5-325 MG per tablet 1 tablet (1 tablet Oral Given 10/13/21 1956)   morphine injection 2 mg (2 mg Intravenous Given 10/13/21 2013)        Labs  Lab Results (last 24 hours)     Procedure Component Value Units Date/Time    CBC & Differential [[id number]]  (Abnormal) Collected: 10/13/21 155    Specimen: Blood Updated: 10/13/21 1607    Narrative:      The following orders were created for panel order CBC & Differential.  Procedure                               Abnormality         Status                     ---------                               -----------         ------                     CBC Auto Differential[[id number]]        Abnormal            Final result                 Please view results for these tests on the individual  orders.    Comprehensive Metabolic Panel [[id number]]  (Abnormal) Collected: 10/13/21 1556    Specimen: Blood Updated: 10/13/21 1629     Glucose 233 mg/dL      BUN 7 mg/dL      Creatinine 0.52 mg/dL      Sodium 137 mmol/L      Potassium 4.0 mmol/L      Chloride 100 mmol/L      CO2 21.6 mmol/L      Calcium 9.2 mg/dL      Total Protein 7.7 g/dL      Albumin 3.60 g/dL      ALT (SGPT) 25 U/L      AST (SGOT) 38 U/L      Alkaline Phosphatase 175 U/L      Total Bilirubin 0.6 mg/dL      eGFR Non African Amer 120 mL/min/1.73      Globulin 4.1 gm/dL      A/G Ratio 0.9 g/dL      BUN/Creatinine Ratio 13.5     Anion Gap 15.4 mmol/L     Narrative:      GFR Normal >60  Chronic Kidney Disease <60  Kidney Failure <15      Lipase [[id number]]  (Normal) Collected: 10/13/21 1556    Specimen: Blood Updated: 10/13/21 1629     Lipase 24 U/L     BNP [[id number]]  (Abnormal) Collected: 10/13/21 1556    Specimen: Blood Updated: 10/13/21 1626     proBNP 7,224.0 pg/mL     Narrative:      Among patients with dyspnea, NT-proBNP is highly sensitive for the detection of acute congestive heart failure. In addition NT-proBNP of <300 pg/ml effectively rules out acute congestive heart failure with 99% negative predictive value.    Results may be falsely decreased if patient taking Biotin.      Magnesium [[id number]]  (Abnormal) Collected: 10/13/21 1556    Specimen: Blood Updated: 10/13/21 1629     Magnesium 1.4 mg/dL     CK Total & CKMB [[id number]]  (Normal) Collected: 10/13/21 1556    Specimen: Blood Updated: 10/13/21 1629     CKMB 1.36 ng/mL      Creatine Kinase 30 U/L     Narrative:      CKMB results may be falsely decreased if patient taking Biotin.    Protime-INR [[id number]]  (Abnormal) Collected: 10/13/21 1556    Specimen: Blood Updated: 10/13/21 1612     Protime 11.8 Seconds      INR 1.10    Narrative:      Suggested Therapeutic Ranges For Oral Anticoagulant Therapy:  Level of Therapy                      INR Target Range  Standard Dose                             2.0-3.0  High Dose                                2.5-3.5  Patients not receiving anticoagulant  Therapy Normal Range                     0.6-1.2    CBC Auto Differential [[id number]]  (Abnormal) Collected: 10/13/21 1556    Specimen: Blood Updated: 10/13/21 1607     WBC 10.06 10*3/mm3      RBC 4.66 10*6/mm3      Hemoglobin 13.0 g/dL      Hematocrit 38.8 %      MCV 83.3 fL      MCH 27.9 pg      MCHC 33.5 g/dL      RDW 16.2 %      RDW-SD 49.0 fl      MPV 10.5 fL      Platelets 220 10*3/mm3      Neutrophil % 73.7 %      Lymphocyte % 18.8 %      Monocyte % 5.0 %      Eosinophil % 1.7 %      Basophil % 0.5 %      Immature Grans % 0.3 %      Neutrophils, Absolute 7.42 10*3/mm3      Lymphocytes, Absolute 1.89 10*3/mm3      Monocytes, Absolute 0.50 10*3/mm3      Eosinophils, Absolute 0.17 10*3/mm3      Basophils, Absolute 0.05 10*3/mm3      Immature Grans, Absolute 0.03 10*3/mm3      nRBC 0.0 /100 WBC     Troponin [[id number]]  (Normal) Collected: 10/13/21 1556    Specimen: Blood Updated: 10/13/21 1912     Troponin T 0.015 ng/mL     Narrative:      Troponin T Reference Range:  <= 0.03 ng/mL-   Negative for AMI  >0.03 ng/mL-     Abnormal for myocardial necrosis.  Clinicians would have to utilize clinical acumen, EKG, Troponin and serial changes to determine if it is an Acute Myocardial Infarction or myocardial injury due to an underlying chronic condition.       Results may be falsely decreased if patient taking Biotin.             Imaging:  XR Chest 1 View    Result Date: 10/13/2021  PROCEDURE: XR CHEST 1 VW  COMPARISON: [facility], CR, XR CHEST 1 VW, 7/18/2021, 19:48.  [facility], CR, XR CHEST 2 VW, 9/10/2021, 13:23.  INDICATIONS: Chest Pain triage protocol  FINDINGS:   The lungs are well-expanded. The heart and pulmonary vasculature are within normal limits. No pleural effusions are identified. There are no active appearing infiltrates.  There are chronic appearing changes in  the lung fields.  IMPRESSION: No active disease.  JENNY TOBAR MD       Electronically Signed and Approved By: JENNY TOBAR MD on 10/13/2021 at 16:44               Procedures:  Procedures    Progress  ED Course as of 10/13/21 2051   Wed Oct 13, 2021   2026 EKG interpretation: Normal sinus rhythm, heart rate 97, normal [place] interval, normal QRS, normal axis, nonspecific ST segment changes with no definite acute ischemia. [RP]      ED Course User Index  [RP] Jose De Jesus De Jesus MD                            Medical Decision Making:  MDM  Number of Diagnoses or Management Options     Amount and/or Complexity of Data Reviewed  Clinical lab tests: reviewed  Tests in the radiology section of CPT®: reviewed  Tests in the medicine section of CPT®: reviewed  Decide to obtain previous medical records or to obtain history from someone other than the patient: yes  Independent visualization of images, tracings, or specimens: yes    Risk of Complications, Morbidity, and/or Mortality  Presenting problems: moderate  Management options: low    Patient Progress  Patient progress: stable       Final diagnoses:   Pedal edema        Disposition:  ED Disposition     ED Disposition Condition Comment    Discharge Stable           This medical record created using voice recognition software and may contain unintended errors.    Documentation assistance provided by Tamiko Goodwin acting as scribe for Jose De Jesus De Jesus MD. Information recorded by the scribe was done at my direction and has been verified and validated by me.          Tamiko Goodwin  10/13/21 1849       Jose De Jesus De Jesus MD  10/13/21 2051

## 2021-10-14 VITALS — SYSTOLIC BLOOD PRESSURE: 183 MMHG | OXYGEN SATURATION: 99 % | HEART RATE: 106 BPM | DIASTOLIC BLOOD PRESSURE: 109 MMHG

## 2021-10-14 LAB — QT INTERVAL: 377 MS

## 2021-10-14 NOTE — HOME HEALTH
PATIENT IS BEING SEEN FOR POOR ENDURANCE, POOR STRENGHTH, POOR MOBILITY.  UPON ARRIVAL P.T. LEARNED THAT PATIENT HAS NOT SLEPT FOR 3 NIGHTS DUE TO PAIN AND SWELLING IN HER BILATERAL LEGS WITH LEFT WORSE THAN RIGHT.  HER LEGS WERE VERY TIGHT, WARM AND SLIGHTLY PINK/RED.  HER BLOOD PRESSURE WAS VERY ELEVATED 183/109.  NURSING HAD NOTIFIED P.T. EARLIER IN THE DAY THAT HER BLOOD PRESSURE /90 WHEN SHE VISITED.  DELPHINE WAS CALLING THE DOCTOR'S OFFICE TO NOTIFY THEM AND RECEIVE ANY NEW INSTRUCTIONS.  SHE REPORTED THAT PATIENT HAS MADE SEVERAL OFFICE VISIT APPOINTMENTS BUT ALWAYS CANCELLED THEM AT THE LAST MINUTE AND SAYS SHE DOESNT FEEL LIKE GOING.  WITH CONSULATION WITH [facility] P.T. SUGGESTED THAT PATIENT'S SPOUSE TAKE HER TO THE ER.  PATIENT AND SPOUSE AGREED.  P.T. HELPED PATIENT GET READY TO GO TO ER WITH CLOTHING APPROPRIATE, TRANSFERS INTO HER W/C AND DOWN HER RAMP AND INTO HER CAR.  SPOUSE GATHERED UP HER MEDICATIONS TO TAKE WITH THEM.  SPOUSE ANDPATIENT LEFT FOR ER.

## 2021-10-14 NOTE — DISCHARGE INSTRUCTIONS
Patient Seen in: [address]    History   Patient presents with:  Head Neck Injury (neurologic, musculoskeletal)  Dizziness (neurologic)    Stated Complaint: dizzy    HPI    Patient here with complaint of head injury.   Inj See your family doctor soon as possible to discuss further treatment options.  Return to the emergency department for chest pain or difficulty breathing.   daily as needed for cough. Family History   Problem Relation Age of Onset   • Cancer Brother         skin       Social History    Tobacco Use      Smoking status: Never Smoker      Smokeless tobacco: Never Used    Alcohol use:  Yes      Alcohol/week: Impression:  BPPV (benign paroxysmal positional vertigo), unspecified laterality  (primary encounter diagnosis)  Concussion without loss of consciousness, initial encounter    Disposition:  Discharge    Follow-up:  Polly Tuttle MD  [address] [place] [address]

## 2021-10-18 ENCOUNTER — HOME CARE VISIT (OUTPATIENT)
Dept: HOME HEALTH SERVICES | Facility: HOME HEALTHCARE | Age: 61
End: 2021-10-18

## 2021-10-18 ENCOUNTER — HOME CARE VISIT (OUTPATIENT)
Dept: HOME HEALTH SERVICES | Facility: CLINIC | Age: 61
End: 2021-10-18

## 2021-10-18 VITALS
RESPIRATION RATE: 18 BRPM | SYSTOLIC BLOOD PRESSURE: 152 MMHG | HEART RATE: 61 BPM | OXYGEN SATURATION: 96 % | DIASTOLIC BLOOD PRESSURE: 62 MMHG | TEMPERATURE: 97.3 F

## 2021-10-18 PROCEDURE — G0299 HHS/HOSPICE OF RN EA 15 MIN: HCPCS

## 2021-10-18 NOTE — CASE COMMUNICATION
HUD RECERT – HUDDLE RECERT REVIEW    Complete between days 46-50    Progress toward goals: Swelling to BLE's is better today    Barriers to goal achievement: Pt is noncompliant Diabetic and noncompliant with repositioning in her chair for wound healing.     Recert or discharge? Recert    Any payor source changes?No    Any changes needed to the focus of  care? No  HUD RECERT – HUDDLE RECERT REVIEW    SN: 1wk8

## 2021-10-18 NOTE — HOME HEALTH
60 Day Summary    Home Health need continues for: Wound Care/Monitoring to Coccyx, monitor edema, monitor B/P    Primary diagnoses/co-morbidities/recent procedures in past 60 days that impact current episode: CHF    Current level of functional ability: Max Assist    Homebound status and living arrangements: Pt. continues homebound d/t very poor endurance and limited mobility. She is max assist with transfers in the home and ADL's.     Goals accomplished and/or measurable progress toward unmet goals in past 60 days: Swelling is better to lower extremities.   Focus of care for next 60 days for each discipline ordered: Wound Healing, Edema management, pain management    Skin integrity/wound status: Wound to coccyx is not healing, MD is aware. Pt. to have a follow up appointment in the office on Thursday.     Code status: Full Code    Most recent fall risk: Fall Risk     Estimated date when home care services will end: When Goals Meet and Skilled services no longer needed.     Plan of Care confirmed with Dr. Shoemaker on 10/18/21.     Mrs. Olvera is lying in the recliner when SN arrives for the visit. She is alert to person and place. She is very forgetful at times and gets easily aggitated. Lungs are diminshed throughout. She continues to smoke in the home. She wears oxygen during the night. She has complaints of generalized pain and manages her pain with medication. Edema to lower extremities is better today. She has 1+ edema to both lower extremities. No reddness today. She cont. with blister like areas to toes on her rt. foot that area scabbing over. Silvadene ointment is being applied to areas twice a day by her family. Pt. assisted to her side for SN to assess wound to coccyx. Dressing removed from her coccyx. Scant amount of serous drainage.  Wound cleanse with wound cleanser and pat dry. Full thickness tissue loss with subucanteous tissue visible. Ulcer has yellow tissue to wound bed, no necrosis noted. It is  starting to tunnel. Wound measured 2.7x1.1cm. Silvadene applied to wound bed and covered with iodoform dressing. Pt. tolerated dressing change with slight discomfort. Pt. is noncompliant with repositioning in the home as instructed. SN reviewed with pt. and family the importance of repositioning for wound to heal. Pt. daughter advised her BS today is 543 and gave her 5units of fast acting insulin. SN spoke with Dr. Shoemaker office at the visit and daughter is to give pt. 10 more units. Daughter rechecked BS before SN leaving and had came down to 398. SN reviewed importance of following ADA diet.  MD office is aware pt. wound is not healing and pt. is complaining today of having no smell or taste She is to go to MD office parking lot for Covid Test today. No med changes today. SN performed venipuncture to Lt. Forearm for labs. Blood obtained and pt. tolerated well. Pt. is to follow up with Dr. Shoemaker in his office this Thursday. Pt./Family is aware pt. has to be compliant and see her MD for home care to continue.  Next visit scheduled with pt. for next week. She is aware how to contact SN if needed prior to next visit.

## 2021-10-19 ENCOUNTER — HOME CARE VISIT (OUTPATIENT)
Dept: HOME HEALTH SERVICES | Facility: CLINIC | Age: 61
End: 2021-10-19

## 2021-10-19 PROCEDURE — G0151 HHCP-SERV OF PT,EA 15 MIN: HCPCS

## 2021-10-20 VITALS — SYSTOLIC BLOOD PRESSURE: 173 MMHG | HEART RATE: 65 BPM | OXYGEN SATURATION: 98 % | DIASTOLIC BLOOD PRESSURE: 74 MMHG

## 2021-10-20 NOTE — HOME HEALTH
PATIENT IS BEING SEEN FOR REASSESSMENT.  SHE HAS BEEN ILL DURING PAST FEW DAYS AND HAS  BEEN TESTED FOR COVID19 YESTERDAY.  RESULTS ARE NOT BACK YET.  SHE HAS BEEN COMPLIANT WITH HER HEP AND IS EAGER TO IMPROVE IN TRANSFERS AND LIMITED GAIT ACTIVITIES SO THAT SHE IS MORE MOBILE AND SAFE IN HER HOME WITH HER FAMILY.  HER LES ARE LESS EDEMATOUS TODAY AND SHE IS NOT C/O PAIN IN THEM AS SHE WAS DURING LAST VISIT.SHE REPORTS THAT SHE HAS AN APPT WITH HER PHYSICIAN ON 10/21/2021 FOR ASSESSMENT OF LE EDEMA, ETC.  SHE HAS PROGRESSED SINCE INITIAL EVAL AND WISHES TO CONTINUE.  ONGOING PAIN AND EDEMA AS WELL AS DIARRHEA AND OTHER COMPLAINTS ADVERSELY AFFECT HER OVERALL PROGRESS.  FAMILY IS SUPPORTIVE AND ENCOURAGING.

## 2021-10-24 LAB — QT INTERVAL: 346 MS

## 2021-10-25 ENCOUNTER — HOME CARE VISIT (OUTPATIENT)
Dept: HOME HEALTH SERVICES | Facility: CLINIC | Age: 61
End: 2021-10-25

## 2021-10-25 VITALS
TEMPERATURE: 97 F | OXYGEN SATURATION: 99 % | HEART RATE: 65 BPM | SYSTOLIC BLOOD PRESSURE: 150 MMHG | DIASTOLIC BLOOD PRESSURE: 60 MMHG | RESPIRATION RATE: 18 BRPM

## 2021-10-25 PROCEDURE — G0299 HHS/HOSPICE OF RN EA 15 MIN: HCPCS

## 2021-10-25 NOTE — HOME HEALTH
Mrs. Olvera is asleep in the recliner when SN arrives. Her  answers the door for SN. Pt. awakened without difficutly. She is alert to person, place and situation. Lungs are diminshed. She is a current smoker. She has oxygen at nightime and advises she wears it when she wants to. SN discussed importance of wearing her oxygen as ordered. Edema to her lower extremities are better today. She has 1+ to LLE. Blisters to Rt. foot are scabbing over. Silvadene ointment applied to areas. Pt. assisted on her side to assess her pressure wound to coccyx. Dressing removed. Scant amount of serous drainage. No odor noted. Wound measured. Wound cleanse and pat dry. Wound bed has more red granulation tissue today. Continues with yellow tissue to wound bed. Silavdene applied to woundbed and covered with border foam dressing. Pt. tolerated well. SN reviewed importance of repostioning and increasing protein in her diet for wound healing. Pt. was seen at her PCP last week for her wound, no new orders. No med changes today. Her BS today was 250 per her . Pt. continues to refuse ADA diet. DM diet encouraged and reviewed. Pt. recieved her PT/INR home testing and is to check her blood Wednesday in the home. Pt.  advised he was taught how to use machine by the company. SN called and notified Dr. Shoemaker office of SN not performing venipunture today d/t home kit arriving. Next visit schedule  with pt. for next week. They are aware how to contact SN if needed prior to next visit.

## 2021-10-26 ENCOUNTER — HOME CARE VISIT (OUTPATIENT)
Dept: HOME HEALTH SERVICES | Facility: HOME HEALTHCARE | Age: 61
End: 2021-10-26

## 2021-10-26 PROCEDURE — G0157 HHC PT ASSISTANT EA 15: HCPCS

## 2021-11-01 ENCOUNTER — HOME CARE VISIT (OUTPATIENT)
Dept: HOME HEALTH SERVICES | Facility: CLINIC | Age: 61
End: 2021-11-01

## 2021-11-01 VITALS
OXYGEN SATURATION: 93 % | SYSTOLIC BLOOD PRESSURE: 110 MMHG | HEART RATE: 80 BPM | TEMPERATURE: 97.5 F | DIASTOLIC BLOOD PRESSURE: 70 MMHG

## 2021-11-01 PROCEDURE — G0299 HHS/HOSPICE OF RN EA 15 MIN: HCPCS

## 2021-11-01 NOTE — HOME HEALTH
Mrs. Olvera is asleep in recliner when SN arrives. Her  answers the door for SN. She is easily awakened. She is alert to person and place. Lungs have slight wheezing to MICHAEL. All other lobes are clear today. She is a current smoker. She has oxygen at nightime and is awaiting a Cpap machine. She has complaints of generallized discomfort and takes pain medication/muscle relaxers when needed. VS are wnl. Her BS was in the 200's today. Pt. does not follow a ADA diet. She has 2+ edema to her toes and RLE extremity today. Blisters to toes are scabbed over. They cont. to use silvadene ointment as ordered. Pt. was agreeable to assess her wound to coccyx. Pt. assisted to her side. Dressing removed from pressure wound. Scant amount of serous drainage, no odor. Wound cleanse and pat dry. Wound bed has spots of yellow tissue to wound bed. Periwound is soft and whiteish. Silvadene ointment applied to wound bed and covered with iodoform Dressing. Pt. tolerated well. SN reviewed importance of repositioing to relieve pressure off of wound. Pt. cont. to refuse to lie in her bed at all and stays in her recliner. SN called and left message with Dr. Shoemaker office regarding reccomendations from insurance to use hydrofera blue for wound debridement. Meds reviewed. Next visit scheduled with pt. for next week. Pt.  advised he is doing well with her PT/INR home machine and is due to recheck her level on Wed.

## 2021-11-02 ENCOUNTER — HOME CARE VISIT (OUTPATIENT)
Dept: HOME HEALTH SERVICES | Facility: HOME HEALTHCARE | Age: 61
End: 2021-11-02

## 2021-11-02 ENCOUNTER — HOME CARE VISIT (OUTPATIENT)
Dept: HOME HEALTH SERVICES | Facility: CLINIC | Age: 61
End: 2021-11-02

## 2021-11-02 PROCEDURE — G0151 HHCP-SERV OF PT,EA 15 MIN: HCPCS

## 2021-11-03 VITALS — OXYGEN SATURATION: 97 % | DIASTOLIC BLOOD PRESSURE: 76 MMHG | HEART RATE: 55 BPM | SYSTOLIC BLOOD PRESSURE: 135 MMHG

## 2021-11-03 NOTE — HOME HEALTH
PATIENT IS BEING SEEN FOR STRENGTHENING AND MOBILITY DUE TO ONGOING CHRONIC PAIN, RECOVERING UTI, DMII.  SHE IS VERY WEAK AND HAS NOT BEEN ABLE TO AMBULATE MORE THAN A FEW FEET FOR SEVERAL MONTHS.  SHE NEEDS TO CONTINUE WORKING TOWARD IMPROVING HER STRENGTH SO THAT MOBILITY CAN IMPROVE.

## 2021-11-04 VITALS — OXYGEN SATURATION: 91 % | SYSTOLIC BLOOD PRESSURE: 140 MMHG | DIASTOLIC BLOOD PRESSURE: 62 MMHG | HEART RATE: 86 BPM

## 2021-11-04 NOTE — HOME HEALTH
Pt sleeping upon therapist arrival reporting having an upset stomach. Pt required increased encouragement to participate actively in session today. Pt refused ambulation and seated Therex due to not feeling well. She did report she needed to use the commode. Used this opportunity to educate pt and spouse on proper safe transfers and repositioning to decrease pressure on coccyx.

## 2021-11-08 ENCOUNTER — HOME CARE VISIT (OUTPATIENT)
Dept: HOME HEALTH SERVICES | Facility: CLINIC | Age: 61
End: 2021-11-08

## 2021-11-08 VITALS
RESPIRATION RATE: 20 BRPM | DIASTOLIC BLOOD PRESSURE: 60 MMHG | OXYGEN SATURATION: 97 % | TEMPERATURE: 97.8 F | HEART RATE: 80 BPM | SYSTOLIC BLOOD PRESSURE: 130 MMHG

## 2021-11-08 PROCEDURE — G0299 HHS/HOSPICE OF RN EA 15 MIN: HCPCS

## 2021-11-08 NOTE — HOME HEALTH
Mrs. Olvera is sitting in the recliner when SN arrives. She is alert to person and place. Her  is present. Lungs are diminished, she is a current smoker. VS are wnl. Edema looks alot better today. She cont. with scabs to toes on rt. foot that spouse is applying silvadene ointment to areas per MD twice a day. No reddness noted today. Pt. assisted to her side to assess pressure area to coccyx. Dressing removed, no drainage noted or odor. Wound cleanse with sterile normal saline and pat dry. Wound measured and not healing from last visit. Wound bed has some red and white granulation tissue. Periwound is whitish in color.  Hydrofera blue moistened with normal saline and applied to wound bed. Optifoam dressing applied over wound. Pt. tolerated with minimal discomfort. SN taught pt spouse how to apply new dressing. Dressing to be changed every 7 days and prn if soiled. Pt.  feels comfortable with dressing change. No med changes today. SN reviewed importance of pt. repositioning off wound for healing. Pt. spouse is awaiting new kits in the mail to check her INR level this week. Next visit scheduled with pt. for next week. They are aware how to contact SN if needed prior to next visit.

## 2021-11-10 ENCOUNTER — HOME CARE VISIT (OUTPATIENT)
Dept: HOME HEALTH SERVICES | Facility: CLINIC | Age: 61
End: 2021-11-10

## 2021-11-10 PROCEDURE — G0151 HHCP-SERV OF PT,EA 15 MIN: HCPCS

## 2021-11-11 VITALS — DIASTOLIC BLOOD PRESSURE: 90 MMHG | SYSTOLIC BLOOD PRESSURE: 165 MMHG | HEART RATE: 75 BPM | OXYGEN SATURATION: 96 %

## 2021-11-11 NOTE — HOME HEALTH
PATIENTIS BEING SEEN FOR WEAKNESS AND POOR MOBILITY DUE ONGOING POOR NUTRITION AND WOUNDS ON HER BUTTOCK AS WELL AS DMII AND SEPSIS.  SHE HAS BEEN SLOWLY IMPROVING AND IS NOW ABLE TO PERFORM TRANSFERS SIT TO STAND WITH MIN ASSIST AND AMBULATE SHORT DISTANCES WITH R/W WITH MOD ASSIST.  SPOUSE IS PRESENT AND HELPFUL  GOOD EFFORT AND PERFORMANCE TODAY

## 2021-11-15 ENCOUNTER — HOME CARE VISIT (OUTPATIENT)
Dept: HOME HEALTH SERVICES | Facility: CLINIC | Age: 61
End: 2021-11-15

## 2021-11-15 VITALS
TEMPERATURE: 97.4 F | OXYGEN SATURATION: 95 % | DIASTOLIC BLOOD PRESSURE: 62 MMHG | SYSTOLIC BLOOD PRESSURE: 140 MMHG | HEART RATE: 83 BPM | RESPIRATION RATE: 18 BRPM

## 2021-11-15 PROCEDURE — G0299 HHS/HOSPICE OF RN EA 15 MIN: HCPCS

## 2021-11-16 NOTE — HOME HEALTH
Mrs. Olvera is sitting in recliner when SN arrives. Her  answers the door. She is alert, oriented to person, place, and situation. Lungs are diminished. She is a current smoker. VS are wnl. She has complaints of generalized discomfort and manages her pain with medication in the home. Pt. advised her BS are doing ok she just has not had a good appetite for the last few days. SN encouraged pt. to drink boost or ensure if not eating a meal. She is to notify SN if poor appetite continues. No edema noted today. She cont. with scab areas to toes on right foot that  is applying silvadene ointment to twice a day. No areas to her arms. Pt. assisted to her side to assess pressure wound to coccyx. Dressing removed. Wound cleanse with saline and pat dry. Wound measured. Wound bed is pink with whitish areas. No drainage. Hydro fera blue moistened and applied to wound bed then covered with tegaderm dressing. Pt. tolerated with discomfort. Pt.  feels comfortable with dressing change in the home. SN reviewed importance of repositioing off area for healing. Pt. was laying on a pillow today. No med changes today. Pt.  continues with checking her PT/INR in the home with machine. No changes to her dose of coumadin. Next visit scheduled with pt. for next week.

## 2021-11-18 ENCOUNTER — HOME CARE VISIT (OUTPATIENT)
Dept: HOME HEALTH SERVICES | Facility: HOME HEALTHCARE | Age: 61
End: 2021-11-18

## 2021-11-22 ENCOUNTER — HOME CARE VISIT (OUTPATIENT)
Dept: HOME HEALTH SERVICES | Facility: CLINIC | Age: 61
End: 2021-11-22

## 2021-11-22 ENCOUNTER — HOME CARE VISIT (OUTPATIENT)
Dept: HOME HEALTH SERVICES | Facility: HOME HEALTHCARE | Age: 61
End: 2021-11-22

## 2021-11-22 VITALS
OXYGEN SATURATION: 95 % | SYSTOLIC BLOOD PRESSURE: 120 MMHG | HEART RATE: 86 BPM | RESPIRATION RATE: 18 BRPM | DIASTOLIC BLOOD PRESSURE: 68 MMHG | TEMPERATURE: 96.7 F

## 2021-11-22 PROCEDURE — G0299 HHS/HOSPICE OF RN EA 15 MIN: HCPCS

## 2021-11-23 ENCOUNTER — HOME CARE VISIT (OUTPATIENT)
Dept: HOME HEALTH SERVICES | Facility: HOME HEALTHCARE | Age: 61
End: 2021-11-23

## 2021-11-23 NOTE — HOME HEALTH
Mrs. Olvera is lying in the recliner when SN arrives. Her  answers the door for SN. Pt. is very drowsy today. She is lethargic like at times. She does recognize SN and what is going on but easily falls back asleep.  advised she had took her muscle relaxer this morning. Lungs are diminished. VS are wnl. She has complaints of pain all over. No edema noted. She has scabs to toes on right foot. Pt. assisted to her side to assess pressure wound. Dressing removed. No drainage noted. Wound bed measured and no healing from last visit. Woundbed continues pink with slight white areas. Periwound is whitish/pink. Wound bed cleanse with normal saline and pat dry. Hydra fera blue moistened and applied to woundbed then covered with tegaderm. Pt. tolerated well. SN reviewed with Pt./ on repostioining in the chair frequently for wound healing. Pt. was sitting on a pillow on her side today. Pt. has complaints of itching to her bottom and pain with urination. SN called and notified Dr. Shoemaker office. SN to obtain U/A today. Pt. assisted to BSC and U/A obtained in cup. Urine is very thick and cloudy. Pt. has labs ordered today as well. Venipuncture performed to . . Labs obtained. Pt. tolerated well. No med changes today. Pt. BS has been up and down per her . Pt. appetite has been very poor for the last week and she has not been feeling well. Dr. Shoemaker office is aware. SN discussed with  imporatnce of checking her BS before administering any insulin since her appetite is poor. He verbalizes understanding. Next visit scheduled for next week. Labs taken to [facility].

## 2021-11-23 NOTE — CASE COMMUNICATION
PATIENT WAS CONTACTED BY GABINO 11/22/AND 11/23/21 FOR HH VISIT.  SHE REPORTED THAT SHE HAS HAD SICKNESS IN HER HOME AND WOULD RATHER WE DID NOT COME UNTIL NEXT WEEK.

## 2021-11-24 ENCOUNTER — HOME CARE VISIT (OUTPATIENT)
Dept: HOME HEALTH SERVICES | Facility: HOME HEALTHCARE | Age: 61
End: 2021-11-24

## 2021-11-24 NOTE — CASE COMMUNICATION
Pt.  called  regarding pt. continues not eating and feeling good. He was concerned about her labs.  called and spoke with Dr. Shoemaker office regarding pt. cont. not feeling well and her labs. Jennyfer at MD office is going to show MD her labs and call pt. with instructions on what to do today.

## 2021-11-26 ENCOUNTER — HOME CARE VISIT (OUTPATIENT)
Dept: HOME HEALTH SERVICES | Facility: HOME HEALTHCARE | Age: 61
End: 2021-11-26

## 2021-11-28 ENCOUNTER — HOSPITAL ENCOUNTER (INPATIENT)
Facility: HOSPITAL | Age: 61
LOS: 4 days | Discharge: HOME HEALTH | End: 2021-12-02
Attending: EMERGENCY MEDICINE | Admitting: INTERNAL MEDICINE

## 2021-11-28 ENCOUNTER — APPOINTMENT (OUTPATIENT)
Dept: GENERAL RADIOLOGY | Facility: HOSPITAL | Age: 61
End: 2021-11-28

## 2021-11-28 ENCOUNTER — HOME CARE VISIT (OUTPATIENT)
Dept: HOME HEALTH SERVICES | Facility: HOME HEALTHCARE | Age: 61
End: 2021-11-28

## 2021-11-28 DIAGNOSIS — N39.0 ACUTE UTI: ICD-10-CM

## 2021-11-28 DIAGNOSIS — R73.9 HYPERGLYCEMIA: ICD-10-CM

## 2021-11-28 DIAGNOSIS — E87.20 LACTIC ACIDOSIS: ICD-10-CM

## 2021-11-28 DIAGNOSIS — R26.2 DIFFICULTY WALKING: ICD-10-CM

## 2021-11-28 DIAGNOSIS — E86.0 DEHYDRATION: ICD-10-CM

## 2021-11-28 DIAGNOSIS — A41.9 SEPSIS, DUE TO UNSPECIFIED ORGANISM, UNSPECIFIED WHETHER ACUTE ORGAN DYSFUNCTION PRESENT (HCC): Primary | ICD-10-CM

## 2021-11-28 LAB
027 TOXIN: NORMAL
ALBUMIN SERPL-MCNC: 2.9 G/DL (ref 3.5–5.2)
ALBUMIN/GLOB SERPL: 0.7 G/DL
ALP SERPL-CCNC: 163 U/L (ref 39–117)
ALT SERPL W P-5'-P-CCNC: 12 U/L (ref 1–33)
ANION GAP SERPL CALCULATED.3IONS-SCNC: 21.5 MMOL/L (ref 5–15)
AST SERPL-CCNC: 28 U/L (ref 1–32)
BACTERIA UR QL AUTO: ABNORMAL /HPF
BASOPHILS # BLD AUTO: 0.11 10*3/MM3 (ref 0–0.2)
BASOPHILS NFR BLD AUTO: 0.8 % (ref 0–1.5)
BILIRUB SERPL-MCNC: 0.6 MG/DL (ref 0–1.2)
BILIRUB UR QL STRIP: ABNORMAL
BUN SERPL-MCNC: 50 MG/DL (ref 8–23)
BUN/CREAT SERPL: 32.3 (ref 7–25)
C DIFF TOX GENS STL QL NAA+PROBE: NEGATIVE
CALCIUM SPEC-SCNC: 9.3 MG/DL (ref 8.6–10.5)
CHLORIDE SERPL-SCNC: 91 MMOL/L (ref 98–107)
CLARITY UR: ABNORMAL
CO2 SERPL-SCNC: 18.5 MMOL/L (ref 22–29)
COLOR UR: ABNORMAL
CREAT SERPL-MCNC: 1.55 MG/DL (ref 0.57–1)
D-LACTATE SERPL-SCNC: 3.6 MMOL/L (ref 0.5–2)
D-LACTATE SERPL-SCNC: 4 MMOL/L (ref 0.5–2)
DEPRECATED RDW RBC AUTO: 48.4 FL (ref 37–54)
EOSINOPHIL # BLD AUTO: 0.02 10*3/MM3 (ref 0–0.4)
EOSINOPHIL NFR BLD AUTO: 0.2 % (ref 0.3–6.2)
ERYTHROCYTE [DISTWIDTH] IN BLOOD BY AUTOMATED COUNT: 16.1 % (ref 12.3–15.4)
GFR SERPL CREATININE-BSD FRML MDRD: 34 ML/MIN/1.73
GLOBULIN UR ELPH-MCNC: 4.2 GM/DL
GLUCOSE BLDC GLUCOMTR-MCNC: 259 MG/DL (ref 70–99)
GLUCOSE BLDC GLUCOMTR-MCNC: 284 MG/DL (ref 70–99)
GLUCOSE BLDC GLUCOMTR-MCNC: 365 MG/DL (ref 70–99)
GLUCOSE BLDC GLUCOMTR-MCNC: 469 MG/DL (ref 70–99)
GLUCOSE SERPL-MCNC: 465 MG/DL (ref 65–99)
GLUCOSE UR STRIP-MCNC: ABNORMAL MG/DL
HCT VFR BLD AUTO: 38 % (ref 34–46.6)
HGB BLD-MCNC: 13.2 G/DL (ref 12–15.9)
HGB UR QL STRIP.AUTO: ABNORMAL
HOLD SPECIMEN: NORMAL
HOLD SPECIMEN: NORMAL
IMM GRANULOCYTES # BLD AUTO: 0.07 10*3/MM3 (ref 0–0.05)
IMM GRANULOCYTES NFR BLD AUTO: 0.5 % (ref 0–0.5)
INR PPP: 2.14 (ref 2–3)
KETONES UR QL STRIP: NEGATIVE
LEUKOCYTE ESTERASE UR QL STRIP.AUTO: ABNORMAL
LYMPHOCYTES # BLD AUTO: 0.79 10*3/MM3 (ref 0.7–3.1)
LYMPHOCYTES NFR BLD AUTO: 6.1 % (ref 19.6–45.3)
MCH RBC QN AUTO: 28.6 PG (ref 26.6–33)
MCHC RBC AUTO-ENTMCNC: 34.7 G/DL (ref 31.5–35.7)
MCV RBC AUTO: 82.3 FL (ref 79–97)
MONOCYTES # BLD AUTO: 0.96 10*3/MM3 (ref 0.1–0.9)
MONOCYTES NFR BLD AUTO: 7.4 % (ref 5–12)
NEUTROPHILS NFR BLD AUTO: 11.02 10*3/MM3 (ref 1.7–7)
NEUTROPHILS NFR BLD AUTO: 85 % (ref 42.7–76)
NITRITE UR QL STRIP: NEGATIVE
NRBC BLD AUTO-RTO: 0 /100 WBC (ref 0–0.2)
PH UR STRIP.AUTO: 5.5 [PH] (ref 5–8)
PLATELET # BLD AUTO: 224 10*3/MM3 (ref 140–450)
PMV BLD AUTO: 12.8 FL (ref 6–12)
POTASSIUM SERPL-SCNC: 3.8 MMOL/L (ref 3.5–5.2)
PROT SERPL-MCNC: 7.1 G/DL (ref 6–8.5)
PROT UR QL STRIP: ABNORMAL
PROTHROMBIN TIME: 20.8 SECONDS (ref 9.4–12)
RBC # BLD AUTO: 4.62 10*6/MM3 (ref 3.77–5.28)
RBC # UR STRIP: ABNORMAL /HPF
REF LAB TEST METHOD: ABNORMAL
SODIUM SERPL-SCNC: 131 MMOL/L (ref 136–145)
SP GR UR STRIP: 1.02 (ref 1–1.03)
SQUAMOUS #/AREA URNS HPF: ABNORMAL /HPF
UROBILINOGEN UR QL STRIP: ABNORMAL
WBC # UR STRIP: ABNORMAL /HPF
WBC NRBC COR # BLD: 12.97 10*3/MM3 (ref 3.4–10.8)
WHOLE BLOOD HOLD SPECIMEN: NORMAL
WHOLE BLOOD HOLD SPECIMEN: NORMAL

## 2021-11-28 PROCEDURE — 85610 PROTHROMBIN TIME: CPT

## 2021-11-28 PROCEDURE — 82962 GLUCOSE BLOOD TEST: CPT

## 2021-11-28 PROCEDURE — 87493 C DIFF AMPLIFIED PROBE: CPT | Performed by: EMERGENCY MEDICINE

## 2021-11-28 PROCEDURE — 80053 COMPREHEN METABOLIC PANEL: CPT

## 2021-11-28 PROCEDURE — P9612 CATHETERIZE FOR URINE SPEC: HCPCS

## 2021-11-28 PROCEDURE — 87506 IADNA-DNA/RNA PROBE TQ 6-11: CPT | Performed by: EMERGENCY MEDICINE

## 2021-11-28 PROCEDURE — 83605 ASSAY OF LACTIC ACID: CPT

## 2021-11-28 PROCEDURE — 63710000001 INSULIN REGULAR HUMAN PER 5 UNITS: Performed by: EMERGENCY MEDICINE

## 2021-11-28 PROCEDURE — 25010000002 CEFTRIAXONE PER 250 MG: Performed by: EMERGENCY MEDICINE

## 2021-11-28 PROCEDURE — 71045 X-RAY EXAM CHEST 1 VIEW: CPT

## 2021-11-28 PROCEDURE — 93010 ELECTROCARDIOGRAM REPORT: CPT | Performed by: INTERNAL MEDICINE

## 2021-11-28 PROCEDURE — 81001 URINALYSIS AUTO W/SCOPE: CPT | Performed by: EMERGENCY MEDICINE

## 2021-11-28 PROCEDURE — 83036 HEMOGLOBIN GLYCOSYLATED A1C: CPT | Performed by: INTERNAL MEDICINE

## 2021-11-28 PROCEDURE — 99285 EMERGENCY DEPT VISIT HI MDM: CPT

## 2021-11-28 PROCEDURE — 63710000001 INSULIN DETEMIR PER 5 UNITS: Performed by: INTERNAL MEDICINE

## 2021-11-28 PROCEDURE — 85025 COMPLETE CBC W/AUTO DIFF WBC: CPT

## 2021-11-28 PROCEDURE — 25010000002 MORPHINE PER 10 MG: Performed by: EMERGENCY MEDICINE

## 2021-11-28 PROCEDURE — 87040 BLOOD CULTURE FOR BACTERIA: CPT

## 2021-11-28 PROCEDURE — 87086 URINE CULTURE/COLONY COUNT: CPT | Performed by: EMERGENCY MEDICINE

## 2021-11-28 PROCEDURE — 93005 ELECTROCARDIOGRAM TRACING: CPT

## 2021-11-28 PROCEDURE — 25010000002 MEROPENEM PER 100 MG: Performed by: INTERNAL MEDICINE

## 2021-11-28 RX ORDER — ONDANSETRON 2 MG/ML
4 INJECTION INTRAMUSCULAR; INTRAVENOUS EVERY 6 HOURS PRN
Status: DISCONTINUED | OUTPATIENT
Start: 2021-11-28 | End: 2021-12-02 | Stop reason: HOSPADM

## 2021-11-28 RX ORDER — SODIUM CHLORIDE 9 MG/ML
125 INJECTION, SOLUTION INTRAVENOUS CONTINUOUS
Status: ACTIVE | OUTPATIENT
Start: 2021-11-28 | End: 2021-11-29

## 2021-11-28 RX ORDER — DEXTROSE MONOHYDRATE 100 MG/ML
25 INJECTION, SOLUTION INTRAVENOUS
Status: DISCONTINUED | OUTPATIENT
Start: 2021-11-28 | End: 2021-11-28

## 2021-11-28 RX ORDER — FUROSEMIDE 80 MG
80 TABLET ORAL 2 TIMES DAILY
COMMUNITY
End: 2022-11-29

## 2021-11-28 RX ORDER — POTASSIUM CHLORIDE 750 MG/1
20 TABLET, FILM COATED, EXTENDED RELEASE ORAL DAILY
Status: ON HOLD | COMMUNITY

## 2021-11-28 RX ORDER — NICOTINE POLACRILEX 4 MG
15 LOZENGE BUCCAL
Status: DISCONTINUED | OUTPATIENT
Start: 2021-11-28 | End: 2021-11-28

## 2021-11-28 RX ORDER — CEFTRIAXONE SODIUM 1 G/50ML
1 INJECTION, SOLUTION INTRAVENOUS ONCE
Status: COMPLETED | OUTPATIENT
Start: 2021-11-28 | End: 2021-11-28

## 2021-11-28 RX ORDER — SODIUM CHLORIDE 0.9 % (FLUSH) 0.9 %
10 SYRINGE (ML) INJECTION AS NEEDED
Status: DISCONTINUED | OUTPATIENT
Start: 2021-11-28 | End: 2021-11-28

## 2021-11-28 RX ORDER — ONDANSETRON 4 MG/1
4 TABLET, FILM COATED ORAL EVERY 8 HOURS PRN
Status: ON HOLD | COMMUNITY

## 2021-11-28 RX ORDER — HYDROCODONE BITARTRATE AND ACETAMINOPHEN 7.5; 325 MG/1; MG/1
1 TABLET ORAL EVERY 6 HOURS PRN
Status: DISCONTINUED | OUTPATIENT
Start: 2021-11-28 | End: 2021-11-29

## 2021-11-28 RX ORDER — NORTRIPTYLINE HYDROCHLORIDE 25 MG/1
25 CAPSULE ORAL NIGHTLY PRN
COMMUNITY
End: 2022-02-21

## 2021-11-28 RX ORDER — ACETAMINOPHEN 325 MG/1
650 TABLET ORAL EVERY 6 HOURS PRN
Status: DISCONTINUED | OUTPATIENT
Start: 2021-11-28 | End: 2021-12-02 | Stop reason: HOSPADM

## 2021-11-28 RX ADMIN — MEROPENEM 1 G: 1 INJECTION, POWDER, FOR SOLUTION INTRAVENOUS at 22:35

## 2021-11-28 RX ADMIN — MORPHINE SULFATE 4 MG: 4 INJECTION INTRAVENOUS at 19:10

## 2021-11-28 RX ADMIN — INSULIN HUMAN 25 UNITS: 100 INJECTION, SOLUTION PARENTERAL at 19:14

## 2021-11-28 RX ADMIN — INSULIN DETEMIR 30 UNITS: 100 INJECTION, SOLUTION SUBCUTANEOUS at 22:55

## 2021-11-28 RX ADMIN — CEFTRIAXONE SODIUM 1 G: 1 INJECTION, SOLUTION INTRAVENOUS at 17:49

## 2021-11-28 RX ADMIN — SODIUM CHLORIDE 1000 ML: 9 INJECTION, SOLUTION INTRAVENOUS at 17:05

## 2021-11-28 RX ADMIN — SODIUM CHLORIDE 125 ML/HR: 9 INJECTION, SOLUTION INTRAVENOUS at 22:35

## 2021-11-28 RX ADMIN — SODIUM CHLORIDE 500 ML: 9 INJECTION, SOLUTION INTRAVENOUS at 17:49

## 2021-11-29 PROBLEM — E87.6 HYPOKALEMIA: Status: ACTIVE | Noted: 2021-11-29

## 2021-11-29 PROBLEM — J44.1 COPD WITH ACUTE EXACERBATION: Chronic | Status: ACTIVE | Noted: 2021-11-29

## 2021-11-29 LAB
ALBUMIN SERPL-MCNC: 2.6 G/DL (ref 3.5–5.2)
ALBUMIN/GLOB SERPL: 0.7 G/DL
ALP SERPL-CCNC: 127 U/L (ref 39–117)
ALT SERPL W P-5'-P-CCNC: 10 U/L (ref 1–33)
ANION GAP SERPL CALCULATED.3IONS-SCNC: 13.3 MMOL/L (ref 5–15)
ANISOCYTOSIS BLD QL: ABNORMAL
AST SERPL-CCNC: 15 U/L (ref 1–32)
BACTERIA SPEC AEROBE CULT: NORMAL
BASOPHILS # BLD MANUAL: 0.1 10*3/MM3 (ref 0–0.2)
BASOPHILS NFR BLD MANUAL: 1 % (ref 0–1.5)
BILIRUB SERPL-MCNC: 0.3 MG/DL (ref 0–1.2)
BUN SERPL-MCNC: 50 MG/DL (ref 8–23)
BUN/CREAT SERPL: 52.6 (ref 7–25)
BURR CELLS BLD QL SMEAR: ABNORMAL
C COLI+JEJ+UPSA DNA STL QL NAA+NON-PROBE: NOT DETECTED
CALCIUM SPEC-SCNC: 8.5 MG/DL (ref 8.6–10.5)
CHLORIDE SERPL-SCNC: 103 MMOL/L (ref 98–107)
CO2 SERPL-SCNC: 20.7 MMOL/L (ref 22–29)
CREAT SERPL-MCNC: 0.95 MG/DL (ref 0.57–1)
CRYPTOSP DNA STL QL NAA+NON-PROBE: NOT DETECTED
D-LACTATE SERPL-SCNC: 3.6 MMOL/L (ref 0.5–2)
DEPRECATED RDW RBC AUTO: 49.1 FL (ref 37–54)
E HISTOLYT DNA STL QL NAA+NON-PROBE: NOT DETECTED
EC STX1+STX2 GENES STL QL NAA+NON-PROBE: NOT DETECTED
EOSINOPHIL # BLD MANUAL: 0.31 10*3/MM3 (ref 0–0.4)
EOSINOPHIL NFR BLD MANUAL: 3 % (ref 0.3–6.2)
ERYTHROCYTE [DISTWIDTH] IN BLOOD BY AUTOMATED COUNT: 16.3 % (ref 12.3–15.4)
G LAMBLIA DNA STL QL NAA+NON-PROBE: NOT DETECTED
GFR SERPL CREATININE-BSD FRML MDRD: 60 ML/MIN/1.73
GLOBULIN UR ELPH-MCNC: 3.6 GM/DL
GLUCOSE BLDC GLUCOMTR-MCNC: 149 MG/DL (ref 70–99)
GLUCOSE BLDC GLUCOMTR-MCNC: 204 MG/DL (ref 70–99)
GLUCOSE BLDC GLUCOMTR-MCNC: 230 MG/DL (ref 70–99)
GLUCOSE BLDC GLUCOMTR-MCNC: 41 MG/DL (ref 70–99)
GLUCOSE BLDC GLUCOMTR-MCNC: 44 MG/DL (ref 70–99)
GLUCOSE BLDC GLUCOMTR-MCNC: 65 MG/DL (ref 70–99)
GLUCOSE SERPL-MCNC: 115 MG/DL (ref 65–99)
HBA1C MFR BLD: 11.1 % (ref 4.8–5.6)
HCT VFR BLD AUTO: 33.9 % (ref 34–46.6)
HGB BLD-MCNC: 11.7 G/DL (ref 12–15.9)
INR PPP: 2.79 (ref 2–3)
LDH SERPL-CCNC: 192 U/L (ref 135–214)
LYMPHOCYTES # BLD MANUAL: 0.42 10*3/MM3 (ref 0.7–3.1)
LYMPHOCYTES NFR BLD MANUAL: 4 % (ref 5–12)
MCH RBC QN AUTO: 28.5 PG (ref 26.6–33)
MCHC RBC AUTO-ENTMCNC: 34.5 G/DL (ref 31.5–35.7)
MCV RBC AUTO: 82.7 FL (ref 79–97)
METAMYELOCYTES NFR BLD MANUAL: 1 % (ref 0–0)
MONOCYTES # BLD: 0.42 10*3/MM3 (ref 0.1–0.9)
NEUTROPHILS # BLD AUTO: 9.1 10*3/MM3 (ref 1.7–7)
NEUTROPHILS NFR BLD MANUAL: 79 % (ref 42.7–76)
NEUTS BAND NFR BLD MANUAL: 8 % (ref 0–5)
NEUTS VAC BLD QL SMEAR: ABNORMAL
OVALOCYTES BLD QL SMEAR: ABNORMAL
PLATELET # BLD AUTO: 185 10*3/MM3 (ref 140–450)
PMV BLD AUTO: 11.3 FL (ref 6–12)
POTASSIUM SERPL-SCNC: 2.7 MMOL/L (ref 3.5–5.2)
PROCALCITONIN SERPL-MCNC: 2.04 NG/ML (ref 0–0.25)
PROT SERPL-MCNC: 6.2 G/DL (ref 6–8.5)
PROTHROMBIN TIME: 26.9 SECONDS (ref 9.4–12)
RBC # BLD AUTO: 4.1 10*6/MM3 (ref 3.77–5.28)
S ENT+BONG DNA STL QL NAA+NON-PROBE: NOT DETECTED
SCAN SLIDE: NORMAL
SHIGELLA SP+EIEC IPAH ST NAA+NON-PROBE: NOT DETECTED
SMALL PLATELETS BLD QL SMEAR: ADEQUATE
SODIUM SERPL-SCNC: 137 MMOL/L (ref 136–145)
TOXIC GRANULATION: ABNORMAL
VARIANT LYMPHS NFR BLD MANUAL: 4 % (ref 19.6–45.3)
WBC NRBC COR # BLD: 10.46 10*3/MM3 (ref 3.4–10.8)

## 2021-11-29 PROCEDURE — 25010000002 METHYLPREDNISOLONE PER 40 MG: Performed by: INTERNAL MEDICINE

## 2021-11-29 PROCEDURE — 36415 COLL VENOUS BLD VENIPUNCTURE: CPT | Performed by: INTERNAL MEDICINE

## 2021-11-29 PROCEDURE — 94799 UNLISTED PULMONARY SVC/PX: CPT

## 2021-11-29 PROCEDURE — 83615 LACTATE (LD) (LDH) ENZYME: CPT | Performed by: INTERNAL MEDICINE

## 2021-11-29 PROCEDURE — 94640 AIRWAY INHALATION TREATMENT: CPT

## 2021-11-29 PROCEDURE — 82962 GLUCOSE BLOOD TEST: CPT

## 2021-11-29 PROCEDURE — 63710000001 INSULIN DETEMIR PER 5 UNITS: Performed by: INTERNAL MEDICINE

## 2021-11-29 PROCEDURE — 84145 PROCALCITONIN (PCT): CPT | Performed by: INTERNAL MEDICINE

## 2021-11-29 PROCEDURE — 80053 COMPREHEN METABOLIC PANEL: CPT | Performed by: INTERNAL MEDICINE

## 2021-11-29 PROCEDURE — 85610 PROTHROMBIN TIME: CPT | Performed by: INTERNAL MEDICINE

## 2021-11-29 PROCEDURE — 85025 COMPLETE CBC W/AUTO DIFF WBC: CPT | Performed by: INTERNAL MEDICINE

## 2021-11-29 PROCEDURE — 25010000002 CEFTRIAXONE PER 250 MG: Performed by: INTERNAL MEDICINE

## 2021-11-29 PROCEDURE — 25010000002 MEROPENEM PER 100 MG: Performed by: INTERNAL MEDICINE

## 2021-11-29 PROCEDURE — 83605 ASSAY OF LACTIC ACID: CPT | Performed by: INTERNAL MEDICINE

## 2021-11-29 PROCEDURE — 85007 BL SMEAR W/DIFF WBC COUNT: CPT | Performed by: INTERNAL MEDICINE

## 2021-11-29 RX ORDER — HYDROCHLOROTHIAZIDE 25 MG/1
6.25 TABLET ORAL DAILY
Status: DISCONTINUED | OUTPATIENT
Start: 2021-11-29 | End: 2021-12-01

## 2021-11-29 RX ORDER — NICOTINE POLACRILEX 4 MG
15 LOZENGE BUCCAL
Status: DISCONTINUED | OUTPATIENT
Start: 2021-11-29 | End: 2021-11-30

## 2021-11-29 RX ORDER — POTASSIUM CHLORIDE 750 MG/1
10 CAPSULE, EXTENDED RELEASE ORAL 2 TIMES DAILY WITH MEALS
Status: DISCONTINUED | OUTPATIENT
Start: 2021-11-29 | End: 2021-11-29

## 2021-11-29 RX ORDER — NICOTINE POLACRILEX 4 MG
LOZENGE BUCCAL
Status: COMPLETED
Start: 2021-11-29 | End: 2021-11-29

## 2021-11-29 RX ORDER — IPRATROPIUM BROMIDE AND ALBUTEROL SULFATE 2.5; .5 MG/3ML; MG/3ML
3 SOLUTION RESPIRATORY (INHALATION)
Status: DISCONTINUED | OUTPATIENT
Start: 2021-11-29 | End: 2021-12-02 | Stop reason: HOSPADM

## 2021-11-29 RX ORDER — BISOPROLOL FUMARATE 5 MG/1
10 TABLET, FILM COATED ORAL
Status: DISCONTINUED | OUTPATIENT
Start: 2021-11-29 | End: 2021-12-02 | Stop reason: HOSPADM

## 2021-11-29 RX ORDER — PANTOPRAZOLE SODIUM 40 MG/1
40 TABLET, DELAYED RELEASE ORAL DAILY
Status: DISCONTINUED | OUTPATIENT
Start: 2021-11-29 | End: 2021-12-02 | Stop reason: HOSPADM

## 2021-11-29 RX ORDER — FUROSEMIDE 40 MG/1
80 TABLET ORAL 2 TIMES DAILY
Status: DISCONTINUED | OUTPATIENT
Start: 2021-11-29 | End: 2021-12-02 | Stop reason: HOSPADM

## 2021-11-29 RX ORDER — METHYLPREDNISOLONE SODIUM SUCCINATE 40 MG/ML
40 INJECTION, POWDER, LYOPHILIZED, FOR SOLUTION INTRAMUSCULAR; INTRAVENOUS EVERY 8 HOURS
Status: DISCONTINUED | OUTPATIENT
Start: 2021-11-29 | End: 2021-12-01

## 2021-11-29 RX ORDER — SERTRALINE HYDROCHLORIDE 100 MG/1
100 TABLET, FILM COATED ORAL DAILY
Status: DISCONTINUED | OUTPATIENT
Start: 2021-11-29 | End: 2021-12-02 | Stop reason: HOSPADM

## 2021-11-29 RX ORDER — LOPERAMIDE HYDROCHLORIDE 2 MG/1
4 CAPSULE ORAL EVERY 6 HOURS PRN
Status: DISCONTINUED | OUTPATIENT
Start: 2021-11-29 | End: 2021-12-02 | Stop reason: HOSPADM

## 2021-11-29 RX ORDER — DEXTROSE MONOHYDRATE 100 MG/ML
25 INJECTION, SOLUTION INTRAVENOUS
Status: DISCONTINUED | OUTPATIENT
Start: 2021-11-29 | End: 2021-11-30

## 2021-11-29 RX ORDER — NORTRIPTYLINE HYDROCHLORIDE 25 MG/1
25 CAPSULE ORAL NIGHTLY PRN
Status: DISCONTINUED | OUTPATIENT
Start: 2021-11-29 | End: 2021-12-02 | Stop reason: HOSPADM

## 2021-11-29 RX ORDER — HYDROXYZINE HYDROCHLORIDE 10 MG/1
10 TABLET, FILM COATED ORAL 3 TIMES DAILY PRN
Status: DISCONTINUED | OUTPATIENT
Start: 2021-11-29 | End: 2021-12-02 | Stop reason: HOSPADM

## 2021-11-29 RX ORDER — OXYCODONE AND ACETAMINOPHEN 10; 325 MG/1; MG/1
1 TABLET ORAL EVERY 6 HOURS PRN
Status: DISCONTINUED | OUTPATIENT
Start: 2021-11-29 | End: 2021-12-02 | Stop reason: HOSPADM

## 2021-11-29 RX ORDER — CEFTRIAXONE SODIUM 1 G/50ML
1 INJECTION, SOLUTION INTRAVENOUS EVERY 24 HOURS
Status: COMPLETED | OUTPATIENT
Start: 2021-11-29 | End: 2021-12-01

## 2021-11-29 RX ORDER — BISOPROLOL FUMARATE AND HYDROCHLOROTHIAZIDE 10; 6.25 MG/1; MG/1
1 TABLET ORAL
Status: DISCONTINUED | OUTPATIENT
Start: 2021-11-29 | End: 2021-11-29

## 2021-11-29 RX ORDER — POTASSIUM CHLORIDE 750 MG/1
20 CAPSULE, EXTENDED RELEASE ORAL 2 TIMES DAILY WITH MEALS
Status: COMPLETED | OUTPATIENT
Start: 2021-11-29 | End: 2021-12-02

## 2021-11-29 RX ADMIN — SERTRALINE HYDROCHLORIDE 100 MG: 100 TABLET ORAL at 15:59

## 2021-11-29 RX ADMIN — LOPERAMIDE HYDROCHLORIDE 4 MG: 2 CAPSULE ORAL at 17:51

## 2021-11-29 RX ADMIN — DEXTROSE MONOHYDRATE 25 G: 100 INJECTION, SOLUTION INTRAVENOUS at 10:35

## 2021-11-29 RX ADMIN — OXYCODONE HYDROCHLORIDE AND ACETAMINOPHEN 1 TABLET: 10; 325 TABLET ORAL at 23:16

## 2021-11-29 RX ADMIN — INSULIN DETEMIR 30 UNITS: 100 INJECTION, SOLUTION SUBCUTANEOUS at 21:49

## 2021-11-29 RX ADMIN — BISOPROLOL FUMARATE 10 MG: 5 TABLET, FILM COATED ORAL at 15:56

## 2021-11-29 RX ADMIN — IPRATROPIUM BROMIDE AND ALBUTEROL SULFATE 3 ML: .5; 2.5 SOLUTION RESPIRATORY (INHALATION) at 11:37

## 2021-11-29 RX ADMIN — IPRATROPIUM BROMIDE AND ALBUTEROL SULFATE 3 ML: .5; 2.5 SOLUTION RESPIRATORY (INHALATION) at 19:32

## 2021-11-29 RX ADMIN — CEFTRIAXONE SODIUM 1 G: 1 INJECTION, SOLUTION INTRAVENOUS at 21:48

## 2021-11-29 RX ADMIN — OXYCODONE HYDROCHLORIDE AND ACETAMINOPHEN 1 TABLET: 10; 325 TABLET ORAL at 10:24

## 2021-11-29 RX ADMIN — FUROSEMIDE 80 MG: 40 TABLET ORAL at 15:57

## 2021-11-29 RX ADMIN — FUROSEMIDE 80 MG: 40 TABLET ORAL at 21:49

## 2021-11-29 RX ADMIN — POTASSIUM CHLORIDE 10 MEQ: 750 CAPSULE, EXTENDED RELEASE ORAL at 15:59

## 2021-11-29 RX ADMIN — POTASSIUM CHLORIDE 10 MEQ: 750 CAPSULE, EXTENDED RELEASE ORAL at 17:51

## 2021-11-29 RX ADMIN — POTASSIUM CHLORIDE 20 MEQ: 750 CAPSULE, EXTENDED RELEASE ORAL at 21:49

## 2021-11-29 RX ADMIN — SODIUM CHLORIDE 500 ML: 9 INJECTION, SOLUTION INTRAVENOUS at 11:31

## 2021-11-29 RX ADMIN — MEROPENEM 1 G: 1 INJECTION, POWDER, FOR SOLUTION INTRAVENOUS at 10:24

## 2021-11-29 RX ADMIN — HYDROCHLOROTHIAZIDE 6.25 MG: 25 TABLET ORAL at 15:58

## 2021-11-29 RX ADMIN — METHYLPREDNISOLONE SODIUM SUCCINATE 40 MG: 40 INJECTION, POWDER, FOR SOLUTION INTRAMUSCULAR; INTRAVENOUS at 21:49

## 2021-11-29 RX ADMIN — DEXTROSE: 15 GEL ORAL at 09:54

## 2021-11-29 RX ADMIN — OXYCODONE HYDROCHLORIDE AND ACETAMINOPHEN 1 TABLET: 10; 325 TABLET ORAL at 15:54

## 2021-11-29 RX ADMIN — PANTOPRAZOLE SODIUM 40 MG: 40 TABLET, DELAYED RELEASE ORAL at 15:59

## 2021-11-29 NOTE — CASE COMMUNICATION
Mrs. Olvera daughter Kristi called RN regarding pt. continues not to feel good and has not recieved her ABT for UTI. She advised pt. continues not to eat and is not as alert. Pt. daughter is going to call Dr. Shoemaker office about her medication. SN advised pt. to be seen in ER d/t her mental status and decline in condition. Pt. daughter verbalizes understanding.

## 2021-11-30 PROBLEM — E44.0 MODERATE MALNUTRITION (HCC): Status: ACTIVE | Noted: 2021-11-30

## 2021-11-30 LAB
ALBUMIN SERPL-MCNC: 2.2 G/DL (ref 3.5–5.2)
ALBUMIN/GLOB SERPL: 0.6 G/DL
ALP SERPL-CCNC: 135 U/L (ref 39–117)
ALT SERPL W P-5'-P-CCNC: 9 U/L (ref 1–33)
ANION GAP SERPL CALCULATED.3IONS-SCNC: 10.9 MMOL/L (ref 5–15)
AST SERPL-CCNC: 13 U/L (ref 1–32)
BASOPHILS # BLD AUTO: 0.03 10*3/MM3 (ref 0–0.2)
BASOPHILS NFR BLD AUTO: 0.5 % (ref 0–1.5)
BILIRUB SERPL-MCNC: 0.3 MG/DL (ref 0–1.2)
BUN SERPL-MCNC: 35 MG/DL (ref 8–23)
BUN/CREAT SERPL: 50 (ref 7–25)
CALCIUM SPEC-SCNC: 8 MG/DL (ref 8.6–10.5)
CHLORIDE SERPL-SCNC: 106 MMOL/L (ref 98–107)
CO2 SERPL-SCNC: 20.1 MMOL/L (ref 22–29)
CREAT SERPL-MCNC: 0.7 MG/DL (ref 0.57–1)
DEPRECATED RDW RBC AUTO: 47.8 FL (ref 37–54)
EOSINOPHIL # BLD AUTO: 0.12 10*3/MM3 (ref 0–0.4)
EOSINOPHIL NFR BLD AUTO: 2.1 % (ref 0.3–6.2)
ERYTHROCYTE [DISTWIDTH] IN BLOOD BY AUTOMATED COUNT: 16.1 % (ref 12.3–15.4)
GFR SERPL CREATININE-BSD FRML MDRD: 85 ML/MIN/1.73
GLOBULIN UR ELPH-MCNC: 3.4 GM/DL
GLUCOSE BLDC GLUCOMTR-MCNC: 143 MG/DL (ref 70–99)
GLUCOSE BLDC GLUCOMTR-MCNC: 193 MG/DL (ref 70–99)
GLUCOSE BLDC GLUCOMTR-MCNC: 193 MG/DL (ref 70–99)
GLUCOSE BLDC GLUCOMTR-MCNC: 208 MG/DL (ref 70–99)
GLUCOSE BLDC GLUCOMTR-MCNC: 29 MG/DL (ref 70–99)
GLUCOSE BLDC GLUCOMTR-MCNC: 32 MG/DL (ref 70–99)
GLUCOSE BLDC GLUCOMTR-MCNC: 36 MG/DL (ref 70–99)
GLUCOSE BLDC GLUCOMTR-MCNC: 76 MG/DL (ref 70–99)
GLUCOSE BLDC GLUCOMTR-MCNC: 88 MG/DL (ref 70–99)
GLUCOSE SERPL-MCNC: 179 MG/DL (ref 65–99)
HCT VFR BLD AUTO: 31.4 % (ref 34–46.6)
HGB BLD-MCNC: 10.9 G/DL (ref 12–15.9)
IMM GRANULOCYTES # BLD AUTO: 0.04 10*3/MM3 (ref 0–0.05)
IMM GRANULOCYTES NFR BLD AUTO: 0.7 % (ref 0–0.5)
INR PPP: 2.63 (ref 2–3)
LYMPHOCYTES # BLD AUTO: 0.67 10*3/MM3 (ref 0.7–3.1)
LYMPHOCYTES NFR BLD AUTO: 11.9 % (ref 19.6–45.3)
MAGNESIUM SERPL-MCNC: 1.3 MG/DL (ref 1.6–2.4)
MCH RBC QN AUTO: 28.2 PG (ref 26.6–33)
MCHC RBC AUTO-ENTMCNC: 34.7 G/DL (ref 31.5–35.7)
MCV RBC AUTO: 81.1 FL (ref 79–97)
MONOCYTES # BLD AUTO: 0.33 10*3/MM3 (ref 0.1–0.9)
MONOCYTES NFR BLD AUTO: 5.9 % (ref 5–12)
NEUTROPHILS NFR BLD AUTO: 4.42 10*3/MM3 (ref 1.7–7)
NEUTROPHILS NFR BLD AUTO: 78.9 % (ref 42.7–76)
NRBC BLD AUTO-RTO: 0 /100 WBC (ref 0–0.2)
PLATELET # BLD AUTO: 143 10*3/MM3 (ref 140–450)
PMV BLD AUTO: 11.8 FL (ref 6–12)
POTASSIUM SERPL-SCNC: 2.7 MMOL/L (ref 3.5–5.2)
PROT SERPL-MCNC: 5.6 G/DL (ref 6–8.5)
PROTHROMBIN TIME: 25.4 SECONDS (ref 9.4–12)
RBC # BLD AUTO: 3.87 10*6/MM3 (ref 3.77–5.28)
SODIUM SERPL-SCNC: 137 MMOL/L (ref 136–145)
WBC NRBC COR # BLD: 5.61 10*3/MM3 (ref 3.4–10.8)

## 2021-11-30 PROCEDURE — 94799 UNLISTED PULMONARY SVC/PX: CPT

## 2021-11-30 PROCEDURE — 83735 ASSAY OF MAGNESIUM: CPT | Performed by: INTERNAL MEDICINE

## 2021-11-30 PROCEDURE — 25010000002 METHYLPREDNISOLONE PER 40 MG: Performed by: INTERNAL MEDICINE

## 2021-11-30 PROCEDURE — 82962 GLUCOSE BLOOD TEST: CPT

## 2021-11-30 PROCEDURE — 25010000002 CEFTRIAXONE PER 250 MG: Performed by: INTERNAL MEDICINE

## 2021-11-30 PROCEDURE — 25010000002 GLUCAGON (HUMAN RECOMBINANT) 1 MG RECONSTITUTED SOLUTION: Performed by: INTERNAL MEDICINE

## 2021-11-30 PROCEDURE — 63710000001 INSULIN DETEMIR PER 5 UNITS: Performed by: INTERNAL MEDICINE

## 2021-11-30 PROCEDURE — 85025 COMPLETE CBC W/AUTO DIFF WBC: CPT | Performed by: INTERNAL MEDICINE

## 2021-11-30 PROCEDURE — 85610 PROTHROMBIN TIME: CPT | Performed by: INTERNAL MEDICINE

## 2021-11-30 PROCEDURE — 80053 COMPREHEN METABOLIC PANEL: CPT | Performed by: INTERNAL MEDICINE

## 2021-11-30 RX ORDER — WARFARIN SODIUM 1 MG/1
1 TABLET ORAL
Status: DISCONTINUED | OUTPATIENT
Start: 2021-12-01 | End: 2021-11-30

## 2021-11-30 RX ORDER — NICOTINE POLACRILEX 4 MG
15 LOZENGE BUCCAL
Status: DISCONTINUED | OUTPATIENT
Start: 2021-11-30 | End: 2021-12-02 | Stop reason: HOSPADM

## 2021-11-30 RX ORDER — WARFARIN SODIUM 1 MG/1
1 TABLET ORAL
Status: COMPLETED | OUTPATIENT
Start: 2021-11-30 | End: 2021-11-30

## 2021-11-30 RX ORDER — DEXTROSE MONOHYDRATE 100 MG/ML
25 INJECTION, SOLUTION INTRAVENOUS
Status: DISCONTINUED | OUTPATIENT
Start: 2021-11-30 | End: 2021-12-02 | Stop reason: HOSPADM

## 2021-11-30 RX ORDER — SACCHAROMYCES BOULARDII 250 MG
250 CAPSULE ORAL 2 TIMES DAILY
Status: DISCONTINUED | OUTPATIENT
Start: 2021-11-30 | End: 2021-12-02 | Stop reason: HOSPADM

## 2021-11-30 RX ADMIN — OXYCODONE HYDROCHLORIDE AND ACETAMINOPHEN 1 TABLET: 10; 325 TABLET ORAL at 13:29

## 2021-11-30 RX ADMIN — POTASSIUM CHLORIDE 20 MEQ: 750 CAPSULE, EXTENDED RELEASE ORAL at 08:32

## 2021-11-30 RX ADMIN — OXYCODONE HYDROCHLORIDE AND ACETAMINOPHEN 1 TABLET: 10; 325 TABLET ORAL at 21:18

## 2021-11-30 RX ADMIN — DEXTROSE MONOHYDRATE 25 G: 100 INJECTION, SOLUTION INTRAVENOUS at 12:57

## 2021-11-30 RX ADMIN — FUROSEMIDE 80 MG: 40 TABLET ORAL at 08:32

## 2021-11-30 RX ADMIN — METHYLPREDNISOLONE SODIUM SUCCINATE 40 MG: 40 INJECTION, POWDER, FOR SOLUTION INTRAMUSCULAR; INTRAVENOUS at 12:00

## 2021-11-30 RX ADMIN — IPRATROPIUM BROMIDE AND ALBUTEROL SULFATE 3 ML: .5; 2.5 SOLUTION RESPIRATORY (INHALATION) at 06:44

## 2021-11-30 RX ADMIN — FUROSEMIDE 80 MG: 40 TABLET ORAL at 21:17

## 2021-11-30 RX ADMIN — HYDROXYZINE HYDROCHLORIDE 10 MG: 10 TABLET ORAL at 06:17

## 2021-11-30 RX ADMIN — IPRATROPIUM BROMIDE AND ALBUTEROL SULFATE 3 ML: .5; 2.5 SOLUTION RESPIRATORY (INHALATION) at 00:06

## 2021-11-30 RX ADMIN — METHYLPREDNISOLONE SODIUM SUCCINATE 40 MG: 40 INJECTION, POWDER, FOR SOLUTION INTRAMUSCULAR; INTRAVENOUS at 21:16

## 2021-11-30 RX ADMIN — WARFARIN SODIUM 1 MG: 1 TABLET ORAL at 17:22

## 2021-11-30 RX ADMIN — WARFARIN SODIUM 1 MG: 1 TABLET ORAL at 01:09

## 2021-11-30 RX ADMIN — METHYLPREDNISOLONE SODIUM SUCCINATE 40 MG: 40 INJECTION, POWDER, FOR SOLUTION INTRAMUSCULAR; INTRAVENOUS at 04:50

## 2021-11-30 RX ADMIN — PANTOPRAZOLE SODIUM 40 MG: 40 TABLET, DELAYED RELEASE ORAL at 08:32

## 2021-11-30 RX ADMIN — BISOPROLOL FUMARATE 10 MG: 5 TABLET, FILM COATED ORAL at 08:32

## 2021-11-30 RX ADMIN — POTASSIUM CHLORIDE 20 MEQ: 750 CAPSULE, EXTENDED RELEASE ORAL at 17:22

## 2021-11-30 RX ADMIN — OXYCODONE HYDROCHLORIDE AND ACETAMINOPHEN 1 TABLET: 10; 325 TABLET ORAL at 05:18

## 2021-11-30 RX ADMIN — INSULIN DETEMIR 30 UNITS: 100 INJECTION, SOLUTION SUBCUTANEOUS at 08:32

## 2021-11-30 RX ADMIN — CEFTRIAXONE SODIUM 1 G: 1 INJECTION, SOLUTION INTRAVENOUS at 21:16

## 2021-11-30 RX ADMIN — RDII 250 MG CAPSULE 250 MG: at 21:17

## 2021-11-30 RX ADMIN — SERTRALINE HYDROCHLORIDE 100 MG: 100 TABLET ORAL at 08:32

## 2021-11-30 RX ADMIN — HYDROCHLOROTHIAZIDE 6.25 MG: 25 TABLET ORAL at 08:32

## 2021-12-01 LAB
ALBUMIN SERPL-MCNC: 2.5 G/DL (ref 3.5–5.2)
ALBUMIN/GLOB SERPL: 0.7 G/DL
ALP SERPL-CCNC: 148 U/L (ref 39–117)
ALT SERPL W P-5'-P-CCNC: 11 U/L (ref 1–33)
ANION GAP SERPL CALCULATED.3IONS-SCNC: 12.4 MMOL/L (ref 5–15)
AST SERPL-CCNC: 14 U/L (ref 1–32)
BASOPHILS # BLD AUTO: 0.03 10*3/MM3 (ref 0–0.2)
BASOPHILS NFR BLD AUTO: 0.6 % (ref 0–1.5)
BILIRUB SERPL-MCNC: 0.3 MG/DL (ref 0–1.2)
BUN SERPL-MCNC: 31 MG/DL (ref 8–23)
BUN/CREAT SERPL: 40.3 (ref 7–25)
CALCIUM SPEC-SCNC: 7.8 MG/DL (ref 8.6–10.5)
CHLORIDE SERPL-SCNC: 105 MMOL/L (ref 98–107)
CO2 SERPL-SCNC: 21.6 MMOL/L (ref 22–29)
CREAT SERPL-MCNC: 0.77 MG/DL (ref 0.57–1)
DEPRECATED RDW RBC AUTO: 50.1 FL (ref 37–54)
EOSINOPHIL # BLD AUTO: 0.01 10*3/MM3 (ref 0–0.4)
EOSINOPHIL NFR BLD AUTO: 0.2 % (ref 0.3–6.2)
ERYTHROCYTE [DISTWIDTH] IN BLOOD BY AUTOMATED COUNT: 16.5 % (ref 12.3–15.4)
GFR SERPL CREATININE-BSD FRML MDRD: 76 ML/MIN/1.73
GLOBULIN UR ELPH-MCNC: 3.7 GM/DL
GLUCOSE BLDC GLUCOMTR-MCNC: 400 MG/DL (ref 70–99)
GLUCOSE BLDC GLUCOMTR-MCNC: 432 MG/DL (ref 70–99)
GLUCOSE BLDC GLUCOMTR-MCNC: >500 MG/DL (ref 70–99)
GLUCOSE SERPL-MCNC: 322 MG/DL (ref 65–99)
HCT VFR BLD AUTO: 32.4 % (ref 34–46.6)
HGB BLD-MCNC: 11 G/DL (ref 12–15.9)
IMM GRANULOCYTES # BLD AUTO: 0.06 10*3/MM3 (ref 0–0.05)
IMM GRANULOCYTES NFR BLD AUTO: 1.1 % (ref 0–0.5)
INR PPP: 3.49 (ref 2–3)
LYMPHOCYTES # BLD AUTO: 0.7 10*3/MM3 (ref 0.7–3.1)
LYMPHOCYTES NFR BLD AUTO: 13.2 % (ref 19.6–45.3)
MCH RBC QN AUTO: 28.3 PG (ref 26.6–33)
MCHC RBC AUTO-ENTMCNC: 34 G/DL (ref 31.5–35.7)
MCV RBC AUTO: 83.3 FL (ref 79–97)
MONOCYTES # BLD AUTO: 0.15 10*3/MM3 (ref 0.1–0.9)
MONOCYTES NFR BLD AUTO: 2.8 % (ref 5–12)
NEUTROPHILS NFR BLD AUTO: 4.37 10*3/MM3 (ref 1.7–7)
NEUTROPHILS NFR BLD AUTO: 82.1 % (ref 42.7–76)
NRBC BLD AUTO-RTO: 0 /100 WBC (ref 0–0.2)
PLATELET # BLD AUTO: 164 10*3/MM3 (ref 140–450)
PMV BLD AUTO: 11.2 FL (ref 6–12)
POTASSIUM SERPL-SCNC: 3.1 MMOL/L (ref 3.5–5.2)
PROT SERPL-MCNC: 6.2 G/DL (ref 6–8.5)
PROTHROMBIN TIME: 33.7 SECONDS (ref 9.4–12)
RBC # BLD AUTO: 3.89 10*6/MM3 (ref 3.77–5.28)
SODIUM SERPL-SCNC: 139 MMOL/L (ref 136–145)
WBC NRBC COR # BLD: 5.32 10*3/MM3 (ref 3.4–10.8)

## 2021-12-01 PROCEDURE — 25010000002 METHYLPREDNISOLONE PER 40 MG: Performed by: INTERNAL MEDICINE

## 2021-12-01 PROCEDURE — 85025 COMPLETE CBC W/AUTO DIFF WBC: CPT | Performed by: INTERNAL MEDICINE

## 2021-12-01 PROCEDURE — 94799 UNLISTED PULMONARY SVC/PX: CPT

## 2021-12-01 PROCEDURE — 63710000001 INSULIN LISPRO (HUMAN) PER 5 UNITS: Performed by: INTERNAL MEDICINE

## 2021-12-01 PROCEDURE — 25010000002 CEFTRIAXONE PER 250 MG: Performed by: INTERNAL MEDICINE

## 2021-12-01 PROCEDURE — 82962 GLUCOSE BLOOD TEST: CPT

## 2021-12-01 PROCEDURE — 85610 PROTHROMBIN TIME: CPT | Performed by: INTERNAL MEDICINE

## 2021-12-01 PROCEDURE — 80053 COMPREHEN METABOLIC PANEL: CPT | Performed by: INTERNAL MEDICINE

## 2021-12-01 RX ORDER — POTASSIUM CHLORIDE 750 MG/1
40 CAPSULE, EXTENDED RELEASE ORAL ONCE
Status: COMPLETED | OUTPATIENT
Start: 2021-12-01 | End: 2021-12-01

## 2021-12-01 RX ORDER — CHOLESTYRAMINE LIGHT 4 G/5.7G
1 POWDER, FOR SUSPENSION ORAL EVERY 12 HOURS SCHEDULED
Status: DISCONTINUED | OUTPATIENT
Start: 2021-12-01 | End: 2021-12-02 | Stop reason: HOSPADM

## 2021-12-01 RX ADMIN — LOPERAMIDE HYDROCHLORIDE 4 MG: 2 CAPSULE ORAL at 11:52

## 2021-12-01 RX ADMIN — FUROSEMIDE 80 MG: 40 TABLET ORAL at 08:15

## 2021-12-01 RX ADMIN — INSULIN LISPRO 15 UNITS: 100 INJECTION, SOLUTION INTRAVENOUS; SUBCUTANEOUS at 23:53

## 2021-12-01 RX ADMIN — RDII 250 MG CAPSULE 250 MG: at 21:06

## 2021-12-01 RX ADMIN — CEFTRIAXONE SODIUM 1 G: 1 INJECTION, SOLUTION INTRAVENOUS at 21:05

## 2021-12-01 RX ADMIN — RDII 250 MG CAPSULE 250 MG: at 08:31

## 2021-12-01 RX ADMIN — POTASSIUM CHLORIDE 20 MEQ: 750 CAPSULE, EXTENDED RELEASE ORAL at 08:15

## 2021-12-01 RX ADMIN — HYDROCHLOROTHIAZIDE 6.25 MG: 25 TABLET ORAL at 08:28

## 2021-12-01 RX ADMIN — INSULIN LISPRO 9 UNITS: 100 INJECTION, SOLUTION INTRAVENOUS; SUBCUTANEOUS at 17:59

## 2021-12-01 RX ADMIN — INSULIN LISPRO 8 UNITS: 100 INJECTION, SOLUTION INTRAVENOUS; SUBCUTANEOUS at 11:50

## 2021-12-01 RX ADMIN — CHOLESTYRAMINE 4 G: 4 POWDER, FOR SUSPENSION ORAL at 21:06

## 2021-12-01 RX ADMIN — LOPERAMIDE HYDROCHLORIDE 4 MG: 2 CAPSULE ORAL at 03:55

## 2021-12-01 RX ADMIN — OXYCODONE HYDROCHLORIDE AND ACETAMINOPHEN 1 TABLET: 10; 325 TABLET ORAL at 08:28

## 2021-12-01 RX ADMIN — HYDROXYZINE HYDROCHLORIDE 10 MG: 10 TABLET ORAL at 21:06

## 2021-12-01 RX ADMIN — SERTRALINE HYDROCHLORIDE 100 MG: 100 TABLET ORAL at 08:15

## 2021-12-01 RX ADMIN — IPRATROPIUM BROMIDE AND ALBUTEROL SULFATE 3 ML: .5; 2.5 SOLUTION RESPIRATORY (INHALATION) at 18:46

## 2021-12-01 RX ADMIN — CHOLESTYRAMINE 4 G: 4 POWDER, FOR SUSPENSION ORAL at 15:57

## 2021-12-01 RX ADMIN — PANTOPRAZOLE SODIUM 40 MG: 40 TABLET, DELAYED RELEASE ORAL at 08:16

## 2021-12-01 RX ADMIN — POTASSIUM CHLORIDE 40 MEQ: 750 CAPSULE, EXTENDED RELEASE ORAL at 15:56

## 2021-12-01 RX ADMIN — FUROSEMIDE 80 MG: 40 TABLET ORAL at 21:06

## 2021-12-01 RX ADMIN — BISOPROLOL FUMARATE 10 MG: 5 TABLET, FILM COATED ORAL at 08:15

## 2021-12-01 RX ADMIN — HYDROXYZINE HYDROCHLORIDE 10 MG: 10 TABLET ORAL at 11:51

## 2021-12-01 RX ADMIN — POTASSIUM CHLORIDE 20 MEQ: 750 CAPSULE, EXTENDED RELEASE ORAL at 17:42

## 2021-12-01 RX ADMIN — METHYLPREDNISOLONE SODIUM SUCCINATE 40 MG: 40 INJECTION, POWDER, FOR SOLUTION INTRAMUSCULAR; INTRAVENOUS at 11:50

## 2021-12-01 RX ADMIN — OXYCODONE HYDROCHLORIDE AND ACETAMINOPHEN 1 TABLET: 10; 325 TABLET ORAL at 18:44

## 2021-12-01 RX ADMIN — INSULIN LISPRO 7 UNITS: 100 INJECTION, SOLUTION INTRAVENOUS; SUBCUTANEOUS at 07:27

## 2021-12-01 RX ADMIN — METHYLPREDNISOLONE SODIUM SUCCINATE 40 MG: 40 INJECTION, POWDER, FOR SOLUTION INTRAMUSCULAR; INTRAVENOUS at 03:51

## 2021-12-01 NOTE — PROGRESS NOTES
Pharmacy to Dose: Warfarin  Consulting provider: NORIS  Indication for warfarin: AF REQUIRING FULL ANTICOAGULATION  Goal INR range: 2-3  Home warfarin dose: WARFARIN 2 MG DAILY  Actions or monitoring: INR & S/S OF BLEEDING    Any Vitamin K given?: NO  Any major drug interactions: NA  Is patient on bridging therapy with another anticoagulant?: NO    Plan:   INR increased by 0.0.86 today. Will hold warfarin today and check INR tomorrow    Date HGB PLATELETS INR Warfarin Dose Given   11-28 13.2 224 2.14 -   11-29 11.7 185 2.79 1 mg    11-30 10.9 143 2.63 1 mg   12-1 11 164 3.49 hold

## 2021-12-01 NOTE — PROGRESS NOTES
[facility]     Progress Note    Patient Name: Italia Olvera  : 1960  MRN: [id number]  Primary Care Physician:  Carloz Shoemaker MD  Date of admission: 2021      Subjective   Brief summary.  Patient admitted with shortness of breath and UTI      HPI:  More awake and alert complains of less shortness of breath, complains of diarrhea.  Dry area not slowing happening for last 7 days.  Some abdominal discomfort.  No fever chills.  Requesting pain medications and requesting sleeping pills, blood sugar dropped earlier.    Review of Systems     Denies any chest pain, complains of shortness of breath, no nausea vomiting, complains of diarrhea.  No blood in the stools.      Objective     Vitals:   Temp:  [97.3 °F (36.3 °C)-99 °F (37.2 °C)] 98.1 °F (36.7 °C)  Heart Rate:  [77-90] 77  Resp:  [16-18] 18  BP: (107-166)/(44-80) 107/60  Flow (L/min):  [2] 2    Physical Exam :     Elderly female not in acute distress.  Anxious.  Neck supple  Heart regular  Lungs diminished breath sounds but occasional rhonchi.  Significantly improved from yesterday.  Abdomen soft.  Mild suprapubic and diffuse tenderness.  No CVA tenderness.  Extremities without any edema      Result Review:  I have personally reviewed the results from the time of this admission to 2021 19:13 EST and agree with these findings:  [x]  Laboratory  [x]  Microbiology  [x]  Radiology  []  EKG/Telemetry   []  Cardiology/Vascular   []  Pathology  []  Old records  []  Other:       Urine cultures with mixed kwame  Assessment / Plan       Active Hospital Problems:  Active Hospital Problems    Diagnosis    • **COPD with acute exacerbation ([facility])    • Hypokalemia    • Sepsis ([facility])    • Acute UTI (urinary tract infection)    • T2DM (type 2 diabetes mellitus) (CMS/[facility])- uncontrolled    Hypoglycemia    Plan:   Patient with multiple issues, now hyperglycemic although we use very small amount of insulin.  We will discontinue long-acting insulin, use only  sliding scale if needed.  Continue steroids and neb treatments.  Cultures with mixed kwame.  Nondiagnostic for UTI.  Continue antibiotic for now which will help with her COPD also.  At this point patient has some diarrhea stools for C. difficile negative we will add Florastor.  Also restart pain meds.  Increase activity as tolerates.  Monitor pro time and INR per pharmacy.       DVT prophylaxis:  Medical DVT prophylaxis orders are present.    CODE STATUS:   Code Status (Patient has no pulse and is not breathing): CPR (Attempt to Resuscitate)  Medical Interventions (Patient has pulse or is breathing): Full Support    Total time spent with patient's exam eval and discussion with the nursing staff approximately 25 minutes        Electronically signed by Rudy Garnett MD, 11/30/21, 7:13 PM EST.

## 2021-12-01 NOTE — PROGRESS NOTES
[facility]     Progress Note    Patient Name: Italia Olvera  : 1960  MRN: [id number]  Primary Care Physician:  Carloz Shoemaker MD  Date of admission: 2021      Subjective   Brief summary.  Admitted with shortness of breath and UTI    HPI:  Complaints of diarrhea.  Wants to go home, no shortness of breath.  No abdominal pain    Review of Systems      No fever chills.  No blood in the stools.  No cough      Objective     Vitals:   Temp:  [97.6 °F (36.4 °C)-98.4 °F (36.9 °C)] 98.3 °F (36.8 °C)  Heart Rate:  [77-90] 77  Resp:  [18] 18  BP: (107-162)/(60-75) 150/66  Flow (L/min):  [2] 2    Physical Exam :     Elderly female, not in acute distress.  Heart regular.  Lungs diminished breath sounds but clear.  Abdomen soft nontender      Result Review:  I have personally reviewed the results from the time of this admission to 2021 13:54 EST and agree with these findings:  [x]  Laboratory  []  Microbiology  []  Radiology  []  EKG/Telemetry   []  Cardiology/Vascular   []  Pathology  []  Old records  []  Other:           Assessment / Plan       Active Hospital Problems:  Active Hospital Problems    Diagnosis    • **COPD with acute exacerbation ([facility])    • Moderate malnutrition (CMS/[facility])    • Hypokalemia    • Sepsis ([facility])    • Acute UTI (urinary tract infection)    • T2DM (type 2 diabetes mellitus) (CMS/[facility])- uncontrolled        Plan:   Replace potassium.  Discontinue steroids.  Add Questran Light.  Increase activity.  Recheck labs in a.m.       DVT prophylaxis:  Medical DVT prophylaxis orders are present.    CODE STATUS:   Code Status (Patient has no pulse and is not breathing): CPR (Attempt to Resuscitate)  Medical Interventions (Patient has pulse or is breathing): Full Support            Electronically signed by Rudy Garnett MD, 21, 1:54 PM EST.            28-May-2019 22:43

## 2021-12-01 NOTE — PLAN OF CARE
Goal Outcome Evaluation:  Plan of Care Reviewed With: patient  Problem: Skin Injury Risk Increased  Goal: Skin Health and Integrity  Outcome: Ongoing, Progressing     Problem: Glycemic Control Impaired (Sepsis/Septic Shock)  Goal: Blood Glucose Level Within Desired Range  Outcome: Ongoing, Progressing  Intervention: Optimize Glycemic Control  Recent Flowsheet Documentation  Taken 12/1/2021 0910 by Kaera Smith, RN  Glycemic Management: blood glucose monitoring

## 2021-12-01 NOTE — PLAN OF CARE
Goal Outcome Evaluation:  Plan of Care Reviewed With: patient        Progress: no change   Vital signs stable. No new complaints. Patient resting comfortably and pain controlled with medication and repositioning. Will continue to monitor. Carolin Marques RN

## 2021-12-02 ENCOUNTER — TRANSCRIBE ORDERS (OUTPATIENT)
Dept: HOME HEALTH SERVICES | Facility: HOME HEALTHCARE | Age: 61
End: 2021-12-02

## 2021-12-02 ENCOUNTER — READMISSION MANAGEMENT (OUTPATIENT)
Dept: CALL CENTER | Facility: HOSPITAL | Age: 61
End: 2021-12-02

## 2021-12-02 VITALS
HEIGHT: 66 IN | TEMPERATURE: 97.5 F | WEIGHT: 113.1 LBS | SYSTOLIC BLOOD PRESSURE: 136 MMHG | RESPIRATION RATE: 18 BRPM | HEART RATE: 74 BPM | DIASTOLIC BLOOD PRESSURE: 59 MMHG | BODY MASS INDEX: 18.18 KG/M2 | OXYGEN SATURATION: 100 %

## 2021-12-02 DIAGNOSIS — N39.0 URINARY TRACT INFECTION WITHOUT HEMATURIA, SITE UNSPECIFIED: Primary | ICD-10-CM

## 2021-12-02 PROBLEM — L89.103: Chronic | Status: ACTIVE | Noted: 2021-12-02

## 2021-12-02 LAB
ANION GAP SERPL CALCULATED.3IONS-SCNC: 11.1 MMOL/L (ref 5–15)
BUN SERPL-MCNC: 31 MG/DL (ref 8–23)
BUN/CREAT SERPL: 33.3 (ref 7–25)
CALCIUM SPEC-SCNC: 7.8 MG/DL (ref 8.6–10.5)
CHLORIDE SERPL-SCNC: 105 MMOL/L (ref 98–107)
CO2 SERPL-SCNC: 22.9 MMOL/L (ref 22–29)
CREAT SERPL-MCNC: 0.93 MG/DL (ref 0.57–1)
GFR SERPL CREATININE-BSD FRML MDRD: 61 ML/MIN/1.73
GLUCOSE BLDC GLUCOMTR-MCNC: 281 MG/DL (ref 70–99)
GLUCOSE BLDC GLUCOMTR-MCNC: 345 MG/DL (ref 70–99)
GLUCOSE BLDC GLUCOMTR-MCNC: 378 MG/DL (ref 70–99)
GLUCOSE SERPL-MCNC: 267 MG/DL (ref 65–99)
INR PPP: 3.95 (ref 2–3)
POTASSIUM SERPL-SCNC: 3.6 MMOL/L (ref 3.5–5.2)
PROTHROMBIN TIME: 38.2 SECONDS (ref 9.4–12)
SODIUM SERPL-SCNC: 139 MMOL/L (ref 136–145)

## 2021-12-02 PROCEDURE — 94799 UNLISTED PULMONARY SVC/PX: CPT

## 2021-12-02 PROCEDURE — 63710000001 INSULIN LISPRO (HUMAN) PER 5 UNITS: Performed by: INTERNAL MEDICINE

## 2021-12-02 PROCEDURE — 97530 THERAPEUTIC ACTIVITIES: CPT

## 2021-12-02 PROCEDURE — 82962 GLUCOSE BLOOD TEST: CPT

## 2021-12-02 PROCEDURE — 83735 ASSAY OF MAGNESIUM: CPT | Performed by: INTERNAL MEDICINE

## 2021-12-02 PROCEDURE — 97161 PT EVAL LOW COMPLEX 20 MIN: CPT

## 2021-12-02 PROCEDURE — 80048 BASIC METABOLIC PNL TOTAL CA: CPT | Performed by: INTERNAL MEDICINE

## 2021-12-02 PROCEDURE — 85610 PROTHROMBIN TIME: CPT | Performed by: INTERNAL MEDICINE

## 2021-12-02 RX ORDER — SACCHAROMYCES BOULARDII 250 MG
250 CAPSULE ORAL 2 TIMES DAILY
Qty: 30 CAPSULE | Refills: 0 | Status: SHIPPED | OUTPATIENT
Start: 2021-12-02 | End: 2021-12-17

## 2021-12-02 RX ORDER — CHOLESTYRAMINE LIGHT 4 G/5.7G
1 POWDER, FOR SUSPENSION ORAL EVERY 12 HOURS SCHEDULED
Qty: 60 PACKET | Refills: 0 | Status: SHIPPED | OUTPATIENT
Start: 2021-12-02 | End: 2022-11-29

## 2021-12-02 RX ADMIN — POTASSIUM CHLORIDE 20 MEQ: 750 CAPSULE, EXTENDED RELEASE ORAL at 08:14

## 2021-12-02 RX ADMIN — SERTRALINE HYDROCHLORIDE 100 MG: 100 TABLET ORAL at 09:46

## 2021-12-02 RX ADMIN — FUROSEMIDE 80 MG: 40 TABLET ORAL at 08:13

## 2021-12-02 RX ADMIN — HYDROXYZINE HYDROCHLORIDE 10 MG: 10 TABLET ORAL at 08:15

## 2021-12-02 RX ADMIN — OXYCODONE HYDROCHLORIDE AND ACETAMINOPHEN 1 TABLET: 10; 325 TABLET ORAL at 01:44

## 2021-12-02 RX ADMIN — BISOPROLOL FUMARATE 10 MG: 5 TABLET, FILM COATED ORAL at 08:14

## 2021-12-02 RX ADMIN — INSULIN LISPRO 7 UNITS: 100 INJECTION, SOLUTION INTRAVENOUS; SUBCUTANEOUS at 08:15

## 2021-12-02 RX ADMIN — IPRATROPIUM BROMIDE AND ALBUTEROL SULFATE 3 ML: .5; 2.5 SOLUTION RESPIRATORY (INHALATION) at 06:39

## 2021-12-02 RX ADMIN — OXYCODONE HYDROCHLORIDE AND ACETAMINOPHEN 1 TABLET: 10; 325 TABLET ORAL at 08:15

## 2021-12-02 RX ADMIN — RDII 250 MG CAPSULE 250 MG: at 08:15

## 2021-12-02 RX ADMIN — PANTOPRAZOLE SODIUM 40 MG: 40 TABLET, DELAYED RELEASE ORAL at 08:14

## 2021-12-02 RX ADMIN — INSULIN LISPRO 6 UNITS: 100 INJECTION, SOLUTION INTRAVENOUS; SUBCUTANEOUS at 12:35

## 2021-12-02 RX ADMIN — CHOLESTYRAMINE 4 G: 4 POWDER, FOR SUSPENSION ORAL at 08:16

## 2021-12-02 NOTE — PLAN OF CARE
Problem: Skin Injury Risk Increased  Goal: Skin Health and Integrity  Intervention: Optimize Skin Protection  Recent Flowsheet Documentation  Taken 12/2/2021 0826 by Keara Smith RN  Pressure Reduction Techniques: weight shift assistance provided  Pressure Reduction Devices: pressure-redistributing mattress utilized     Problem: Skin Injury Risk Increased  Goal: Skin Health and Integrity  Outcome: Met  Intervention: Optimize Skin Protection  Recent Flowsheet Documentation  Taken 12/2/2021 0826 by Keara Smith RN  Pressure Reduction Techniques: weight shift assistance provided  Pressure Reduction Devices: pressure-redistributing mattress utilized     Problem: Glycemic Control Impaired (Sepsis/Septic Shock)  Goal: Blood Glucose Level Within Desired Range  Outcome: Met  Intervention: Optimize Glycemic Control  Recent Flowsheet Documentation  Taken 12/2/2021 0826 by Keara Smith RN  Glycemic Management: blood glucose monitoring     Problem: Glycemic Control Impaired (Sepsis/Septic Shock)  Goal: Blood Glucose Level Within Desired Range  Intervention: Optimize Glycemic Control  Recent Flowsheet Documentation  Taken 12/2/2021 0826 by Keara Smith RN  Glycemic Management: blood glucose monitoring     Problem: Nutrition Impaired (Sepsis/Septic Shock)  Goal: Optimal Nutrition Intake  Outcome: Met     Problem: Adult Inpatient Plan of Care  Goal: Plan of Care Review  Outcome: Met  Flowsheets (Taken 12/2/2021 0950)  Plan of Care Reviewed With: patient     Problem: Adult Inpatient Plan of Care  Goal: Absence of Hospital-Acquired Illness or Injury  Outcome: Met  Intervention: Identify and Manage Fall Risk  Recent Flowsheet Documentation  Taken 12/2/2021 0826 by Keara Smith, RN  Safety Promotion/Fall Prevention: safety round/check completed     Problem: Adult Inpatient Plan of Care  Goal: Optimal Comfort and Wellbeing  Outcome: Met     Problem: Adult Inpatient Plan of Care  Goal: Readiness for Transition of  Care  Outcome: Met     Problem: Fall Injury Risk  Goal: Absence of Fall and Fall-Related Injury  Outcome: Met  Intervention: Promote Injury-Free Environment  Recent Flowsheet Documentation  Taken 12/2/2021 0826 by Keara Smith RN  Safety Promotion/Fall Prevention: safety round/check completed     Problem: Fall Injury Risk  Goal: Absence of Fall and Fall-Related Injury  Intervention: Promote Injury-Free Environment  Recent Flowsheet Documentation  Taken 12/2/2021 0826 by Keara Smith RN  Safety Promotion/Fall Prevention: safety round/check completed     Problem: COPD Comorbidity  Goal: Maintenance of COPD Symptom Control  Outcome: Met     Problem: Diabetes Comorbidity  Goal: Blood Glucose Level Within Desired Range  Outcome: Met  Intervention: Maintain Glycemic Control  Recent Flowsheet Documentation  Taken 12/2/2021 0826 by Keara Smith RN  Glycemic Management: blood glucose monitoring     Problem: Diabetes Comorbidity  Goal: Blood Glucose Level Within Desired Range  Intervention: Maintain Glycemic Control  Recent Flowsheet Documentation  Taken 12/2/2021 0826 by Keara Smith RN  Glycemic Management: blood glucose monitoring     Problem: Pain Chronic (Persistent) (Comorbidity Management)  Goal: Acceptable Pain Control and Functional Ability  Outcome: Met   Goal Outcome Evaluation:  Plan of Care Reviewed With: patient

## 2021-12-02 NOTE — DISCHARGE SUMMARY
[facility]         DISCHARGE SUMMARY    Patient Name: Italia Olvera  : 1960  MRN: [id number]    Date of Admission: 2021  Date of Discharge: Second 2021  Primary Care Physician: Carloz Shoemaker MD    Consults     No orders found from 10/30/2021 to 2021.          Presenting Problem:   Dehydration [E86.0]  Lactic acidosis [E87.2]  Hyperglycemia [R73.9]  Acute UTI [N39.0]  Sepsis, due to unspecified organism, unspecified whether acute organ dysfunction present (HCC) [A41.9]    Active and Resolved Hospital Problems:  Active Hospital Problems    Diagnosis POA   • **COPD with acute exacerbation ([facility]) [J44.1] Yes   • Decubitus ulcer of back, stage 3 ([facility]) [L89.103] Yes   • Moderate malnutrition (CMS/[facility]) [E44.0] Yes   • Hypokalemia [E87.6] Yes   • Sepsis (HCC) [A41.9] Yes   • Acute UTI (urinary tract infection) [N39.0] Unknown   • T2DM (type 2 diabetes mellitus) (CMS/[facility])- uncontrolled [E11.9] Yes      Resolved Hospital Problems   No resolved problems to display.         Hospital Course     Hospital Course:  Italia Olvera is a 61 y.o. female admitted to hospital for shortness of breath, patient was diagnosed with UTI at ER.  Patient has mild UTI, she was treated with antibiotics, she was given steroids for COPD exacerbation.  Her sugars were elevated, she reports her normal sugars are 400 at home.  She was treated aggressively with insulin, she had episodes of hypoglycemia with it, insulin was adjusted.  Patient started feeling better within 48 hours and wants to go home.  She also had a decub ulcer which is chronic and being taken care of by home health.  Patient feels much better today, she had some diarrhea which has improved significantly.  Her stool for C. difficile was negative.  Her breathing status is improved.  Sugars are stable.  She wants to go home.  We will discharge her home with outpatient follow-up      DISCHARGE Follow Up Recommendations for labs and  diagnostics:   Patient stable ready for discharge.  Advised to take medications as prescribed on discharge.  Recommended follow-up with consultants as advised.  Patient advised to return to ER if symptoms get worse.  Patient advised to follow-up with Dr. Shoemaker post discharge in 4 to 5 days       Day of Discharge     Vital Signs:  Temp:  [97.2 °F (36.2 °C)-98.3 °F (36.8 °C)] 97.3 °F (36.3 °C)  Heart Rate:  [75-96] 83  Resp:  [18-20] 18  BP: (142-175)/(63-73) 175/73    Physical Exam:    Elderly female not in acute distress.  Heart regular.  Lungs clear, diminished breath sounds.  Abdomen soft and obese nontender.  Sacral decub ulcer stage II-III.  Extremities no edema      Pertinent  and/or Most Recent Results     LAB RESULTS:      Lab 12/02/21  0431 12/01/21  0446 11/30/21  0358 11/29/21  1012 11/29/21  0437 11/28/21  1852 11/28/21  1608   WBC  --  5.32 5.61  --  10.46  --  12.97*   HEMOGLOBIN  --  11.0* 10.9*  --  11.7*  --  13.2   HEMATOCRIT  --  32.4* 31.4*  --  33.9*  --  38.0   PLATELETS  --  164 143  --  185  --  224   NEUTROS ABS  --  4.37 4.42  --  9.10*  --  11.02*   IMMATURE GRANS (ABS)  --  0.06* 0.04  --   --   --  0.07*   LYMPHS ABS  --  0.70 0.67*  --   --   --  0.79   MONOS ABS  --  0.15 0.33  --   --   --  0.96*   EOS ABS  --  0.01 0.12  --  0.31  --  0.02   MCV  --  83.3 81.1  --  82.7  --  82.3   PROCALCITONIN  --   --   --   --  2.04*  --   --    LACTATE  --   --   --   --  3.6* 3.6* 4.0*   LDH  --   --   --   --  192  --   --    PROTIME 38.2* 33.7* 25.4* 26.9*  --   --  20.8*         Lab 12/02/21  0431 12/01/21  0446 11/30/21  0358 11/29/21  0437 11/28/21  1609 11/28/21  1608   SODIUM 139 139 137 137 131*  --    POTASSIUM 3.6 3.1* 2.7* 2.7* 3.8  --    CHLORIDE 105 105 106 103 91*  --    CO2 22.9 21.6* 20.1* 20.7* 18.5*  --    ANION GAP 11.1 12.4 10.9 13.3 21.5*  --    BUN 31* 31* 35* 50* 50*  --    CREATININE 0.93 0.77 0.70 0.95 1.55*  --    GLUCOSE 267* 322* 179* 115* 465*  --    CALCIUM 7.8*  7.8* 8.0* 8.5* 9.3  --    MAGNESIUM  --   --  1.3*  --   --   --    HEMOGLOBIN A1C  --   --   --   --   --  11.10*         Lab 12/01/21 0446 11/30/21 0358 11/29/21  0437 11/28/21  1609   TOTAL PROTEIN 6.2 5.6* 6.2 7.1   ALBUMIN 2.50* 2.20* 2.60* 2.90*   GLOBULIN 3.7 3.4 3.6 4.2   ALT (SGPT) 11 9 10 12   AST (SGOT) 14 13 15 28   BILIRUBIN 0.3 0.3 0.3 0.6   ALK PHOS 148* 135* 127* 163*         Lab 12/02/21 0431 12/01/21 0446 11/30/21  0358 11/29/21  1012 11/28/21  1608   PROTIME 38.2* 33.7* 25.4* 26.9* 20.8*   INR 3.95* 3.49* 2.63 2.79 2.14                 Brief Urine Lab Results  (Last result in the past 365 days)      Color   Clarity   Blood   Leuk Est   Nitrite   Protein   CREAT   Urine HCG        11/28/21 1731 Dark Yellow   Turbid   Small (1+)   Large (3+)   Negative   100 mg/dL (2+)               Microbiology Results (last 10 days)     Procedure Component Value - Date/Time    Clostridium Difficile Toxin, PCR - Stool, Per Rectum [[id number]] Collected: 11/28/21 1731    Lab Status: Final result Specimen: Stool from Per Rectum Updated: 11/28/21 1852     C. Difficile Toxins by PCR Negative     027 Toxin Presumptive Negative    Enteric Bacterial Panel - Stool, Per Rectum [[id number]]  (Normal) Collected: 11/28/21 1731    Lab Status: Final result Specimen: Stool from Per Rectum Updated: 11/29/21 1325     Salmonella Not Detected     Campylobacter Not Detected     Shigella/Enteroinvasive E. coli (EIEC) Not Detected     Shiga-like toxin-producing E. coli (STEC) stx1/stx2 Not Detected    Enteric Parasite Panel - Stool, Per Rectum [[id number]]  (Normal) Collected: 11/28/21 1731    Lab Status: Final result Specimen: Stool from Per Rectum Updated: 11/29/21 1325     Giardia lamblia Not Detected     Cryptosporidium Not Detected     Entamoeba histolytica Not Detected    Urine Culture - Urine, Urine, Catheter [[id number]] Collected: 11/28/21 1731    Lab Status: Final result Specimen: Urine, Catheter Updated: 11/29/21 7422      Urine Culture 50,000 CFU/mL Mixed Kwame Isolated    Narrative:      Specimen contains mixed organisms of questionable pathogenicity which indicates contamination with commensal kwame.  Further identification is unlikely to provide clinically useful information.  Suggest recollection.    Blood Culture - Blood, Arm, Left [[id number]]  (Normal) Collected: 11/28/21 1608    Lab Status: Preliminary result Specimen: Blood from Arm, Left Updated: 12/01/21 1616     Blood Culture No growth at 3 days    Blood Culture - Blood, Arm, Left [[id number]]  (Normal) Collected: 11/28/21 1608    Lab Status: Preliminary result Specimen: Blood from Arm, Left Updated: 12/01/21 1616     Blood Culture No growth at 3 days                       Results for orders placed during the hospital encounter of 06/14/21    Adult Transthoracic Echo Complete W/ Cont if Necessary Per Protocol    Interpretation Summary  · Left ventricular ejection fraction appears to be 51 - 55%.  · The left ventricular cavity is mildly dilated.  · Left ventricular wall thickness is consistent with moderate concentric hypertrophy.  · The right atrial cavity is mildly dilated.  · There is moderate calcification of the aortic valve.  · Estimated right ventricular systolic pressure from tricuspid regurgitation is normal (<35 mmHg).      Labs Pending at Discharge:  Pending Labs     Order Current Status    Magnesium Collected (12/02/21 0431)    Blood Culture - Blood, Arm, Left Preliminary result    Blood Culture - Blood, Arm, Left Preliminary result            Discharge Details        Discharge Medications      New Medications      Instructions Start Date   cholestyramine light 4 g packet   4 g, Oral, Every 12 Hours Scheduled      saccharomyces boulardii 250 MG capsule  Commonly known as: FLORASTOR   250 mg, Oral, 2 Times Daily         Continue These Medications      Instructions Start Date   BISOPROLOL-HYDROCHLOROTHIAZIDE PO   10 mg, Oral, 2 times daily      furosemide 80 MG  tablet  Commonly known as: LASIX   80 mg, Oral, 2 Times Daily      hydrOXYzine 10 MG/5ML syrup  Commonly known as: ATARAX   10 mg, Oral, 4 Times Daily      insulin detemir 100 UNIT/ML injection  Commonly known as: LEVEMIR   25 Units, Subcutaneous, Nightly      ipratropium-albuterol 0.5-2.5 mg/3 ml nebulizer  Commonly known as: DUO-NEB   3 mL, Nebulization, 4 Times Daily - RT      losartan 100 MG tablet  Commonly known as: COZAAR   100 mg, Oral, Daily      metFORMIN 500 MG tablet  Commonly known as: GLUCOPHAGE   1,000 mg, Oral, 2 times daily      metOLazone 10 MG tablet  Commonly known as: ZAROXOLYN   10 mg, Oral, Daily, 10 days      nortriptyline 25 MG capsule  Commonly known as: PAMELOR   25 mg, Oral, Nightly PRN      O2  Commonly known as: OXYGEN   3 L/min, Inhalation, Nightly      ondansetron 4 MG tablet  Commonly known as: ZOFRAN   4 mg, Oral, Every 8 Hours PRN      oxyCODONE-acetaminophen  MG per tablet  Commonly known as: PERCOCET   1 tablet, Oral, Every 6 Hours PRN      pantoprazole 40 MG EC tablet  Commonly known as: PROTONIX   40 mg, Oral, Daily      potassium chloride 10 MEQ CR tablet   10 mEq, Oral, 2 Times Daily      sertraline 100 MG tablet  Commonly known as: ZOLOFT   100 mg, Oral, Daily PRN      silver sulfadiazine 1 % cream  Commonly known as: SILVADENE, SSD   1 application, Topical, 2 Times Daily, This is for a bed sore that is on her lower back per .      tiZANidine 4 MG tablet  Commonly known as: ZANAFLEX   4 mg, Oral, Every 8 Hours      warfarin 2 MG tablet  Commonly known as: COUMADIN   2 mg, Oral, Nightly             No Known Allergies      Discharge Disposition:    Home-Health Care Svc    Diet:  1800 ADA heart healthy diet      Discharge Activity:     As tolerated      Follow-up with PCP Dr. Shoemaker in 3 to 4 days      Time spent on Discharge including face to face service: 31 minutes.            I have dictated this note utilizing Dragon Dictation.             Please note that  portions of this note were completed with a voice recognition program.             Part of this note may be an electronic transcription/translation of spoken language to printed text         using the Dragon Dictation System.       Electronically signed by Rudy Garnett MD, 12/02/21, 9:47 AM EST.

## 2021-12-02 NOTE — CASE MANAGEMENT/SOCIAL WORK
RT CM sent message to Mich Romero, rep with [employer], to notify him of pt's impending discharge. Pt will need her Astral redelivered, as it was left at [facility] and never returned to pt, despite [employer] picking device up from [facility].  RT CM also phoned pt's significant other, Lloyd, and provided him with Mich's contact information.

## 2021-12-02 NOTE — THERAPY EVALUATION
Acute Care - Physical Therapy Initial Evaluation   [facility]     Patient Name: Italia Olvera  : 1960  MRN: [id number]  Today's Date: 2021      Visit Dx:     ICD-10-CM ICD-9-CM   1. Sepsis, due to unspecified organism, unspecified whether acute organ dysfunction present ([facility])  A41.9 038.9     995.91   2. Acute UTI  N39.0 599.0   3. Hyperglycemia  R73.9 790.29   4. Dehydration  E86.0 276.51   5. Lactic acidosis  E87.2 276.2   6. Difficulty walking  R26.2 719.7     Patient Active Problem List   Diagnosis   • Bladder disorder   • Depression   • Hypertension   • Peripheral neuropathy   • Osteoarthritis of knee   • COPD with acute exacerbation ([facility])   • Chronic back pain   • Multifocal pneumonia   • T2DM (type 2 diabetes mellitus) (CMS/[facility])- uncontrolled   • Bipolar disorder ([facility])   • Chronic respiratory failure with hypoxia ([facility])   • Acute on chronic respiratory failure with hypoxia ([facility])   • Chronic respiratory failure with hypoxia, on home oxygen therapy ([facility])   • Overweight (BMI 25.0-29.9)   • Acute on chronic systolic CHF (congestive heart failure) ([facility])   • Essential hypertension   • DM (diabetes mellitus), type 1 ([facility])   • Acute UTI (urinary tract infection)   • Anemia   • Sepsis ([facility])   • COPD with acute exacerbation ([facility])   • Hypokalemia   • Moderate malnutrition (CMS/[facility])   • Decubitus ulcer of back, stage 3 ([facility])     Past Medical History:   Diagnosis Date   • Acid reflux    • ACL tear 2015    PCL/ACL TEAR/RUPTURE   • Acute shoulder bursitis, right 2015   • Anxiety    • Arthritis    • Asthma    • Bipolar 1 disorder ([facility])     untreated   • Bladder disorder    • Cancer ([facility])    • CHF (congestive heart failure) ([facility])    • Chronic back pain    • COPD (chronic obstructive pulmonary disease) ([facility])    • Depression    • Diabetes mellitus ([facility])    • DJD (degenerative joint disease)    • GERD (gastroesophageal reflux disease)    • HBP (high blood pressure)    • Hip pain 09/15/2015   •  Hypertension    • Knee injury 08/19/2015   • Knee pain, right 09/15/2015   • Limb swelling    • Neuropathy    • Osteoarthritis, knee 09/01/2015   • Osteoporosis    • Peripheral neuropathy    • Renal failure 1994   • Seasonal allergies    • Shoulder tendonitis 08/23/2015   • SOB (shortness of breath)    • Tendinitis of right rotator cuff 08/23/2015     Past Surgical History:   Procedure Laterality Date   • APPENDECTOMY     • BACK SURGERY     • BLADDER REPAIR     • BRONCHOSCOPY N/A 7/10/2021    Procedure: BRONCHOSCOPY WITH BRONCHOALVEOLAR LAVAGE, POSSIBLE BIOPSY, BRUSHING, WASHING, AIRWAY INSPECTION;  Surgeon: Rodrigo Reyes MD;  Location: [facility] MAIN OR;  Service: Pulmonary;  Laterality: N/A;   • BRONCHOSCOPY N/A 7/10/2021    Procedure: BRONCHOSCOPY;  Surgeon: Rodrigo Reyes MD;  Location: [facility] ENDOSCOPY;  Service: Pulmonary;  Laterality: N/A;   • CHOLECYSTECTOMY     • ENDOSCOPY     • FRACTURE SURGERY     • GALLBLADDER SURGERY     • HERNIA REPAIR     • HYSTERECTOMY     • JOINT REPLACEMENT     • OTHER SURGICAL HISTORY      ARTIFICIAL JOINTS/LIMBS   • OTHER SURGICAL HISTORY      FACE SURGERY, UNSPECIFIED   • TOTAL HIP ARTHROPLASTY Right    • TOTAL KNEE ARTHROPLASTY Left      PT Assessment (last 12 hours)     PT Evaluation and Treatment     Row Name 12/02/21 1000          Physical Therapy Time and Intention    Subjective Information complains of; weakness  -AV     Document Type evaluation  -AV     Mode of Treatment individual therapy; physical therapy  -AV     Patient Effort fair  -AV     Row Name 12/02/21 1000          General Information    Patient Profile Reviewed yes  -AV     Prior Level of Function --  Patient reports assist from spouse for all ADLs. Ambulates short distances with rolling walker but reports using wheelchair due to feeling weak. Reports being on 3L O2 continuoisly but was on room air upon entering.  -AV     Equipment Currently Used at Home wheelchair; walker, rolling  -AV     Existing  "Precautions/Restrictions fall  -AV     Row Name 12/02/21 1000          Living Environment    Current Living Arrangements home/apartment/condo  -AV     Lives With spouse  -AV     Row Name 12/02/21 1000          Range of Motion (ROM)    Range of Motion bilateral lower extremities; ROM is WFL  -AV     Row Name 12/02/21 1000          Strength (Manual Muscle Testing)    Strength (Manual Muscle Testing) bilateral lower extremities  3-/5  -AV     Row Name 12/02/21 1000          Bed Mobility    Bed Mobility rolling left; rolling right; sit-supine  -AV     Rolling Left [place] (Bed Mobility) minimum assist (75% patient effort)  -AV     Rolling Right [place] (Bed Mobility) minimum assist (75% patient effort)  -AV     Sit-Supine [place] (Bed Mobility) moderate assist (50% patient effort)  -AV     Comment (Bed Mobility) Patient seated EOB upon entering, supine-sit transfer not assessed.  -AV     Row Name 12/02/21 1000          Transfers    Transfers sit-stand transfer  -AV     Comment (Transfers) Patient needing max encouragement to complete sit-stand transfer in order to change soiled pad she was sitting on. Patient demonstrated minimal effort in completing stand requiring moderate/maximal assistance from therapist. Once standing patient did not bring trunk upright before throwing self back down on bed stating, \"I can't do it.\"  -AV     Sit-Stand [place] (Transfers) moderate assist (50% patient effort); maximum assist (25% patient effort)  -AV     Row Name 12/02/21 1000          Sit-Stand Transfer    Assistive Device (Sit-Stand Transfers) walker, front-wheeled  -AV     Row Name 12/02/21 1000          Safety Issues, Functional Mobility    Safety Issues Affecting Function (Mobility) ability to follow commands; impulsivity; judgment; safety precaution awareness; safety precautions follow-through/compliance  -AV     Impairments Affecting Function (Mobility) balance; endurance/activity tolerance; strength  -AV  "    Row Name 12/02/21 1000          Balance    Balance Assessment standing static balance  -AV     Static Standing Balance moderate impairment  -AV     Row Name             Wound 11/22/21 1100 medial coccyx Pressure Injury    Wound - Properties Group Placement Date: 11/22/21  -MD Placement Time: 1100  -MD Present on Hospital Admission: Y  -CB Orientation: medial  -CB Location: coccyx  -CB Primary Wound Type: Pressure inj  -CB Stage, Pressure Injury : Stage 2  -CB     Retired Wound - Properties Group Date first assessed: 11/22/21  -MD Time first assessed: 1100  -MD Present on Hospital Admission: Y  -CB Location: coccyx  -CB Primary Wound Type: Pressure inj  -CB     Row Name             Wound 11/29/21 1156 Left anterior second toe Neuropathic    Wound - Properties Group Placement Date: 11/29/21  -SB Placement Time: 1156  -SB Present on Hospital Admission: Y  -SB Side: Left  -SB Orientation: anterior  -SB Location: second toe  -SB Primary Wound Type: Neuropathic  -SB Additional Comments: eschar covered  -SB     Retired Wound - Properties Group Date first assessed: 11/29/21  -SB Time first assessed: 1156  -SB Present on Hospital Admission: Y  -SB Side: Left  -SB Location: second toe  -SB Primary Wound Type: Neuropathic  -SB Additional Comments: eschar covered  -SB     Row Name             Wound 10/18/21 1201 Right third toe Blisters    Wound - Properties Group Placement Date: 10/18/21  -MD Placement Time: 1201  -MD Present on Hospital Admission: N  -CB Side: Right  -CB Location: third toe  -CB Primary Wound Type: Blisters  -CB     Retired Wound - Properties Group Date first assessed: 10/18/21  -MD Time first assessed: 1201  -MD Present on Hospital Admission: N  -CB Side: Right  -CB Location: third toe  -CB Primary Wound Type: Blisters  -CB     Row Name             Wound 08/30/21 1205 Right distal arm Skin Tear    Wound - Properties Group Placement Date: 08/30/21  -MD Placement Time: 1205  -MD Present on Hospital  Admission: Y  -AB Side: Right  -AB Orientation: distal  -AB Location: arm  -AB Primary Wound Type: Skin tear  -AB     Retired Wound - Properties Group Date first assessed: 08/30/21  -MD Time first assessed: 1205  -MD Present on Hospital Admission: Y  -AB Side: Right  -AB Location: arm  -AB Primary Wound Type: Skin tear  -AB     Row Name             Wound 08/30/21 1210 Left lower arm Skin Tear    Wound - Properties Group Placement Date: 08/30/21  -MD Placement Time: 1210  -MD Present on Hospital Admission: Y  -AB Side: Left  -AB Orientation: lower  -AB Location: arm  -AB Primary Wound Type: Skin tear  -AB     Retired Wound - Properties Group Date first assessed: 08/30/21  -MD Time first assessed: 1210  -MD Present on Hospital Admission: Y  -AB Side: Left  -AB Location: arm  -AB Primary Wound Type: Skin tear  -AB     Row Name             Wound 08/16/21 1105 Right posterior gluteal Traumatic    Wound - Properties Group Placement Date: 08/16/21  -FL Placement Time: 1105  -FL Side: Right  -FL Orientation: posterior  -FL Location: gluteal  -FL Primary Wound Type: Traumatic  -FL     Retired Wound - Properties Group Date first assessed: 08/16/21  -FL Time first assessed: 1105  -FL Side: Right  -FL Location: gluteal  -FL Primary Wound Type: Traumatic  -FL     Row Name 12/02/21 1000          Plan of Care Review    Plan of Care Reviewed With patient  -AV     Progress no change  -AV     Outcome Summary Patient presents with deficits impacting balance, strength, transfers, and ambulation. Patient will benefit from skilled PT services to address these mobility deficits and decrease risk of falls.  -AV     Row Name 12/02/21 1000          Physical Therapy Goals    Bed Mobility Goal Selection (PT) bed mobility, PT goal 1  -AV     Transfer Goal Selection (PT) transfer, PT goal 1  -AV     Gait Training Goal Selection (PT) gait training, PT goal 1  -AV     Row Name 12/02/21 1000          Bed Mobility Goal 1 (PT)    Activity/Assistive  Device (Bed Mobility Goal 1, PT) sit to supine/supine to sit  -AV     [place] Level/Cues Needed (Bed Mobility Goal 1, PT) standby assist  -AV     Time Frame (Bed Mobility Goal 1, PT) 10 days  -AV     Row Name 12/02/21 1000          Transfer Goal 1 (PT)    Activity/Assistive Device (Transfer Goal 1, PT) sit-to-stand/stand-to-sit; bed-to-chair/chair-to-bed; walker, rolling  -AV     [place] Level/Cues Needed (Transfer Goal 1, PT) contact guard assist  -AV     Time Frame (Transfer Goal 1, PT) 10 days  -AV     Row Name 12/02/21 1000          Gait Training Goal 1 (PT)    Activity/Assistive Device (Gait Training Goal 1, PT) gait (walking locomotion); assistive device use; walker, rolling  -AV     [place] Level (Gait Training Goal 1, PT) contact guard assist  -AV     Distance (Gait Training Goal 1, PT) 20  -AV     Time Frame (Gait Training Goal 1, PT) 10 days  -AV     Row Name 12/02/21 1000          Therapy Assessment/Plan (PT)    Rehab Potential (PT) good, to achieve stated therapy goals  -AV     Criteria for Skilled Interventions Met (PT) yes; meets criteria  -AV     Problem List (PT) problems related to; balance; mobility; strength  -AV     Activity Limitations Related to Problem List (PT) unable to ambulate safely; unable to transfer safely  -AV     Row Name 12/02/21 1000          PT Evaluation Complexity    History, PT Evaluation Complexity 1-2 personal factors and/or comorbidities  -AV     Examination of Body Systems (PT Eval Complexity) total of 4 or more elements  -AV     Clinical Presentation (PT Evaluation Complexity) stable  -AV     Clinical Decision Making (PT Evaluation Complexity) low complexity  -AV     Overall Complexity (PT Evaluation Complexity) low complexity  -AV     Row Name 12/02/21 1000          Therapy Plan Review/Discharge Plan (PT)    Therapy Plan Review (PT) evaluation/treatment results reviewed; patient  -AV           User Key  (r) = Recorded By, (t) = Taken By, (c) = Cosigned By     Initials Name Provider Type    Carin Fajardo, RN Registered Nurse    Graciela Durán, RN Registered Nurse    Hilaria Card, RN Registered Nurse    Maxine Tiwari, RN Registered Nurse    Mj Singer, PT Physical Therapist    Cleo Campos, RN Registered Nurse                Physical Therapy Education                 Title: PT OT SLP Therapies (In Progress)     Topic: Physical Therapy (Done)     Point: Mobility training (Done)     Learning Progress Summary           Patient Acceptance, E,TB, VU,NR by  at 12/2/2021 1128                   Point: Home exercise program (Done)     Learning Progress Summary           Patient Acceptance, E,TB, VU,NR by AV at 12/2/2021 1128                   Point: Body mechanics (Done)     Learning Progress Summary           Patient Acceptance, E,TB, VU,NR by  at 12/2/2021 1128                   Point: Precautions (Done)     Learning Progress Summary           Patient Acceptance, E,TB, VU,NR by  at 12/2/2021 1128                               User Key     Initials Effective Dates Name Provider Type Discipline     06/11/21 -  Mj Stewart, MARY Physical Therapist PT              PT Recommendation and Plan  Anticipated Discharge Disposition (PT): inpatient rehabilitation facility, sub acute care setting, home with home health  Planned Therapy Interventions (PT): balance training, bed mobility training, gait training, neuromuscular re-education, strengthening, transfer training  Therapy Frequency (PT): daily  Plan of Care Reviewed With: patient  Progress: no change  Outcome Summary: Patient presents with deficits impacting balance, strength, transfers, and ambulation. Patient will benefit from skilled PT services to address these mobility deficits and decrease risk of falls.   Outcome Measures     Row Name 12/02/21 1100             How much help from another person do you currently need...    Turning from your back to your side while in flat bed  without using bedrails? 3  -AV      Moving from lying on back to sitting on the side of a flat bed without bedrails? 2  -AV      Moving to and from a bed to a chair (including a wheelchair)? 2  -AV      Standing up from a chair using your arms (e.g., wheelchair, bedside chair)? 2  -AV      Climbing 3-5 steps with a railing? 1  -AV      To walk in hospital room? 2  -AV      AM-PAC 6 Clicks Score (PT) 12  -AV              Functional Assessment    Outcome Measure Options AM-PAC 6 Clicks Basic Mobility (PT)  -AV            User Key  (r) = Recorded By, (t) = Taken By, (c) = Cosigned By    Initials Name Provider Type    AV Mj Stewart, PT Physical Therapist                 Time Calculation:    PT Charges     Row Name 12/02/21 1128             Time Calculation    PT Received On 12/02/21  -AV      PT Goal Re-Cert Due Date 12/11/21  -AV              Timed Charges    29468 - PT Therapeutic Activity Minutes 10  -AV              Untimed Charges    PT Eval/Re-eval Minutes 20  -AV              Total Minutes    Timed Charges Total Minutes 10  -AV      Untimed Charges Total Minutes 20  -AV       Total Minutes 30  -AV            User Key  (r) = Recorded By, (t) = Taken By, (c) = Cosigned By    Initials Name Provider Type    AV Mj Stewart, PT Physical Therapist              Therapy Charges for Today     Code Description Service Date Service Provider Modifiers Qty    44641670504 HC PT THERAPEUTIC ACT EA 15 MIN 12/2/2021 Mj Stewart, PT GP 1    32325493627 HC PT EVAL LOW COMPLEXITY 2 12/2/2021 Mj Stewart, PT GP 1          PT G-Codes  Outcome Measure Options: AM-PAC 6 Clicks Basic Mobility (PT)  AM-PAC 6 Clicks Score (PT): 12    Mj Stewart PT  12/2/2021

## 2021-12-02 NOTE — PLAN OF CARE
"Goal Outcome Evaluation:  Plan of Care Reviewed With: patient        Progress: no change  Outcome Summary: Family at bedside this shift and brought in [employer]. BS elevated to reading \"HI\" Received 1 time dose of 15 units of humalog.BS decreased to stable level. Continues to have loose stools.   "

## 2021-12-02 NOTE — PROGRESS NOTES
Pharmacy to Dose: Warfarin    Consulting provider: Mariola  Indication for warfarin: AF - requiring full anticoagulation  Goal INR range: 2-3  Home warfarin dose: Warfarin 2 mg daily  Actions or monitoring: INR, H&H, s/sx of bleeding  Any Vitamin K given?: No  Any major drug interactions: no  Is patient on bridging therapy with another anticoagulant?: NO      Date INR Warfarin Dose Given   11-28 2.14 -   11-29 2.79 1 mg    11-30 2.63 1 mg   12-01 3.49 HOLD   12-02 3.95 HOLD                         Plan: INR increased significantly again today and remains supratherapeutic at 3.95. No CBC today. Will continue to hold warfarin today and check INR and CBC tomorrow.    Labs Ordered: INR and CBC in AM

## 2021-12-03 ENCOUNTER — HOME CARE VISIT (OUTPATIENT)
Dept: HOME HEALTH SERVICES | Facility: CLINIC | Age: 61
End: 2021-12-03

## 2021-12-03 ENCOUNTER — HOME CARE VISIT (OUTPATIENT)
Dept: HOME HEALTH SERVICES | Facility: HOME HEALTHCARE | Age: 61
End: 2021-12-03

## 2021-12-03 LAB
BACTERIA SPEC AEROBE CULT: NORMAL
BACTERIA SPEC AEROBE CULT: NORMAL

## 2021-12-03 PROCEDURE — G0299 HHS/HOSPICE OF RN EA 15 MIN: HCPCS

## 2021-12-03 NOTE — OUTREACH NOTE
Prep Survey      Responses   [facility] facility patient discharged from? [facility]   Is LACE score < 7 ? No   Emergency Room discharge w/ pulse ox? No   Eligibility Readm Mgmt   Discharge diagnosis COPD with acute exacerbation    Does the patient have one of the following disease processes/diagnoses(primary or secondary)? Other   Does the patient have Home health ordered? Yes   What is the Home health agency?  [facility]   Is there a DME ordered? No   Prep survey completed? Yes          Giselle Thompson RN

## 2021-12-04 VITALS
DIASTOLIC BLOOD PRESSURE: 52 MMHG | TEMPERATURE: 98 F | OXYGEN SATURATION: 96 % | SYSTOLIC BLOOD PRESSURE: 110 MMHG | HEART RATE: 84 BPM | RESPIRATION RATE: 18 BRPM

## 2021-12-04 LAB — MAGNESIUM SERPL-MCNC: 1.4 MG/DL (ref 1.6–2.4)

## 2021-12-04 NOTE — HOME HEALTH
[facility] on 12/3/21 d/t pt. recently in the hospital for UTI and Sepsis.    Mrs. Olvera is sitting in her recliner when SN arrives. Her  answers the door for SN. She is alert, oriented to perosn and place. She is very forgetful and has episodes of confusion at times. Lungs are diminshed, she is a current smoker. VS are wnl. She has 1+ edema to top of her right foot and is slighlty red today. She continues with scab areas to toes on rt. foot. 2nd toe is slighlty open, pt. advised she hit her toe on something. Pt.  is applying silvadene ointment to area twice a day. She is incontinent and complains of burning with urination. Pt.  advised she was not sent home from hospital with any ABTs or her new medications.  called Dr. Shoemaker office and SN to obtain U/A today. SN performed in/out cath and pt. was only able to void a tiny drop of urine. Pt.  advised she just urinating about a hr ago.  called and notified Jennyfer at MD office. SN is to try to obtain U/A on Monday or pt. CG can come to their office for cup and U/A.   Pt. assisted on her side to assess pressure wound to coccyx. Dressing removed. Small amount of serous drainage noted, no odor. Wound cleanse with NS and pat dry. Wound measured 3.5x2cm. Stage 3 wound, wound bed is pink with yellow areas. No necrosis noted. Periwound is whitish around wound. She has alot of reddness to her groin and bottom. Pt.  is applying ointment. Hydro fera blue moistened and placed into wound bed then covered with tegaderm. Pt. tolerated well. SN discussed importance of repositioing and pt. staying off wound as much as possible. Pt. is only able to stand to be transfered to BS. She is very weak since hospital discharge.  reviewed meds and Dr. Shoemaker office is to call in florastor to pharmacy per Jennyfre. Next visit scheduled with pt for Monday.

## 2021-12-04 NOTE — CASE COMMUNICATION
HUDDLE [facility] – H[facility]LE [facility]    Complete within 48 hours of return home or notice    Reason for hospitalization: UTI/Sepsis    [facility] oasis findings: Continues with complaints of burning with urination, MD called.     Change in primary dx/focus of care: No    Care plan changes needed: Yes    Changes in team composition/visit frequency and schedule: No    Based upon record review and collaboration conference, the recommended frequency for this p atient is:     SN-----1wk2, then pt. due for recert    PT-----    OT----    ST-----    HHA---    MSW---         Please note that above is a recommendation only and that the plan of care should reflect the patient's clinical needs and goals. If in agreement, please complete note. If a different frequency is necessary, please explain in note box and proceed per patients clinical needs.

## 2021-12-06 ENCOUNTER — READMISSION MANAGEMENT (OUTPATIENT)
Dept: CALL CENTER | Facility: HOSPITAL | Age: 61
End: 2021-12-06

## 2021-12-06 ENCOUNTER — HOME CARE VISIT (OUTPATIENT)
Dept: HOME HEALTH SERVICES | Facility: CLINIC | Age: 61
End: 2021-12-06

## 2021-12-06 VITALS
SYSTOLIC BLOOD PRESSURE: 110 MMHG | DIASTOLIC BLOOD PRESSURE: 54 MMHG | RESPIRATION RATE: 18 BRPM | TEMPERATURE: 99 F | OXYGEN SATURATION: 99 % | HEART RATE: 64 BPM

## 2021-12-06 PROCEDURE — G0299 HHS/HOSPICE OF RN EA 15 MIN: HCPCS

## 2021-12-06 NOTE — OUTREACH NOTE
Medical Week 1 Survey      Responses   [facility] patient discharged from? [facility]   Does the patient have one of the following disease processes/diagnoses(primary or secondary)? Other   Week 1 attempt successful? No   Unsuccessful attempts Attempt 1          Audrey Mendiola RN

## 2021-12-08 ENCOUNTER — HOME CARE VISIT (OUTPATIENT)
Dept: HOME HEALTH SERVICES | Facility: CLINIC | Age: 61
End: 2021-12-08

## 2021-12-08 ENCOUNTER — READMISSION MANAGEMENT (OUTPATIENT)
Dept: CALL CENTER | Facility: HOSPITAL | Age: 61
End: 2021-12-08

## 2021-12-08 PROCEDURE — G0299 HHS/HOSPICE OF RN EA 15 MIN: HCPCS

## 2021-12-08 NOTE — OUTREACH NOTE
Medical Week 1 Survey      Responses   [facility] patient discharged from? [facility]   Does the patient have one of the following disease processes/diagnoses(primary or secondary)? Other   Week 1 attempt successful? Yes   Call start time 0844   Call end time 0901   Discharge diagnosis COPD with acute exacerbation    Person spoke with today (if not patient) and relationship Lloyd   Meds reviewed with patient/caregiver? Yes   Is the patient having any side effects they believe may be caused by any medication additions or changes? No   Does the patient have all medications ordered at discharge? Yes   Is the patient taking all medications as directed (includes completed medication regime)? No   What is preventing the patient from taking all medications as directed? Other   Nursing Interventions Advised patient to call provider   Medication comments Florastor not at pharm advised     Comments regarding appointments Dr. Hannah 12/8/21    Does the patient have a primary care provider?  Yes   Does the patient have an appointment with their PCP within 7 days of discharge? Yes   Comments regarding PCP Dr. Sahara engle 12/9/21.    Has the patient kept scheduled appointments due by today? N/A   What is the Home health agency?  [facility]   Has home health visited the patient within 72 hours of discharge? Yes   What DME was ordered? Has home O2 and neb.    Psychosocial issues? No   Comments O2 sats 97% with O2 @ 3L    Did the patient receive a copy of their discharge instructions? Yes   Nursing interventions Reviewed instructions with patient   What is the patient's perception of their health status since discharge? Same  [States loose stool has started again.  ]   Is the patient/caregiver able to teach back signs and symptoms related to disease process for when to call PCP? Yes   Is the patient/caregiver able to teach back signs and symptoms related to disease process for when to call 911? Yes   Is the patient/caregiver  "able to teach back the hierarchy of who to call/visit for symptoms/problems? PCP, Specialist, Home health nurse, Urgent Care, ED, 911 Yes   If the patient is a current smoker, are they able to teach back resources for cessation? Smoking cessation medications  [has cut back ]   Week 1 call completed? Yes   Wrap up additional comments  focused on her running out of Nortriptyline. States HHN has been trying to get it filled through PCP office with out any luck. Pt has appt tomorrow with PCP and I advised  to speak to the Dr about it., as well as inform PCP that Pt still having \"bouts\" of loose stool. Encouraged  to get florstor over the counter as well.           Rekha Valencia RN  "

## 2021-12-09 NOTE — HOME HEALTH
[facility] visit to obtain U/A.    Mrs. Olvera is lying in the recliner when SN arrives. Her  answers the door. She is drowsy today but awakens easily. She is alert to person, place and situation. She continues with complaints of burning with urination. SN performed sterile in/out cath. Pt. was unable to void. Her chucks underneath of her where wet from incontinence before cathing. Pt. tolerated in/out cath well. She is gaulded to [place] area from frequent stools. Pt.  is using protective ointment. SN called and left message for Crystal at Dr. Shoemaker office that pt. was unable to give specimen.

## 2021-12-10 ENCOUNTER — HOME CARE VISIT (OUTPATIENT)
Dept: HOME HEALTH SERVICES | Facility: HOME HEALTHCARE | Age: 61
End: 2021-12-10

## 2021-12-15 ENCOUNTER — HOME CARE VISIT (OUTPATIENT)
Dept: HOME HEALTH SERVICES | Facility: HOME HEALTHCARE | Age: 61
End: 2021-12-15

## 2021-12-15 ENCOUNTER — HOME CARE VISIT (OUTPATIENT)
Dept: HOME HEALTH SERVICES | Facility: CLINIC | Age: 61
End: 2021-12-15

## 2021-12-15 VITALS
HEART RATE: 64 BPM | TEMPERATURE: 98 F | DIASTOLIC BLOOD PRESSURE: 64 MMHG | SYSTOLIC BLOOD PRESSURE: 148 MMHG | RESPIRATION RATE: 18 BRPM | OXYGEN SATURATION: 96 %

## 2021-12-15 PROCEDURE — G0299 HHS/HOSPICE OF RN EA 15 MIN: HCPCS

## 2021-12-15 NOTE — CASE COMMUNICATION
HUD RECERT – HUDDLE RECERT REVIEW    Complete between days 46-50    Progress toward goals: No edema noted, wound looks slightly better to coccyx    Barriers to goal achievement: Incontinence, max assist    Recert or discharge? Recert    Any payor source changes? No    Any changes needed to the focus of  care?   Wound to coccyx

## 2021-12-15 NOTE — HOME HEALTH
60 Day Summary    Home Health need continues for: Wound Care/Monitoring to Stage 3 are on coccyx    Primary diagnoses/co-morbidities/recent procedures in past 60 days that impact current episode: Yes    Current level of functional ability: Max assist    Homebound status and living arrangements: Pt. is homebound d/t very limited mobility and increased weakness. She is max assist with transfers and ADL's in the home.     Goals accomplished and/or measurable progress toward unmet goals in past 60 days: No edema today. Coccyx wound has some improvement.   Focus of care for next 60 days for each discipline ordered: Healing to Stage 3 pressure wound to coccyx.     Skin integrity/wound status: Stage 3 Pressure wound to Coccyx. Scabbed areas to toes on rt. foot.     Code status: Full Code  Most recent fall risk: Fall RIsk    Estimated date when home care services will end: When wound care is no longer needed.     Plan of Care confirmed with Dr. Shoemakre on 12/15/21      Mrs. Olvera is lying in her recliner when SN arrives for the visit. Her  answers the door. She is alert to person, place, and situation. She is forgetfull and has confusion episodes. Lungs are diminished. She is a current smoker. She does wear 3L of oxygen at night and is awaiting a CPaP machine per her .  VS are wnl. She has complaints of generalized discomfort and takes medication for her pain. No edema noted to extremities. She continues with large scab areas to toes on her rt. foot. Pt.  applies silvadene to her toes twice a day. She continues incontinent of bladder and bowel at times. She recently was having alot of diarrhea episodes. No complaints of diarrhea today. She has some reddness to her groin.  is applying barrier ointment when needed. Pt. assisted to her side to assess coccyx. Dressing is intact. Dressing removed. Scant amount of serous drainage, no odor noted.  Full thickness tissue loss with subcutaneous fat  visible. No slough or necrosis noted. Wound bed is pink with yellow areas. Periwound is slighlty irritated. Wound measures 3.5x1cm. Wound cleanse with normal saline and pat dry. Hydro fera blue moistened and cut to size then placed into wound bed. Wound covered with optifoam dressing. Pt. tolerated with minimal discomfort. SN reviewed importance of using the hydro fera blue as ordered. SN reviewed importance of repositioning to relieve pressure off the area. SN looked at medication bottles and reviewed meds. Pt. is currently taking a PO ABT for complaints of dysuria. Med list updated. Pt. is max assist with all ADL's in the home. She can only take a few steps from the chair to her BSC.    Plan for Next Visit: Wound care to Coccyx: Hydrofera blue moistened, cover with optifoam adhesive.

## 2021-12-17 ENCOUNTER — HOME CARE VISIT (OUTPATIENT)
Dept: HOME HEALTH SERVICES | Facility: CLINIC | Age: 61
End: 2021-12-17

## 2021-12-17 PROCEDURE — G0151 HHCP-SERV OF PT,EA 15 MIN: HCPCS

## 2021-12-19 VITALS — DIASTOLIC BLOOD PRESSURE: 88 MMHG | HEART RATE: 80 BPM | SYSTOLIC BLOOD PRESSURE: 168 MMHG | OXYGEN SATURATION: 99 %

## 2021-12-19 NOTE — HOME HEALTH
PATIENT IS 62 Y/O F WHO HAS PREVIOUSLY BEEN SEEN BY PRAJINDER AND NSG AND HAS HAD A RESUMPTION OF CARE PER NSG.  SINCE RETURNING HOME FROM THE HOSPITAL WHERE SHE WAS TREATED FOR DEHYDRATION, UTI, LACTIC ACIDOSIS, SEPSIS, COPD, MODERATE MALNUTRITION, HYPOKALEMIA, DMII, AND DECUBITUS ULCER ON BACK, STG 3.  SHE RETURNED HOME SEVERAL DAYS PREVIOUSLY, BUT HAD ONGOING DIARRHEA AND SUBSEQUENT WEAKNESS AND DEHYDRATION WITH POOR PO INTAKE FOR THE PAST WEEK.  PT ATTEMPTED TO SEE HER SOONER, HOWEVER, SHE ANDHER FAMILY BELIEVED SHE WAS NOT ABLE TO PARTICIPATE UNTIL TODAY.  SHE HAS BEEN EATING BETTER NOW AND IS FEELING BETTER SHE REPORTS. SHE SAYS SHE HAS BEEN DOING HER EXERCISES AND TRYING TO MAINTAIN THE STRENGTH SHE HAD GAINED DURINGHER PAST VISITS.  SON AND GRANDCHILDREN AND HER SPOUSE ARE PRESENT TODAY AND WILLING TO ASSIST.

## 2021-12-20 ENCOUNTER — HOME CARE VISIT (OUTPATIENT)
Dept: HOME HEALTH SERVICES | Facility: CLINIC | Age: 61
End: 2021-12-20

## 2021-12-20 VITALS
DIASTOLIC BLOOD PRESSURE: 64 MMHG | OXYGEN SATURATION: 100 % | SYSTOLIC BLOOD PRESSURE: 180 MMHG | RESPIRATION RATE: 16 BRPM | TEMPERATURE: 98.3 F | HEART RATE: 56 BPM

## 2021-12-20 PROCEDURE — G0299 HHS/HOSPICE OF RN EA 15 MIN: HCPCS

## 2021-12-21 ENCOUNTER — READMISSION MANAGEMENT (OUTPATIENT)
Dept: CALL CENTER | Facility: HOSPITAL | Age: 61
End: 2021-12-21

## 2021-12-21 ENCOUNTER — HOME CARE VISIT (OUTPATIENT)
Dept: HOME HEALTH SERVICES | Facility: CLINIC | Age: 61
End: 2021-12-21

## 2021-12-21 PROCEDURE — G0151 HHCP-SERV OF PT,EA 15 MIN: HCPCS

## 2021-12-21 NOTE — HOME HEALTH
Patient was sitting in chair and eating lunch with RN arrived. She was alert, oriented and very interactive with RN.  reports to RN that two new medications were added. Cholestryamine and Macrobid. RN discussed side effects with  and answered all questions.  reports that loose stool has decreased greatly with introduction of new medications. RN warned that ABX can cause this to start back so encouraged the use of fermented foods or live bacteria culture such as yogurt.     Dressing: dressing to cocxyx changed by RN. Area cleansed using wound  and dried using gauze. Silver cream applied to wound only. Explained to  that cream to wound edges is what is causing maceration of skin. Dressing applied after using skin prep. Taught  how to properly use skin prep and allow for drying time.  prefers optifoam dressings as they do no tear patient's skin as per his observation. Spoke with  and patient about repositioning. Moved patient to R side during this visit.     Plan: Examine wound and discuss side effects of new medications. Answer all questions and plan to keep teaching regarding wound care.

## 2021-12-21 NOTE — OUTREACH NOTE
Medical Week 3 Survey      Responses   [facility] patient discharged from? [facility]   Does the patient have one of the following disease processes/diagnoses(primary or secondary)? Other   Week 3 attempt successful? No   Unsuccessful attempts Attempt 1   Discharge diagnosis COPD with acute exacerbation           Rekha Valencia RN

## 2021-12-22 VITALS
SYSTOLIC BLOOD PRESSURE: 161 MMHG | DIASTOLIC BLOOD PRESSURE: 71 MMHG | OXYGEN SATURATION: 99 % | TEMPERATURE: 100.5 F | HEART RATE: 57 BPM

## 2021-12-23 NOTE — HOME HEALTH
"P.T. CALLED PATIENT TO ARRANGE A VISIT AND SHE SAID TO COME IN THE AFTERNOON.  P.T. AGREED.  WHEN P.T. ARRIVED PATIENT SAID THAT SHE WAS FEELING REALLY BAD, HAD A RUNNY NOSE AND HAD FEVER AND CHILLS.  P.T. AND SPOUSE TOOK HER TEMP AND IT .5 DEG F.  VITALS [id number]/71, PULSE OF 57, 02 SAT 99%.  SHE WAS TREMULOUS AND HER FACE WAS FLUSHED.  SHE WAS UPSET TO  BE GETTING A \"COLD\" SHE SAID.  SHE WISHES NO ONE WHO IS SICK WOULD COME TO SEE HER.  P.T. ADVISED THEM TO CALL THE PHYSICIAN IF HER FEVER DOES NOT ALESSANDRA.  SHE HAS NOT HAD A COV ID 19 VACCINATION AND HAS BEEN IN THE HOSPITAL RECENTLY.  THEY SAID THEY WOULD CALL HIM TOMORROW.  SHE SAID THAT SHE HAS AN APPT TO SEE HIM ON THE 30 TH.  BUT, THAT IS TOO FAR IN ADVANCE TO TREAT WHAT IS HAPPENING AT PRESENT.  PATIENT PARTICIPATED IN UE/LE GENTLE ROM EXERCISE.  SHE SAID HER LEGS WERE SORE FROM WALKING THE PREVIOUS VISIT.  PATIENT SAID THAT SHE WAS NOT ABLE TO DO MORE DURING THIS VISIT AND SHE FELT TOO BAD."

## 2021-12-24 ENCOUNTER — READMISSION MANAGEMENT (OUTPATIENT)
Dept: CALL CENTER | Facility: HOSPITAL | Age: 61
End: 2021-12-24

## 2021-12-24 NOTE — OUTREACH NOTE
Medical Week 3 Survey      Responses   [facility] patient discharged from? [facility]   Does the patient have one of the following disease processes/diagnoses(primary or secondary)? Other   Week 3 attempt successful? Yes   Call start time 1213   Call end time 1215   Discharge diagnosis COPD with acute exacerbation    Meds reviewed with patient/caregiver? Yes   Is the patient taking all medications as directed (includes completed medication regime)? Yes   Does the patient have a primary care provider?  Yes   Has the patient kept scheduled appointments due by today? Yes   What is the Home health agency?  [facility]   Has home health visited the patient within 72 hours of discharge? Yes   What DME was ordered? Has home 3L O2 and neb.    Psychosocial issues? No   What is the patient's perception of their health status since discharge? Same   Is the patient/caregiver able to teach back signs and symptoms related to disease process for when to call PCP? Yes   Is the patient/caregiver able to teach back signs and symptoms related to disease process for when to call 911? Yes   Is the patient/caregiver able to teach back the hierarchy of who to call/visit for symptoms/problems? PCP, Specialist, Home health nurse, Urgent Care, ED, 911 Yes   Week 3 Call Completed? Yes          ALISA ESCALANTE RN

## 2021-12-25 LAB — QT INTERVAL: 418 MS

## 2021-12-27 ENCOUNTER — HOME CARE VISIT (OUTPATIENT)
Dept: HOME HEALTH SERVICES | Facility: CLINIC | Age: 61
End: 2021-12-27

## 2021-12-27 VITALS
RESPIRATION RATE: 18 BRPM | SYSTOLIC BLOOD PRESSURE: 158 MMHG | HEART RATE: 64 BPM | TEMPERATURE: 98.3 F | OXYGEN SATURATION: 96 % | DIASTOLIC BLOOD PRESSURE: 70 MMHG

## 2021-12-27 PROCEDURE — G0299 HHS/HOSPICE OF RN EA 15 MIN: HCPCS

## 2021-12-27 PROCEDURE — G0151 HHCP-SERV OF PT,EA 15 MIN: HCPCS

## 2021-12-27 NOTE — HOME HEALTH
Mrs. Olvera is asleep in the chair when SN arrives. Her  answers the door. Pt. awakens easily. She is alert and oriented x3. Lungs have slight wheezing to MICHAEL and clears with cough. All other lobes are diminished. She is a current smoker. No edema noted to extremities. She continues with large scab areas on toes to rt. foot. She has complaints of generalized discomfort and takes medication in the home to manage her pain. She has not had any diarrhea recently and is voiding without difficulty. SN assessed pressure wound to coccyx. No drainage noted. Wound bed is pink with white edges. Wound measures 3x0.5cm and does look better today. Wound cleanse with saline and pat dry. Hydro fera blue moistened and placed to woundbed and covered with optifoam dressing. Pt. tolerated well. Pt. repositioned on her rt. side. SN reviewed importance of repositioning off area for healing and increasing protein in her diet. Pt. advised her BS today was 187 and continues up and down at times. Pt. does not follow DM diet. No med changes today. Next visit scheduled with pt. for next week.     Plan for next Visit: Wound care to coccyx: Hydrofera blue and optifoam

## 2021-12-28 VITALS — DIASTOLIC BLOOD PRESSURE: 71 MMHG | OXYGEN SATURATION: 97 % | HEART RATE: 62 BPM | SYSTOLIC BLOOD PRESSURE: 169 MMHG

## 2021-12-28 NOTE — HOME HEALTH
PATIENT SAYS THAT SHE IS FEELING BETTER AND IS WILLING AND EAGER TO WORK TOWARD IMPROVING STRENGTH AND GAIT.  SPOUSE IS PRESENT AND HELPFUL.  SHE SAYS HER BOTTOM IS FEELING BETTER AS SHE IS CHANGING FROM ONE SIDE TO THE OTHER TO DECREASE PRESSURE ON HER WOUNDS.

## 2021-12-31 ENCOUNTER — HOME CARE VISIT (OUTPATIENT)
Dept: HOME HEALTH SERVICES | Facility: HOME HEALTHCARE | Age: 61
End: 2021-12-31

## 2022-01-03 ENCOUNTER — HOME CARE VISIT (OUTPATIENT)
Dept: HOME HEALTH SERVICES | Facility: CLINIC | Age: 62
End: 2022-01-03

## 2022-01-03 VITALS
TEMPERATURE: 98.7 F | HEART RATE: 85 BPM | SYSTOLIC BLOOD PRESSURE: 154 MMHG | DIASTOLIC BLOOD PRESSURE: 62 MMHG | RESPIRATION RATE: 18 BRPM | OXYGEN SATURATION: 94 %

## 2022-01-03 PROCEDURE — G0299 HHS/HOSPICE OF RN EA 15 MIN: HCPCS

## 2022-01-03 NOTE — HOME HEALTH
Mrs. Olvera is lying in the recliner when SN arrives. Her  answers the door. She is alert, oriented x3. Lungs are diminished throughout. She has congestion that clears with cough. Pt. is a current smoker. VS are wnl. She has complaints of generalized discomfort and manages her pain with medication and rest. No edema noted today. She continues with large scabs to toes on right foot. Wound to coccyx does not have a dressing on at assessment. Wound cleanse and pat dry. Wound is almost closed and healing very well. Measures 0.3x0.3cm. Whitish around edges. Hydrofera blue moistened and placed to wound bed then covered with dry dressing. Pt. tolerated well. SN reviewed importance of repostioning off area for healing and increasing protein in her diet. Pt. advised her BS last was 148. She does not follow a DM diet as instructed. No med changes today. Next visit scheduled for next week for poss. last SN visit if wound is healed. Pt. verbalizes understanding.     Plan for next visit: Wound care to coccyx. Poss. last SN visit if wound healed and no further issues.

## 2022-01-04 ENCOUNTER — READMISSION MANAGEMENT (OUTPATIENT)
Dept: CALL CENTER | Facility: HOSPITAL | Age: 62
End: 2022-01-04

## 2022-01-04 ENCOUNTER — HOME CARE VISIT (OUTPATIENT)
Dept: HOME HEALTH SERVICES | Facility: CLINIC | Age: 62
End: 2022-01-04

## 2022-01-04 PROCEDURE — G0151 HHCP-SERV OF PT,EA 15 MIN: HCPCS

## 2022-01-04 NOTE — OUTREACH NOTE
Medical Week 4 Survey      Responses   [facility] patient discharged from? [facility]   Does the patient have one of the following disease processes/diagnoses(primary or secondary)? Other   Week 4 attempt successful? No          Audrey Mendiola RN

## 2022-01-05 VITALS — HEART RATE: 52 BPM | SYSTOLIC BLOOD PRESSURE: 184 MMHG | DIASTOLIC BLOOD PRESSURE: 74 MMHG | OXYGEN SATURATION: 97 %

## 2022-01-05 NOTE — HOME HEALTH
Mrs. Olvera is lying in the recliner when SN arrives for the visit. Her  is present. She is alert to person, place and situation. She has episodes of forgetfullness. Lungs are diminished, she is a current smoker. VS are wnl. She has complaints of generalized discomfort and manages her pain with medication in the home. No edema noted today. She continues with scab areas to toes on right foot. No areas to her arms. Pt. assisted to her side to assess pressure wound. Wound continues open. Wound cleanse and pat dry. Wound measured 4x1cm. Woundbed has alot of yellow tissue to woundbed. Periwound is whitish. She has alot of reddness to her bottom and groin. Hydro fera blue moistened and slits cut into dressing. Dressing cut to size and placed in wound bed. Tegaderm placed over wound. Pt.  is using calazimine ointment to reddened areas. Pt. continues with burning with urination. SN performed sterile in/out cath to collect U/A. Clear/yellow urine return. Pt. tolerated well. Pt.  advised that her florastor was not called in last week by Dr. Shoemaker. SN called and spoke with Jennyfer regarding medication and advised she would fax the order back to their pharmacy. Next visit scheduled with pt. for next week. [Negative] : Heme/Lymph

## 2022-01-08 ENCOUNTER — HOME CARE VISIT (OUTPATIENT)
Dept: HOME HEALTH SERVICES | Facility: HOME HEALTHCARE | Age: 62
End: 2022-01-08

## 2022-01-12 ENCOUNTER — HOME CARE VISIT (OUTPATIENT)
Dept: HOME HEALTH SERVICES | Facility: CLINIC | Age: 62
End: 2022-01-12

## 2022-01-12 VITALS
DIASTOLIC BLOOD PRESSURE: 70 MMHG | OXYGEN SATURATION: 98 % | SYSTOLIC BLOOD PRESSURE: 120 MMHG | RESPIRATION RATE: 18 BRPM | TEMPERATURE: 97.3 F | HEART RATE: 60 BPM

## 2022-01-12 PROCEDURE — G0299 HHS/HOSPICE OF RN EA 15 MIN: HCPCS

## 2022-01-13 NOTE — HOME HEALTH
Mrs. Olvera is sitting in her recliner eating lunch when SN arrives. She finishes her lunch before SN assessment.  is present. Lungs have some rhonci throughout. She does sound better after coughing. She has prod. sputum at times. Pt. advises her qasim has been sick and she feels that she caught it from her. She has not felt well for a few days. No fever, oxygen level is good today. Her appetite is good today. She has pain all over and manages her pain in the home. She continues with scab areas to toes on right foot. Pt. assisted to her side to assess pressure area to coccyx. Dressing removed, no drainage noted. Wound is healing well. Periwound is reddened and wound is on a brony prominence. Wound cleanse and pat dry. Hydro fera blue moistened and placed to wound bed. Wound covered with optifoam dressing. Pt. tolerated well. She is repositioning off area with pillows with assist from her . Her BS today was in the 200's. Pt. is a noncompliant diabetic and does not follow ADA diet. Her /daughter is checking her INR weekly in the home. She continues on 2mg of Coumadin. Meds reviewed. Next visit scheduled with pt. for next week.     Plan for next visit: Assess lung sounds for worsening symptoms, assess wound to coccyx.

## 2022-01-14 ENCOUNTER — HOME CARE VISIT (OUTPATIENT)
Dept: HOME HEALTH SERVICES | Facility: CLINIC | Age: 62
End: 2022-01-14

## 2022-01-14 PROCEDURE — G0151 HHCP-SERV OF PT,EA 15 MIN: HCPCS

## 2022-01-15 VITALS — SYSTOLIC BLOOD PRESSURE: 169 MMHG | HEART RATE: 64 BPM | OXYGEN SATURATION: 94 % | DIASTOLIC BLOOD PRESSURE: 50 MMHG

## 2022-01-15 NOTE — HOME HEALTH
PATIENT IS BEING SEEN FOR WEAKNESS AND POOR ENDURANCE AND MOBILITY FOLOWING HOSP STAY FOR UTI, SEPSIS, HYPERGLYCEMIA, DEHYDRATION, LACTIC ACIDOSIS AND DIFFICULTY WALKING.  HANNYHAS HAD A FLUCTUATING ABILITY TO PARTICIPATE IN GAIT AS SHE AND MANY MEMBERS OF HER FAMILY HAVE BEEN ILL DURING THE PAST SEVERAL WEEKS.  SHE HAS DIFFICULTY WITH WOUNDS ON HER RIGHT TOES, EDEMA IN HER RIGHT LE, R KNEE BEING EDEMATOUS AND PAIN BEING CHRONIC.  SHE SITS AND SLEEPS IN A RECLINER IN WHICH SHE CAUGHT HER ARM YESTERDAY AND COULD NOT GET FREE.  IT BRUISED HER L ARM PROXIMAL TO THE ELBOW AND HER LEFT HAND IS EDEMATOUS AND PAINFUL 8/10.  SHE IS UNSURE WHETHER SHE CAN FULLY PARTICIPATE TODAY.  SHE WISHES TO TRY.  HER SPOUSE IS THERE TO HELP WITH HER TREATMENT.  PATIENT STOOD THREE TIMES TO THE WALKER WITH MAX ASSIST, BUT COULD NOT TAKE STEPS AS HER RIGHT KNEE IS TOO WEAK ANDPAINFUL.      PLAN;  PATIENT WILL BE DISCHARGED NEXT VISIT

## 2022-01-21 ENCOUNTER — HOME CARE VISIT (OUTPATIENT)
Dept: HOME HEALTH SERVICES | Facility: HOME HEALTHCARE | Age: 62
End: 2022-01-21

## 2022-01-21 ENCOUNTER — HOME CARE VISIT (OUTPATIENT)
Dept: HOME HEALTH SERVICES | Facility: CLINIC | Age: 62
End: 2022-01-21

## 2022-01-21 VITALS
DIASTOLIC BLOOD PRESSURE: 60 MMHG | RESPIRATION RATE: 18 BRPM | TEMPERATURE: 98.6 F | OXYGEN SATURATION: 96 % | SYSTOLIC BLOOD PRESSURE: 140 MMHG | HEART RATE: 60 BPM

## 2022-01-21 PROCEDURE — G0299 HHS/HOSPICE OF RN EA 15 MIN: HCPCS

## 2022-01-21 NOTE — HOME HEALTH
Upon arrival, patient sitting in her recliner, A&O, complains of generalized pain.  Lungs deminished with rhonci noted in upper lobes.  Productive cough reported by patient, clear and thin sputum.  O2 96% on RA.  Patient uses oxygen prn.  oxygen safety discussed.  Toes remain dark and scabed to right foot.   applying betadine to toes as directed by the physician.  Patient complaining of hip pain and felt like she had hurt her hip last night.  Patient refused to stand or walk because of her hip.  Patient rolled over to her side, in the chair, for RN to check wound on bottom.  No dressing noted on wound, it had come off.  Wound to bottom was scabbed over, no redness noted.  Instructed  to continue to cover area with barrier cream and  foam dressing.  Instructed patient to continue to relieve pressue to coccyx so area doesn't open back up.   continues to check INR weekly.  no new meds noted.  Reviewed meds, dosage and side effects.  Next visit scheduled for next week.    Plan for next visit: Assess lung sounds for worsening symptoms, assess wound to coccyx.

## 2022-01-27 ENCOUNTER — HOME CARE VISIT (OUTPATIENT)
Dept: HOME HEALTH SERVICES | Facility: CLINIC | Age: 62
End: 2022-01-27

## 2022-01-27 VITALS — DIASTOLIC BLOOD PRESSURE: 94 MMHG | HEART RATE: 60 BPM | OXYGEN SATURATION: 94 % | SYSTOLIC BLOOD PRESSURE: 151 MMHG

## 2022-01-27 PROCEDURE — G0493 RN CARE EA 15 MIN HH/HOSPICE: HCPCS

## 2022-01-27 PROCEDURE — G0151 HHCP-SERV OF PT,EA 15 MIN: HCPCS

## 2022-01-27 NOTE — HOME HEALTH
COVID screening done, screening negative  Upon arrival, patient sitting in her recliner, sleeping, easliy awake. complains of generalized pain.  [place] [facility] [place], no odor noted.  Lungs clear, deminished in bases. O2 96% on RA.  Patient uses oxygen prn.  oxygen safety discussed.  Toes,2nd and 5th digit remain dark and scabed to right foot.   applying betadine to toes as directed by the physician.  Patient rolled over to her side, in the chair, for RN to check wound on bottom.  No dressing noted to bottom.  Wound to bottom was scabbed over, no redness noted.  Instructed  to continue to cover area with barrier cream and  foam dressing.  Instructed patient to continue to relieve pressue to coccyx so area doesn't open back up.   continues to check INR weekly. BS today 186. no new meds noted.  Reviewed meds, dosage and side effects.  Next visit scheduled for next week.    Plan for next visit: Assess lung sounds for worsening symptoms, assess wound to coccyx. Intact

## 2022-01-28 VITALS
TEMPERATURE: 98.8 F | SYSTOLIC BLOOD PRESSURE: 110 MMHG | DIASTOLIC BLOOD PRESSURE: 60 MMHG | RESPIRATION RATE: 16 BRPM | HEART RATE: 60 BPM | OXYGEN SATURATION: 96 %

## 2022-01-28 NOTE — HOME HEALTH
PATIENT HAS BEEN SEEN FOR STRENGTHENING AND GAIT AFTER BEING HOSPITALIZED FOR UTI.  SHE HAS WORKED WITH P.T. INTERMITTENTLY DUE TO ONGOING C/O PAIN IN HER LEGS, WEAKNESS, ILLNESS IN HER FAMILY, ETC.  P.T. HAS TRAINED HER  AND SON IN GAIT ASSIST SO THEY COULD WALK WITH HER TO HELP STRENGTHEN HER LEGS AND TRUNK.  BUT, THEY HAVE NOT ATTEMPTED TO DO THIS, EVEN THOUGH THE TRAINING WAS GIVEN SEVERAL WEEKS AGO.  FREQUENTLY PATIENT AND SPOUSE DO NOT ANSWER THE PHONE OR CALL BACK WHEN MESSAGES ARE LEFT, RESULTING IN MISSED VISITS.  PATIENT SAYS SHE WNTS TO WALK, BUT DOES NOT DO WHAT IS NECESSARY TO ACCOMPLISH THIS TASK.  PATIENT IS BEING DISCHARGED FROM P.T. TODAY.

## 2022-02-01 ENCOUNTER — HOME CARE VISIT (OUTPATIENT)
Dept: HOME HEALTH SERVICES | Facility: CLINIC | Age: 62
End: 2022-02-01

## 2022-02-01 VITALS
OXYGEN SATURATION: 97 % | TEMPERATURE: 98.4 F | SYSTOLIC BLOOD PRESSURE: 210 MMHG | DIASTOLIC BLOOD PRESSURE: 70 MMHG | HEART RATE: 61 BPM | RESPIRATION RATE: 16 BRPM

## 2022-02-01 PROCEDURE — G0299 HHS/HOSPICE OF RN EA 15 MIN: HCPCS

## 2022-02-01 NOTE — HOME HEALTH
"patient is in chair at visit.  is in the home at this time as well. Patient states that she has not taken her medications at this time and has not eaten for 24 hours. Patient is hypertensive and has edema to BLE. She states \"I don't want to take that water pill\". RN performed teaching regarding the importance of taking medications in a timely manner. Patient states that both legs are numb and tingling/painful. RN explained this could be due to neuropathy and patient has reported this to physician. Right and left legs have +2 pitting edema and redness noted to right leg. Second and third toe reveal wounds as per baseline for this patient. Pulses diminished.  states that patient's glucose levels have been from 200-300 and \"that's where she feels good\".     Teaching performed regarding the connection between elevated blood glucose, neuropathies, and lack of wound healing. Patient and  verbalized understanding of these concepts.  explained that patient has not been ambulating. He feels she cannot stand or transfer without a 2 person assist. RN performed teaching regarding standing with one person assistance as well as demonstration. Cocxyx wound is healed and only dry skin to area is noted. RN removed bandage as it is not needed. Instructed and demonstrated to patient's  how to cleanse area and apply barrier cream. Instructed both patient and  regarding how to properly turn patient so she is not creating further skin issues. Also demonstrated to  what an area of concern (red and blanchable/non blanchable) on patient's skin will appear like.      and patient agreed to turn patient more often and have her attempt to stand several times a day."

## 2022-02-07 ENCOUNTER — HOME CARE VISIT (OUTPATIENT)
Dept: HOME HEALTH SERVICES | Facility: CLINIC | Age: 62
End: 2022-02-07

## 2022-02-07 VITALS
HEART RATE: 80 BPM | SYSTOLIC BLOOD PRESSURE: 142 MMHG | DIASTOLIC BLOOD PRESSURE: 60 MMHG | RESPIRATION RATE: 18 BRPM | OXYGEN SATURATION: 95 % | TEMPERATURE: 96.8 F

## 2022-02-07 PROCEDURE — G0493 RN CARE EA 15 MIN HH/HOSPICE: HCPCS

## 2022-02-08 NOTE — HOME HEALTH
Mrs. Olvera is sitting in her chair when SN arrives. Her  answers the door. She is alert, oriented x3. Lungs are diminished throughout. Slight congestion, clears with cough. She is a current smoker. She has complaints of discomfort to her right foot. She has 2+ edema with reddness to her right foot starting at ther toes and midway up, poss. cellulitus. Warm to touch. Continues with large scab areas to toes on right foot. 2+ pitting edema to right leg and 1+ to left leg. SN discussed importance of keeping extremities elevated and left message with Jennyfer at Dr. Shoemaker office regarding her foot today. Pt. turned to her side to assess her coccyx. Wound is closed with dry skin. Slight reddness to coccyx and groin. Barrier ointment applied. SN discussed importance of using barrier ointment and repositioning off the area. Pt. verbalizes understanding. No med changes today. Next visit scheduled with pt. for next week for poss. DC visit.    Plan for next visit: Assess reddness and swelling to right foot. Assess if wound to coccyx continues closed. Possible DC visit.

## 2022-02-14 ENCOUNTER — HOME CARE VISIT (OUTPATIENT)
Dept: HOME HEALTH SERVICES | Facility: CLINIC | Age: 62
End: 2022-02-14

## 2022-02-14 ENCOUNTER — HOME CARE VISIT (OUTPATIENT)
Dept: HOME HEALTH SERVICES | Facility: HOME HEALTHCARE | Age: 62
End: 2022-02-14

## 2022-02-14 VITALS
HEART RATE: 65 BPM | RESPIRATION RATE: 16 BRPM | OXYGEN SATURATION: 95 % | TEMPERATURE: 98 F | SYSTOLIC BLOOD PRESSURE: 140 MMHG | DIASTOLIC BLOOD PRESSURE: 60 MMHG

## 2022-02-14 PROCEDURE — G0299 HHS/HOSPICE OF RN EA 15 MIN: HCPCS

## 2022-02-15 NOTE — CASE COMMUNICATION
HUD RECERT – HUDDLE RECERT REVIEW    Complete between days 46-50    Progress toward goals: Wound to coccyx closed, continues red on bony prominence    Barriers to goal achievement: Max assist with ambulation, sits in chair during day/night    Recert or discharge? Recert    Any payor source changes?no    Any changes needed to the focus of  care?   Monitoring of right foot-2nd/5th digit worsening necrosis with drainage

## 2022-02-15 NOTE — HOME HEALTH
60 Day Summary    Home Health need continues for: Monitoring of toes to right foot.     Primary diagnoses/co-morbidities/recent procedures in past 60 days that impact current episode: Swelling to right foot, reddnes    Current level of functional ability: Max assist    Homebound status and living arrangements: Homebound d/t very limited mobility and poor endurance    Goals accomplished and/or measurable progress toward unmet goals in past 60 days: Wound to coccyx is closed, continues red on bony prominence.  Focus of care for next 60 days for each discipline ordered: Improvement of 2nd/5th toe, reddness to right foot    Skin integrity/wound status: Reddness to right foot and swelling. Black areas with drainage to 2nd toe and 5th toe on right foot    Code status: Full Code    Most recent fall risk: Fall Risk     Estimated date when home care services will end: 1wk4    Plan of Care confirmed with Dr. Shoemaker on 2/14/22.       NA: Mrs. Olvera is lying in the recliner when SN arrives. Her  answers the door. She is alert, oriented x3. Lungs are clear and VS are WNL. She continues to smoke in the home. Fire safety reviewed. She has generalized discomfort and takes medication in the home for pain. She has 2+ edema to BLE. Right foot contiues red and slightly warm to touch on top of foot. Black areas to 2nd and 5th digit on her right foot is worse today. The 2nd toe is black in the front and back. It has scant amount of bloody odor drainage. Pt.  is applying silvadene ointment to her toes. SN called and left message with Dr. Shoemaker office regarding her toes being worse today. Pt. did start a ABT last week for reddness to her right foot. Pt.  doesn't think pt. needs to see a specialist at this time. Advised Dr. Shoemaker will notify SN of any new orders. Pt. assisted to her side to assess coccyx. Wound is closed but continues red on bony prominence. Pt. is lying on pillows on her side when SN arrived. SN  reviewed meds with pt. and spouse. Her coumadin was increased last week.  does home PT/INR testing weekly. Pt. requires max assist with transfering from her chair to the bsc. She uses a wheelchair if needed to go out to MD office.

## 2022-02-21 ENCOUNTER — APPOINTMENT (OUTPATIENT)
Dept: GENERAL RADIOLOGY | Facility: HOSPITAL | Age: 62
End: 2022-02-21

## 2022-02-21 ENCOUNTER — HOSPITAL ENCOUNTER (INPATIENT)
Facility: HOSPITAL | Age: 62
LOS: 18 days | Discharge: HOME HEALTH | End: 2022-03-11
Attending: EMERGENCY MEDICINE | Admitting: INTERNAL MEDICINE

## 2022-02-21 ENCOUNTER — HOME CARE VISIT (OUTPATIENT)
Dept: HOME HEALTH SERVICES | Facility: HOME HEALTHCARE | Age: 62
End: 2022-02-21

## 2022-02-21 DIAGNOSIS — I96 GANGRENE OF TOE OF RIGHT FOOT: Primary | ICD-10-CM

## 2022-02-21 DIAGNOSIS — I70.235 ATHEROSCLEROSIS OF NATIVE ARTERY OF RIGHT LOWER EXTREMITY WITH ULCERATION OF OTHER PART OF FOOT: ICD-10-CM

## 2022-02-21 DIAGNOSIS — R26.2 DIFFICULTY WALKING: ICD-10-CM

## 2022-02-21 PROBLEM — M86.9 OSTEOMYELITIS: Status: ACTIVE | Noted: 2022-02-21

## 2022-02-21 LAB
ALBUMIN SERPL-MCNC: 3 G/DL (ref 3.5–5.2)
ALBUMIN/GLOB SERPL: 0.8 G/DL
ALP SERPL-CCNC: 130 U/L (ref 39–117)
ALT SERPL W P-5'-P-CCNC: 9 U/L (ref 1–33)
ANION GAP SERPL CALCULATED.3IONS-SCNC: 10.4 MMOL/L (ref 5–15)
AST SERPL-CCNC: 16 U/L (ref 1–32)
BASOPHILS # BLD AUTO: 0.02 10*3/MM3 (ref 0–0.2)
BASOPHILS NFR BLD AUTO: 0.3 % (ref 0–1.5)
BILIRUB SERPL-MCNC: 0.3 MG/DL (ref 0–1.2)
BUN SERPL-MCNC: 24 MG/DL (ref 8–23)
BUN/CREAT SERPL: 39.3 (ref 7–25)
CALCIUM SPEC-SCNC: 8.8 MG/DL (ref 8.6–10.5)
CHLORIDE SERPL-SCNC: 108 MMOL/L (ref 98–107)
CO2 SERPL-SCNC: 20.6 MMOL/L (ref 22–29)
CREAT SERPL-MCNC: 0.61 MG/DL (ref 0.57–1)
CRP SERPL-MCNC: 3.76 MG/DL (ref 0–0.5)
D-LACTATE SERPL-SCNC: 1.1 MMOL/L (ref 0.5–2)
DEPRECATED RDW RBC AUTO: 49.1 FL (ref 37–54)
EOSINOPHIL # BLD AUTO: 0.06 10*3/MM3 (ref 0–0.4)
EOSINOPHIL NFR BLD AUTO: 0.9 % (ref 0.3–6.2)
ERYTHROCYTE [DISTWIDTH] IN BLOOD BY AUTOMATED COUNT: 15.1 % (ref 12.3–15.4)
ERYTHROCYTE [SEDIMENTATION RATE] IN BLOOD: 47 MM/HR (ref 0–30)
GFR SERPL CREATININE-BSD FRML MDRD: 99 ML/MIN/1.73
GLOBULIN UR ELPH-MCNC: 4 GM/DL
GLUCOSE BLDC GLUCOMTR-MCNC: 175 MG/DL (ref 70–99)
GLUCOSE SERPL-MCNC: 172 MG/DL (ref 65–99)
HCT VFR BLD AUTO: 27.6 % (ref 34–46.6)
HGB BLD-MCNC: 8.8 G/DL (ref 12–15.9)
HOLD SPECIMEN: NORMAL
HOLD SPECIMEN: NORMAL
IMM GRANULOCYTES # BLD AUTO: 0.02 10*3/MM3 (ref 0–0.05)
IMM GRANULOCYTES NFR BLD AUTO: 0.3 % (ref 0–0.5)
INR PPP: 1.86 (ref 2–3)
LYMPHOCYTES # BLD AUTO: 0.99 10*3/MM3 (ref 0.7–3.1)
LYMPHOCYTES NFR BLD AUTO: 15.2 % (ref 19.6–45.3)
MCH RBC QN AUTO: 28.5 PG (ref 26.6–33)
MCHC RBC AUTO-ENTMCNC: 31.9 G/DL (ref 31.5–35.7)
MCV RBC AUTO: 89.3 FL (ref 79–97)
MONOCYTES # BLD AUTO: 0.39 10*3/MM3 (ref 0.1–0.9)
MONOCYTES NFR BLD AUTO: 6 % (ref 5–12)
NEUTROPHILS NFR BLD AUTO: 5.02 10*3/MM3 (ref 1.7–7)
NEUTROPHILS NFR BLD AUTO: 77.3 % (ref 42.7–76)
NRBC BLD AUTO-RTO: 0 /100 WBC (ref 0–0.2)
PLATELET # BLD AUTO: 137 10*3/MM3 (ref 140–450)
PMV BLD AUTO: 11.2 FL (ref 6–12)
POTASSIUM SERPL-SCNC: 4.1 MMOL/L (ref 3.5–5.2)
PROT SERPL-MCNC: 7 G/DL (ref 6–8.5)
PROTHROMBIN TIME: 18.2 SECONDS (ref 9.4–12)
RBC # BLD AUTO: 3.09 10*6/MM3 (ref 3.77–5.28)
SODIUM SERPL-SCNC: 139 MMOL/L (ref 136–145)
WBC NRBC COR # BLD: 6.5 10*3/MM3 (ref 3.4–10.8)
WHOLE BLOOD HOLD SPECIMEN: NORMAL
WHOLE BLOOD HOLD SPECIMEN: NORMAL

## 2022-02-21 PROCEDURE — 25010000002 PIPERACILLIN SOD-TAZOBACTAM PER 1 G: Performed by: EMERGENCY MEDICINE

## 2022-02-21 PROCEDURE — 85652 RBC SED RATE AUTOMATED: CPT | Performed by: EMERGENCY MEDICINE

## 2022-02-21 PROCEDURE — 83605 ASSAY OF LACTIC ACID: CPT | Performed by: EMERGENCY MEDICINE

## 2022-02-21 PROCEDURE — 25010000002 VANCOMYCIN 5 G RECONSTITUTED SOLUTION: Performed by: EMERGENCY MEDICINE

## 2022-02-21 PROCEDURE — 84145 PROCALCITONIN (PCT): CPT | Performed by: INTERNAL MEDICINE

## 2022-02-21 PROCEDURE — 87040 BLOOD CULTURE FOR BACTERIA: CPT | Performed by: EMERGENCY MEDICINE

## 2022-02-21 PROCEDURE — 99284 EMERGENCY DEPT VISIT MOD MDM: CPT

## 2022-02-21 PROCEDURE — 63710000001 INSULIN DETEMIR PER 5 UNITS: Performed by: INTERNAL MEDICINE

## 2022-02-21 PROCEDURE — 25010000002 HYDROMORPHONE 1 MG/ML SOLUTION: Performed by: EMERGENCY MEDICINE

## 2022-02-21 PROCEDURE — 36415 COLL VENOUS BLD VENIPUNCTURE: CPT

## 2022-02-21 PROCEDURE — 71045 X-RAY EXAM CHEST 1 VIEW: CPT

## 2022-02-21 PROCEDURE — 86140 C-REACTIVE PROTEIN: CPT | Performed by: EMERGENCY MEDICINE

## 2022-02-21 PROCEDURE — 83036 HEMOGLOBIN GLYCOSYLATED A1C: CPT | Performed by: INTERNAL MEDICINE

## 2022-02-21 PROCEDURE — 85025 COMPLETE CBC W/AUTO DIFF WBC: CPT | Performed by: EMERGENCY MEDICINE

## 2022-02-21 PROCEDURE — 82962 GLUCOSE BLOOD TEST: CPT

## 2022-02-21 PROCEDURE — 73630 X-RAY EXAM OF FOOT: CPT

## 2022-02-21 PROCEDURE — 85610 PROTHROMBIN TIME: CPT | Performed by: EMERGENCY MEDICINE

## 2022-02-21 PROCEDURE — 86140 C-REACTIVE PROTEIN: CPT | Performed by: INTERNAL MEDICINE

## 2022-02-21 PROCEDURE — 82728 ASSAY OF FERRITIN: CPT | Performed by: INTERNAL MEDICINE

## 2022-02-21 PROCEDURE — 80053 COMPREHEN METABOLIC PANEL: CPT | Performed by: EMERGENCY MEDICINE

## 2022-02-21 RX ORDER — ACETAMINOPHEN 325 MG/1
650 TABLET ORAL EVERY 6 HOURS PRN
Status: DISCONTINUED | OUTPATIENT
Start: 2022-02-21 | End: 2022-03-03 | Stop reason: SDUPTHER

## 2022-02-21 RX ORDER — BISOPROLOL FUMARATE 5 MG/1
10 TABLET, FILM COATED ORAL
Status: DISCONTINUED | OUTPATIENT
Start: 2022-02-22 | End: 2022-03-11 | Stop reason: HOSPADM

## 2022-02-21 RX ORDER — OXYCODONE AND ACETAMINOPHEN 10; 325 MG/1; MG/1
1 TABLET ORAL EVERY 6 HOURS PRN
Status: DISCONTINUED | OUTPATIENT
Start: 2022-02-21 | End: 2022-02-26

## 2022-02-21 RX ORDER — ONDANSETRON 2 MG/ML
4 INJECTION INTRAMUSCULAR; INTRAVENOUS EVERY 6 HOURS PRN
Status: DISCONTINUED | OUTPATIENT
Start: 2022-02-21 | End: 2022-03-11 | Stop reason: HOSPADM

## 2022-02-21 RX ORDER — WARFARIN SODIUM 2.5 MG/1
2.5 TABLET ORAL NIGHTLY
COMMUNITY
End: 2022-03-11 | Stop reason: HOSPADM

## 2022-02-21 RX ORDER — IPRATROPIUM BROMIDE AND ALBUTEROL SULFATE 2.5; .5 MG/3ML; MG/3ML
3 SOLUTION RESPIRATORY (INHALATION)
Status: DISCONTINUED | OUTPATIENT
Start: 2022-02-22 | End: 2022-03-11 | Stop reason: HOSPADM

## 2022-02-21 RX ORDER — HYDROCHLOROTHIAZIDE 25 MG/1
6.25 TABLET ORAL
Status: DISCONTINUED | OUTPATIENT
Start: 2022-02-22 | End: 2022-03-09

## 2022-02-21 RX ORDER — NORTRIPTYLINE HYDROCHLORIDE 25 MG/1
25 CAPSULE ORAL NIGHTLY PRN
Status: DISCONTINUED | OUTPATIENT
Start: 2022-02-21 | End: 2022-02-21

## 2022-02-21 RX ORDER — BISOPROLOL FUMARATE AND HYDROCHLOROTHIAZIDE 10; 6.25 MG/1; MG/1
1 TABLET ORAL
Status: DISCONTINUED | OUTPATIENT
Start: 2022-02-22 | End: 2022-02-21

## 2022-02-21 RX ORDER — SODIUM CHLORIDE 0.9 % (FLUSH) 0.9 %
10 SYRINGE (ML) INJECTION AS NEEDED
Status: DISCONTINUED | OUTPATIENT
Start: 2022-02-21 | End: 2022-03-11 | Stop reason: HOSPADM

## 2022-02-21 RX ORDER — POTASSIUM CHLORIDE 750 MG/1
20 CAPSULE, EXTENDED RELEASE ORAL 2 TIMES DAILY WITH MEALS
Status: DISCONTINUED | OUTPATIENT
Start: 2022-02-21 | End: 2022-03-11 | Stop reason: HOSPADM

## 2022-02-21 RX ORDER — TIZANIDINE 4 MG/1
4 TABLET ORAL EVERY 8 HOURS
Status: DISCONTINUED | OUTPATIENT
Start: 2022-02-22 | End: 2022-02-26

## 2022-02-21 RX ORDER — PANTOPRAZOLE SODIUM 40 MG/1
40 TABLET, DELAYED RELEASE ORAL
Status: DISCONTINUED | OUTPATIENT
Start: 2022-02-22 | End: 2022-03-11 | Stop reason: HOSPADM

## 2022-02-21 RX ORDER — DEXTROSE MONOHYDRATE 100 MG/ML
25 INJECTION, SOLUTION INTRAVENOUS
Status: DISCONTINUED | OUTPATIENT
Start: 2022-02-21 | End: 2022-03-11 | Stop reason: HOSPADM

## 2022-02-21 RX ORDER — FUROSEMIDE 40 MG/1
80 TABLET ORAL 2 TIMES DAILY
Status: DISCONTINUED | OUTPATIENT
Start: 2022-02-21 | End: 2022-03-11 | Stop reason: HOSPADM

## 2022-02-21 RX ORDER — CEFEPIME 1 G/50ML
2 INJECTION, SOLUTION INTRAVENOUS ONCE
Status: COMPLETED | OUTPATIENT
Start: 2022-02-22 | End: 2022-02-22

## 2022-02-21 RX ORDER — TIZANIDINE 4 MG/1
4 TABLET ORAL EVERY 8 HOURS
Status: DISCONTINUED | OUTPATIENT
Start: 2022-02-21 | End: 2022-02-21

## 2022-02-21 RX ORDER — SERTRALINE HYDROCHLORIDE 100 MG/1
100 TABLET, FILM COATED ORAL DAILY
Status: DISCONTINUED | OUTPATIENT
Start: 2022-02-22 | End: 2022-02-21 | Stop reason: DRUGHIGH

## 2022-02-21 RX ORDER — NICOTINE POLACRILEX 4 MG
15 LOZENGE BUCCAL
Status: DISCONTINUED | OUTPATIENT
Start: 2022-02-21 | End: 2022-03-11 | Stop reason: HOSPADM

## 2022-02-21 RX ORDER — CEFEPIME 1 G/50ML
2 INJECTION, SOLUTION INTRAVENOUS EVERY 8 HOURS
Status: DISPENSED | OUTPATIENT
Start: 2022-02-22 | End: 2022-03-04

## 2022-02-21 RX ADMIN — VANCOMYCIN HYDROCHLORIDE 1250 MG: 5 INJECTION, POWDER, LYOPHILIZED, FOR SOLUTION INTRAVENOUS at 20:10

## 2022-02-21 RX ADMIN — TRAZODONE HYDROCHLORIDE 150 MG: 100 TABLET ORAL at 23:42

## 2022-02-21 RX ADMIN — OXYCODONE HYDROCHLORIDE AND ACETAMINOPHEN 1 TABLET: 10; 325 TABLET ORAL at 23:55

## 2022-02-21 RX ADMIN — FUROSEMIDE 80 MG: 80 TABLET ORAL at 23:42

## 2022-02-21 RX ADMIN — PIPERACILLIN SODIUM AND TAZOBACTAM SODIUM 4.5 G: 4; .5 INJECTION, POWDER, LYOPHILIZED, FOR SOLUTION INTRAVENOUS at 19:27

## 2022-02-21 RX ADMIN — HYDROMORPHONE HYDROCHLORIDE 0.5 MG: 1 INJECTION, SOLUTION INTRAMUSCULAR; INTRAVENOUS; SUBCUTANEOUS at 19:28

## 2022-02-21 RX ADMIN — POTASSIUM CHLORIDE 20 MEQ: 750 CAPSULE, EXTENDED RELEASE ORAL at 23:42

## 2022-02-21 RX ADMIN — TIZANIDINE 4 MG: 4 TABLET ORAL at 23:42

## 2022-02-21 RX ADMIN — INSULIN DETEMIR 25 UNITS: 100 INJECTION, SOLUTION SUBCUTANEOUS at 23:42

## 2022-02-22 ENCOUNTER — APPOINTMENT (OUTPATIENT)
Dept: MRI IMAGING | Facility: HOSPITAL | Age: 62
End: 2022-02-22

## 2022-02-22 PROBLEM — M86.171 ACUTE OSTEOMYELITIS OF TOE OF RIGHT FOOT: Status: ACTIVE | Noted: 2022-02-22

## 2022-02-22 LAB
CRP SERPL-MCNC: 3.84 MG/DL (ref 0–0.5)
FERRITIN SERPL-MCNC: 183.7 NG/ML (ref 13–150)
GLUCOSE BLDC GLUCOMTR-MCNC: 140 MG/DL (ref 70–99)
GLUCOSE BLDC GLUCOMTR-MCNC: 147 MG/DL (ref 70–99)
GLUCOSE BLDC GLUCOMTR-MCNC: 164 MG/DL (ref 70–99)
GLUCOSE BLDC GLUCOMTR-MCNC: 198 MG/DL (ref 70–99)
HBA1C MFR BLD: 7.1 % (ref 4.8–5.6)
PROCALCITONIN SERPL-MCNC: 0.07 NG/ML (ref 0–0.25)

## 2022-02-22 PROCEDURE — 94760 N-INVAS EAR/PLS OXIMETRY 1: CPT

## 2022-02-22 PROCEDURE — 25010000002 CEFEPIME PER 500 MG: Performed by: INTERNAL MEDICINE

## 2022-02-22 PROCEDURE — 73718 MRI LOWER EXTREMITY W/O DYE: CPT

## 2022-02-22 PROCEDURE — 82962 GLUCOSE BLOOD TEST: CPT

## 2022-02-22 PROCEDURE — 25010000002 VANCOMYCIN 5 G RECONSTITUTED SOLUTION: Performed by: INTERNAL MEDICINE

## 2022-02-22 PROCEDURE — 63710000001 INSULIN DETEMIR PER 5 UNITS: Performed by: INTERNAL MEDICINE

## 2022-02-22 PROCEDURE — 94640 AIRWAY INHALATION TREATMENT: CPT

## 2022-02-22 PROCEDURE — 94799 UNLISTED PULMONARY SVC/PX: CPT

## 2022-02-22 PROCEDURE — 25010000002 HYDROMORPHONE 1 MG/ML SOLUTION: Performed by: INTERNAL MEDICINE

## 2022-02-22 RX ORDER — LOSARTAN POTASSIUM 25 MG/1
100 TABLET ORAL DAILY
Status: DISCONTINUED | OUTPATIENT
Start: 2022-02-22 | End: 2022-03-11 | Stop reason: HOSPADM

## 2022-02-22 RX ADMIN — TIZANIDINE 4 MG: 4 TABLET ORAL at 15:58

## 2022-02-22 RX ADMIN — HYDROMORPHONE HYDROCHLORIDE 0.5 MG: 1 INJECTION, SOLUTION INTRAMUSCULAR; INTRAVENOUS; SUBCUTANEOUS at 20:09

## 2022-02-22 RX ADMIN — INSULIN DETEMIR 25 UNITS: 100 INJECTION, SOLUTION SUBCUTANEOUS at 22:24

## 2022-02-22 RX ADMIN — FUROSEMIDE 80 MG: 80 TABLET ORAL at 20:08

## 2022-02-22 RX ADMIN — VANCOMYCIN HYDROCHLORIDE 1000 MG: 5 INJECTION, POWDER, LYOPHILIZED, FOR SOLUTION INTRAVENOUS at 08:46

## 2022-02-22 RX ADMIN — CEFEPIME 2 G: 1 INJECTION, SOLUTION INTRAVENOUS at 00:16

## 2022-02-22 RX ADMIN — CEFEPIME 2 G: 1 INJECTION, SOLUTION INTRAVENOUS at 16:01

## 2022-02-22 RX ADMIN — IPRATROPIUM BROMIDE AND ALBUTEROL SULFATE 3 ML: 2.5; .5 SOLUTION RESPIRATORY (INHALATION) at 00:03

## 2022-02-22 RX ADMIN — LOSARTAN POTASSIUM 100 MG: 50 TABLET, FILM COATED ORAL at 10:52

## 2022-02-22 RX ADMIN — OXYCODONE HYDROCHLORIDE AND ACETAMINOPHEN 1 TABLET: 10; 325 TABLET ORAL at 23:01

## 2022-02-22 RX ADMIN — BISOPROLOL FUMARATE 10 MG: 5 TABLET ORAL at 08:48

## 2022-02-22 RX ADMIN — TRAZODONE HYDROCHLORIDE 150 MG: 100 TABLET ORAL at 20:08

## 2022-02-22 RX ADMIN — HYDROMORPHONE HYDROCHLORIDE 0.5 MG: 1 INJECTION, SOLUTION INTRAMUSCULAR; INTRAVENOUS; SUBCUTANEOUS at 10:52

## 2022-02-22 RX ADMIN — FUROSEMIDE 80 MG: 80 TABLET ORAL at 08:47

## 2022-02-22 RX ADMIN — TIZANIDINE 4 MG: 4 TABLET ORAL at 08:47

## 2022-02-22 RX ADMIN — POTASSIUM CHLORIDE 20 MEQ: 750 CAPSULE, EXTENDED RELEASE ORAL at 17:15

## 2022-02-22 RX ADMIN — CEFEPIME 2 G: 1 INJECTION, SOLUTION INTRAVENOUS at 08:47

## 2022-02-22 RX ADMIN — PANTOPRAZOLE SODIUM 40 MG: 40 TABLET, DELAYED RELEASE ORAL at 05:20

## 2022-02-22 RX ADMIN — OXYCODONE HYDROCHLORIDE AND ACETAMINOPHEN 1 TABLET: 10; 325 TABLET ORAL at 06:44

## 2022-02-22 RX ADMIN — IPRATROPIUM BROMIDE AND ALBUTEROL SULFATE 3 ML: 2.5; .5 SOLUTION RESPIRATORY (INHALATION) at 19:24

## 2022-02-22 RX ADMIN — HYDROMORPHONE HYDROCHLORIDE 0.5 MG: 1 INJECTION, SOLUTION INTRAMUSCULAR; INTRAVENOUS; SUBCUTANEOUS at 08:47

## 2022-02-22 RX ADMIN — SERTRALINE HYDROCHLORIDE 150 MG: 50 TABLET ORAL at 08:47

## 2022-02-22 RX ADMIN — POTASSIUM CHLORIDE 20 MEQ: 750 CAPSULE, EXTENDED RELEASE ORAL at 08:47

## 2022-02-22 RX ADMIN — IPRATROPIUM BROMIDE AND ALBUTEROL SULFATE 3 ML: 2.5; .5 SOLUTION RESPIRATORY (INHALATION) at 06:34

## 2022-02-22 RX ADMIN — HYDROMORPHONE HYDROCHLORIDE 0.5 MG: 1 INJECTION, SOLUTION INTRAMUSCULAR; INTRAVENOUS; SUBCUTANEOUS at 03:41

## 2022-02-22 RX ADMIN — OXYCODONE HYDROCHLORIDE AND ACETAMINOPHEN 1 TABLET: 10; 325 TABLET ORAL at 13:33

## 2022-02-22 RX ADMIN — VANCOMYCIN HYDROCHLORIDE 1000 MG: 5 INJECTION, POWDER, LYOPHILIZED, FOR SOLUTION INTRAVENOUS at 20:08

## 2022-02-22 RX ADMIN — HYDROMORPHONE HYDROCHLORIDE 0.5 MG: 1 INJECTION, SOLUTION INTRAMUSCULAR; INTRAVENOUS; SUBCUTANEOUS at 15:58

## 2022-02-22 RX ADMIN — HYDROCHLOROTHIAZIDE 6.25 MG: 25 TABLET ORAL at 08:47

## 2022-02-23 ENCOUNTER — APPOINTMENT (OUTPATIENT)
Dept: CARDIOLOGY | Facility: HOSPITAL | Age: 62
End: 2022-02-23

## 2022-02-23 LAB
ANION GAP SERPL CALCULATED.3IONS-SCNC: 12.4 MMOL/L (ref 5–15)
BH CV LOWER ARTERIAL LEFT ABI RATIO: 0.78
BH CV LOWER ARTERIAL LEFT DORSALIS PEDIS SYS MAX: 118 MMHG
BH CV LOWER ARTERIAL LEFT GREAT TOE SYS MAX: 55 MMHG
BH CV LOWER ARTERIAL LEFT LOW THIGH SYS MAX: 121 MMHG
BH CV LOWER ARTERIAL LEFT POPLITEAL SYS MAX: 119 MMHG
BH CV LOWER ARTERIAL LEFT POST TIBIAL SYS MAX: 104 MMHG
BH CV LOWER ARTERIAL LEFT TBI RATIO: 0.36
BH CV LOWER ARTERIAL RIGHT ABI RATIO: 0.48
BH CV LOWER ARTERIAL RIGHT DORSALIS PEDIS SYS MAX: 65 MMHG
BH CV LOWER ARTERIAL RIGHT GREAT TOE SYS MAX: 44 MMHG
BH CV LOWER ARTERIAL RIGHT LOW THIGH SYS MAX: 106 MMHG
BH CV LOWER ARTERIAL RIGHT POPLITEAL SYS MAX: 95 MMHG
BH CV LOWER ARTERIAL RIGHT POST TIBIAL SYS MAX: 72 MMHG
BH CV LOWER ARTERIAL RIGHT TBI RATIO: 0.29
BUN SERPL-MCNC: 22 MG/DL (ref 8–23)
BUN/CREAT SERPL: 36.1 (ref 7–25)
CALCIUM SPEC-SCNC: 8.9 MG/DL (ref 8.6–10.5)
CHLORIDE SERPL-SCNC: 106 MMOL/L (ref 98–107)
CO2 SERPL-SCNC: 22.6 MMOL/L (ref 22–29)
CREAT SERPL-MCNC: 0.61 MG/DL (ref 0.57–1)
GFR SERPL CREATININE-BSD FRML MDRD: 99 ML/MIN/1.73
GLUCOSE BLDC GLUCOMTR-MCNC: 134 MG/DL (ref 70–99)
GLUCOSE BLDC GLUCOMTR-MCNC: 143 MG/DL (ref 70–99)
GLUCOSE BLDC GLUCOMTR-MCNC: 184 MG/DL (ref 70–99)
GLUCOSE BLDC GLUCOMTR-MCNC: 76 MG/DL (ref 70–99)
GLUCOSE SERPL-MCNC: 100 MG/DL (ref 65–99)
MAXIMAL PREDICTED HEART RATE: 158 BPM
POTASSIUM SERPL-SCNC: 4 MMOL/L (ref 3.5–5.2)
SODIUM SERPL-SCNC: 141 MMOL/L (ref 136–145)
STRESS TARGET HR: 134 BPM
UPPER ARTERIAL LEFT ARM BRACHIAL SYS MAX: 151 MMHG
UPPER ARTERIAL RIGHT ARM BRACHIAL SYS MAX: 147 MMHG
VANCOMYCIN TROUGH SERPL-MCNC: 16.33 MCG/ML (ref 5–20)

## 2022-02-23 PROCEDURE — 94799 UNLISTED PULMONARY SVC/PX: CPT

## 2022-02-23 PROCEDURE — 93923 UPR/LXTR ART STDY 3+ LVLS: CPT

## 2022-02-23 PROCEDURE — 80202 ASSAY OF VANCOMYCIN: CPT | Performed by: INTERNAL MEDICINE

## 2022-02-23 PROCEDURE — 99222 1ST HOSP IP/OBS MODERATE 55: CPT | Performed by: PODIATRIST

## 2022-02-23 PROCEDURE — 25010000002 VANCOMYCIN 5 G RECONSTITUTED SOLUTION: Performed by: INTERNAL MEDICINE

## 2022-02-23 PROCEDURE — 63710000001 INSULIN DETEMIR PER 5 UNITS: Performed by: INTERNAL MEDICINE

## 2022-02-23 PROCEDURE — 25010000002 CEFEPIME PER 500 MG: Performed by: INTERNAL MEDICINE

## 2022-02-23 PROCEDURE — 25010000002 HYDROMORPHONE 1 MG/ML SOLUTION: Performed by: INTERNAL MEDICINE

## 2022-02-23 PROCEDURE — 82962 GLUCOSE BLOOD TEST: CPT

## 2022-02-23 PROCEDURE — 80048 BASIC METABOLIC PNL TOTAL CA: CPT | Performed by: INTERNAL MEDICINE

## 2022-02-23 RX ORDER — NICOTINE 21 MG/24HR
1 PATCH, TRANSDERMAL 24 HOURS TRANSDERMAL
Status: DISCONTINUED | OUTPATIENT
Start: 2022-02-23 | End: 2022-03-11 | Stop reason: HOSPADM

## 2022-02-23 RX ADMIN — BISOPROLOL FUMARATE 10 MG: 5 TABLET ORAL at 08:11

## 2022-02-23 RX ADMIN — VANCOMYCIN HYDROCHLORIDE 1000 MG: 5 INJECTION, POWDER, LYOPHILIZED, FOR SOLUTION INTRAVENOUS at 08:21

## 2022-02-23 RX ADMIN — IPRATROPIUM BROMIDE AND ALBUTEROL SULFATE 3 ML: 2.5; .5 SOLUTION RESPIRATORY (INHALATION) at 00:30

## 2022-02-23 RX ADMIN — CEFEPIME 2 G: 1 INJECTION, SOLUTION INTRAVENOUS at 10:02

## 2022-02-23 RX ADMIN — NICOTINE 1 PATCH: 14 PATCH, EXTENDED RELEASE TRANSDERMAL at 10:03

## 2022-02-23 RX ADMIN — HYDROMORPHONE HYDROCHLORIDE 0.5 MG: 1 INJECTION, SOLUTION INTRAMUSCULAR; INTRAVENOUS; SUBCUTANEOUS at 23:56

## 2022-02-23 RX ADMIN — HYDROMORPHONE HYDROCHLORIDE 0.5 MG: 1 INJECTION, SOLUTION INTRAMUSCULAR; INTRAVENOUS; SUBCUTANEOUS at 21:51

## 2022-02-23 RX ADMIN — POTASSIUM CHLORIDE 20 MEQ: 750 CAPSULE, EXTENDED RELEASE ORAL at 17:28

## 2022-02-23 RX ADMIN — TIZANIDINE 4 MG: 4 TABLET ORAL at 08:21

## 2022-02-23 RX ADMIN — IPRATROPIUM BROMIDE AND ALBUTEROL SULFATE 3 ML: 2.5; .5 SOLUTION RESPIRATORY (INHALATION) at 13:49

## 2022-02-23 RX ADMIN — POTASSIUM CHLORIDE 20 MEQ: 750 CAPSULE, EXTENDED RELEASE ORAL at 08:05

## 2022-02-23 RX ADMIN — OXYCODONE HYDROCHLORIDE AND ACETAMINOPHEN 1 TABLET: 10; 325 TABLET ORAL at 17:28

## 2022-02-23 RX ADMIN — TRAZODONE HYDROCHLORIDE 150 MG: 100 TABLET ORAL at 21:44

## 2022-02-23 RX ADMIN — HYDROMORPHONE HYDROCHLORIDE 0.5 MG: 1 INJECTION, SOLUTION INTRAMUSCULAR; INTRAVENOUS; SUBCUTANEOUS at 14:24

## 2022-02-23 RX ADMIN — TIZANIDINE 4 MG: 4 TABLET ORAL at 02:33

## 2022-02-23 RX ADMIN — HYDROMORPHONE HYDROCHLORIDE 0.5 MG: 1 INJECTION, SOLUTION INTRAMUSCULAR; INTRAVENOUS; SUBCUTANEOUS at 12:16

## 2022-02-23 RX ADMIN — SERTRALINE HYDROCHLORIDE 150 MG: 50 TABLET ORAL at 08:05

## 2022-02-23 RX ADMIN — OXYCODONE HYDROCHLORIDE AND ACETAMINOPHEN 1 TABLET: 10; 325 TABLET ORAL at 08:06

## 2022-02-23 RX ADMIN — HYDROMORPHONE HYDROCHLORIDE 0.5 MG: 1 INJECTION, SOLUTION INTRAMUSCULAR; INTRAVENOUS; SUBCUTANEOUS at 10:02

## 2022-02-23 RX ADMIN — TIZANIDINE 4 MG: 4 TABLET ORAL at 23:56

## 2022-02-23 RX ADMIN — LOSARTAN POTASSIUM 100 MG: 50 TABLET, FILM COATED ORAL at 08:05

## 2022-02-23 RX ADMIN — HYDROMORPHONE HYDROCHLORIDE 0.5 MG: 1 INJECTION, SOLUTION INTRAMUSCULAR; INTRAVENOUS; SUBCUTANEOUS at 19:39

## 2022-02-23 RX ADMIN — VANCOMYCIN HYDROCHLORIDE 500 MG: 5 INJECTION, POWDER, LYOPHILIZED, FOR SOLUTION INTRAVENOUS at 10:43

## 2022-02-23 RX ADMIN — PANTOPRAZOLE SODIUM 40 MG: 40 TABLET, DELAYED RELEASE ORAL at 05:43

## 2022-02-23 RX ADMIN — HYDROMORPHONE HYDROCHLORIDE 0.5 MG: 1 INJECTION, SOLUTION INTRAMUSCULAR; INTRAVENOUS; SUBCUTANEOUS at 02:34

## 2022-02-23 RX ADMIN — TIZANIDINE 4 MG: 4 TABLET ORAL at 16:32

## 2022-02-23 RX ADMIN — CEFEPIME 2 G: 1 INJECTION, SOLUTION INTRAVENOUS at 02:34

## 2022-02-23 RX ADMIN — HYDROMORPHONE HYDROCHLORIDE 0.5 MG: 1 INJECTION, SOLUTION INTRAMUSCULAR; INTRAVENOUS; SUBCUTANEOUS at 16:31

## 2022-02-23 RX ADMIN — IPRATROPIUM BROMIDE AND ALBUTEROL SULFATE 3 ML: 2.5; .5 SOLUTION RESPIRATORY (INHALATION) at 08:34

## 2022-02-23 RX ADMIN — HYDROCHLOROTHIAZIDE 6.25 MG: 25 TABLET ORAL at 08:06

## 2022-02-23 RX ADMIN — FUROSEMIDE 80 MG: 80 TABLET ORAL at 08:06

## 2022-02-23 RX ADMIN — CEFEPIME 2 G: 1 INJECTION, SOLUTION INTRAVENOUS at 16:32

## 2022-02-23 RX ADMIN — INSULIN DETEMIR 25 UNITS: 100 INJECTION, SOLUTION SUBCUTANEOUS at 21:43

## 2022-02-24 ENCOUNTER — APPOINTMENT (OUTPATIENT)
Dept: CT IMAGING | Facility: HOSPITAL | Age: 62
End: 2022-02-24

## 2022-02-24 PROBLEM — F41.9 ANXIETY DISORDER: Chronic | Status: ACTIVE | Noted: 2022-02-24

## 2022-02-24 PROBLEM — K59.00 CONSTIPATION: Status: ACTIVE | Noted: 2022-02-24

## 2022-02-24 PROBLEM — E11.51 DIABETES MELLITUS WITH PERIPHERAL VASCULAR DISEASE: Status: ACTIVE | Noted: 2022-02-24

## 2022-02-24 LAB
ANION GAP SERPL CALCULATED.3IONS-SCNC: 8.7 MMOL/L (ref 5–15)
BUN SERPL-MCNC: 24 MG/DL (ref 8–23)
BUN/CREAT SERPL: 34.8 (ref 7–25)
CALCIUM SPEC-SCNC: 8.7 MG/DL (ref 8.6–10.5)
CHLORIDE SERPL-SCNC: 106 MMOL/L (ref 98–107)
CO2 SERPL-SCNC: 23.3 MMOL/L (ref 22–29)
CREAT SERPL-MCNC: 0.69 MG/DL (ref 0.57–1)
GFR SERPL CREATININE-BSD FRML MDRD: 86 ML/MIN/1.73
GLUCOSE BLDC GLUCOMTR-MCNC: 132 MG/DL (ref 70–99)
GLUCOSE BLDC GLUCOMTR-MCNC: 220 MG/DL (ref 70–99)
GLUCOSE BLDC GLUCOMTR-MCNC: 91 MG/DL (ref 70–99)
GLUCOSE BLDC GLUCOMTR-MCNC: 96 MG/DL (ref 70–99)
GLUCOSE SERPL-MCNC: 122 MG/DL (ref 65–99)
POTASSIUM SERPL-SCNC: 4.1 MMOL/L (ref 3.5–5.2)
SODIUM SERPL-SCNC: 138 MMOL/L (ref 136–145)

## 2022-02-24 PROCEDURE — 94799 UNLISTED PULMONARY SVC/PX: CPT

## 2022-02-24 PROCEDURE — 80048 BASIC METABOLIC PNL TOTAL CA: CPT | Performed by: INTERNAL MEDICINE

## 2022-02-24 PROCEDURE — 82962 GLUCOSE BLOOD TEST: CPT

## 2022-02-24 PROCEDURE — 25010000002 VANCOMYCIN 5 G RECONSTITUTED SOLUTION: Performed by: INTERNAL MEDICINE

## 2022-02-24 PROCEDURE — 25010000002 ENOXAPARIN PER 10 MG: Performed by: INTERNAL MEDICINE

## 2022-02-24 PROCEDURE — 25010000002 HYDROMORPHONE 1 MG/ML SOLUTION: Performed by: INTERNAL MEDICINE

## 2022-02-24 PROCEDURE — 63710000001 INSULIN DETEMIR PER 5 UNITS: Performed by: INTERNAL MEDICINE

## 2022-02-24 PROCEDURE — 75635 CT ANGIO ABDOMINAL ARTERIES: CPT

## 2022-02-24 PROCEDURE — 25010000002 CEFEPIME PER 500 MG: Performed by: INTERNAL MEDICINE

## 2022-02-24 PROCEDURE — 99221 1ST HOSP IP/OBS SF/LOW 40: CPT | Performed by: SURGERY

## 2022-02-24 PROCEDURE — 0 IOPAMIDOL PER 1 ML: Performed by: INTERNAL MEDICINE

## 2022-02-24 RX ORDER — AMOXICILLIN 250 MG
2 CAPSULE ORAL NIGHTLY
Status: DISCONTINUED | OUTPATIENT
Start: 2022-02-24 | End: 2022-03-11 | Stop reason: HOSPADM

## 2022-02-24 RX ORDER — HYDROXYZINE PAMOATE 25 MG/1
25 CAPSULE ORAL 3 TIMES DAILY PRN
Status: DISCONTINUED | OUTPATIENT
Start: 2022-02-24 | End: 2022-03-11 | Stop reason: HOSPADM

## 2022-02-24 RX ADMIN — HYDROMORPHONE HYDROCHLORIDE 0.5 MG: 1 INJECTION, SOLUTION INTRAMUSCULAR; INTRAVENOUS; SUBCUTANEOUS at 11:44

## 2022-02-24 RX ADMIN — IOPAMIDOL 100 ML: 755 INJECTION, SOLUTION INTRAVENOUS at 17:50

## 2022-02-24 RX ADMIN — PANTOPRAZOLE SODIUM 40 MG: 40 TABLET, DELAYED RELEASE ORAL at 05:45

## 2022-02-24 RX ADMIN — HYDROMORPHONE HYDROCHLORIDE 0.5 MG: 1 INJECTION, SOLUTION INTRAMUSCULAR; INTRAVENOUS; SUBCUTANEOUS at 14:32

## 2022-02-24 RX ADMIN — IPRATROPIUM BROMIDE AND ALBUTEROL SULFATE 3 ML: 2.5; .5 SOLUTION RESPIRATORY (INHALATION) at 12:15

## 2022-02-24 RX ADMIN — HYDROCHLOROTHIAZIDE 6.25 MG: 25 TABLET ORAL at 09:35

## 2022-02-24 RX ADMIN — CEFEPIME 2 G: 1 INJECTION, SOLUTION INTRAVENOUS at 00:29

## 2022-02-24 RX ADMIN — LOSARTAN POTASSIUM 100 MG: 50 TABLET, FILM COATED ORAL at 09:35

## 2022-02-24 RX ADMIN — ENOXAPARIN SODIUM 40 MG: 100 INJECTION SUBCUTANEOUS at 11:44

## 2022-02-24 RX ADMIN — HYDROMORPHONE HYDROCHLORIDE 0.5 MG: 1 INJECTION, SOLUTION INTRAMUSCULAR; INTRAVENOUS; SUBCUTANEOUS at 16:39

## 2022-02-24 RX ADMIN — HYDROMORPHONE HYDROCHLORIDE 0.5 MG: 1 INJECTION, SOLUTION INTRAMUSCULAR; INTRAVENOUS; SUBCUTANEOUS at 05:45

## 2022-02-24 RX ADMIN — SERTRALINE HYDROCHLORIDE 150 MG: 50 TABLET ORAL at 09:35

## 2022-02-24 RX ADMIN — HYDROMORPHONE HYDROCHLORIDE 0.5 MG: 1 INJECTION, SOLUTION INTRAMUSCULAR; INTRAVENOUS; SUBCUTANEOUS at 22:01

## 2022-02-24 RX ADMIN — BISOPROLOL FUMARATE 10 MG: 5 TABLET ORAL at 09:35

## 2022-02-24 RX ADMIN — CEFEPIME 2 G: 1 INJECTION, SOLUTION INTRAVENOUS at 09:34

## 2022-02-24 RX ADMIN — CEFEPIME 2 G: 1 INJECTION, SOLUTION INTRAVENOUS at 16:39

## 2022-02-24 RX ADMIN — INSULIN DETEMIR 25 UNITS: 100 INJECTION, SOLUTION SUBCUTANEOUS at 20:20

## 2022-02-24 RX ADMIN — POTASSIUM CHLORIDE 20 MEQ: 750 CAPSULE, EXTENDED RELEASE ORAL at 07:38

## 2022-02-24 RX ADMIN — HYDROMORPHONE HYDROCHLORIDE 0.5 MG: 1 INJECTION, SOLUTION INTRAMUSCULAR; INTRAVENOUS; SUBCUTANEOUS at 07:38

## 2022-02-24 RX ADMIN — VANCOMYCIN HYDROCHLORIDE 1500 MG: 5 INJECTION, POWDER, LYOPHILIZED, FOR SOLUTION INTRAVENOUS at 07:38

## 2022-02-24 RX ADMIN — HYDROXYZINE PAMOATE 25 MG: 25 CAPSULE ORAL at 20:18

## 2022-02-24 RX ADMIN — HYDROMORPHONE HYDROCHLORIDE 0.5 MG: 1 INJECTION, SOLUTION INTRAMUSCULAR; INTRAVENOUS; SUBCUTANEOUS at 20:05

## 2022-02-24 RX ADMIN — SENNOSIDES AND DOCUSATE SODIUM 2 TABLET: 50; 8.6 TABLET ORAL at 20:18

## 2022-02-24 RX ADMIN — NICOTINE 1 PATCH: 14 PATCH, EXTENDED RELEASE TRANSDERMAL at 07:39

## 2022-02-24 RX ADMIN — OXYCODONE HYDROCHLORIDE AND ACETAMINOPHEN 1 TABLET: 10; 325 TABLET ORAL at 17:43

## 2022-02-24 RX ADMIN — HYDROMORPHONE HYDROCHLORIDE 0.5 MG: 1 INJECTION, SOLUTION INTRAMUSCULAR; INTRAVENOUS; SUBCUTANEOUS at 09:35

## 2022-02-24 RX ADMIN — HYDROMORPHONE HYDROCHLORIDE 0.5 MG: 1 INJECTION, SOLUTION INTRAMUSCULAR; INTRAVENOUS; SUBCUTANEOUS at 03:42

## 2022-02-24 RX ADMIN — TRAZODONE HYDROCHLORIDE 150 MG: 100 TABLET ORAL at 20:18

## 2022-02-24 RX ADMIN — FUROSEMIDE 80 MG: 80 TABLET ORAL at 09:35

## 2022-02-24 RX ADMIN — TIZANIDINE 4 MG: 4 TABLET ORAL at 16:39

## 2022-02-24 RX ADMIN — IPRATROPIUM BROMIDE AND ALBUTEROL SULFATE 3 ML: 2.5; .5 SOLUTION RESPIRATORY (INHALATION) at 00:30

## 2022-02-24 RX ADMIN — IPRATROPIUM BROMIDE AND ALBUTEROL SULFATE 3 ML: 2.5; .5 SOLUTION RESPIRATORY (INHALATION) at 07:59

## 2022-02-24 RX ADMIN — POTASSIUM CHLORIDE 20 MEQ: 750 CAPSULE, EXTENDED RELEASE ORAL at 17:43

## 2022-02-24 RX ADMIN — TIZANIDINE 4 MG: 4 TABLET ORAL at 07:38

## 2022-02-25 PROBLEM — L89.95: Status: ACTIVE | Noted: 2022-02-25

## 2022-02-25 LAB
ANION GAP SERPL CALCULATED.3IONS-SCNC: 10.6 MMOL/L (ref 5–15)
BUN SERPL-MCNC: 26 MG/DL (ref 8–23)
BUN/CREAT SERPL: 41.3 (ref 7–25)
CALCIUM SPEC-SCNC: 9.3 MG/DL (ref 8.6–10.5)
CHLORIDE SERPL-SCNC: 104 MMOL/L (ref 98–107)
CO2 SERPL-SCNC: 22.4 MMOL/L (ref 22–29)
CREAT SERPL-MCNC: 0.63 MG/DL (ref 0.57–1)
GFR SERPL CREATININE-BSD FRML MDRD: 96 ML/MIN/1.73
GLUCOSE BLDC GLUCOMTR-MCNC: 119 MG/DL (ref 70–99)
GLUCOSE BLDC GLUCOMTR-MCNC: 145 MG/DL (ref 70–99)
GLUCOSE BLDC GLUCOMTR-MCNC: 181 MG/DL (ref 70–99)
GLUCOSE BLDC GLUCOMTR-MCNC: 202 MG/DL (ref 70–99)
GLUCOSE SERPL-MCNC: 110 MG/DL (ref 65–99)
POTASSIUM SERPL-SCNC: 3.9 MMOL/L (ref 3.5–5.2)
SODIUM SERPL-SCNC: 137 MMOL/L (ref 136–145)
VANCOMYCIN TROUGH SERPL-MCNC: 15.92 MCG/ML (ref 5–20)

## 2022-02-25 PROCEDURE — 63710000001 INSULIN LISPRO (HUMAN) PER 5 UNITS: Performed by: INTERNAL MEDICINE

## 2022-02-25 PROCEDURE — 25010000002 CEFEPIME PER 500 MG: Performed by: INTERNAL MEDICINE

## 2022-02-25 PROCEDURE — 94799 UNLISTED PULMONARY SVC/PX: CPT

## 2022-02-25 PROCEDURE — 80048 BASIC METABOLIC PNL TOTAL CA: CPT | Performed by: INTERNAL MEDICINE

## 2022-02-25 PROCEDURE — 25010000002 HYDROMORPHONE 1 MG/ML SOLUTION: Performed by: INTERNAL MEDICINE

## 2022-02-25 PROCEDURE — 63710000001 INSULIN DETEMIR PER 5 UNITS: Performed by: INTERNAL MEDICINE

## 2022-02-25 PROCEDURE — 82962 GLUCOSE BLOOD TEST: CPT

## 2022-02-25 PROCEDURE — 25010000002 ENOXAPARIN PER 10 MG: Performed by: INTERNAL MEDICINE

## 2022-02-25 PROCEDURE — 80202 ASSAY OF VANCOMYCIN: CPT | Performed by: INTERNAL MEDICINE

## 2022-02-25 PROCEDURE — 25010000002 VANCOMYCIN 5 G RECONSTITUTED SOLUTION: Performed by: INTERNAL MEDICINE

## 2022-02-25 RX ADMIN — OXYCODONE HYDROCHLORIDE AND ACETAMINOPHEN 1 TABLET: 10; 325 TABLET ORAL at 20:45

## 2022-02-25 RX ADMIN — HYDROXYZINE PAMOATE 25 MG: 25 CAPSULE ORAL at 17:01

## 2022-02-25 RX ADMIN — CEFEPIME 2 G: 1 INJECTION, SOLUTION INTRAVENOUS at 10:28

## 2022-02-25 RX ADMIN — INSULIN LISPRO 4 UNITS: 100 INJECTION, SOLUTION INTRAVENOUS; SUBCUTANEOUS at 17:09

## 2022-02-25 RX ADMIN — IPRATROPIUM BROMIDE AND ALBUTEROL SULFATE 3 ML: 2.5; .5 SOLUTION RESPIRATORY (INHALATION) at 12:24

## 2022-02-25 RX ADMIN — HYDROXYZINE PAMOATE 25 MG: 25 CAPSULE ORAL at 10:33

## 2022-02-25 RX ADMIN — POTASSIUM CHLORIDE 20 MEQ: 750 CAPSULE, EXTENDED RELEASE ORAL at 08:13

## 2022-02-25 RX ADMIN — IPRATROPIUM BROMIDE AND ALBUTEROL SULFATE 3 ML: 2.5; .5 SOLUTION RESPIRATORY (INHALATION) at 19:31

## 2022-02-25 RX ADMIN — HYDROMORPHONE HYDROCHLORIDE 0.5 MG: 1 INJECTION, SOLUTION INTRAMUSCULAR; INTRAVENOUS; SUBCUTANEOUS at 16:31

## 2022-02-25 RX ADMIN — LOSARTAN POTASSIUM 100 MG: 50 TABLET, FILM COATED ORAL at 08:30

## 2022-02-25 RX ADMIN — POTASSIUM CHLORIDE 20 MEQ: 750 CAPSULE, EXTENDED RELEASE ORAL at 17:26

## 2022-02-25 RX ADMIN — HYDROCHLOROTHIAZIDE 6.25 MG: 25 TABLET ORAL at 08:38

## 2022-02-25 RX ADMIN — INSULIN DETEMIR 25 UNITS: 100 INJECTION, SOLUTION SUBCUTANEOUS at 21:40

## 2022-02-25 RX ADMIN — OXYCODONE HYDROCHLORIDE AND ACETAMINOPHEN 1 TABLET: 10; 325 TABLET ORAL at 06:25

## 2022-02-25 RX ADMIN — BISOPROLOL FUMARATE 10 MG: 5 TABLET ORAL at 08:30

## 2022-02-25 RX ADMIN — ENOXAPARIN SODIUM 40 MG: 100 INJECTION SUBCUTANEOUS at 13:26

## 2022-02-25 RX ADMIN — TIZANIDINE 4 MG: 4 TABLET ORAL at 17:11

## 2022-02-25 RX ADMIN — NALOXEGOL OXALATE 12.5 MG: 12.5 TABLET, FILM COATED ORAL at 06:25

## 2022-02-25 RX ADMIN — TIZANIDINE 4 MG: 4 TABLET ORAL at 00:09

## 2022-02-25 RX ADMIN — OXYCODONE HYDROCHLORIDE AND ACETAMINOPHEN 1 TABLET: 10; 325 TABLET ORAL at 14:43

## 2022-02-25 RX ADMIN — PANTOPRAZOLE SODIUM 40 MG: 40 TABLET, DELAYED RELEASE ORAL at 06:26

## 2022-02-25 RX ADMIN — CEFEPIME 2 G: 1 INJECTION, SOLUTION INTRAVENOUS at 16:27

## 2022-02-25 RX ADMIN — CEFEPIME 2 G: 1 INJECTION, SOLUTION INTRAVENOUS at 00:11

## 2022-02-25 RX ADMIN — HYDROMORPHONE HYDROCHLORIDE 0.5 MG: 1 INJECTION, SOLUTION INTRAMUSCULAR; INTRAVENOUS; SUBCUTANEOUS at 18:34

## 2022-02-25 RX ADMIN — SENNOSIDES AND DOCUSATE SODIUM 2 TABLET: 50; 8.6 TABLET ORAL at 20:42

## 2022-02-25 RX ADMIN — HYDROMORPHONE HYDROCHLORIDE 0.5 MG: 1 INJECTION, SOLUTION INTRAMUSCULAR; INTRAVENOUS; SUBCUTANEOUS at 10:28

## 2022-02-25 RX ADMIN — HYDROMORPHONE HYDROCHLORIDE 0.5 MG: 1 INJECTION, SOLUTION INTRAMUSCULAR; INTRAVENOUS; SUBCUTANEOUS at 22:28

## 2022-02-25 RX ADMIN — SERTRALINE HYDROCHLORIDE 150 MG: 50 TABLET ORAL at 08:30

## 2022-02-25 RX ADMIN — VANCOMYCIN HYDROCHLORIDE 1500 MG: 5 INJECTION, POWDER, LYOPHILIZED, FOR SOLUTION INTRAVENOUS at 08:23

## 2022-02-25 RX ADMIN — FUROSEMIDE 80 MG: 80 TABLET ORAL at 20:42

## 2022-02-25 RX ADMIN — TRAZODONE HYDROCHLORIDE 150 MG: 100 TABLET ORAL at 20:42

## 2022-02-25 RX ADMIN — HYDROMORPHONE HYDROCHLORIDE 0.5 MG: 1 INJECTION, SOLUTION INTRAMUSCULAR; INTRAVENOUS; SUBCUTANEOUS at 08:11

## 2022-02-25 RX ADMIN — OXYCODONE HYDROCHLORIDE AND ACETAMINOPHEN 1 TABLET: 10; 325 TABLET ORAL at 00:09

## 2022-02-25 RX ADMIN — NICOTINE 1 PATCH: 14 PATCH, EXTENDED RELEASE TRANSDERMAL at 08:27

## 2022-02-25 RX ADMIN — TIZANIDINE 4 MG: 4 TABLET ORAL at 08:30

## 2022-02-26 LAB
ANION GAP SERPL CALCULATED.3IONS-SCNC: 10.3 MMOL/L (ref 5–15)
APTT PPP: 29.2 SECONDS (ref 22.2–34.2)
BACTERIA SPEC AEROBE CULT: NORMAL
BACTERIA SPEC AEROBE CULT: NORMAL
BASOPHILS # BLD AUTO: 0.01 10*3/MM3 (ref 0–0.2)
BASOPHILS NFR BLD AUTO: 0.2 % (ref 0–1.5)
BUN SERPL-MCNC: 24 MG/DL (ref 8–23)
BUN/CREAT SERPL: 44.4 (ref 7–25)
CALCIUM SPEC-SCNC: 8.9 MG/DL (ref 8.6–10.5)
CHLORIDE SERPL-SCNC: 108 MMOL/L (ref 98–107)
CO2 SERPL-SCNC: 22.7 MMOL/L (ref 22–29)
CREAT SERPL-MCNC: 0.54 MG/DL (ref 0.57–1)
DEPRECATED RDW RBC AUTO: 47.8 FL (ref 37–54)
EOSINOPHIL # BLD AUTO: 0.06 10*3/MM3 (ref 0–0.4)
EOSINOPHIL NFR BLD AUTO: 1.4 % (ref 0.3–6.2)
ERYTHROCYTE [DISTWIDTH] IN BLOOD BY AUTOMATED COUNT: 14.7 % (ref 12.3–15.4)
GFR SERPL CREATININE-BSD FRML MDRD: 114 ML/MIN/1.73
GLUCOSE BLDC GLUCOMTR-MCNC: 135 MG/DL (ref 70–99)
GLUCOSE BLDC GLUCOMTR-MCNC: 140 MG/DL (ref 70–99)
GLUCOSE BLDC GLUCOMTR-MCNC: 162 MG/DL (ref 70–99)
GLUCOSE BLDC GLUCOMTR-MCNC: 97 MG/DL (ref 70–99)
GLUCOSE SERPL-MCNC: 101 MG/DL (ref 65–99)
HCT VFR BLD AUTO: 24.1 % (ref 34–46.6)
HGB BLD-MCNC: 7.5 G/DL (ref 12–15.9)
IMM GRANULOCYTES # BLD AUTO: 0.02 10*3/MM3 (ref 0–0.05)
IMM GRANULOCYTES NFR BLD AUTO: 0.5 % (ref 0–0.5)
INR PPP: 1.1 (ref 2–3)
LYMPHOCYTES # BLD AUTO: 0.72 10*3/MM3 (ref 0.7–3.1)
LYMPHOCYTES NFR BLD AUTO: 17 % (ref 19.6–45.3)
MCH RBC QN AUTO: 27.7 PG (ref 26.6–33)
MCHC RBC AUTO-ENTMCNC: 31.1 G/DL (ref 31.5–35.7)
MCV RBC AUTO: 88.9 FL (ref 79–97)
MONOCYTES # BLD AUTO: 0.37 10*3/MM3 (ref 0.1–0.9)
MONOCYTES NFR BLD AUTO: 8.7 % (ref 5–12)
NEUTROPHILS NFR BLD AUTO: 3.05 10*3/MM3 (ref 1.7–7)
NEUTROPHILS NFR BLD AUTO: 72.2 % (ref 42.7–76)
NRBC BLD AUTO-RTO: 0 /100 WBC (ref 0–0.2)
PLATELET # BLD AUTO: 130 10*3/MM3 (ref 140–450)
PMV BLD AUTO: 10.6 FL (ref 6–12)
POTASSIUM SERPL-SCNC: 3.6 MMOL/L (ref 3.5–5.2)
PROTHROMBIN TIME: 11.4 SECONDS (ref 9.4–12)
RBC # BLD AUTO: 2.71 10*6/MM3 (ref 3.77–5.28)
SODIUM SERPL-SCNC: 141 MMOL/L (ref 136–145)
WBC NRBC COR # BLD: 4.23 10*3/MM3 (ref 3.4–10.8)

## 2022-02-26 PROCEDURE — 25010000002 HYDROMORPHONE 1 MG/ML SOLUTION: Performed by: INTERNAL MEDICINE

## 2022-02-26 PROCEDURE — 63710000001 INSULIN DETEMIR PER 5 UNITS: Performed by: INTERNAL MEDICINE

## 2022-02-26 PROCEDURE — 94799 UNLISTED PULMONARY SVC/PX: CPT

## 2022-02-26 PROCEDURE — 63710000001 INSULIN LISPRO (HUMAN) PER 5 UNITS: Performed by: INTERNAL MEDICINE

## 2022-02-26 PROCEDURE — 85730 THROMBOPLASTIN TIME PARTIAL: CPT | Performed by: INTERNAL MEDICINE

## 2022-02-26 PROCEDURE — 80048 BASIC METABOLIC PNL TOTAL CA: CPT | Performed by: INTERNAL MEDICINE

## 2022-02-26 PROCEDURE — 25010000002 CEFEPIME PER 500 MG: Performed by: INTERNAL MEDICINE

## 2022-02-26 PROCEDURE — 82962 GLUCOSE BLOOD TEST: CPT

## 2022-02-26 PROCEDURE — 25010000002 HEPARIN (PORCINE) 25000-0.45 UT/250ML-% SOLUTION: Performed by: INTERNAL MEDICINE

## 2022-02-26 PROCEDURE — 85025 COMPLETE CBC W/AUTO DIFF WBC: CPT | Performed by: INTERNAL MEDICINE

## 2022-02-26 PROCEDURE — 85610 PROTHROMBIN TIME: CPT | Performed by: INTERNAL MEDICINE

## 2022-02-26 PROCEDURE — 25010000002 VANCOMYCIN 5 G RECONSTITUTED SOLUTION: Performed by: INTERNAL MEDICINE

## 2022-02-26 RX ORDER — OXYCODONE AND ACETAMINOPHEN 10; 325 MG/1; MG/1
1 TABLET ORAL EVERY 4 HOURS PRN
Status: DISCONTINUED | OUTPATIENT
Start: 2022-02-26 | End: 2022-03-11 | Stop reason: HOSPADM

## 2022-02-26 RX ORDER — ALPRAZOLAM 0.25 MG/1
0.25 TABLET ORAL 4 TIMES DAILY PRN
Status: DISPENSED | OUTPATIENT
Start: 2022-02-26 | End: 2022-03-05

## 2022-02-26 RX ORDER — TIZANIDINE 4 MG/1
2 TABLET ORAL EVERY 8 HOURS
Status: DISCONTINUED | OUTPATIENT
Start: 2022-02-26 | End: 2022-03-11 | Stop reason: HOSPADM

## 2022-02-26 RX ORDER — HEPARIN SODIUM 10000 [USP'U]/100ML
12 INJECTION, SOLUTION INTRAVENOUS
Status: DISCONTINUED | OUTPATIENT
Start: 2022-02-26 | End: 2022-03-01

## 2022-02-26 RX ADMIN — HYDROMORPHONE HYDROCHLORIDE 0.5 MG: 1 INJECTION, SOLUTION INTRAMUSCULAR; INTRAVENOUS; SUBCUTANEOUS at 17:25

## 2022-02-26 RX ADMIN — PANTOPRAZOLE SODIUM 40 MG: 40 TABLET, DELAYED RELEASE ORAL at 05:01

## 2022-02-26 RX ADMIN — SERTRALINE HYDROCHLORIDE 150 MG: 50 TABLET ORAL at 08:32

## 2022-02-26 RX ADMIN — LOSARTAN POTASSIUM 100 MG: 50 TABLET, FILM COATED ORAL at 08:30

## 2022-02-26 RX ADMIN — VANCOMYCIN HYDROCHLORIDE 1500 MG: 5 INJECTION, POWDER, LYOPHILIZED, FOR SOLUTION INTRAVENOUS at 08:31

## 2022-02-26 RX ADMIN — TIZANIDINE 2 MG: 4 TABLET ORAL at 23:30

## 2022-02-26 RX ADMIN — TRAZODONE HYDROCHLORIDE 150 MG: 100 TABLET ORAL at 21:43

## 2022-02-26 RX ADMIN — ALPRAZOLAM 0.25 MG: 0.25 TABLET ORAL at 20:27

## 2022-02-26 RX ADMIN — HYDROXYZINE PAMOATE 25 MG: 25 CAPSULE ORAL at 00:09

## 2022-02-26 RX ADMIN — HYDROMORPHONE HYDROCHLORIDE 0.5 MG: 1 INJECTION, SOLUTION INTRAMUSCULAR; INTRAVENOUS; SUBCUTANEOUS at 01:08

## 2022-02-26 RX ADMIN — INSULIN DETEMIR 25 UNITS: 100 INJECTION, SOLUTION SUBCUTANEOUS at 21:51

## 2022-02-26 RX ADMIN — TIZANIDINE 4 MG: 4 TABLET ORAL at 08:30

## 2022-02-26 RX ADMIN — TIZANIDINE 4 MG: 4 TABLET ORAL at 00:00

## 2022-02-26 RX ADMIN — CEFEPIME 2 G: 1 INJECTION, SOLUTION INTRAVENOUS at 10:30

## 2022-02-26 RX ADMIN — HEPARIN SODIUM 12 UNITS/KG/HR: 10000 INJECTION, SOLUTION INTRAVENOUS at 22:02

## 2022-02-26 RX ADMIN — OXYCODONE HYDROCHLORIDE AND ACETAMINOPHEN 1 TABLET: 10; 325 TABLET ORAL at 19:30

## 2022-02-26 RX ADMIN — OXYCODONE HYDROCHLORIDE AND ACETAMINOPHEN 1 TABLET: 10; 325 TABLET ORAL at 04:58

## 2022-02-26 RX ADMIN — INSULIN LISPRO 2 UNITS: 100 INJECTION, SOLUTION INTRAVENOUS; SUBCUTANEOUS at 16:38

## 2022-02-26 RX ADMIN — HYDROMORPHONE HYDROCHLORIDE 0.5 MG: 1 INJECTION, SOLUTION INTRAMUSCULAR; INTRAVENOUS; SUBCUTANEOUS at 21:50

## 2022-02-26 RX ADMIN — HYDROMORPHONE HYDROCHLORIDE 0.5 MG: 1 INJECTION, SOLUTION INTRAMUSCULAR; INTRAVENOUS; SUBCUTANEOUS at 08:29

## 2022-02-26 RX ADMIN — OXYCODONE HYDROCHLORIDE AND ACETAMINOPHEN 1 TABLET: 10; 325 TABLET ORAL at 23:28

## 2022-02-26 RX ADMIN — IPRATROPIUM BROMIDE AND ALBUTEROL SULFATE 3 ML: 2.5; .5 SOLUTION RESPIRATORY (INHALATION) at 18:05

## 2022-02-26 RX ADMIN — FUROSEMIDE 80 MG: 80 TABLET ORAL at 08:30

## 2022-02-26 RX ADMIN — HYDROXYZINE PAMOATE 25 MG: 25 CAPSULE ORAL at 23:11

## 2022-02-26 RX ADMIN — HYDROMORPHONE HYDROCHLORIDE 0.5 MG: 1 INJECTION, SOLUTION INTRAMUSCULAR; INTRAVENOUS; SUBCUTANEOUS at 10:30

## 2022-02-26 RX ADMIN — NALOXEGOL OXALATE 12.5 MG: 12.5 TABLET, FILM COATED ORAL at 08:30

## 2022-02-26 RX ADMIN — NICOTINE 1 PATCH: 14 PATCH, EXTENDED RELEASE TRANSDERMAL at 09:43

## 2022-02-26 RX ADMIN — CEFEPIME 2 G: 1 INJECTION, SOLUTION INTRAVENOUS at 17:24

## 2022-02-26 RX ADMIN — OXYCODONE HYDROCHLORIDE AND ACETAMINOPHEN 1 TABLET: 10; 325 TABLET ORAL at 15:26

## 2022-02-26 RX ADMIN — POTASSIUM CHLORIDE 20 MEQ: 750 CAPSULE, EXTENDED RELEASE ORAL at 08:30

## 2022-02-26 RX ADMIN — POTASSIUM CHLORIDE 20 MEQ: 750 CAPSULE, EXTENDED RELEASE ORAL at 17:25

## 2022-02-26 RX ADMIN — TIZANIDINE 2 MG: 4 TABLET ORAL at 15:30

## 2022-02-26 RX ADMIN — HYDROCHLOROTHIAZIDE 6.25 MG: 25 TABLET ORAL at 08:30

## 2022-02-26 RX ADMIN — BISOPROLOL FUMARATE 10 MG: 5 TABLET ORAL at 08:30

## 2022-02-26 RX ADMIN — CEFEPIME 2 G: 1 INJECTION, SOLUTION INTRAVENOUS at 00:01

## 2022-02-27 LAB
APTT PPP: 35.7 SECONDS (ref 22.2–34.2)
APTT PPP: 37 SECONDS (ref 22.2–34.2)
APTT PPP: 38.7 SECONDS (ref 22.2–34.2)
GLUCOSE BLDC GLUCOMTR-MCNC: 113 MG/DL (ref 70–99)
GLUCOSE BLDC GLUCOMTR-MCNC: 151 MG/DL (ref 70–99)
GLUCOSE BLDC GLUCOMTR-MCNC: 159 MG/DL (ref 70–99)
GLUCOSE BLDC GLUCOMTR-MCNC: 185 MG/DL (ref 70–99)
HOLD SPECIMEN: NORMAL

## 2022-02-27 PROCEDURE — 94799 UNLISTED PULMONARY SVC/PX: CPT

## 2022-02-27 PROCEDURE — 94761 N-INVAS EAR/PLS OXIMETRY MLT: CPT

## 2022-02-27 PROCEDURE — 82962 GLUCOSE BLOOD TEST: CPT

## 2022-02-27 PROCEDURE — 25010000002 VANCOMYCIN 5 G RECONSTITUTED SOLUTION: Performed by: INTERNAL MEDICINE

## 2022-02-27 PROCEDURE — 63710000001 INSULIN LISPRO (HUMAN) PER 5 UNITS: Performed by: INTERNAL MEDICINE

## 2022-02-27 PROCEDURE — 25010000002 HYDROMORPHONE 1 MG/ML SOLUTION: Performed by: INTERNAL MEDICINE

## 2022-02-27 PROCEDURE — 63710000001 INSULIN DETEMIR PER 5 UNITS: Performed by: INTERNAL MEDICINE

## 2022-02-27 PROCEDURE — 85730 THROMBOPLASTIN TIME PARTIAL: CPT | Performed by: INTERNAL MEDICINE

## 2022-02-27 PROCEDURE — 25010000002 CEFEPIME PER 500 MG: Performed by: INTERNAL MEDICINE

## 2022-02-27 RX ADMIN — ALPRAZOLAM 0.25 MG: 0.25 TABLET ORAL at 16:03

## 2022-02-27 RX ADMIN — INSULIN DETEMIR 25 UNITS: 100 INJECTION, SOLUTION SUBCUTANEOUS at 20:58

## 2022-02-27 RX ADMIN — HYDROCHLOROTHIAZIDE 6.25 MG: 25 TABLET ORAL at 08:40

## 2022-02-27 RX ADMIN — HYDROMORPHONE HYDROCHLORIDE 0.5 MG: 1 INJECTION, SOLUTION INTRAMUSCULAR; INTRAVENOUS; SUBCUTANEOUS at 06:15

## 2022-02-27 RX ADMIN — NICOTINE 1 PATCH: 14 PATCH, EXTENDED RELEASE TRANSDERMAL at 08:43

## 2022-02-27 RX ADMIN — CEFEPIME 2 G: 1 INJECTION, SOLUTION INTRAVENOUS at 00:38

## 2022-02-27 RX ADMIN — OXYCODONE HYDROCHLORIDE AND ACETAMINOPHEN 1 TABLET: 10; 325 TABLET ORAL at 04:00

## 2022-02-27 RX ADMIN — OXYCODONE HYDROCHLORIDE AND ACETAMINOPHEN 1 TABLET: 10; 325 TABLET ORAL at 14:19

## 2022-02-27 RX ADMIN — TIZANIDINE 2 MG: 4 TABLET ORAL at 08:40

## 2022-02-27 RX ADMIN — HYDROMORPHONE HYDROCHLORIDE 0.5 MG: 1 INJECTION, SOLUTION INTRAMUSCULAR; INTRAVENOUS; SUBCUTANEOUS at 02:16

## 2022-02-27 RX ADMIN — OXYCODONE HYDROCHLORIDE AND ACETAMINOPHEN 1 TABLET: 10; 325 TABLET ORAL at 08:39

## 2022-02-27 RX ADMIN — CEFEPIME 2 G: 1 INJECTION, SOLUTION INTRAVENOUS at 08:39

## 2022-02-27 RX ADMIN — IPRATROPIUM BROMIDE AND ALBUTEROL SULFATE 3 ML: 2.5; .5 SOLUTION RESPIRATORY (INHALATION) at 19:34

## 2022-02-27 RX ADMIN — ALPRAZOLAM 0.25 MG: 0.25 TABLET ORAL at 08:40

## 2022-02-27 RX ADMIN — INSULIN LISPRO 2 UNITS: 100 INJECTION, SOLUTION INTRAVENOUS; SUBCUTANEOUS at 10:46

## 2022-02-27 RX ADMIN — BISOPROLOL FUMARATE 10 MG: 5 TABLET ORAL at 08:40

## 2022-02-27 RX ADMIN — IPRATROPIUM BROMIDE AND ALBUTEROL SULFATE 3 ML: 2.5; .5 SOLUTION RESPIRATORY (INHALATION) at 00:12

## 2022-02-27 RX ADMIN — CEFEPIME 2 G: 1 INJECTION, SOLUTION INTRAVENOUS at 16:03

## 2022-02-27 RX ADMIN — VANCOMYCIN HYDROCHLORIDE 1500 MG: 5 INJECTION, POWDER, LYOPHILIZED, FOR SOLUTION INTRAVENOUS at 08:39

## 2022-02-27 RX ADMIN — FUROSEMIDE 80 MG: 80 TABLET ORAL at 08:40

## 2022-02-27 RX ADMIN — LOSARTAN POTASSIUM 100 MG: 50 TABLET, FILM COATED ORAL at 08:40

## 2022-02-27 RX ADMIN — TIZANIDINE 2 MG: 4 TABLET ORAL at 16:03

## 2022-02-27 RX ADMIN — SENNOSIDES AND DOCUSATE SODIUM 2 TABLET: 50; 8.6 TABLET ORAL at 21:01

## 2022-02-27 RX ADMIN — OXYCODONE HYDROCHLORIDE AND ACETAMINOPHEN 1 TABLET: 10; 325 TABLET ORAL at 21:01

## 2022-02-27 RX ADMIN — POTASSIUM CHLORIDE 20 MEQ: 750 CAPSULE, EXTENDED RELEASE ORAL at 08:40

## 2022-02-27 RX ADMIN — TRAZODONE HYDROCHLORIDE 150 MG: 100 TABLET ORAL at 21:00

## 2022-02-27 RX ADMIN — FUROSEMIDE 80 MG: 80 TABLET ORAL at 21:01

## 2022-02-27 RX ADMIN — HYDROMORPHONE HYDROCHLORIDE 0.5 MG: 1 INJECTION, SOLUTION INTRAMUSCULAR; INTRAVENOUS; SUBCUTANEOUS at 22:42

## 2022-02-27 RX ADMIN — SERTRALINE HYDROCHLORIDE 150 MG: 50 TABLET ORAL at 08:40

## 2022-02-27 RX ADMIN — PANTOPRAZOLE SODIUM 40 MG: 40 TABLET, DELAYED RELEASE ORAL at 06:18

## 2022-02-27 RX ADMIN — POTASSIUM CHLORIDE 20 MEQ: 750 CAPSULE, EXTENDED RELEASE ORAL at 17:14

## 2022-02-27 RX ADMIN — ALPRAZOLAM 0.25 MG: 0.25 TABLET ORAL at 23:59

## 2022-02-27 RX ADMIN — TIZANIDINE 2 MG: 4 TABLET ORAL at 23:44

## 2022-02-28 LAB
ANION GAP SERPL CALCULATED.3IONS-SCNC: 11.5 MMOL/L (ref 5–15)
APTT PPP: 37.5 SECONDS (ref 22.2–34.2)
APTT PPP: 37.7 SECONDS (ref 22.2–34.2)
APTT PPP: 53.2 SECONDS (ref 22.2–34.2)
APTT PPP: 55 SECONDS (ref 22.2–34.2)
BUN SERPL-MCNC: 26 MG/DL (ref 8–23)
BUN/CREAT SERPL: 41.9 (ref 7–25)
CALCIUM SPEC-SCNC: 9.4 MG/DL (ref 8.6–10.5)
CHLORIDE SERPL-SCNC: 105 MMOL/L (ref 98–107)
CO2 SERPL-SCNC: 21.5 MMOL/L (ref 22–29)
CREAT SERPL-MCNC: 0.62 MG/DL (ref 0.57–1)
GFR SERPL CREATININE-BSD FRML MDRD: 98 ML/MIN/1.73
GLUCOSE BLDC GLUCOMTR-MCNC: 118 MG/DL (ref 70–99)
GLUCOSE BLDC GLUCOMTR-MCNC: 151 MG/DL (ref 70–99)
GLUCOSE BLDC GLUCOMTR-MCNC: 165 MG/DL (ref 70–99)
GLUCOSE BLDC GLUCOMTR-MCNC: 98 MG/DL (ref 70–99)
GLUCOSE SERPL-MCNC: 109 MG/DL (ref 65–99)
POTASSIUM SERPL-SCNC: 4.3 MMOL/L (ref 3.5–5.2)
SODIUM SERPL-SCNC: 138 MMOL/L (ref 136–145)

## 2022-02-28 PROCEDURE — 94761 N-INVAS EAR/PLS OXIMETRY MLT: CPT

## 2022-02-28 PROCEDURE — 82962 GLUCOSE BLOOD TEST: CPT

## 2022-02-28 PROCEDURE — 25010000002 HYDROMORPHONE 1 MG/ML SOLUTION: Performed by: INTERNAL MEDICINE

## 2022-02-28 PROCEDURE — 94799 UNLISTED PULMONARY SVC/PX: CPT

## 2022-02-28 PROCEDURE — 85730 THROMBOPLASTIN TIME PARTIAL: CPT | Performed by: INTERNAL MEDICINE

## 2022-02-28 PROCEDURE — 80048 BASIC METABOLIC PNL TOTAL CA: CPT | Performed by: INTERNAL MEDICINE

## 2022-02-28 PROCEDURE — 25010000002 HEPARIN (PORCINE) 25000-0.45 UT/250ML-% SOLUTION: Performed by: INTERNAL MEDICINE

## 2022-02-28 PROCEDURE — 63710000001 INSULIN DETEMIR PER 5 UNITS: Performed by: INTERNAL MEDICINE

## 2022-02-28 PROCEDURE — 99232 SBSQ HOSP IP/OBS MODERATE 35: CPT | Performed by: SURGERY

## 2022-02-28 PROCEDURE — 25010000002 VANCOMYCIN 5 G RECONSTITUTED SOLUTION: Performed by: INTERNAL MEDICINE

## 2022-02-28 PROCEDURE — 25010000002 CEFEPIME PER 500 MG: Performed by: INTERNAL MEDICINE

## 2022-02-28 RX ORDER — CHOLESTYRAMINE LIGHT 4 G/5.7G
1 POWDER, FOR SUSPENSION ORAL EVERY 12 HOURS SCHEDULED
Status: DISCONTINUED | OUTPATIENT
Start: 2022-02-28 | End: 2022-03-11 | Stop reason: HOSPADM

## 2022-02-28 RX ADMIN — TIZANIDINE 2 MG: 4 TABLET ORAL at 23:58

## 2022-02-28 RX ADMIN — HYDROMORPHONE HYDROCHLORIDE 0.5 MG: 1 INJECTION, SOLUTION INTRAMUSCULAR; INTRAVENOUS; SUBCUTANEOUS at 03:02

## 2022-02-28 RX ADMIN — CEFEPIME 2 G: 1 INJECTION, SOLUTION INTRAVENOUS at 09:43

## 2022-02-28 RX ADMIN — BISOPROLOL FUMARATE 10 MG: 5 TABLET ORAL at 08:00

## 2022-02-28 RX ADMIN — HYDROMORPHONE HYDROCHLORIDE 0.5 MG: 1 INJECTION, SOLUTION INTRAMUSCULAR; INTRAVENOUS; SUBCUTANEOUS at 20:57

## 2022-02-28 RX ADMIN — NALOXEGOL OXALATE 12.5 MG: 12.5 TABLET, FILM COATED ORAL at 06:38

## 2022-02-28 RX ADMIN — CHOLESTYRAMINE 4 G: 4 POWDER, FOR SUSPENSION ORAL at 16:47

## 2022-02-28 RX ADMIN — TIZANIDINE 2 MG: 4 TABLET ORAL at 07:55

## 2022-02-28 RX ADMIN — OXYCODONE HYDROCHLORIDE AND ACETAMINOPHEN 1 TABLET: 10; 325 TABLET ORAL at 09:43

## 2022-02-28 RX ADMIN — FUROSEMIDE 80 MG: 80 TABLET ORAL at 20:56

## 2022-02-28 RX ADMIN — HYDROMORPHONE HYDROCHLORIDE 0.5 MG: 1 INJECTION, SOLUTION INTRAMUSCULAR; INTRAVENOUS; SUBCUTANEOUS at 16:54

## 2022-02-28 RX ADMIN — SERTRALINE HYDROCHLORIDE 150 MG: 50 TABLET ORAL at 08:00

## 2022-02-28 RX ADMIN — ALPRAZOLAM 0.25 MG: 0.25 TABLET ORAL at 17:51

## 2022-02-28 RX ADMIN — INSULIN DETEMIR 25 UNITS: 100 INJECTION, SOLUTION SUBCUTANEOUS at 20:58

## 2022-02-28 RX ADMIN — IPRATROPIUM BROMIDE AND ALBUTEROL SULFATE 3 ML: 2.5; .5 SOLUTION RESPIRATORY (INHALATION) at 00:40

## 2022-02-28 RX ADMIN — CEFEPIME 2 G: 1 INJECTION, SOLUTION INTRAVENOUS at 00:25

## 2022-02-28 RX ADMIN — POTASSIUM CHLORIDE 20 MEQ: 750 CAPSULE, EXTENDED RELEASE ORAL at 17:51

## 2022-02-28 RX ADMIN — TIZANIDINE 2 MG: 4 TABLET ORAL at 16:47

## 2022-02-28 RX ADMIN — HYDROMORPHONE HYDROCHLORIDE 0.5 MG: 1 INJECTION, SOLUTION INTRAMUSCULAR; INTRAVENOUS; SUBCUTANEOUS at 07:51

## 2022-02-28 RX ADMIN — HEPARIN SODIUM 20 UNITS/KG/HR: 10000 INJECTION, SOLUTION INTRAVENOUS at 13:14

## 2022-02-28 RX ADMIN — HYDROMORPHONE HYDROCHLORIDE 0.5 MG: 1 INJECTION, SOLUTION INTRAMUSCULAR; INTRAVENOUS; SUBCUTANEOUS at 12:00

## 2022-02-28 RX ADMIN — OXYCODONE HYDROCHLORIDE AND ACETAMINOPHEN 1 TABLET: 10; 325 TABLET ORAL at 18:45

## 2022-02-28 RX ADMIN — NICOTINE 1 PATCH: 14 PATCH, EXTENDED RELEASE TRANSDERMAL at 08:00

## 2022-02-28 RX ADMIN — IPRATROPIUM BROMIDE AND ALBUTEROL SULFATE 3 ML: 2.5; .5 SOLUTION RESPIRATORY (INHALATION) at 19:21

## 2022-02-28 RX ADMIN — LOSARTAN POTASSIUM 100 MG: 50 TABLET, FILM COATED ORAL at 08:00

## 2022-02-28 RX ADMIN — HYDROCHLOROTHIAZIDE 6.25 MG: 25 TABLET ORAL at 08:00

## 2022-02-28 RX ADMIN — PANTOPRAZOLE SODIUM 40 MG: 40 TABLET, DELAYED RELEASE ORAL at 06:38

## 2022-02-28 RX ADMIN — POTASSIUM CHLORIDE 20 MEQ: 750 CAPSULE, EXTENDED RELEASE ORAL at 07:57

## 2022-02-28 RX ADMIN — IPRATROPIUM BROMIDE AND ALBUTEROL SULFATE 3 ML: 2.5; .5 SOLUTION RESPIRATORY (INHALATION) at 13:41

## 2022-02-28 RX ADMIN — OXYCODONE HYDROCHLORIDE AND ACETAMINOPHEN 1 TABLET: 10; 325 TABLET ORAL at 23:16

## 2022-02-28 RX ADMIN — TRAZODONE HYDROCHLORIDE 150 MG: 100 TABLET ORAL at 20:56

## 2022-02-28 RX ADMIN — CHOLESTYRAMINE 4 G: 4 POWDER, FOR SUSPENSION ORAL at 20:56

## 2022-02-28 RX ADMIN — CEFEPIME 2 G: 1 INJECTION, SOLUTION INTRAVENOUS at 16:54

## 2022-02-28 RX ADMIN — ALPRAZOLAM 0.25 MG: 0.25 TABLET ORAL at 23:16

## 2022-02-28 RX ADMIN — ALPRAZOLAM 0.25 MG: 0.25 TABLET ORAL at 10:18

## 2022-02-28 RX ADMIN — VANCOMYCIN HYDROCHLORIDE 1500 MG: 5 INJECTION, POWDER, LYOPHILIZED, FOR SOLUTION INTRAVENOUS at 07:52

## 2022-02-28 RX ADMIN — OXYCODONE HYDROCHLORIDE AND ACETAMINOPHEN 1 TABLET: 10; 325 TABLET ORAL at 14:18

## 2022-02-28 RX ADMIN — FUROSEMIDE 80 MG: 80 TABLET ORAL at 08:00

## 2022-02-28 RX ADMIN — OXYCODONE HYDROCHLORIDE AND ACETAMINOPHEN 1 TABLET: 10; 325 TABLET ORAL at 04:35

## 2022-03-01 LAB
ANION GAP SERPL CALCULATED.3IONS-SCNC: 10.6 MMOL/L (ref 5–15)
APTT PPP: 46.4 SECONDS (ref 22.2–34.2)
BASOPHILS # BLD AUTO: 0.01 10*3/MM3 (ref 0–0.2)
BASOPHILS NFR BLD AUTO: 0.2 % (ref 0–1.5)
BUN SERPL-MCNC: 28 MG/DL (ref 8–23)
BUN/CREAT SERPL: 43.1 (ref 7–25)
CALCIUM SPEC-SCNC: 9.2 MG/DL (ref 8.6–10.5)
CHLORIDE SERPL-SCNC: 105 MMOL/L (ref 98–107)
CO2 SERPL-SCNC: 24.4 MMOL/L (ref 22–29)
CREAT SERPL-MCNC: 0.65 MG/DL (ref 0.57–1)
DEPRECATED RDW RBC AUTO: 47.8 FL (ref 37–54)
EGFRCR SERPLBLD CKD-EPI 2021: 99.7 ML/MIN/1.73
EOSINOPHIL # BLD AUTO: 0.08 10*3/MM3 (ref 0–0.4)
EOSINOPHIL NFR BLD AUTO: 1.6 % (ref 0.3–6.2)
ERYTHROCYTE [DISTWIDTH] IN BLOOD BY AUTOMATED COUNT: 14.9 % (ref 12.3–15.4)
GLUCOSE BLDC GLUCOMTR-MCNC: 101 MG/DL (ref 70–99)
GLUCOSE BLDC GLUCOMTR-MCNC: 154 MG/DL (ref 70–99)
GLUCOSE BLDC GLUCOMTR-MCNC: 163 MG/DL (ref 70–99)
GLUCOSE BLDC GLUCOMTR-MCNC: 77 MG/DL (ref 70–99)
GLUCOSE SERPL-MCNC: 106 MG/DL (ref 65–99)
HCT VFR BLD AUTO: 23.2 % (ref 34–46.6)
HGB BLD-MCNC: 7.3 G/DL (ref 12–15.9)
IMM GRANULOCYTES # BLD AUTO: 0.03 10*3/MM3 (ref 0–0.05)
IMM GRANULOCYTES NFR BLD AUTO: 0.6 % (ref 0–0.5)
LYMPHOCYTES # BLD AUTO: 0.91 10*3/MM3 (ref 0.7–3.1)
LYMPHOCYTES NFR BLD AUTO: 18.2 % (ref 19.6–45.3)
MCH RBC QN AUTO: 27.3 PG (ref 26.6–33)
MCHC RBC AUTO-ENTMCNC: 31.5 G/DL (ref 31.5–35.7)
MCV RBC AUTO: 86.9 FL (ref 79–97)
MONOCYTES # BLD AUTO: 0.36 10*3/MM3 (ref 0.1–0.9)
MONOCYTES NFR BLD AUTO: 7.2 % (ref 5–12)
NEUTROPHILS NFR BLD AUTO: 3.62 10*3/MM3 (ref 1.7–7)
NEUTROPHILS NFR BLD AUTO: 72.2 % (ref 42.7–76)
NRBC BLD AUTO-RTO: 0 /100 WBC (ref 0–0.2)
PLATELET # BLD AUTO: 127 10*3/MM3 (ref 140–450)
PMV BLD AUTO: 10.6 FL (ref 6–12)
POTASSIUM SERPL-SCNC: 4 MMOL/L (ref 3.5–5.2)
RBC # BLD AUTO: 2.67 10*6/MM3 (ref 3.77–5.28)
SODIUM SERPL-SCNC: 140 MMOL/L (ref 136–145)
VANCOMYCIN TROUGH SERPL-MCNC: 19.42 MCG/ML (ref 5–20)
WBC NRBC COR # BLD: 5.01 10*3/MM3 (ref 3.4–10.8)

## 2022-03-01 PROCEDURE — 25010000002 CEFEPIME PER 500 MG: Performed by: INTERNAL MEDICINE

## 2022-03-01 PROCEDURE — 85025 COMPLETE CBC W/AUTO DIFF WBC: CPT | Performed by: INTERNAL MEDICINE

## 2022-03-01 PROCEDURE — 94799 UNLISTED PULMONARY SVC/PX: CPT

## 2022-03-01 PROCEDURE — 25010000002 ONDANSETRON PER 1 MG: Performed by: INTERNAL MEDICINE

## 2022-03-01 PROCEDURE — 63710000001 INSULIN DETEMIR PER 5 UNITS: Performed by: INTERNAL MEDICINE

## 2022-03-01 PROCEDURE — 25010000002 HYDROMORPHONE 1 MG/ML SOLUTION: Performed by: INTERNAL MEDICINE

## 2022-03-01 PROCEDURE — 80048 BASIC METABOLIC PNL TOTAL CA: CPT | Performed by: INTERNAL MEDICINE

## 2022-03-01 PROCEDURE — 85730 THROMBOPLASTIN TIME PARTIAL: CPT | Performed by: INTERNAL MEDICINE

## 2022-03-01 PROCEDURE — 82962 GLUCOSE BLOOD TEST: CPT

## 2022-03-01 PROCEDURE — 80202 ASSAY OF VANCOMYCIN: CPT | Performed by: INTERNAL MEDICINE

## 2022-03-01 PROCEDURE — 99233 SBSQ HOSP IP/OBS HIGH 50: CPT | Performed by: PODIATRIST

## 2022-03-01 PROCEDURE — 25010000002 VANCOMYCIN 5 G RECONSTITUTED SOLUTION: Performed by: INTERNAL MEDICINE

## 2022-03-01 RX ADMIN — Medication 10 ML: at 15:21

## 2022-03-01 RX ADMIN — CHOLESTYRAMINE 4 G: 4 POWDER, FOR SUSPENSION ORAL at 20:22

## 2022-03-01 RX ADMIN — LOSARTAN POTASSIUM 100 MG: 50 TABLET, FILM COATED ORAL at 11:06

## 2022-03-01 RX ADMIN — OXYCODONE HYDROCHLORIDE AND ACETAMINOPHEN 1 TABLET: 10; 325 TABLET ORAL at 17:02

## 2022-03-01 RX ADMIN — CEFEPIME 2 G: 1 INJECTION, SOLUTION INTRAVENOUS at 08:54

## 2022-03-01 RX ADMIN — HYDROMORPHONE HYDROCHLORIDE 0.5 MG: 1 INJECTION, SOLUTION INTRAMUSCULAR; INTRAVENOUS; SUBCUTANEOUS at 01:14

## 2022-03-01 RX ADMIN — OXYCODONE HYDROCHLORIDE AND ACETAMINOPHEN 1 TABLET: 10; 325 TABLET ORAL at 08:53

## 2022-03-01 RX ADMIN — NALOXEGOL OXALATE 12.5 MG: 12.5 TABLET, FILM COATED ORAL at 08:53

## 2022-03-01 RX ADMIN — OXYCODONE HYDROCHLORIDE AND ACETAMINOPHEN 1 TABLET: 10; 325 TABLET ORAL at 12:34

## 2022-03-01 RX ADMIN — FUROSEMIDE 80 MG: 80 TABLET ORAL at 20:22

## 2022-03-01 RX ADMIN — POTASSIUM CHLORIDE 20 MEQ: 750 CAPSULE, EXTENDED RELEASE ORAL at 17:02

## 2022-03-01 RX ADMIN — SERTRALINE HYDROCHLORIDE 150 MG: 50 TABLET ORAL at 08:53

## 2022-03-01 RX ADMIN — ONDANSETRON 4 MG: 2 INJECTION INTRAMUSCULAR; INTRAVENOUS at 00:00

## 2022-03-01 RX ADMIN — NICOTINE 1 PATCH: 14 PATCH, EXTENDED RELEASE TRANSDERMAL at 08:55

## 2022-03-01 RX ADMIN — FUROSEMIDE 80 MG: 80 TABLET ORAL at 11:05

## 2022-03-01 RX ADMIN — INSULIN DETEMIR 25 UNITS: 100 INJECTION, SOLUTION SUBCUTANEOUS at 20:23

## 2022-03-01 RX ADMIN — VANCOMYCIN HYDROCHLORIDE 1250 MG: 5 INJECTION, POWDER, LYOPHILIZED, FOR SOLUTION INTRAVENOUS at 12:12

## 2022-03-01 RX ADMIN — OXYCODONE HYDROCHLORIDE AND ACETAMINOPHEN 1 TABLET: 10; 325 TABLET ORAL at 21:40

## 2022-03-01 RX ADMIN — CEFEPIME 2 G: 1 INJECTION, SOLUTION INTRAVENOUS at 02:16

## 2022-03-01 RX ADMIN — IPRATROPIUM BROMIDE AND ALBUTEROL SULFATE 3 ML: 2.5; .5 SOLUTION RESPIRATORY (INHALATION) at 06:31

## 2022-03-01 RX ADMIN — IPRATROPIUM BROMIDE AND ALBUTEROL SULFATE 3 ML: 2.5; .5 SOLUTION RESPIRATORY (INHALATION) at 18:58

## 2022-03-01 RX ADMIN — BISOPROLOL FUMARATE 10 MG: 5 TABLET ORAL at 11:05

## 2022-03-01 RX ADMIN — POTASSIUM CHLORIDE 20 MEQ: 750 CAPSULE, EXTENDED RELEASE ORAL at 08:53

## 2022-03-01 RX ADMIN — ALPRAZOLAM 0.25 MG: 0.25 TABLET ORAL at 18:05

## 2022-03-01 RX ADMIN — TRAZODONE HYDROCHLORIDE 150 MG: 100 TABLET ORAL at 20:27

## 2022-03-01 RX ADMIN — TIZANIDINE 2 MG: 4 TABLET ORAL at 17:02

## 2022-03-01 RX ADMIN — PANTOPRAZOLE SODIUM 40 MG: 40 TABLET, DELAYED RELEASE ORAL at 08:53

## 2022-03-01 RX ADMIN — CHOLESTYRAMINE 4 G: 4 POWDER, FOR SUSPENSION ORAL at 08:52

## 2022-03-01 RX ADMIN — HYDROMORPHONE HYDROCHLORIDE 0.5 MG: 1 INJECTION, SOLUTION INTRAMUSCULAR; INTRAVENOUS; SUBCUTANEOUS at 15:21

## 2022-03-01 RX ADMIN — Medication 10 ML: at 08:54

## 2022-03-01 NOTE — PROGRESS NOTES
"PHARMACY TO DOSE VANCOMYCIN DAY: 7  DURATION OF THERAPY: 3-3-22    INDICATION: SSTI  GOAL AUC: 400-600 MG/L.HR    HT: 167.6 cm (66\")      02/26/22  1300      Weight: 66.9 kg (147 lb 7.8 oz)        Estimated Creatinine Clearance: 94.8 mL/min (by C-G formula based on SCr of 0.65 mg/dL).  HD/CRRT/PD? NO  CONTRAST ADMINISTERED? ISOVUE 2-24-22    Microbiology Results (last 10 days)       Procedure Component Value - Date/Time    Blood Culture - Blood, Blood, Central Line [[id number]]  (Normal) Collected: 02/21/22 1914    Lab Status: Final result Specimen: Blood, Central Line Updated: 02/26/22 1930     Blood Culture No growth at 5 days    Blood Culture - Blood, Arm, Left [[id number]]  (Normal) Collected: 02/21/22 1913    Lab Status: Final result Specimen: Blood from Arm, Left Updated: 02/26/22 1930     Blood Culture No growth at 5 days          OTHER ANTIMICROBIAL THERAPY: Cefepime  Radiology  2/21 MRI of foot   IMPRESSION:  Motion artifact obscures images.  There is abnormal marrow signal intensity in the right 5th toe   and right 2nd toe suspicious for osteomyelitis.    ASSESSMENT / PLAN:  Lab Results   Component Value Date    VANCOTROUGH 19.42 03/01/2022    VANCORANDOM 28.40 07/11/2021     Regimen: 1250 mg IV every 24 hours.  Exposure target: AUC24 (range)400-600 mg/L.hr   AUC24,ss: 483 mg/L.hr  PAUC*: 92 %  Ctrough,ss: 15 mg/L  Pconc*: 2 %  Tox.: 10 %    Trough with increased risk of toxicity. Will reduce dose to 1250mg q24h. No plan for additional levels at this time.     Labs ordered: BMP in AM         "

## 2022-03-01 NOTE — PLAN OF CARE
Goal Outcome Evaluation:           Progress: no change  Outcome Summary: Patient is A&Ox4. Patient is on room air maintaining oxygen saturation in 90's. Patient biggest complaints are pain and anxiety. All other vital signs remained stable. Will continue plan of care.

## 2022-03-01 NOTE — SIGNIFICANT NOTE
Wound Eval / Progress Noted    JUNG Palomo     Patient Name: Italia Olvera  : 1960  MRN: [id number]  Today's Date: 3/1/2022                 Admit Date: 2022    Visit Dx:    ICD-10-CM ICD-9-CM   1. Gangrene of toe of right foot ([facility])  I96 785.4   2. Atherosclerosis of native artery of right lower extremity with ulceration of other part of foot ([facility])  I70.235 440.23     707.9       Patient Active Problem List   Diagnosis    Bladder disorder    Depression    Hypertension    Peripheral neuropathy    Osteoarthritis of knee    COPD with acute exacerbation (HCC)    Chronic back pain    Multifocal pneumonia    T2DM (type 2 diabetes mellitus) (CMS/[facility])- uncontrolled    Bipolar disorder (HCC)    Chronic respiratory failure with hypoxia (HCC)    Acute on chronic respiratory failure with hypoxia ([facility])    Chronic respiratory failure with hypoxia, on home oxygen therapy ([facility])    Overweight (BMI 25.0-29.9)    Acute on chronic systolic CHF (congestive heart failure) ([facility])    Essential hypertension    DM (diabetes mellitus), type 1 ([facility])    Acute UTI (urinary tract infection)    Anemia    Sepsis ([facility])    COPD with acute exacerbation (HCC)    Hypokalemia    Moderate malnutrition (CMS/[facility])    Decubitus ulcer of back, stage 3 (HCC)    Osteomyelitis ([facility])    Acute osteomyelitis of toe of right foot ([facility])    Constipation    Anxiety disorder    Diabetes mellitus with peripheral vascular disease ([facility])    Decubitus ulcer, unstageable ([facility])        Past Medical History:   Diagnosis Date    Acid reflux     ACL tear 2015    PCL/ACL TEAR/RUPTURE    Acute shoulder bursitis, right 2015    Anxiety     Arthritis     Asthma     Bipolar 1 disorder (HCC)     untreated    Bladder disorder     Cancer (HCC)     CHF (congestive heart failure) ([facility])     Chronic back pain     COPD (chronic obstructive pulmonary disease) ([facility])     Depression     Diabetes mellitus (HCC)     DJD (degenerative joint disease)     GERD (gastroesophageal  reflux disease)     HBP (high blood pressure)     Hip pain 09/15/2015    Hypertension     Knee injury 08/19/2015    Knee pain, right 09/15/2015    Limb swelling     Neuropathy     Osteoarthritis, knee 09/01/2015    Osteoporosis     Peripheral neuropathy     Renal failure 1994    Seasonal allergies     Shoulder tendonitis 08/23/2015    SOB (shortness of breath)     Tendinitis of right rotator cuff 08/23/2015        Past Surgical History:   Procedure Laterality Date    APPENDECTOMY      BACK SURGERY      BLADDER REPAIR      BRONCHOSCOPY N/A 7/10/2021    Procedure: BRONCHOSCOPY WITH BRONCHOALVEOLAR LAVAGE, POSSIBLE BIOPSY, BRUSHING, WASHING, AIRWAY INSPECTION;  Surgeon: Rodrigo Reyes MD;  Location: [facility] MAIN OR;  Service: Pulmonary;  Laterality: N/A;    BRONCHOSCOPY N/A 7/10/2021    Procedure: BRONCHOSCOPY;  Surgeon: Rodrigo Reyes MD;  Location: [facility] ENDOSCOPY;  Service: Pulmonary;  Laterality: N/A;    CHOLECYSTECTOMY      ENDOSCOPY      FRACTURE SURGERY      GALLBLADDER SURGERY      HERNIA REPAIR      HYSTERECTOMY      JOINT REPLACEMENT      OTHER SURGICAL HISTORY      ARTIFICIAL JOINTS/LIMBS    OTHER SURGICAL HISTORY      FACE SURGERY, UNSPECIFIED    TOTAL HIP ARTHROPLASTY Right     TOTAL KNEE ARTHROPLASTY Left          Physical Assessment:  Wound 02/14/22 1249 Right anterior fifth toe (Active)   Wound Image   03/01/22 1000   Dressing Appearance dry; open to air 03/01/22 1000   Closure None 02/28/22 2000   Base black eschar; necrotic 03/01/22 1000   Periwound intact; redness 03/01/22 1000   Periwound Temperature warm 03/01/22 1000   Periwound Skin Turgor firm 03/01/22 1000   Drainage Amount none 03/01/22 1000   Care, Wound cleansed with; sterile normal saline 03/01/22 1000   Dressing Care open to air 03/01/22 1000   Periwound Care dry periwound area maintained 03/01/22 1000       Wound 02/14/22 1220 coccyx Pressure Injury (Active)   Wound Image   03/01/22 1000   Dressing Appearance intact; dry 03/01/22 1000    Closure LOUIE 02/28/22 2000   Base pink; blanchable 03/01/22 1000   Periwound intact; blanchable 03/01/22 1000   Periwound Temperature warm 03/01/22 1000   Periwound Skin Turgor soft 03/01/22 1000   Drainage Amount none 03/01/22 1000   Dressing Care open to air 03/01/22 1000   Periwound Care barrier ointment applied 03/01/22 1000       Wound 02/22/22 1423 Right anterior second toe (Active)   Wound Image   03/01/22 1000   Dressing Appearance dry; open to air 03/01/22 1000   Base necrotic; black eschar 03/01/22 1000   Black (%), Wound Tissue Color 100 03/01/22 1000   Periwound dry 03/01/22 1000   Periwound Temperature cool 03/01/22 1000   Periwound Skin Turgor firm 03/01/22 1000   Drainage Amount none 03/01/22 1000   Care, Wound cleansed with; sterile normal saline 03/01/22 1000   Dressing Care open to air 03/01/22 1000   Periwound Care dry periwound area maintained 03/01/22 1000        Wound Check / Follow-up:  Patient with 2nd and 5th toes with necrotic, stable eschar to the right foot. Patient complains of pain at the base of the 5th toe; area of redness. No drainage noted. Cleansed with normal saline and gauze. Blotted dry. Painted affected toes with betadine and left open to air.     Coccyx with pink dry and intact; blanchable. Cleansed with normal saline and gauze. Patted dry. Applied moisture barrier to entire gluteal aspect and left open to air.       Epitheal %: 0    Exposed Bone: 0    Exposed Tendon: 0    Impression: 0      Nikki Perla RN    3/1/2022    13:24 EST

## 2022-03-01 NOTE — PROGRESS NOTES
[facility] - PODIATRY    Today's Date: 03/01/22    Patient Name: Italia Olvera  MRN: [id number]  [facility]: [id number]  PCP: Carloz Shoemaker MD  Referring Provider: Provider, No Known  Attending Provider: Rudy Garnett MD  Length of Stay: 8    SUBJECTIVE   Chief Complaint: Gangrene right second and fifth toes    HPI: Italia Olvera, a 61 y.o.female,    Patient seen at bedside no apparent distress eating her lunch.    Patient denies any fevers, chills, nausea, vomiting, shortness of breathe, nor any other constitutional signs nor symptoms.    Past Medical History:   Diagnosis Date   • Acid reflux    • ACL tear 09/01/2015    PCL/ACL TEAR/RUPTURE   • Acute shoulder bursitis, right 08/23/2015   • Anxiety    • Arthritis    • Asthma    • Bipolar 1 disorder ([facility])     untreated   • Bladder disorder    • Cancer ([facility])    • CHF (congestive heart failure) ([facility])    • Chronic back pain    • COPD (chronic obstructive pulmonary disease) ([facility])    • Depression    • Diabetes mellitus ([facility])    • DJD (degenerative joint disease)    • GERD (gastroesophageal reflux disease)    • HBP (high blood pressure)    • Hip pain 09/15/2015   • Hypertension    • Knee injury 08/19/2015   • Knee pain, right 09/15/2015   • Limb swelling    • Neuropathy    • Osteoarthritis, knee 09/01/2015   • Osteoporosis    • Peripheral neuropathy    • Renal failure 1994   • Seasonal allergies    • Shoulder tendonitis 08/23/2015   • SOB (shortness of breath)    • Tendinitis of right rotator cuff 08/23/2015     Past Surgical History:   Procedure Laterality Date   • APPENDECTOMY     • BACK SURGERY     • BLADDER REPAIR     • BRONCHOSCOPY N/A 7/10/2021    Procedure: BRONCHOSCOPY WITH BRONCHOALVEOLAR LAVAGE, POSSIBLE BIOPSY, BRUSHING, WASHING, AIRWAY INSPECTION;  Surgeon: Rodrigo Reyes MD;  Location: [facility] MAIN OR;  Service: Pulmonary;  Laterality: N/A;   • BRONCHOSCOPY N/A 7/10/2021    Procedure: BRONCHOSCOPY;  Surgeon: Rodrigo Reyes MD;  Location:   [facility];  Service: Pulmonary;  Laterality: N/A;   • CHOLECYSTECTOMY     • ENDOSCOPY     • FRACTURE SURGERY     • GALLBLADDER SURGERY     • HERNIA REPAIR     • HYSTERECTOMY     • JOINT REPLACEMENT     • OTHER SURGICAL HISTORY      ARTIFICIAL JOINTS/LIMBS   • OTHER SURGICAL HISTORY      FACE SURGERY, UNSPECIFIED   • TOTAL HIP ARTHROPLASTY Right    • TOTAL KNEE ARTHROPLASTY Left      Family History   Problem Relation Age of Onset   • Stroke Mother    • Throat cancer Mother    • Arthritis Mother    • Osteoporosis Mother    • Heart disease Father    • Breast cancer Sister    • Colon cancer Sister    • Lung cancer Sister    • Arthritis Sister    • Osteoporosis Sister    • Heart disease Brother    • Diabetes Brother    • Arthritis Brother    • Arthritis Daughter      Social History     Socioeconomic History   • Marital status:    Tobacco Use   • Smoking status: Former Smoker     Packs/day: 0.50     Years: 50.00     Pack years: 25.00     Types: Cigarettes     Start date: 6/15/1979     Quit date: 2021     Years since quittin.7   • Smokeless tobacco: Never Used   • Tobacco comment: SMOKED FOR 31 PLUS YEARS   Vaping Use   • Vaping Use: Never used   Substance and Sexual Activity   • Alcohol use: Never   • Drug use: Never   • Sexual activity: Defer     No Known Allergies  Current Facility-Administered Medications   Medication Dose Route Frequency Provider Last Rate Last Admin   • acetaminophen (TYLENOL) tablet 650 mg  650 mg Oral Q6H PRN Carloz Shoemaker MD       • ALPRAZolam (XANAX) tablet 0.25 mg  0.25 mg Oral 4x Daily PRN Rudy Garnett MD   0.25 mg at 22 2316   • hydroCHLOROthiazide (HYDRODIURIL) tablet 6.25 mg  6.25 mg Oral Q24H Carloz Shoemaker MD   6.25 mg at 22 0800    And   • bisoprolol (ZEBeta) tablet 10 mg  10 mg Oral Q24H Carloz Shoemaker MD   10 mg at 22 1105   • cefepime (MAXIPIME) IVPB 2 g (premix) in D5  2 g Intravenous Q8H Carloz Shoemaker MD 25 mL/hr at 22 0038 2 g at  03/01/22 0854   • cholestyramine light packet 4 g  1 packet Oral Q12H Rudy Garnett MD   4 g at 03/01/22 0852   • dextrose (GLUTOSE) oral gel 15 g  15 g Oral Q15 Min PRN Carloz Shoemaker MD       • dextrose 10 % infusion  25 g Intravenous Q15 Min PRN Carloz Shoemaker MD       • furosemide (LASIX) tablet 80 mg  80 mg Oral BID Carloz Shoemaker MD   80 mg at 03/01/22 1105   • glucagon (human recombinant) (GLUCAGEN DIAGNOSTIC) injection 1 mg  1 mg Intramuscular Q15 Min PRN Carloz Shoemaker MD       • heparin 84777 units/250 mL (100 units/mL) in 0.45 % NaCl infusion  12 Units/kg/hr Intravenous Titrated Sami Caba MD 13.38 mL/hr at 02/28/22 2319 20 Units/kg/hr at 02/28/22 2319   • heparin bolus from bag 1,700 Units  25 Units/kg Intravenous PRN Sami Caba MD   1,700 Units at 02/28/22 0835   • heparin bolus from bag 3,400 Units  50 Units/kg Intravenous PRN Sami Caba MD   Held at 02/27/22 1340   • HYDROmorphone (DILAUDID) injection 0.5 mg  0.5 mg Intravenous Q4H PRN Rudy Garnett MD   0.5 mg at 03/01/22 0114   • hydrOXYzine pamoate (VISTARIL) capsule 25 mg  25 mg Oral TID PRN Rudy Garnett MD   25 mg at 02/26/22 2311   • insulin detemir (LEVEMIR) injection 25 Units  25 Units Subcutaneous Nightly Carloz Shoemaker MD   25 Units at 02/28/22 2058   • insulin lispro (humaLOG) injection 0-9 Units  0-9 Units Subcutaneous TID AC Carloz Shoemaker MD   2 Units at 02/27/22 1046   • ipratropium-albuterol (DUO-NEB) nebulizer solution 3 mL  3 mL Nebulization 4x Daily - RT Carloz Shoemaker MD   3 mL at 03/01/22 0631   • losartan (COZAAR) tablet 100 mg  100 mg Oral Daily Rudy Garnett MD   100 mg at 03/01/22 1106   • Naloxegol Oxalate (MOVANTIK) tablet 12.5 mg  12.5 mg Oral Rudy Solis MD   12.5 mg at 03/01/22 0853   • nicotine (NICODERM CQ) 14 MG/24HR patch 1 patch  1 patch Transdermal Q24H Rudy Garnett MD   1 patch at 03/01/22 0855   • ondansetron (ZOFRAN) injection 4 mg  4 mg Intravenous Q6H PRN Carloz Shoemaker MD   4 mg at  03/01/22 0000   • oxyCODONE-acetaminophen (PERCOCET)  MG per tablet 1 tablet  1 tablet Oral Q4H PRN Rudy Garnett MD   1 tablet at 03/01/22 1234   • pantoprazole (PROTONIX) EC tablet 40 mg  40 mg Oral Q AM Carloz Shoemaker MD   40 mg at 03/01/22 0853   • Pharmacy to Dose Cefepime   Does not apply Continuous Carloz Shoemaker MD       • Pharmacy to dose vancomycin   Does not apply Continuous Carloz Shoemaker MD       • potassium chloride (MICRO-K) CR capsule 20 mEq  20 mEq Oral BID With Meals Carloz Shoemaker MD   20 mEq at 03/01/22 0853   • sennosides-docusate (PERICOLACE) 8.6-50 MG per tablet 2 tablet  2 tablet Oral Nightly Rudy Garnett MD   2 tablet at 02/27/22 2101   • sertraline (ZOLOFT) tablet 150 mg  150 mg Oral Daily Carloz Shoemaker MD   150 mg at 03/01/22 0853   • sodium chloride 0.9 % flush 10 mL  10 mL Intravenous PRN Carloz Shoemaker MD   10 mL at 03/01/22 0854   • tiZANidine (ZANAFLEX) tablet 2 mg  2 mg Oral Q8H Rudy Garnett MD   2 mg at 02/28/22 2358   • traZODone (DESYREL) tablet 150 mg  150 mg Oral Nightly Carloz Shoemaker MD   150 mg at 02/28/22 2056   • vancomycin 1250 mg/250 mL 0.9% NS IVPB ([facility])  1,250 mg Intravenous Q24H Carloz Shoemaker MD   1,250 mg at 03/01/22 1212     Review of Systems   Constitutional: Negative.    Skin:        Dry gangrene of right second and fifth toes   All other systems reviewed and are negative.      OBJECTIVE     Vitals:    03/01/22 1105   BP: 155/60   Pulse: 62   Resp:    Temp:    SpO2:        PHYSICAL EXAM    GEN:   A&Ox3, NAD. Pt presents in hospital bed.     Neurovascular status unchanged.    Dry gangrene of skin of the right second and fifth toes.  No signs of edema, erythema, lymphangitis, nor signs of infection.      RADIOLOGY/NUCLEAR:    CT Angio Abdominal Aorta Bilateral Iliofem Runoff    Result Date: 2/25/2022  Narrative: PROCEDURE: CT ANGIO ABDOMINAL AORTA BILAT ILIOFEM RUNOFF W WO CONTRAST  COMPARISON: [facility], CT, CT CHEST WO CONTRAST,  7/08/2021, 23:15.  INDICATIONS: Right foot gangrene.  TECHNIQUE: After obtaining the patient's consent, 2,382 CT/CTA images of the abdomen, pelvis, and lower extremities were obtained with non-ionic intravenous contrast material. Multi-planar reformatted/3-D images were created to optimize visualization of vascular anatomy.   PROTOCOL:   Standard CTA imaging protocol performed.    RADIATION:   DLP: 1,177 mGy*cm.   Automated exposure control was utilized to minimize radiation dose.  100 mL Isovue 370 I.V. LABS:   eGFR: 86.2 mL/min/1.73m^2  FINDINGS:   VASCULAR FINDINGS:  Atherosclerotic changes are seen throughout the imaged arterial tree, moderate to severe in degree.  A fusiform aneurysm involves the infrarenal abdominal aorta, measuring about 3.6 cm in greatest diameter, without acute retroperitoneal hemorrhage.  No acute intraperitoneal hemorrhage is seen.  Extensive atherosclerosis involves the bilateral iliac arterial system with probably mild-to-moderate atherosclerotic irregularity.  The bilateral common iliac arteries and external iliac arteries are continuously patent.  Surgical clips are seen near the right inguinal region and may represent prior inguinal hernia repair or prior vascular surgery.  The right internal iliac artery is not clearly opacified.  It may be occluded at its origin.  High-grade stenosis is also possible.  The left internal iliac origin is patent and measures about 1.6 cm in greatest diameter, suggesting mild fusiform aneurysmal dilatation.  It may be occluded proximally.  A high-grade proximal stenosis is possible.  The celiac trunk is widely patent.  The superior mesenteric artery (SMA) origin is widely patent.  The inferior mesenteric artery (CAMILLE) origin exhibits moderate to severe atherosclerotic stenosis, as seen on image 172 of series 4 and adjacent images.  More distally, it is patent.  There are 2 left renal arteries with probably at least mild to moderate stenosis at the  origin of the more superior larger caliber left renal artery.  A smaller caliber inferior accessory left renal artery is seen, which appears patent with mild origin stenosis due to predominantly noncalcified plaque.  A single right renal artery is seen with mild calcified atherosclerotic plaque at its origin and with mild stenosis possible.  The left lower extremity arterial runoff exhibits moderate stenosis at the origin of the left common iliac artery.  Otherwise, the left common iliac artery and the left external iliac artery are patent without hemodynamically significant stenosis.  The left common femoral artery is patent.  Its bifurcation is patent.  There is high-grade stenosis or occlusion involving the proximal left superficial femoral artery, best seen on series 2007.  That is, a long-segment high-grade stenosis or arterial occlusion is seen involving the left superficial femoral artery (SFA) with distal reconstitution.  The more distal left superficial femoral artery is patent.  The left popliteal artery is obscured by the left knee prosthesis.  Distally, the left popliteal trifurcation is probably patent.  There is suspected 2-vessel runoff across the left ankle into the left foot via the left anterior tibial and the left peroneal artery.  The left posterior tibial artery is not clearly identified.  It may be occluded at its origin.  The left deep femoral artery is patent proximally.  Regarding the right lower extremity arterial runoff, there is mild origin stenosis of the right common iliac artery.  Calcified and noncalcified atherosclerotic plaque involves the remainder of the right-sided iliac arterial system without hemodynamically significant stenoses of the right common iliac or right external iliac arteries.  There is suspected high-grade stenosis involving the mid to distal right common femoral artery.  Its bifurcation is patent.  The proximal right deep femoral artery is patent.  Probably  moderate to severe atherosclerotic changes are seen throughout the right femoral-popliteal arterial system, which is continuously patent.  The popliteal trifurcation is patent.  The infrapopliteal vessels are not well seen.  Probably arterial runoff is appreciated across the level of the right ankle joint into the right foot.  The right posterior tibial and right anterior tibial arteries are thought to be occluded at their origins.  A small caliber right peroneal artery is patent to the distal right calf.  No definite subcutaneous emphysema is seen.  Anasarca or diffuse subcutaneous edema is present bilaterally (involving the bilateral lower extremities) and is nonspecific.  OTHER FINDINGS:  Again, there is anasarca.  A moderate to large amount of formed stool is seen throughout the colon, suggesting fecal stasis, as with constipation.  Fecal impaction is possible.  No definite stercoral ulceration.  Stercoral colitis cannot be excluded.  Diffuse hepatic steatosis is seen.  Cirrhosis is suspected.  There is splenomegaly.  The exam was not tailored to evaluate the portal vein or the hepatobiliary system.  There is motion artifact on the study.  There is a small right pleural effusion.  Minimal left pleural effusion is seen.  Atelectasis and/or infiltrate(s) is (are) identified within the imaged lung bases, greater on the right.  Pneumonia cannot be excluded.  There is mild to moderate cardiomegaly.  Minimal if any pericardial effusion is seen.  Chronic calcified granulomatous disease involves the spleen and liver.  No hydronephrosis or renal stones.  No ureterolithiasis.  No definite acute pyelonephritis.  No acute pancreatitis.  The patient has undergone cholecystectomy.  The appendix is not clearly identified.  Please correlate with the surgical history.  There is a right hip prosthesis in place with associated streak artifact.  It obscures the lower pelvis.  No definite urinary bladder calculi are seen.  Again,  edema (with possible cellulitis) involves the soft tissues overlying the sacrum and coccyx.  No definite subcutaneous emphysema is seen in this region.  It is suspected that the patient has undergone hysterectomy.  There may be a small amount of ascites in the lower pelvis, obscured by streak artifact.  There is diastasis of the rectus sheath with bowel protruding into the diastatic defect.  Please correlate with prior history of ventral hernia repair.  No mechanical bowel obstruction is seen.  No pneumoperitoneum or pneumatosis.  No portal or mesenteric venous gas is identified.  There is a left knee prosthesis in place, which obscures detail.  Degenerative changes involve the left hip joint.  There is pubic osteitis.  Severe degenerative changes are seen throughout the imaged spine.  There are age-indeterminate vertebral compression fractures involving superior endplates at L2, L3, and L4, moderate in degree, estimated at 30-50 percent.  These findings are probably subacute in nature with mild posterior wall retropulsion seen at each level, as on image 95 of series 603 and adjacent images.       Impression:    1. Extensive atherosclerotic changes are seen.   2. On the right, no major arterial runoff is appreciated across the right ankle into the right foot.   3. Multiple moderate-to-high-grade arterial stenoses are suggested throughout a continuously patent right-sided femoral-popliteal arterial system.  The infrapopliteal arteries exhibit occlusion involving the right anterior and posterior tibial arteries with a small caliber right peroneal artery patent to the distal right calf.   4. Mild atherosclerotic stenoses are seen involving the right common iliac and the right external iliac arteries.  There is moderate-to-high-grade stenosis involving the [id number] right common femoral artery.  The proximal right deep femoral artery is patent with probably moderate-to-severe atherosclerotic change proximally.  5.  On the left, there is atherosclerotic change involving the left external and common iliac arteries with moderate stenosis of the origin of the left common iliac artery.  There is high-grade stenosis versus long- segment occlusion of the left superficial femoral artery (SFA) with distal reconstitution via deep femoral arterial collateral vessels involving the distal left SFA.  The left popliteal artery is patent proximally.  Its mid and distal aspects are obscured by a left knee prosthesis.  The left popliteal trifurcation is not well seen.  There are thought to be patent left anterior and peroneal arteries across the level of the left ankle into the left foot.  The left posterior tibial artery is thought to be occluded proximally.   6. There is an infrarenal abdominal aortic aneurysm measuring about 3.6 cm in greatest diameter without acute retroperitoneal hemorrhage.  Probably no hemodynamically significant aortic stenosis is suggested.  7. The right internal iliac artery is occluded at its origin.  8. There is proximal occlusion of the left internal iliac artery.  There may be fusiform aneurysmal dilatation of the proximal left internal iliac artery.   9. Please see above comments for further detail      JAILYN LEÓN JR, MD       Electronically Signed and Approved By: JAILYN LEÓN JR, MD on 2/25/2022 at 6:08             Doppler Arterial Multi Level Lower Extremity - Bilateral CAR    Result Date: 2/23/2022  Narrative: · Right Conclusion: The right SHARON is severely reduced. Severe digital ischemia. · Left Conclusion: The left SHARON is severely reduced. Severe digital ischemia.        LABORATORY/CULTURE RESULTS:  Results from last 7 days   Lab Units 03/01/22  0954 02/26/22  1139   WBC 10*3/mm3 5.01 4.23   HEMOGLOBIN g/dL 7.3* 7.5*   HEMATOCRIT % 23.2* 24.1*   PLATELETS 10*3/mm3 127* 130*     Results from last 7 days   Lab Units 03/01/22  0954 02/28/22  0751 02/26/22  0447   SODIUM mmol/L 140 138 141   POTASSIUM  mmol/L 4.0 4.3 3.6   CHLORIDE mmol/L 105 105 108*   CO2 mmol/L 24.4 21.5* 22.7   BUN mg/dL 28* 26* 24*   CREATININE mg/dL 0.65 0.62 0.54*   CALCIUM mg/dL 9.2 9.4 8.9   GLUCOSE mg/dL 106* 109* 101*     Results from last 7 days   Lab Units 03/01/22  0954 02/28/22  2052 02/28/22  1448 02/27/22  0410 02/26/22  1139   INR   --   --   --   --  1.10*   APTT seconds 46.4* 55.0* 53.2*   < > 29.2    < > = values in this interval not displayed.     Microbiology Results (last 10 days)     Procedure Component Value - Date/Time    Blood Culture - Blood, Blood, Central Line [[id number]]  (Normal) Collected: 02/21/22 1914    Lab Status: Final result Specimen: Blood, Central Line Updated: 02/26/22 1930     Blood Culture No growth at 5 days    Blood Culture - Blood, Arm, Left [[id number]]  (Normal) Collected: 02/21/22 1913    Lab Status: Final result Specimen: Blood from Arm, Left Updated: 02/26/22 1930     Blood Culture No growth at 5 days           ASSESSMENT/PLAN     Patient Active Problem List   Diagnosis   • Bladder disorder   • Depression   • Hypertension   • Peripheral neuropathy   • Osteoarthritis of knee   • COPD with acute exacerbation ([facility])   • Chronic back pain   • Multifocal pneumonia   • T2DM (type 2 diabetes mellitus) (CMS/[facility])- uncontrolled   • Bipolar disorder ([facility])   • Chronic respiratory failure with hypoxia ([facility])   • Acute on chronic respiratory failure with hypoxia ([facility])   • Chronic respiratory failure with hypoxia, on home oxygen therapy ([facility])   • Overweight (BMI 25.0-29.9)   • Acute on chronic systolic CHF (congestive heart failure) ([facility])   • Essential hypertension   • DM (diabetes mellitus), type 1 ([facility])   • Acute UTI (urinary tract infection)   • Anemia   • Sepsis ([facility])   • COPD with acute exacerbation ([facility])   • Hypokalemia   • Moderate malnutrition (CMS/[facility])   • Decubitus ulcer of back, stage 3 ([facility])   • Osteomyelitis ([facility])   • Acute osteomyelitis of toe of right foot ([facility])   • Constipation   • Anxiety  disorder   • Diabetes mellitus with peripheral vascular disease (HCC)   • Decubitus ulcer, unstageable (HCC)       Comprehensive lower extremity examination and evaluation was performed.    Discussed findings and treatment plan including risks, benefits, and treatment options with patient in detail. Patient agreed with treatment plan.    Further podiatric interventions will depend on vascular interventions outcome.    This document has been electronically signed by Alexander Crowell DPM on March 1, 2022 13:09 EST

## 2022-03-01 NOTE — PLAN OF CARE
Goal Outcome Evaluation:    Pt c/o  pain to right foot continuously throughout shift. PRN pain and anx meds given multiple x's per pt request. Call light in reach and bed alarm on for pts safety.

## 2022-03-01 NOTE — PROGRESS NOTES
[facility]     Progress Note    Patient Name: Italia Olvera  : 1960  MRN: [id number]  Primary Care Physician:  Carloz Shoemaker MD  Date of admission: 2022      Subjective   Brief summary.  Admitted with cellulitis and osteo of the toes    HPI:  Follow-up ischemic toes   Patient awake and alert, still having issues with pain,  at bedside, seen by vascular surgeon and planning for surgery and agreeable    Review of Systems   No fever chills  No shortness of breath        Objective     Vitals:   Temp:  [97.9 °F (36.6 °C)-98.8 °F (37.1 °C)] 97.9 °F (36.6 °C)  Heart Rate:  [55-72] 72  Resp:  [16-22] 18  BP: (154-184)/(48-64) 154/51    Physical Exam :       Elderly female, tired looking  Heart regular  Lungs clear, diminished breath sounds  Abdomen soft, nontender  Right lower extremity with diminished pulses, gangrenous changes of the second and fifth toe unchanged  Redness of the dorsum of the foot    Result Review:  I have personally reviewed the results from the time of this admission to 2022 19:34 EST and agree with these findings:  [x]  Laboratory  []  Microbiology  [x]  Radiology  []  EKG/Telemetry   []  Cardiology/Vascular   []  Pathology  []  Old records  []  Other:           Assessment / Plan       Active Hospital Problems:  Active Hospital Problems    Diagnosis    • **Diabetes mellitus with peripheral vascular disease ([facility])    • Decubitus ulcer, unstageable ([facility])    • Constipation    • Anxiety disorder    • Acute osteomyelitis of toe of right foot ([facility])    • Osteomyelitis ([facility])    • Peripheral neuropathy    • T2DM (type 2 diabetes mellitus) (CMS/[facility])- uncontrolled          Plan:     Continue heparin tolerating well.  Xanax helping her anxiety  Continue antibiotics until surgery  Discussed with vascular surgeon Dr. Devine         DVT prophylaxis:  Medical DVT prophylaxis orders are present.    CODE STATUS:   Level Of Support Discussed With: Patient  Code Status (Patient has no  pulse and is not breathing): CPR (Attempt to Resuscitate)  Medical Interventions (Patient has pulse or is breathing): Full Support                Electronically signed by Rudy Garnett MD, 02/28/22, 7:35 PM EST.

## 2022-03-02 PROBLEM — I70.209 ATHEROSCLEROSIS OF NATIVE ARTERY OF EXTREMITY: Status: ACTIVE | Noted: 2022-02-21

## 2022-03-02 LAB
APTT PPP: 25.8 SECONDS (ref 22.2–34.2)
GLUCOSE BLDC GLUCOMTR-MCNC: 105 MG/DL (ref 70–99)
GLUCOSE BLDC GLUCOMTR-MCNC: 127 MG/DL (ref 70–99)
GLUCOSE BLDC GLUCOMTR-MCNC: 84 MG/DL (ref 70–99)
GLUCOSE BLDC GLUCOMTR-MCNC: 90 MG/DL (ref 70–99)
HOLD SPECIMEN: NORMAL
WHOLE BLOOD HOLD SPECIMEN: NORMAL

## 2022-03-02 PROCEDURE — 94799 UNLISTED PULMONARY SVC/PX: CPT

## 2022-03-02 PROCEDURE — 63710000001 INSULIN DETEMIR PER 5 UNITS: Performed by: INTERNAL MEDICINE

## 2022-03-02 PROCEDURE — 25010000002 CEFEPIME PER 500 MG: Performed by: INTERNAL MEDICINE

## 2022-03-02 PROCEDURE — 25010000002 HYDROMORPHONE 1 MG/ML SOLUTION: Performed by: INTERNAL MEDICINE

## 2022-03-02 PROCEDURE — 25010000002 VANCOMYCIN 5 G RECONSTITUTED SOLUTION: Performed by: INTERNAL MEDICINE

## 2022-03-02 PROCEDURE — 82962 GLUCOSE BLOOD TEST: CPT

## 2022-03-02 PROCEDURE — 85730 THROMBOPLASTIN TIME PARTIAL: CPT | Performed by: INTERNAL MEDICINE

## 2022-03-02 RX ADMIN — CEFEPIME 2 G: 1 INJECTION, SOLUTION INTRAVENOUS at 08:31

## 2022-03-02 RX ADMIN — TRAZODONE HYDROCHLORIDE 150 MG: 100 TABLET ORAL at 22:03

## 2022-03-02 RX ADMIN — BISOPROLOL FUMARATE 10 MG: 5 TABLET ORAL at 08:32

## 2022-03-02 RX ADMIN — CEFEPIME 2 G: 1 INJECTION, SOLUTION INTRAVENOUS at 16:51

## 2022-03-02 RX ADMIN — OXYCODONE HYDROCHLORIDE AND ACETAMINOPHEN 1 TABLET: 10; 325 TABLET ORAL at 02:31

## 2022-03-02 RX ADMIN — CHOLESTYRAMINE 4 G: 4 POWDER, FOR SUSPENSION ORAL at 22:02

## 2022-03-02 RX ADMIN — POTASSIUM CHLORIDE 20 MEQ: 750 CAPSULE, EXTENDED RELEASE ORAL at 18:51

## 2022-03-02 RX ADMIN — CEFEPIME 2 G: 1 INJECTION, SOLUTION INTRAVENOUS at 00:22

## 2022-03-02 RX ADMIN — ALPRAZOLAM 0.25 MG: 0.25 TABLET ORAL at 18:51

## 2022-03-02 RX ADMIN — OXYCODONE HYDROCHLORIDE AND ACETAMINOPHEN 1 TABLET: 10; 325 TABLET ORAL at 06:45

## 2022-03-02 RX ADMIN — FUROSEMIDE 80 MG: 80 TABLET ORAL at 08:32

## 2022-03-02 RX ADMIN — POTASSIUM CHLORIDE 20 MEQ: 750 CAPSULE, EXTENDED RELEASE ORAL at 08:32

## 2022-03-02 RX ADMIN — INSULIN DETEMIR 20 UNITS: 100 INJECTION, SOLUTION SUBCUTANEOUS at 22:02

## 2022-03-02 RX ADMIN — Medication 10 ML: at 08:31

## 2022-03-02 RX ADMIN — NALOXEGOL OXALATE 12.5 MG: 12.5 TABLET, FILM COATED ORAL at 06:21

## 2022-03-02 RX ADMIN — FUROSEMIDE 80 MG: 80 TABLET ORAL at 22:02

## 2022-03-02 RX ADMIN — HYDROMORPHONE HYDROCHLORIDE 0.5 MG: 1 INJECTION, SOLUTION INTRAMUSCULAR; INTRAVENOUS; SUBCUTANEOUS at 08:32

## 2022-03-02 RX ADMIN — HYDROMORPHONE HYDROCHLORIDE 0.5 MG: 1 INJECTION, SOLUTION INTRAMUSCULAR; INTRAVENOUS; SUBCUTANEOUS at 18:51

## 2022-03-02 RX ADMIN — SERTRALINE HYDROCHLORIDE 150 MG: 50 TABLET ORAL at 08:32

## 2022-03-02 RX ADMIN — HYDROMORPHONE HYDROCHLORIDE 0.5 MG: 1 INJECTION, SOLUTION INTRAMUSCULAR; INTRAVENOUS; SUBCUTANEOUS at 00:13

## 2022-03-02 RX ADMIN — TIZANIDINE 2 MG: 4 TABLET ORAL at 08:32

## 2022-03-02 RX ADMIN — IPRATROPIUM BROMIDE AND ALBUTEROL SULFATE 3 ML: 2.5; .5 SOLUTION RESPIRATORY (INHALATION) at 00:49

## 2022-03-02 RX ADMIN — PANTOPRAZOLE SODIUM 40 MG: 40 TABLET, DELAYED RELEASE ORAL at 06:21

## 2022-03-02 RX ADMIN — OXYCODONE HYDROCHLORIDE AND ACETAMINOPHEN 1 TABLET: 10; 325 TABLET ORAL at 22:03

## 2022-03-02 RX ADMIN — TIZANIDINE 2 MG: 4 TABLET ORAL at 00:22

## 2022-03-02 RX ADMIN — LOSARTAN POTASSIUM 100 MG: 50 TABLET, FILM COATED ORAL at 08:32

## 2022-03-02 RX ADMIN — NICOTINE 1 PATCH: 14 PATCH, EXTENDED RELEASE TRANSDERMAL at 08:33

## 2022-03-02 RX ADMIN — HYDROXYZINE PAMOATE 25 MG: 25 CAPSULE ORAL at 22:13

## 2022-03-02 RX ADMIN — VANCOMYCIN HYDROCHLORIDE 1250 MG: 5 INJECTION, POWDER, LYOPHILIZED, FOR SOLUTION INTRAVENOUS at 11:37

## 2022-03-02 RX ADMIN — HYDROMORPHONE HYDROCHLORIDE 0.5 MG: 1 INJECTION, SOLUTION INTRAMUSCULAR; INTRAVENOUS; SUBCUTANEOUS at 14:27

## 2022-03-02 RX ADMIN — CHOLESTYRAMINE 4 G: 4 POWDER, FOR SUSPENSION ORAL at 08:32

## 2022-03-02 RX ADMIN — HYDROXYZINE PAMOATE 25 MG: 25 CAPSULE ORAL at 02:55

## 2022-03-02 NOTE — PLAN OF CARE
Goal Outcome Evaluation:  Plan of Care Reviewed With: patient, spouse        Progress: no change  Outcome Summary: Patient complained of foot pain throughout shift, PRN percocet and dilaudid given. Vistaril given for anxiety. Spouse at bedside, supportive of patient. Vital signs remained stable, will continue to monitor.

## 2022-03-02 NOTE — PROGRESS NOTES
[facility]     Progress Note    Patient Name: Italia Olvera  : 1960  MRN: [id number]  Primary Care Physician:  Carloz Shoemaker MD  Date of admission: 2022      Subjective   Brief summary.  Admitted with cellulitis and osteo of the toes    HPI:  Follow-up ischemic toes   No new complaints waiting for surgery tomorrow.  Anxious.  Reports pain severe at times if not getting medicine on time    Review of Systems   No fever chills  No shortness of breath  No abdominal pain        Objective     Vitals:   Temp:  [97.3 °F (36.3 °C)-98.7 °F (37.1 °C)] 97.9 °F (36.6 °C)  Heart Rate:  [55-72] 63  Resp:  [15-24] 20  BP: (113-191)/(40-77) 113/40    Physical Exam :       Elderly female, tired looking  Heart regular  Lungs clear, diminished breath sounds  Abdomen soft, nontender  Right lower extremity with diminished pulses, gangrenous changes of the second and fifth toe unchanged  Neurologically awake alert and oriented    Result Review:  I have personally reviewed the results from the time of this admission to 3/1/2022 20:03 EST and agree with these findings:  [x]  Laboratory  []  Microbiology  [x]  Radiology  []  EKG/Telemetry   []  Cardiology/Vascular   []  Pathology  []  Old records  []  Other:           Assessment / Plan       Active Hospital Problems:  Active Hospital Problems    Diagnosis    • **Diabetes mellitus with peripheral vascular disease ([facility])    • Decubitus ulcer, unstageable ([facility])    • Constipation    • Anxiety disorder    • Acute osteomyelitis of toe of right foot ([facility])    • Osteomyelitis ([facility])    • Peripheral neuropathy    • T2DM (type 2 diabetes mellitus) (CMS/[facility])- uncontrolled          Plan:     Patient currently on heparin   To DC heparin 6 hours prior to surgery   Continue pain meds   Continue Xanax as needed for anxiety .  Check labs in a.m.         DVT prophylaxis:  Medical DVT prophylaxis orders are present.    CODE STATUS:   Level Of Support Discussed With: Patient  Code Status  (Patient has no pulse and is not breathing): CPR (Attempt to Resuscitate)  Medical Interventions (Patient has pulse or is breathing): Full Support                  Electronically signed by Rudy Ganrett MD, 03/01/22, 8:04 PM EST.

## 2022-03-03 PROBLEM — R04.0 EPISTAXIS: Status: ACTIVE | Noted: 2022-03-03

## 2022-03-03 LAB
ABO GROUP BLD: NORMAL
ABO GROUP BLD: NORMAL
ANION GAP SERPL CALCULATED.3IONS-SCNC: 12.6 MMOL/L (ref 5–15)
BLD GP AB SCN SERPL QL: NEGATIVE
BUN SERPL-MCNC: 27 MG/DL (ref 8–23)
BUN/CREAT SERPL: 39.7 (ref 7–25)
CALCIUM SPEC-SCNC: 9.6 MG/DL (ref 8.6–10.5)
CHLORIDE SERPL-SCNC: 104 MMOL/L (ref 98–107)
CO2 SERPL-SCNC: 22.4 MMOL/L (ref 22–29)
CREAT SERPL-MCNC: 0.68 MG/DL (ref 0.57–1)
EGFRCR SERPLBLD CKD-EPI 2021: 98.6 ML/MIN/1.73
GLUCOSE BLDC GLUCOMTR-MCNC: 107 MG/DL (ref 70–99)
GLUCOSE BLDC GLUCOMTR-MCNC: 132 MG/DL (ref 70–99)
GLUCOSE BLDC GLUCOMTR-MCNC: 156 MG/DL (ref 70–99)
GLUCOSE BLDC GLUCOMTR-MCNC: 75 MG/DL (ref 70–99)
GLUCOSE SERPL-MCNC: 62 MG/DL (ref 65–99)
HCT VFR BLD AUTO: 28.8 % (ref 34–46.6)
HGB BLD-MCNC: 9 G/DL (ref 12–15.9)
POTASSIUM SERPL-SCNC: 3.3 MMOL/L (ref 3.5–5.2)
RH BLD: POSITIVE
RH BLD: POSITIVE
SODIUM SERPL-SCNC: 139 MMOL/L (ref 136–145)
T&S EXPIRATION DATE: NORMAL

## 2022-03-03 PROCEDURE — 047M3ZZ DILATION OF RIGHT POPLITEAL ARTERY, PERCUTANEOUS APPROACH: ICD-10-PCS | Performed by: SURGERY

## 2022-03-03 PROCEDURE — 99153 MOD SED SAME PHYS/QHP EA: CPT | Performed by: SURGERY

## 2022-03-03 PROCEDURE — 37232 PR REVSC OPN/PRQ TIB/PERO W/ANGIOPLASTY UNI EA VSL: CPT | Performed by: SURGERY

## 2022-03-03 PROCEDURE — 25010000002 CEFEPIME PER 500 MG: Performed by: INTERNAL MEDICINE

## 2022-03-03 PROCEDURE — 25010000002 MIDAZOLAM PER 1 MG: Performed by: SURGERY

## 2022-03-03 PROCEDURE — C1769 GUIDE WIRE: HCPCS | Performed by: SURGERY

## 2022-03-03 PROCEDURE — 85014 HEMATOCRIT: CPT | Performed by: INTERNAL MEDICINE

## 2022-03-03 PROCEDURE — C1876 STENT, NON-COA/NON-COV W/DEL: HCPCS | Performed by: SURGERY

## 2022-03-03 PROCEDURE — 75625 CONTRAST EXAM ABDOMINL AORTA: CPT | Performed by: SURGERY

## 2022-03-03 PROCEDURE — 0 IODIXANOL PER 1 ML: Performed by: SURGERY

## 2022-03-03 PROCEDURE — 047K3ZZ DILATION OF RIGHT FEMORAL ARTERY, PERCUTANEOUS APPROACH: ICD-10-PCS | Performed by: SURGERY

## 2022-03-03 PROCEDURE — 25010000002 VANCOMYCIN 5 G RECONSTITUTED SOLUTION: Performed by: INTERNAL MEDICINE

## 2022-03-03 PROCEDURE — 25010000002 FENTANYL CITRATE (PF) 50 MCG/ML SOLUTION: Performed by: SURGERY

## 2022-03-03 PROCEDURE — 37226 PR REVSC OPN/PRQ FEM/POP W/STNT/ANGIOP SM VSL: CPT | Performed by: SURGERY

## 2022-03-03 PROCEDURE — 99152 MOD SED SAME PHYS/QHP 5/>YRS: CPT | Performed by: SURGERY

## 2022-03-03 PROCEDURE — 85018 HEMOGLOBIN: CPT | Performed by: INTERNAL MEDICINE

## 2022-03-03 PROCEDURE — C1894 INTRO/SHEATH, NON-LASER: HCPCS | Performed by: SURGERY

## 2022-03-03 PROCEDURE — 99233 SBSQ HOSP IP/OBS HIGH 50: CPT | Performed by: PODIATRIST

## 2022-03-03 PROCEDURE — 25010000002 HYDROMORPHONE 1 MG/ML SOLUTION: Performed by: SURGERY

## 2022-03-03 PROCEDURE — 25010000002 HYDRALAZINE PER 20 MG: Performed by: INTERNAL MEDICINE

## 2022-03-03 PROCEDURE — C1725 CATH, TRANSLUMIN NON-LASER: HCPCS | Performed by: SURGERY

## 2022-03-03 PROCEDURE — C1887 CATHETER, GUIDING: HCPCS | Performed by: SURGERY

## 2022-03-03 PROCEDURE — 80048 BASIC METABOLIC PNL TOTAL CA: CPT | Performed by: INTERNAL MEDICINE

## 2022-03-03 PROCEDURE — 047K3DZ DILATION OF RIGHT FEMORAL ARTERY WITH INTRALUMINAL DEVICE, PERCUTANEOUS APPROACH: ICD-10-PCS | Performed by: SURGERY

## 2022-03-03 PROCEDURE — 86850 RBC ANTIBODY SCREEN: CPT | Performed by: INTERNAL MEDICINE

## 2022-03-03 PROCEDURE — 86901 BLOOD TYPING SEROLOGIC RH(D): CPT

## 2022-03-03 PROCEDURE — B41F1ZZ FLUOROSCOPY OF RIGHT LOWER EXTREMITY ARTERIES USING LOW OSMOLAR CONTRAST: ICD-10-PCS | Performed by: SURGERY

## 2022-03-03 PROCEDURE — 82962 GLUCOSE BLOOD TEST: CPT

## 2022-03-03 PROCEDURE — 25010000002 HEPARIN (PORCINE) PER 1000 UNITS: Performed by: SURGERY

## 2022-03-03 PROCEDURE — 75710 ARTERY X-RAYS ARM/LEG: CPT | Performed by: SURGERY

## 2022-03-03 PROCEDURE — C1874 STENT, COATED/COV W/DEL SYS: HCPCS | Performed by: SURGERY

## 2022-03-03 PROCEDURE — 86900 BLOOD TYPING SEROLOGIC ABO: CPT

## 2022-03-03 PROCEDURE — B4101ZZ FLUOROSCOPY OF ABDOMINAL AORTA USING LOW OSMOLAR CONTRAST: ICD-10-PCS | Performed by: SURGERY

## 2022-03-03 PROCEDURE — 047T3ZZ DILATION OF RIGHT PERONEAL ARTERY, PERCUTANEOUS APPROACH: ICD-10-PCS | Performed by: SURGERY

## 2022-03-03 PROCEDURE — 86900 BLOOD TYPING SEROLOGIC ABO: CPT | Performed by: INTERNAL MEDICINE

## 2022-03-03 PROCEDURE — 86901 BLOOD TYPING SEROLOGIC RH(D): CPT | Performed by: INTERNAL MEDICINE

## 2022-03-03 PROCEDURE — 37228 PR REVSC OPN/PRQ TIB/PERO W/ANGIOPLASTY UNI: CPT | Performed by: SURGERY

## 2022-03-03 PROCEDURE — 97161 PT EVAL LOW COMPLEX 20 MIN: CPT

## 2022-03-03 PROCEDURE — 63710000001 INSULIN DETEMIR PER 5 UNITS: Performed by: SURGERY

## 2022-03-03 PROCEDURE — 25010000002 MORPHINE (PF) 10 MG/ML SOLUTION: Performed by: SURGERY

## 2022-03-03 DEVICE — STENTGR ENDOPROSTH VIABAHN RO HEP 6F 6MM 15X120CM: Type: IMPLANTABLE DEVICE | Status: FUNCTIONAL

## 2022-03-03 DEVICE — PREMOUNTED STENT SYSTEM
Type: IMPLANTABLE DEVICE | Status: FUNCTIONAL
Brand: EXPRESS® LD ILIAC / BILIARY

## 2022-03-03 RX ORDER — NITROGLYCERIN 5 MG/ML
INJECTION, SOLUTION INTRAVENOUS AS NEEDED
Status: DISCONTINUED | OUTPATIENT
Start: 2022-03-03 | End: 2022-03-03 | Stop reason: HOSPADM

## 2022-03-03 RX ORDER — CLOPIDOGREL BISULFATE 75 MG/1
75 TABLET ORAL DAILY
Status: DISCONTINUED | OUTPATIENT
Start: 2022-03-04 | End: 2022-03-11 | Stop reason: HOSPADM

## 2022-03-03 RX ORDER — MORPHINE SULFATE 10 MG/ML
INJECTION, SOLUTION INTRAMUSCULAR; INTRAVENOUS AS NEEDED
Status: DISCONTINUED | OUTPATIENT
Start: 2022-03-03 | End: 2022-03-03 | Stop reason: HOSPADM

## 2022-03-03 RX ORDER — LIDOCAINE HYDROCHLORIDE 20 MG/ML
INJECTION, SOLUTION INFILTRATION; PERINEURAL AS NEEDED
Status: DISCONTINUED | OUTPATIENT
Start: 2022-03-03 | End: 2022-03-03 | Stop reason: HOSPADM

## 2022-03-03 RX ORDER — HYDRALAZINE HYDROCHLORIDE 20 MG/ML
10 INJECTION INTRAMUSCULAR; INTRAVENOUS ONCE
Status: COMPLETED | OUTPATIENT
Start: 2022-03-03 | End: 2022-03-03

## 2022-03-03 RX ORDER — IODIXANOL 320 MG/ML
INJECTION, SOLUTION INTRAVASCULAR AS NEEDED
Status: DISCONTINUED | OUTPATIENT
Start: 2022-03-03 | End: 2022-03-03 | Stop reason: HOSPADM

## 2022-03-03 RX ORDER — CLOPIDOGREL BISULFATE 75 MG/1
TABLET ORAL AS NEEDED
Status: DISCONTINUED | OUTPATIENT
Start: 2022-03-03 | End: 2022-03-03 | Stop reason: HOSPADM

## 2022-03-03 RX ORDER — SODIUM CHLORIDE 9 MG/ML
100 INJECTION, SOLUTION INTRAVENOUS CONTINUOUS
Status: DISCONTINUED | OUTPATIENT
Start: 2022-03-03 | End: 2022-03-04

## 2022-03-03 RX ORDER — ACETAMINOPHEN 325 MG/1
650 TABLET ORAL EVERY 4 HOURS PRN
Status: DISCONTINUED | OUTPATIENT
Start: 2022-03-03 | End: 2022-03-11 | Stop reason: HOSPADM

## 2022-03-03 RX ORDER — HEPARIN SODIUM 1000 [USP'U]/ML
INJECTION, SOLUTION INTRAVENOUS; SUBCUTANEOUS AS NEEDED
Status: DISCONTINUED | OUTPATIENT
Start: 2022-03-03 | End: 2022-03-03 | Stop reason: HOSPADM

## 2022-03-03 RX ORDER — MIDAZOLAM HYDROCHLORIDE 1 MG/ML
INJECTION INTRAMUSCULAR; INTRAVENOUS AS NEEDED
Status: DISCONTINUED | OUTPATIENT
Start: 2022-03-03 | End: 2022-03-03 | Stop reason: HOSPADM

## 2022-03-03 RX ORDER — FENTANYL CITRATE 50 UG/ML
INJECTION, SOLUTION INTRAMUSCULAR; INTRAVENOUS AS NEEDED
Status: DISCONTINUED | OUTPATIENT
Start: 2022-03-03 | End: 2022-03-03 | Stop reason: HOSPADM

## 2022-03-03 RX ADMIN — NALOXEGOL OXALATE 12.5 MG: 12.5 TABLET, FILM COATED ORAL at 06:03

## 2022-03-03 RX ADMIN — INSULIN DETEMIR 25 UNITS: 100 INJECTION, SOLUTION SUBCUTANEOUS at 23:05

## 2022-03-03 RX ADMIN — ALPRAZOLAM 0.25 MG: 0.25 TABLET ORAL at 23:04

## 2022-03-03 RX ADMIN — BISOPROLOL FUMARATE 10 MG: 5 TABLET ORAL at 09:11

## 2022-03-03 RX ADMIN — TIZANIDINE 2 MG: 4 TABLET ORAL at 00:19

## 2022-03-03 RX ADMIN — HYDROCHLOROTHIAZIDE 6.25 MG: 25 TABLET ORAL at 09:09

## 2022-03-03 RX ADMIN — PANTOPRAZOLE SODIUM 40 MG: 40 TABLET, DELAYED RELEASE ORAL at 06:03

## 2022-03-03 RX ADMIN — HYDROMORPHONE HYDROCHLORIDE 0.5 MG: 1 INJECTION, SOLUTION INTRAMUSCULAR; INTRAVENOUS; SUBCUTANEOUS at 21:24

## 2022-03-03 RX ADMIN — TRAZODONE HYDROCHLORIDE 150 MG: 100 TABLET ORAL at 23:19

## 2022-03-03 RX ADMIN — CEFEPIME 2 G: 1 INJECTION, SOLUTION INTRAVENOUS at 16:48

## 2022-03-03 RX ADMIN — OXYCODONE HYDROCHLORIDE AND ACETAMINOPHEN 1 TABLET: 10; 325 TABLET ORAL at 06:09

## 2022-03-03 RX ADMIN — VANCOMYCIN HYDROCHLORIDE 1250 MG: 5 INJECTION, POWDER, LYOPHILIZED, FOR SOLUTION INTRAVENOUS at 14:28

## 2022-03-03 RX ADMIN — LOSARTAN POTASSIUM 100 MG: 50 TABLET, FILM COATED ORAL at 09:04

## 2022-03-03 RX ADMIN — POTASSIUM CHLORIDE 20 MEQ: 750 CAPSULE, EXTENDED RELEASE ORAL at 09:04

## 2022-03-03 RX ADMIN — TIZANIDINE 2 MG: 4 TABLET ORAL at 17:37

## 2022-03-03 RX ADMIN — ALPRAZOLAM 0.25 MG: 0.25 TABLET ORAL at 15:28

## 2022-03-03 RX ADMIN — CEFEPIME 2 G: 1 INJECTION, SOLUTION INTRAVENOUS at 00:20

## 2022-03-03 RX ADMIN — NICOTINE 1 PATCH: 14 PATCH, EXTENDED RELEASE TRANSDERMAL at 09:11

## 2022-03-03 RX ADMIN — OXYCODONE HYDROCHLORIDE AND ACETAMINOPHEN 1 TABLET: 10; 325 TABLET ORAL at 14:25

## 2022-03-03 RX ADMIN — SODIUM CHLORIDE 100 ML/HR: 9 INJECTION, SOLUTION INTRAVENOUS at 23:20

## 2022-03-03 RX ADMIN — SODIUM CHLORIDE 100 ML/HR: 9 INJECTION, SOLUTION INTRAVENOUS at 21:23

## 2022-03-03 RX ADMIN — POTASSIUM CHLORIDE 20 MEQ: 750 CAPSULE, EXTENDED RELEASE ORAL at 17:37

## 2022-03-03 RX ADMIN — Medication 10 ML: at 21:24

## 2022-03-03 RX ADMIN — TIZANIDINE 2 MG: 4 TABLET ORAL at 09:10

## 2022-03-03 RX ADMIN — CEFEPIME 2 G: 1 INJECTION, SOLUTION INTRAVENOUS at 09:16

## 2022-03-03 RX ADMIN — HYDRALAZINE HYDROCHLORIDE 10 MG: 20 INJECTION INTRAMUSCULAR; INTRAVENOUS at 16:39

## 2022-03-03 RX ADMIN — SERTRALINE HYDROCHLORIDE 150 MG: 50 TABLET ORAL at 09:05

## 2022-03-03 RX ADMIN — CHOLESTYRAMINE 4 G: 4 POWDER, FOR SUSPENSION ORAL at 21:24

## 2022-03-03 RX ADMIN — CHOLESTYRAMINE 4 G: 4 POWDER, FOR SUSPENSION ORAL at 09:03

## 2022-03-03 NOTE — PROGRESS NOTES
[facility]     Progress Note    Patient Name: Italia Olvera  : 1960  MRN: [id number]  Primary Care Physician:  Carloz Shoemaker MD  Date of admission: 2022      Subjective   Brief summary.  Admitted with cellulitis and osteo of the toes    HPI:  Follow-up ischemic toes   Patient waiting for surgery seen earlier this morning .   at bedside ,.  No chest pain or shortness of    Review of Systems   No fever chills  No shortness of breath  No abdominal pain  Anxious        Objective     Vitals:   Temp:  [97.5 °F (36.4 °C)-98.4 °F (36.9 °C)] 98.4 °F (36.9 °C)  Heart Rate:  [57-68] 57  Resp:  [18-20] 20  BP: (113-165)/(40-85) 144/62    Physical Exam :       Elderly female, tired looking  Heart regular  Lungs clear, diminished breath sounds  Abdomen soft, nontender  Right lower extremity with diminished pulses, gangrenous changes of the second and fifth toe unchanged  Neurologically awake alert and oriented    Result Review:  I have personally reviewed the results from the time of this admission to 3/2/2022 19:24 EST and agree with these findings:  [x]  Laboratory  []  Microbiology  [x]  Radiology  []  EKG/Telemetry   []  Cardiology/Vascular   []  Pathology  []  Old records  []  Other:           Assessment / Plan       Active Hospital Problems:  Active Hospital Problems    Diagnosis    • **Diabetes mellitus with peripheral vascular disease ([facility])    • Decubitus ulcer, unstageable ([facility])    • Constipation    • Anxiety disorder    • Acute osteomyelitis of toe of right foot ([facility])    • Osteomyelitis ([facility])    • Atherosclerosis of native artery of extremity ([facility])      Added automatically from request for surgery [id number]     • Peripheral neuropathy    • T2DM (type 2 diabetes mellitus) (CMS/[facility])- uncontrolled          Plan:   Ischemic toes with cellulitis.  On antibiotics and heparin.  Waiting for surgery this evening   n.p.o. after breakfast  To hold heparin as recommended by surgical team  Discussed with  RN           DVT prophylaxis:  Medical DVT prophylaxis orders are present.    CODE STATUS:   Level Of Support Discussed With: Patient  Code Status (Patient has no pulse and is not breathing): CPR (Attempt to Resuscitate)  Medical Interventions (Patient has pulse or is breathing): Full Support                    Electronically signed by Rudy Garnett MD, 03/02/22, 7:31 PM EST.

## 2022-03-03 NOTE — PLAN OF CARE
Goal Outcome Evaluation:  Plan of Care Reviewed With: patient        Progress: no change  Outcome Summary: Patient presents with deficits in strength and likely balance, transfers, and ambulation. Patient will benefit from skilled PT services to address these mobility deficits and decrease risk of falls.

## 2022-03-03 NOTE — PLAN OF CARE
Goal Outcome Evaluation:  Plan of Care Reviewed With: patient        Progress: no change  Outcome Summary: PRN percocet and vistaril given. Patient complained of foot pain. Ambulated to [facility] with X2 assist. BS was 127 at bedtime, contacted MD regarding scheduled 25 units of Levemir. MD ordered one time dose of 20 units of Levemir and non-admin the 25 units. Patient rested well throughout shift. Vital signs stable, will continue to monitor.

## 2022-03-03 NOTE — NURSING NOTE
Primary Rn called RRT for nosebleed. Patient holding pressure for 1h no improvement with multiple large clots from nose. Dr. Garnett-Primary MD and Dr. Solano-ENT notified. ED physician unable to place packing at this time. Orders given to continue holding pressure, FFP 2 units, H/H sent. OR notified and able to have epistaxis tray ready for Dr. Solano. Instructed patient to leave clot in place, not to blow nose, and remain in a chin to chest position as tolerated. Vital signs recorded. Riley Ledesma RN

## 2022-03-03 NOTE — PRE-SEDATION DOCUMENTATION
Sedation Plan    ASA 2     Mallampati class: III.    Risks, benefits, and alternatives discussed with patient.

## 2022-03-03 NOTE — THERAPY EVALUATION
Acute Care - Physical Therapy Initial Evaluation   [place]     Patient Name: Italia Olvera  : 1960  MRN: [id number]  Today's Date: 3/3/2022      Visit Dx:     ICD-10-CM ICD-9-CM   1. Gangrene of toe of right foot ([facility])  I96 785.4   2. Atherosclerosis of native artery of right lower extremity with ulceration of other part of foot ([facility])  I70.235 440.23     707.9   3. Difficulty walking  R26.2 719.7     Patient Active Problem List   Diagnosis   • Bladder disorder   • Depression   • Hypertension   • Peripheral neuropathy   • Osteoarthritis of knee   • COPD with acute exacerbation (HCC)   • Chronic back pain   • Multifocal pneumonia   • T2DM (type 2 diabetes mellitus) (CMS/HCC)- uncontrolled   • Bipolar disorder ([facility])   • Chronic respiratory failure with hypoxia ([facility])   • Acute on chronic respiratory failure with hypoxia ([facility])   • Chronic respiratory failure with hypoxia, on home oxygen therapy ([facility])   • Overweight (BMI 25.0-29.9)   • Acute on chronic systolic CHF (congestive heart failure) ([facility])   • Essential hypertension   • DM (diabetes mellitus), type 1 ([facility])   • Acute UTI (urinary tract infection)   • Anemia   • Sepsis ([facility])   • COPD with acute exacerbation ([facility])   • Hypokalemia   • Moderate malnutrition (CMS/HCC)   • Decubitus ulcer of back, stage 3 ([facility])   • Osteomyelitis ([facility])   • Acute osteomyelitis of toe of right foot ([facility])   • Constipation   • Anxiety disorder   • Diabetes mellitus with peripheral vascular disease ([facility])   • Decubitus ulcer, unstageable ([facility])   • Atherosclerosis of native artery of extremity ([facility])     Past Medical History:   Diagnosis Date   • Acid reflux    • ACL tear 2015    PCL/ACL TEAR/RUPTURE   • Acute shoulder bursitis, right 2015   • Anxiety    • Arthritis    • Asthma    • Bipolar 1 disorder ([facility])     untreated   • Bladder disorder    • Cancer (HCC)    • CHF (congestive heart failure) ([facility])    • Chronic back pain    • COPD (chronic obstructive pulmonary disease)  ([facility])    • Depression    • Diabetes mellitus ([facility])    • DJD (degenerative joint disease)    • GERD (gastroesophageal reflux disease)    • HBP (high blood pressure)    • Hip pain 09/15/2015   • Hypertension    • Knee injury 08/19/2015   • Knee pain, right 09/15/2015   • Limb swelling    • Neuropathy    • Osteoarthritis, knee 09/01/2015   • Osteoporosis    • Peripheral neuropathy    • Renal failure 1994   • Seasonal allergies    • Shoulder tendonitis 08/23/2015   • SOB (shortness of breath)    • Tendinitis of right rotator cuff 08/23/2015     Past Surgical History:   Procedure Laterality Date   • APPENDECTOMY     • BACK SURGERY     • BLADDER REPAIR     • BRONCHOSCOPY N/A 7/10/2021    Procedure: BRONCHOSCOPY WITH BRONCHOALVEOLAR LAVAGE, POSSIBLE BIOPSY, BRUSHING, WASHING, AIRWAY INSPECTION;  Surgeon: Rodrigo Reyes MD;  Location: [facility] MAIN OR;  Service: Pulmonary;  Laterality: N/A;   • BRONCHOSCOPY N/A 7/10/2021    Procedure: BRONCHOSCOPY;  Surgeon: Rodrigo Reyes MD;  Location: [facility] ENDOSCOPY;  Service: Pulmonary;  Laterality: N/A;   • CHOLECYSTECTOMY     • ENDOSCOPY     • FRACTURE SURGERY     • GALLBLADDER SURGERY     • HERNIA REPAIR     • HYSTERECTOMY     • JOINT REPLACEMENT     • OTHER SURGICAL HISTORY      ARTIFICIAL JOINTS/LIMBS   • OTHER SURGICAL HISTORY      FACE SURGERY, UNSPECIFIED   • TOTAL HIP ARTHROPLASTY Right    • TOTAL KNEE ARTHROPLASTY Left      PT Assessment (last 12 hours)     PT Evaluation and Treatment     Row Name 03/03/22 1300          Physical Therapy Time and Intention    Document Type evaluation  -AV     Mode of Treatment individual therapy; physical therapy  -AV     Row Name 03/03/22 1300          General Information    Patient Profile Reviewed yes  -AV     Prior Level of Function --  Per report patient's  assists with ADLs. Ambulated with assistance from spouse. 3 L O2 as needed. Ramp  -AV     Equipment Currently Used at Home walker, rolling  -AV     Existing  Precautions/Restrictions fall  -AV     Row Name 03/03/22 1300          Living Environment    Current Living Arrangements home/apartment/condo  -AV     Home Accessibility wheelchair accessible  -AV     Lives With spouse  -AV     Row Name 03/03/22 1300          Range of Motion (ROM)    Range of Motion bilateral lower extremities; ROM is WFL  PROM  -AV     Row Name 03/03/22 1300          Strength (Manual Muscle Testing)    Strength (Manual Muscle Testing) bilateral lower extremities  2+/5  -AV     Row Name 03/03/22 1300          Bed Mobility    Comment (Bed Mobility) Patient declined all bed mobility and transfers this date. Per EMR patient is x2 assist to ambulate to [facility].  -AV     Row Name 03/03/22 1300          Safety Issues, Functional Mobility    Safety Issues Affecting Function (Mobility) insight into deficits/self-awareness; awareness of need for assistance  -AV     Impairments Affecting Function (Mobility) balance; endurance/activity tolerance; strength  -AV     Row Name 03/03/22 1300          Balance    Comment, Balance Not tested  -AV     Row Name             Wound 02/14/22 1249 Right anterior fifth toe    Wound - Properties Group Placement Date: 02/14/22  -MD Placement Time: 1249  -MD Side: Right  -MD Orientation: anterior  -MD Location: fifth toe  -MD     Retired Wound - Properties Group Date first assessed: 02/14/22  -MD Time first assessed: 1249  -MD Side: Right  -MD Location: fifth toe  -MD     Row Name             Wound 02/14/22 1220 coccyx Pressure Injury    Wound - Properties Group Placement Date: 02/14/22  -MD Placement Time: 1220  -MD Location: coccyx  -MD Primary Wound Type: Pressure inj  -MD     Retired Wound - Properties Group Date first assessed: 02/14/22  -MD Time first assessed: 1220  -MD Location: coccyx  -MD Primary Wound Type: Pressure inj  -MD     Row Name             Wound 02/22/22 1423 Right anterior second toe    Wound - Properties Group Placement Date: 02/22/22  -ANUPAMA Placement Time: 1423   -ANUPAMA Present on Hospital Admission: Y  -ANUPAMA Side: Right  -ANUPAMA Orientation: anterior  -ANUPAMA Location: second toe  -ANUPAMA     Retired Wound - Properties Group Date first assessed: 02/22/22  -ANUPAMA Time first assessed: 1423  -ANUPAMA Present on Hospital Admission: Y  -ANUPAMA Side: Right  -ANUPAMA Location: second toe  -ANUPAMA     Row Name 03/03/22 1300          Plan of Care Review    Plan of Care Reviewed With patient  -AV     Progress no change  -AV     Outcome Summary Patient presents with deficits in strength and likely balance, transfers, and ambulation. Patient will benefit from skilled PT services to address these mobility deficits and decrease risk of falls.  -AV     Row Name 03/03/22 1300          Physical Therapy Goals    Bed Mobility Goal Selection (PT) bed mobility, PT goal 1  -AV     Transfer Goal Selection (PT) transfer, PT goal 1  -AV     Gait Training Goal Selection (PT) gait training, PT goal 1  -AV     Row Name 03/03/22 1300          Bed Mobility Goal 1 (PT)    Activity/Assistive Device (Bed Mobility Goal 1, PT) bed mobility activities, all  -AV     [place] Level/Cues Needed (Bed Mobility Goal 1, PT) supervision required  -AV     Time Frame (Bed Mobility Goal 1, PT) 10 days  -AV     Row Name 03/03/22 1300          Transfer Goal 1 (PT)    Activity/Assistive Device (Transfer Goal 1, PT) transfers, all; walker, rolling  -AV     [place] Level/Cues Needed (Transfer Goal 1, PT) supervision required  -AV     Time Frame (Transfer Goal 1, PT) 10 days  -AV     Row Name 03/03/22 1300          Gait Training Goal 1 (PT)    Activity/Assistive Device (Gait Training Goal 1, PT) gait (walking locomotion); assistive device use; walker, rolling  -AV     [place] Level (Gait Training Goal 1, PT) supervision required  -AV     Distance (Gait Training Goal 1, PT) 50  -AV     Time Frame (Gait Training Goal 1, PT) 10 days  -AV     Row Name 03/03/22 1300          Therapy Assessment/Plan (PT)    Rehab Potential (PT) good, to achieve stated  therapy goals  -AV     Criteria for Skilled Interventions Met (PT) yes; meets criteria  -AV     Problem List (PT) problems related to; balance; mobility; strength  -AV     Activity Limitations Related to Problem List (PT) unable to transfer safely; unable to ambulate safely  -AV     Row Name 03/03/22 1300          PT Evaluation Complexity    History, PT Evaluation Complexity 1-2 personal factors and/or comorbidities  -AV     Examination of Body Systems (PT Eval Complexity) total of 4 or more elements  -AV     Clinical Presentation (PT Evaluation Complexity) stable  -AV     Clinical Decision Making (PT Evaluation Complexity) low complexity  -AV     Overall Complexity (PT Evaluation Complexity) low complexity  -AV     Row Name 03/03/22 1300          Therapy Plan Review/Discharge Plan (PT)    Therapy Plan Review (PT) evaluation/treatment results reviewed; patient  -AV           User Key  (r) = Recorded By, (t) = Taken By, (c) = Cosigned By    Initials Name Provider Type    Carin Fajardo, RN Registered Nurse    Nikki Rodney RN Registered Nurse    Mj Singer, PT Physical Therapist                Physical Therapy Education                 Title: PT OT SLP Therapies (Done)     Topic: Physical Therapy (Done)     Point: Mobility training (Done)     Learning Progress Summary           Patient Acceptance, E,TB, VU by AV at 3/3/2022 1351    Acceptance, E,TB, VU by EV at 2/25/2022 2320                   Point: Home exercise program (Done)     Learning Progress Summary           Patient Acceptance, E,TB, VU by AV at 3/3/2022 1351    Acceptance, E,TB, VU by EV at 2/25/2022 2320                   Point: Body mechanics (Done)     Learning Progress Summary           Patient Acceptance, E,TB, VU by AV at 3/3/2022 1351    Acceptance, E,TB, VU by EV at 2/25/2022 2320                   Point: Precautions (Done)     Learning Progress Summary           Patient Acceptance, E,TB, VU by AV at 3/3/2022 1351     Acceptance, E,TB, VU by EV at 2/25/2022 2320                               User Key     Initials Effective Dates Name Provider Type Discipline    EV 06/16/21 -  Cecilia Siegel RN Registered Nurse Nurse    AV 06/11/21 -  Mj Stewart, PT Physical Therapist PT              PT Recommendation and Plan  Anticipated Discharge Disposition (PT): sub acute care setting  Planned Therapy Interventions (PT): balance training, bed mobility training, gait training, neuromuscular re-education, strengthening, transfer training  Therapy Frequency (PT): daily  Plan of Care Reviewed With: patient  Progress: no change  Outcome Summary: Patient presents with deficits in strength and likely balance, transfers, and ambulation. Patient will benefit from skilled PT services to address these mobility deficits and decrease risk of falls.   Outcome Measures     Row Name 03/03/22 1300             How much help from another person do you currently need...    Turning from your back to your side while in flat bed without using bedrails? 4  -AV      Moving from lying on back to sitting on the side of a flat bed without bedrails? 3  -AV      Moving to and from a bed to a chair (including a wheelchair)? 2  -AV      Standing up from a chair using your arms (e.g., wheelchair, bedside chair)? 2  -AV      Climbing 3-5 steps with a railing? 1  -AV      To walk in hospital room? 2  -AV      AM-PAC 6 Clicks Score (PT) 14  -AV              Functional Assessment    Outcome Measure Options AM-PAC 6 Clicks Basic Mobility (PT)  -AV            User Key  (r) = Recorded By, (t) = Taken By, (c) = Cosigned By    Initials Name Provider Type    AV Mj Stewart, PT Physical Therapist                 Time Calculation:    PT Charges     Row Name 03/03/22 1350             Time Calculation    PT Received On 03/03/22  -AV      PT Goal Re-Cert Due Date 03/12/22  -AV              Untimed Charges    PT Eval/Re-eval Minutes 30  -AV              Total Minutes     Untimed Charges Total Minutes 30  -AV       Total Minutes 30  -AV            User Key  (r) = Recorded By, (t) = Taken By, (c) = Cosigned By    Initials Name Provider Type    AV Mj Stewart, PT Physical Therapist              Therapy Charges for Today     Code Description Service Date Service Provider Modifiers Qty    [id number] HC PT EVAL LOW COMPLEXITY 2 3/3/2022 Mj Stewart, PT GP 1          PT G-Codes  Outcome Measure Options: AM-PAC 6 Clicks Basic Mobility (PT)  AM-PAC 6 Clicks Score (PT): 14    Mj Stewart, PT  3/3/2022

## 2022-03-03 NOTE — PROGRESS NOTES
[facility] JENNINGS - PODIATRY    Today's Date: 03/03/22    Patient Name: Italia Olvera  MRN: [id number]  [facility]: [id number]  PCP: Carloz Shoemaker MD  Referring Provider: Provider, No Known  Attending Provider: Rudy Garnett MD  Length of Stay: 10    SUBJECTIVE   Chief Complaint: Gangrene right second and fifth toes    HPI: Italia Olvera, a 61 y.o.female,    Patient seen at bedside no apparent distress.  Family member presents.    Pt is scheduled for angiography with Dr. Yoon today.    Patient denies any fevers, chills, nausea, vomiting, shortness of breathe, nor any other constitutional signs nor symptoms.    Past Medical History:   Diagnosis Date   • Acid reflux    • ACL tear 09/01/2015    PCL/ACL TEAR/RUPTURE   • Acute shoulder bursitis, right 08/23/2015   • Anxiety    • Arthritis    • Asthma    • Bipolar 1 disorder ([facility])     untreated   • Bladder disorder    • Cancer ([facility])    • CHF (congestive heart failure) ([facility])    • Chronic back pain    • COPD (chronic obstructive pulmonary disease) ([facility])    • Depression    • Diabetes mellitus ([facility])    • DJD (degenerative joint disease)    • GERD (gastroesophageal reflux disease)    • HBP (high blood pressure)    • Hip pain 09/15/2015   • Hypertension    • Knee injury 08/19/2015   • Knee pain, right 09/15/2015   • Limb swelling    • Neuropathy    • Osteoarthritis, knee 09/01/2015   • Osteoporosis    • Peripheral neuropathy    • Renal failure 1994   • Seasonal allergies    • Shoulder tendonitis 08/23/2015   • SOB (shortness of breath)    • Tendinitis of right rotator cuff 08/23/2015     Past Surgical History:   Procedure Laterality Date   • APPENDECTOMY     • BACK SURGERY     • BLADDER REPAIR     • BRONCHOSCOPY N/A 7/10/2021    Procedure: BRONCHOSCOPY WITH BRONCHOALVEOLAR LAVAGE, POSSIBLE BIOPSY, BRUSHING, WASHING, AIRWAY INSPECTION;  Surgeon: Rodrigo Reyes MD;  Location: [facility] MAIN OR;  Service: Pulmonary;  Laterality: N/A;   • BRONCHOSCOPY N/A 7/10/2021     Procedure: BRONCHOSCOPY;  Surgeon: Rodrigo Reyes MD;  Location: [facility] ENDOSCOPY;  Service: Pulmonary;  Laterality: N/A;   • CHOLECYSTECTOMY     • ENDOSCOPY     • FRACTURE SURGERY     • GALLBLADDER SURGERY     • HERNIA REPAIR     • HYSTERECTOMY     • JOINT REPLACEMENT     • OTHER SURGICAL HISTORY      ARTIFICIAL JOINTS/LIMBS   • OTHER SURGICAL HISTORY      FACE SURGERY, UNSPECIFIED   • TOTAL HIP ARTHROPLASTY Right    • TOTAL KNEE ARTHROPLASTY Left      Family History   Problem Relation Age of Onset   • Stroke Mother    • Throat cancer Mother    • Arthritis Mother    • Osteoporosis Mother    • Heart disease Father    • Breast cancer Sister    • Colon cancer Sister    • Lung cancer Sister    • Arthritis Sister    • Osteoporosis Sister    • Heart disease Brother    • Diabetes Brother    • Arthritis Brother    • Arthritis Daughter      Social History     Socioeconomic History   • Marital status:    Tobacco Use   • Smoking status: Former Smoker     Packs/day: 0.50     Years: 50.00     Pack years: 25.00     Types: Cigarettes     Start date: 6/15/1979     Quit date: 2021     Years since quittin.7   • Smokeless tobacco: Never Used   • Tobacco comment: SMOKED FOR 31 PLUS YEARS   Vaping Use   • Vaping Use: Never used   Substance and Sexual Activity   • Alcohol use: Never   • Drug use: Never   • Sexual activity: Defer     No Known Allergies  Current Facility-Administered Medications   Medication Dose Route Frequency Provider Last Rate Last Admin   • acetaminophen (TYLENOL) tablet 650 mg  650 mg Oral Q6H PRN Carloz Shoemaker MD       • ALPRAZolam (XANAX) tablet 0.25 mg  0.25 mg Oral 4x Daily PRN Rudy Garnett MD   0.25 mg at 22 1528   • hydroCHLOROthiazide (HYDRODIURIL) tablet 6.25 mg  6.25 mg Oral Q24H Carloz Shoemaker MD   6.25 mg at 22 0909    And   • bisoprolol (ZEBeta) tablet 10 mg  10 mg Oral Q24H Carloz Shoemaker MD   10 mg at 22 0911   • cefepime (MAXIPIME) IVPB 2 g (premix) in D5  2 g  Intravenous Q8H Carloz Shoemaker MD 0 mL/hr at 03/03/22 0602 2 g at 03/03/22 0916   • cholestyramine light packet 4 g  1 packet Oral Q12H Rudy Garnett MD   4 g at 03/03/22 0903   • dextrose (GLUTOSE) oral gel 15 g  15 g Oral Q15 Min PRN Carloz Shoemaker MD       • dextrose 10 % infusion  25 g Intravenous Q15 Min PRN Carloz Shoemaker MD       • furosemide (LASIX) tablet 80 mg  80 mg Oral BID Carloz Shoemaker MD   80 mg at 03/02/22 2202   • glucagon (human recombinant) (GLUCAGEN DIAGNOSTIC) injection 1 mg  1 mg Intramuscular Q15 Min PRN Carloz Shoemaker MD       • heparin bolus from bag 1,700 Units  25 Units/kg Intravenous PRN Sami Caba MD   1,700 Units at 02/28/22 0835   • heparin bolus from bag 3,400 Units  50 Units/kg Intravenous PRN Sami Caba MD   Held at 02/27/22 1340   • hydrALAZINE (APRESOLINE) injection 10 mg  10 mg Intravenous Once Rudy Garnett MD       • HYDROmorphone (DILAUDID) injection 0.5 mg  0.5 mg Intravenous Q4H PRN Rudy Garnett MD   0.5 mg at 03/02/22 1851   • hydrOXYzine pamoate (VISTARIL) capsule 25 mg  25 mg Oral TID PRN Rudy Garnett MD   25 mg at 03/02/22 2213   • insulin detemir (LEVEMIR) injection 25 Units  25 Units Subcutaneous Nightly Carloz Shoemaker MD   25 Units at 03/01/22 2023   • insulin lispro (humaLOG) injection 0-9 Units  0-9 Units Subcutaneous TID AC Carloz Shoemaker MD   2 Units at 02/27/22 1046   • ipratropium-albuterol (DUO-NEB) nebulizer solution 3 mL  3 mL Nebulization 4x Daily - RT Carloz Shoemaker MD   3 mL at 03/02/22 0049   • losartan (COZAAR) tablet 100 mg  100 mg Oral Daily Rudy Garnett MD   100 mg at 03/03/22 0904   • Naloxegol Oxalate (MOVANTIK) tablet 12.5 mg  12.5 mg Oral QAM Rudy Garnett MD   12.5 mg at 03/03/22 0603   • nicotine (NICODERM CQ) 14 MG/24HR patch 1 patch  1 patch Transdermal Q24H Rudy Garnett MD   1 patch at 03/03/22 0911   • ondansetron (ZOFRAN) injection 4 mg  4 mg Intravenous Q6H PRN Carloz Shoemaker MD   4 mg at 03/01/22 0000   •  oxyCODONE-acetaminophen (PERCOCET)  MG per tablet 1 tablet  1 tablet Oral Q4H PRN Rudy Garnett MD   1 tablet at 03/03/22 1425   • pantoprazole (PROTONIX) EC tablet 40 mg  40 mg Oral Q AM Carloz Shoemaker MD   40 mg at 03/03/22 0603   • Pharmacy to Dose Cefepime   Does not apply Continuous Carloz Shoemaker MD       • Pharmacy to dose vancomycin   Does not apply Continuous Carloz Shoemkaer MD       • potassium chloride (MICRO-K) CR capsule 20 mEq  20 mEq Oral BID With Meals Carloz Shoemaker MD   20 mEq at 03/03/22 0904   • sennosides-docusate (PERICOLACE) 8.6-50 MG per tablet 2 tablet  2 tablet Oral Nightly Rudy Garnett MD   2 tablet at 02/27/22 2101   • sertraline (ZOLOFT) tablet 150 mg  150 mg Oral Daily Carloz Shoemaker MD   150 mg at 03/03/22 0905   • sodium chloride 0.9 % flush 10 mL  10 mL Intravenous PRN Carloz Shoemaker MD   10 mL at 03/02/22 0831   • tiZANidine (ZANAFLEX) tablet 2 mg  2 mg Oral Q8H Rudy Garnett MD   2 mg at 03/03/22 0910   • traZODone (DESYREL) tablet 150 mg  150 mg Oral Nightly Carloz Shoemaker MD   150 mg at 03/02/22 2203     Review of Systems   Constitutional: Negative.    Skin:        Dry gangrene of right second and fifth toes   All other systems reviewed and are negative.      OBJECTIVE     Vitals:    03/03/22 1534   BP: (!) 192/78   Pulse:    Resp:    Temp:    SpO2:        PHYSICAL EXAM    GEN:   A&Ox3, NAD. Pt presents in hospital bed.     Neurovascular status unchanged.    Dry gangrene of skin of the right second and fifth toes.  No signs of edema, erythema, lymphangitis, nor signs of infection.  Unchanged compared to previous exam.      LABORATORY/CULTURE RESULTS:  Results from last 7 days   Lab Units 03/03/22  0812 03/01/22  0954 02/26/22  1139   WBC 10*3/mm3  --  5.01 4.23   HEMOGLOBIN g/dL 9.0* 7.3* 7.5*   HEMATOCRIT % 28.8* 23.2* 24.1*   PLATELETS 10*3/mm3  --  127* 130*     Results from last 7 days   Lab Units 03/03/22  0628 03/01/22  0954 02/28/22  0751   SODIUM mmol/L 139 140  138   POTASSIUM mmol/L 3.3* 4.0 4.3   CHLORIDE mmol/L 104 105 105   CO2 mmol/L 22.4 24.4 21.5*   BUN mg/dL 27* 28* 26*   CREATININE mg/dL 0.68 0.65 0.62   CALCIUM mg/dL 9.6 9.2 9.4   GLUCOSE mg/dL 62* 106* 109*     Results from last 7 days   Lab Units 03/02/22  0645 03/01/22  0954 02/28/22 2052 02/27/22 0410 02/26/22  1139   INR   --   --   --   --  1.10*   APTT seconds 25.8 46.4* 55.0*   < > 29.2    < > = values in this interval not displayed.     Microbiology Results (last 10 days)     Procedure Component Value - Date/Time    Blood Culture - Blood, Blood, Central Line [[id number]]  (Normal) Collected: 02/21/22 1914    Lab Status: Final result Specimen: Blood, Central Line Updated: 02/26/22 1930     Blood Culture No growth at 5 days    Blood Culture - Blood, Arm, Left [[id number]]  (Normal) Collected: 02/21/22 1913    Lab Status: Final result Specimen: Blood from Arm, Left Updated: 02/26/22 1930     Blood Culture No growth at 5 days           ASSESSMENT/PLAN     Patient Active Problem List   Diagnosis   • Bladder disorder   • Depression   • Hypertension   • Peripheral neuropathy   • Osteoarthritis of knee   • COPD with acute exacerbation ([facility])   • Chronic back pain   • Multifocal pneumonia   • T2DM (type 2 diabetes mellitus) (CMS/[facility])- uncontrolled   • Bipolar disorder ([facility])   • Chronic respiratory failure with hypoxia ([facility])   • Acute on chronic respiratory failure with hypoxia ([facility])   • Chronic respiratory failure with hypoxia, on home oxygen therapy ([facility])   • Overweight (BMI 25.0-29.9)   • Acute on chronic systolic CHF (congestive heart failure) ([facility])   • Essential hypertension   • DM (diabetes mellitus), type 1 ([facility])   • Acute UTI (urinary tract infection)   • Anemia   • Sepsis ([facility])   • COPD with acute exacerbation ([facility])   • Hypokalemia   • Moderate malnutrition (CMS/[facility])   • Decubitus ulcer of back, stage 3 ([facility])   • Osteomyelitis ([facility])   • Acute osteomyelitis of toe of right foot ([facility])   • Constipation   •  Anxiety disorder   • Diabetes mellitus with peripheral vascular disease (HCC)   • Decubitus ulcer, unstageable (HCC)   • Atherosclerosis of native artery of extremity (HCC)       Comprehensive lower extremity examination and evaluation was performed.    Discussed findings and treatment plan including risks, benefits, and treatment options with patient in detail. Patient agreed with treatment plan.    Further podiatric interventions will depend on vascular interventions outcome.    This document has been electronically signed by Alexander Crowell DPM on March 3, 2022 15:53 EST

## 2022-03-04 PROBLEM — I48.0 PAROXYSMAL ATRIAL FIBRILLATION: Chronic | Status: ACTIVE | Noted: 2022-03-04

## 2022-03-04 LAB
BASOPHILS # BLD AUTO: 0.02 10*3/MM3 (ref 0–0.2)
BASOPHILS NFR BLD AUTO: 0.3 % (ref 0–1.5)
DEPRECATED RDW RBC AUTO: 49.6 FL (ref 37–54)
EOSINOPHIL # BLD AUTO: 0.08 10*3/MM3 (ref 0–0.4)
EOSINOPHIL NFR BLD AUTO: 1.3 % (ref 0.3–6.2)
ERYTHROCYTE [DISTWIDTH] IN BLOOD BY AUTOMATED COUNT: 15.3 % (ref 12.3–15.4)
GLUCOSE BLDC GLUCOMTR-MCNC: 119 MG/DL (ref 70–99)
GLUCOSE BLDC GLUCOMTR-MCNC: 131 MG/DL (ref 70–99)
GLUCOSE BLDC GLUCOMTR-MCNC: 133 MG/DL (ref 70–99)
GLUCOSE BLDC GLUCOMTR-MCNC: 171 MG/DL (ref 70–99)
HCT VFR BLD AUTO: 20.5 % (ref 34–46.6)
HGB BLD-MCNC: 6.4 G/DL (ref 12–15.9)
IMM GRANULOCYTES # BLD AUTO: 0.03 10*3/MM3 (ref 0–0.05)
IMM GRANULOCYTES NFR BLD AUTO: 0.5 % (ref 0–0.5)
LYMPHOCYTES # BLD AUTO: 0.9 10*3/MM3 (ref 0.7–3.1)
LYMPHOCYTES NFR BLD AUTO: 14.1 % (ref 19.6–45.3)
MCH RBC QN AUTO: 27.9 PG (ref 26.6–33)
MCHC RBC AUTO-ENTMCNC: 31.2 G/DL (ref 31.5–35.7)
MCV RBC AUTO: 89.5 FL (ref 79–97)
MONOCYTES # BLD AUTO: 0.51 10*3/MM3 (ref 0.1–0.9)
MONOCYTES NFR BLD AUTO: 8 % (ref 5–12)
NEUTROPHILS NFR BLD AUTO: 4.83 10*3/MM3 (ref 1.7–7)
NEUTROPHILS NFR BLD AUTO: 75.8 % (ref 42.7–76)
NRBC BLD AUTO-RTO: 0 /100 WBC (ref 0–0.2)
PLATELET # BLD AUTO: 132 10*3/MM3 (ref 140–450)
PMV BLD AUTO: 11 FL (ref 6–12)
RBC # BLD AUTO: 2.29 10*6/MM3 (ref 3.77–5.28)
WBC NRBC COR # BLD: 6.37 10*3/MM3 (ref 3.4–10.8)

## 2022-03-04 PROCEDURE — 86923 COMPATIBILITY TEST ELECTRIC: CPT

## 2022-03-04 PROCEDURE — 85025 COMPLETE CBC W/AUTO DIFF WBC: CPT | Performed by: SURGERY

## 2022-03-04 PROCEDURE — 25010000002 HYDROMORPHONE 1 MG/ML SOLUTION: Performed by: SURGERY

## 2022-03-04 PROCEDURE — 99233 SBSQ HOSP IP/OBS HIGH 50: CPT | Performed by: PODIATRIST

## 2022-03-04 PROCEDURE — 63710000001 INSULIN DETEMIR PER 5 UNITS: Performed by: SURGERY

## 2022-03-04 PROCEDURE — 25010000002 CEFEPIME PER 500 MG: Performed by: SURGERY

## 2022-03-04 PROCEDURE — 63710000001 INSULIN LISPRO (HUMAN) PER 5 UNITS: Performed by: SURGERY

## 2022-03-04 PROCEDURE — 99231 SBSQ HOSP IP/OBS SF/LOW 25: CPT | Performed by: SURGERY

## 2022-03-04 PROCEDURE — 94760 N-INVAS EAR/PLS OXIMETRY 1: CPT

## 2022-03-04 PROCEDURE — 86900 BLOOD TYPING SEROLOGIC ABO: CPT

## 2022-03-04 PROCEDURE — 94799 UNLISTED PULMONARY SVC/PX: CPT

## 2022-03-04 PROCEDURE — 86927 PLASMA FRESH FROZEN: CPT

## 2022-03-04 PROCEDURE — P9017 PLASMA 1 DONOR FRZ W/IN 8 HR: HCPCS

## 2022-03-04 PROCEDURE — 82962 GLUCOSE BLOOD TEST: CPT

## 2022-03-04 PROCEDURE — P9016 RBC LEUKOCYTES REDUCED: HCPCS

## 2022-03-04 PROCEDURE — 36430 TRANSFUSION BLD/BLD COMPNT: CPT

## 2022-03-04 RX ADMIN — OXYCODONE HYDROCHLORIDE AND ACETAMINOPHEN 1 TABLET: 10; 325 TABLET ORAL at 06:22

## 2022-03-04 RX ADMIN — INSULIN DETEMIR 25 UNITS: 100 INJECTION, SOLUTION SUBCUTANEOUS at 22:13

## 2022-03-04 RX ADMIN — CHOLESTYRAMINE 4 G: 4 POWDER, FOR SUSPENSION ORAL at 09:18

## 2022-03-04 RX ADMIN — OXYCODONE HYDROCHLORIDE AND ACETAMINOPHEN 1 TABLET: 10; 325 TABLET ORAL at 14:24

## 2022-03-04 RX ADMIN — TIZANIDINE 2 MG: 4 TABLET ORAL at 00:43

## 2022-03-04 RX ADMIN — POTASSIUM CHLORIDE 20 MEQ: 750 CAPSULE, EXTENDED RELEASE ORAL at 17:42

## 2022-03-04 RX ADMIN — OXYCODONE HYDROCHLORIDE AND ACETAMINOPHEN 1 TABLET: 10; 325 TABLET ORAL at 10:23

## 2022-03-04 RX ADMIN — ALPRAZOLAM 0.25 MG: 0.25 TABLET ORAL at 14:14

## 2022-03-04 RX ADMIN — OXYCODONE HYDROCHLORIDE AND ACETAMINOPHEN 1 TABLET: 10; 325 TABLET ORAL at 00:43

## 2022-03-04 RX ADMIN — CLOPIDOGREL BISULFATE 75 MG: 75 TABLET ORAL at 09:17

## 2022-03-04 RX ADMIN — LOSARTAN POTASSIUM 100 MG: 50 TABLET, FILM COATED ORAL at 09:18

## 2022-03-04 RX ADMIN — ALPRAZOLAM 0.25 MG: 0.25 TABLET ORAL at 05:14

## 2022-03-04 RX ADMIN — HYDROMORPHONE HYDROCHLORIDE 0.5 MG: 1 INJECTION, SOLUTION INTRAMUSCULAR; INTRAVENOUS; SUBCUTANEOUS at 18:12

## 2022-03-04 RX ADMIN — BISOPROLOL FUMARATE 10 MG: 5 TABLET ORAL at 09:17

## 2022-03-04 RX ADMIN — SODIUM CHLORIDE 100 ML/HR: 9 INJECTION, SOLUTION INTRAVENOUS at 14:16

## 2022-03-04 RX ADMIN — NALOXEGOL OXALATE 12.5 MG: 12.5 TABLET, FILM COATED ORAL at 06:22

## 2022-03-04 RX ADMIN — CEFEPIME 2 G: 1 INJECTION, SOLUTION INTRAVENOUS at 01:27

## 2022-03-04 RX ADMIN — IPRATROPIUM BROMIDE AND ALBUTEROL SULFATE 3 ML: 2.5; .5 SOLUTION RESPIRATORY (INHALATION) at 13:33

## 2022-03-04 RX ADMIN — TIZANIDINE 2 MG: 4 TABLET ORAL at 16:33

## 2022-03-04 RX ADMIN — SENNOSIDES AND DOCUSATE SODIUM 2 TABLET: 50; 8.6 TABLET ORAL at 22:06

## 2022-03-04 RX ADMIN — TRAZODONE HYDROCHLORIDE 150 MG: 100 TABLET ORAL at 22:06

## 2022-03-04 RX ADMIN — HYDROCHLOROTHIAZIDE 6.25 MG: 25 TABLET ORAL at 09:18

## 2022-03-04 RX ADMIN — SERTRALINE HYDROCHLORIDE 150 MG: 50 TABLET ORAL at 09:17

## 2022-03-04 RX ADMIN — TIZANIDINE 2 MG: 4 TABLET ORAL at 09:18

## 2022-03-04 RX ADMIN — PANTOPRAZOLE SODIUM 40 MG: 40 TABLET, DELAYED RELEASE ORAL at 06:22

## 2022-03-04 RX ADMIN — IPRATROPIUM BROMIDE AND ALBUTEROL SULFATE 3 ML: 2.5; .5 SOLUTION RESPIRATORY (INHALATION) at 01:00

## 2022-03-04 RX ADMIN — POTASSIUM CHLORIDE 20 MEQ: 750 CAPSULE, EXTENDED RELEASE ORAL at 09:18

## 2022-03-04 RX ADMIN — HYDROMORPHONE HYDROCHLORIDE 0.5 MG: 1 INJECTION, SOLUTION INTRAMUSCULAR; INTRAVENOUS; SUBCUTANEOUS at 03:11

## 2022-03-04 RX ADMIN — OXYCODONE HYDROCHLORIDE AND ACETAMINOPHEN 1 TABLET: 10; 325 TABLET ORAL at 22:06

## 2022-03-04 RX ADMIN — INSULIN LISPRO 2 UNITS: 100 INJECTION, SOLUTION INTRAVENOUS; SUBCUTANEOUS at 17:42

## 2022-03-04 RX ADMIN — NICOTINE 1 PATCH: 14 PATCH, EXTENDED RELEASE TRANSDERMAL at 09:18

## 2022-03-04 NOTE — PLAN OF CARE
Goal Outcome Evaluation:  Plan of Care Reviewed With: spouse, patient        Progress: no change  Outcome Summary: Patient nosebleed at 0650, instructed to hold pressure with chin to chest position. After about one hour, patient starts passing multiple clots through nare and mouth. RRT called. Attending MD ordered 2 units of FFP and stat H/H. ENT, Dr. Solano, notified. ED physician unable to place packing at this time, Dr. Solano to come in at a later time to pack. Approximately 1015 patient nose stops bleeding, Dr. Solano made aware. No bleeding has occured since then. BP elevated, new orders placed. Complaints of pain treated with percocet, which were effective per patient. Complaints of anxiety treated with xanax, which was effective per patient. Up to bsc with x1 assist. Patient to go to cath lab today.

## 2022-03-04 NOTE — PROGRESS NOTES
[facility]     Progress Note    Patient Name: Italia Olvera  : 1960  MRN: [id number]  Primary Care Physician:  Carloz Shoemaker MD  Date of admission: 2022      Subjective   Brief summary.  Admitted with cellulitis and osteo of the toes    HPI:  Follow-up ischemic toes .  Called to see patient for nosebleed from the right naris in the morning.  She had bouts of bleeding and clots coming out since morning around seven.  Applied pressure for several hours did not help.  Fresh FFP ordered along with H&H.  ENT consulted around 7:30 AM, recommended ER physician to come and check, RN notified ER and ER physician, unable to come upstairs at this point, recommended pressure application.  Patient was scheduled for surgery yesterday, surgery could not be done yesterday for timing.  Patient waiting for surgery, possible surgery today by vascular, discussed with Dr. Devine  Patient denies any chest pain, shortness of breath, cough    Review of Systems     Nosebleed  Pain in the foot and ankle  No fever chills  No shortness of breath  No abdominal pain  Anxious        Objective     Vitals:   Temp:  [97.3 °F (36.3 °C)-98.2 °F (36.8 °C)] 98.2 °F (36.8 °C)  Heart Rate:  [52-70] 70  Resp:  [18-20] 18  BP: (126-192)/(39-78) 174/68  Flow (L/min):  [2] 2    Physical Exam :       Elderly female, tired looking  Right nostril and nares bleeding with some packing  Heart regular  Lungs clear, diminished breath sounds  Abdomen soft, nontender  Right lower extremity with diminished pulses, gangrenous changes of the second and fifth toe unchanged  Neurologically awake alert and oriented    Result Review:  I have personally reviewed the results from the time of this admission to 3/3/2022 20:06 EST and agree with these findings:  [x]  Laboratory  []  Microbiology  [x]  Radiology  []  EKG/Telemetry   []  Cardiology/Vascular   []  Pathology  []  Old records  []  Other:           Assessment / Plan       Active Hospital  Problems:  Active Hospital Problems    Diagnosis    • **Diabetes mellitus with peripheral vascular disease (HCC)    • Epistaxis    • Decubitus ulcer, unstageable ([facility])    • Constipation    • Anxiety disorder    • Acute osteomyelitis of toe of right foot ([facility])    • Osteomyelitis ([facility])    • Atherosclerosis of native artery of extremity ([facility])      Added automatically from request for surgery [id number]     • Peripheral neuropathy    • T2DM (type 2 diabetes mellitus) (CMS/[facility])- uncontrolled          Plan:   Ischemic toes with cellulitis waiting for vascular surgery.  Patient developed nosebleed she had nosebleed 3 days ago very minor which stopped with adjustment of heparin and saline,  ENT consulted earlier this morning,  Requested ER physician to do  packing,   RN called emergency room, ER physician not able to come at this point.  Heparin on hold, FFP ordered and given 2 units.  Patient's bleeding stopped with pressure application and above measures.  ENT supposed to see patient this afternoon..    Waiting for surgery this evening   n.p.o. after breakfast    Discussed with RN           DVT prophylaxis:  Medical DVT prophylaxis orders are present.    CODE STATUS:   Level Of Support Discussed With: Patient  Code Status (Patient has no pulse and is not breathing): CPR (Attempt to Resuscitate)  Medical Interventions (Patient has pulse or is breathing): Full Support            Total time spent in patient's eval exam discussion with the consultants and acute medical care approximately 42 minutes        Electronically signed by Rudy Garnett MD, 03/02/22, 7:31 PM EST.

## 2022-03-04 NOTE — NURSING NOTE
At 2100 noticed swelling below puncture developing. Called RRT RN to come look. She stated hematoma was forming and to call surgeon. Patient was having burning pain in groin area and continued to get larger. Dr. Yoon stated to take dressing off, find puncture, and apply pressure above puncture for 15 minutes. RRT RN held pressure for 15 minutes while I assessed pedal pulse with doppler per RRT request to ensure she was holding pressure appropriately. Site was marked where swelling was present. New dressing reapplied. Swelling has significantly decreased since.

## 2022-03-04 NOTE — NURSING NOTE
When trying to scan scheduled trazodone, there was a pop up that stated adverse drug reaction with fentanyl. Called pharmacy and notified them that she had 100mcg of fentanyl in surgery at 1910. Pharmacy stated it is ok to give the trazadone now. Spoke with Daryl.

## 2022-03-04 NOTE — PROCEDURES
VASCULAR INTERVENTION REPORT         Patient Name:  Italia Olvera  YOB: 1960    Date of Surgery:  3/3/2022        Pre-OP Diagnosis     Right leg PVD with gangrene    Post-OP Diagnosis     Right leg PVD with gangrene    Procedure     #1 abdominal aortogram  2.  Right lower extremity angiogram  3.  Right SFA stent with 6 x 15 Viabahn x3 and 6 x 37 balloon expandable stent  4.  Right peroneal artery angioplasty with 3 mm balloon  5.  Right popliteal artery angioplasty with 4 mm balloon  6.  Right common femoral artery angioplasty with 8 mm balloon  7.  70 minutes of monitored conscious sedation    Staff     Marleny Yoon MD      Anesthesia     Local with sedation      Findings     See below    BLOOD LOSS: 0 ML  COMPLICATIONS:  none  SPECIMEN:  NONE      Description of Procedure     The left common femoral artery was accessed under ultrasound guidance and a 5 German sheath introduced.  An Omni Flush catheter was parked in the aorta and an abdominal aortogram was performed.  This revealed patent bilateral renal arteries.  An abdominal aortic aneurysm was present.  The bilateral common and external iliac arteries appear to be patent.  The catheter was advanced into the right external iliac artery and a right lower extremity angiogram was performed.  This revealed 70% stenosis of the common femoral artery.  The profundofemoral artery was patent but heavily diseased throughout.  The SFA was patent but had varying degrees of stenosis throughout ranging from 50 to 90%.  The above-knee popliteal artery was patent.  The below-knee popliteal artery had areas of 60% stenosis.  The anterior tibial artery was chronically occluded.  The tibioperoneal trunk was patent.  The posterior tibial artery was chronically occluded.  The proximal peroneal artery was occluded with reconstitution in the midportion.  This was the lone runoff to the foot.  At the ankle this vessel gave off small collaterals to the foot.   There was severe microvascular disease in the foot.    A stiff Glidewire was inserted and the 5 Marshallese sheath was exchanged for 6 Marshallese sheath which was parked in the right common femoral artery.  The superficial femoral artery was selected and a combination of Glidewire and support catheter was used to cross the SFA stenoses.  The catheter was advanced into the popliteal artery.  A V 18 wire was inserted and over this a 6 x 15 Viabahn stent was inserted into the above-knee popliteal artery and deployed.  This was followed by another 6 x 15 Viabahn stent that was deployed proximal to this with appropriate overlap.  This was then followed by a third 6 x 15 Viabahn stent that was inserted with appropriate overlap into the proximal SFA.  There was still disease at the origin of the SFA and so a balloon expandable stent measuring 6 x 37 was placed into the proximal SFA origin with appropriate overlap into the previous stent.  All self-expanding stents were postdilated with a 5 mm balloon.  An angiogram performed revealed a widely patent SFA.  Using a support catheter the peroneal artery was selected.  Glidewire and support catheter was used to cross the peroneal artery occlusion and reentry was regained within the mid peroneal artery that was confirmed by angiogram.  An 014 wire was inserted and over this a 3 mm balloon was used to angioplasty the proximal peroneal artery.  This was done for prolonged inflation time.  A repeat angiogram showed that the peroneal artery was now widely patent with continued collaterals to the foot.  A 4 mm balloon was then inserted and the below-knee popliteal artery was angioplastied.  Repeat angiogram showed now widely patent below-knee popliteal artery.  The sheath was then withdrawn into the iliac artery and angiograms performed that revealed the stenosis in the common femoral artery.  An 035 wire was inserted and the common femoral artery was angioplastied for prolonged inflation  time.  Repeat angiogram showed widely patent common femoral artery.  The sheath was then withdrawn and the access site was closed with a 6 Bengali minx device with no bleeding or hematoma.  The patient tolerated the procedure well.            Date: 3/3/2022  Time: 19:59 EST

## 2022-03-04 NOTE — PLAN OF CARE
Goal Outcome Evaluation:  Plan of Care Reviewed With: patient, spouse, daughter        Progress: no change  Outcome Summary: Puncture site remains swollen, no hematoma present. Critical hemoglobin of 6.4 and hematocrit of 20.5, attending MD notified. New orders for 2 units of PRBCs. Night shift RN to finish administering first unit and give second unit. Complaints of pain treated with percocet and dilaudid as ordered, effective per patient. Xanax given as ordered for anxiety. Blood glucose remains in 100s. VSS. F/C remains in place for retention, cath care done. Wound care orders completed as ordered.    Daughter updated via phone call on patient status and plan of care.

## 2022-03-04 NOTE — PROGRESS NOTES
[facility]     Progress Note    Patient Name: Italia Olvera  : 1960  MRN: [id number]  Primary Care Physician:  Carloz Shoemaker MD  Date of admission: 2022      Subjective   Brief summary.  Patient admitted with ischemic toes and peripheral vascular disease.  Post revascularization surgery yesterday.  Had 2 bouts of nosebleeds, 4 days ago and yesterday.  No further bleed      HPI:  Patient awake alert.  No chest pain or shortness of breath.  Pain in the leg persist.  No fever chills.  Eyes any nosebleed    Review of Systems     Fatigue and weakness.  No nausea vomiting.  No abdominal pain.  Denies any chest pain or shortness of breath.        Objective     Vitals:   Temp:  [97.5 °F (36.4 °C)-98.2 °F (36.8 °C)] 98.1 °F (36.7 °C)  Heart Rate:  [50-70] 59  Resp:  [18-20] 18  BP: (114-192)/(39-78) 121/66  Flow (L/min):  [2] 2    Physical Exam :     Elderly female not in acute distress.  Nares without any active bleed.  Heart regular.  Lungs diminished breath sounds but clear.  Abdomen soft and nontender.  Extremities edema 1+.  Toes unchange      Result Review:  I have personally reviewed the results from the time of this admission to 3/4/2022 10:36 EST and agree with these findings:  [x]  Laboratory  []  Microbiology  []  Radiology  []  EKG/Telemetry   []  Cardiology/Vascular   []  Pathology  []  Old records  []  Other:           Assessment / Plan       Active Hospital Problems:  Active Hospital Problems    Diagnosis    • **Diabetes mellitus with peripheral vascular disease (HCC)    • Paroxysmal atrial fibrillation ([facility])    • Epistaxis    • Decubitus ulcer, unstageable ([facility])    • Constipation    • Anxiety disorder    • Acute osteomyelitis of toe of right foot ([facility])    • Osteomyelitis ([facility])    • Atherosclerosis of native artery of extremity ([facility])      Added automatically from request for surgery [id number]     • Peripheral neuropathy    • T2DM (type 2 diabetes mellitus) (CMS/[facility])- uncontrolled         Plan:   Patient stable post surgery, no further with nosebleed..  Discussed with patient's PCP Dr. Shoemaker.  Patient is a paroxysomal A. fib  Issues with bleeding in the past and have difficulty controlling, also have noncompliance with Coumadin and cannot afford Eliquis considering patient's.  Recurrent nosebleed now and being on aspirin will hold off anticoagulation with heparin.  Patient due for toe amputation and surgery.  Continue antibiotics.  Continue PT OT efforts.  Monitor labs.  Discussed with consultant Dr. Devine.  Also discussed with Dr. Solano, he has not seen patient as patient's nosebleed stopped.  He will see if there is any further need.  Conservative management for now    DVT prophylaxis:  Medical DVT prophylaxis orders are present.    CODE STATUS:   Level Of Support Discussed With: Patient  Code Status (Patient has no pulse and is not breathing): CPR (Attempt to Resuscitate)  Medical Interventions (Patient has pulse or is breathing): Full Support        Addendum  Patient's hemoglobin came back later, dropped down to 6.4, most likely acute blood loss anemia from epistaxis as well as surgery, transfused 2 units, monitor closely        Electronically signed by Rudy Garnett MD, 03/04/22, 10:29 AM EST.

## 2022-03-04 NOTE — PROGRESS NOTES
[facility]    Today's Date: 03/04/22    Patient Name: Italia Olvera  MRN: [id number]  [facility]: [id number]  PCP: Carloz Shoemaker MD  Referring Provider: Provider, No Known  Attending Provider: Rudy Garnett MD  Length of Stay: 11    SUBJECTIVE   Chief Complaint: Gangrene right second and fifth toes    HPI: Italia Olvera, a 61 y.o.female,    Patient seen resting in her bed with no apparent distress.  Family member presents.    Pt patient had angiography with Dr. Yoon yesterday.    Patient denies any fevers, chills, nausea, vomiting, shortness of breathe, nor any other constitutional signs nor symptoms.    Past Medical History:   Diagnosis Date   • Acid reflux    • ACL tear 09/01/2015    PCL/ACL TEAR/RUPTURE   • Acute shoulder bursitis, right 08/23/2015   • Anxiety    • Arthritis    • Asthma    • Bipolar 1 disorder ([facility])     untreated   • Bladder disorder    • Cancer ([facility])    • CHF (congestive heart failure) ([facility])    • Chronic back pain    • COPD (chronic obstructive pulmonary disease) ([facility])    • Depression    • Diabetes mellitus ([facility])    • DJD (degenerative joint disease)    • GERD (gastroesophageal reflux disease)    • HBP (high blood pressure)    • Hip pain 09/15/2015   • Hypertension    • Knee injury 08/19/2015   • Knee pain, right 09/15/2015   • Limb swelling    • Neuropathy    • Osteoarthritis, knee 09/01/2015   • Osteoporosis    • Peripheral neuropathy    • Renal failure 1994   • Seasonal allergies    • Shoulder tendonitis 08/23/2015   • SOB (shortness of breath)    • Tendinitis of right rotator cuff 08/23/2015     Past Surgical History:   Procedure Laterality Date   • APPENDECTOMY     • BACK SURGERY     • BLADDER REPAIR     • BRONCHOSCOPY N/A 7/10/2021    Procedure: BRONCHOSCOPY WITH BRONCHOALVEOLAR LAVAGE, POSSIBLE BIOPSY, BRUSHING, WASHING, AIRWAY INSPECTION;  Surgeon: Rodrigo Reyes MD;  Location: [facility] MAIN OR;  Service: Pulmonary;  Laterality: N/A;   • BRONCHOSCOPY N/A  7/10/2021    Procedure: BRONCHOSCOPY;  Surgeon: Rodrigo Reyes MD;  Location: [facility] ENDOSCOPY;  Service: Pulmonary;  Laterality: N/A;   • CHOLECYSTECTOMY     • ENDOSCOPY     • FRACTURE SURGERY     • GALLBLADDER SURGERY     • HERNIA REPAIR     • HYSTERECTOMY     • JOINT REPLACEMENT     • OTHER SURGICAL HISTORY      ARTIFICIAL JOINTS/LIMBS   • OTHER SURGICAL HISTORY      FACE SURGERY, UNSPECIFIED   • TOTAL HIP ARTHROPLASTY Right    • TOTAL KNEE ARTHROPLASTY Left      Family History   Problem Relation Age of Onset   • Stroke Mother    • Throat cancer Mother    • Arthritis Mother    • Osteoporosis Mother    • Heart disease Father    • Breast cancer Sister    • Colon cancer Sister    • Lung cancer Sister    • Arthritis Sister    • Osteoporosis Sister    • Heart disease Brother    • Diabetes Brother    • Arthritis Brother    • Arthritis Daughter      Social History     Socioeconomic History   • Marital status:    Tobacco Use   • Smoking status: Former Smoker     Packs/day: 0.50     Years: 50.00     Pack years: 25.00     Types: Cigarettes     Start date: 6/15/1979     Quit date: 2021     Years since quittin.7   • Smokeless tobacco: Never Used   • Tobacco comment: SMOKED FOR 31 PLUS YEARS   Vaping Use   • Vaping Use: Never used   Substance and Sexual Activity   • Alcohol use: Never   • Drug use: Never   • Sexual activity: Defer     No Known Allergies  Current Facility-Administered Medications   Medication Dose Route Frequency Provider Last Rate Last Admin   • acetaminophen (TYLENOL) tablet 650 mg  650 mg Oral Q4H PRN Marleny Yoon MD       • ALPRAZolam (XANAX) tablet 0.25 mg  0.25 mg Oral 4x Daily PRN Marleny Yoon MD   0.25 mg at 22 0514   • hydroCHLOROthiazide (HYDRODIURIL) tablet 6.25 mg  6.25 mg Oral Q24H Marleny Yoon MD   6.25 mg at 22 0918    And   • bisoprolol (ZEBeta) tablet 10 mg  10 mg Oral Q24H Marleny Yoon MD   10 mg at 22 0917   • cholestyramine light  packet 4 g  1 packet Oral Q12H Marleny Yoon MD   4 g at 03/04/22 0918   • clopidogrel (PLAVIX) tablet 75 mg  75 mg Oral Daily Marleny Yoon MD   75 mg at 03/04/22 0917   • dextrose (GLUTOSE) oral gel 15 g  15 g Oral Q15 Min PRN Marleny Yoon MD       • dextrose 10 % infusion  25 g Intravenous Q15 Min PRN Marleny Yoon MD       • furosemide (LASIX) tablet 80 mg  80 mg Oral BID Marleny Yoon MD   80 mg at 03/02/22 2202   • glucagon (human recombinant) (GLUCAGEN DIAGNOSTIC) injection 1 mg  1 mg Intramuscular Q15 Min PRN Marleny Yoon MD       • heparin bolus from bag 1,700 Units  25 Units/kg Intravenous PRN Marleny Yoon MD   1,700 Units at 02/28/22 0835   • heparin bolus from bag 3,400 Units  50 Units/kg Intravenous PRN Marleny Yoon MD   Held at 02/27/22 1340   • HYDROmorphone (DILAUDID) injection 0.5 mg  0.5 mg Intravenous Q4H PRN Marleny Yoon MD   0.5 mg at 03/04/22 0311   • hydrOXYzine pamoate (VISTARIL) capsule 25 mg  25 mg Oral TID PRN Marleny Yoon MD   25 mg at 03/02/22 2213   • insulin detemir (LEVEMIR) injection 25 Units  25 Units Subcutaneous Nightly Marleny Yoon MD   25 Units at 03/03/22 2305   • insulin lispro (humaLOG) injection 0-9 Units  0-9 Units Subcutaneous TID AC Marleny Yoon MD   2 Units at 02/27/22 1046   • ipratropium-albuterol (DUO-NEB) nebulizer solution 3 mL  3 mL Nebulization 4x Daily - RT Marleny Yoon MD   3 mL at 03/04/22 0100   • losartan (COZAAR) tablet 100 mg  100 mg Oral Daily Marleny Yoon MD   100 mg at 03/04/22 0918   • Naloxegol Oxalate (MOVANTIK) tablet 12.5 mg  12.5 mg Oral QAM Marleny Yoon MD   12.5 mg at 03/04/22 0622   • nicotine (NICODERM CQ) 14 MG/24HR patch 1 patch  1 patch Transdermal Q24H Marleny Yoon MD   1 patch at 03/04/22 0918   • ondansetron (ZOFRAN) injection 4 mg  4 mg Intravenous Q6H PRN Marleny Yoon MD   4 mg at 03/01/22 0000   • oxyCODONE-acetaminophen (PERCOCET)   MG per tablet 1 tablet  1 tablet Oral Q4H PRN Marleny Yoon MD   1 tablet at 03/04/22 1023   • pantoprazole (PROTONIX) EC tablet 40 mg  40 mg Oral Q AM Marleny Yoon MD   40 mg at 03/04/22 0622   • potassium chloride (MICRO-K) CR capsule 20 mEq  20 mEq Oral BID With Meals Marleny Yoon MD   20 mEq at 03/04/22 0918   • sennosides-docusate (PERICOLACE) 8.6-50 MG per tablet 2 tablet  2 tablet Oral Nightly Marleny Yoon MD   2 tablet at 02/27/22 2101   • sertraline (ZOLOFT) tablet 150 mg  150 mg Oral Daily Marleny Yoon MD   150 mg at 03/04/22 0917   • sodium chloride 0.9 % flush 10 mL  10 mL Intravenous PRN Marleny Yoon MD   10 mL at 03/03/22 2124   • sodium chloride 0.9 % infusion  100 mL/hr Intravenous Continuous Marleny Yoon  mL/hr at 03/03/22 2320 100 mL/hr at 03/03/22 2320   • tiZANidine (ZANAFLEX) tablet 2 mg  2 mg Oral Q8H Marleny Yoon MD   2 mg at 03/04/22 0918   • traZODone (DESYREL) tablet 150 mg  150 mg Oral Nightly Marleny Yoon MD   150 mg at 03/03/22 2319     Review of Systems   Constitutional: Negative.    Skin:        Dry gangrene of right second and fifth toes   All other systems reviewed and are negative.      OBJECTIVE     Vitals:    03/04/22 1114   BP: 143/45   Pulse: 63   Resp: 18   Temp: 97.9 °F (36.6 °C)   SpO2: 95%       PHYSICAL EXAM    GEN:   A&Ox3, NAD. Pt presents in hospital bed.     Neurovascular status unchanged.    Dry gangrene of skin of the right second and fifth toes.  No signs of edema, erythema, lymphangitis, nor signs of infection.  Stable compared to previous exam.  No fluctuance.      LABORATORY/CULTURE RESULTS:  Results from last 7 days   Lab Units 03/03/22  0812 03/01/22  0954 02/26/22  1139   WBC 10*3/mm3  --  5.01 4.23   HEMOGLOBIN g/dL 9.0* 7.3* 7.5*   HEMATOCRIT % 28.8* 23.2* 24.1*   PLATELETS 10*3/mm3  --  127* 130*     Results from last 7 days   Lab Units 03/03/22  0628 03/01/22  0954 02/28/22  0751   SODIUM mmol/L 139  140 138   POTASSIUM mmol/L 3.3* 4.0 4.3   CHLORIDE mmol/L 104 105 105   CO2 mmol/L 22.4 24.4 21.5*   BUN mg/dL 27* 28* 26*   CREATININE mg/dL 0.68 0.65 0.62   CALCIUM mg/dL 9.6 9.2 9.4   GLUCOSE mg/dL 62* 106* 109*     Results from last 7 days   Lab Units 03/02/22  0645 03/01/22  0954 02/28/22 2052 02/27/22  0410 02/26/22  1139   INR   --   --   --   --  1.10*   APTT seconds 25.8 46.4* 55.0*   < > 29.2    < > = values in this interval not displayed.     Microbiology Results (last 10 days)     ** No results found for the last 240 hours. **           ASSESSMENT/PLAN     Patient Active Problem List   Diagnosis   • Bladder disorder   • Depression   • Hypertension   • Peripheral neuropathy   • Osteoarthritis of knee   • COPD with acute exacerbation ([facility])   • Chronic back pain   • Multifocal pneumonia   • T2DM (type 2 diabetes mellitus) (CMS/[facility])- uncontrolled   • Bipolar disorder ([facility])   • Chronic respiratory failure with hypoxia ([facility])   • Acute on chronic respiratory failure with hypoxia ([facility])   • Chronic respiratory failure with hypoxia, on home oxygen therapy ([facility])   • Overweight (BMI 25.0-29.9)   • Acute on chronic systolic CHF (congestive heart failure) ([facility])   • Essential hypertension   • DM (diabetes mellitus), type 1 ([facility])   • Acute UTI (urinary tract infection)   • Anemia   • Sepsis ([facility])   • COPD with acute exacerbation ([facility])   • Hypokalemia   • Moderate malnutrition (CMS/[facility])   • Decubitus ulcer of back, stage 3 ([facility])   • Osteomyelitis ([facility])   • Acute osteomyelitis of toe of right foot ([facility])   • Constipation   • Anxiety disorder   • Diabetes mellitus with peripheral vascular disease ([facility])   • Decubitus ulcer, unstageable ([facility])   • Atherosclerosis of native artery of extremity ([facility])   • Epistaxis   • Paroxysmal atrial fibrillation ([facility])       Comprehensive lower extremity examination and evaluation was performed.    Patient was seen with her nurse present.    Recommend continue current wound care.  Recommend  leaving the toes on her right foot auto amputate due to dry gangrene.  No podiatric interventions are recommended at this time.    This document has been electronically signed by Alexander Crowell DPM on March 4, 2022 12:17 EST

## 2022-03-05 LAB
BH BB BLOOD EXPIRATION DATE: NORMAL
BH BB BLOOD EXPIRATION DATE: NORMAL
BH BB BLOOD TYPE BARCODE: 5100
BH BB BLOOD TYPE BARCODE: 5100
BH BB DISPENSE STATUS: NORMAL
BH BB DISPENSE STATUS: NORMAL
BH BB PRODUCT CODE: NORMAL
BH BB PRODUCT CODE: NORMAL
BH BB UNIT NUMBER: NORMAL
BH BB UNIT NUMBER: NORMAL
GLUCOSE BLDC GLUCOMTR-MCNC: 118 MG/DL (ref 70–99)
GLUCOSE BLDC GLUCOMTR-MCNC: 171 MG/DL (ref 70–99)
GLUCOSE BLDC GLUCOMTR-MCNC: 65 MG/DL (ref 70–99)
GLUCOSE BLDC GLUCOMTR-MCNC: 83 MG/DL (ref 70–99)
HCT VFR BLD AUTO: 23.8 % (ref 34–46.6)
HGB BLD-MCNC: 7.8 G/DL (ref 12–15.9)
UNIT  ABO: NORMAL
UNIT  ABO: NORMAL
UNIT  RH: NORMAL
UNIT  RH: NORMAL

## 2022-03-05 PROCEDURE — 82962 GLUCOSE BLOOD TEST: CPT

## 2022-03-05 PROCEDURE — 85014 HEMATOCRIT: CPT | Performed by: SURGERY

## 2022-03-05 PROCEDURE — 25010000002 HYDROMORPHONE 1 MG/ML SOLUTION: Performed by: SURGERY

## 2022-03-05 PROCEDURE — 86900 BLOOD TYPING SEROLOGIC ABO: CPT

## 2022-03-05 PROCEDURE — 85018 HEMOGLOBIN: CPT | Performed by: SURGERY

## 2022-03-05 PROCEDURE — P9016 RBC LEUKOCYTES REDUCED: HCPCS

## 2022-03-05 PROCEDURE — 86923 COMPATIBILITY TEST ELECTRIC: CPT

## 2022-03-05 PROCEDURE — 63710000001 INSULIN LISPRO (HUMAN) PER 5 UNITS: Performed by: SURGERY

## 2022-03-05 PROCEDURE — 36430 TRANSFUSION BLD/BLD COMPNT: CPT

## 2022-03-05 PROCEDURE — 63710000001 INSULIN DETEMIR PER 5 UNITS: Performed by: SURGERY

## 2022-03-05 RX ADMIN — TIZANIDINE 2 MG: 4 TABLET ORAL at 09:33

## 2022-03-05 RX ADMIN — PANTOPRAZOLE SODIUM 40 MG: 40 TABLET, DELAYED RELEASE ORAL at 05:27

## 2022-03-05 RX ADMIN — SENNOSIDES AND DOCUSATE SODIUM 2 TABLET: 50; 8.6 TABLET ORAL at 21:53

## 2022-03-05 RX ADMIN — NALOXEGOL OXALATE 12.5 MG: 12.5 TABLET, FILM COATED ORAL at 08:29

## 2022-03-05 RX ADMIN — CHOLESTYRAMINE 4 G: 4 POWDER, FOR SUSPENSION ORAL at 20:16

## 2022-03-05 RX ADMIN — OXYCODONE HYDROCHLORIDE AND ACETAMINOPHEN 1 TABLET: 10; 325 TABLET ORAL at 21:53

## 2022-03-05 RX ADMIN — HYDROCHLOROTHIAZIDE 6.25 MG: 25 TABLET ORAL at 08:26

## 2022-03-05 RX ADMIN — INSULIN DETEMIR 25 UNITS: 100 INJECTION, SOLUTION SUBCUTANEOUS at 21:54

## 2022-03-05 RX ADMIN — HYDROMORPHONE HYDROCHLORIDE 0.5 MG: 1 INJECTION, SOLUTION INTRAMUSCULAR; INTRAVENOUS; SUBCUTANEOUS at 10:15

## 2022-03-05 RX ADMIN — CLOPIDOGREL BISULFATE 75 MG: 75 TABLET ORAL at 08:26

## 2022-03-05 RX ADMIN — FUROSEMIDE 80 MG: 80 TABLET ORAL at 21:53

## 2022-03-05 RX ADMIN — SERTRALINE HYDROCHLORIDE 150 MG: 50 TABLET ORAL at 08:26

## 2022-03-05 RX ADMIN — INSULIN LISPRO 2 UNITS: 100 INJECTION, SOLUTION INTRAVENOUS; SUBCUTANEOUS at 17:24

## 2022-03-05 RX ADMIN — LOSARTAN POTASSIUM 100 MG: 50 TABLET, FILM COATED ORAL at 08:26

## 2022-03-05 RX ADMIN — HYDROMORPHONE HYDROCHLORIDE 0.5 MG: 1 INJECTION, SOLUTION INTRAMUSCULAR; INTRAVENOUS; SUBCUTANEOUS at 19:40

## 2022-03-05 RX ADMIN — TIZANIDINE 2 MG: 4 TABLET ORAL at 23:38

## 2022-03-05 RX ADMIN — POTASSIUM CHLORIDE 20 MEQ: 750 CAPSULE, EXTENDED RELEASE ORAL at 08:26

## 2022-03-05 RX ADMIN — TIZANIDINE 2 MG: 4 TABLET ORAL at 18:50

## 2022-03-05 RX ADMIN — NICOTINE 1 PATCH: 14 PATCH, EXTENDED RELEASE TRANSDERMAL at 08:26

## 2022-03-05 RX ADMIN — OXYCODONE HYDROCHLORIDE AND ACETAMINOPHEN 1 TABLET: 10; 325 TABLET ORAL at 08:29

## 2022-03-05 RX ADMIN — HYDROMORPHONE HYDROCHLORIDE 0.5 MG: 1 INJECTION, SOLUTION INTRAMUSCULAR; INTRAVENOUS; SUBCUTANEOUS at 06:14

## 2022-03-05 RX ADMIN — OXYCODONE HYDROCHLORIDE AND ACETAMINOPHEN 1 TABLET: 10; 325 TABLET ORAL at 17:24

## 2022-03-05 RX ADMIN — BISOPROLOL FUMARATE 10 MG: 5 TABLET ORAL at 08:26

## 2022-03-05 RX ADMIN — OXYCODONE HYDROCHLORIDE AND ACETAMINOPHEN 1 TABLET: 10; 325 TABLET ORAL at 03:34

## 2022-03-05 RX ADMIN — CHOLESTYRAMINE 4 G: 4 POWDER, FOR SUSPENSION ORAL at 08:26

## 2022-03-05 RX ADMIN — OXYCODONE HYDROCHLORIDE AND ACETAMINOPHEN 1 TABLET: 10; 325 TABLET ORAL at 12:28

## 2022-03-05 RX ADMIN — HYDROMORPHONE HYDROCHLORIDE 0.5 MG: 1 INJECTION, SOLUTION INTRAMUSCULAR; INTRAVENOUS; SUBCUTANEOUS at 00:47

## 2022-03-05 RX ADMIN — ALPRAZOLAM 0.25 MG: 0.25 TABLET ORAL at 00:22

## 2022-03-05 RX ADMIN — TRAZODONE HYDROCHLORIDE 150 MG: 100 TABLET ORAL at 21:56

## 2022-03-05 RX ADMIN — POTASSIUM CHLORIDE 20 MEQ: 750 CAPSULE, EXTENDED RELEASE ORAL at 17:24

## 2022-03-05 RX ADMIN — HYDROMORPHONE HYDROCHLORIDE 0.5 MG: 1 INJECTION, SOLUTION INTRAMUSCULAR; INTRAVENOUS; SUBCUTANEOUS at 23:38

## 2022-03-05 RX ADMIN — TIZANIDINE 2 MG: 4 TABLET ORAL at 00:22

## 2022-03-05 RX ADMIN — HYDROMORPHONE HYDROCHLORIDE 0.5 MG: 1 INJECTION, SOLUTION INTRAMUSCULAR; INTRAVENOUS; SUBCUTANEOUS at 14:51

## 2022-03-05 NOTE — PLAN OF CARE
Goal Outcome Evaluation:                 Problem: Fall Injury Risk  Goal: Absence of Fall and Fall-Related Injury  Outcome: Unable to Meet, Plan Revised     Problem: Adult Inpatient Plan of Care  Goal: Plan of Care Review  Outcome: Unable to Meet, Plan Revised  Goal: Patient-Specific Goal (Individualized)  Outcome: Unable to Meet, Plan Revised  Goal: Absence of Hospital-Acquired Illness or Injury  Outcome: Unable to Meet, Plan Revised  Goal: Optimal Comfort and Wellbeing  Outcome: Unable to Meet, Plan Revised  Goal: Readiness for Transition of Care  Outcome: Unable to Meet, Plan Revised     Problem: Skin Injury Risk Increased  Goal: Skin Health and Integrity  Outcome: Unable to Meet, Plan Revised     Problem: COPD Comorbidity  Goal: Maintenance of COPD Symptom Control  Outcome: Unable to Meet, Plan Revised     Problem: Diabetes Comorbidity  Problem: Skin Injury Risk Increased  Goal: Skin Health and Integrity  Outcome: Ongoing, Progressing  Intervention: Optimize Skin Protection  Recent Flowsheet Documentation  Taken 3/5/2022 0400 by Zoraida Clancy RN  Head of Bed (HOB) Positioning: HOB at 20-30 degrees  Taken 3/5/2022 0300 by Zoraida Clancy RN  Head of Bed (HOB) Positioning: HOB at 20-30 degrees  Taken 3/4/2022 2016 by Zoraida Clancy RN  Pressure Reduction Techniques:   frequent weight shift encouraged   weight shift assistance provided  Head of Bed (HOB) Positioning: HOB at 20-30 degrees  Pressure Reduction Devices:   positioning supports utilized   pressure-redistributing mattress utilized     Problem: Fall Injury Risk  Goal: Absence of Fall and Fall-Related Injury  Outcome: Ongoing, Progressing  Intervention: Identify and Manage Contributors to Fall Injury Risk  Recent Flowsheet Documentation  Taken 3/4/2022 2016 by Zoraida Clancy, RN  Self-Care Promotion: independence encouraged  Intervention: Promote Injury-Free Environment  Recent Flowsheet Documentation  Taken 3/5/2022 0400 by Zoraida Clancy  RN  Safety Promotion/Fall Prevention:   safety round/check completed   fall prevention program maintained  Taken 3/5/2022 0300 by Zoraida Clancy RN  Safety Promotion/Fall Prevention:   safety round/check completed   fall prevention program maintained   clutter free environment maintained   assistive device/personal items within reach  Taken 3/5/2022 0200 by Zoraida Clancy RN  Safety Promotion/Fall Prevention:   safety round/check completed   fall prevention program maintained   clutter free environment maintained   assistive device/personal items within reach  Taken 3/5/2022 0058 by Zoraida Clancy RN  Safety Promotion/Fall Prevention:   safety round/check completed   fall prevention program maintained   clutter free environment maintained   assistive device/personal items within reach  Taken 3/5/2022 0000 by Zoraida Clancy RN  Safety Promotion/Fall Prevention: safety round/check completed  Taken 3/4/2022 2200 by Zoraida Clancy RN  Safety Promotion/Fall Prevention: safety round/check completed  Taken 3/4/2022 2100 by Zoraida Clancy RN  Safety Promotion/Fall Prevention:   safety round/check completed   fall prevention program maintained   clutter free environment maintained     Problem: Pain Acute  Goal: Acceptable Pain Control and Functional Ability  Outcome: Ongoing, Progressing     Problem: Vascular Access Protection (Cardiac Catheterization)  Goal: Absence of Vascular Access Complication  Outcome: Ongoing, Progressing     Problem: Fall Injury Risk  Goal: Absence of Fall and Fall-Related Injury  Intervention: Promote Injury-Free Environment  Recent Flowsheet Documentation  Taken 3/5/2022 0400 by Zoraida Clancy, RN  Safety Promotion/Fall Prevention:   safety round/check completed   fall prevention program maintained  Taken 3/5/2022 0300 by Zoraida Clancy RN  Safety Promotion/Fall Prevention:   safety round/check completed   fall prevention program maintained   clutter free environment  maintained   assistive device/personal items within reach  Taken 3/5/2022 0200 by Zoraida Clancy RN  Safety Promotion/Fall Prevention:   safety round/check completed   fall prevention program maintained   clutter free environment maintained   assistive device/personal items within reach  Taken 3/5/2022 0058 by Zoraida Clancy RN  Safety Promotion/Fall Prevention:   safety round/check completed   fall prevention program maintained   clutter free environment maintained   assistive device/personal items within reach  Taken 3/5/2022 0000 by Zoraida Clancy RN  Safety Promotion/Fall Prevention: safety round/check completed  Taken 3/4/2022 2200 by Zoraida Clancy RN  Safety Promotion/Fall Prevention: safety round/check completed  Taken 3/4/2022 2100 by Zoraida Clancy RN  Safety Promotion/Fall Prevention:   safety round/check completed   fall prevention program maintained   clutter free environment maintained     Goal: Blood Glucose Level Within Desired Range  Outcome: Unable to Meet, Plan Revised     Problem: Pain Chronic (Persistent) (Comorbidity Management)  Goal: Acceptable Pain Control and Functional Ability  Outcome: Unable to Meet, Plan Revised

## 2022-03-05 NOTE — PROGRESS NOTES
[facility]   Progress Note    Patient Name: Italia Olvera  : 1960  MRN: [id number]  Primary Care Physician: Carloz Shoemaker MD  Date of admission: 2022    Subjective   Subjective     Chief Complaint:  Follow-up on multiple conditions  History of Present Illness  No new complaints  No further nosebleeds      Review of Systems   Constitutional: Negative for activity change.   Respiratory: Negative for apnea.    Cardiovascular: Negative for chest pain.       Objective   Objective     Vitals:  Temp:  [97.9 °F (36.6 °C)-99.5 °F (37.5 °C)] 98.2 °F (36.8 °C)  Heart Rate:  [61-69] 67  Resp:  [16-20] 16  BP: (138-160)/(50-70) 160/62    Physical Exam  Pulmonary:      Effort: Pulmonary effort is normal.      Breath sounds: Normal breath sounds.   Musculoskeletal:      Cervical back: Normal range of motion.   Feet:      Comments: Right foot: Gangrene of toes  Pedal pulses are not palpable  Skin:     Capillary Refill: Capillary refill takes more than 3 seconds.   Neurological:      General: No focal deficit present.      Mental Status: She is alert and oriented to person, place, and time.         Result Review    Result Review:  I have personally reviewed the results from the time of this admission to 22 5:50 PM EST and agree with these findings:  [x]  Laboratory  []  Microbiology  []  Radiology  []  EKG/Telemetry   []  Cardiology/Vascular   []  Pathology  []  Old records  []  Other:    Assessment/Plan   Assessment / Plan       Active Hospital Problems:  Active Hospital Problems    Diagnosis    • **Diabetes mellitus with peripheral vascular disease (HCC)    • Paroxysmal atrial fibrillation (HCC)    • Epistaxis    • Decubitus ulcer, unstageable (HCC)    • Constipation    • Anxiety disorder    • Acute osteomyelitis of toe of right foot (HCC)    • Osteomyelitis (HCC)    • Atherosclerosis of native artery of extremity (HCC)      Added automatically from request for surgery [id number]     • Peripheral  neuropathy    • T2DM (type 2 diabetes mellitus) (CMS/[facility])- uncontrolled        Plan:   Continue current management  Monitor H&H                  Electronically signed by Carloz Shoemaker MD, 03/05/22, 5:50 PM EST.

## 2022-03-06 LAB
ALBUMIN SERPL-MCNC: 3 G/DL (ref 3.5–5.2)
ALBUMIN/GLOB SERPL: 0.8 G/DL
ALP SERPL-CCNC: 98 U/L (ref 39–117)
ALT SERPL W P-5'-P-CCNC: 8 U/L (ref 1–33)
ANION GAP SERPL CALCULATED.3IONS-SCNC: 10.2 MMOL/L (ref 5–15)
AST SERPL-CCNC: 12 U/L (ref 1–32)
BASOPHILS # BLD AUTO: 0.02 10*3/MM3 (ref 0–0.2)
BASOPHILS NFR BLD AUTO: 0.3 % (ref 0–1.5)
BH BB BLOOD EXPIRATION DATE: NORMAL
BH BB BLOOD EXPIRATION DATE: NORMAL
BH BB BLOOD TYPE BARCODE: 5100
BH BB BLOOD TYPE BARCODE: 5100
BH BB DISPENSE STATUS: NORMAL
BH BB DISPENSE STATUS: NORMAL
BH BB PRODUCT CODE: NORMAL
BH BB PRODUCT CODE: NORMAL
BH BB UNIT NUMBER: NORMAL
BH BB UNIT NUMBER: NORMAL
BILIRUB SERPL-MCNC: 0.4 MG/DL (ref 0–1.2)
BUN SERPL-MCNC: 26 MG/DL (ref 8–23)
BUN/CREAT SERPL: 37.1 (ref 7–25)
CALCIUM SPEC-SCNC: 9 MG/DL (ref 8.6–10.5)
CHLORIDE SERPL-SCNC: 110 MMOL/L (ref 98–107)
CO2 SERPL-SCNC: 19.8 MMOL/L (ref 22–29)
CREAT SERPL-MCNC: 0.7 MG/DL (ref 0.57–1)
CROSSMATCH INTERPRETATION: NORMAL
CROSSMATCH INTERPRETATION: NORMAL
DEPRECATED RDW RBC AUTO: 52 FL (ref 37–54)
EGFRCR SERPLBLD CKD-EPI 2021: 97.9 ML/MIN/1.73
EOSINOPHIL # BLD AUTO: 0.1 10*3/MM3 (ref 0–0.4)
EOSINOPHIL NFR BLD AUTO: 1.7 % (ref 0.3–6.2)
ERYTHROCYTE [DISTWIDTH] IN BLOOD BY AUTOMATED COUNT: 15.9 % (ref 12.3–15.4)
FERRITIN SERPL-MCNC: 191.8 NG/ML (ref 13–150)
GLOBULIN UR ELPH-MCNC: 3.8 GM/DL
GLUCOSE BLDC GLUCOMTR-MCNC: 137 MG/DL (ref 70–99)
GLUCOSE BLDC GLUCOMTR-MCNC: 145 MG/DL (ref 70–99)
GLUCOSE BLDC GLUCOMTR-MCNC: 55 MG/DL (ref 70–99)
GLUCOSE BLDC GLUCOMTR-MCNC: 75 MG/DL (ref 70–99)
GLUCOSE BLDC GLUCOMTR-MCNC: 81 MG/DL (ref 70–99)
GLUCOSE SERPL-MCNC: 93 MG/DL (ref 65–99)
HCT VFR BLD AUTO: 24.5 % (ref 34–46.6)
HGB BLD-MCNC: 7.7 G/DL (ref 12–15.9)
IMM GRANULOCYTES # BLD AUTO: 0.01 10*3/MM3 (ref 0–0.05)
IMM GRANULOCYTES NFR BLD AUTO: 0.2 % (ref 0–0.5)
IRON 24H UR-MRATE: 15 MCG/DL (ref 37–145)
IRON SATN MFR SERPL: 6 % (ref 20–50)
LYMPHOCYTES # BLD AUTO: 1.06 10*3/MM3 (ref 0.7–3.1)
LYMPHOCYTES NFR BLD AUTO: 18.2 % (ref 19.6–45.3)
MAGNESIUM SERPL-MCNC: 2 MG/DL (ref 1.6–2.4)
MCH RBC QN AUTO: 28.1 PG (ref 26.6–33)
MCHC RBC AUTO-ENTMCNC: 31.4 G/DL (ref 31.5–35.7)
MCV RBC AUTO: 89.4 FL (ref 79–97)
MONOCYTES # BLD AUTO: 0.5 10*3/MM3 (ref 0.1–0.9)
MONOCYTES NFR BLD AUTO: 8.6 % (ref 5–12)
NEUTROPHILS NFR BLD AUTO: 4.12 10*3/MM3 (ref 1.7–7)
NEUTROPHILS NFR BLD AUTO: 71 % (ref 42.7–76)
NRBC BLD AUTO-RTO: 0 /100 WBC (ref 0–0.2)
PLATELET # BLD AUTO: 113 10*3/MM3 (ref 140–450)
PMV BLD AUTO: 11.4 FL (ref 6–12)
POTASSIUM SERPL-SCNC: 4.2 MMOL/L (ref 3.5–5.2)
PROT SERPL-MCNC: 6.8 G/DL (ref 6–8.5)
RBC # BLD AUTO: 2.74 10*6/MM3 (ref 3.77–5.28)
SODIUM SERPL-SCNC: 140 MMOL/L (ref 136–145)
TIBC SERPL-MCNC: 250 MCG/DL (ref 298–536)
TRANSFERRIN SERPL-MCNC: 168 MG/DL (ref 200–360)
UNIT  ABO: NORMAL
UNIT  ABO: NORMAL
UNIT  RH: NORMAL
UNIT  RH: NORMAL
WBC NRBC COR # BLD: 5.81 10*3/MM3 (ref 3.4–10.8)

## 2022-03-06 PROCEDURE — 63710000001 INSULIN DETEMIR PER 5 UNITS: Performed by: INTERNAL MEDICINE

## 2022-03-06 PROCEDURE — 84466 ASSAY OF TRANSFERRIN: CPT | Performed by: INTERNAL MEDICINE

## 2022-03-06 PROCEDURE — 82962 GLUCOSE BLOOD TEST: CPT

## 2022-03-06 PROCEDURE — 83735 ASSAY OF MAGNESIUM: CPT | Performed by: INTERNAL MEDICINE

## 2022-03-06 PROCEDURE — 25010000002 HYDROMORPHONE 1 MG/ML SOLUTION: Performed by: SURGERY

## 2022-03-06 PROCEDURE — 85025 COMPLETE CBC W/AUTO DIFF WBC: CPT | Performed by: INTERNAL MEDICINE

## 2022-03-06 PROCEDURE — 83540 ASSAY OF IRON: CPT | Performed by: INTERNAL MEDICINE

## 2022-03-06 PROCEDURE — 82728 ASSAY OF FERRITIN: CPT | Performed by: INTERNAL MEDICINE

## 2022-03-06 PROCEDURE — 80053 COMPREHEN METABOLIC PANEL: CPT | Performed by: INTERNAL MEDICINE

## 2022-03-06 RX ORDER — ALPRAZOLAM 0.25 MG/1
0.25 TABLET ORAL 3 TIMES DAILY PRN
Status: DISCONTINUED | OUTPATIENT
Start: 2022-03-06 | End: 2022-03-11 | Stop reason: HOSPADM

## 2022-03-06 RX ADMIN — CLOPIDOGREL BISULFATE 75 MG: 75 TABLET ORAL at 08:31

## 2022-03-06 RX ADMIN — FUROSEMIDE 80 MG: 80 TABLET ORAL at 21:19

## 2022-03-06 RX ADMIN — SERTRALINE HYDROCHLORIDE 150 MG: 50 TABLET ORAL at 08:31

## 2022-03-06 RX ADMIN — HYDROMORPHONE HYDROCHLORIDE 0.5 MG: 1 INJECTION, SOLUTION INTRAMUSCULAR; INTRAVENOUS; SUBCUTANEOUS at 08:32

## 2022-03-06 RX ADMIN — OXYCODONE HYDROCHLORIDE AND ACETAMINOPHEN 1 TABLET: 10; 325 TABLET ORAL at 02:09

## 2022-03-06 RX ADMIN — ALPRAZOLAM 0.25 MG: 0.25 TABLET ORAL at 16:42

## 2022-03-06 RX ADMIN — OXYCODONE HYDROCHLORIDE AND ACETAMINOPHEN 1 TABLET: 10; 325 TABLET ORAL at 06:30

## 2022-03-06 RX ADMIN — OXYCODONE HYDROCHLORIDE AND ACETAMINOPHEN 1 TABLET: 10; 325 TABLET ORAL at 21:29

## 2022-03-06 RX ADMIN — HYDROMORPHONE HYDROCHLORIDE 0.5 MG: 1 INJECTION, SOLUTION INTRAMUSCULAR; INTRAVENOUS; SUBCUTANEOUS at 03:50

## 2022-03-06 RX ADMIN — OXYCODONE HYDROCHLORIDE AND ACETAMINOPHEN 1 TABLET: 10; 325 TABLET ORAL at 14:52

## 2022-03-06 RX ADMIN — TRAZODONE HYDROCHLORIDE 150 MG: 100 TABLET ORAL at 22:53

## 2022-03-06 RX ADMIN — HYDROMORPHONE HYDROCHLORIDE 0.5 MG: 1 INJECTION, SOLUTION INTRAMUSCULAR; INTRAVENOUS; SUBCUTANEOUS at 12:45

## 2022-03-06 RX ADMIN — LOSARTAN POTASSIUM 100 MG: 50 TABLET, FILM COATED ORAL at 08:31

## 2022-03-06 RX ADMIN — CHOLESTYRAMINE 4 G: 4 POWDER, FOR SUSPENSION ORAL at 21:34

## 2022-03-06 RX ADMIN — OXYCODONE HYDROCHLORIDE AND ACETAMINOPHEN 1 TABLET: 10; 325 TABLET ORAL at 10:46

## 2022-03-06 RX ADMIN — NALOXEGOL OXALATE 12.5 MG: 12.5 TABLET, FILM COATED ORAL at 06:30

## 2022-03-06 RX ADMIN — HYDROMORPHONE HYDROCHLORIDE 0.5 MG: 1 INJECTION, SOLUTION INTRAMUSCULAR; INTRAVENOUS; SUBCUTANEOUS at 18:36

## 2022-03-06 RX ADMIN — CHOLESTYRAMINE 4 G: 4 POWDER, FOR SUSPENSION ORAL at 08:31

## 2022-03-06 RX ADMIN — POTASSIUM CHLORIDE 20 MEQ: 750 CAPSULE, EXTENDED RELEASE ORAL at 17:18

## 2022-03-06 RX ADMIN — HYDROMORPHONE HYDROCHLORIDE 0.5 MG: 1 INJECTION, SOLUTION INTRAMUSCULAR; INTRAVENOUS; SUBCUTANEOUS at 23:29

## 2022-03-06 RX ADMIN — POTASSIUM CHLORIDE 20 MEQ: 750 CAPSULE, EXTENDED RELEASE ORAL at 08:31

## 2022-03-06 RX ADMIN — INSULIN DETEMIR 18 UNITS: 100 INJECTION, SOLUTION SUBCUTANEOUS at 21:30

## 2022-03-06 RX ADMIN — HYDROCHLOROTHIAZIDE 6.25 MG: 25 TABLET ORAL at 08:32

## 2022-03-06 RX ADMIN — SENNOSIDES AND DOCUSATE SODIUM 2 TABLET: 50; 8.6 TABLET ORAL at 21:20

## 2022-03-06 RX ADMIN — TIZANIDINE 2 MG: 4 TABLET ORAL at 10:04

## 2022-03-06 RX ADMIN — PANTOPRAZOLE SODIUM 40 MG: 40 TABLET, DELAYED RELEASE ORAL at 06:30

## 2022-03-06 RX ADMIN — NICOTINE 1 PATCH: 14 PATCH, EXTENDED RELEASE TRANSDERMAL at 08:31

## 2022-03-06 RX ADMIN — BISOPROLOL FUMARATE 10 MG: 5 TABLET ORAL at 08:31

## 2022-03-06 NOTE — PLAN OF CARE
Goal Outcome Evaluation:  Pain control addressed with Dr. Shoemaker. Stated he will reorder xanax.   Problem: Skin Injury Risk Increased  Goal: Skin Health and Integrity  Outcome: Ongoing, Progressing     Problem: Fall Injury Risk  Goal: Absence of Fall and Fall-Related Injury  Outcome: Ongoing, Progressing  Intervention: Identify and Manage Contributors to Fall Injury Risk  Recent Flowsheet Documentation  Taken 3/6/2022 0830 by Deb Crook RN  Medication Review/Management:   dosing adjusted   medications reviewed  Intervention: Promote Injury-Free Environment  Recent Flowsheet Documentation  Taken 3/6/2022 0830 by Deb Crook RN  Safety Promotion/Fall Prevention: safety round/check completed     Problem: Pain Acute  Goal: Acceptable Pain Control and Functional Ability  Outcome: Ongoing, Progressing  Intervention: Prevent or Manage Pain  Recent Flowsheet Documentation  Taken 3/6/2022 0830 by Deb Crook RN  Sleep/Rest Enhancement:   relaxation techniques promoted   regular sleep/rest pattern promoted   noise level reduced   awakenings minimized  Medication Review/Management:   dosing adjusted   medications reviewed  Intervention: Optimize Psychosocial Wellbeing  Recent Flowsheet Documentation  Taken 3/6/2022 0830 by Deb Crook RN  Diversional Activities: television     Problem: Arrhythmia/Dysrhythmia (Cardiac Catheterization)  Goal: Stable Heart Rate and Rhythm  Outcome: Ongoing, Progressing     Problem: Bleeding (Cardiac Catheterization)  Goal: Absence of Bleeding  Outcome: Ongoing, Progressing     Problem: Contrast-Induced Injury Risk (Cardiac Catheterization)  Goal: Absence of Contrast-Induced Injury  Outcome: Ongoing, Progressing     Problem: Embolism (Cardiac Catheterization)  Goal: Absence of Embolism Signs and Symptoms  Outcome: Ongoing, Progressing     Problem: Ongoing Anesthesia/Sedation Effects (Cardiac Catheterization)  Goal: Anesthesia/Sedation Recovery  Outcome: Ongoing,  Progressing  Intervention: Optimize Anesthesia Recovery  Recent Flowsheet Documentation  Taken 3/6/2022 0830 by Deb Crook, RN  Safety Promotion/Fall Prevention: safety round/check completed     Problem: Pain (Cardiac Catheterization)  Goal: Acceptable Pain Control  Outcome: Ongoing, Progressing  Intervention: Prevent or Manage Pain  Recent Flowsheet Documentation  Taken 3/6/2022 0830 by Deb Crook, RN  Diversional Activities: television     Problem: Vascular Access Protection (Cardiac Catheterization)  Goal: Absence of Vascular Access Complication  Outcome: Ongoing, Progressing  Intervention: Prevent Access Site Complications  Recent Flowsheet Documentation  Taken 3/6/2022 0830 by Deb Crook, RN  Activity Management:   activity adjusted per tolerance   activity encouraged

## 2022-03-06 NOTE — PLAN OF CARE
Goal Outcome Evaluation:            Alert and oriented x4, reports frequent pain 9/10 in right foot, provided PRN diluadid and percocet q4 alternating for pain control. 2nd and 5th digit on RLE necrotic - cleansed with NS and painted with betadine. KENNEDY lower extremities tender to touch. Bony prominence on coccyx, pink and blanchable, preventative dressing placed per patient request.

## 2022-03-06 NOTE — PROGRESS NOTES
[facility]   Progress Note    Patient Name: Italia Olvera  : 1960  MRN: [id number]  Primary Care Physician: Carloz Shoemaker MD  Date of admission: 2022    Subjective   Subjective     Chief Complaint:  Follow-up on multiple conditions    History of Present Illness  Complains of increased anxiety and panic attacks.  Requesting resumption of Xanax  Blood glucose was low this morning      Review of Systems   Constitutional: Negative for activity change.   HENT: Negative.    Eyes: Negative.    Respiratory: Negative.    Cardiovascular: Negative.        Objective   Objective     Vitals:  Temp:  [98.1 °F (36.7 °C)-98.6 °F (37 °C)] 98.1 °F (36.7 °C)  Heart Rate:  [56-70] 56  Resp:  [16-22] 16  BP: (112-160)/(45-65) 141/57    Physical Exam  Cardiovascular:      Rate and Rhythm: Normal rate and regular rhythm.   Pulmonary:      Effort: Pulmonary effort is normal.      Breath sounds: Normal air entry. No stridor or transmitted upper airway sounds.   Feet:      Right foot:      Skin integrity: Callus and dry skin present. No warmth.      Left foot:      Skin integrity: Dry skin present. No warmth or fissure.         Result Review    Result Review:  I have personally reviewed the results from the time of this admission to 22 1:03 PM EST and agree with these findings:  []  Laboratory  []  Microbiology  []  Radiology  []  EKG/Telemetry   []  Cardiology/Vascular   []  Pathology  []  Old records  []  Other:    Assessment/Plan   Assessment / Plan   Anxiety with panic attacks  Hypoglycemia  Peripheral arterial disease  Dry gangrene of the toes of the right foot    Active Hospital Problems:  Active Hospital Problems    Diagnosis    • **Diabetes mellitus with peripheral vascular disease (HCC)    • Paroxysmal atrial fibrillation (HCC)    • Epistaxis    • Decubitus ulcer, unstageable (HCC)    • Constipation    • Anxiety disorder    • Acute osteomyelitis of toe of right foot (HCC)    • Osteomyelitis (HCC)    •  Atherosclerosis of native artery of extremity (HCC)      Added automatically from request for surgery [id number]     • Peripheral neuropathy    • T2DM (type 2 diabetes mellitus) (CMS/[facility])- uncontrolled        Plan:  Reduce Levemir to 18 units at bedtime  Start Xanax 0.25 mg every 6 hours as needed  Continue all other measures                  Electronically signed by Carloz Shoemaker MD, 03/06/22, 1:03 PM EST.

## 2022-03-07 ENCOUNTER — APPOINTMENT (OUTPATIENT)
Dept: CARDIOLOGY | Facility: HOSPITAL | Age: 62
End: 2022-03-07

## 2022-03-07 LAB
BASOPHILS # BLD AUTO: 0.02 10*3/MM3 (ref 0–0.2)
BASOPHILS NFR BLD AUTO: 0.3 % (ref 0–1.5)
BH CV LOWER ARTERIAL LEFT ABI RATIO: 0.67
BH CV LOWER ARTERIAL LEFT DORSALIS PEDIS SYS MAX: 74
BH CV LOWER ARTERIAL LEFT GREAT TOE SYS MAX: 43
BH CV LOWER ARTERIAL LEFT POPLITEAL SYS MAX: 74
BH CV LOWER ARTERIAL LEFT POST TIBIAL SYS MAX: 75
BH CV LOWER ARTERIAL LEFT TBI RATIO: 0.38
BH CV LOWER ARTERIAL RIGHT ABI RATIO: 0.74
BH CV LOWER ARTERIAL RIGHT DORSALIS PEDIS SYS MAX: 78
BH CV LOWER ARTERIAL RIGHT GREAT TOE SYS MAX: 23
BH CV LOWER ARTERIAL RIGHT POPLITEAL SYS MAX: 101
BH CV LOWER ARTERIAL RIGHT POST TIBIAL SYS MAX: 83
BH CV LOWER ARTERIAL RIGHT TBI RATIO: 0.21
DEPRECATED RDW RBC AUTO: 52 FL (ref 37–54)
EOSINOPHIL # BLD AUTO: 0.08 10*3/MM3 (ref 0–0.4)
EOSINOPHIL NFR BLD AUTO: 1.3 % (ref 0.3–6.2)
ERYTHROCYTE [DISTWIDTH] IN BLOOD BY AUTOMATED COUNT: 16 % (ref 12.3–15.4)
GLUCOSE BLDC GLUCOMTR-MCNC: 109 MG/DL (ref 70–99)
GLUCOSE BLDC GLUCOMTR-MCNC: 121 MG/DL (ref 70–99)
GLUCOSE BLDC GLUCOMTR-MCNC: 122 MG/DL (ref 70–99)
GLUCOSE BLDC GLUCOMTR-MCNC: 170 MG/DL (ref 70–99)
HCT VFR BLD AUTO: 24.3 % (ref 34–46.6)
HGB BLD-MCNC: 7.5 G/DL (ref 12–15.9)
IMM GRANULOCYTES # BLD AUTO: 0.03 10*3/MM3 (ref 0–0.05)
IMM GRANULOCYTES NFR BLD AUTO: 0.5 % (ref 0–0.5)
LYMPHOCYTES # BLD AUTO: 1.08 10*3/MM3 (ref 0.7–3.1)
LYMPHOCYTES NFR BLD AUTO: 17.4 % (ref 19.6–45.3)
MAXIMAL PREDICTED HEART RATE: 158 BPM
MCH RBC QN AUTO: 27.8 PG (ref 26.6–33)
MCHC RBC AUTO-ENTMCNC: 30.9 G/DL (ref 31.5–35.7)
MCV RBC AUTO: 90 FL (ref 79–97)
MONOCYTES # BLD AUTO: 0.45 10*3/MM3 (ref 0.1–0.9)
MONOCYTES NFR BLD AUTO: 7.3 % (ref 5–12)
NEUTROPHILS NFR BLD AUTO: 4.53 10*3/MM3 (ref 1.7–7)
NEUTROPHILS NFR BLD AUTO: 73.2 % (ref 42.7–76)
NRBC BLD AUTO-RTO: 0 /100 WBC (ref 0–0.2)
PLATELET # BLD AUTO: 127 10*3/MM3 (ref 140–450)
PMV BLD AUTO: 10.6 FL (ref 6–12)
RBC # BLD AUTO: 2.7 10*6/MM3 (ref 3.77–5.28)
STRESS TARGET HR: 134 BPM
UPPER ARTERIAL RIGHT ARM BRACHIAL SYS MAX: 112
WBC NRBC COR # BLD: 6.19 10*3/MM3 (ref 3.4–10.8)

## 2022-03-07 PROCEDURE — 82962 GLUCOSE BLOOD TEST: CPT

## 2022-03-07 PROCEDURE — 94760 N-INVAS EAR/PLS OXIMETRY 1: CPT

## 2022-03-07 PROCEDURE — 25010000002 HYDROMORPHONE 1 MG/ML SOLUTION: Performed by: SURGERY

## 2022-03-07 PROCEDURE — 97110 THERAPEUTIC EXERCISES: CPT

## 2022-03-07 PROCEDURE — 94799 UNLISTED PULMONARY SVC/PX: CPT

## 2022-03-07 PROCEDURE — 93923 UPR/LXTR ART STDY 3+ LVLS: CPT

## 2022-03-07 PROCEDURE — 85025 COMPLETE CBC W/AUTO DIFF WBC: CPT | Performed by: SURGERY

## 2022-03-07 PROCEDURE — 93923 UPR/LXTR ART STDY 3+ LVLS: CPT | Performed by: SURGERY

## 2022-03-07 PROCEDURE — 97530 THERAPEUTIC ACTIVITIES: CPT

## 2022-03-07 PROCEDURE — 63710000001 INSULIN DETEMIR PER 5 UNITS: Performed by: INTERNAL MEDICINE

## 2022-03-07 RX ADMIN — HYDROCHLOROTHIAZIDE 6.25 MG: 25 TABLET ORAL at 08:16

## 2022-03-07 RX ADMIN — CLOPIDOGREL BISULFATE 75 MG: 75 TABLET ORAL at 08:17

## 2022-03-07 RX ADMIN — HYDROMORPHONE HYDROCHLORIDE 0.5 MG: 1 INJECTION, SOLUTION INTRAMUSCULAR; INTRAVENOUS; SUBCUTANEOUS at 20:47

## 2022-03-07 RX ADMIN — OXYCODONE HYDROCHLORIDE AND ACETAMINOPHEN 1 TABLET: 10; 325 TABLET ORAL at 12:54

## 2022-03-07 RX ADMIN — TIZANIDINE 2 MG: 4 TABLET ORAL at 15:52

## 2022-03-07 RX ADMIN — SENNOSIDES AND DOCUSATE SODIUM 2 TABLET: 50; 8.6 TABLET ORAL at 20:46

## 2022-03-07 RX ADMIN — POTASSIUM CHLORIDE 20 MEQ: 750 CAPSULE, EXTENDED RELEASE ORAL at 08:17

## 2022-03-07 RX ADMIN — INSULIN DETEMIR 18 UNITS: 100 INJECTION, SOLUTION SUBCUTANEOUS at 20:55

## 2022-03-07 RX ADMIN — OXYCODONE HYDROCHLORIDE AND ACETAMINOPHEN 1 TABLET: 10; 325 TABLET ORAL at 18:39

## 2022-03-07 RX ADMIN — HYDROMORPHONE HYDROCHLORIDE 0.5 MG: 1 INJECTION, SOLUTION INTRAMUSCULAR; INTRAVENOUS; SUBCUTANEOUS at 11:28

## 2022-03-07 RX ADMIN — FUROSEMIDE 80 MG: 80 TABLET ORAL at 20:47

## 2022-03-07 RX ADMIN — TIZANIDINE 2 MG: 4 TABLET ORAL at 00:31

## 2022-03-07 RX ADMIN — ALPRAZOLAM 0.25 MG: 0.25 TABLET ORAL at 18:39

## 2022-03-07 RX ADMIN — Medication 10 ML: at 20:46

## 2022-03-07 RX ADMIN — ALPRAZOLAM 0.25 MG: 0.25 TABLET ORAL at 09:53

## 2022-03-07 RX ADMIN — HYDROMORPHONE HYDROCHLORIDE 0.5 MG: 1 INJECTION, SOLUTION INTRAMUSCULAR; INTRAVENOUS; SUBCUTANEOUS at 07:00

## 2022-03-07 RX ADMIN — FUROSEMIDE 80 MG: 80 TABLET ORAL at 08:16

## 2022-03-07 RX ADMIN — CHOLESTYRAMINE 4 G: 4 POWDER, FOR SUSPENSION ORAL at 20:46

## 2022-03-07 RX ADMIN — IPRATROPIUM BROMIDE AND ALBUTEROL SULFATE 3 ML: 2.5; .5 SOLUTION RESPIRATORY (INHALATION) at 19:00

## 2022-03-07 RX ADMIN — CHOLESTYRAMINE 4 G: 4 POWDER, FOR SUSPENSION ORAL at 08:16

## 2022-03-07 RX ADMIN — PANTOPRAZOLE SODIUM 40 MG: 40 TABLET, DELAYED RELEASE ORAL at 06:26

## 2022-03-07 RX ADMIN — NICOTINE 1 PATCH: 14 PATCH, EXTENDED RELEASE TRANSDERMAL at 08:15

## 2022-03-07 RX ADMIN — BISOPROLOL FUMARATE 10 MG: 5 TABLET ORAL at 08:16

## 2022-03-07 RX ADMIN — HYDROMORPHONE HYDROCHLORIDE 0.5 MG: 1 INJECTION, SOLUTION INTRAMUSCULAR; INTRAVENOUS; SUBCUTANEOUS at 15:52

## 2022-03-07 RX ADMIN — TIZANIDINE 2 MG: 4 TABLET ORAL at 08:17

## 2022-03-07 RX ADMIN — POTASSIUM CHLORIDE 20 MEQ: 750 CAPSULE, EXTENDED RELEASE ORAL at 16:56

## 2022-03-07 RX ADMIN — ALPRAZOLAM 0.25 MG: 0.25 TABLET ORAL at 01:31

## 2022-03-07 RX ADMIN — SERTRALINE HYDROCHLORIDE 150 MG: 50 TABLET ORAL at 08:16

## 2022-03-07 RX ADMIN — TRAZODONE HYDROCHLORIDE 150 MG: 100 TABLET ORAL at 20:46

## 2022-03-07 RX ADMIN — LOSARTAN POTASSIUM 100 MG: 50 TABLET, FILM COATED ORAL at 08:16

## 2022-03-07 RX ADMIN — NALOXEGOL OXALATE 12.5 MG: 12.5 TABLET, FILM COATED ORAL at 06:26

## 2022-03-07 RX ADMIN — OXYCODONE HYDROCHLORIDE AND ACETAMINOPHEN 1 TABLET: 10; 325 TABLET ORAL at 04:07

## 2022-03-07 RX ADMIN — OXYCODONE HYDROCHLORIDE AND ACETAMINOPHEN 1 TABLET: 10; 325 TABLET ORAL at 08:23

## 2022-03-07 NOTE — PLAN OF CARE
Goal Outcome Evaluation:      Pt is A&O x 4, BP was increased.  Pain complained of pain on her lower limbs , pain meds administered as par MAR. Slept most of the night.Performed wound dressing. Pt tolerated the procedure.   at bedside. Will continue to monitor.

## 2022-03-07 NOTE — PLAN OF CARE
Goal Outcome Evaluation:      Pt followed by RD for LOS day 14. Pt continues to consume % of meals. No new wt. No acute concerns or nutrition intervention at this time. RD to follow and monitor per protocol.

## 2022-03-07 NOTE — THERAPY TREATMENT NOTE
Acute Care - Physical Therapy Treatment Note   [place]     Patient Name: Italia Olvera  : 1960  MRN: [id number]  Today's Date: 3/7/2022      Visit Dx:     ICD-10-CM ICD-9-CM   1. Gangrene of toe of right foot ([facility])  I96 785.4   2. Atherosclerosis of native artery of right lower extremity with ulceration of other part of foot ([facility])  I70.235 440.23     707.9   3. Difficulty walking  R26.2 719.7     Patient Active Problem List   Diagnosis   • Bladder disorder   • Depression   • Hypertension   • Peripheral neuropathy   • Osteoarthritis of knee   • COPD with acute exacerbation (HCC)   • Chronic back pain   • Multifocal pneumonia   • T2DM (type 2 diabetes mellitus) (CMS/[facility])- uncontrolled   • Bipolar disorder ([facility])   • Chronic respiratory failure with hypoxia ([facility])   • Acute on chronic respiratory failure with hypoxia ([facility])   • Chronic respiratory failure with hypoxia, on home oxygen therapy ([facility])   • Overweight (BMI 25.0-29.9)   • Acute on chronic systolic CHF (congestive heart failure) ([facility])   • Essential hypertension   • DM (diabetes mellitus), type 1 ([facility])   • Acute UTI (urinary tract infection)   • Anemia   • Sepsis ([facility])   • COPD with acute exacerbation ([facility])   • Hypokalemia   • Moderate malnutrition (CMS/HCC)   • Decubitus ulcer of back, stage 3 ([facility])   • Osteomyelitis ([facility])   • Acute osteomyelitis of toe of right foot ([facility])   • Constipation   • Anxiety disorder   • Diabetes mellitus with peripheral vascular disease ([facility])   • Decubitus ulcer, unstageable ([facility])   • Atherosclerosis of native artery of extremity ([facility])   • Epistaxis   • Paroxysmal atrial fibrillation ([facility])     Past Medical History:   Diagnosis Date   • Acid reflux    • ACL tear 2015    PCL/ACL TEAR/RUPTURE   • Acute shoulder bursitis, right 2015   • Anxiety    • Arthritis    • Asthma    • Bipolar 1 disorder ([facility])     untreated   • Bladder disorder    • Cancer (HCC)    • CHF (congestive heart failure) ([facility])    • Chronic back  pain    • COPD (chronic obstructive pulmonary disease) ([facility])    • Depression    • Diabetes mellitus ([facility])    • DJD (degenerative joint disease)    • GERD (gastroesophageal reflux disease)    • HBP (high blood pressure)    • Hip pain 09/15/2015   • Hypertension    • Knee injury 08/19/2015   • Knee pain, right 09/15/2015   • Limb swelling    • Neuropathy    • Osteoarthritis, knee 09/01/2015   • Osteoporosis    • Peripheral neuropathy    • Renal failure 1994   • Seasonal allergies    • Shoulder tendonitis 08/23/2015   • SOB (shortness of breath)    • Tendinitis of right rotator cuff 08/23/2015     Past Surgical History:   Procedure Laterality Date   • APPENDECTOMY     • BACK SURGERY     • BLADDER REPAIR     • BRONCHOSCOPY N/A 7/10/2021    Procedure: BRONCHOSCOPY WITH BRONCHOALVEOLAR LAVAGE, POSSIBLE BIOPSY, BRUSHING, WASHING, AIRWAY INSPECTION;  Surgeon: Rodrigo Reyes MD;  Location: [facility] MAIN OR;  Service: Pulmonary;  Laterality: N/A;   • BRONCHOSCOPY N/A 7/10/2021    Procedure: BRONCHOSCOPY;  Surgeon: Rodrigo Reyes MD;  Location: [facility] ENDOSCOPY;  Service: Pulmonary;  Laterality: N/A;   • CHOLECYSTECTOMY     • ENDOSCOPY     • FRACTURE SURGERY     • GALLBLADDER SURGERY     • HERNIA REPAIR     • HYSTERECTOMY     • INTERVENTIONAL RADIOLOGY PROCEDURE Right 3/3/2022    Procedure: Abdominal Aortagram with Runoff;  Surgeon: Marleny Yoon MD;  Location: [facility] CATH INVASIVE LOCATION;  Service: Peripheral Vascular;  Laterality: Right;   • JOINT REPLACEMENT     • OTHER SURGICAL HISTORY      ARTIFICIAL JOINTS/LIMBS   • OTHER SURGICAL HISTORY      FACE SURGERY, UNSPECIFIED   • TOTAL HIP ARTHROPLASTY Right    • TOTAL KNEE ARTHROPLASTY Left      PT Assessment (last 12 hours)     PT Evaluation and Treatment     Row Name 03/07/22 0837          Physical Therapy Time and Intention    Subjective Information complains of;weakness;fatigue;pain  -DK     Document Type therapy note (daily note)  -DK     Mode of Treatment  individual therapy;physical therapy  -DK     Patient Effort adequate  -DK     Symptoms Noted During/After Treatment fatigue;increased pain  -DK     Comment --  Right toes 2 and 5, blackened, no dressing.  -     Row Name 03/07/22 08          Pain    Pretreatment Pain Rating 8/10  -DK     Posttreatment Pain Rating 8/10  -DK     Pain Location generalized  -DK     Pain Location - back;foot;toe  -DK     Pain Intervention(s) Repositioned;Distraction;Therapeutic presence  -DK     Additional Documentation --  Pt states she has already requested pain meds.  -     Row Name 03/07/22 08          Cognition    Affect/Mental Status (Cognition) WFL  -DK     Orientation Status (Cognition) oriented x 4  -DK     Follows Commands (Cognition) WFL  -DK     Cognitive Function WFL  -DK     Personal Safety Interventions nonskid shoes/slippers when out of bed;supervised activity  -     Row Name 03/07/22 08          Bed Mobility    Bed Mobility supine-sit-supine  -DK     Supine-Sit-Supine Bailey (Bed Mobility) contact guard;minimum assist (75% patient effort);1 person assist  -DK     Bed Mobility, Safety Issues decreased use of arms for pushing/pulling;decreased use of legs for bridging/pushing  -DK     Assistive Device (Bed Mobility) bed rails  -     Row Name 03/07/22 08          Safety Issues, Functional Mobility    Safety Issues Affecting Function (Mobility) awareness of need for assistance;judgment;safety precaution awareness  -DK     Impairments Affecting Function (Mobility) balance;endurance/activity tolerance;pain;strength  -     Row Name 03/07/22 08          Balance    Balance Assessment sitting static balance;sitting dynamic balance  -DK     Static Sitting Balance supervision  -DK     Dynamic Sitting Balance supervision  -DK     Position, Sitting Balance unsupported;sitting edge of bed  -DK     Balance Interventions sitting;static;dynamic  -     Row Name 03/07/22 08          Motor Skills    Motor Skills  --  therapeutic exercises  -     Therapeutic Exercise hip;knee  -     Additional Documentation --  Pt declined standing transfers due to c/o severe pain in the right foot.  -DK     Row Name 03/07/22 0837          Hip (Therapeutic Exercise)    Hip (Therapeutic Exercise) AROM (active range of motion)  -     Hip AROM (Therapeutic Exercise) bilateral;flexion;extension;aBduction;aDduction;sitting;10 repetitions  2 sets  -     Row Name 03/07/22 0837          Knee (Therapeutic Exercise)    Knee (Therapeutic Exercise) AROM (active range of motion)  -     Knee AROM (Therapeutic Exercise) bilateral;flexion;extension;LAQ (long arc quad);sitting;10 repetitions;3 sets  -DK     Row Name             Wound 02/14/22 1249 Right anterior fifth toe    Wound - Properties Group Placement Date: 02/14/22  -MD Placement Time: 1249  -MD Side: Right  -MD Orientation: anterior  -MD Location: fifth toe  -MD     Retired Wound - Properties Group Placement Date: 02/14/22  -MD Placement Time: 1249  -MD Side: Right  -MD Orientation: anterior  -MD Location: fifth toe  -MD     Retired Wound - Properties Group Date first assessed: 02/14/22  -MD Time first assessed: 1249  -MD Side: Right  -MD Location: fifth toe  -MD     Row Name             Wound 02/22/22 1423 Right anterior second toe    Wound - Properties Group Placement Date: 02/22/22  -ANUPAMA Placement Time: 1423  -ANUPAMA Present on Hospital Admission: Y  -ANUPAMA Side: Right  -ANUPAMA Orientation: anterior  -ANUPAMA Location: second toe  -ANUPAMA     Retired Wound - Properties Group Placement Date: 02/22/22  -ANUPAMA Placement Time: 1423  -ANUPAMA Present on Hospital Admission: Y  -ANUPAMA Side: Right  -ANUPAMA Orientation: anterior  -ANUPAMA Location: second toe  -ANUPAMA     Retired Wound - Properties Group Date first assessed: 02/22/22  -NAUPAMA Time first assessed: 1423  -ANUPAMA Present on Hospital Admission: Y  -ANUPAMA Side: Right  -ANUPAMA Location: second toe  -ANUPAMA     Row Name 03/07/22 0837          Positioning and Restraints    Pre-Treatment Position in  bed  -DK     Post Treatment Position bed  -DK     In Bed supine;call light within reach;encouraged to call for assist;exit alarm on;with family/caregiver;side rails up x3  -DK     Row Name 03/07/22 0837          Therapy Assessment/Plan (PT)    Rehab Potential (PT) fair, will monitor progress closely  -DK     Criteria for Skilled Interventions Met (PT) skilled treatment is necessary  -DK     Problem List (PT) problems related to;balance;mobility;strength;pain  -DK     Activity Limitations Related to Problem List (PT) unable to ambulate safely;unable to transfer safely  -DK     Row Name 03/07/22 0837          Progress Summary (PT)    Progress Toward Functional Goals (PT) progress toward functional goals is fair  -DK           User Key  (r) = Recorded By, (t) = Taken By, (c) = Cosigned By    Initials Name Provider Type    Carin Fajardo, RN Registered Nurse    Nikki Rodney RN Registered Nurse    Janay Briggs PTA Physical Therapy Assistant                Physical Therapy Education                 Title: PT OT SLP Therapies (In Progress)     Topic: Physical Therapy (In Progress)     Point: Mobility training (In Progress)     Learning Progress Summary           Patient Acceptance, E, NL by RR at 3/5/2022 0425      Show all documentation for this point (2)                 Point: Home exercise program (In Progress)     Learning Progress Summary           Patient Acceptance, E, NL by RR at 3/5/2022 0425      Show all documentation for this point (2)                 Point: Body mechanics (In Progress)     Learning Progress Summary           Patient Acceptance, E, NL by RR at 3/5/2022 0425      Show all documentation for this point (2)                 Point: Precautions (In Progress)     Learning Progress Summary           Patient Acceptance, E, NL by RR at 3/5/2022 0425      Show all documentation for this point (2)                             User Key     Initials Effective Dates Name Provider Type  Discipline    RR 01/20/22 -  Zoraida Clancy RN Registered Nurse Nurse              PT Recommendation and Plan  Planned Therapy Interventions (PT): balance training, bed mobility training, strengthening, transfer training, gait training  Therapy Frequency (PT): daily  Progress Summary (PT)  Progress Toward Functional Goals (PT): progress toward functional goals is fair   Outcome Measures     Row Name 03/07/22 0837             How much help from another person do you currently need...    Turning from your back to your side while in flat bed without using bedrails? 4  -DK      Moving from lying on back to sitting on the side of a flat bed without bedrails? 3  -DK      Moving to and from a bed to a chair (including a wheelchair)? 2  -DK      Standing up from a chair using your arms (e.g., wheelchair, bedside chair)? 2  -DK      Climbing 3-5 steps with a railing? 1  -DK      To walk in hospital room? 2  -DK      AM-PAC 6 Clicks Score (PT) 14  -DK              Functional Assessment    Outcome Measure Options AM-PAC 6 Clicks Basic Mobility (PT)  -DK            User Key  (r) = Recorded By, (t) = Taken By, (c) = Cosigned By    Initials Name Provider Type    Janay Briggs PTA Physical Therapy Assistant                 Time Calculation:    PT Charges     Row Name 03/07/22 0844             Time Calculation    PT Received On 03/07/22  -DK      PT Goal Re-Cert Due Date 03/12/22  -DK              Timed Charges    67035 - PT Therapeutic Exercise Minutes 14  -DK      51344 - PT Therapeutic Activity Minutes 9  -DK              Total Minutes    Timed Charges Total Minutes 23  -DK       Total Minutes 23  -DK            User Key  (r) = Recorded By, (t) = Taken By, (c) = Cosigned By    Initials Name Provider Type    Janay Briggs PTA Physical Therapy Assistant              Therapy Charges for Today     Code Description Service Date Service Provider Modifiers Qty    61954557468 HC PT THER PROC EA 15 MIN 3/7/2022 Sarah  ESSENCE Gustafson GP 1    [id number]  PT THERAPEUTIC ACT EA 15 MIN 3/7/2022 Janay Smith PTA GP 1          PT G-Codes  Outcome Measure Options: AM-PAC 6 Clicks Basic Mobility (PT)  AM-PAC 6 Clicks Score (PT): 14    Janay Smith PTA  3/7/2022

## 2022-03-08 ENCOUNTER — APPOINTMENT (OUTPATIENT)
Dept: CARDIOLOGY | Facility: HOSPITAL | Age: 62
End: 2022-03-08

## 2022-03-08 ENCOUNTER — APPOINTMENT (OUTPATIENT)
Dept: CT IMAGING | Facility: HOSPITAL | Age: 62
End: 2022-03-08

## 2022-03-08 LAB
GLUCOSE BLDC GLUCOMTR-MCNC: 131 MG/DL (ref 70–99)
GLUCOSE BLDC GLUCOMTR-MCNC: 181 MG/DL (ref 70–99)
GLUCOSE BLDC GLUCOMTR-MCNC: 79 MG/DL (ref 70–99)
HCT VFR BLD AUTO: 23.6 % (ref 34–46.6)
HGB BLD-MCNC: 7.4 G/DL (ref 12–15.9)

## 2022-03-08 PROCEDURE — 25010000002 LEVOFLOXACIN PER 250 MG: Performed by: INTERNAL MEDICINE

## 2022-03-08 PROCEDURE — 94799 UNLISTED PULMONARY SVC/PX: CPT

## 2022-03-08 PROCEDURE — 25010000002 HYDROMORPHONE 1 MG/ML SOLUTION: Performed by: SURGERY

## 2022-03-08 PROCEDURE — 63710000001 INSULIN LISPRO (HUMAN) PER 5 UNITS: Performed by: SURGERY

## 2022-03-08 PROCEDURE — 85018 HEMOGLOBIN: CPT | Performed by: INTERNAL MEDICINE

## 2022-03-08 PROCEDURE — 75635 CT ANGIO ABDOMINAL ARTERIES: CPT

## 2022-03-08 PROCEDURE — 82962 GLUCOSE BLOOD TEST: CPT

## 2022-03-08 PROCEDURE — 85014 HEMATOCRIT: CPT | Performed by: INTERNAL MEDICINE

## 2022-03-08 PROCEDURE — 0 IOPAMIDOL PER 1 ML: Performed by: INTERNAL MEDICINE

## 2022-03-08 PROCEDURE — 63710000001 INSULIN DETEMIR PER 5 UNITS: Performed by: INTERNAL MEDICINE

## 2022-03-08 RX ORDER — LEVOFLOXACIN 5 MG/ML
500 INJECTION, SOLUTION INTRAVENOUS EVERY 24 HOURS
Status: DISCONTINUED | OUTPATIENT
Start: 2022-03-08 | End: 2022-03-11 | Stop reason: HOSPADM

## 2022-03-08 RX ADMIN — LEVOFLOXACIN 500 MG: 500 INJECTION, SOLUTION INTRAVENOUS at 15:21

## 2022-03-08 RX ADMIN — OXYCODONE HYDROCHLORIDE AND ACETAMINOPHEN 1 TABLET: 10; 325 TABLET ORAL at 05:31

## 2022-03-08 RX ADMIN — IOPAMIDOL 100 ML: 755 INJECTION, SOLUTION INTRAVENOUS at 17:54

## 2022-03-08 RX ADMIN — TRAZODONE HYDROCHLORIDE 150 MG: 100 TABLET ORAL at 21:16

## 2022-03-08 RX ADMIN — HYDROCHLOROTHIAZIDE 6.25 MG: 25 TABLET ORAL at 10:03

## 2022-03-08 RX ADMIN — HYDROMORPHONE HYDROCHLORIDE 0.5 MG: 1 INJECTION, SOLUTION INTRAMUSCULAR; INTRAVENOUS; SUBCUTANEOUS at 11:57

## 2022-03-08 RX ADMIN — OXYCODONE HYDROCHLORIDE AND ACETAMINOPHEN 1 TABLET: 10; 325 TABLET ORAL at 19:38

## 2022-03-08 RX ADMIN — ALPRAZOLAM 0.25 MG: 0.25 TABLET ORAL at 22:02

## 2022-03-08 RX ADMIN — INSULIN DETEMIR 18 UNITS: 100 INJECTION, SOLUTION SUBCUTANEOUS at 22:02

## 2022-03-08 RX ADMIN — BISOPROLOL FUMARATE 10 MG: 5 TABLET ORAL at 08:13

## 2022-03-08 RX ADMIN — ALPRAZOLAM 0.25 MG: 0.25 TABLET ORAL at 05:03

## 2022-03-08 RX ADMIN — NALOXEGOL OXALATE 12.5 MG: 12.5 TABLET, FILM COATED ORAL at 05:32

## 2022-03-08 RX ADMIN — POTASSIUM CHLORIDE 20 MEQ: 750 CAPSULE, EXTENDED RELEASE ORAL at 17:14

## 2022-03-08 RX ADMIN — FUROSEMIDE 80 MG: 80 TABLET ORAL at 08:03

## 2022-03-08 RX ADMIN — SENNOSIDES AND DOCUSATE SODIUM 2 TABLET: 50; 8.6 TABLET ORAL at 21:08

## 2022-03-08 RX ADMIN — OXYCODONE HYDROCHLORIDE AND ACETAMINOPHEN 1 TABLET: 10; 325 TABLET ORAL at 15:20

## 2022-03-08 RX ADMIN — INSULIN LISPRO 2 UNITS: 100 INJECTION, SOLUTION INTRAVENOUS; SUBCUTANEOUS at 16:49

## 2022-03-08 RX ADMIN — CHOLESTYRAMINE 4 G: 4 POWDER, FOR SUSPENSION ORAL at 08:13

## 2022-03-08 RX ADMIN — IPRATROPIUM BROMIDE AND ALBUTEROL SULFATE 3 ML: 2.5; .5 SOLUTION RESPIRATORY (INHALATION) at 08:19

## 2022-03-08 RX ADMIN — TIZANIDINE 2 MG: 4 TABLET ORAL at 08:13

## 2022-03-08 RX ADMIN — LOSARTAN POTASSIUM 100 MG: 50 TABLET, FILM COATED ORAL at 08:02

## 2022-03-08 RX ADMIN — FUROSEMIDE 80 MG: 80 TABLET ORAL at 21:08

## 2022-03-08 RX ADMIN — HYDROMORPHONE HYDROCHLORIDE 0.5 MG: 1 INJECTION, SOLUTION INTRAMUSCULAR; INTRAVENOUS; SUBCUTANEOUS at 21:08

## 2022-03-08 RX ADMIN — CLOPIDOGREL BISULFATE 75 MG: 75 TABLET ORAL at 08:02

## 2022-03-08 RX ADMIN — POTASSIUM CHLORIDE 20 MEQ: 750 CAPSULE, EXTENDED RELEASE ORAL at 08:03

## 2022-03-08 RX ADMIN — SERTRALINE HYDROCHLORIDE 150 MG: 50 TABLET ORAL at 08:13

## 2022-03-08 RX ADMIN — NICOTINE 1 PATCH: 14 PATCH, EXTENDED RELEASE TRANSDERMAL at 09:41

## 2022-03-08 RX ADMIN — HYDROMORPHONE HYDROCHLORIDE 0.5 MG: 1 INJECTION, SOLUTION INTRAMUSCULAR; INTRAVENOUS; SUBCUTANEOUS at 08:03

## 2022-03-08 RX ADMIN — OXYCODONE HYDROCHLORIDE AND ACETAMINOPHEN 1 TABLET: 10; 325 TABLET ORAL at 09:40

## 2022-03-08 RX ADMIN — CHOLESTYRAMINE 4 G: 4 POWDER, FOR SUSPENSION ORAL at 21:12

## 2022-03-08 RX ADMIN — HYDROMORPHONE HYDROCHLORIDE 0.5 MG: 1 INJECTION, SOLUTION INTRAMUSCULAR; INTRAVENOUS; SUBCUTANEOUS at 16:48

## 2022-03-08 RX ADMIN — PANTOPRAZOLE SODIUM 40 MG: 40 TABLET, DELAYED RELEASE ORAL at 05:32

## 2022-03-08 RX ADMIN — OXYCODONE HYDROCHLORIDE AND ACETAMINOPHEN 1 TABLET: 10; 325 TABLET ORAL at 00:52

## 2022-03-08 RX ADMIN — IPRATROPIUM BROMIDE AND ALBUTEROL SULFATE 3 ML: 2.5; .5 SOLUTION RESPIRATORY (INHALATION) at 19:20

## 2022-03-08 RX ADMIN — ALPRAZOLAM 0.25 MG: 0.25 TABLET ORAL at 13:49

## 2022-03-08 RX ADMIN — HYDROMORPHONE HYDROCHLORIDE 0.5 MG: 1 INJECTION, SOLUTION INTRAMUSCULAR; INTRAVENOUS; SUBCUTANEOUS at 03:54

## 2022-03-08 RX ADMIN — Medication 10 ML: at 08:03

## 2022-03-08 RX ADMIN — TIZANIDINE 2 MG: 4 TABLET ORAL at 15:52

## 2022-03-08 NOTE — PROGRESS NOTES
[facility]     Progress Note    Patient Name: Italia Olvera  : 1960  MRN: [id number]  Primary Care Physician:  Carloz Shoemaker MD  Date of admission: 2022      Subjective   Brief summary.  Patient admitted with ischemic toes and peripheral vascular disease.  Post revascularization surgery .      HPI:  Patient awake alert.  No chest pain or shortness of breath.  Pain in the leg persist.  Some increased induration and redness on the left thigh      Review of Systems     Fatigue and weakness.  No nausea vomiting.  No abdominal pain.  Denies any chest pain or shortness of breath.        Objective     Vitals:   Temp:  [97.7 °F (36.5 °C)-98.9 °F (37.2 °C)] 98.9 °F (37.2 °C)  Heart Rate:  [56-63] 61  Resp:  [16-20] 20  BP: (142-168)/(54-66) 142/56    Physical Exam :     Elderly female not in acute distress.  Heart regular.  Lungs diminished breath sounds but clear.  Abdomen soft and nontender.  Extremities edema 1+.  Left thigh some redness and induration  Toes unchange on the right foot      Result Review:  I have personally reviewed the results from the time of this admission to 3/8/2022 13:47 EST and agree with these findings:  [x]  Laboratory  []  Microbiology  []  Radiology  []  EKG/Telemetry   []  Cardiology/Vascular   []  Pathology  []  Old records  []  Other:           Assessment / Plan       Active Hospital Problems:  Active Hospital Problems    Diagnosis    • **Diabetes mellitus with peripheral vascular disease ([facility])    • Paroxysmal atrial fibrillation ([facility])    • Epistaxis    • Decubitus ulcer, unstageable ([facility])    • Constipation    • Anxiety disorder    • Acute osteomyelitis of toe of right foot ([facility])    • Osteomyelitis ([facility])    • Atherosclerosis of native artery of extremity ([facility])      Added automatically from request for surgery [id number]     • Peripheral neuropathy    • T2DM (type 2 diabetes mellitus) (CMS/[facility])- uncontrolled        Plan:     Patient overall stable.  Slightly increased  redness and pain and swelling on the left thigh.  Vascular surgeon notified by nursing staff earlier this morning and I also notified, he is in surgery at this point, will see his recommendation.  Continue Plavix.  Patient takes Coumadin for paroxysmal A. fib, on hold due to nosebleeds.  Hemoglobin still low.  Blood sugars better.  Check labs in a.m..  PT and OT efforts.  Patient refused nursing home or inpatient rehab.      DVT prophylaxis:  Medical DVT prophylaxis orders are present.    CODE STATUS:   Level Of Support Discussed With: Patient  Code Status (Patient has no pulse and is not breathing): CPR (Attempt to Resuscitate)  Medical Interventions (Patient has pulse or is breathing): Full Support        Addendum  Patient's hemoglobin came back later, dropped down to 6.4, most likely acute blood loss anemia from epistaxis as well as surgery, transfused 2 units, monitor closely          Electronically signed by Rudy Garnett MD, 03/08/22, 1:49 PM EST.  .

## 2022-03-08 NOTE — NURSING NOTE
Patients swelling continues to worsen in left leg. Pulses are weak in Dorsalis Pedis, MD aware. Cath lab called to come examine patient's leg, recommended elevation and to notify Karrie if there were any further changes. Patient going down to Vasc lab for US of left leg currently.

## 2022-03-08 NOTE — PROGRESS NOTES
[facility]   Progress Note    Patient Name: Itlaia Olvera  : 1960  MRN: [id number]  Primary Care Physician: Carloz Shoemaker MD  Date of admission: 2022    Subjective   Subjective     Chief Complaint:  Follow-up on ischemic toes and peripheral vascular disease    History of Present Illness  Feeling better today, less anxious.  Continues to have pain in the leg.  No shortness of breath or chest pain    Review of system..  No fever chills.  Some swelling of legs.  No nausea vomiting      Objective   Objective     Vitals:  Temp:  [97.7 °F (36.5 °C)-99 °F (37.2 °C)] 98.1 °F (36.7 °C)  Heart Rate:  [56-65] 56  Resp:  [18-20] 20  BP: (137-186)/(51-63) 155/56         Elderly female not in acute distress.  Nares without any active bleed.  Heart regular.  Lungs diminished breath sounds but clear.  Abdomen soft and nontender.  Extremities edema 1+.  Toes with gangrenous changes on the right foot     Result Review    Result Review:  I have personally reviewed the results from the time of this admission to 22 1:03 PM EST and agree with these findings:  []  Laboratory  []  Microbiology  []  Radiology  []  EKG/Telemetry   []  Cardiology/Vascular   []  Pathology  []  Old records  []  Other:    Assessment/Plan   Assessment / Plan       Active Hospital Problems:  Active Hospital Problems    Diagnosis    • **Diabetes mellitus with peripheral vascular disease (HCC)    • Paroxysmal atrial fibrillation ([facility])    • Epistaxis    • Decubitus ulcer, unstageable ([facility])    • Constipation    • Anxiety disorder    • Acute osteomyelitis of toe of right foot ([facility])    • Osteomyelitis ([facility])    • Atherosclerosis of native artery of extremity ([facility])      Added automatically from request for surgery [id number]     • Peripheral neuropathy    • T2DM (type 2 diabetes mellitus) (CMS/[facility])- uncontrolled        Plan:  Stable.  Continue current care.  Hypoglycemia resolved.  Monitor sugars.  Increase activity with PT OT.  Discussed with  patient and  refused nursing home or inpatient rehab wants to go home.  We will check with vascular surgeon and possible discharge tomorrow                Electronically signed by Rudy Garnett MD, 03/07/22, 7:39 PM EST.

## 2022-03-08 NOTE — SIGNIFICANT NOTE
Wound Eval / Progress Noted    JUNG Palomo     Patient Name: Italia Olvera  : 1960  MRN: [id number]  Today's Date: 3/8/2022                 Admit Date: 2022    Visit Dx:    ICD-10-CM ICD-9-CM   1. Gangrene of toe of right foot (Formerly [facility])  I96 785.4   2. Atherosclerosis of native artery of right lower extremity with ulceration of other part of foot (HCC)  I70.235 440.23     707.9   3. Difficulty walking  R26.2 719.7       Patient Active Problem List   Diagnosis    Bladder disorder    Depression    Hypertension    Peripheral neuropathy    Osteoarthritis of knee    COPD with acute exacerbation (HCC)    Chronic back pain    Multifocal pneumonia    T2DM (type 2 diabetes mellitus) (CMS/HCC)- uncontrolled    Bipolar disorder (HCC)    Chronic respiratory failure with hypoxia (HCC)    Acute on chronic respiratory failure with hypoxia (HCC)    Chronic respiratory failure with hypoxia, on home oxygen therapy (HCC)    Overweight (BMI 25.0-29.9)    Acute on chronic systolic CHF (congestive heart failure) (HCC)    Essential hypertension    DM (diabetes mellitus), type 1 (HCC)    Acute UTI (urinary tract infection)    Anemia    Sepsis (HCC)    COPD with acute exacerbation (HCC)    Hypokalemia    Moderate malnutrition (CMS/HCC)    Decubitus ulcer of back, stage 3 (HCC)    Osteomyelitis (HCC)    Acute osteomyelitis of toe of right foot (HCC)    Constipation    Anxiety disorder    Diabetes mellitus with peripheral vascular disease (HCC)    Decubitus ulcer, unstageable (HCC)    Atherosclerosis of native artery of extremity (HCC)    Epistaxis    Paroxysmal atrial fibrillation (HCC)        Past Medical History:   Diagnosis Date    Acid reflux     ACL tear 2015    PCL/ACL TEAR/RUPTURE    Acute shoulder bursitis, right 2015    Anxiety     Arthritis     Asthma     Bipolar 1 disorder (HCC)     untreated    Bladder disorder     Cancer (HCC)     CHF (congestive heart failure) (HCC)     Chronic back pain     COPD  (chronic obstructive pulmonary disease) (HCC)     Depression     Diabetes mellitus (HCC)     DJD (degenerative joint disease)     GERD (gastroesophageal reflux disease)     HBP (high blood pressure)     Hip pain 09/15/2015    Hypertension     Knee injury 08/19/2015    Knee pain, right 09/15/2015    Limb swelling     Neuropathy     Osteoarthritis, knee 09/01/2015    Osteoporosis     Peripheral neuropathy     Renal failure 1994    Seasonal allergies     Shoulder tendonitis 08/23/2015    SOB (shortness of breath)     Tendinitis of right rotator cuff 08/23/2015        Past Surgical History:   Procedure Laterality Date    APPENDECTOMY      BACK SURGERY      BLADDER REPAIR      BRONCHOSCOPY N/A 7/10/2021    Procedure: BRONCHOSCOPY WITH BRONCHOALVEOLAR LAVAGE, POSSIBLE BIOPSY, BRUSHING, WASHING, AIRWAY INSPECTION;  Surgeon: Rodrigo Reyes MD;  Location: [facility] MAIN OR;  Service: Pulmonary;  Laterality: N/A;    BRONCHOSCOPY N/A 7/10/2021    Procedure: BRONCHOSCOPY;  Surgeon: Rodrigo Reyes MD;  Location: [facility] ENDOSCOPY;  Service: Pulmonary;  Laterality: N/A;    CHOLECYSTECTOMY      ENDOSCOPY      FRACTURE SURGERY      GALLBLADDER SURGERY      HERNIA REPAIR      HYSTERECTOMY      INTERVENTIONAL RADIOLOGY PROCEDURE Right 3/3/2022    Procedure: Abdominal Aortagram with Runoff;  Surgeon: Marleny Yoon MD;  Location: [facility] CATH INVASIVE LOCATION;  Service: Peripheral Vascular;  Laterality: Right;    JOINT REPLACEMENT      OTHER SURGICAL HISTORY      ARTIFICIAL JOINTS/LIMBS    OTHER SURGICAL HISTORY      FACE SURGERY, UNSPECIFIED    TOTAL HIP ARTHROPLASTY Right     TOTAL KNEE ARTHROPLASTY Left          Physical Assessment:  Wound 02/14/22 1249 Right anterior fifth toe (Active)   Wound Image   03/08/22 1105   Dressing Appearance open to air 03/08/22 1105   Base black eschar;dry;necrotic 03/08/22 1105   Black (%), Wound Tissue Color 100 03/08/22 1105   Periwound dry 03/08/22 1105   Periwound Temperature cool 03/08/22 1105    Periwound Skin Turgor firm 03/08/22 1105   Drainage Amount none 03/08/22 1105   Care, Wound cleansed with;sterile normal saline 03/08/22 1105   Dressing Care open to air;other (see comments) 03/08/22 1105   Periwound Care dry periwound area maintained 03/08/22 1105       Wound 02/22/22 1423 Right anterior second toe (Active)   Wound Image   03/08/22 1105   Dressing Appearance open to air 03/08/22 1105   Closure None 03/08/22 1105   Base black eschar;dry;necrotic 03/08/22 1105   Black (%), Wound Tissue Color 100 03/08/22 1105   Periwound dry 03/08/22 1105   Periwound Temperature cool 03/08/22 1105   Periwound Skin Turgor firm 03/08/22 1105   Edges rolled/closed 03/08/22 1105   Drainage Amount none 03/08/22 1105   Care, Wound cleansed with;sterile normal saline 03/08/22 1105   Dressing Care open to air;other (see comments) 03/08/22 1105   Periwound Care dry periwound area maintained 03/08/22 1105        Wound Check / Follow-up:  Patient with necrotic 2nd and 5th toes to right foot. Cleansed with normal saline and gauze. Blotted dry. Painted toes with betadine and left open to air. Tissue to bilateral feet are dry with flaking skin. Applied moisturizer.     Buttocks in clean dry and intact. No skin breakdown noted at this time. Applied thin layer of moister barrier.       Nikki Perla RN    3/8/2022    14:06 EST

## 2022-03-08 NOTE — PLAN OF CARE
Goal Outcome Evaluation:      Pt is alert and oriented x 4. She asks for pain meds frequently. She asks and then falls asleep right after asking. Sometimes she falls asleep mid sentence. I did teaching about pain medication frequency and indication. Pt blood pressure trended up during the shift.Just reassessed Pt right leg and groin from vascular procedure with in the last week and it is puffy and sore. Contacting on call md.

## 2022-03-08 NOTE — NURSING NOTE
Called Dr. Yoon regarding pt increased swelling and induration of left inner thigh S/P vascular procedure within the week. He stated to hold manual pressure and that he may escalate to a US later this morning.

## 2022-03-09 LAB
ALBUMIN SERPL-MCNC: 3.2 G/DL (ref 3.5–5.2)
ALBUMIN/GLOB SERPL: 0.7 G/DL
ALP SERPL-CCNC: 104 U/L (ref 39–117)
ALT SERPL W P-5'-P-CCNC: 9 U/L (ref 1–33)
ANION GAP SERPL CALCULATED.3IONS-SCNC: 10 MMOL/L (ref 5–15)
AST SERPL-CCNC: 16 U/L (ref 1–32)
BASOPHILS # BLD AUTO: 0.01 10*3/MM3 (ref 0–0.2)
BASOPHILS NFR BLD AUTO: 0.2 % (ref 0–1.5)
BILIRUB SERPL-MCNC: 0.5 MG/DL (ref 0–1.2)
BUN SERPL-MCNC: 25 MG/DL (ref 8–23)
BUN/CREAT SERPL: 36.2 (ref 7–25)
CALCIUM SPEC-SCNC: 9.2 MG/DL (ref 8.6–10.5)
CHLORIDE SERPL-SCNC: 105 MMOL/L (ref 98–107)
CO2 SERPL-SCNC: 24 MMOL/L (ref 22–29)
CREAT SERPL-MCNC: 0.69 MG/DL (ref 0.57–1)
DEPRECATED RDW RBC AUTO: 50 FL (ref 37–54)
EGFRCR SERPLBLD CKD-EPI 2021: 98.3 ML/MIN/1.73
EOSINOPHIL # BLD AUTO: 0.1 10*3/MM3 (ref 0–0.4)
EOSINOPHIL NFR BLD AUTO: 2 % (ref 0.3–6.2)
ERYTHROCYTE [DISTWIDTH] IN BLOOD BY AUTOMATED COUNT: 15.7 % (ref 12.3–15.4)
GLOBULIN UR ELPH-MCNC: 4.5 GM/DL
GLUCOSE BLDC GLUCOMTR-MCNC: 115 MG/DL (ref 70–99)
GLUCOSE BLDC GLUCOMTR-MCNC: 167 MG/DL (ref 70–99)
GLUCOSE BLDC GLUCOMTR-MCNC: 188 MG/DL (ref 70–99)
GLUCOSE BLDC GLUCOMTR-MCNC: 69 MG/DL (ref 70–99)
GLUCOSE SERPL-MCNC: 65 MG/DL (ref 65–99)
HCT VFR BLD AUTO: 22.9 % (ref 34–46.6)
HGB BLD-MCNC: 7.4 G/DL (ref 12–15.9)
IMM GRANULOCYTES # BLD AUTO: 0.01 10*3/MM3 (ref 0–0.05)
IMM GRANULOCYTES NFR BLD AUTO: 0.2 % (ref 0–0.5)
LYMPHOCYTES # BLD AUTO: 1.07 10*3/MM3 (ref 0.7–3.1)
LYMPHOCYTES NFR BLD AUTO: 21.6 % (ref 19.6–45.3)
MCH RBC QN AUTO: 27.9 PG (ref 26.6–33)
MCHC RBC AUTO-ENTMCNC: 32.3 G/DL (ref 31.5–35.7)
MCV RBC AUTO: 86.4 FL (ref 79–97)
MONOCYTES # BLD AUTO: 0.43 10*3/MM3 (ref 0.1–0.9)
MONOCYTES NFR BLD AUTO: 8.7 % (ref 5–12)
NEUTROPHILS NFR BLD AUTO: 3.34 10*3/MM3 (ref 1.7–7)
NEUTROPHILS NFR BLD AUTO: 67.3 % (ref 42.7–76)
NRBC BLD AUTO-RTO: 0 /100 WBC (ref 0–0.2)
PLATELET # BLD AUTO: 149 10*3/MM3 (ref 140–450)
PMV BLD AUTO: 10.7 FL (ref 6–12)
POTASSIUM SERPL-SCNC: 4 MMOL/L (ref 3.5–5.2)
PROT SERPL-MCNC: 7.7 G/DL (ref 6–8.5)
RBC # BLD AUTO: 2.65 10*6/MM3 (ref 3.77–5.28)
SODIUM SERPL-SCNC: 139 MMOL/L (ref 136–145)
WBC NRBC COR # BLD: 4.96 10*3/MM3 (ref 3.4–10.8)

## 2022-03-09 PROCEDURE — 25010000002 FUROSEMIDE PER 20 MG: Performed by: INTERNAL MEDICINE

## 2022-03-09 PROCEDURE — 94799 UNLISTED PULMONARY SVC/PX: CPT

## 2022-03-09 PROCEDURE — 82962 GLUCOSE BLOOD TEST: CPT

## 2022-03-09 PROCEDURE — 25010000002 HYDROMORPHONE 1 MG/ML SOLUTION: Performed by: SURGERY

## 2022-03-09 PROCEDURE — 63710000001 INSULIN LISPRO (HUMAN) PER 5 UNITS: Performed by: SURGERY

## 2022-03-09 PROCEDURE — 25010000002 LEVOFLOXACIN PER 250 MG: Performed by: INTERNAL MEDICINE

## 2022-03-09 PROCEDURE — 80053 COMPREHEN METABOLIC PANEL: CPT | Performed by: INTERNAL MEDICINE

## 2022-03-09 PROCEDURE — 85025 COMPLETE CBC W/AUTO DIFF WBC: CPT | Performed by: INTERNAL MEDICINE

## 2022-03-09 PROCEDURE — 97110 THERAPEUTIC EXERCISES: CPT

## 2022-03-09 PROCEDURE — 2Y41X5Z PACKING OF NASAL REGION USING PACKING MATERIAL: ICD-10-PCS | Performed by: OTOLARYNGOLOGY

## 2022-03-09 PROCEDURE — 63710000001 INSULIN DETEMIR PER 5 UNITS: Performed by: INTERNAL MEDICINE

## 2022-03-09 RX ORDER — OXYMETAZOLINE HYDROCHLORIDE 0.05 G/100ML
2 SPRAY NASAL 2 TIMES DAILY
Status: DISCONTINUED | OUTPATIENT
Start: 2022-03-10 | End: 2022-03-11 | Stop reason: HOSPADM

## 2022-03-09 RX ORDER — FUROSEMIDE 10 MG/ML
20 INJECTION INTRAMUSCULAR; INTRAVENOUS ONCE
Status: COMPLETED | OUTPATIENT
Start: 2022-03-09 | End: 2022-03-09

## 2022-03-09 RX ORDER — OXYMETAZOLINE HYDROCHLORIDE 0.05 G/100ML
2 SPRAY NASAL 2 TIMES DAILY
Status: DISCONTINUED | OUTPATIENT
Start: 2022-03-09 | End: 2022-03-09

## 2022-03-09 RX ORDER — IBUPROFEN 200 MG
1 CAPSULE ORAL ONCE
Status: DISCONTINUED | OUTPATIENT
Start: 2022-03-09 | End: 2022-03-11 | Stop reason: HOSPADM

## 2022-03-09 RX ADMIN — LEVOFLOXACIN 500 MG: 500 INJECTION, SOLUTION INTRAVENOUS at 14:45

## 2022-03-09 RX ADMIN — CHOLESTYRAMINE 4 G: 4 POWDER, FOR SUSPENSION ORAL at 20:36

## 2022-03-09 RX ADMIN — POTASSIUM CHLORIDE 20 MEQ: 750 CAPSULE, EXTENDED RELEASE ORAL at 17:08

## 2022-03-09 RX ADMIN — HYDROMORPHONE HYDROCHLORIDE 0.5 MG: 1 INJECTION, SOLUTION INTRAMUSCULAR; INTRAVENOUS; SUBCUTANEOUS at 22:46

## 2022-03-09 RX ADMIN — ALPRAZOLAM 0.25 MG: 0.25 TABLET ORAL at 20:37

## 2022-03-09 RX ADMIN — SERTRALINE HYDROCHLORIDE 150 MG: 50 TABLET ORAL at 08:32

## 2022-03-09 RX ADMIN — NICOTINE 1 PATCH: 14 PATCH, EXTENDED RELEASE TRANSDERMAL at 08:37

## 2022-03-09 RX ADMIN — HYDROCHLOROTHIAZIDE 6.25 MG: 25 TABLET ORAL at 08:33

## 2022-03-09 RX ADMIN — HYDROMORPHONE HYDROCHLORIDE 0.5 MG: 1 INJECTION, SOLUTION INTRAMUSCULAR; INTRAVENOUS; SUBCUTANEOUS at 02:28

## 2022-03-09 RX ADMIN — Medication 10 ML: at 08:33

## 2022-03-09 RX ADMIN — FUROSEMIDE 80 MG: 80 TABLET ORAL at 08:33

## 2022-03-09 RX ADMIN — HYDROMORPHONE HYDROCHLORIDE 0.5 MG: 1 INJECTION, SOLUTION INTRAMUSCULAR; INTRAVENOUS; SUBCUTANEOUS at 10:35

## 2022-03-09 RX ADMIN — OXYCODONE HYDROCHLORIDE AND ACETAMINOPHEN 1 TABLET: 10; 325 TABLET ORAL at 00:07

## 2022-03-09 RX ADMIN — CHOLESTYRAMINE 4 G: 4 POWDER, FOR SUSPENSION ORAL at 08:33

## 2022-03-09 RX ADMIN — INSULIN LISPRO 2 UNITS: 100 INJECTION, SOLUTION INTRAVENOUS; SUBCUTANEOUS at 17:08

## 2022-03-09 RX ADMIN — IPRATROPIUM BROMIDE AND ALBUTEROL SULFATE 3 ML: 2.5; .5 SOLUTION RESPIRATORY (INHALATION) at 07:12

## 2022-03-09 RX ADMIN — NALOXEGOL OXALATE 12.5 MG: 12.5 TABLET, FILM COATED ORAL at 06:36

## 2022-03-09 RX ADMIN — INSULIN DETEMIR 18 UNITS: 100 INJECTION, SOLUTION SUBCUTANEOUS at 20:41

## 2022-03-09 RX ADMIN — FUROSEMIDE 20 MG: 10 INJECTION, SOLUTION INTRAMUSCULAR; INTRAVENOUS at 14:32

## 2022-03-09 RX ADMIN — OXYCODONE HYDROCHLORIDE AND ACETAMINOPHEN 1 TABLET: 10; 325 TABLET ORAL at 16:31

## 2022-03-09 RX ADMIN — TIZANIDINE 2 MG: 4 TABLET ORAL at 16:09

## 2022-03-09 RX ADMIN — HYDROMORPHONE HYDROCHLORIDE 0.5 MG: 1 INJECTION, SOLUTION INTRAMUSCULAR; INTRAVENOUS; SUBCUTANEOUS at 14:45

## 2022-03-09 RX ADMIN — BISOPROLOL FUMARATE 10 MG: 5 TABLET ORAL at 08:32

## 2022-03-09 RX ADMIN — Medication 2 SPRAY: at 18:28

## 2022-03-09 RX ADMIN — SENNOSIDES AND DOCUSATE SODIUM 2 TABLET: 50; 8.6 TABLET ORAL at 20:37

## 2022-03-09 RX ADMIN — PANTOPRAZOLE SODIUM 40 MG: 40 TABLET, DELAYED RELEASE ORAL at 06:36

## 2022-03-09 RX ADMIN — HYDROMORPHONE HYDROCHLORIDE 0.5 MG: 1 INJECTION, SOLUTION INTRAMUSCULAR; INTRAVENOUS; SUBCUTANEOUS at 06:36

## 2022-03-09 RX ADMIN — OXYCODONE HYDROCHLORIDE AND ACETAMINOPHEN 1 TABLET: 10; 325 TABLET ORAL at 12:24

## 2022-03-09 RX ADMIN — FUROSEMIDE 80 MG: 80 TABLET ORAL at 20:37

## 2022-03-09 RX ADMIN — TRAZODONE HYDROCHLORIDE 150 MG: 100 TABLET ORAL at 20:36

## 2022-03-09 RX ADMIN — CLOPIDOGREL BISULFATE 75 MG: 75 TABLET ORAL at 08:33

## 2022-03-09 RX ADMIN — POTASSIUM CHLORIDE 20 MEQ: 750 CAPSULE, EXTENDED RELEASE ORAL at 08:33

## 2022-03-09 RX ADMIN — TIZANIDINE 2 MG: 4 TABLET ORAL at 00:08

## 2022-03-09 RX ADMIN — OXYCODONE HYDROCHLORIDE AND ACETAMINOPHEN 1 TABLET: 10; 325 TABLET ORAL at 07:33

## 2022-03-09 RX ADMIN — OXYCODONE HYDROCHLORIDE AND ACETAMINOPHEN 1 TABLET: 10; 325 TABLET ORAL at 20:36

## 2022-03-09 RX ADMIN — HYDROMORPHONE HYDROCHLORIDE 0.5 MG: 1 INJECTION, SOLUTION INTRAMUSCULAR; INTRAVENOUS; SUBCUTANEOUS at 18:38

## 2022-03-09 RX ADMIN — ALPRAZOLAM 0.25 MG: 0.25 TABLET ORAL at 09:37

## 2022-03-09 RX ADMIN — LOSARTAN POTASSIUM 100 MG: 50 TABLET, FILM COATED ORAL at 08:32

## 2022-03-09 RX ADMIN — TIZANIDINE 2 MG: 4 TABLET ORAL at 08:32

## 2022-03-09 NOTE — PLAN OF CARE
Goal Outcome Evaluation:      Patient alert and oriented.  Anxious.  VSS.  Patient with frequent complaints of pain at a scale of 10/10.  Medicated per MAR.  Medications not effective per patient.  Patient calls out frequently for pain medication right after receiving meds.  Swelling on patients left upper thigh has decreased this shift and does feel softer to touch.  No other changes in patient condition this shift.

## 2022-03-09 NOTE — SIGNIFICANT NOTE
03/09/22 1030   Coping/Psychosocial   Observed Emotional State frustrated   Verbalized Emotional State frustration   Trust Relationship/Rapport thoughts/feelings acknowledged;empathic listening provided   Additional Documentation Spiritual Care (Group)   Spiritual Care   Spiritual Care Source  initiative  (daily rounding)   Spiritual Care Follow-Up follow-up planned regularly for general support   Response to Spiritual Care receptive of support;expressing self, indicated difficulty   Spiritual Care Interventions supportive conversation provided   Spiritual Care Visit Type initial  (introductions made and my role explained)   Spiritual Care Request coping/stress of illness support   Receptivity to Spiritual Care visit welcomed   At time of visit pt is on 4NT

## 2022-03-09 NOTE — PROGRESS NOTES
Patient was complaining of some left-sided groin pain and there was a concern for pseudoaneurysm.    On exam.  The left groin is tender to touch but no pulsatile masses evident.  Left foot is warm.    CT angiogram shows patent common femoral artery.  There is hematoma surrounding the common femoral artery but no definite pseudoaneurysm.  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    No further intervention is needed at this time.

## 2022-03-09 NOTE — THERAPY TREATMENT NOTE
Acute Care - Physical Therapy Treatment Note   [place]     Patient Name: Italia Olvera  : 1960  MRN: [id number]  Today's Date: 3/9/2022      Visit Dx:     ICD-10-CM ICD-9-CM   1. Gangrene of toe of right foot (Formerly [facility])  I96 785.4   2. Atherosclerosis of native artery of right lower extremity with ulceration of other part of foot ([facility])  I70.235 440.23     707.9   3. Difficulty walking  R26.2 719.7     Patient Active Problem List   Diagnosis   • Bladder disorder   • Depression   • Hypertension   • Peripheral neuropathy   • Osteoarthritis of knee   • COPD with acute exacerbation (HCC)   • Chronic back pain   • Multifocal pneumonia   • T2DM (type 2 diabetes mellitus) (CMS/[facility])- uncontrolled   • Bipolar disorder ([facility])   • Chronic respiratory failure with hypoxia ([facility])   • Acute on chronic respiratory failure with hypoxia ([facility])   • Chronic respiratory failure with hypoxia, on home oxygen therapy ([facility])   • Overweight (BMI 25.0-29.9)   • Acute on chronic systolic CHF (congestive heart failure) ([facility])   • Essential hypertension   • DM (diabetes mellitus), type 1 ([facility])   • Acute UTI (urinary tract infection)   • Anemia   • Sepsis ([facility])   • COPD with acute exacerbation ([facility])   • Hypokalemia   • Moderate malnutrition (CMS/HCC)   • Decubitus ulcer of back, stage 3 ([facility])   • Osteomyelitis ([facility])   • Acute osteomyelitis of toe of right foot ([facility])   • Constipation   • Anxiety disorder   • Diabetes mellitus with peripheral vascular disease ([facility])   • Decubitus ulcer, unstageable ([facility])   • Atherosclerosis of native artery of extremity ([facility])   • Epistaxis   • Paroxysmal atrial fibrillation ([facility])     Past Medical History:   Diagnosis Date   • Acid reflux    • ACL tear 2015    PCL/ACL TEAR/RUPTURE   • Acute shoulder bursitis, right 2015   • Anxiety    • Arthritis    • Asthma    • Bipolar 1 disorder ([facility])     untreated   • Bladder disorder    • Cancer (HCC)    • CHF (congestive heart failure) ([facility])    • Chronic back  pain    • COPD (chronic obstructive pulmonary disease) ([facility])    • Depression    • Diabetes mellitus ([facility])    • DJD (degenerative joint disease)    • GERD (gastroesophageal reflux disease)    • HBP (high blood pressure)    • Hip pain 09/15/2015   • Hypertension    • Knee injury 08/19/2015   • Knee pain, right 09/15/2015   • Limb swelling    • Neuropathy    • Osteoarthritis, knee 09/01/2015   • Osteoporosis    • Peripheral neuropathy    • Renal failure 1994   • Seasonal allergies    • Shoulder tendonitis 08/23/2015   • SOB (shortness of breath)    • Tendinitis of right rotator cuff 08/23/2015     Past Surgical History:   Procedure Laterality Date   • APPENDECTOMY     • BACK SURGERY     • BLADDER REPAIR     • BRONCHOSCOPY N/A 7/10/2021    Procedure: BRONCHOSCOPY WITH BRONCHOALVEOLAR LAVAGE, POSSIBLE BIOPSY, BRUSHING, WASHING, AIRWAY INSPECTION;  Surgeon: Rodrigo Reyes MD;  Location: [facility] MAIN OR;  Service: Pulmonary;  Laterality: N/A;   • BRONCHOSCOPY N/A 7/10/2021    Procedure: BRONCHOSCOPY;  Surgeon: Rodrigo Reyes MD;  Location: [facility] ENDOSCOPY;  Service: Pulmonary;  Laterality: N/A;   • CHOLECYSTECTOMY     • ENDOSCOPY     • FRACTURE SURGERY     • GALLBLADDER SURGERY     • HERNIA REPAIR     • HYSTERECTOMY     • INTERVENTIONAL RADIOLOGY PROCEDURE Right 3/3/2022    Procedure: Abdominal Aortagram with Runoff;  Surgeon: Marleny Yoon MD;  Location: [facility] CATH INVASIVE LOCATION;  Service: Peripheral Vascular;  Laterality: Right;   • JOINT REPLACEMENT     • OTHER SURGICAL HISTORY      ARTIFICIAL JOINTS/LIMBS   • OTHER SURGICAL HISTORY      FACE SURGERY, UNSPECIFIED   • TOTAL HIP ARTHROPLASTY Right    • TOTAL KNEE ARTHROPLASTY Left      PT Assessment (last 12 hours)     PT Evaluation and Treatment     Row Name 03/09/22 0905          Physical Therapy Time and Intention    Subjective Information complains of;weakness;fatigue;pain  -DK     Document Type therapy note (daily note)  -DK     Mode of Treatment  individual therapy;physical therapy  -DK     Patient Effort adequate  -DK     Symptoms Noted During/After Treatment fatigue;increased pain  -DK     Comment --  Pt sitting EOB, c/o severe pain upon entry to the room.  -     Row Name 03/09/22 0905          Pain    Pretreatment Pain Rating 8/10  -DK     Posttreatment Pain Rating 8/10  -     Row Name 03/09/22 0905          Cognition    Affect/Mental Status (Cognition) WFL  -     Orientation Status (Cognition) oriented x 4  -DK     Follows Commands (Cognition) WFL  -     Cognitive Function WFL  -     Row Name 03/09/22 0905          Balance    Balance Assessment sitting static balance;sitting dynamic balance  -DK     Static Sitting Balance supervision  -DK     Dynamic Sitting Balance supervision  -DK     Position, Sitting Balance unsupported;sitting edge of bed  -DK     Balance Interventions sitting;static;dynamic  -     Row Name 03/09/22 0905          Motor Skills    Motor Skills --  therapeutic exercises  -     Therapeutic Exercise hip;knee;ankle  -     Additional Documentation --  Pt was sitting (I) EOB upon entry to the room, left sitting (I) EOB post treatment on alert. Pt declined transfers.  -     Row Name 03/09/22 0905          Hip (Therapeutic Exercise)    Hip (Therapeutic Exercise) AROM (active range of motion)  -     Hip AROM (Therapeutic Exercise) bilateral;flexion;extension;aBduction;aDduction;sitting;10 repetitions  2 sets  -     Row Name 03/09/22 0905          Knee (Therapeutic Exercise)    Knee (Therapeutic Exercise) AROM (active range of motion)  -     Knee AROM (Therapeutic Exercise) bilateral;flexion;extension;LAQ (long arc quad);sitting;10 repetitions;2 sets  -     Row Name 03/09/22 0905          Ankle (Therapeutic Exercise)    Ankle (Therapeutic Exercise) AROM (active range of motion)  -     Ankle AROM (Therapeutic Exercise) bilateral;dorsiflexion;plantarflexion;sitting;10 repetitions;2 sets  -     Row Name              Wound 02/14/22 1249 Right anterior fifth toe    Wound - Properties Group Placement Date: 02/14/22  -MD Placement Time: 1249  -MD Side: Right  -MD Orientation: anterior  -MD Location: fifth toe  -MD     Retired Wound - Properties Group Placement Date: 02/14/22  -MD Placement Time: 1249  -MD Side: Right  -MD Orientation: anterior  -MD Location: fifth toe  -MD     Retired Wound - Properties Group Date first assessed: 02/14/22  -MD Time first assessed: 1249  -MD Side: Right  -MD Location: fifth toe  -MD     Row Name             Wound 02/22/22 1423 Right anterior second toe    Wound - Properties Group Placement Date: 02/22/22  -ANUPAMA Placement Time: 1423  -ANUPAMA Present on Hospital Admission: Y  -ANUPAMA Side: Right  -ANUPAMA Orientation: anterior  -ANUPAMA Location: second toe  -ANUPAMA     Retired Wound - Properties Group Placement Date: 02/22/22  -ANUPAMA Placement Time: 1423  -ANUPAMA Present on Hospital Admission: Y  -ANUPAMA Side: Right  -ANUPAMA Orientation: anterior  -ANUPAMA Location: second toe  -ANUPAMA     Retired Wound - Properties Group Date first assessed: 02/22/22  -ANUPAMA Time first assessed: 1423  -ANUPAMA Present on Hospital Admission: Y  -ANUPAMA Side: Right  -ANUPAMA Location: second toe  -ANUPAMA     Row Name             Wound 03/03/22 2000 Left anterior greater trochanter Puncture    Wound - Properties Group Placement Date: 03/03/22  -KM Placement Time: 2000 -KM Side: Left  -KM Orientation: anterior  -KM Location: greater trochanter  -KM Primary Wound Type: Puncture  -KM Additional Comments: Puncture site from cath lab... also developed a hematoma below puncture site  -KM     Retired Wound - Properties Group Placement Date: 03/03/22  -KM Placement Time: 2000 -KM Side: Left  -KM Orientation: anterior  -KM Location: greater trochanter  -KM Primary Wound Type: Puncture  -KM Additional Comments: Puncture site from cath lab... also developed a hematoma below puncture site  -KM     Retired Wound - Properties Group Date first assessed: 03/03/22  -KM Time first assessed: 2000  -KM  Side: Left  -KM Location: greater trochanter  -KM Primary Wound Type: Puncture  -KM Additional Comments: Puncture site from cath lab... also developed a hematoma below puncture site  -KM     Row Name             Wound 03/09/22 0709 Left anterior great toe Other (comment)    Wound - Properties Group Placement Date: 03/09/22 -HS Placement Time: 0709 -HS Side: Left  -HS Orientation: anterior  -HS Location: great toe  -HS Primary Wound Type: Other  -HS, red merary toes      Retired Wound - Properties Group Placement Date: 03/09/22 -HS Placement Time: 0709 -HS Side: Left  -HS Orientation: anterior  -HS Location: great toe  -HS Primary Wound Type: Other  -HS, red merary toes      Retired Wound - Properties Group Date first assessed: 03/09/22 -HS Time first assessed: 0709 -HS Side: Left  -HS Location: great toe  -HS Primary Wound Type: Other  -HS, red merary toes      Row Name 03/09/22 0905          Positioning and Restraints    Pre-Treatment Position in bed  -DK     Post Treatment Position bed  -DK     In Bed sitting EOB;side rails up x2;call light within reach;encouraged to call for assist;exit alarm on  -DK     Row Name 03/09/22 0905          Therapy Assessment/Plan (PT)    Rehab Potential (PT) fair, will monitor progress closely  -DK     Criteria for Skilled Interventions Met (PT) skilled treatment is necessary  -DK     Problem List (PT) problems related to;balance;mobility;strength;pain  -DK     Activity Limitations Related to Problem List (PT) unable to ambulate safely;unable to transfer safely  -DK     Row Name 03/09/22 0905          Progress Summary (PT)    Progress Toward Functional Goals (PT) progress toward functional goals is gradual  -           User Key  (r) = Recorded By, (t) = Taken By, (c) = Cosigned By    Initials Name Provider Type    Carin Fajardo, RN Registered Nurse    Nikki Rodney, RN Registered Nurse    Elizabeth Hernandez, RN Registered Nurse    HS Soledad Bernal, JAN Registered  Nurse    Janay Briggs, ESSENCE Physical Therapy Assistant                Physical Therapy Education                 Title: PT OT SLP Therapies (In Progress)     Topic: Physical Therapy (In Progress)     Point: Mobility training (In Progress)     Learning Progress Summary           Patient Acceptance, E, NL by RR at 3/5/2022 0425      Show all documentation for this point (2)                 Point: Home exercise program (In Progress)     Learning Progress Summary           Patient Acceptance, E, NL by RR at 3/5/2022 0425      Show all documentation for this point (2)                 Point: Body mechanics (In Progress)     Learning Progress Summary           Patient Acceptance, E, NL by RR at 3/5/2022 0425      Show all documentation for this point (2)                 Point: Precautions (In Progress)     Learning Progress Summary           Patient Acceptance, E, NL by RR at 3/5/2022 0425      Show all documentation for this point (2)                             User Key     Initials Effective Dates Name Provider Type Discipline    RR 01/20/22 -  Zoraida Clancy, RN Registered Nurse Nurse              PT Recommendation and Plan  Planned Therapy Interventions (PT): balance training, bed mobility training, home exercise program, strengthening, transfer training  Therapy Frequency (PT): daily  Progress Summary (PT)  Progress Toward Functional Goals (PT): progress toward functional goals is gradual   Outcome Measures     Row Name 03/09/22 0900 03/07/22 0837          How much help from another person do you currently need...    Turning from your back to your side while in flat bed without using bedrails? 4  -DK 4  -DK     Moving from lying on back to sitting on the side of a flat bed without bedrails? 3  -DK 3  -DK     Moving to and from a bed to a chair (including a wheelchair)? 2  -DK 2  -DK     Standing up from a chair using your arms (e.g., wheelchair, bedside chair)? 2  -DK 2  -DK     Climbing 3-5 steps with a  railing? 1  -DK 1  -DK     To walk in hospital room? 1  -DK 2  -DK     AM-PAC 6 Clicks Score (PT) 13  -DK 14  -DK            Functional Assessment    Outcome Measure Options AM-PAC 6 Clicks Basic Mobility (PT)  -DK AM-PAC 6 Clicks Basic Mobility (PT)  -DK           User Key  (r) = Recorded By, (t) = Taken By, (c) = Cosigned By    Initials Name Provider Type    Janay Briggs PTA Physical Therapy Assistant                 Time Calculation:    PT Charges     Row Name 03/09/22 0909             Time Calculation    PT Received On 03/09/22  -DK      PT Goal Re-Cert Due Date 03/12/22  -DK              Timed Charges    12597 - PT Therapeutic Exercise Minutes 14  -DK      15509 - PT Therapeutic Activity Minutes 4  -DK              Total Minutes    Timed Charges Total Minutes 18  -DK       Total Minutes 18  -DK            User Key  (r) = Recorded By, (t) = Taken By, (c) = Cosigned By    Initials Name Provider Type    Janay Briggs PTA Physical Therapy Assistant              Therapy Charges for Today     Code Description Service Date Service Provider Modifiers Qty    [id number] HC PT THER PROC EA 15 MIN 3/9/2022 Janay Smith PTA GP 1          PT G-Codes  Outcome Measure Options: AM-PAC 6 Clicks Basic Mobility (PT)  AM-PAC 6 Clicks Score (PT): 13    Janay Smith PTA  3/9/2022

## 2022-03-10 LAB
GLUCOSE BLDC GLUCOMTR-MCNC: 106 MG/DL (ref 70–99)
GLUCOSE BLDC GLUCOMTR-MCNC: 138 MG/DL (ref 70–99)
GLUCOSE BLDC GLUCOMTR-MCNC: 159 MG/DL (ref 70–99)
GLUCOSE BLDC GLUCOMTR-MCNC: 173 MG/DL (ref 70–99)

## 2022-03-10 PROCEDURE — 63710000001 INSULIN DETEMIR PER 5 UNITS: Performed by: INTERNAL MEDICINE

## 2022-03-10 PROCEDURE — 25010000002 HYDROMORPHONE 1 MG/ML SOLUTION: Performed by: SURGERY

## 2022-03-10 PROCEDURE — 25010000002 NA FERRIC GLUC CPLX PER 12.5 MG: Performed by: INTERNAL MEDICINE

## 2022-03-10 PROCEDURE — 63710000001 INSULIN LISPRO (HUMAN) PER 5 UNITS: Performed by: SURGERY

## 2022-03-10 PROCEDURE — 25010000002 LEVOFLOXACIN PER 250 MG: Performed by: INTERNAL MEDICINE

## 2022-03-10 PROCEDURE — 94799 UNLISTED PULMONARY SVC/PX: CPT

## 2022-03-10 PROCEDURE — 82962 GLUCOSE BLOOD TEST: CPT

## 2022-03-10 PROCEDURE — 94760 N-INVAS EAR/PLS OXIMETRY 1: CPT

## 2022-03-10 RX ADMIN — PANTOPRAZOLE SODIUM 40 MG: 40 TABLET, DELAYED RELEASE ORAL at 06:15

## 2022-03-10 RX ADMIN — BISOPROLOL FUMARATE 10 MG: 5 TABLET ORAL at 08:24

## 2022-03-10 RX ADMIN — INSULIN LISPRO 2 UNITS: 100 INJECTION, SOLUTION INTRAVENOUS; SUBCUTANEOUS at 17:30

## 2022-03-10 RX ADMIN — OXYMETAZOLINE HYDROCHLORIDE 2 SPRAY: 0.05 SPRAY NASAL at 21:35

## 2022-03-10 RX ADMIN — TIZANIDINE 2 MG: 4 TABLET ORAL at 23:32

## 2022-03-10 RX ADMIN — OXYCODONE HYDROCHLORIDE AND ACETAMINOPHEN 1 TABLET: 10; 325 TABLET ORAL at 06:15

## 2022-03-10 RX ADMIN — OXYCODONE HYDROCHLORIDE AND ACETAMINOPHEN 1 TABLET: 10; 325 TABLET ORAL at 17:30

## 2022-03-10 RX ADMIN — CHOLESTYRAMINE 4 G: 4 POWDER, FOR SUSPENSION ORAL at 11:40

## 2022-03-10 RX ADMIN — LEVOFLOXACIN 500 MG: 500 INJECTION, SOLUTION INTRAVENOUS at 15:08

## 2022-03-10 RX ADMIN — TIZANIDINE 2 MG: 4 TABLET ORAL at 00:39

## 2022-03-10 RX ADMIN — HYDROMORPHONE HYDROCHLORIDE 0.5 MG: 1 INJECTION, SOLUTION INTRAMUSCULAR; INTRAVENOUS; SUBCUTANEOUS at 03:21

## 2022-03-10 RX ADMIN — SODIUM CHLORIDE 125 MG: 9 INJECTION, SOLUTION INTRAVENOUS at 14:01

## 2022-03-10 RX ADMIN — NICOTINE 1 PATCH: 14 PATCH, EXTENDED RELEASE TRANSDERMAL at 08:24

## 2022-03-10 RX ADMIN — POTASSIUM CHLORIDE 20 MEQ: 750 CAPSULE, EXTENDED RELEASE ORAL at 17:30

## 2022-03-10 RX ADMIN — HYDROMORPHONE HYDROCHLORIDE 0.5 MG: 1 INJECTION, SOLUTION INTRAMUSCULAR; INTRAVENOUS; SUBCUTANEOUS at 23:32

## 2022-03-10 RX ADMIN — NALOXEGOL OXALATE 12.5 MG: 12.5 TABLET, FILM COATED ORAL at 06:15

## 2022-03-10 RX ADMIN — TIZANIDINE 2 MG: 4 TABLET ORAL at 08:25

## 2022-03-10 RX ADMIN — LOSARTAN POTASSIUM 100 MG: 50 TABLET, FILM COATED ORAL at 08:25

## 2022-03-10 RX ADMIN — HYDROMORPHONE HYDROCHLORIDE 0.5 MG: 1 INJECTION, SOLUTION INTRAMUSCULAR; INTRAVENOUS; SUBCUTANEOUS at 15:08

## 2022-03-10 RX ADMIN — POTASSIUM CHLORIDE 20 MEQ: 750 CAPSULE, EXTENDED RELEASE ORAL at 08:24

## 2022-03-10 RX ADMIN — SERTRALINE HYDROCHLORIDE 150 MG: 50 TABLET ORAL at 08:25

## 2022-03-10 RX ADMIN — OXYCODONE HYDROCHLORIDE AND ACETAMINOPHEN 1 TABLET: 10; 325 TABLET ORAL at 21:35

## 2022-03-10 RX ADMIN — SENNOSIDES AND DOCUSATE SODIUM 2 TABLET: 50; 8.6 TABLET ORAL at 21:35

## 2022-03-10 RX ADMIN — TRAZODONE HYDROCHLORIDE 150 MG: 100 TABLET ORAL at 21:35

## 2022-03-10 RX ADMIN — ALPRAZOLAM 0.25 MG: 0.25 TABLET ORAL at 23:32

## 2022-03-10 RX ADMIN — CLOPIDOGREL BISULFATE 75 MG: 75 TABLET ORAL at 08:25

## 2022-03-10 RX ADMIN — OXYMETAZOLINE HYDROCHLORIDE 2 SPRAY: 0.05 SPRAY NASAL at 08:25

## 2022-03-10 RX ADMIN — INSULIN DETEMIR 18 UNITS: 100 INJECTION, SOLUTION SUBCUTANEOUS at 21:36

## 2022-03-10 RX ADMIN — TIZANIDINE 2 MG: 4 TABLET ORAL at 17:30

## 2022-03-10 RX ADMIN — HYDROMORPHONE HYDROCHLORIDE 0.5 MG: 1 INJECTION, SOLUTION INTRAMUSCULAR; INTRAVENOUS; SUBCUTANEOUS at 08:24

## 2022-03-10 RX ADMIN — HYDROMORPHONE HYDROCHLORIDE 0.5 MG: 1 INJECTION, SOLUTION INTRAMUSCULAR; INTRAVENOUS; SUBCUTANEOUS at 19:24

## 2022-03-10 RX ADMIN — Medication 10 ML: at 08:24

## 2022-03-10 RX ADMIN — OXYCODONE HYDROCHLORIDE AND ACETAMINOPHEN 1 TABLET: 10; 325 TABLET ORAL at 00:39

## 2022-03-10 RX ADMIN — ALPRAZOLAM 0.25 MG: 0.25 TABLET ORAL at 15:08

## 2022-03-10 RX ADMIN — OXYCODONE HYDROCHLORIDE AND ACETAMINOPHEN 1 TABLET: 10; 325 TABLET ORAL at 13:07

## 2022-03-10 RX ADMIN — FUROSEMIDE 80 MG: 80 TABLET ORAL at 08:24

## 2022-03-10 RX ADMIN — FUROSEMIDE 80 MG: 80 TABLET ORAL at 21:35

## 2022-03-10 RX ADMIN — CHOLESTYRAMINE 4 G: 4 POWDER, FOR SUSPENSION ORAL at 21:36

## 2022-03-10 NOTE — CONSULTS
ENT Hospital Consult Note     Date of Consult: 2022     Patient Name: Italia Olvera  MRN: [id number]   : 1960     Referring Provider: Dr. Rudy Garnett    Care Team: Patient Care Team:  Carloz Shoemaker MD as PCP - General (Internal Medicine)     Reason for Hospitalization: Right foot cellulitis with gangrene of second toe    History of Present Illness: Was contacted for hospital consultation on Italia Olvera a 61 y.o. female who presents to the hospital with gangrene and cellulitis of her foot.  Patient has uncontrolled diabetes and also peripheral vascular disease.  Patient had been on Coumadin at home but since she has been only on Plavix.  Unfortunately patient had a bleeding from right nostril about a week ago which resolved with pressure.  However since patient has been bleeding on and off from the right nostril.  ENT has been consulted for further management of this patient.  Due to her significant cardiac and vascular issues, patient most likely will remain on some anticoagulant therapy.    Subjective      Pertinent items are noted in HPI.     Past Medical History:   Past Medical History:   Diagnosis Date   • Acid reflux    • ACL tear 2015    PCL/ACL TEAR/RUPTURE   • Acute shoulder bursitis, right 2015   • Anxiety    • Arthritis    • Asthma    • Bipolar 1 disorder (Formerly [facility])     untreated   • Bladder disorder    • Cancer (Formerly [facility])    • CHF (congestive heart failure) (Formerly [facility])    • Chronic back pain    • COPD (chronic obstructive pulmonary disease) (Formerly [facility])    • Depression    • Diabetes mellitus (Formerly [facility])    • DJD (degenerative joint disease)    • GERD (gastroesophageal reflux disease)    • HBP (high blood pressure)    • Hip pain 09/15/2015   • Hypertension    • Knee injury 2015   • Knee pain, right 09/15/2015   • Limb swelling    • Neuropathy    • Osteoarthritis, knee 2015   • Osteoporosis    • Peripheral neuropathy    • Renal failure    • Seasonal allergies    • Shoulder  tendonitis 2015   • SOB (shortness of breath)    • Tendinitis of right rotator cuff 2015       Past Surgical History:   Past Surgical History:   Procedure Laterality Date   • APPENDECTOMY     • BACK SURGERY     • BLADDER REPAIR     • BRONCHOSCOPY N/A 7/10/2021    Procedure: BRONCHOSCOPY WITH BRONCHOALVEOLAR LAVAGE, POSSIBLE BIOPSY, BRUSHING, WASHING, AIRWAY INSPECTION;  Surgeon: Rodrigo Reyes MD;  Location: [facility] MAIN OR;  Service: Pulmonary;  Laterality: N/A;   • BRONCHOSCOPY N/A 7/10/2021    Procedure: BRONCHOSCOPY;  Surgeon: Rodrigo Reyes MD;  Location: [facility] ENDOSCOPY;  Service: Pulmonary;  Laterality: N/A;   • CHOLECYSTECTOMY     • ENDOSCOPY     • FRACTURE SURGERY     • GALLBLADDER SURGERY     • HERNIA REPAIR     • HYSTERECTOMY     • INTERVENTIONAL RADIOLOGY PROCEDURE Right 3/3/2022    Procedure: Abdominal Aortagram with Runoff;  Surgeon: Marleny Yoon MD;  Location: [facility] CATH INVASIVE LOCATION;  Service: Peripheral Vascular;  Laterality: Right;   • JOINT REPLACEMENT     • OTHER SURGICAL HISTORY      ARTIFICIAL JOINTS/LIMBS   • OTHER SURGICAL HISTORY      FACE SURGERY, UNSPECIFIED   • TOTAL HIP ARTHROPLASTY Right    • TOTAL KNEE ARTHROPLASTY Left        Family History:   Family History   Problem Relation Age of Onset   • Stroke Mother    • Throat cancer Mother    • Arthritis Mother    • Osteoporosis Mother    • Heart disease Father    • Breast cancer Sister    • Colon cancer Sister    • Lung cancer Sister    • Arthritis Sister    • Osteoporosis Sister    • Heart disease Brother    • Diabetes Brother    • Arthritis Brother    • Arthritis Daughter        Social History:   Social History     Socioeconomic History   • Marital status:    Tobacco Use   • Smoking status: Former Smoker     Packs/day: 0.50     Years: 50.00     Pack years: 25.00     Types: Cigarettes     Start date: 6/15/1979     Quit date: 2021     Years since quittin.7   • Smokeless tobacco: Never Used   •  Tobacco comment: SMOKED FOR 31 PLUS YEARS   Vaping Use   • Vaping Use: Never used   Substance and Sexual Activity   • Alcohol use: Never   • Drug use: Never   • Sexual activity: Defer       Medications:     Current Facility-Administered Medications:   •  acetaminophen (TYLENOL) tablet 650 mg, 650 mg, Oral, Q4H PRN, Marleny Yoon MD  •  ALPRAZolam (XANAX) tablet 0.25 mg, 0.25 mg, Oral, TID PRN, Carloz Shoemaker MD, 0.25 mg at 03/09/22 0937  •  [DISCONTINUED] hydroCHLOROthiazide (HYDRODIURIL) tablet 6.25 mg, 6.25 mg, Oral, Q24H, 6.25 mg at 03/09/22 0833 **AND** bisoprolol (ZEBeta) tablet 10 mg, 10 mg, Oral, Q24H, Marleyn Yoon MD, 10 mg at 03/09/22 0832  •  cholestyramine light packet 4 g, 1 packet, Oral, Q12H, Marleny Yoon MD, 4 g at 03/09/22 0833  •  clopidogrel (PLAVIX) tablet 75 mg, 75 mg, Oral, Daily, Marleny Yoon MD, 75 mg at 03/09/22 0833  •  dextrose (GLUTOSE) oral gel 15 g, 15 g, Oral, Q15 Min PRN, Marleny Yoon MD  •  dextrose 10 % infusion, 25 g, Intravenous, Q15 Min PRN, Marleny Yoon MD  •  furosemide (LASIX) tablet 80 mg, 80 mg, Oral, BID, Marleny Yoon MD, 80 mg at 03/09/22 0833  •  glucagon (human recombinant) (GLUCAGEN DIAGNOSTIC) injection 1 mg, 1 mg, Intramuscular, Q15 Min PRN, Marleny Yoon MD  •  heparin bolus from bag 1,700 Units, 25 Units/kg, Intravenous, PRN, Marleny Yoon MD, 1,700 Units at 02/28/22 0835  •  heparin bolus from bag 3,400 Units, 50 Units/kg, Intravenous, PRN, Marleny Yoon MD, Held at 02/27/22 1340  •  HYDROmorphone (DILAUDID) injection 0.5 mg, 0.5 mg, Intravenous, Q4H PRN, Marleny Yoon MD, 0.5 mg at 03/09/22 1838  •  hydrOXYzine pamoate (VISTARIL) capsule 25 mg, 25 mg, Oral, TID PRN, Marleny Yoon MD, 25 mg at 03/02/22 2213  •  insulin detemir (LEVEMIR) injection 18 Units, 18 Units, Subcutaneous, Nightly, Carloz Shoemaker MD, 18 Units at 03/08/22 2202  •  insulin lispro (humaLOG) injection 0-9 Units, 0-9 Units,  Subcutaneous, TID AC, Marleny Yoon MD, 2 Units at 03/09/22 1708  •  ipratropium-albuterol (DUO-NEB) nebulizer solution 3 mL, 3 mL, Nebulization, 4x Daily - RT, Marleny Yoon MD, 3 mL at 03/09/22 0712  •  levoFLOXacin (LEVAQUIN) 500 mg/100 mL D5W (premix) (LEVAQUIN) 500 mg, 500 mg, Intravenous, Q24H, Rudy Garnett MD, 500 mg at 03/09/22 1445  •  losartan (COZAAR) tablet 100 mg, 100 mg, Oral, Daily, Marleny Yoon MD, 100 mg at 03/09/22 0832  •  Naloxegol Oxalate (MOVANTIK) tablet 12.5 mg, 12.5 mg, Oral, QAM, Marleny Yoon MD, 12.5 mg at 03/09/22 0636  •  neomycin-bacitracin-polymyxin b (NEOSPORIN) ointment 1 application, 1 application, Topical, Once, Rudy Garnett MD  •  nicotine (NICODERM CQ) 14 MG/24HR patch 1 patch, 1 patch, Transdermal, Q24H, Marleny Yoon MD, 1 patch at 03/09/22 0837  •  ondansetron (ZOFRAN) injection 4 mg, 4 mg, Intravenous, Q6H PRN, Marleny Yoon MD, 4 mg at 03/01/22 0000  •  oxyCODONE-acetaminophen (PERCOCET)  MG per tablet 1 tablet, 1 tablet, Oral, Q4H PRN, Marleny Yoon MD, 1 tablet at 03/09/22 1631  •  [START ON 3/10/2022] oxymetazoline (AFRIN) nasal spray 2 spray, 2 spray, Each Nare, BID, Kuldeep Solano MD  •  pantoprazole (PROTONIX) EC tablet 40 mg, 40 mg, Oral, Q AM, Marleny Yoon MD, 40 mg at 03/09/22 0636  •  potassium chloride (MICRO-K) CR capsule 20 mEq, 20 mEq, Oral, BID With Meals, Marleny Yoon MD, 20 mEq at 03/09/22 1708  •  sennosides-docusate (PERICOLACE) 8.6-50 MG per tablet 2 tablet, 2 tablet, Oral, Nightly, Marleny Yoon MD, 2 tablet at 03/08/22 2108  •  sertraline (ZOLOFT) tablet 150 mg, 150 mg, Oral, Daily, Marleny Yoon MD, 150 mg at 03/09/22 0832  •  sodium chloride 0.9 % flush 10 mL, 10 mL, Intravenous, PRN, Marleny Yoon MD, 10 mL at 03/09/22 0833  •  tiZANidine (ZANAFLEX) tablet 2 mg, 2 mg, Oral, Q8H, Marleny Yoon MD, 2 mg at 03/09/22 1609  •  traZODone (DESYREL) tablet 150 mg, 150 mg, Oral,  Nightly, Marleny Yoon MD, 150 mg at 03/08/22 2115    Allergies:   No Known Allergies    Review of Systems:     Systemic Symptoms: No fever, no recent weight loss   Head Symptoms: No headache, no facial pain, no facial weakness   Eye Symptoms: No blurry vision, full range of motion   Ears: No loss of hearing   Nose:  Nosebleed from right side   Throat: No hoarseness   Cardiovascular Symptoms: No cold hand or feet   Pulmonary Symptoms: No dyspnea or stridor   GI Symptoms: No dysphagia, no choking   Endocrine Symptoms: No heat or cold intolerance   Neurological Symptoms: No vertigo, no taste or smell disturbances   Skin Symptoms: No abnormal lumps, rash or cuts        Objective     Physical Exam:  Vital Signs:   Temp:  [97.9 °F (36.6 °C)-98.4 °F (36.9 °C)] 97.9 °F (36.6 °C)  Heart Rate:  [56-64] 60  Resp:  [18-20] 18  BP: (121-157)/(52-70) 153/60  66.9 kg (147 lb 7.8 oz)  Body mass index is 23.81 kg/m².     General Appearance:  Alert, cooperative, in no acute distress, no audible stridor   Head:  Normocephalic, without obvious abnormality, atraumatic   Eyes:          Conjunctivae and sclerae normal, corneas clear, PERRLA   Ears:  Ear canals clear, right TM normal, left TM normal   Oral Exam: Normal tongue, tonsils    Nose:  Bleeding from the right side but no specific site identified   Laryngeal: Refer to fiber optic laryngoscopy procedure note    Fiberoptic Exam:  Not done   Neck: No adenopathy, supple, trachea midline, no thyromegaly, no carotid bruit   Salivary Glands: No palpable masses   Lungs:       Heart:     Abdomen:    Pulses:    Skin: No bleeding, bruising or rash   Lymph nodes: No palpable adenopathy   Neurologic: Cranial nerves II-XII grossly intact, no spontaneous nystagmus     Results Review:   I reviewed the patient's new clinical results.     Results from last 7 days   Lab Units 03/09/22  0535   WBC 10*3/mm3 4.96   HEMOGLOBIN g/dL 7.4*   HEMATOCRIT % 22.9*   PLATELETS 10*3/mm3 149     Results from  last 7 days   Lab Units 03/09/22  0535   SODIUM mmol/L 139   POTASSIUM mmol/L 4.0   CHLORIDE mmol/L 105   CO2 mmol/L 24.0   BUN mg/dL 25*   CREATININE mg/dL 0.69   GLUCOSE mg/dL 65   CALCIUM mg/dL 9.2     No results found for: ACANTHNAEG, AFBCX, BPERTUSSISCX, BLOODCX  No results found for: BCIDPCR, CXREFLEX, CSFCX, CULTURETIS  No results found for: CULTURES, HSVCX, URCX  No results found for: EYECULTURE, GCCX, HSVCULTURE, LABHSV  No results found for: LEGIONELLA, MRSACX, MUMPSCX, MYCOPLASCX  No results found for: NOCARDIACX, STOOLCX  No results found for: THROATCX, UNSTIMCULT, URINECX, CULTURE, VZVCULTUR  No results found for: VIRALCULTU, WOUNDCX    Imaging Results (Last 72 Hours)     Procedure Component Value Units Date/Time    CT Angio Abdominal Aorta Bilateral Iliofem Runoff [[id number]] Collected: 03/08/22 2115     Updated: 03/09/22 1010    Narrative:      PROCEDURE: CT ANGIO ABDOMINAL AORTA BILAT ILIOFEM RUNOFF W WO CONTRAST     COMPARISON: [facility], CT, CT ANGIO ABDOMINAL AORTA BILAT ILIOFEM RUNOFF, 2/24/2022,   17:22.     INDICATIONS: Arterial embolism, lower extremity     TECHNIQUE: CT images of the abdomen, pelvis, and lower extremities were obtained without and with   non-ionic intravenous contrast material. Multi-planar reformatted/3-D images were created to   optimize visualization of vascular anatomy.       PROTOCOL:   Standard imaging protocol performed      RADIATION:   DLP: 368  mGy*cm    Automated exposure control was utilized to minimize radiation dose.   CONTRAST: 100 cc Isovue 370 I.V.     FINDINGS:   Small right pleural effusion appears smaller.  Mild compressive atelectasis is seen in the right   lower lobe.  A small amount of fluid is seen in the subphrenic space.  The liver is of normal size.    Surgical clips are seen in the gallbladder bed, the gallbladder is absent.  No pancreatic mass is   evident.  The spleen remains slightly enlarged.  Heterogeneous enhancement of the  right kidney is   nonspecific, and may be due to vascular compromise.  Bowel loops are not abnormally dilated.  The   rectus muscles are diastatic, and the ventral fascia bulges anteriorly.  A small amount of   peritoneal fluid is evident.  Pelvic contents are obscured by streak artifact from right hip   prosthesis.  Diffuse anasarca is evident.  13 cm x 6 cm mass in the anterior and medial upper left   thigh may represent a hematoma.     The arteries are well opacified.  The celiac artery and superior mesenteric artery are widely   patent.  Single renal arteries are widely patent.  Fusiform infrarenal abdominal aortic aneurysm   measures 3.6 cm in maximal AP diameter.  The inferior mesenteric artery is opacified.  The aorta   tapers to 2.5 cm in diameter at the bifurcation.  Marked atheromatous irregularity is seen in the   pelvic arteries with mild to moderate stenoses.  The common femoral arteries bilaterally are widely   patent.     CONTINUED ON NEXT PAGE...           On the right, the profunda is opacified.  Overlapping SFA grafts extend from the common femoral   artery to the mid popliteal artery.  The distal popliteal artery is patent.  Single-vessel runoff   can be followed to the level of the distal tibial metaphysis.     On the left, the profunda is opacified.  The left superficial femoral artery is occluded   proximally, and is reconstituted at the level of the adductor canal by collaterals from the   profunda.  The popliteal artery is opacified, and obscured by streak artifact from knee prosthesis.    Two-vessel runoff can be followed to the ankle.       Impression:         CT angiogram of the abdomen and pelvis with aortofemoral and bilateral lower extremity runoff   demonstrating a small amount of fluid in the subphrenic space and in the peritoneal space.  Diffuse   anasarca.     Heterogeneous enhancement of the right kidney is nonspecific, and may be due to vascular   compromise.     3.6 cm fusiform  infrarenal abdominal aortic aneurysm.     Marked atheromatous irregularity of the pelvic arteries with mild to moderate stenoses.  The common   femoral arteries are widely patent.     On the right, overlapping SFA grafts appear patent, and extend opacify the distal popliteal artery.    Single-vessel runoff can be followed to the level of the distal tibial metaphysis.     On the left, the superficial femoral artery is occluded proximally, and is reconstituted at the   level of the abductor canal by collaterals from the profunda.  The popliteal artery is opacified,   and obscured by streak artifact from knee prosthesis.  Two vessel runoff can be followed to the   ankle.     13 cm x 6 cm mass in the anterior and medial upper left thigh may represent a hematoma.                  LETY TONY MD         Electronically Signed and Approved By: LETY TONY MD on 3/09/2022 at 10:07                       Procedure: Patient was set up erect at bedside and both nostrils were examined.  Left nostril was unremarkable for any bleeding however right nostril was continuing to bleed mostly posteriorly.  Afrin nasal spray was utilized on both side to open up the nostrils for better examination but no source was identified.  At this point since patient will be on anticoagulant therapy, it was decided to place a dissolvable packing.  Therefore, right nasal cavity was packed with nasopore dissolvable packing saturated with Afrin nasal spray obtaining excellent hemostasis.  Full-length of the packing was utilized as anterior and posterior right nasal packing.    Assessment / Plan      Assessment:  1.  Epistaxis  2.  Coagulopathy  3.  Foot gangrene  4.  Diabetes Mellitus    Plan:   1.  Right epistaxis controlled with packing utilizing nasopore dissolvable packing.  2.  We will continue Afrin nasal spray to the right nostril couple puffs twice a day to keep the packing moist and eventually dissolve away.  3.  We will follow the patient  with you.    Electronically signed by Kuldeep Solano MD, 03/09/22, 7:35 PM EST.

## 2022-03-10 NOTE — PLAN OF CARE
Goal Outcome Evaluation:      Patient alert and oriented x4, in pleasant mood, frequently request pain medication. PRN diluadid, percocet and xanax given per eMAR. VSS. Andino catheter removed upon pt request and MD approval, will monitor for retention. BGs ranged from 106-159. IV ferric gluconate given once, pt tolerated well. IV antibiotics continued. Family member at bedside for majority of shift.

## 2022-03-10 NOTE — PLAN OF CARE
Goal Outcome Evaluation:         Patient alert and oriented x4.  VSS.  Patient request pain medication frequently.  Patient had nose packed at bedside right before shift changes.  Patient educated about need to leave packing alone, voiced understanding.  No other changes in patient condition this shift.

## 2022-03-11 ENCOUNTER — READMISSION MANAGEMENT (OUTPATIENT)
Dept: CALL CENTER | Facility: HOSPITAL | Age: 62
End: 2022-03-11

## 2022-03-11 VITALS
RESPIRATION RATE: 16 BRPM | HEIGHT: 66 IN | BODY MASS INDEX: 23.7 KG/M2 | OXYGEN SATURATION: 100 % | DIASTOLIC BLOOD PRESSURE: 53 MMHG | SYSTOLIC BLOOD PRESSURE: 159 MMHG | TEMPERATURE: 97.3 F | WEIGHT: 147.49 LBS | HEART RATE: 57 BPM

## 2022-03-11 PROBLEM — R04.0 EPISTAXIS: Status: RESOLVED | Noted: 2022-03-03 | Resolved: 2022-03-11

## 2022-03-11 PROBLEM — M86.9 OSTEOMYELITIS (HCC): Status: RESOLVED | Noted: 2022-02-21 | Resolved: 2022-03-11

## 2022-03-11 PROBLEM — K59.00 CONSTIPATION: Status: RESOLVED | Noted: 2022-02-24 | Resolved: 2022-03-11

## 2022-03-11 LAB
ANION GAP SERPL CALCULATED.3IONS-SCNC: 8.9 MMOL/L (ref 5–15)
BASOPHILS # BLD AUTO: 0.03 10*3/MM3 (ref 0–0.2)
BASOPHILS NFR BLD AUTO: 0.6 % (ref 0–1.5)
BUN SERPL-MCNC: 28 MG/DL (ref 8–23)
BUN/CREAT SERPL: 37.8 (ref 7–25)
CALCIUM SPEC-SCNC: 9 MG/DL (ref 8.6–10.5)
CHLORIDE SERPL-SCNC: 106 MMOL/L (ref 98–107)
CO2 SERPL-SCNC: 24.1 MMOL/L (ref 22–29)
CREAT SERPL-MCNC: 0.74 MG/DL (ref 0.57–1)
DEPRECATED RDW RBC AUTO: 50.6 FL (ref 37–54)
EGFRCR SERPLBLD CKD-EPI 2021: 91.6 ML/MIN/1.73
EOSINOPHIL # BLD AUTO: 0.12 10*3/MM3 (ref 0–0.4)
EOSINOPHIL NFR BLD AUTO: 2.2 % (ref 0.3–6.2)
ERYTHROCYTE [DISTWIDTH] IN BLOOD BY AUTOMATED COUNT: 15.8 % (ref 12.3–15.4)
GLUCOSE BLDC GLUCOMTR-MCNC: 117 MG/DL (ref 70–99)
GLUCOSE BLDC GLUCOMTR-MCNC: 94 MG/DL (ref 70–99)
GLUCOSE SERPL-MCNC: 72 MG/DL (ref 65–99)
HCT VFR BLD AUTO: 23.7 % (ref 34–46.6)
HGB BLD-MCNC: 7.6 G/DL (ref 12–15.9)
IMM GRANULOCYTES # BLD AUTO: 0.02 10*3/MM3 (ref 0–0.05)
IMM GRANULOCYTES NFR BLD AUTO: 0.4 % (ref 0–0.5)
LYMPHOCYTES # BLD AUTO: 1.02 10*3/MM3 (ref 0.7–3.1)
LYMPHOCYTES NFR BLD AUTO: 19.1 % (ref 19.6–45.3)
MCH RBC QN AUTO: 28.4 PG (ref 26.6–33)
MCHC RBC AUTO-ENTMCNC: 32.1 G/DL (ref 31.5–35.7)
MCV RBC AUTO: 88.4 FL (ref 79–97)
MONOCYTES # BLD AUTO: 0.47 10*3/MM3 (ref 0.1–0.9)
MONOCYTES NFR BLD AUTO: 8.8 % (ref 5–12)
NEUTROPHILS NFR BLD AUTO: 3.69 10*3/MM3 (ref 1.7–7)
NEUTROPHILS NFR BLD AUTO: 68.9 % (ref 42.7–76)
NRBC BLD AUTO-RTO: 0 /100 WBC (ref 0–0.2)
PLATELET # BLD AUTO: 153 10*3/MM3 (ref 140–450)
PMV BLD AUTO: 10.9 FL (ref 6–12)
POTASSIUM SERPL-SCNC: 3.9 MMOL/L (ref 3.5–5.2)
RBC # BLD AUTO: 2.68 10*6/MM3 (ref 3.77–5.28)
SODIUM SERPL-SCNC: 139 MMOL/L (ref 136–145)
WBC NRBC COR # BLD: 5.35 10*3/MM3 (ref 3.4–10.8)

## 2022-03-11 PROCEDURE — 94799 UNLISTED PULMONARY SVC/PX: CPT

## 2022-03-11 PROCEDURE — 25010000002 HYDROMORPHONE 1 MG/ML SOLUTION: Performed by: SURGERY

## 2022-03-11 PROCEDURE — 85025 COMPLETE CBC W/AUTO DIFF WBC: CPT | Performed by: INTERNAL MEDICINE

## 2022-03-11 PROCEDURE — 80048 BASIC METABOLIC PNL TOTAL CA: CPT | Performed by: INTERNAL MEDICINE

## 2022-03-11 PROCEDURE — 82962 GLUCOSE BLOOD TEST: CPT

## 2022-03-11 RX ORDER — LEVOFLOXACIN 500 MG/1
500 TABLET, FILM COATED ORAL DAILY
Qty: 5 TABLET | Refills: 0 | Status: SHIPPED | OUTPATIENT
Start: 2022-03-11 | End: 2022-03-16

## 2022-03-11 RX ORDER — OXYCODONE AND ACETAMINOPHEN 10; 325 MG/1; MG/1
1 TABLET ORAL EVERY 6 HOURS
Qty: 40 TABLET | Refills: 0 | Status: SHIPPED | OUTPATIENT
Start: 2022-03-11 | End: 2022-03-21

## 2022-03-11 RX ORDER — NICOTINE 21 MG/24HR
1 PATCH, TRANSDERMAL 24 HOURS TRANSDERMAL
Qty: 30 PATCH | Refills: 0 | Status: SHIPPED | OUTPATIENT
Start: 2022-03-12 | End: 2022-04-11

## 2022-03-11 RX ORDER — ATORVASTATIN CALCIUM 10 MG/1
10 TABLET, FILM COATED ORAL DAILY
Qty: 30 TABLET | Refills: 0 | Status: SHIPPED | OUTPATIENT
Start: 2022-03-11 | End: 2022-04-10

## 2022-03-11 RX ORDER — CLOPIDOGREL BISULFATE 75 MG/1
75 TABLET ORAL DAILY
Qty: 30 TABLET | Refills: 0 | Status: SHIPPED | OUTPATIENT
Start: 2022-03-12 | End: 2022-04-11

## 2022-03-11 RX ORDER — ONDANSETRON 4 MG/1
4 TABLET, ORALLY DISINTEGRATING ORAL EVERY 8 HOURS PRN
Qty: 15 TABLET | Refills: 0 | Status: SHIPPED | OUTPATIENT
Start: 2022-03-11 | End: 2022-03-16

## 2022-03-11 RX ORDER — LEVOFLOXACIN 5 MG/ML
500 INJECTION, SOLUTION INTRAVENOUS EVERY 24 HOURS
Qty: 200 ML | Refills: 0 | Status: SHIPPED | OUTPATIENT
Start: 2022-03-11 | End: 2022-03-11 | Stop reason: HOSPADM

## 2022-03-11 RX ADMIN — POTASSIUM CHLORIDE 20 MEQ: 750 CAPSULE, EXTENDED RELEASE ORAL at 08:12

## 2022-03-11 RX ADMIN — OXYCODONE HYDROCHLORIDE AND ACETAMINOPHEN 1 TABLET: 10; 325 TABLET ORAL at 01:29

## 2022-03-11 RX ADMIN — CHOLESTYRAMINE 4 G: 4 POWDER, FOR SUSPENSION ORAL at 09:40

## 2022-03-11 RX ADMIN — OXYMETAZOLINE HYDROCHLORIDE 2 SPRAY: 0.05 SPRAY NASAL at 08:11

## 2022-03-11 RX ADMIN — TIZANIDINE 2 MG: 4 TABLET ORAL at 08:12

## 2022-03-11 RX ADMIN — NALOXEGOL OXALATE 12.5 MG: 12.5 TABLET, FILM COATED ORAL at 05:40

## 2022-03-11 RX ADMIN — HYDROMORPHONE HYDROCHLORIDE 0.5 MG: 1 INJECTION, SOLUTION INTRAMUSCULAR; INTRAVENOUS; SUBCUTANEOUS at 03:53

## 2022-03-11 RX ADMIN — BISOPROLOL FUMARATE 10 MG: 5 TABLET ORAL at 08:12

## 2022-03-11 RX ADMIN — OXYCODONE HYDROCHLORIDE AND ACETAMINOPHEN 1 TABLET: 10; 325 TABLET ORAL at 05:40

## 2022-03-11 RX ADMIN — FUROSEMIDE 80 MG: 80 TABLET ORAL at 08:12

## 2022-03-11 RX ADMIN — PANTOPRAZOLE SODIUM 40 MG: 40 TABLET, DELAYED RELEASE ORAL at 05:40

## 2022-03-11 RX ADMIN — ALPRAZOLAM 0.25 MG: 0.25 TABLET ORAL at 13:09

## 2022-03-11 RX ADMIN — NICOTINE 1 PATCH: 14 PATCH, EXTENDED RELEASE TRANSDERMAL at 08:11

## 2022-03-11 RX ADMIN — LOSARTAN POTASSIUM 100 MG: 50 TABLET, FILM COATED ORAL at 08:12

## 2022-03-11 RX ADMIN — HYDROMORPHONE HYDROCHLORIDE 0.5 MG: 1 INJECTION, SOLUTION INTRAMUSCULAR; INTRAVENOUS; SUBCUTANEOUS at 08:12

## 2022-03-11 RX ADMIN — CLOPIDOGREL BISULFATE 75 MG: 75 TABLET ORAL at 08:12

## 2022-03-11 RX ADMIN — SERTRALINE HYDROCHLORIDE 150 MG: 50 TABLET ORAL at 08:12

## 2022-03-11 RX ADMIN — OXYCODONE HYDROCHLORIDE AND ACETAMINOPHEN 1 TABLET: 10; 325 TABLET ORAL at 10:17

## 2022-03-11 RX ADMIN — HYDROMORPHONE HYDROCHLORIDE 0.5 MG: 1 INJECTION, SOLUTION INTRAMUSCULAR; INTRAVENOUS; SUBCUTANEOUS at 13:09

## 2022-03-11 RX ADMIN — IPRATROPIUM BROMIDE AND ALBUTEROL SULFATE 3 ML: 2.5; .5 SOLUTION RESPIRATORY (INHALATION) at 07:24

## 2022-03-11 NOTE — PLAN OF CARE
Goal Outcome Evaluation:      Patient alert and oriented.  VSS.  Patient with complaints of pain 10/10 at all times.  Not helped by medication.  Patient calls out for medication frequently.  Patient with tang pulled during day shift and had no issues with elimination this shift.  No other changes.

## 2022-03-11 NOTE — NURSING NOTE
Patient A&O x4, in pleasant mood, VSS. Pt c/o 1/10 pain, PRN diluadid and percocet given per eMAR. PRN xanax given once for anxiety. Pain meds given prior to pt d/c, pt instructed to wait 1 hour prior to leaving, pt compliant with waiting. Pt urinating well. Discharge orders placed, paperwork printed and given to pt. Home meds reviewed, next due doses discussed, pt denies questions/concerns. Educated on neuropathy, gangrene, s/s of wound infection, wound care, opioid administration and s/s of opioid overdose with pt and spouse. Both deny questions/concerns. IVs removed, tip intact, no issues. All belongings gathered and sent with pt and family.

## 2022-03-11 NOTE — DISCHARGE SUMMARY
[facility]         DISCHARGE SUMMARY    Patient Name: Italia Olvera  : 1960  MRN: [id number]    Date of Admission: 2022  Date of Discharge:   Primary Care Physician: Carloz Shoemaker MD    Consults     Date and Time Order Name Status Description    2022  5:28 PM Inpatient Vascular Surgery Consult Completed     2022  7:06 PM Inpatient Podiatry Consult Completed           Presenting Problem:   Osteomyelitis ([facility]) [M86.9]  Gangrene of toe of right foot ([facility]) [I96]    Active and Resolved Hospital Problems:  Active Hospital Problems    Diagnosis POA   • **Diabetes mellitus with peripheral vascular disease ([facility]) [E11.51] Yes   • Paroxysmal atrial fibrillation ([facility]) [I48.0] No   • Decubitus ulcer, unstageable ([facility]) [L89.95] Yes   • Anxiety disorder [F41.9] Yes   • Acute osteomyelitis of toe of right foot ([facility]) [M86.171] Yes   • Atherosclerosis of native artery of extremity ([facility]) [I70.209] Yes   • Peripheral neuropathy [G62.9] Yes   • T2DM (type 2 diabetes mellitus) (CMS/[facility])- uncontrolled [E11.9] Yes      Resolved Hospital Problems    Diagnosis POA   • Epistaxis [R04.0] No   • Constipation [K59.00] No   • Osteomyelitis ([facility]) [M86.9] Yes         Hospital Course     Hospital Course:  Italia Olvera is a 61 y.o. female admitted to hospital on  for infected toes.  Patient had right foot swelling and toes getting dark black.  Patient was already being treated as an outpatient.  Work-up in the ER revealed gangrenous toes with possible osteomyelitis.  Patient was admitted to hospital started on IV antibiotics and podiatry consult was done..  He was seen by Dr. Crowell.  Further he recommended vascular evaluation..  Vascular surgeon Dr. Russell saw patient and diagnosed with extensive peripheral vascular disease recommended revascularization surgery.  Meanwhile she was treated with IV antibiotic and local care.  Her sugars was closely monitored.  Patient remained  stable.  The patient underwent angiogram with right SFA stent placement on 3 March..  He was continued on anticoagulation with heparin.  She had episodes of epistaxis for which Dr. Edge who saw patient and packing was done on the right nostril.  Her bleeding stopped.  She was given transfusion of blood for anemia and blood loss.  After several days of treatment she is feeling better.  She still have gangrenous toes but we expect them to fall off with autoamputation, podiatry saw patient post surgery and recommended no intervention at this point.    I recommended her to go to inpatient rehab as she was extremely weak but she refused.  Her  also agreed that she needs to come home rather than going to rehab.  As patient stable she will be discharged home today with home health.  She was advised not to take Coumadin as she had bleeding and will continue to watch for next few days before starting.  She was started on Plavix and statins.  She was advised to follow-up with PCP next week and to follow-up with vascular surgeons as recommended.            DISCHARGE Follow Up Recommendations for labs and diagnostics:   Patient stable ready for discharge.  Advised to take medications as prescribed on discharge.  Recommended follow-up with consultants as advised.  Patient advised to return to ER if symptoms get worse.  Patient advised to follow-up with me/PCP post discharge in 1 week.      Day of Discharge     Vital Signs:  Temp:  [97 °F (36.1 °C)-98.6 °F (37 °C)] 97.3 °F (36.3 °C)  Heart Rate:  [55-67] 57  Resp:  [14-16] 16  BP: (118-159)/(41-64) 159/53    Physical Exam:    Elderly female not in acute distress.  Right nostril with packing, no bleeding  Heart regular and lungs clear, diminished breath sounds.  Abdomen obese and soft nontender.  Right foot with gangrenous toe second and fifth.  No significant edema    Pertinent  and/or Most Recent Results     LAB RESULTS:      Lab 03/11/22  0445 03/09/22  0535  03/08/22  0614 03/07/22  1134 03/06/22  0446   WBC 5.35 4.96  --  6.19 5.81   HEMOGLOBIN 7.6* 7.4* 7.4* 7.5* 7.7*   HEMATOCRIT 23.7* 22.9* 23.6* 24.3* 24.5*   PLATELETS 153 149  --  127* 113*   NEUTROS ABS 3.69 3.34  --  4.53 4.12   IMMATURE GRANS (ABS) 0.02 0.01  --  0.03 0.01   LYMPHS ABS 1.02 1.07  --  1.08 1.06   MONOS ABS 0.47 0.43  --  0.45 0.50   EOS ABS 0.12 0.10  --  0.08 0.10   MCV 88.4 86.4  --  90.0 89.4         Lab 03/11/22  0445 03/09/22  0535 03/06/22  0446   SODIUM 139 139 140   POTASSIUM 3.9 4.0 4.2   CHLORIDE 106 105 110*   CO2 24.1 24.0 19.8*   ANION GAP 8.9 10.0 10.2   BUN 28* 25* 26*   CREATININE 0.74 0.69 0.70   EGFR 91.6 98.3 97.9   GLUCOSE 72 65 93   CALCIUM 9.0 9.2 9.0   MAGNESIUM  --   --  2.0         Lab 03/09/22  0535 03/06/22  0446   TOTAL PROTEIN 7.7 6.8   ALBUMIN 3.20* 3.00*   GLOBULIN 4.5 3.8   ALT (SGPT) 9 8   AST (SGOT) 16 12   BILIRUBIN 0.5 0.4   ALK PHOS 104 98                 Lab 03/06/22  0446   IRON 15*   IRON SATURATION 6*   TIBC 250*   TRANSFERRIN 168*   FERRITIN 191.80*         Brief Urine Lab Results  (Last result in the past 365 days)      Color   Clarity   Blood   Leuk Est   Nitrite   Protein   CREAT   Urine HCG        11/28/21 1731 Dark Yellow   Turbid   Small (1+)   Large (3+)   Negative   100 mg/dL (2+)               Microbiology Results (last 10 days)     ** No results found for the last 240 hours. **          XR Foot 3+ View Right    Result Date: 2/21/2022  Impression:   1. Lower calf and foot soft tissue swelling.  Differential includes cellulitis, dependent edema and posttraumatic changes 2. No specific radiographic evidence of osteomyelitis.  Consider MRI if there is persistent clinical suspicion.      Willi Sierra M.D.       Electronically Signed and Approved By: Willi Sierra M.D. on 2/21/2022 at 19:48             CT Angio Abdominal Aorta Bilateral Iliofem Runoff    Result Date: 3/9/2022  Impression:   CT angiogram of the abdomen and pelvis with aortofemoral  and bilateral lower extremity runoff demonstrating a small amount of fluid in the subphrenic space and in the peritoneal space.  Diffuse anasarca.  Heterogeneous enhancement of the right kidney is nonspecific, and may be due to vascular compromise.  3.6 cm fusiform infrarenal abdominal aortic aneurysm.  Marked atheromatous irregularity of the pelvic arteries with mild to moderate stenoses.  The common femoral arteries are widely patent.  On the right, overlapping SFA grafts appear patent, and extend opacify the distal popliteal artery.  Single-vessel runoff can be followed to the level of the distal tibial metaphysis.  On the left, the superficial femoral artery is occluded proximally, and is reconstituted at the level of the abductor canal by collaterals from the profunda.  The popliteal artery is opacified, and obscured by streak artifact from knee prosthesis.  Two vessel runoff can be followed to the ankle.  13 cm x 6 cm mass in the anterior and medial upper left thigh may represent a hematoma.       LETY TONY MD       Electronically Signed and Approved By: LETY TONY MD on 3/09/2022 at 10:07             CT Angio Abdominal Aorta Bilateral Iliofem Runoff    Result Date: 2/25/2022  Impression:    1. Extensive atherosclerotic changes are seen.   2. On the right, no major arterial runoff is appreciated across the right ankle into the right foot.   3. Multiple moderate-to-high-grade arterial stenoses are suggested throughout a continuously patent right-sided femoral-popliteal arterial system.  The infrapopliteal arteries exhibit occlusion involving the right anterior and posterior tibial arteries with a small caliber right peroneal artery patent to the distal right calf.   4. Mild atherosclerotic stenoses are seen involving the right common iliac and the right external iliac arteries.  There is moderate-to-high-grade stenosis involving the [id number] right common femoral artery.  The proximal right deep  femoral artery is patent with probably moderate-to-severe atherosclerotic change proximally.  5. On the left, there is atherosclerotic change involving the left external and common iliac arteries with moderate stenosis of the origin of the left common iliac artery.  There is high-grade stenosis versus long- segment occlusion of the left superficial femoral artery (SFA) with distal reconstitution via deep femoral arterial collateral vessels involving the distal left SFA.  The left popliteal artery is patent proximally.  Its mid and distal aspects are obscured by a left knee prosthesis.  The left popliteal trifurcation is not well seen.  There are thought to be patent left anterior and peroneal arteries across the level of the left ankle into the left foot.  The left posterior tibial artery is thought to be occluded proximally.   6. There is an infrarenal abdominal aortic aneurysm measuring about 3.6 cm in greatest diameter without acute retroperitoneal hemorrhage.  Probably no hemodynamically significant aortic stenosis is suggested.  7. The right internal iliac artery is occluded at its origin.  8. There is proximal occlusion of the left internal iliac artery.  There may be fusiform aneurysmal dilatation of the proximal left internal iliac artery.   9. Please see above comments for further detail      JAILYN LEÓN JR, MD       Electronically Signed and Approved By: JAILYN LEÓN JR, MD on 2/25/2022 at 6:08             XR Chest 1 View    Result Date: 2/21/2022  Impression:  No acute cardiopulmonary disease is seen radiographically.      JAILYN LEÓN JR, MD       Electronically Signed and Approved By: JAILYN LEÓN JR, MD on 2/21/2022 at 23:04               Results for orders placed during the hospital encounter of 02/21/22    Doppler Arterial Multi Level Lower Extremity - Bilateral CAR    Interpretation Summary  · Right Conclusion: The right SHARON is moderately reduced. Severe digital ischemia.  · Left  Conclusion: The left SHARON is moderately reduced. Moderate digital ischemia.  · Right SHARON (0.74) is improved in comparison to results (0.48) noted on study dated 2/23/2022  · The left SHARON(0.67) is slightly worse in comparison to results (0.78) noted on study dated 2/23/2022.      Results for orders placed during the hospital encounter of 02/21/22    Doppler Arterial Multi Level Lower Extremity - Bilateral CAR    Interpretation Summary  · Right Conclusion: The right SHARON is moderately reduced. Severe digital ischemia.  · Left Conclusion: The left SHARON is moderately reduced. Moderate digital ischemia.  · Right SHARON (0.74) is improved in comparison to results (0.48) noted on study dated 2/23/2022  · The left SHARON(0.67) is slightly worse in comparison to results (0.78) noted on study dated 2/23/2022.      Results for orders placed during the hospital encounter of 06/14/21    Adult Transthoracic Echo Complete W/ Cont if Necessary Per Protocol    Interpretation Summary  · Left ventricular ejection fraction appears to be 51 - 55%.  · The left ventricular cavity is mildly dilated.  · Left ventricular wall thickness is consistent with moderate concentric hypertrophy.  · The right atrial cavity is mildly dilated.  · There is moderate calcification of the aortic valve.  · Estimated right ventricular systolic pressure from tricuspid regurgitation is normal (<35 mmHg).      Labs Pending at Discharge:        Discharge Details        Discharge Medications      New Medications      Instructions Start Date   atorvastatin 10 MG tablet  Commonly known as: Lipitor   10 mg, Oral, Daily      clopidogrel 75 MG tablet  Commonly known as: PLAVIX   75 mg, Oral, Daily   Start Date: March 12, 2022     levoFLOXacin 500 MG tablet  Commonly known as: Levaquin   500 mg, Oral, Daily      nicotine 14 MG/24HR patch  Commonly known as: NICODERM CQ   1 patch, Transdermal, Every 24 Hours Scheduled   Start Date: March 12, 2022     ondansetron ODT 4 MG  disintegrating tablet  Commonly known as: Zofran ODT   4 mg, Translingual, Every 8 Hours PRN         Changes to Medications      Instructions Start Date   oxyCODONE-acetaminophen  MG per tablet  Commonly known as: PERCOCET  What changed:   when to take this  reasons to take this   1 tablet, Oral, Every 6 Hours      sertraline 100 MG tablet  Commonly known as: ZOLOFT  What changed: Another medication with the same name was removed. Continue taking this medication, and follow the directions you see here.   100 mg, Oral, Daily, In addition to the 50 mg tablet to make 150 mg daily         Continue These Medications      Instructions Start Date   BISOPROLOL-HYDROCHLOROTHIAZIDE PO   10 mg, Oral, 2 times daily      cholestyramine light 4 g packet   4 g, Oral, Every 12 Hours Scheduled      furosemide 80 MG tablet  Commonly known as: LASIX   80 mg, Oral, 2 Times Daily      hydrOXYzine 10 MG/5ML syrup  Commonly known as: ATARAX   10 mg, Oral, 4 Times Daily      insulin detemir 100 UNIT/ML injection  Commonly known as: LEVEMIR   25 Units, Subcutaneous, Nightly      ipratropium-albuterol 0.5-2.5 mg/3 ml nebulizer  Commonly known as: DUO-NEB   3 mL, Nebulization, 4 Times Daily - RT      losartan 100 MG tablet  Commonly known as: COZAAR   100 mg, Oral, Daily      metFORMIN 500 MG tablet  Commonly known as: GLUCOPHAGE   500 mg, Oral, 2 times daily      O2  Commonly known as: OXYGEN   3 L/min, Inhalation, Nightly      ondansetron 4 MG tablet  Commonly known as: ZOFRAN   4 mg, Oral, Every 8 Hours PRN      pantoprazole 40 MG EC tablet  Commonly known as: PROTONIX   40 mg, Oral, Daily      potassium chloride 10 MEQ CR tablet   20 mEq, Oral, 2 Times Daily      silver sulfadiazine 1 % cream  Commonly known as: SILVADENE, SSD   1 application, Topical, 2 Times Daily, This is for a bed sore that is on her lower back per .      tiZANidine 4 MG tablet  Commonly known as: ZANAFLEX   4 mg, Oral, Every 8 Hours PRN      traZODone  150 MG tablet  Commonly known as: DESYREL   150 mg, Oral, Nightly         Stop These Medications    famotidine 20 MG tablet  Commonly known as: PEPCID     warfarin 2.5 MG tablet  Commonly known as: COUMADIN            No Known Allergies      Discharge Disposition:    Home or Self Care    Diet:    Heart healthy 1800 ADA    Discharge Activity:     Activity Instructions     Activity as Tolerated              No future appointments.    Additional Instructions for the Follow-ups that You Need to Schedule     Discharge Follow-up with PCP   As directed       Currently Documented PCP:    Carloz Shoemaker MD    PCP Phone Number:    [phone]     Follow Up Details: 1-2 weeks         Discharge Follow-up with Specified Provider: Dr Yoon; 1 Week   As directed      To: Dr Yoon    Follow Up: 1 Week               Time spent on Discharge including face to face service: 27 minutes.            I have dictated this note utilizing Dragon Dictation.             Please note that portions of this note were completed with a voice recognition program.             Part of this note may be an electronic transcription/translation of spoken language to printed text         using the Dragon Dictation System.       Electronically signed by Rudy Garnett MD, 03/11/22, 6:11 PM EST.

## 2022-03-12 ENCOUNTER — TRANSCRIBE ORDERS (OUTPATIENT)
Dept: HOME HEALTH SERVICES | Facility: HOME HEALTHCARE | Age: 62
End: 2022-03-12

## 2022-03-12 DIAGNOSIS — I96 GANGRENE: Primary | ICD-10-CM

## 2022-03-12 NOTE — OUTREACH NOTE
Prep Survey    Flowsheet Row Responses   [facility] facility patient discharged from? [facility]   Is LACE score < 7 ? No   Emergency Room discharge w/ pulse ox? No   Eligibility Readm Mgmt   Discharge diagnosis **Diabetes mellitus with peripheral vascular disease Paroxysmal atrial fibrillation  revascularization surgery   Does the patient have one of the following disease processes/diagnoses(primary or secondary)? General Surgery   Does the patient have Home health ordered? Yes   What is the Home health agency?  [facility]   Is there a DME ordered? No   Prep survey completed? Yes          BERTO SMART - Registered Nurse

## 2022-03-14 ENCOUNTER — HOME CARE VISIT (OUTPATIENT)
Dept: HOME HEALTH SERVICES | Facility: HOME HEALTHCARE | Age: 62
End: 2022-03-14

## 2022-03-14 ENCOUNTER — HOME CARE VISIT (OUTPATIENT)
Dept: HOME HEALTH SERVICES | Facility: CLINIC | Age: 62
End: 2022-03-14

## 2022-03-14 VITALS
RESPIRATION RATE: 18 BRPM | TEMPERATURE: 98.7 F | SYSTOLIC BLOOD PRESSURE: 140 MMHG | DIASTOLIC BLOOD PRESSURE: 60 MMHG | HEART RATE: 80 BPM | HEIGHT: 66 IN | BODY MASS INDEX: 23.81 KG/M2 | OXYGEN SATURATION: 99 %

## 2022-03-14 PROCEDURE — G0493 RN CARE EA 15 MIN HH/HOSPICE: HCPCS

## 2022-03-15 ENCOUNTER — READMISSION MANAGEMENT (OUTPATIENT)
Dept: CALL CENTER | Facility: HOSPITAL | Age: 62
End: 2022-03-15

## 2022-03-15 NOTE — CASE COMMUNICATION
[facility] – [facility]    Complete within 48 hours of return home or notice    Reason for hospitalization:Gangrene 2nd and 5th toe right foot, osteomylitis    [facility] oasis findings: Toes black to rt. foot/hard, swelling to upper left leg from stent placement, coccyx red    Change in primary dx/focus of care: monitor toes, stent site    Care plan changes needed:     Changes in team composition/visit frequency and schedule: SN     Based upo n record review and collaboration conference, the recommended frequency for this patient is:     SN-----2w2, 1wk3    PT-----    OT----    ST-----    HHA---    MSW---         Please note that above is a recommendation only and that the plan of care should reflect the patient's clinical needs and goals. If in agreement, please complete note. If a different frequency is necessary, please explain in note box and proceed per patients clinica l needs.

## 2022-03-15 NOTE — HOME HEALTH
61 yr. old female recently admitted for gangrene of 2nd and 5th toes to rt. foot with osteomylitis and stent placement. SN services are ordered for C/P assess, med teaching, skin assessment, monitor toes to rt. foot. No PT ordered at this time as pt. refuses. Pt. is homebound d/t very limited mobility and increased weakness. She is max assist with transfer to [facility] and is unable to ambulate further in the home at this time.     NA: Mrs. Olvera is lying in her chair when SN arrives. Her  is present. She is alert to person, place and situation. Lungs are clear and VS are wnl. She has constant generalized discomfort and more pain in her lt. leg since stent placement. Lt. upper leg has swelling and bruising where stent was placed. Second and fifth toe to rt. foot are hard and black. Pt. is aware per MD the toes will eventually fall off. She has 2+ swelling to her rt. foot without reddness. Groin is slightly red and she has a bruise to vaginal area. Pt. rolled onto her side with assist to assess coccyx. Previous diabetic wound site closed but pink and blancheable. Thick dry skin to surface. Area measures 2x1cm.  Around area is pink. SN discussed with pt. importance of repositioning off area to keep area from reopening up. She verbalizes understanding. Pt. can only stand with max assist to transfer to her B[facility] at this time. She is not walking in the home at all. She is very weak and does not want PT at this time. SN looked at all medication bottles and reviewed with pt. and spouse which meds she is to be taking. Pt. has a virtual appointment with Dr. Shoemaker scheduled for March 21st.

## 2022-03-15 NOTE — OUTREACH NOTE
General Surgery Week 1 Survey    Flowsheet Row Responses   [facility] facility patient discharged from? [facility]   Does the patient have one of the following disease processes/diagnoses(primary or secondary)? General Surgery   Week 1 attempt successful? No   Unsuccessful attempts Attempt 1          LATISHA Simpson Registered Nurse

## 2022-03-17 ENCOUNTER — HOME CARE VISIT (OUTPATIENT)
Dept: HOME HEALTH SERVICES | Facility: CLINIC | Age: 62
End: 2022-03-17

## 2022-03-17 VITALS
TEMPERATURE: 97.5 F | OXYGEN SATURATION: 95 % | DIASTOLIC BLOOD PRESSURE: 50 MMHG | HEART RATE: 80 BPM | RESPIRATION RATE: 18 BRPM | SYSTOLIC BLOOD PRESSURE: 108 MMHG

## 2022-03-17 PROCEDURE — G0299 HHS/HOSPICE OF RN EA 15 MIN: HCPCS

## 2022-03-18 ENCOUNTER — READMISSION MANAGEMENT (OUTPATIENT)
Dept: CALL CENTER | Facility: HOSPITAL | Age: 62
End: 2022-03-18

## 2022-03-18 NOTE — HOME HEALTH
Mrs. Olvera is lying in recliner when SN arrives. Her  answers the door. She is alert to person, place and situation. She has alot of pain today in her toes on rt. foot. 2nd and 5th digit on rt. foot continue hard and black. She has 2+ swelling to rt. foot. SN spoke with Dr. Sahara doss regarding pt. pain medication. Pt. is wanting more pain medication, per MD pt. was prescribed 140 pain pills before recent admission and needs to find them before he will prescribe anything. Pt. had Percocet bottle in home with date of 3/11/22. 40 pain pills were ordered and pt. only had 2 in bottle.  searched home for the old medication bottle but could not find it at this time. MD office is aware.  Pt. continues with edema and bruising to upper left leg from stent placement. Pt. rolled to her side with assist to assess coccyx. Previous diabetic ulcer is closed but pink and peeling. Area is blancheable at this time. Measures 1.5x1cm.  Per MD office optifoam to cover for protection. Optifoam applied. Pt. tolerated well.  and pt. advised that hospice had called them for a evaluation and didn't know what it was about. SN explained hospice and spoke with them on phone in home. Pt. request for hospice to come and talk to her about their services. No medication changes today. Next visit scheduled with pt. for next week. Pt. is homebound d/t very limited mobitily and endurance. She is max assist at this time.     Plan for next visit: Assess toes on rt. foot, assess area to coccyx and apply optifoam adhesive.

## 2022-03-18 NOTE — OUTREACH NOTE
General Surgery Week 1 Survey    Flowsheet Row Responses   [facility] [place], operated by [facility] patient discharged from? [facility]   Does the patient have one of the following disease processes/diagnoses(primary or secondary)? General Surgery   Week 1 attempt successful? Yes   Call start time 1042   Call end time 1058   List who call center can speak with pt   Meds reviewed with patient/caregiver? Yes   Is the patient having any side effects they believe may be caused by any medication additions or changes? No   Does the patient have all medications related to this admission filled (includes all antibiotics, pain medications, etc.) Yes   Prescription comments Pt needing more pain medication. Call into MD office.   Does the patient have a follow up appointment scheduled with their surgeon? No   What is preventing the patient from scheduling follow up appointments? Waiting on return call   Has the patient kept scheduled appointments due by today? N/A   What is the Home health agency?  [facility]   Has home health visited the patient within 72 hours of discharge? Yes   Home health comments  visiting 2 x per week.   Psychosocial issues? No   Did the patient receive a copy of their discharge instructions? Yes   Nursing interventions Reviewed instructions with patient   What is the patient's perception of their health status since discharge? Same   Is the patient /caregiver able to teach back basic post-op care? Drive as instructed by MD in discharge instructions, Take showers only when approved by MD-sponge bathe until then, No tub bath, swimming, or hot tub until instructed by MD, Keep incision areas clean,dry and protected, Do not remove steri-strips, Lifting as instructed by MD in discharge instructions   Is the patient/caregiver able to teach back signs and symptoms of incisional infection? Increased redness, swelling or pain at the incisonal site, Increased drainage or bleeding, Incisional warmth, Pus or odor from incision,  Fever   Is the patient/caregiver able to teach back steps to recovery at home? Set small, achievable goals for return to baseline health, Rest and rebuild strength, gradually increase activity   Is the patient/caregiver able to teach back the hierarchy of who to call/visit for symptoms/problems? PCP, Specialist, Home health nurse, Urgent Care, ED, 911 Yes   Week 1 call completed? Yes   Wrap up additional comments PT and spouse states pt in pain. Pt has exceeded number of pain pills with in this time frame. Pt has call into MD office. HH is visiting. Spouse participating in wound care daily. Pt will see surgeon one the affected toes have fallen off. Pt may be transitiong to hospice.          NOLAN LAMBERT - Registered Nurse

## 2022-03-21 ENCOUNTER — HOME CARE VISIT (OUTPATIENT)
Dept: HOME HEALTH SERVICES | Facility: CLINIC | Age: 62
End: 2022-03-21

## 2022-03-21 VITALS
TEMPERATURE: 96.6 F | RESPIRATION RATE: 18 BRPM | OXYGEN SATURATION: 97 % | SYSTOLIC BLOOD PRESSURE: 130 MMHG | HEART RATE: 60 BPM | DIASTOLIC BLOOD PRESSURE: 62 MMHG

## 2022-03-21 PROCEDURE — G0299 HHS/HOSPICE OF RN EA 15 MIN: HCPCS

## 2022-03-22 NOTE — HOME HEALTH
Mrs. Olvera is lying in the recliner when SN arrives. Her  is present. She is alert, oriented x3. She is forgetfull. Pt. advised she is still in alot of pain d/t her toes on right foot. Second and fifth digit continue hard and black. She is taking muscle relaxers at this time. Pt. did not find her bottle of 140 pain pills prescribed before her hospital admission. Lungs are clear and VS are wnl. 1+ edema to RLE. Pt. assisted to her side to assess coccyx. Continues pink and blancheable. Very thick dry skin to previous open wound. Area cleanse and dry dressing placed for protection. Pt. tolerated well. Edema and brusing to lt. upper leg continues from previous stent placement but is better today. No med changes at this time. Pt. advised her BS have been doing well and was 115 today. Pt. is homebound d/t max assist with ADL's at this time.     Plan for next visit: Assess toes to right foot, assess coccyx, change dressing. Med management.

## 2022-03-23 ENCOUNTER — HOME CARE VISIT (OUTPATIENT)
Dept: HOME HEALTH SERVICES | Facility: CLINIC | Age: 62
End: 2022-03-23

## 2022-03-23 PROCEDURE — G0299 HHS/HOSPICE OF RN EA 15 MIN: HCPCS

## 2022-03-24 VITALS
TEMPERATURE: 99 F | HEART RATE: 59 BPM | RESPIRATION RATE: 18 BRPM | DIASTOLIC BLOOD PRESSURE: 60 MMHG | OXYGEN SATURATION: 99 % | SYSTOLIC BLOOD PRESSURE: 120 MMHG

## 2022-03-24 NOTE — HOME HEALTH
Mrs. Olvera is lying in recliner when SN arrives. Her  is present and son. She is alert, oriented x3. Lungs are clear. VS are wnl. She has 2+ edema to right foot. LLE and Rt. lower leg 1+ edema with some reddness, no warmth. Pt. has starting levaquin for her foot per her . Medication bottle in the home, med list updated. 2nd and 5th toe to right foot continue hard and black. She has complaints of constant pain all over and more to her toes. Pt. has a new bottle of her Pain medication in the home from Dr. Shoemaker. Pt. rolled onto her side with assist to assess coccyx. Dressing intact. Soiled dressing removed. Area continues red but closed. Thick dry skin. Optifoam applied. Pt. tolerated well. MD has ordered a drug urine screen for today. Pt. assisted to BSC and urine obtained in specimen cup. Pt. assisted back to her chair. Her BS is doing well per pt. and  today was 118. Med list updated. Urine taken to lab. She continues homebound d/t max asssit with all ADL's.     Plan for next visit: Assess toes to right foot and coccyx. Assess for Reddness to BLE's.

## 2022-03-28 ENCOUNTER — READMISSION MANAGEMENT (OUTPATIENT)
Dept: CALL CENTER | Facility: HOSPITAL | Age: 62
End: 2022-03-28

## 2022-03-28 ENCOUNTER — HOME CARE VISIT (OUTPATIENT)
Dept: HOME HEALTH SERVICES | Facility: CLINIC | Age: 62
End: 2022-03-28

## 2022-03-28 VITALS
OXYGEN SATURATION: 97 % | RESPIRATION RATE: 18 BRPM | SYSTOLIC BLOOD PRESSURE: 120 MMHG | TEMPERATURE: 98.6 F | HEART RATE: 67 BPM | DIASTOLIC BLOOD PRESSURE: 60 MMHG

## 2022-03-28 PROCEDURE — G0299 HHS/HOSPICE OF RN EA 15 MIN: HCPCS

## 2022-03-28 NOTE — OUTREACH NOTE
General Surgery Week 2 Survey    Flowsheet Row Responses   [facility] patient discharged from? [facility]   Does the patient have one of the following disease processes/diagnoses(primary or secondary)? General Surgery   Week 2 attempt successful? Yes   Call start time 1007   Call end time 1008   Discharge diagnosis **Diabetes mellitus with peripheral vascular disease Paroxysmal atrial fibrillation  revascularization surgery   Meds reviewed with patient/caregiver? Yes   Is the patient taking all medications as directed (includes completed medication regime)? Yes   Has the patient kept scheduled appointments due by today? N/A   What is the patient's perception of their health status since discharge? Same   Week 2 call completed? Yes   Wrap up additional comments Pt states that the MD called in 7 days worth of pain med this am.  Doing the same.          LATISHA BROWNLEE - Registered Nurse

## 2022-03-29 NOTE — HOME HEALTH
Mrs. Olvera is lying in recliner when SN arrives. Her  is present. She is alert, oriented x3 with forgetfullness. Lungs are clear. VS are wnl. She has constant pain all over and more to toes on rt. foot. 2nd and fifth toe on her Rt. foot continues black and hard. Rt. foot today is swollen and red up her foot. No warmth noted. LLE has 2+ pitting edema. Pt. did take her lasix today. SN advised for pt. to keep taking her lasix as ordered. Pt. assisted to her side to assess skin. Previous wound to coccyx continues closed. Thick dry skin to area with some reddness. Optifoam adhesive applied for protection. Swelling to upper left leg is some better today, continues with brusing from stent placement.  called and left message with Jennyfer at Dr. Shoemaker office regarding pt. swelling and reddness to rt. foot. No med changes today. Pt. has two oral ABT left to take. Meds reviewed. Pt. is homebound d/t very limited mobility. She is max assist with all ADL's at this time.     Plan for next visit: Wound care to coccyx, Assess toes to rt. foot, reddness and edema.

## 2022-04-04 ENCOUNTER — HOME CARE VISIT (OUTPATIENT)
Dept: HOME HEALTH SERVICES | Facility: CLINIC | Age: 62
End: 2022-04-04

## 2022-04-04 PROCEDURE — G0299 HHS/HOSPICE OF RN EA 15 MIN: HCPCS

## 2022-04-05 ENCOUNTER — READMISSION MANAGEMENT (OUTPATIENT)
Dept: CALL CENTER | Facility: HOSPITAL | Age: 62
End: 2022-04-05

## 2022-04-05 VITALS
TEMPERATURE: 97.9 F | DIASTOLIC BLOOD PRESSURE: 68 MMHG | HEART RATE: 70 BPM | OXYGEN SATURATION: 97 % | RESPIRATION RATE: 18 BRPM | SYSTOLIC BLOOD PRESSURE: 148 MMHG

## 2022-04-05 NOTE — OUTREACH NOTE
General Surgery Week 3 Survey    Flowsheet Row Responses   [facility] patient discharged from? [facility]   Does the patient have one of the following disease processes/diagnoses(primary or secondary)? General Surgery   Week 3 attempt successful? Yes   Call start time 1732   Call end time 1741   Discharge diagnosis **Diabetes mellitus with peripheral vascular disease Paroxysmal atrial fibrillation  revascularization surgery   Is patient permission given to speak with other caregiver? Yes   List who call center can speak with Lloyd   Person spoke with today (if not patient) and relationship Lloyd   Meds reviewed with patient/caregiver? Yes   Is the patient taking all medications as directed (includes completed medication regime)? Yes   Has the patient kept scheduled appointments due by today? Yes   What is the patient's perception of their health status since discharge? Same   Week 3 call completed? Yes   Wrap up additional comments Doing ok.          LATISHA BROWNLEE - Registered Nurse

## 2022-04-05 NOTE — HOME HEALTH
Mrs. Olvera is sitting in recliner visiting with daughter when SN arrives. Shes alert, oriented x3. Lungs are clear and VS are wnl. She continues with chronic generalized discomfort. She has a full bottle of pain medication on her table. 2nd and 5th toe to right foot continues black and hard. She has 3+ edema to both BLE's. Toes on left foot are slightly red today, no warmth. Pt. seen Dr. Shoemaker last week and advised he seen her toes to left foot and didnt order any med. SN discussed to monitor toes and if warm and start hurting to lionel ECHEVARRIA. Previous stent area to Lt. upper leg continues with bruising swelling is better today. Pt. assisted to her side to assess coccyx. No open area. Continues red and thick dry skin. Optifoam adhesive placed for protection. She tolerated well. No med changes today. Her BS have been doing well she advised. She continues homebound d/t very limited mobility and weakness. SN discussed possible D/C for next week.    Plan for next visit: Assess toes to right foot and coccyx. Possible D/C visit.

## 2022-04-13 ENCOUNTER — HOME CARE VISIT (OUTPATIENT)
Dept: HOME HEALTH SERVICES | Facility: CLINIC | Age: 62
End: 2022-04-13

## 2022-04-13 ENCOUNTER — HOME CARE VISIT (OUTPATIENT)
Dept: HOME HEALTH SERVICES | Facility: HOME HEALTHCARE | Age: 62
End: 2022-04-13

## 2022-04-13 VITALS
SYSTOLIC BLOOD PRESSURE: 132 MMHG | OXYGEN SATURATION: 94 % | RESPIRATION RATE: 18 BRPM | TEMPERATURE: 98.7 F | HEART RATE: 67 BPM | DIASTOLIC BLOOD PRESSURE: 64 MMHG

## 2022-04-13 PROCEDURE — G0299 HHS/HOSPICE OF RN EA 15 MIN: HCPCS

## 2022-04-14 ENCOUNTER — READMISSION MANAGEMENT (OUTPATIENT)
Dept: CALL CENTER | Facility: HOSPITAL | Age: 62
End: 2022-04-14

## 2022-04-14 NOTE — CASE COMMUNICATION
[facility]- HUDDLE DISCHARGE    Complete day 51-58    Plan for discharge: Pt. discharged from home care on 4/13/22.     Barriers to discharge:    Ongoing needs: Monitor for S/S of infection and wound reopening to coccyx area.     Any referrals needed:     Any declines in outcomes: discuss and document reason:    Teaching needed prior to discharge: S/S of infection, notify MD of open wounds    Date of last visit by each discipline:4/13/22     Assign DC notice responsibility:    Assign [facility] discharge responsibility:

## 2022-04-14 NOTE — HOME HEALTH
Mrs. Olvera is sitting in her recliner when SN arrives. Her  is present. Pt. is alert, oriented x3. Advised she has been dealing with a cold. Lungs have some rhonci throughout that clears with cough. She continues to smoke in the home. She wears oxygen at nightime. VS are wnl. Pt. has hard knot area to Lt. elbow and to lt. side of abdomen that are sore to touch. She noticed the one in her stomach on Sunday. Upper left leg continues with slight edema from previous stent placement. Bruising is almost gone. She has 2+ edema to Both BLE's that look better today. 2nd and 5th digit to right foot are intact but continue black and hard. Toes to left foot are slightly red, no warmth. Pt. rolled to her side to assess skin. She has some reddness to her groin and underneath breast. She is using antifungal powder. Dressing removed from coccyx. No open area noted. Continues pink and thick dry skin. Optifoam placed for protection. Pt. tolerated well. SN reviewd meds with pt. Her BS have been doing well per pt./. Pt. is aware this is a discharge visit. Discharge instructions explained to pt./. She is to keep all follow up appointments with her PCP. Notify MD of any open wounds or signs/symptoms of infection. They verbalize understanding. Venipuncture performed to Lt. AC for labs today. She tolerated well. Discharge papers signed.     Dr. Shoemaker office aware of pt. discharge on 4/13/22. SN notifed office of knot areas to lt. elbow and abdomen. As well as toes to left foot continue red. Pt. also requesting refill of fungal powder for irritation under breast and groin.

## 2022-04-14 NOTE — OUTREACH NOTE
General Surgery Week 4 Survey    Flowsheet Row Responses   [facility] patient discharged from? [facility]   Does the patient have one of the following disease processes/diagnoses(primary or secondary)? General Surgery   Week 4 attempt successful? Yes   Call start time 1201   Call end time 1217   Discharge diagnosis **Diabetes mellitus with peripheral vascular disease Paroxysmal atrial fibrillation  revascularization surgery   Is patient permission given to speak with other caregiver? Yes   List who call center can speak with Lloyd   Person spoke with today (if not patient) and relationship Lloyd   Is the patient taking all medications as directed (includes completed medication regime)? Yes   Has the patient kept scheduled appointments due by today? Yes   Comments Has saw PCP   Is the patient still receiving Home Health Services? Yes   Psychosocial issues? No   What is the patient's perception of their health status since discharge? Improving   Nursing interventions Nurse provided patient education   Is the patient/caregiver able to teach back steps to recovery at home? Set small, achievable goals for return to baseline health, Rest and rebuild strength, gradually increase activity, Eat a well-balance diet   If the patient is a current smoker, are they able to teach back resources for cessation? Smoking cessation medications   Is the patient/caregiver able to teach back the hierarchy of who to call/visit for symptoms/problems? PCP, Specialist, Home health nurse, Urgent Care, ED, 911 Yes   Week 4 call completed? Yes   Would the patient like one additional call? No   Graduated Yes   Is the patient interested in additional calls from an ambulatory ?  NOTE:  applies to high risk patients requiring additional follow-up. No   Did the patient feel the follow up calls were helpful during their recovery period? Yes   Was the number of calls appropriate? Yes          YOEL SMART - Registered Nurse

## 2022-06-27 ENCOUNTER — TRANSCRIBE ORDERS (OUTPATIENT)
Dept: ADMINISTRATIVE | Facility: HOSPITAL | Age: 62
End: 2022-06-27

## 2022-06-27 DIAGNOSIS — Z12.31 SCREENING MAMMOGRAM, ENCOUNTER FOR: Primary | ICD-10-CM

## 2022-08-03 ENCOUNTER — APPOINTMENT (OUTPATIENT)
Dept: MAMMOGRAPHY | Facility: HOSPITAL | Age: 62
End: 2022-08-03

## 2022-09-13 ENCOUNTER — OFFICE VISIT (OUTPATIENT)
Dept: VASCULAR SURGERY | Facility: HOSPITAL | Age: 62
End: 2022-09-13

## 2022-09-13 VITALS
TEMPERATURE: 98.2 F | SYSTOLIC BLOOD PRESSURE: 190 MMHG | OXYGEN SATURATION: 93 % | HEART RATE: 83 BPM | DIASTOLIC BLOOD PRESSURE: 90 MMHG

## 2022-09-13 DIAGNOSIS — I71.40 ABDOMINAL AORTIC ANEURYSM (AAA) WITHOUT RUPTURE: ICD-10-CM

## 2022-09-13 DIAGNOSIS — E11.51 DIABETES MELLITUS WITH PERIPHERAL VASCULAR DISEASE: ICD-10-CM

## 2022-09-13 DIAGNOSIS — I70.261 ATHEROSCLEROSIS OF NATIVE ARTERY OF RIGHT LOWER EXTREMITY WITH GANGRENE: Primary | ICD-10-CM

## 2022-09-13 PROCEDURE — 99214 OFFICE O/P EST MOD 30 MIN: CPT | Performed by: NURSE PRACTITIONER

## 2022-09-13 PROCEDURE — G0463 HOSPITAL OUTPT CLINIC VISIT: HCPCS | Performed by: NURSE PRACTITIONER

## 2022-09-13 RX ORDER — OXYCODONE HYDROCHLORIDE AND ACETAMINOPHEN 5; 325 MG/1; MG/1
1 TABLET ORAL EVERY 6 HOURS PRN
Qty: 20 TABLET | Refills: 0 | Status: SHIPPED | OUTPATIENT
Start: 2022-09-13 | End: 2022-09-18

## 2022-09-13 RX ORDER — DOXYCYCLINE HYCLATE 100 MG/1
100 CAPSULE ORAL 2 TIMES DAILY
Qty: 14 CAPSULE | Refills: 0 | Status: SHIPPED | OUTPATIENT
Start: 2022-09-13 | End: 2022-09-20

## 2022-09-13 NOTE — PROGRESS NOTES
[facility] Vascular Surgery Office Follow Up Note     Date of Encounter: 09/13/2022     MRN Number: [id number]  Name: Italia Olvera  Phone Number: [phone]     Referred By: Carloz Shoemaker MD  PCP: Carloz Shoemaker MD    Chief Complaint:    Chief Complaint   Patient presents with   • Leg Swelling     Patient is here as a follow up s/p right leg angiogram. Patient had procedure in march of 2022. Patient has gangrene to three toes of the right foot. Right lower extremity noted for pedal and ankle edema. Patient states right leg is painful from toes to groin.        Subjective      History of Present Illness:    Italia Olvera is a 61 y.o. female presents with pain and gangrene to the toes of the right foot, second and fifth digit..  She was seen as an inpatient by Dr. Yoon and he performed a arteriogram with a right SFA stent and angioplasty of the peroneal popliteal and common femoral artery on 3/3/2022.  She has a chronically occluded left SFA as present on the CTA on 3/8/2022. She has a new ulcer on the left foot second digit.  She has seen podiatry and they referred her back to us before considering amputation of the digits on the right foot.  She rates her pain 8 out of 10 in both lower extremities.  She was told when she left the hospital that the telescope could possibly self amputate.  She is diabetic and blood sugars have been maintained under 150.  She is accompanied today by her  who has been helping her with wound care.    Review of Systems:  ROS  Review of Systems   Constitutional: Negative.   HENT: Negative.    Cardiovascular: Negative.    Respiratory: Negative.    Skin:  Dry gangrene of the second and fifth digit of right foot.  New ulcer on the third digit of the left foot.   Musculoskeletal: Negative.    Gastrointestinal: Negative.    Neurological: Negative.    Psychiatric/Behavioral: Negative.      I have reviewed the following portions of the patient's history:  allergies, current medications, past family history, past medical history, past social history, past surgical history and problem list and confirm it's accurate.    Allergies:  No Known Allergies    Medications:      Current Outpatient Medications:   •  BISOPROLOL-HYDROCHLOROTHIAZIDE PO, Take 10 mg by mouth 2 (two) times a day., Disp: , Rfl:   •  cholestyramine (QUESTRAN) 4 g packet, Take 1 packet by mouth 2 (Two) Times a Day With Meals., Disp: , Rfl:   •  furosemide (LASIX) 80 MG tablet, Take 80 mg by mouth 2 (Two) Times a Day., Disp: , Rfl:   •  hydrOXYzine (ATARAX) 10 MG/5ML syrup, Take 10 mg by mouth 4 (Four) Times a Day., Disp: , Rfl:   •  Insulin Detemir (LEVEMIR SC), Inject 25 Units under the skin into the appropriate area as directed Every Night., Disp: , Rfl:   •  ipratropium-albuterol (DUO-NEB) 0.5-2.5 mg/3 ml nebulizer, Take 3 mL by nebulization 4 (Four) Times a Day., Disp: , Rfl:   •  losartan (COZAAR) 100 MG tablet, Take 100 mg by mouth Daily., Disp: , Rfl:   •  metFORMIN (GLUCOPHAGE) 500 MG tablet, Take 500 mg by mouth 2 (two) times a day., Disp: , Rfl:   •  O2 (OXYGEN), Inhale 3 L/min Every Night., Disp: , Rfl:   •  ondansetron (ZOFRAN) 4 MG tablet, Take 4 mg by mouth Every 8 (Eight) Hours As Needed for Nausea or Vomiting., Disp: , Rfl:   •  pantoprazole (PROTONIX) 40 MG EC tablet, Take 40 mg by mouth Daily., Disp: , Rfl:   •  potassium chloride 10 MEQ CR tablet, Take 20 mEq by mouth 2 (Two) Times a Day., Disp: , Rfl:   •  sertraline (ZOLOFT) 100 MG tablet, Take 100 mg by mouth Daily. In addition to the 50 mg tablet to make 150 mg daily, Disp: , Rfl:   •  silver sulfadiazine (SILVADENE, SSD) 1 % cream, Apply 1 application topically to the appropriate area as directed 2 (Two) Times a Day. This is for a bed sore that is on her lower back per ., Disp: , Rfl:   •  temazepam (RESTORIL) 30 MG capsule, Take 30 mg by mouth At Night As Needed for Sleep., Disp: , Rfl:   •  tiZANidine (ZANAFLEX) 4 MG  tablet, Take 4 mg by mouth Every 8 (Eight) Hours As Needed for Muscle Spasms., Disp: , Rfl:   •  traZODone (DESYREL) 150 MG tablet, Take 150 mg by mouth Every Night., Disp: , Rfl:   •  cholestyramine light 4 g packet, Take 1 packet by mouth Every 12 (Twelve) Hours for 30 days., Disp: 60 packet, Rfl: 0  •  doxycycline (VIBRAMYCIN) 100 MG capsule, Take 1 capsule by mouth 2 (Two) Times a Day for 7 days., Disp: 14 capsule, Rfl: 0  •  insulin detemir (LEVEMIR) 100 UNIT/ML injection, Inject 25 Units under the skin into the appropriate area as directed Every Night for 30 days., Disp: 7.5 mL, Rfl: 0  •  ipratropium-albuterol (DUO-NEB) 0.5-2.5 mg/3 ml nebulizer, Take 3 mL by nebulization 4 (Four) Times a Day for 30 days., Disp: 360 mL, Rfl: 0  •  losartan (COZAAR) 100 MG tablet, Take 1 tablet by mouth Daily for 30 days., Disp: 30 tablet, Rfl: 0  •  oxyCODONE-acetaminophen (Percocet) 5-325 MG per tablet, Take 1 tablet by mouth Every 6 (Six) Hours As Needed for Severe Pain for up to 5 days., Disp: 20 tablet, Rfl: 0    History:   Past Medical History:   Diagnosis Date   • Acid reflux    • ACL tear 09/01/2015    PCL/ACL TEAR/RUPTURE   • Acute shoulder bursitis, right 08/23/2015   • Anxiety    • Arthritis    • Asthma    • Bipolar 1 disorder ([facility]ly [facility])     untreated   • Bladder disorder    • Cancer ([facility])    • CHF (congestive heart failure) (Formerly [facility])    • Chronic back pain    • COPD (chronic obstructive pulmonary disease) ([facility])    • Depression    • Diabetes mellitus ([facility])    • DJD (degenerative joint disease)    • GERD (gastroesophageal reflux disease)    • HBP (high blood pressure)    • Hip pain 09/15/2015   • Hypertension    • Knee injury 08/19/2015   • Knee pain, right 09/15/2015   • Limb swelling    • Neuropathy    • Osteoarthritis, knee 09/01/2015   • Osteoporosis    • Peripheral neuropathy    • Renal failure 1994   • Seasonal allergies    • Shoulder tendonitis 08/23/2015   • SOB (shortness of breath)    • Tendinitis of right rotator  cuff 08/23/2015       Past Surgical History:   Procedure Laterality Date   • APPENDECTOMY     • BACK SURGERY     • BLADDER REPAIR     • BRONCHOSCOPY N/A 7/10/2021    Procedure: BRONCHOSCOPY WITH BRONCHOALVEOLAR LAVAGE, POSSIBLE BIOPSY, BRUSHING, WASHING, AIRWAY INSPECTION;  Surgeon: Rodrigo Reyes MD;  Location: [facility] MAIN OR;  Service: Pulmonary;  Laterality: N/A;   • BRONCHOSCOPY N/A 7/10/2021    Procedure: BRONCHOSCOPY;  Surgeon: Rodrigo Reyes MD;  Location: [facility] ENDOSCOPY;  Service: Pulmonary;  Laterality: N/A;   • CHOLECYSTECTOMY     • ENDOSCOPY     • FRACTURE SURGERY     • GALLBLADDER SURGERY     • HERNIA REPAIR     • HYSTERECTOMY     • INTERVENTIONAL RADIOLOGY PROCEDURE Right 3/3/2022    Procedure: Abdominal Aortagram with Runoff;  Surgeon: Marleny Yoon MD;  Location: [facility] CATH INVASIVE LOCATION;  Service: Peripheral Vascular;  Laterality: Right;   • JOINT REPLACEMENT     • OTHER SURGICAL HISTORY      ARTIFICIAL JOINTS/LIMBS   • OTHER SURGICAL HISTORY      FACE SURGERY, UNSPECIFIED   • TOTAL HIP ARTHROPLASTY Right    • TOTAL KNEE ARTHROPLASTY Left        Social History     Socioeconomic History   • Marital status:    Tobacco Use   • Smoking status: Current Every Day Smoker     Packs/day: 1.00     Years: 50.00     Pack years: 50.00     Types: Cigarettes     Start date: 6/15/1979   • Smokeless tobacco: Never Used   • Tobacco comment: SMOKED FOR 31 PLUS YEARS   Vaping Use   • Vaping Use: Never used   Substance and Sexual Activity   • Alcohol use: Never   • Drug use: Never   • Sexual activity: Defer        Family History   Problem Relation Age of Onset   • Stroke Mother    • Throat cancer Mother    • Arthritis Mother    • Osteoporosis Mother    • Heart disease Father    • Breast cancer Sister    • Colon cancer Sister    • Lung cancer Sister    • Arthritis Sister    • Osteoporosis Sister    • Heart disease Brother    • Diabetes Brother    • Arthritis Brother    • Arthritis Daughter         Objective     Physical Exam:  Vitals:    09/13/22 1404   BP: (!) 190/90   BP Location: Right arm   Patient Position: Sitting   Cuff Size: Large Adult   Pulse: 83   Temp: 98.2 °F (36.8 °C)   TempSrc: Temporal   SpO2: 93%   PainSc:   8   PainLoc: Leg      There is no height or weight on file to calculate BMI.    Physical Exam  Physical Exam  Constitutional:       Appearance: Normal appearance.   HENT:      Head: Normocephalic.   Cardiovascular:      Rate and Rhythm: Normal rate.      Pulses: Normal pulses.      Comments: Right lower extremity: +2 Doppler detected posterior tibial, nonpalpable or Doppler detected dorsalis pedis pulse.  Left lower extremity: +2 Doppler detected dorsalis pedis and nondetectable posterior tibial.  Pulmonary:      Effort: Pulmonary effort is normal.   Musculoskeletal:         General: Normal range of motion.      Cervical back: Normal range of motion.   Skin:     General: Skin is warm and dry.      Capillary Refill: Capillary refill takes less than 2 seconds.      Comments: Right foot: dry gangrene to the second and fifth digit, erythema and edema.  Left foot  Neurological:      General: No focal deficit present.      Mental Status: Alert and oriented to person, place, and time.   Psychiatric:         Mood and Affect: Mood normal.         Behavior: Behavior normal.    Imaging/Labs:    Her last imaging was on 3/8/2022, she had a CTA of the abdomen and pelvis with runoff.  She has a occluded left SFA and a left SFA graft which appeared patent.  She also has a 3.6 cm fusiform infrarenal abdominal aortic aneurysm.        Assessment / Plan      Assessment / Plan:  Diagnoses and all orders for this visit:    1. 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  Dispense: 20 tablet; Refill: 0  -     doxycycline (VIBRAMYCIN) 100 MG capsule; Take 1 capsule by mouth 2 (Two) Times a Day for 7 days.  Dispense: 14 capsule; Refill: 0    2. Diabetes mellitus with peripheral vascular disease (HCC)    Ms. Olvera has dry gangrene to the second and fifth digit of the right foot with erythema and edema.  Although this is not new, the pain continues to increase and she now has a new ulcer on the second digit of the left foot.  She had a previous intervention performed Dr. Yoon in March 2022.  I recommend we obtain a CTA of the abdomen and pelvis with runoff and follow-up in the office.  I have prescribed an antibiotic along with pain medication.  Her family provider has been prescribing her pain medication however he is out of town and she has been out for 2 days.  Her pain is at an 8 out of 10 today.  I recommend she continue current wound care as directed by podiatry.  Her blood pressure was elevated today that she relates to the pain. Her family care provider is monitoring her blood pressure.     I have answered all their questions and she is in agreement with the plan at this time.    Thank you for allowing me to participate in your patient's care.    Patient Education: Smoking cessation denied at this time.        Follow Up:   No follow-ups on file.   Or sooner for any further concerns or worsening sign and symptoms. If unable to reach us in the office please dial 911 or go to the nearest emergency department.      Yanique Marquez APRBELGICA  [facility] Vascular Surgery

## 2022-10-13 ENCOUNTER — HOSPITAL ENCOUNTER (OUTPATIENT)
Dept: CT IMAGING | Facility: HOSPITAL | Age: 62
Discharge: HOME | End: 2022-10-13
Admitting: NURSE PRACTITIONER

## 2022-10-13 DIAGNOSIS — I70.261 ATHEROSCLEROSIS OF NATIVE ARTERY OF RIGHT LOWER EXTREMITY WITH GANGRENE: ICD-10-CM

## 2022-10-13 LAB
CREAT BLDA-MCNC: 0.6 MG/DL
EGFRCR SERPLBLD CKD-EPI 2021: 101.6 ML/MIN/1.73

## 2022-10-13 PROCEDURE — 0 IOPAMIDOL PER 1 ML: Performed by: NURSE PRACTITIONER

## 2022-10-13 PROCEDURE — 75635 CT ANGIO ABDOMINAL ARTERIES: CPT

## 2022-10-13 PROCEDURE — 82565 ASSAY OF CREATININE: CPT

## 2022-10-13 RX ADMIN — IOPAMIDOL 150 ML: 755 INJECTION, SOLUTION INTRAVENOUS at 14:01

## 2022-10-17 ENCOUNTER — OFFICE VISIT (OUTPATIENT)
Dept: VASCULAR SURGERY | Facility: HOSPITAL | Age: 62
End: 2022-10-17

## 2022-10-17 VITALS
HEART RATE: 92 BPM | TEMPERATURE: 99.2 F | SYSTOLIC BLOOD PRESSURE: 200 MMHG | RESPIRATION RATE: 18 BRPM | DIASTOLIC BLOOD PRESSURE: 82 MMHG | OXYGEN SATURATION: 98 %

## 2022-10-17 DIAGNOSIS — I70.261 ATHEROSCLEROSIS OF NATIVE ARTERY OF RIGHT LOWER EXTREMITY WITH GANGRENE: Primary | ICD-10-CM

## 2022-10-17 PROCEDURE — G0463 HOSPITAL OUTPT CLINIC VISIT: HCPCS | Performed by: SURGERY

## 2022-10-17 PROCEDURE — 99214 OFFICE O/P EST MOD 30 MIN: CPT | Performed by: SURGERY

## 2022-10-17 RX ORDER — ASPIRIN 81 MG/1
81 TABLET ORAL DAILY
Qty: 90 TABLET | Refills: 3 | Status: ON HOLD | OUTPATIENT
Start: 2022-10-17 | End: 2023-10-17

## 2022-10-17 NOTE — PROGRESS NOTES
[facility]   Follow up Office    Patient Name: Italia Olvera  : 1960  MRN: [id number]  Primary Care Physician:  Carloz Shoemaker MD      Subjective   Subjective     HPI:    Italia Olvera is a 61 y.o. female here for follow-up for peripheral vascular disease.  She has dry gangrene of the right second and fifth toes for several months.  She was told by podiatry to let them fall off.  She feels sick from it.  She was seen by our service and a CTA was ordered.  She returns in follow-up.  She previously underwent intervention by Dr. Yoon in 2022.      Objective     Vitals:   Temp:  [99.2 °F (37.3 °C)] 99.2 °F (37.3 °C)  Heart Rate:  [92] 92  Resp:  [18] 18  BP: (200)/(82) 200/82    Physical Exam      General: Alert, no acute distress.  Extremities: Symmetric.  Pulses: Nonpalpable right pedal pulses    Diagnostic studies: A CTA demonstrates diffuse disease with a patent right common femoral, superficial femoral and popliteal arteries with a single-vessel runoff via peroneal with an area of moderate to severe stenosis in the proximal segment.    Assessment & Plan   Assessment / Plan     Diagnoses and all orders for this visit:    1. 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  Dispense: 90 tablet; Refill: 3       Assessment/Plan:   Mrs. Olvera has dry gangrene of 2 toes in her right foot.  Her noninvasive studies seem to suggest that there is room for optimization.  Plan angiography with possible endovascular intervention.  I have discussed with the patient in detail the mechanics of the procedure,  the indications, benefits, risks, alternatives as well as potential complications to include but not limited to infection, bleeding, inability to cross the occlusion, vascular injury requiring surgical repair.  She appears to understand and desires to proceed.        Electronically signed by Gabino Russell MD, 10/17/22, 2:49 PM EDT.

## 2022-11-02 PROCEDURE — S0260 H&P FOR SURGERY: HCPCS | Performed by: SURGERY

## 2022-11-02 NOTE — H&P
[facility], [place], operated by [facility]   HISTORY AND PHYSICAL    Patient Name: Italia Olvera  : 1960  MRN: [id number]  Primary Care Physician:  Carloz Shoemaker MD  Date of admission: (Not on file)    Subjective   Subjective     Chief Complaint: Right foot gangrene    HPI:    Italia Olvera is a 61 y.o. female with dry gangrene of the right second and fifth toes.    Review of Systems    Non contributory except for the History of Present Illness    Personal History     Past Medical History:   Diagnosis Date   • Acid reflux    • ACL tear 2015    PCL/ACL TEAR/RUPTURE   • Acute shoulder bursitis, right 2015   • Anxiety    • Arthritis    • Asthma    • Bipolar 1 disorder ([facility])     untreated   • Bladder disorder    • Cancer ([facility])    • CHF (congestive heart failure) ([facility])    • Chronic back pain    • COPD (chronic obstructive pulmonary disease) ([facility])    • Depression    • Diabetes mellitus ([facility])    • DJD (degenerative joint disease)    • GERD (gastroesophageal reflux disease)    • HBP (high blood pressure)    • Hip pain 09/15/2015   • Hypertension    • Knee injury 2015   • Knee pain, right 09/15/2015   • Limb swelling    • Neuropathy    • Osteoarthritis, knee 2015   • Osteoporosis    • Peripheral neuropathy    • Renal failure    • Seasonal allergies    • Shoulder tendonitis 2015   • SOB (shortness of breath)    • Tendinitis of right rotator cuff 2015       Past Surgical History:   Procedure Laterality Date   • APPENDECTOMY     • BACK SURGERY     • BLADDER REPAIR     • BRONCHOSCOPY N/A 7/10/2021    Procedure: BRONCHOSCOPY WITH BRONCHOALVEOLAR LAVAGE, POSSIBLE BIOPSY, BRUSHING, WASHING, AIRWAY INSPECTION;  Surgeon: Rodrigo Reyes MD;  Location: [facility] MAIN OR;  Service: Pulmonary;  Laterality: N/A;   • BRONCHOSCOPY N/A 7/10/2021    Procedure: BRONCHOSCOPY;  Surgeon: Rodrigo Reyes MD;  Location: [facility] ENDOSCOPY;  Service: Pulmonary;  Laterality: N/A;   • CHOLECYSTECTOMY     • ENDOSCOPY     •  FRACTURE SURGERY     • GALLBLADDER SURGERY     • HERNIA REPAIR     • HYSTERECTOMY     • INTERVENTIONAL RADIOLOGY PROCEDURE Right 3/3/2022    Procedure: Abdominal Aortagram with Runoff;  Surgeon: Marleny Yoon MD;  Location: [facility] INVASIVE LOCATION;  Service: Peripheral Vascular;  Laterality: Right;   • JOINT REPLACEMENT     • OTHER SURGICAL HISTORY      ARTIFICIAL JOINTS/LIMBS   • OTHER SURGICAL HISTORY      FACE SURGERY, UNSPECIFIED   • TOTAL HIP ARTHROPLASTY Right    • TOTAL KNEE ARTHROPLASTY Left        Family History: family history includes Arthritis in her brother, daughter, mother, and sister; Breast cancer in her sister; Colon cancer in her sister; Diabetes in her brother; Heart disease in her brother and father; Lung cancer in her sister; Osteoporosis in her mother and sister; Stroke in her mother; Throat cancer in her mother. Otherwise pertinent FHx was reviewed and not pertinent to current issue.    Social History:  reports that she has been smoking cigarettes. She started smoking about 43 years ago. She has a 25.00 pack-year smoking history. She has never used smokeless tobacco. She reports that she does not drink alcohol and does not use drugs.    Home Medications:  No current facility-administered medications on file prior to encounter.     Current Outpatient Medications on File Prior to Encounter   Medication Sig   • aspirin 81 MG EC tablet Take 1 tablet by mouth Daily.   • BISOPROLOL-HYDROCHLOROTHIAZIDE PO Take 10 mg by mouth 2 (two) times a day.   • cholestyramine (QUESTRAN) 4 g packet Take 1 packet by mouth 2 (Two) Times a Day With Meals.   • cholestyramine light 4 g packet Take 1 packet by mouth Every 12 (Twelve) Hours for 30 days.   • furosemide (LASIX) 80 MG tablet Take 80 mg by mouth 2 (Two) Times a Day.   • hydrOXYzine (ATARAX) 10 MG/5ML syrup Take 10 mg by mouth 4 (Four) Times a Day.   • Insulin Detemir (LEVEMIR SC) Inject 25 Units under the skin into the appropriate area as  directed Every Night.   • insulin detemir (LEVEMIR) 100 UNIT/ML injection Inject 25 Units under the skin into the appropriate area as directed Every Night for 30 days.   • ipratropium-albuterol (DUO-NEB) 0.5-2.5 mg/3 ml nebulizer Take 3 mL by nebulization 4 (Four) Times a Day for 30 days.   • ipratropium-albuterol (DUO-NEB) 0.5-2.5 mg/3 ml nebulizer Take 3 mL by nebulization 4 (Four) Times a Day.   • losartan (COZAAR) 100 MG tablet Take 1 tablet by mouth Daily for 30 days.   • losartan (COZAAR) 100 MG tablet Take 100 mg by mouth Daily.   • metFORMIN (GLUCOPHAGE) 500 MG tablet Take 500 mg by mouth 2 (two) times a day.   • O2 (OXYGEN) Inhale 3 L/min Every Night.   • ondansetron (ZOFRAN) 4 MG tablet Take 4 mg by mouth Every 8 (Eight) Hours As Needed for Nausea or Vomiting.   • pantoprazole (PROTONIX) 40 MG EC tablet Take 40 mg by mouth Daily.   • potassium chloride 10 MEQ CR tablet Take 20 mEq by mouth 2 (Two) Times a Day.   • sertraline (ZOLOFT) 100 MG tablet Take 100 mg by mouth Daily. In addition to the 50 mg tablet to make 150 mg daily   • silver sulfadiazine (SILVADENE, SSD) 1 % cream Apply 1 application topically to the appropriate area as directed 2 (Two) Times a Day. This is for a bed sore that is on her lower back per .   • temazepam (RESTORIL) 30 MG capsule Take 30 mg by mouth At Night As Needed for Sleep.   • tiZANidine (ZANAFLEX) 4 MG tablet Take 4 mg by mouth Every 8 (Eight) Hours As Needed for Muscle Spasms.   • traZODone (DESYREL) 150 MG tablet Take 150 mg by mouth Every Night.          Allergies:  No Known Allergies    Objective   Objective     Vitals:        Physical Exam   General: Alert, no acute distress.  Neck: Supple  Heart: Regular rate  Lungs: Clear  Abdomen: Benign  Extremities: Symmetric  Pulses: Nonpalpable right pedal pulses.    Diagnostic studies:   A CTA demonstrates diffuse disease with a patent right common femoral, superficial femoral and popliteal arteries with a single-vessel  runoff via peroneal with an area of moderate to severe stenosis in the proximal segment.    Assessment & Plan   Assessment / Plan     Active Hospital Problems:  Active Hospital Problems    Diagnosis    • **Atherosclerosis of native artery of extremity (HCC)        Diagnoses and all orders for this visit:    1. 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 Olvera has dry gangrene of 2 toes in her right foot.  Her noninvasive studies seem to suggest that there is room for optimization.  Plan angiography with possible endovascular intervention.  I have discussed with the patient in detail the mechanics of the procedure, the indications, benefits, risks, alternatives as well as potential complications to include but not limited to infection, bleeding, inability to cross the occlusion, vascular injury requiring surgical repair.  She appears to understand and desires to proceed.      Electronically signed by Gabino Russell MD, 11/02/22, 1:19 PM EDT.

## 2022-11-03 ENCOUNTER — HOSPITAL ENCOUNTER (OUTPATIENT)
Facility: HOSPITAL | Age: 62
Setting detail: HOSPITAL OUTPATIENT SURGERY
Discharge: HOME | End: 2022-11-03
Attending: SURGERY | Admitting: SURGERY

## 2022-11-03 VITALS
HEART RATE: 84 BPM | OXYGEN SATURATION: 93 % | WEIGHT: 197.53 LBS | SYSTOLIC BLOOD PRESSURE: 148 MMHG | TEMPERATURE: 97.6 F | HEIGHT: 66 IN | DIASTOLIC BLOOD PRESSURE: 64 MMHG | RESPIRATION RATE: 18 BRPM | BODY MASS INDEX: 31.75 KG/M2

## 2022-11-03 DIAGNOSIS — I70.261 ATHEROSCLEROSIS OF NATIVE ARTERY OF RIGHT LOWER EXTREMITY WITH GANGRENE: ICD-10-CM

## 2022-11-03 LAB
ANION GAP SERPL CALCULATED.3IONS-SCNC: 6.5 MMOL/L (ref 5–15)
BASOPHILS # BLD AUTO: 0.01 10*3/MM3 (ref 0–0.2)
BASOPHILS NFR BLD AUTO: 0.2 % (ref 0–1.5)
BUN SERPL-MCNC: 15 MG/DL (ref 8–23)
BUN/CREAT SERPL: 22.1 (ref 7–25)
CALCIUM SPEC-SCNC: 8.7 MG/DL (ref 8.6–10.5)
CHLORIDE SERPL-SCNC: 103 MMOL/L (ref 98–107)
CO2 SERPL-SCNC: 29.5 MMOL/L (ref 22–29)
CREAT SERPL-MCNC: 0.68 MG/DL (ref 0.57–1)
DEPRECATED RDW RBC AUTO: 53.1 FL (ref 37–54)
EGFRCR SERPLBLD CKD-EPI 2021: 98.6 ML/MIN/1.73
EOSINOPHIL # BLD AUTO: 0.08 10*3/MM3 (ref 0–0.4)
EOSINOPHIL NFR BLD AUTO: 1.5 % (ref 0.3–6.2)
ERYTHROCYTE [DISTWIDTH] IN BLOOD BY AUTOMATED COUNT: 16.5 % (ref 12.3–15.4)
GLUCOSE SERPL-MCNC: 178 MG/DL (ref 65–99)
HCT VFR BLD AUTO: 35.3 % (ref 34–46.6)
HGB BLD-MCNC: 11 G/DL (ref 12–15.9)
IMM GRANULOCYTES # BLD AUTO: 0.01 10*3/MM3 (ref 0–0.05)
IMM GRANULOCYTES NFR BLD AUTO: 0.2 % (ref 0–0.5)
LYMPHOCYTES # BLD AUTO: 0.99 10*3/MM3 (ref 0.7–3.1)
LYMPHOCYTES NFR BLD AUTO: 18.3 % (ref 19.6–45.3)
MCH RBC QN AUTO: 27.8 PG (ref 26.6–33)
MCHC RBC AUTO-ENTMCNC: 31.2 G/DL (ref 31.5–35.7)
MCV RBC AUTO: 89.1 FL (ref 79–97)
MONOCYTES # BLD AUTO: 0.28 10*3/MM3 (ref 0.1–0.9)
MONOCYTES NFR BLD AUTO: 5.2 % (ref 5–12)
NEUTROPHILS NFR BLD AUTO: 4.05 10*3/MM3 (ref 1.7–7)
NEUTROPHILS NFR BLD AUTO: 74.6 % (ref 42.7–76)
NRBC BLD AUTO-RTO: 0 /100 WBC (ref 0–0.2)
PLATELET # BLD AUTO: 98 10*3/MM3 (ref 140–450)
PMV BLD AUTO: 11.5 FL (ref 6–12)
POTASSIUM SERPL-SCNC: 4.2 MMOL/L (ref 3.5–5.2)
RBC # BLD AUTO: 3.96 10*6/MM3 (ref 3.77–5.28)
SODIUM SERPL-SCNC: 139 MMOL/L (ref 136–145)
WBC NRBC COR # BLD: 5.42 10*3/MM3 (ref 3.4–10.8)

## 2022-11-03 PROCEDURE — 25010000002 CEFAZOLIN IN DEXTROSE 2-4 GM/100ML-% SOLUTION: Performed by: SURGERY

## 2022-11-03 PROCEDURE — C1769 GUIDE WIRE: HCPCS | Performed by: SURGERY

## 2022-11-03 PROCEDURE — 25010000002 HEPARIN (PORCINE) PER 1000 UNITS: Performed by: SURGERY

## 2022-11-03 PROCEDURE — 75710 ARTERY X-RAYS ARM/LEG: CPT | Performed by: SURGERY

## 2022-11-03 PROCEDURE — C1725 CATH, TRANSLUMIN NON-LASER: HCPCS | Performed by: SURGERY

## 2022-11-03 PROCEDURE — 0 IOPAMIDOL PER 1 ML: Performed by: SURGERY

## 2022-11-03 PROCEDURE — 37228 PR REVSC OPN/PRQ TIB/PERO W/ANGIOPLASTY UNI: CPT | Performed by: SURGERY

## 2022-11-03 PROCEDURE — C1894 INTRO/SHEATH, NON-LASER: HCPCS | Performed by: SURGERY

## 2022-11-03 PROCEDURE — 37224 PR REVSC OPN/PRG FEM/POP W/ANGIOPLASTY UNI: CPT | Performed by: SURGERY

## 2022-11-03 PROCEDURE — C1887 CATHETER, GUIDING: HCPCS | Performed by: SURGERY

## 2022-11-03 PROCEDURE — C1760 CLOSURE DEV, VASC: HCPCS | Performed by: SURGERY

## 2022-11-03 PROCEDURE — 75625 CONTRAST EXAM ABDOMINL AORTA: CPT | Performed by: SURGERY

## 2022-11-03 PROCEDURE — 25010000002 FENTANYL CITRATE (PF) 50 MCG/ML SOLUTION: Performed by: SURGERY

## 2022-11-03 PROCEDURE — 99153 MOD SED SAME PHYS/QHP EA: CPT

## 2022-11-03 PROCEDURE — 25010000002 HYDRALAZINE PER 20 MG: Performed by: SURGERY

## 2022-11-03 PROCEDURE — 85025 COMPLETE CBC W/AUTO DIFF WBC: CPT | Performed by: SURGERY

## 2022-11-03 PROCEDURE — 25010000002 MIDAZOLAM PER 1 MG: Performed by: SURGERY

## 2022-11-03 PROCEDURE — 80048 BASIC METABOLIC PNL TOTAL CA: CPT | Performed by: SURGERY

## 2022-11-03 PROCEDURE — 99152 MOD SED SAME PHYS/QHP 5/>YRS: CPT

## 2022-11-03 RX ORDER — SODIUM CHLORIDE 9 MG/ML
75 INJECTION, SOLUTION INTRAVENOUS CONTINUOUS
Status: DISCONTINUED | OUTPATIENT
Start: 2022-11-03 | End: 2022-11-03 | Stop reason: HOSPADM

## 2022-11-03 RX ORDER — FENTANYL CITRATE 50 UG/ML
INJECTION, SOLUTION INTRAMUSCULAR; INTRAVENOUS
Status: DISCONTINUED | OUTPATIENT
Start: 2022-11-03 | End: 2022-11-03 | Stop reason: HOSPADM

## 2022-11-03 RX ORDER — HYDRALAZINE HYDROCHLORIDE 20 MG/ML
INJECTION INTRAMUSCULAR; INTRAVENOUS
Status: DISCONTINUED | OUTPATIENT
Start: 2022-11-03 | End: 2022-11-03 | Stop reason: HOSPADM

## 2022-11-03 RX ORDER — CLOPIDOGREL BISULFATE 75 MG/1
75 TABLET ORAL DAILY
Status: ON HOLD | COMMUNITY

## 2022-11-03 RX ORDER — CEFAZOLIN SODIUM 2 G/100ML
2 INJECTION, SOLUTION INTRAVENOUS ONCE
Status: COMPLETED | OUTPATIENT
Start: 2022-11-03 | End: 2022-11-03

## 2022-11-03 RX ORDER — MIDAZOLAM HYDROCHLORIDE 1 MG/ML
INJECTION INTRAMUSCULAR; INTRAVENOUS
Status: DISCONTINUED | OUTPATIENT
Start: 2022-11-03 | End: 2022-11-03 | Stop reason: HOSPADM

## 2022-11-03 RX ORDER — ENOXAPARIN SODIUM 100 MG/ML
40 INJECTION SUBCUTANEOUS DAILY
Status: DISCONTINUED | OUTPATIENT
Start: 2022-11-04 | End: 2022-11-03 | Stop reason: HOSPADM

## 2022-11-03 RX ORDER — LIDOCAINE HYDROCHLORIDE 20 MG/ML
INJECTION, SOLUTION INFILTRATION; PERINEURAL
Status: DISCONTINUED | OUTPATIENT
Start: 2022-11-03 | End: 2022-11-03 | Stop reason: HOSPADM

## 2022-11-03 RX ORDER — HEPARIN SODIUM 1000 [USP'U]/ML
INJECTION, SOLUTION INTRAVENOUS; SUBCUTANEOUS
Status: DISCONTINUED | OUTPATIENT
Start: 2022-11-03 | End: 2022-11-03 | Stop reason: HOSPADM

## 2022-11-03 RX ORDER — OXYCODONE HYDROCHLORIDE AND ACETAMINOPHEN 5; 325 MG/1; MG/1
1 TABLET ORAL ONCE
Status: COMPLETED | OUTPATIENT
Start: 2022-11-03 | End: 2022-11-03

## 2022-11-03 RX ADMIN — OXYCODONE HYDROCHLORIDE AND ACETAMINOPHEN 1 TABLET: 5; 325 TABLET ORAL at 10:38

## 2022-11-03 RX ADMIN — CEFAZOLIN SODIUM 2 G: 2 INJECTION, SOLUTION INTRAVENOUS at 08:11

## 2022-11-03 NOTE — PROGRESS NOTES
Angiogram performed.  Successful angioplasty of the right peroneal and popliteal arteries.  For details find full report under chart review, cardiology tab.

## 2022-11-03 NOTE — DISCHARGE INSTRUCTIONS
May remove dressing in 1 day and wash area.  Follow-up in the office in 2 weeks, call for appointment.  Resume home diet.  Resume home medications.  No lifting greater than 15 pounds for 3 days.  Hold metformin for 2 days, may resume on Saturday, 11/5/2022.  
ambulatory

## 2022-11-23 ENCOUNTER — OFFICE VISIT (OUTPATIENT)
Dept: VASCULAR SURGERY | Facility: HOSPITAL | Age: 62
End: 2022-11-23

## 2022-11-23 VITALS
OXYGEN SATURATION: 94 % | TEMPERATURE: 97.8 F | HEART RATE: 78 BPM | DIASTOLIC BLOOD PRESSURE: 60 MMHG | RESPIRATION RATE: 16 BRPM | SYSTOLIC BLOOD PRESSURE: 158 MMHG

## 2022-11-23 DIAGNOSIS — I70.261 ATHEROSCLEROSIS OF NATIVE ARTERY OF RIGHT LOWER EXTREMITY WITH GANGRENE: Primary | ICD-10-CM

## 2022-11-23 PROCEDURE — 99214 OFFICE O/P EST MOD 30 MIN: CPT | Performed by: SURGERY

## 2022-11-23 PROCEDURE — G0463 HOSPITAL OUTPT CLINIC VISIT: HCPCS | Performed by: SURGERY

## 2022-11-23 NOTE — PROGRESS NOTES
[facility]   Follow up Office    Patient Name: Italia Olvera  : 1960  MRN: [id number]  Primary Care Physician:  Carloz Shoemaker MD      Subjective   Subjective     HPI:    Italia Olvera is a 62 y.o. female here for follow-up after angiography with endovascular intervention of the right lower extremity.  Still having pain at the necrotic toes and a little bit in the dorsum of the foot but no other complaints.      Objective     Vitals:   Temp:  [97.8 °F (36.6 °C)] 97.8 °F (36.6 °C)  Heart Rate:  [78] 78  Resp:  [16] 16  BP: (158)/(60) 158/60    Physical Exam      General: Alert, no acute distress  Right foot: Warm, mild edema.  Dry necrosis of the second and fifth toes.  Pulses: Strong signal over the peroneal artery, mild to moderate over the posterior tibialis.    Assessment & Plan   Assessment / Plan     Diagnoses and all orders for this visit:    1. 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 Olvera has 2 necrotic toes in the right foot.  Her recent endovascular intervention appears to be patent at this time.  The plan is for amputation of the right second and fifth toes.  I have discussed with her in detail the mechanics of the procedure, the indications, benefits, risks, alternatives, as well as potential complications to include but not limited to infection, bleeding, hematoma, transfusion, reoperation.  She appears to understand and desires to proceed.  The plan will be for her to be admitted after the  procedure.        Electronically signed by Gabino Russell MD, 11/23/22, 1:56 PM EST.

## 2022-11-29 ENCOUNTER — PRE-ADMISSION TESTING (OUTPATIENT)
Dept: PREADMISSION TESTING | Facility: HOSPITAL | Age: 62
End: 2022-11-29

## 2022-11-29 VITALS
RESPIRATION RATE: 16 BRPM | HEIGHT: 66 IN | WEIGHT: 155 LBS | DIASTOLIC BLOOD PRESSURE: 74 MMHG | HEART RATE: 93 BPM | SYSTOLIC BLOOD PRESSURE: 136 MMHG | BODY MASS INDEX: 24.91 KG/M2 | OXYGEN SATURATION: 91 % | TEMPERATURE: 97.4 F

## 2022-11-29 DIAGNOSIS — I70.261 ATHEROSCLEROSIS OF NATIVE ARTERY OF RIGHT LOWER EXTREMITY WITH GANGRENE: ICD-10-CM

## 2022-11-29 LAB
ANION GAP SERPL CALCULATED.3IONS-SCNC: 10 MMOL/L (ref 5–15)
BASOPHILS # BLD AUTO: 0.03 10*3/MM3 (ref 0–0.2)
BASOPHILS NFR BLD AUTO: 0.4 % (ref 0–1.5)
BUN SERPL-MCNC: 12 MG/DL (ref 8–23)
BUN/CREAT SERPL: 20.7 (ref 7–25)
CALCIUM SPEC-SCNC: 8.8 MG/DL (ref 8.6–10.5)
CHLORIDE SERPL-SCNC: 103 MMOL/L (ref 98–107)
CO2 SERPL-SCNC: 25 MMOL/L (ref 22–29)
CREAT SERPL-MCNC: 0.58 MG/DL (ref 0.57–1)
DEPRECATED RDW RBC AUTO: 52 FL (ref 37–54)
EGFRCR SERPLBLD CKD-EPI 2021: 101.8 ML/MIN/1.73
EOSINOPHIL # BLD AUTO: 0.12 10*3/MM3 (ref 0–0.4)
EOSINOPHIL NFR BLD AUTO: 1.7 % (ref 0.3–6.2)
ERYTHROCYTE [DISTWIDTH] IN BLOOD BY AUTOMATED COUNT: 16.6 % (ref 12.3–15.4)
GLUCOSE SERPL-MCNC: 141 MG/DL (ref 65–99)
HCT VFR BLD AUTO: 38 % (ref 34–46.6)
HGB BLD-MCNC: 12 G/DL (ref 12–15.9)
IMM GRANULOCYTES # BLD AUTO: 0.01 10*3/MM3 (ref 0–0.05)
IMM GRANULOCYTES NFR BLD AUTO: 0.1 % (ref 0–0.5)
LYMPHOCYTES # BLD AUTO: 1.24 10*3/MM3 (ref 0.7–3.1)
LYMPHOCYTES NFR BLD AUTO: 17.2 % (ref 19.6–45.3)
MCH RBC QN AUTO: 27.1 PG (ref 26.6–33)
MCHC RBC AUTO-ENTMCNC: 31.6 G/DL (ref 31.5–35.7)
MCV RBC AUTO: 86 FL (ref 79–97)
MONOCYTES # BLD AUTO: 0.31 10*3/MM3 (ref 0.1–0.9)
MONOCYTES NFR BLD AUTO: 4.3 % (ref 5–12)
NEUTROPHILS NFR BLD AUTO: 5.49 10*3/MM3 (ref 1.7–7)
NEUTROPHILS NFR BLD AUTO: 76.3 % (ref 42.7–76)
NRBC BLD AUTO-RTO: 0 /100 WBC (ref 0–0.2)
PLATELET # BLD AUTO: 126 10*3/MM3 (ref 140–450)
PMV BLD AUTO: 10.4 FL (ref 6–12)
POTASSIUM SERPL-SCNC: 4.2 MMOL/L (ref 3.5–5.2)
RBC # BLD AUTO: 4.42 10*6/MM3 (ref 3.77–5.28)
SODIUM SERPL-SCNC: 138 MMOL/L (ref 136–145)
WBC NRBC COR # BLD: 7.2 10*3/MM3 (ref 3.4–10.8)

## 2022-11-29 PROCEDURE — 85025 COMPLETE CBC W/AUTO DIFF WBC: CPT

## 2022-11-29 PROCEDURE — 80048 BASIC METABOLIC PNL TOTAL CA: CPT

## 2022-11-29 PROCEDURE — 36415 COLL VENOUS BLD VENIPUNCTURE: CPT

## 2022-11-29 RX ORDER — OXYCODONE AND ACETAMINOPHEN 10; 325 MG/1; MG/1
1 TABLET ORAL EVERY 4 HOURS PRN
Status: ON HOLD | COMMUNITY
Start: 2022-11-18 | End: 2022-12-06 | Stop reason: SDUPTHER

## 2022-11-29 RX ORDER — FERROUS SULFATE TAB EC 324 MG (65 MG FE EQUIVALENT) 324 (65 FE) MG
324 TABLET DELAYED RESPONSE ORAL EVERY OTHER DAY
Status: ON HOLD | COMMUNITY
End: 2023-03-08

## 2022-11-29 RX ORDER — MIRTAZAPINE 30 MG/1
30 TABLET, FILM COATED ORAL NIGHTLY
Status: ON HOLD | COMMUNITY
End: 2023-02-04

## 2022-11-29 RX ORDER — FUROSEMIDE 80 MG
80 TABLET ORAL EVERY OTHER DAY
Status: ON HOLD | COMMUNITY

## 2022-11-29 RX ORDER — DOXEPIN HYDROCHLORIDE 25 MG/1
25 CAPSULE ORAL
Status: ON HOLD | COMMUNITY
Start: 2022-11-17 | End: 2023-02-04

## 2022-11-29 RX ORDER — AMLODIPINE BESYLATE AND BENAZEPRIL HYDROCHLORIDE 10; 40 MG/1; MG/1
1 CAPSULE ORAL DAILY
Status: ON HOLD | COMMUNITY

## 2022-11-29 RX ORDER — NORTRIPTYLINE HYDROCHLORIDE 25 MG/1
25 CAPSULE ORAL NIGHTLY
Status: ON HOLD | COMMUNITY
End: 2023-02-04

## 2022-11-29 RX ORDER — ATORVASTATIN CALCIUM 10 MG/1
10 TABLET, FILM COATED ORAL DAILY
Status: ON HOLD | COMMUNITY
End: 2023-02-04

## 2022-11-29 RX ORDER — THIAMINE HCL 100 MG
2500 TABLET ORAL DAILY
Status: ON HOLD | COMMUNITY
End: 2023-03-08

## 2022-12-01 ENCOUNTER — ANESTHESIA EVENT (OUTPATIENT)
Dept: PERIOP | Facility: HOSPITAL | Age: 62
End: 2022-12-01

## 2022-12-02 NOTE — NURSING NOTE
----- Message from Waleska Mayorga MD sent at 12/1/2022  5:30 PM CST -----  Notify patient her preop labs are normal. Few white cells in urine.  Patient cleared for surgery   Dr. Solano contacted re nose bleed that started on 1745, orders obtained to give afrin 2 sprays to rt nare and to hold pressure and tilt head forward. Dr. Solano placed a nasal pore in rt nare to stop bleeding at 1850.

## 2022-12-05 PROCEDURE — S0260 H&P FOR SURGERY: HCPCS | Performed by: SURGERY

## 2022-12-05 NOTE — H&P
[facility]   HISTORY AND PHYSICAL    Patient Name: Italia Olvera  : 1960  MRN: [id number]  Primary Care Physician:  Carloz Shoemaker MD  Date of admission: (Not on file)    Subjective   Subjective     Chief Complaint: Necrotic right second and fifth toes    HPI:    Italia Olvera is a 62 y.o. female with necrotic right second and fifth toes.  At this time for amputation.    Review of Systems    Non contributory except for the History of Present Illness    Personal History     Past Medical History:   Diagnosis Date   • Acid reflux    • ACL tear 2015    PCL/ACL TEAR/RUPTURE   • Acute shoulder bursitis, right 2015   • Anxiety    • Arthritis    • Asthma    • Bipolar 1 disorder (HCC)     untreated   • Bladder disorder    • Cancer ([facility])     CERVICAL CANCER   • CHF (congestive heart failure) ([facility])     ASYMPTOMATIC- NO CARDIOLOGIST- SEE'S DR SHOEMAKER (PCP)- DENIED CP/SOB   • Chronic back pain    • COPD (chronic obstructive pulmonary disease) ([facility])     USES INHALERS   • Depression    • Diabetes mellitus ([facility])     BG RUND AROUND 140 IN AM   • DJD (degenerative joint disease)    • Gangrene (HCC)     RT SECOND AND FIFTH TOES   • GERD (gastroesophageal reflux disease)    • HBP (high blood pressure)    • Hip pain 09/15/2015   • Hypertension    • Knee injury 2015   • Knee pain, right 09/15/2015   • Limb swelling    • Neuropathy    • Osteoarthritis, knee 2015   • Osteoporosis    • Peripheral neuropathy    • Renal failure 1994    NO CURRENT PROBLEMS   • Seasonal allergies    • Shoulder tendonitis 2015   • Sleep apnea    • SOB (shortness of breath)     NONE CURENTLY   • Tendinitis of right rotator cuff 2015       Past Surgical History:   Procedure Laterality Date   • BACK SURGERY     • BLADDER REPAIR     • BRONCHOSCOPY N/A 07/10/2021    Procedure: BRONCHOSCOPY WITH BRONCHOALVEOLAR LAVAGE, POSSIBLE BIOPSY, BRUSHING, WASHING, AIRWAY INSPECTION;  Surgeon: Rodrigo Reyes MD;  Location:  [facility] MAIN OR;  Service: Pulmonary;  Laterality: N/A;   • BRONCHOSCOPY N/A 07/10/2021    Procedure: BRONCHOSCOPY;  Surgeon: Rodrigo Reeys MD;  Location: [facility] ENDOSCOPY;  Service: Pulmonary;  Laterality: N/A;   • CARDIAC CATHETERIZATION Right 11/03/2022    Procedure: Aortogram with right leg angiogram, possible angioplasty or stenting ;  Surgeon: Gabino Russell MD;  Location: [facility] CATH INVASIVE LOCATION;  Service: Vascular;  Laterality: Right;   • CHOLECYSTECTOMY     • ENDOSCOPY     • FRACTURE SURGERY      SKULL FRACTURE   • GALLBLADDER SURGERY     • HERNIA REPAIR     • HYSTERECTOMY     • INTERVENTIONAL RADIOLOGY PROCEDURE Right 03/03/2022    Procedure: Abdominal Aortagram with Runoff;  Surgeon: Marleny Yoon MD;  Location: [facility] CATH INVASIVE LOCATION;  Service: Peripheral Vascular;  Laterality: Right;   • JOINT REPLACEMENT     • OTHER SURGICAL HISTORY      ARTIFICIAL JOINTS/LIMBS   • OTHER SURGICAL HISTORY      FACE SURGERY, UNSPECIFIED   • TOTAL HIP ARTHROPLASTY Right    • TOTAL KNEE ARTHROPLASTY Left        Family History: family history includes Arthritis in her brother, daughter, mother, and sister; Breast cancer in her sister; Colon cancer in her sister; Diabetes in her brother; Heart disease in her brother and father; Lung cancer in her sister; Osteoporosis in her mother and sister; Stroke in her mother; Throat cancer in her mother. Otherwise pertinent FHx was reviewed and not pertinent to current issue.    Social History:  reports that she has been smoking cigarettes. She started smoking about 43 years ago. She has a 50.00 pack-year smoking history. She has never used smokeless tobacco. She reports that she does not drink alcohol and does not use drugs.    Home Medications:  No current facility-administered medications on file prior to encounter.     Current Outpatient Medications on File Prior to Encounter   Medication Sig   • aspirin 81 MG EC tablet Take 1 tablet by mouth Daily. (Patient  taking differently: Take 81 mg by mouth Daily. PER NAVARRO AT DR SHANAE MCLAUGHLIN TO CONTINUE TAKING FOR SURGERY)   • BISOPROLOL-HYDROCHLOROTHIAZIDE PO Take 10 mg by mouth 2 (two) times a day.   • cholestyramine (QUESTRAN) 4 g packet Take 1 packet by mouth 2 (Two) Times a Day With Meals.   • clopidogrel (PLAVIX) 75 MG tablet Take 75 mg by mouth Daily. PER DR SHANAE MCLAUGHLIN TO CONTINUE TAKING FOR SURGERY   • hydrOXYzine (ATARAX) 10 MG/5ML syrup Take 10 mg by mouth 4 (Four) Times a Day.   • Insulin Detemir (LEVEMIR SC) Inject 25 Units under the skin into the appropriate area as directed Every Night. INSTRUCTED PER ANESTHESIA STANDING ORDERS   • ipratropium-albuterol (DUO-NEB) 0.5-2.5 mg/3 ml nebulizer Take 3 mL by nebulization 4 (Four) Times a Day.   • O2 (OXYGEN) Inhale 3 L/min Every Night.   • ondansetron (ZOFRAN) 4 MG tablet Take 4 mg by mouth Every 8 (Eight) Hours As Needed for Nausea or Vomiting.   • pantoprazole (PROTONIX) 40 MG EC tablet Take 40 mg by mouth Daily.   • potassium chloride 10 MEQ CR tablet Take 20 mEq by mouth 2 (Two) Times a Day.   • sertraline (ZOLOFT) 100 MG tablet Take 100 mg by mouth Daily. In addition to the 50 mg tablet to make 150 mg daily   • silver sulfadiazine (SILVADENE, SSD) 1 % cream Apply 1 application topically to the appropriate area as directed 2 (Two) Times a Day. This is for a bed sore that is on her lower back per .   • tiZANidine (ZANAFLEX) 4 MG tablet Take 4 mg by mouth Every 8 (Eight) Hours As Needed for Muscle Spasms.   • traZODone (DESYREL) 150 MG tablet Take 150 mg by mouth Every Night.          Allergies:  No Known Allergies    Objective   Objective     Vitals:        Physical Exam   General: Alert, no acute distress.  Neck: Supple  Heart: Regular rate  Lungs: Clear  Abdomen: Benign  Extremities: Symmetric  Right foot: Necrotic right second and fifth toes.    Diagnostic studies:   Angiography performed 11/3/2022 demonstrates satisfactory revascularization of the  right lower extremity.    Assessment & Plan   Assessment / Plan     Active Hospital Problems:  Active Hospital Problems    Diagnosis    • **Atherosclerosis of native artery of extremity (HCC)        There are no diagnoses linked to this encounter.    Assessment/plan:   Mrs. Olvera has 2 necrotic toes in the right foot.  Her recent endovascular intervention appears to be patent at this time.  The plan is for amputation of the right second and fifth toes.  I have discussed with her in detail the mechanics of the procedure, the indications, benefits, risks, alternatives, as well as potential complications to include but not limited to infection, bleeding, hematoma, transfusion, reoperation.  She appears to understand and desires to proceed.  The plan will be for her to be admitted after the procedure.      Electronically signed by Gabino Russell MD, 12/05/22, 9:54 AM EST.

## 2022-12-06 ENCOUNTER — ANESTHESIA (OUTPATIENT)
Dept: PERIOP | Facility: HOSPITAL | Age: 62
End: 2022-12-06

## 2022-12-06 ENCOUNTER — HOSPITAL ENCOUNTER (OUTPATIENT)
Facility: HOSPITAL | Age: 62
Setting detail: SURGERY ADMIT
Discharge: HOME | End: 2022-12-06
Attending: SURGERY | Admitting: SURGERY

## 2022-12-06 VITALS
HEART RATE: 65 BPM | OXYGEN SATURATION: 94 % | HEIGHT: 66 IN | DIASTOLIC BLOOD PRESSURE: 74 MMHG | WEIGHT: 175.49 LBS | SYSTOLIC BLOOD PRESSURE: 160 MMHG | RESPIRATION RATE: 16 BRPM | TEMPERATURE: 98 F | BODY MASS INDEX: 28.2 KG/M2

## 2022-12-06 DIAGNOSIS — I96 GANGRENE: Primary | ICD-10-CM

## 2022-12-06 DIAGNOSIS — I70.261 ATHEROSCLEROSIS OF NATIVE ARTERY OF RIGHT LOWER EXTREMITY WITH GANGRENE: ICD-10-CM

## 2022-12-06 LAB
GLUCOSE BLDC GLUCOMTR-MCNC: 168 MG/DL (ref 70–99)
GLUCOSE BLDC GLUCOMTR-MCNC: 186 MG/DL (ref 70–99)

## 2022-12-06 PROCEDURE — 0 MEPERIDINE PER 100 MG: Performed by: NURSE ANESTHETIST, CERTIFIED REGISTERED

## 2022-12-06 PROCEDURE — 63710000001 LEVALBUTEROL PER 0.5 MG: Performed by: ANESTHESIOLOGY

## 2022-12-06 PROCEDURE — 88311 DECALCIFY TISSUE: CPT | Performed by: SURGERY

## 2022-12-06 PROCEDURE — 25010000002 PROPOFOL 10 MG/ML EMULSION: Performed by: NURSE ANESTHETIST, CERTIFIED REGISTERED

## 2022-12-06 PROCEDURE — 25010000002 CEFAZOLIN IN DEXTROSE 2-4 GM/100ML-% SOLUTION: Performed by: SURGERY

## 2022-12-06 PROCEDURE — 88305 TISSUE EXAM BY PATHOLOGIST: CPT | Performed by: SURGERY

## 2022-12-06 PROCEDURE — 82962 GLUCOSE BLOOD TEST: CPT

## 2022-12-06 PROCEDURE — 28810 AMPUTATION TOE & METATARSAL: CPT

## 2022-12-06 PROCEDURE — 0 LIDOCAINE 1 % SOLUTION 10 ML VIAL: Performed by: SURGERY

## 2022-12-06 PROCEDURE — 25010000002 ONDANSETRON PER 1 MG: Performed by: NURSE ANESTHETIST, CERTIFIED REGISTERED

## 2022-12-06 PROCEDURE — 28810 AMPUTATION TOE & METATARSAL: CPT | Performed by: SURGERY

## 2022-12-06 PROCEDURE — 25010000002 HYDROMORPHONE 1 MG/ML SOLUTION: Performed by: NURSE ANESTHETIST, CERTIFIED REGISTERED

## 2022-12-06 PROCEDURE — 25010000002 MIDAZOLAM PER 1 MG: Performed by: ANESTHESIOLOGY

## 2022-12-06 PROCEDURE — 25010000002 DEXAMETHASONE PER 1 MG: Performed by: NURSE ANESTHETIST, CERTIFIED REGISTERED

## 2022-12-06 PROCEDURE — 25010000002 FENTANYL CITRATE (PF) 50 MCG/ML SOLUTION: Performed by: NURSE ANESTHETIST, CERTIFIED REGISTERED

## 2022-12-06 RX ORDER — LIDOCAINE HYDROCHLORIDE 20 MG/ML
INJECTION, SOLUTION EPIDURAL; INFILTRATION; INTRACAUDAL; PERINEURAL AS NEEDED
Status: DISCONTINUED | OUTPATIENT
Start: 2022-12-06 | End: 2022-12-06 | Stop reason: SURG

## 2022-12-06 RX ORDER — PROPOFOL 10 MG/ML
VIAL (ML) INTRAVENOUS AS NEEDED
Status: DISCONTINUED | OUTPATIENT
Start: 2022-12-06 | End: 2022-12-06 | Stop reason: SURG

## 2022-12-06 RX ORDER — CEFAZOLIN SODIUM 2 G/100ML
2 INJECTION, SOLUTION INTRAVENOUS ONCE
Status: COMPLETED | OUTPATIENT
Start: 2022-12-06 | End: 2022-12-06

## 2022-12-06 RX ORDER — ACETAMINOPHEN 500 MG
1000 TABLET ORAL ONCE
Status: COMPLETED | OUTPATIENT
Start: 2022-12-06 | End: 2022-12-06

## 2022-12-06 RX ORDER — DEXAMETHASONE SODIUM PHOSPHATE 4 MG/ML
INJECTION, SOLUTION INTRA-ARTICULAR; INTRALESIONAL; INTRAMUSCULAR; INTRAVENOUS; SOFT TISSUE AS NEEDED
Status: DISCONTINUED | OUTPATIENT
Start: 2022-12-06 | End: 2022-12-06 | Stop reason: SURG

## 2022-12-06 RX ORDER — OXYCODONE HYDROCHLORIDE 5 MG/1
5 TABLET ORAL
Status: COMPLETED | OUTPATIENT
Start: 2022-12-06 | End: 2022-12-06

## 2022-12-06 RX ORDER — LEVALBUTEROL INHALATION SOLUTION 0.63 MG/3ML
0.63 SOLUTION RESPIRATORY (INHALATION) ONCE
Status: COMPLETED | OUTPATIENT
Start: 2022-12-06 | End: 2022-12-06

## 2022-12-06 RX ORDER — MIDAZOLAM HYDROCHLORIDE 1 MG/ML
2 INJECTION INTRAMUSCULAR; INTRAVENOUS ONCE
Status: COMPLETED | OUTPATIENT
Start: 2022-12-06 | End: 2022-12-06

## 2022-12-06 RX ORDER — OXYCODONE AND ACETAMINOPHEN 10; 325 MG/1; MG/1
1 TABLET ORAL EVERY 6 HOURS PRN
Qty: 20 TABLET | Refills: 0 | Status: ON HOLD | OUTPATIENT
Start: 2022-12-06

## 2022-12-06 RX ORDER — PROMETHAZINE HYDROCHLORIDE 12.5 MG/1
25 TABLET ORAL ONCE AS NEEDED
Status: DISCONTINUED | OUTPATIENT
Start: 2022-12-06 | End: 2022-12-06 | Stop reason: HOSPADM

## 2022-12-06 RX ORDER — ONDANSETRON 2 MG/ML
INJECTION INTRAMUSCULAR; INTRAVENOUS AS NEEDED
Status: DISCONTINUED | OUTPATIENT
Start: 2022-12-06 | End: 2022-12-06 | Stop reason: SURG

## 2022-12-06 RX ORDER — ONDANSETRON 2 MG/ML
4 INJECTION INTRAMUSCULAR; INTRAVENOUS ONCE AS NEEDED
Status: DISCONTINUED | OUTPATIENT
Start: 2022-12-06 | End: 2022-12-06 | Stop reason: HOSPADM

## 2022-12-06 RX ORDER — PROMETHAZINE HYDROCHLORIDE 25 MG/1
25 SUPPOSITORY RECTAL ONCE AS NEEDED
Status: DISCONTINUED | OUTPATIENT
Start: 2022-12-06 | End: 2022-12-06 | Stop reason: HOSPADM

## 2022-12-06 RX ORDER — MEPERIDINE HYDROCHLORIDE 25 MG/ML
12.5 INJECTION INTRAMUSCULAR; INTRAVENOUS; SUBCUTANEOUS
Status: DISCONTINUED | OUTPATIENT
Start: 2022-12-06 | End: 2022-12-06 | Stop reason: HOSPADM

## 2022-12-06 RX ORDER — SODIUM CHLORIDE, SODIUM LACTATE, POTASSIUM CHLORIDE, CALCIUM CHLORIDE 600; 310; 30; 20 MG/100ML; MG/100ML; MG/100ML; MG/100ML
9 INJECTION, SOLUTION INTRAVENOUS CONTINUOUS PRN
Status: DISCONTINUED | OUTPATIENT
Start: 2022-12-06 | End: 2022-12-06 | Stop reason: HOSPADM

## 2022-12-06 RX ORDER — FENTANYL CITRATE 50 UG/ML
INJECTION, SOLUTION INTRAMUSCULAR; INTRAVENOUS AS NEEDED
Status: DISCONTINUED | OUTPATIENT
Start: 2022-12-06 | End: 2022-12-06 | Stop reason: SURG

## 2022-12-06 RX ADMIN — OXYCODONE HYDROCHLORIDE 5 MG: 5 TABLET ORAL at 09:48

## 2022-12-06 RX ADMIN — ONDANSETRON 4 MG: 2 INJECTION INTRAMUSCULAR; INTRAVENOUS at 07:50

## 2022-12-06 RX ADMIN — HYDROMORPHONE HYDROCHLORIDE 0.5 MG: 1 INJECTION, SOLUTION INTRAMUSCULAR; INTRAVENOUS; SUBCUTANEOUS at 09:26

## 2022-12-06 RX ADMIN — ACETAMINOPHEN 1000 MG: 500 TABLET ORAL at 07:07

## 2022-12-06 RX ADMIN — HYDROMORPHONE HYDROCHLORIDE 0.5 MG: 1 INJECTION, SOLUTION INTRAMUSCULAR; INTRAVENOUS; SUBCUTANEOUS at 09:17

## 2022-12-06 RX ADMIN — MEPERIDINE HYDROCHLORIDE 12.5 MG: 25 INJECTION INTRAMUSCULAR; INTRAVENOUS; SUBCUTANEOUS at 09:22

## 2022-12-06 RX ADMIN — DEXAMETHASONE SODIUM PHOSPHATE 4 MG: 4 INJECTION, SOLUTION INTRA-ARTICULAR; INTRALESIONAL; INTRAMUSCULAR; INTRAVENOUS; SOFT TISSUE at 07:50

## 2022-12-06 RX ADMIN — FENTANYL CITRATE 50 MCG: 50 INJECTION, SOLUTION INTRAMUSCULAR; INTRAVENOUS at 09:09

## 2022-12-06 RX ADMIN — LEVALBUTEROL HYDROCHLORIDE 0.63 MG: 0.63 SOLUTION RESPIRATORY (INHALATION) at 07:08

## 2022-12-06 RX ADMIN — MIDAZOLAM HYDROCHLORIDE 2 MG: 1 INJECTION, SOLUTION INTRAMUSCULAR; INTRAVENOUS at 07:12

## 2022-12-06 RX ADMIN — SODIUM CHLORIDE, POTASSIUM CHLORIDE, SODIUM LACTATE AND CALCIUM CHLORIDE 9 ML/HR: 600; 310; 30; 20 INJECTION, SOLUTION INTRAVENOUS at 07:07

## 2022-12-06 RX ADMIN — CEFAZOLIN SODIUM 2 G: 2 INJECTION, SOLUTION INTRAVENOUS at 07:27

## 2022-12-06 RX ADMIN — FENTANYL CITRATE 50 MCG: 50 INJECTION, SOLUTION INTRAMUSCULAR; INTRAVENOUS at 07:50

## 2022-12-06 RX ADMIN — PROPOFOL 120 MG: 10 INJECTION, EMULSION INTRAVENOUS at 07:33

## 2022-12-06 RX ADMIN — LIDOCAINE HYDROCHLORIDE 50 MG: 20 INJECTION, SOLUTION EPIDURAL; INFILTRATION; INTRACAUDAL; PERINEURAL at 07:33

## 2022-12-06 RX ADMIN — HYDROMORPHONE HYDROCHLORIDE 0.25 MG: 1 INJECTION, SOLUTION INTRAMUSCULAR; INTRAVENOUS; SUBCUTANEOUS at 09:37

## 2022-12-06 RX ADMIN — FENTANYL CITRATE 50 MCG: 50 INJECTION, SOLUTION INTRAMUSCULAR; INTRAVENOUS at 07:33

## 2022-12-06 RX ADMIN — OXYCODONE HYDROCHLORIDE 5 MG: 5 TABLET ORAL at 09:31

## 2022-12-06 NOTE — OP NOTE
AMPUTATION DIGIT  Procedure Report    Patient Name:  Italia Olvera  YOB: 1960    Date of Surgery:  12/6/2022     Indications: Dry gangrene of the right second and fifth toes    Pre-op Diagnosis:   Atherosclerosis of native artery of right lower extremity with gangrene (HCC) [I70.261]       Post-Op Diagnosis Codes:     * Atherosclerosis of native artery of right lower extremity with gangrene (HCC) [I70.261]    Procedure(s):  Amputation of right second and fifth toes    Staff:  Surgeon(s):  Gabino Russell MD    Assistant: Keyla Fu CSA    Anesthesia: General    Estimated Blood Loss: 50 mL    Implants:    Nothing was implanted during the procedure    Specimen: Right second and fifth toes       Findings: Tissues viable at the level of amputation    Complications: None    Description of Procedure: The patient was taken to the operating room and was placed in the supine position.  She was then induced into general anesthesia.  Her right foot was then prepped and draped in the usual aseptic fashion from the toes circumferentially all the way to the proximal lower leg.  Standard draping was applied.  A timeout was then taken to confirm patient, procedure and laterality.  Local anesthesia was then administered in a digital block on both the medial and lateral aspects of the second and fifth toes.  An incision was then made in a teardrop fashion across the midportion of the distal metatarsal of each toe.  This was deepened all the way down to the bone.  The soft tissues were  from the metatarsal bone using a periosteal elevator.  A sagittal saw was then used to transect the bone.  A rasp was then used to smooth the bone edges.  Hemostasis was assured.  The wounds were then approximated using 3-0 Vicryl in simple interrupted subcutaneous sutures followed by 3-0 nylon in interrupted vertical mattress stitches for approximation of the skin edges.  A dressing was then applied.  The patient  tolerated procedure well.         Assistant: Keyla Fu CSA  was responsible for performing the following activities: First assist and their skilled assistance was necessary for the success of this case.    Gabino Russell MD     Date: 12/6/2022  Time: 09:05 EST

## 2022-12-06 NOTE — ANESTHESIA PREPROCEDURE EVALUATION
Anesthesia Evaluation     Patient summary reviewed and Nursing notes reviewed   no history of anesthetic complications:  NPO Solid Status: > 8 hours  NPO Liquid Status: > 2 hours           Airway   Mallampati: II  TM distance: >3 FB  Neck ROM: full  No difficulty expected  Dental    (+) edentulous    Pulmonary    (+) pneumonia stable , COPD mild, asthma,shortness of breath, sleep apnea, decreased breath sounds,   Cardiovascular - normal exam  Exercise tolerance: good (4-7 METS)    Rhythm: regular  Rate: normal    (+) hypertension, dysrhythmias Tachycardia, CHF , PVD,       Neuro/Psych  (+) numbness, psychiatric history,    GI/Hepatic/Renal/Endo    (+)  GERD well controlled,  renal disease, diabetes mellitus type 2,     Musculoskeletal     Abdominal    Substance History - negative use     OB/GYN negative ob/gyn ROS         Other   arthritis,      ROS/Med Hx Other: <4METS, W/C BOUND DUE TO GANGRENE TOES. HX CHF, DM, HTN, RENAL FAILURE, BIPOLAR, CHRONIC BACK PAIN. STRESS 8/30/20 EF 29%, ECHO 6/15/21 EF 51-55%, TRACE AR,MR. KT                   Anesthesia Plan    ASA 3     general     (Patient understands anesthesia not responsible for dental damage.)  intravenous induction     Anesthetic plan, risks, benefits, and alternatives have been provided, discussed and informed consent has been obtained with: patient.  Pre-procedure education provided  Plan discussed with CRNA.        CODE STATUS:

## 2022-12-06 NOTE — DISCHARGE INSTRUCTIONS
Strict nonweightbearing on the right foot for 2 weeks.  May remove dressing in 3 days and wash area.  Then apply bacitracin ointment to once and cover with 4 x 4's, Kerlix and a loose Ace wrap daily.  Follow-up in the office in 2 weeks, call for appointment.  Resume home diet.  Resume home medications.  No lifting greater than 15 pounds for 2 weeks.     DISCHARGE INSTRUCTIONS  SURGICAL / AMBULATORY  PROCEDURES      For your surgery you had:  General anesthesia (you may have a sore throat for the first 24 hours)  IV sedation.  Local anesthesia  Monitored anesthesia Care  You received a medicated patch for nausea prevention today (behind your ear). It is recommended that you remove it 24-48 hours post-operatively. It must be removed within 72 hours.   You have received an anesthesia medication today that can cause hormonal forms of birth control to be ineffective. You should use a different form of birth control (to prevent pregnancy) for 7 days.   You may experience dizziness, drowsiness, or light-headedness for several hours following surgery/procedure.  Do not stay alone today or tonight.  Limit your activity for 24 hours.  Resume your diet slowly.  Follow whatever special dietary instructions you may have been given by your doctor.  You should not drive or operate machinery, drink alcohol, or sign legally binding documents for 24 hours or while you are taking pain medication.  Last dose of pain medication was given at:   .  NOTIFY YOUR DOCTOR IF YOU EXPERIENCE ANY OF THE FOLLOWING:  Temperature greater than 101 degrees Fahrenheit  Shaking Chills  Redness or excessive drainage from incision  Nausea, vomiting and/or pain that is not controlled by prescribed medications  Increase in bleeding or bleeding that is excessive  Unable to urinate in 6 hours after surgery  If unable to reach your doctor, please go to the closest Emergency Room  You may begin dressing changes:     [] in 24 hours   [] in 48 hours   [] Other:     You may remove Band-Aid/dressing tomorrow.  You may shower or bathe   .  Apply an ice pack 24-48 hours.  Medications per physician instructions as indicated on Discharge Medication Information Sheet.  You should see Dr. Russell  for follow-up care   on   .  Phone number: [phone]      SPECIAL INSTRUCTIONS:

## 2022-12-06 NOTE — ANESTHESIA POSTPROCEDURE EVALUATION
Patient: Italia Olvera    Procedure Summary     Date: 12/06/22 Room / Location: [facility] OR 05 / [facility] MAIN OR    Anesthesia Start: 0727 Anesthesia Stop: 0914    Procedure: Amputation of right second and fifth toes (Right) Diagnosis:       Atherosclerosis of native artery of right lower extremity with gangrene (HCC)      (Atherosclerosis of native artery of right lower extremity with gangrene (HCC) [I70.261])    Surgeons: Gabino Russell MD Provider: Filiberto Mejia MD    Anesthesia Type: general ASA Status: 3          Anesthesia Type: general    Vitals  Vitals Value Taken Time   /74 12/06/22 0953   Temp 36.2 °C (97.2 °F) 12/06/22 0945   Pulse 65 12/06/22 0954   Resp 14 12/06/22 0950   SpO2 94 % 12/06/22 0954   Vitals shown include unvalidated device data.        Post Anesthesia Care and Evaluation    Patient location during evaluation: bedside  Patient participation: complete - patient participated  Level of consciousness: awake and alert  Pain management: adequate    Airway patency: patent  Anesthetic complications: No anesthetic complications  PONV Status: none  Cardiovascular status: acceptable  Respiratory status: acceptable  Hydration status: acceptable    Comments: An Anesthesiologist personally participated in the most demanding procedures (including induction and emergence if applicable) in the anesthesia plan, monitored the course of anesthesia administration at frequent intervals and remained physically present and available for immediate diagnosis and treatment of emergencies.

## 2022-12-08 LAB
CYTO UR: NORMAL
LAB AP CASE REPORT: NORMAL
LAB AP CLINICAL INFORMATION: NORMAL
PATH REPORT.FINAL DX SPEC: NORMAL
PATH REPORT.GROSS SPEC: NORMAL

## 2022-12-21 ENCOUNTER — OFFICE VISIT (OUTPATIENT)
Dept: VASCULAR SURGERY | Facility: HOSPITAL | Age: 62
End: 2022-12-21

## 2022-12-21 VITALS
RESPIRATION RATE: 18 BRPM | TEMPERATURE: 97.5 F | HEART RATE: 85 BPM | DIASTOLIC BLOOD PRESSURE: 90 MMHG | OXYGEN SATURATION: 97 % | SYSTOLIC BLOOD PRESSURE: 220 MMHG

## 2022-12-21 DIAGNOSIS — I70.261 ATHEROSCLEROSIS OF NATIVE ARTERY OF RIGHT LOWER EXTREMITY WITH GANGRENE: Primary | ICD-10-CM

## 2022-12-21 PROCEDURE — 99024 POSTOP FOLLOW-UP VISIT: CPT | Performed by: SURGERY

## 2022-12-21 PROCEDURE — G0463 HOSPITAL OUTPT CLINIC VISIT: HCPCS | Performed by: SURGERY

## 2022-12-21 NOTE — PROGRESS NOTES
[facility]   Follow up Office    Patient Name: Italia Olvera  : 1960  MRN: [id number]  Primary Care Physician:  Carloz Shoemaker MD      Subjective   Subjective     HPI:    Italia Olvera is a 62 y.o. female here for follow-up after right second and fifth toe amputations 2022.  Doing well.  No complaints.      Objective     Vitals:   Temp:  [97.5 °F (36.4 °C)] 97.5 °F (36.4 °C)  Heart Rate:  [85] 85  Resp:  [18] 18  BP: (220)/(90) 220/90    Physical Exam      General: Alert, no acute distress  Wounds: Healing well, no signs of infection.  Minimal separation in the most distal aspect of both wounds.  No surrounding erythema, no significant drainage.    Assessment & Plan   Assessment / Plan     Diagnoses and all orders for this visit:    1. Atherosclerosis of native artery of right lower extremity with gangrene (HCC) (Primary)       Assessment/Plan:   Satisfactory progress following right second and fifth toe amputations.  DC stitches.  Continue to do minimal weightbearing on the right foot, heel only.  Antibiotic ointment, and gauze cover daily.  Follow-up in 2 weeks.        Electronically signed by Gabino Russell MD, 22, 3:13 PM EST.

## 2023-01-03 ENCOUNTER — TELEPHONE (OUTPATIENT)
Dept: VASCULAR SURGERY | Facility: HOSPITAL | Age: 63
End: 2023-01-03

## 2023-01-03 NOTE — TELEPHONE ENCOUNTER
Caller: Italia Olvera    Relationship to patient: Self    Best call back number: [phone]    Chief complaint: RESCHEDULE - NEXT AVAILBLE WITH DR. JOLLY IS 1/25/23    Type of visit: FOLLOW UP     Requested date: NEXT AVAILABLE     If rescheduling, when is the original appointment: 1/3/23    Additional notes: PATIENT WOULD LIKE NEXT AVAILABLE APPOINTMENT IN AFTERNOON IF POSSIBLE DUE TO TRANSPORTATION REASONS.     PATIENT WOULD LIKE A CALL BACK

## 2023-01-16 ENCOUNTER — OFFICE VISIT (OUTPATIENT)
Dept: VASCULAR SURGERY | Facility: HOSPITAL | Age: 63
End: 2023-01-16
Payer: MEDICARE

## 2023-01-16 VITALS
HEART RATE: 103 BPM | DIASTOLIC BLOOD PRESSURE: 110 MMHG | RESPIRATION RATE: 18 BRPM | OXYGEN SATURATION: 95 % | TEMPERATURE: 98.2 F | SYSTOLIC BLOOD PRESSURE: 218 MMHG

## 2023-01-16 DIAGNOSIS — I70.25 ATHEROSCLEROSIS OF NATIVE ARTERIES OF THE EXTREMITIES WITH ULCERATION: Primary | ICD-10-CM

## 2023-01-16 PROCEDURE — G0463 HOSPITAL OUTPT CLINIC VISIT: HCPCS | Performed by: SURGERY

## 2023-01-16 PROCEDURE — 99024 POSTOP FOLLOW-UP VISIT: CPT | Performed by: SURGERY

## 2023-01-18 NOTE — PROGRESS NOTES
[facility]   Follow up Office    Patient Name: Italia Olvera  : 1960  MRN: [id number]  Primary Care Physician:  Carloz Shoemaker MD      Subjective   Subjective     HPI:    Italia Olvera is a 62 y.o. female here for follow-up for right foot wounds.  Doing well.  No new complaints.      Objective     Vitals:    Blood pressure 218/110, pulse 103, respirations 18, temperature 98.2.    Physical Exam      General: Alert, no acute distress  Right foot: 2 areas of residual open wounds over the amputation sites of the second and fifth toes.  No evidence of infection, small amount of fibrinous exudate.    Assessment & Plan   Assessment / Plan     Diagnoses and all orders for this visit:    1. Atherosclerosis of native arteries of the extremities with ulceration (HCC) (Primary)       Assessment/Plan:   Mrs. Olvera's right foot wounds appear to be making slow progress.  At this time the plan is for her to continue wound care.  Follow-up in 3 weeks.        Electronically signed by Gabino Russell MD, 23, 6:12 PM EST.

## 2023-02-03 ENCOUNTER — HOSPITAL ENCOUNTER (INPATIENT)
Facility: HOSPITAL | Age: 63
LOS: 3 days | Discharge: HOME | DRG: 189 | End: 2023-02-06
Attending: EMERGENCY MEDICINE | Admitting: INTERNAL MEDICINE
Payer: MEDICARE

## 2023-02-03 ENCOUNTER — APPOINTMENT (OUTPATIENT)
Dept: GENERAL RADIOLOGY | Facility: HOSPITAL | Age: 63
DRG: 189 | End: 2023-02-03
Payer: MEDICARE

## 2023-02-03 ENCOUNTER — APPOINTMENT (OUTPATIENT)
Dept: CT IMAGING | Facility: HOSPITAL | Age: 63
DRG: 189 | End: 2023-02-03
Payer: MEDICARE

## 2023-02-03 DIAGNOSIS — J18.9 PNEUMONIA DUE TO INFECTIOUS ORGANISM, UNSPECIFIED LATERALITY, UNSPECIFIED PART OF LUNG: ICD-10-CM

## 2023-02-03 DIAGNOSIS — J81.0 ACUTE PULMONARY EDEMA: ICD-10-CM

## 2023-02-03 DIAGNOSIS — R07.9 CHEST PAIN IN ADULT: Primary | ICD-10-CM

## 2023-02-03 LAB
ALBUMIN SERPL-MCNC: 3.3 G/DL (ref 3.5–5.2)
ALBUMIN/GLOB SERPL: 0.9 G/DL
ALP SERPL-CCNC: 149 U/L (ref 39–117)
ALT SERPL W P-5'-P-CCNC: 15 U/L (ref 1–33)
ANION GAP SERPL CALCULATED.3IONS-SCNC: 9.7 MMOL/L (ref 5–15)
AST SERPL-CCNC: 20 U/L (ref 1–32)
BASOPHILS # BLD AUTO: 0.01 10*3/MM3 (ref 0–0.2)
BASOPHILS NFR BLD AUTO: 0.1 % (ref 0–1.5)
BILIRUB SERPL-MCNC: 0.5 MG/DL (ref 0–1.2)
BUN SERPL-MCNC: 14 MG/DL (ref 8–23)
BUN/CREAT SERPL: 21.9 (ref 7–25)
BURR CELLS BLD QL SMEAR: NORMAL
BURR CELLS BLD QL SMEAR: NORMAL
CALCIUM SPEC-SCNC: 8.7 MG/DL (ref 8.6–10.5)
CHLORIDE SERPL-SCNC: 108 MMOL/L (ref 98–107)
CO2 SERPL-SCNC: 24.3 MMOL/L (ref 22–29)
CREAT SERPL-MCNC: 0.64 MG/DL (ref 0.57–1)
DACRYOCYTES BLD QL SMEAR: NORMAL
DEPRECATED RDW RBC AUTO: 60.3 FL (ref 37–54)
EGFRCR SERPLBLD CKD-EPI 2021: 99.4 ML/MIN/1.73
EOSINOPHIL # BLD AUTO: 0.08 10*3/MM3 (ref 0–0.4)
EOSINOPHIL NFR BLD AUTO: 1 % (ref 0.3–6.2)
ERYTHROCYTE [DISTWIDTH] IN BLOOD BY AUTOMATED COUNT: 18.1 % (ref 12.3–15.4)
GLOBULIN UR ELPH-MCNC: 3.5 GM/DL
GLUCOSE SERPL-MCNC: 197 MG/DL (ref 65–99)
HCT VFR BLD AUTO: 42.9 % (ref 34–46.6)
HGB BLD-MCNC: 13.6 G/DL (ref 12–15.9)
HOLD SPECIMEN: NORMAL
IMM GRANULOCYTES # BLD AUTO: 0.07 10*3/MM3 (ref 0–0.05)
IMM GRANULOCYTES NFR BLD AUTO: 0.9 % (ref 0–0.5)
LARGE PLATELETS: NORMAL
LIPASE SERPL-CCNC: 18 U/L (ref 13–60)
LYMPHOCYTES # BLD AUTO: 3.58 10*3/MM3 (ref 0.7–3.1)
LYMPHOCYTES NFR BLD AUTO: 44.7 % (ref 19.6–45.3)
MACROCYTES BLD QL SMEAR: NORMAL
MAGNESIUM SERPL-MCNC: 1.8 MG/DL (ref 1.6–2.4)
MCH RBC QN AUTO: 28.3 PG (ref 26.6–33)
MCHC RBC AUTO-ENTMCNC: 31.7 G/DL (ref 31.5–35.7)
MCV RBC AUTO: 89.4 FL (ref 79–97)
MICROCYTES BLD QL: NORMAL
MONOCYTES # BLD AUTO: 0.5 10*3/MM3 (ref 0.1–0.9)
MONOCYTES NFR BLD AUTO: 6.2 % (ref 5–12)
NEUTROPHILS NFR BLD AUTO: 3.77 10*3/MM3 (ref 1.7–7)
NEUTROPHILS NFR BLD AUTO: 47.1 % (ref 42.7–76)
NRBC BLD AUTO-RTO: 0 /100 WBC (ref 0–0.2)
NT-PROBNP SERPL-MCNC: 4484 PG/ML (ref 0–900)
OVALOCYTES BLD QL SMEAR: NORMAL
PLATELET # BLD AUTO: 79 10*3/MM3 (ref 140–450)
PMV BLD AUTO: 11.8 FL (ref 6–12)
POTASSIUM SERPL-SCNC: 3.8 MMOL/L (ref 3.5–5.2)
PROT SERPL-MCNC: 6.8 G/DL (ref 6–8.5)
RBC # BLD AUTO: 4.8 10*6/MM3 (ref 3.77–5.28)
SMALL PLATELETS BLD QL SMEAR: NORMAL
SODIUM SERPL-SCNC: 142 MMOL/L (ref 136–145)
TROPONIN I SERPL-MCNC: 0.03 NG/ML (ref 0–0.08)
TROPONIN I SERPL-MCNC: 0.04 NG/ML (ref 0–0.08)
WBC MORPH BLD: NORMAL
WBC NRBC COR # BLD: 8.01 10*3/MM3 (ref 3.4–10.8)
WHOLE BLOOD HOLD COAG: NORMAL
WHOLE BLOOD HOLD SPECIMEN: NORMAL

## 2023-02-03 PROCEDURE — 86738 MYCOPLASMA ANTIBODY: CPT | Performed by: INTERNAL MEDICINE

## 2023-02-03 PROCEDURE — 83690 ASSAY OF LIPASE: CPT

## 2023-02-03 PROCEDURE — 80053 COMPREHEN METABOLIC PANEL: CPT

## 2023-02-03 PROCEDURE — 93005 ELECTROCARDIOGRAM TRACING: CPT

## 2023-02-03 PROCEDURE — 99285 EMERGENCY DEPT VISIT HI MDM: CPT

## 2023-02-03 PROCEDURE — 83735 ASSAY OF MAGNESIUM: CPT

## 2023-02-03 PROCEDURE — 84484 ASSAY OF TROPONIN QUANT: CPT

## 2023-02-03 PROCEDURE — 85025 COMPLETE CBC W/AUTO DIFF WBC: CPT

## 2023-02-03 PROCEDURE — 25010000002 ONDANSETRON PER 1 MG: Performed by: EMERGENCY MEDICINE

## 2023-02-03 PROCEDURE — 25010000002 HYDROMORPHONE 1 MG/ML SOLUTION: Performed by: EMERGENCY MEDICINE

## 2023-02-03 PROCEDURE — 84484 ASSAY OF TROPONIN QUANT: CPT | Performed by: INTERNAL MEDICINE

## 2023-02-03 PROCEDURE — 93005 ELECTROCARDIOGRAM TRACING: CPT | Performed by: EMERGENCY MEDICINE

## 2023-02-03 PROCEDURE — 71045 X-RAY EXAM CHEST 1 VIEW: CPT

## 2023-02-03 PROCEDURE — 71260 CT THORAX DX C+: CPT

## 2023-02-03 PROCEDURE — 83880 ASSAY OF NATRIURETIC PEPTIDE: CPT

## 2023-02-03 PROCEDURE — 94640 AIRWAY INHALATION TREATMENT: CPT

## 2023-02-03 PROCEDURE — 85007 BL SMEAR W/DIFF WBC COUNT: CPT

## 2023-02-03 PROCEDURE — 94799 UNLISTED PULMONARY SVC/PX: CPT

## 2023-02-03 PROCEDURE — 36415 COLL VENOUS BLD VENIPUNCTURE: CPT

## 2023-02-03 PROCEDURE — 0 IOPAMIDOL PER 1 ML: Performed by: EMERGENCY MEDICINE

## 2023-02-03 RX ORDER — IPRATROPIUM BROMIDE AND ALBUTEROL SULFATE 2.5; .5 MG/3ML; MG/3ML
3 SOLUTION RESPIRATORY (INHALATION) ONCE
Status: COMPLETED | OUTPATIENT
Start: 2023-02-03 | End: 2023-02-03

## 2023-02-03 RX ORDER — AMLODIPINE BESYLATE 5 MG/1
5 TABLET ORAL
Status: DISCONTINUED | OUTPATIENT
Start: 2023-02-04 | End: 2023-02-03

## 2023-02-03 RX ORDER — SODIUM CHLORIDE 0.9 % (FLUSH) 0.9 %
10 SYRINGE (ML) INJECTION AS NEEDED
Status: DISCONTINUED | OUTPATIENT
Start: 2023-02-03 | End: 2023-02-06 | Stop reason: HOSPADM

## 2023-02-03 RX ORDER — ONDANSETRON 2 MG/ML
4 INJECTION INTRAMUSCULAR; INTRAVENOUS ONCE
Status: COMPLETED | OUTPATIENT
Start: 2023-02-03 | End: 2023-02-03

## 2023-02-03 RX ORDER — IPRATROPIUM BROMIDE AND ALBUTEROL SULFATE 2.5; .5 MG/3ML; MG/3ML
SOLUTION RESPIRATORY (INHALATION)
Status: DISPENSED
Start: 2023-02-03 | End: 2023-02-04

## 2023-02-03 RX ORDER — ASPIRIN 81 MG/1
324 TABLET, CHEWABLE ORAL ONCE
Status: DISCONTINUED | OUTPATIENT
Start: 2023-02-03 | End: 2023-02-06 | Stop reason: HOSPADM

## 2023-02-03 RX ORDER — AMLODIPINE BESYLATE 5 MG/1
5 TABLET ORAL ONCE
Status: COMPLETED | OUTPATIENT
Start: 2023-02-03 | End: 2023-02-03

## 2023-02-03 RX ADMIN — HYDROMORPHONE HYDROCHLORIDE 0.5 MG: 1 INJECTION, SOLUTION INTRAMUSCULAR; INTRAVENOUS; SUBCUTANEOUS at 21:52

## 2023-02-03 RX ADMIN — IOPAMIDOL 79 ML: 755 INJECTION, SOLUTION INTRAVENOUS at 21:04

## 2023-02-03 RX ADMIN — IPRATROPIUM BROMIDE AND ALBUTEROL SULFATE 3 ML: 2.5; .5 SOLUTION RESPIRATORY (INHALATION) at 23:03

## 2023-02-03 RX ADMIN — AMLODIPINE BESYLATE 5 MG: 5 TABLET ORAL at 23:51

## 2023-02-03 RX ADMIN — ONDANSETRON 4 MG: 2 INJECTION INTRAMUSCULAR; INTRAVENOUS at 21:52

## 2023-02-04 PROBLEM — I31.39 PERICARDIAL EFFUSION: Status: ACTIVE | Noted: 2023-02-04

## 2023-02-04 LAB
ALBUMIN SERPL-MCNC: 3.2 G/DL (ref 3.5–5.2)
ALBUMIN/GLOB SERPL: 1 G/DL
ALP SERPL-CCNC: 131 U/L (ref 39–117)
ALT SERPL W P-5'-P-CCNC: 14 U/L (ref 1–33)
ANION GAP SERPL CALCULATED.3IONS-SCNC: 8.9 MMOL/L (ref 5–15)
AST SERPL-CCNC: 21 U/L (ref 1–32)
BASOPHILS # BLD AUTO: 0 10*3/MM3 (ref 0–0.2)
BASOPHILS NFR BLD AUTO: 0 % (ref 0–1.5)
BILIRUB SERPL-MCNC: 0.4 MG/DL (ref 0–1.2)
BUN SERPL-MCNC: 17 MG/DL (ref 8–23)
BUN/CREAT SERPL: 23.6 (ref 7–25)
CALCIUM SPEC-SCNC: 8.3 MG/DL (ref 8.6–10.5)
CHLORIDE SERPL-SCNC: 106 MMOL/L (ref 98–107)
CO2 SERPL-SCNC: 24.1 MMOL/L (ref 22–29)
CREAT SERPL-MCNC: 0.72 MG/DL (ref 0.57–1)
D-LACTATE SERPL-SCNC: 2.1 MMOL/L (ref 0.5–2)
D-LACTATE SERPL-SCNC: 2.4 MMOL/L (ref 0.5–2)
D-LACTATE SERPL-SCNC: 2.5 MMOL/L (ref 0.5–2)
D-LACTATE SERPL-SCNC: 3.3 MMOL/L (ref 0.5–2)
DEPRECATED RDW RBC AUTO: 57.1 FL (ref 37–54)
EGFRCR SERPLBLD CKD-EPI 2021: 94.1 ML/MIN/1.73
EOSINOPHIL # BLD AUTO: 0.01 10*3/MM3 (ref 0–0.4)
EOSINOPHIL NFR BLD AUTO: 0.1 % (ref 0.3–6.2)
ERYTHROCYTE [DISTWIDTH] IN BLOOD BY AUTOMATED COUNT: 17.6 % (ref 12.3–15.4)
GLOBULIN UR ELPH-MCNC: 3.2 GM/DL
GLUCOSE BLDC GLUCOMTR-MCNC: 199 MG/DL (ref 70–99)
GLUCOSE BLDC GLUCOMTR-MCNC: 223 MG/DL (ref 70–99)
GLUCOSE BLDC GLUCOMTR-MCNC: 235 MG/DL (ref 70–99)
GLUCOSE BLDC GLUCOMTR-MCNC: 236 MG/DL (ref 70–99)
GLUCOSE BLDC GLUCOMTR-MCNC: 260 MG/DL (ref 70–99)
GLUCOSE BLDC GLUCOMTR-MCNC: 313 MG/DL (ref 70–99)
GLUCOSE BLDC GLUCOMTR-MCNC: 315 MG/DL (ref 70–99)
GLUCOSE BLDC GLUCOMTR-MCNC: 345 MG/DL (ref 70–99)
GLUCOSE SERPL-MCNC: 346 MG/DL (ref 65–99)
HCT VFR BLD AUTO: 39.7 % (ref 34–46.6)
HGB BLD-MCNC: 12.8 G/DL (ref 12–15.9)
IMM GRANULOCYTES # BLD AUTO: 0.06 10*3/MM3 (ref 0–0.05)
IMM GRANULOCYTES NFR BLD AUTO: 0.7 % (ref 0–0.5)
LYMPHOCYTES # BLD AUTO: 3.88 10*3/MM3 (ref 0.7–3.1)
LYMPHOCYTES NFR BLD AUTO: 48.3 % (ref 19.6–45.3)
M PNEUMO IGM SER QL: NEGATIVE
MCH RBC QN AUTO: 28.6 PG (ref 26.6–33)
MCHC RBC AUTO-ENTMCNC: 32.2 G/DL (ref 31.5–35.7)
MCV RBC AUTO: 88.6 FL (ref 79–97)
MONOCYTES # BLD AUTO: 0.12 10*3/MM3 (ref 0.1–0.9)
MONOCYTES NFR BLD AUTO: 1.5 % (ref 5–12)
NEUTROPHILS NFR BLD AUTO: 3.97 10*3/MM3 (ref 1.7–7)
NEUTROPHILS NFR BLD AUTO: 49.4 % (ref 42.7–76)
NRBC BLD AUTO-RTO: 0 /100 WBC (ref 0–0.2)
PLATELET # BLD AUTO: 78 10*3/MM3 (ref 140–450)
PMV BLD AUTO: 11.9 FL (ref 6–12)
POTASSIUM SERPL-SCNC: 4.5 MMOL/L (ref 3.5–5.2)
PROCALCITONIN SERPL-MCNC: 0.05 NG/ML (ref 0–0.25)
PROT SERPL-MCNC: 6.4 G/DL (ref 6–8.5)
RBC # BLD AUTO: 4.48 10*6/MM3 (ref 3.77–5.28)
SODIUM SERPL-SCNC: 139 MMOL/L (ref 136–145)
TROPONIN T SERPL-MCNC: 0.02 NG/ML (ref 0–0.03)
TROPONIN T SERPL-MCNC: 0.03 NG/ML (ref 0–0.03)
WBC NRBC COR # BLD: 8.04 10*3/MM3 (ref 3.4–10.8)

## 2023-02-04 PROCEDURE — 87899 AGENT NOS ASSAY W/OPTIC: CPT | Performed by: INTERNAL MEDICINE

## 2023-02-04 PROCEDURE — 94799 UNLISTED PULMONARY SVC/PX: CPT

## 2023-02-04 PROCEDURE — 25010000002 METHYLPREDNISOLONE PER 40 MG: Performed by: INTERNAL MEDICINE

## 2023-02-04 PROCEDURE — 84484 ASSAY OF TROPONIN QUANT: CPT | Performed by: INTERNAL MEDICINE

## 2023-02-04 PROCEDURE — 83605 ASSAY OF LACTIC ACID: CPT | Performed by: INTERNAL MEDICINE

## 2023-02-04 PROCEDURE — 85025 COMPLETE CBC W/AUTO DIFF WBC: CPT | Performed by: INTERNAL MEDICINE

## 2023-02-04 PROCEDURE — 25010000002 FUROSEMIDE PER 20 MG: Performed by: INTERNAL MEDICINE

## 2023-02-04 PROCEDURE — 82962 GLUCOSE BLOOD TEST: CPT

## 2023-02-04 PROCEDURE — 63710000001 INSULIN DETEMIR PER 5 UNITS: Performed by: INTERNAL MEDICINE

## 2023-02-04 PROCEDURE — 80053 COMPREHEN METABOLIC PANEL: CPT | Performed by: INTERNAL MEDICINE

## 2023-02-04 PROCEDURE — 84145 PROCALCITONIN (PCT): CPT | Performed by: INTERNAL MEDICINE

## 2023-02-04 PROCEDURE — 87449 NOS EACH ORGANISM AG IA: CPT | Performed by: INTERNAL MEDICINE

## 2023-02-04 PROCEDURE — 63710000001 INSULIN LISPRO (HUMAN) PER 5 UNITS: Performed by: INTERNAL MEDICINE

## 2023-02-04 PROCEDURE — 25010000002 HYDROMORPHONE 1 MG/ML SOLUTION: Performed by: INTERNAL MEDICINE

## 2023-02-04 PROCEDURE — 25010000002 AZITHROMYCIN PER 500 MG: Performed by: INTERNAL MEDICINE

## 2023-02-04 PROCEDURE — 25010000002 ENOXAPARIN PER 10 MG: Performed by: INTERNAL MEDICINE

## 2023-02-04 RX ORDER — AMLODIPINE BESYLATE 5 MG/1
5 TABLET ORAL
Status: DISCONTINUED | OUTPATIENT
Start: 2023-02-04 | End: 2023-02-06 | Stop reason: HOSPADM

## 2023-02-04 RX ORDER — ALUMINA, MAGNESIA, AND SIMETHICONE 2400; 2400; 240 MG/30ML; MG/30ML; MG/30ML
15 SUSPENSION ORAL EVERY 6 HOURS PRN
Status: DISCONTINUED | OUTPATIENT
Start: 2023-02-04 | End: 2023-02-06 | Stop reason: HOSPADM

## 2023-02-04 RX ORDER — SERTRALINE HYDROCHLORIDE 100 MG/1
100 TABLET, FILM COATED ORAL DAILY
Status: DISCONTINUED | OUTPATIENT
Start: 2023-02-04 | End: 2023-02-04 | Stop reason: DRUGHIGH

## 2023-02-04 RX ORDER — TEMAZEPAM 7.5 MG/1
15 CAPSULE ORAL NIGHTLY PRN
Status: DISCONTINUED | OUTPATIENT
Start: 2023-02-04 | End: 2023-02-05

## 2023-02-04 RX ORDER — LISINOPRIL 20 MG/1
40 TABLET ORAL
Status: DISCONTINUED | OUTPATIENT
Start: 2023-02-04 | End: 2023-02-06 | Stop reason: HOSPADM

## 2023-02-04 RX ORDER — POLYETHYLENE GLYCOL 3350 17 G/17G
17 POWDER, FOR SOLUTION ORAL DAILY PRN
Status: DISCONTINUED | OUTPATIENT
Start: 2023-02-04 | End: 2023-02-06 | Stop reason: HOSPADM

## 2023-02-04 RX ORDER — TEMAZEPAM 7.5 MG/1
15 CAPSULE ORAL NIGHTLY PRN
Status: DISCONTINUED | OUTPATIENT
Start: 2023-02-04 | End: 2023-02-04 | Stop reason: SDUPTHER

## 2023-02-04 RX ORDER — ALPRAZOLAM 0.25 MG/1
0.25 TABLET ORAL EVERY 6 HOURS PRN
Status: DISCONTINUED | OUTPATIENT
Start: 2023-02-04 | End: 2023-02-04

## 2023-02-04 RX ORDER — ZOLPIDEM TARTRATE 10 MG/1
10 TABLET ORAL
Status: ON HOLD | COMMUNITY
Start: 2023-01-13 | End: 2023-03-08

## 2023-02-04 RX ORDER — BISOPROLOL FUMARATE 5 MG/1
10 TABLET, FILM COATED ORAL ONCE
Status: COMPLETED | OUTPATIENT
Start: 2023-02-04 | End: 2023-02-04

## 2023-02-04 RX ORDER — PANTOPRAZOLE SODIUM 40 MG/1
40 TABLET, DELAYED RELEASE ORAL DAILY
Status: DISCONTINUED | OUTPATIENT
Start: 2023-02-04 | End: 2023-02-06 | Stop reason: HOSPADM

## 2023-02-04 RX ORDER — MIRTAZAPINE 15 MG/1
30 TABLET, FILM COATED ORAL NIGHTLY
Status: DISCONTINUED | OUTPATIENT
Start: 2023-02-04 | End: 2023-02-06 | Stop reason: HOSPADM

## 2023-02-04 RX ORDER — METHYLPREDNISOLONE SODIUM SUCCINATE 40 MG/ML
40 INJECTION, POWDER, LYOPHILIZED, FOR SOLUTION INTRAMUSCULAR; INTRAVENOUS EVERY 8 HOURS
Status: DISCONTINUED | OUTPATIENT
Start: 2023-02-04 | End: 2023-02-06

## 2023-02-04 RX ORDER — IPRATROPIUM BROMIDE AND ALBUTEROL SULFATE 2.5; .5 MG/3ML; MG/3ML
SOLUTION RESPIRATORY (INHALATION)
Status: COMPLETED
Start: 2023-02-04 | End: 2023-02-04

## 2023-02-04 RX ORDER — AMOXICILLIN 250 MG
1 CAPSULE ORAL 2 TIMES DAILY
Status: DISCONTINUED | OUTPATIENT
Start: 2023-02-04 | End: 2023-02-06 | Stop reason: HOSPADM

## 2023-02-04 RX ORDER — BISOPROLOL FUMARATE AND HYDROCHLOROTHIAZIDE 10; 6.25 MG/1; MG/1
1 TABLET ORAL DAILY
Status: DISCONTINUED | OUTPATIENT
Start: 2023-02-04 | End: 2023-02-06 | Stop reason: HOSPADM

## 2023-02-04 RX ORDER — DEXTROSE MONOHYDRATE 25 G/50ML
10-50 INJECTION, SOLUTION INTRAVENOUS
Status: DISCONTINUED | OUTPATIENT
Start: 2023-02-04 | End: 2023-02-06 | Stop reason: HOSPADM

## 2023-02-04 RX ORDER — INSULIN LISPRO 100 [IU]/ML
1-200 INJECTION, SOLUTION INTRAVENOUS; SUBCUTANEOUS AS NEEDED
Status: DISCONTINUED | OUTPATIENT
Start: 2023-02-04 | End: 2023-02-06 | Stop reason: HOSPADM

## 2023-02-04 RX ORDER — IPRATROPIUM BROMIDE AND ALBUTEROL SULFATE 2.5; .5 MG/3ML; MG/3ML
3 SOLUTION RESPIRATORY (INHALATION)
Status: DISCONTINUED | OUTPATIENT
Start: 2023-02-04 | End: 2023-02-06 | Stop reason: HOSPADM

## 2023-02-04 RX ORDER — INSULIN LISPRO 100 [IU]/ML
1-200 INJECTION, SOLUTION INTRAVENOUS; SUBCUTANEOUS
Status: DISCONTINUED | OUTPATIENT
Start: 2023-02-04 | End: 2023-02-06 | Stop reason: HOSPADM

## 2023-02-04 RX ORDER — BISACODYL 5 MG/1
5 TABLET, DELAYED RELEASE ORAL DAILY PRN
Status: DISCONTINUED | OUTPATIENT
Start: 2023-02-04 | End: 2023-02-06 | Stop reason: HOSPADM

## 2023-02-04 RX ORDER — ENOXAPARIN SODIUM 100 MG/ML
40 INJECTION SUBCUTANEOUS EVERY 24 HOURS
Status: DISCONTINUED | OUTPATIENT
Start: 2023-02-04 | End: 2023-02-06 | Stop reason: HOSPADM

## 2023-02-04 RX ORDER — NALOXONE HCL 0.4 MG/ML
0.4 VIAL (ML) INJECTION
Status: DISCONTINUED | OUTPATIENT
Start: 2023-02-04 | End: 2023-02-06 | Stop reason: HOSPADM

## 2023-02-04 RX ORDER — BISOPROLOL FUMARATE AND HYDROCHLOROTHIAZIDE 10; 6.25 MG/1; MG/1
1 TABLET ORAL EVERY 12 HOURS SCHEDULED
Status: ON HOLD | COMMUNITY
Start: 2023-01-12

## 2023-02-04 RX ORDER — BISOPROLOL FUMARATE AND HYDROCHLOROTHIAZIDE 10; 6.25 MG/1; MG/1
1 TABLET ORAL
Status: DISCONTINUED | OUTPATIENT
Start: 2023-02-04 | End: 2023-02-04 | Stop reason: CLARIF

## 2023-02-04 RX ORDER — BISACODYL 10 MG
10 SUPPOSITORY, RECTAL RECTAL DAILY PRN
Status: DISCONTINUED | OUTPATIENT
Start: 2023-02-04 | End: 2023-02-06 | Stop reason: HOSPADM

## 2023-02-04 RX ORDER — NICOTINE POLACRILEX 4 MG
15 LOZENGE BUCCAL
Status: DISCONTINUED | OUTPATIENT
Start: 2023-02-04 | End: 2023-02-06 | Stop reason: HOSPADM

## 2023-02-04 RX ORDER — HYDROXYZINE HYDROCHLORIDE 10 MG/1
10 TABLET, FILM COATED ORAL 4 TIMES DAILY PRN
Status: DISCONTINUED | OUTPATIENT
Start: 2023-02-04 | End: 2023-02-06 | Stop reason: HOSPADM

## 2023-02-04 RX ORDER — FUROSEMIDE 10 MG/ML
20 INJECTION INTRAMUSCULAR; INTRAVENOUS EVERY 12 HOURS
Status: DISCONTINUED | OUTPATIENT
Start: 2023-02-04 | End: 2023-02-06 | Stop reason: HOSPADM

## 2023-02-04 RX ORDER — OXYCODONE AND ACETAMINOPHEN 10; 325 MG/1; MG/1
1 TABLET ORAL EVERY 6 HOURS PRN
Status: DISCONTINUED | OUTPATIENT
Start: 2023-02-04 | End: 2023-02-06 | Stop reason: HOSPADM

## 2023-02-04 RX ORDER — NORTRIPTYLINE HYDROCHLORIDE 25 MG/1
25 CAPSULE ORAL NIGHTLY
Status: DISCONTINUED | OUTPATIENT
Start: 2023-02-04 | End: 2023-02-04

## 2023-02-04 RX ORDER — ACETAMINOPHEN 325 MG/1
650 TABLET ORAL EVERY 4 HOURS PRN
Status: DISCONTINUED | OUTPATIENT
Start: 2023-02-04 | End: 2023-02-06 | Stop reason: HOSPADM

## 2023-02-04 RX ORDER — HYDROCHLOROTHIAZIDE 25 MG/1
6.25 TABLET ORAL ONCE
Status: COMPLETED | OUTPATIENT
Start: 2023-02-04 | End: 2023-02-04

## 2023-02-04 RX ORDER — ONDANSETRON 2 MG/ML
4 INJECTION INTRAMUSCULAR; INTRAVENOUS EVERY 4 HOURS PRN
Status: DISCONTINUED | OUTPATIENT
Start: 2023-02-04 | End: 2023-02-06 | Stop reason: HOSPADM

## 2023-02-04 RX ORDER — MIRTAZAPINE 45 MG/1
45 TABLET, FILM COATED ORAL NIGHTLY PRN
Status: ON HOLD | COMMUNITY
Start: 2023-01-08

## 2023-02-04 RX ADMIN — AZITHROMYCIN MONOHYDRATE 500 MG: 500 INJECTION, POWDER, LYOPHILIZED, FOR SOLUTION INTRAVENOUS at 00:58

## 2023-02-04 RX ADMIN — SENNOSIDES AND DOCUSATE SODIUM 1 TABLET: 8.6; 5 TABLET ORAL at 00:57

## 2023-02-04 RX ADMIN — IPRATROPIUM BROMIDE AND ALBUTEROL SULFATE 3 ML: .5; 2.5 SOLUTION RESPIRATORY (INHALATION) at 18:38

## 2023-02-04 RX ADMIN — INSULIN LISPRO 4 UNITS: 100 INJECTION, SOLUTION INTRAVENOUS; SUBCUTANEOUS at 02:25

## 2023-02-04 RX ADMIN — TEMAZEPAM 15 MG: 7.5 CAPSULE ORAL at 20:20

## 2023-02-04 RX ADMIN — METHYLPREDNISOLONE SODIUM SUCCINATE 40 MG: 40 INJECTION, POWDER, FOR SOLUTION INTRAMUSCULAR; INTRAVENOUS at 00:58

## 2023-02-04 RX ADMIN — SERTRALINE HYDROCHLORIDE 150 MG: 50 TABLET ORAL at 14:09

## 2023-02-04 RX ADMIN — IPRATROPIUM BROMIDE AND ALBUTEROL SULFATE 3 ML: .5; 2.5 SOLUTION RESPIRATORY (INHALATION) at 07:15

## 2023-02-04 RX ADMIN — NORTRIPTYLINE HYDROCHLORIDE 25 MG: 25 CAPSULE ORAL at 00:56

## 2023-02-04 RX ADMIN — IPRATROPIUM BROMIDE AND ALBUTEROL SULFATE 3 ML: .5; 2.5 SOLUTION RESPIRATORY (INHALATION) at 11:40

## 2023-02-04 RX ADMIN — OXYCODONE AND ACETAMINOPHEN 1 TABLET: 10; 325 TABLET ORAL at 09:18

## 2023-02-04 RX ADMIN — HYDROMORPHONE HYDROCHLORIDE 0.5 MG: 1 INJECTION, SOLUTION INTRAMUSCULAR; INTRAVENOUS; SUBCUTANEOUS at 01:19

## 2023-02-04 RX ADMIN — TEMAZEPAM 15 MG: 7.5 CAPSULE ORAL at 01:58

## 2023-02-04 RX ADMIN — HYDROMORPHONE HYDROCHLORIDE 0.5 MG: 1 INJECTION, SOLUTION INTRAMUSCULAR; INTRAVENOUS; SUBCUTANEOUS at 05:53

## 2023-02-04 RX ADMIN — HYDROMORPHONE HYDROCHLORIDE 0.5 MG: 1 INJECTION, SOLUTION INTRAMUSCULAR; INTRAVENOUS; SUBCUTANEOUS at 03:44

## 2023-02-04 RX ADMIN — INSULIN LISPRO 2 UNITS: 100 INJECTION, SOLUTION INTRAVENOUS; SUBCUTANEOUS at 20:19

## 2023-02-04 RX ADMIN — Medication 10 ML: at 20:22

## 2023-02-04 RX ADMIN — OXYCODONE AND ACETAMINOPHEN 1 TABLET: 10; 325 TABLET ORAL at 18:26

## 2023-02-04 RX ADMIN — INSULIN LISPRO 9 UNITS: 100 INJECTION, SOLUTION INTRAVENOUS; SUBCUTANEOUS at 17:52

## 2023-02-04 RX ADMIN — HYDROMORPHONE HYDROCHLORIDE 0.5 MG: 1 INJECTION, SOLUTION INTRAMUSCULAR; INTRAVENOUS; SUBCUTANEOUS at 10:32

## 2023-02-04 RX ADMIN — BISOPROLOL FUMARATE 10 MG: 5 TABLET ORAL at 02:13

## 2023-02-04 RX ADMIN — ENOXAPARIN SODIUM 40 MG: 100 INJECTION SUBCUTANEOUS at 09:00

## 2023-02-04 RX ADMIN — FUROSEMIDE 20 MG: 10 INJECTION, SOLUTION INTRAMUSCULAR; INTRAVENOUS at 00:58

## 2023-02-04 RX ADMIN — AMLODIPINE BESYLATE 5 MG: 5 TABLET ORAL at 09:00

## 2023-02-04 RX ADMIN — PANTOPRAZOLE SODIUM 40 MG: 40 TABLET, DELAYED RELEASE ORAL at 09:00

## 2023-02-04 RX ADMIN — METHYLPREDNISOLONE SODIUM SUCCINATE 40 MG: 40 INJECTION, POWDER, FOR SOLUTION INTRAMUSCULAR; INTRAVENOUS at 17:50

## 2023-02-04 RX ADMIN — HYDROCHLOROTHIAZIDE 6.25 MG: 25 TABLET ORAL at 02:13

## 2023-02-04 RX ADMIN — SENNOSIDES AND DOCUSATE SODIUM 1 TABLET: 8.6; 5 TABLET ORAL at 09:00

## 2023-02-04 RX ADMIN — MIRTAZAPINE 30 MG: 15 TABLET, FILM COATED ORAL at 20:22

## 2023-02-04 RX ADMIN — INSULIN LISPRO 8 UNITS: 100 INJECTION, SOLUTION INTRAVENOUS; SUBCUTANEOUS at 09:18

## 2023-02-04 RX ADMIN — LISINOPRIL 40 MG: 20 TABLET ORAL at 09:00

## 2023-02-04 RX ADMIN — METHYLPREDNISOLONE SODIUM SUCCINATE 40 MG: 40 INJECTION, POWDER, FOR SOLUTION INTRAMUSCULAR; INTRAVENOUS at 09:01

## 2023-02-04 RX ADMIN — HYDROMORPHONE HYDROCHLORIDE 0.5 MG: 1 INJECTION, SOLUTION INTRAMUSCULAR; INTRAVENOUS; SUBCUTANEOUS at 17:23

## 2023-02-04 RX ADMIN — SENNOSIDES AND DOCUSATE SODIUM 1 TABLET: 8.6; 5 TABLET ORAL at 20:21

## 2023-02-04 RX ADMIN — INSULIN DETEMIR 23 UNITS: 100 INJECTION, SOLUTION SUBCUTANEOUS at 20:19

## 2023-02-04 RX ADMIN — INSULIN LISPRO 8 UNITS: 100 INJECTION, SOLUTION INTRAVENOUS; SUBCUTANEOUS at 12:55

## 2023-02-04 RX ADMIN — INSULIN DETEMIR 19 UNITS: 100 INJECTION, SOLUTION SUBCUTANEOUS at 00:43

## 2023-02-04 RX ADMIN — FUROSEMIDE 20 MG: 10 INJECTION, SOLUTION INTRAMUSCULAR; INTRAVENOUS at 12:55

## 2023-02-04 RX ADMIN — HYDROMORPHONE HYDROCHLORIDE 0.5 MG: 1 INJECTION, SOLUTION INTRAMUSCULAR; INTRAVENOUS; SUBCUTANEOUS at 22:39

## 2023-02-04 NOTE — H&P
[facility]   HISTORY AND PHYSICAL    Patient Name: Italia Olvera  : 1960  MRN: [id number]  Primary Care Physician:  Carloz Shoemaker MD  Date of admission: 2/3/2023    Subjective   Subjective     Chief Complaint:   Chest pain and shortness of breath      HPI:    Italia Olvera is a 62 y.o. female came to emergency room yesterday evening with chest pain and shortness of breath.  Patient was evaluated with ER physician and recommended admission to hospital for COPD exacerbation and CHF.  ER physician also reported the patient is large pericardial effusion, reviewing records patient has old pericardial effusion nothing changed.  Patient denies any chest pain today but she is more fixated on her pain medications and sleeping meds patient reports she is not able to sleep for several days.  She has cough and congestion and shortness of breath and she continues to smoke.        Review of Systems:    No fever chills  Some shortness of breath with exertion  Denies orthopnea PND  Decreased sleep  No nausea vomiting or diarrhea  Denies any dizziness  Decreased sleep  No weight gain  Blood sugars running high      Personal History     Past Medical History:   Diagnosis Date   • Acid reflux    • ACL tear 2015    PCL/ACL TEAR/RUPTURE   • Acute shoulder bursitis, right 2015   • Anxiety    • Arthritis    • Asthma    • Bipolar 1 disorder (HCC)     untreated   • Bladder disorder    • Cancer ([facility])     CERVICAL CANCER   • CHF (congestive heart failure) ([facility])     ASYMPTOMATIC- NO CARDIOLOGIST- SEE'S DR SHOEMAKER (PCP)- DENIED CP/SOB   • Chronic back pain    • COPD (chronic obstructive pulmonary disease) ([facility])     USES INHALERS   • Depression    • Diabetes mellitus ([facility])     BG RUND AROUND 140 IN AM   • DJD (degenerative joint disease)    • Gangrene ([facility])     RT SECOND AND FIFTH TOES   • GERD (gastroesophageal reflux disease)    • HBP (high blood pressure)    • Hip pain 09/15/2015   • Hypertension    • Knee  injury 08/19/2015   • Knee pain, right 09/15/2015   • Limb swelling    • Neuropathy    • Osteoarthritis, knee 09/01/2015   • Osteoporosis    • Peripheral neuropathy    • Renal failure 1994    NO CURRENT PROBLEMS   • Seasonal allergies    • Shoulder tendonitis 08/23/2015   • Sleep apnea    • SOB (shortness of breath)     NONE CURENTLY   • Tendinitis of right rotator cuff 08/23/2015       Past Surgical History:   Procedure Laterality Date   • AMPUTATION DIGIT Right 12/6/2022    Procedure: Amputation of right second and fifth toes;  Surgeon: Gaibno Russell MD;  Location: [facility] MAIN OR;  Service: Vascular;  Laterality: Right;   • BACK SURGERY     • BLADDER REPAIR     • BRONCHOSCOPY N/A 07/10/2021    Procedure: BRONCHOSCOPY WITH BRONCHOALVEOLAR LAVAGE, POSSIBLE BIOPSY, BRUSHING, WASHING, AIRWAY INSPECTION;  Surgeon: Rodrigo Reyes MD;  Location: [facility] MAIN OR;  Service: Pulmonary;  Laterality: N/A;   • BRONCHOSCOPY N/A 07/10/2021    Procedure: BRONCHOSCOPY;  Surgeon: Rodrigo Reyes MD;  Location: [facility] ENDOSCOPY;  Service: Pulmonary;  Laterality: N/A;   • CARDIAC CATHETERIZATION Right 11/03/2022    Procedure: Aortogram with right leg angiogram, possible angioplasty or stenting ;  Surgeon: Gabino Russell MD;  Location: [facility] CATH INVASIVE LOCATION;  Service: Vascular;  Laterality: Right;   • CHOLECYSTECTOMY     • ENDOSCOPY     • FRACTURE SURGERY      SKULL FRACTURE   • GALLBLADDER SURGERY     • HERNIA REPAIR     • HYSTERECTOMY     • INTERVENTIONAL RADIOLOGY PROCEDURE Right 03/03/2022    Procedure: Abdominal Aortagram with Runoff;  Surgeon: Marleny Yoon MD;  Location: [facility] CATH INVASIVE LOCATION;  Service: Peripheral Vascular;  Laterality: Right;   • JOINT REPLACEMENT     • OTHER SURGICAL HISTORY      ARTIFICIAL JOINTS/LIMBS   • OTHER SURGICAL HISTORY      FACE SURGERY, UNSPECIFIED   • TOTAL HIP ARTHROPLASTY Right    • TOTAL KNEE ARTHROPLASTY Left        Family History: family history includes Arthritis in  her brother, daughter, mother, and sister; Breast cancer in her sister; Colon cancer in her sister; Diabetes in her brother; Heart disease in her brother and father; Lung cancer in her sister; Osteoporosis in her mother and sister; Stroke in her mother; Throat cancer in her mother. Otherwise pertinent FHx was reviewed and not pertinent to current issue.    Social History:  reports that she has been smoking cigarettes. She started smoking about 43 years ago. She has a 25.00 pack-year smoking history. She has never used smokeless tobacco. She reports that she does not drink alcohol and does not use drugs.    Home Medications:  amLODIPine-benazepril, aspirin, bisoprolol-hydrochlorothiazide, cholestyramine, clopidogrel, ferrous sulfate, furosemide, hydrOXYzine, insulin NPH-insulin regular, insulin detemir, ipratropium-albuterol, metFORMIN, mirtazapine, ondansetron, oxyCODONE-acetaminophen, pantoprazole, potassium chloride, sertraline, tiZANidine, traZODone, vitamin B-12, and zolpidem      Allergies:  No Known Allergies    Objective   Objective     Vitals:   Temp:  [97.5 °F (36.4 °C)-98.4 °F (36.9 °C)] 98.4 °F (36.9 °C)  Heart Rate:  [62-96] 70  Resp:  [16-28] 18  BP: (165-216)/() 165/74  Flow (L/min):  [2-3] 3    Physical Exam    Elderly female looks older than stated age, not in acute distress comfortable sitting in bed  No JVD distention or visualization  Heart regular distant sounds  Lungs diminished breath sounds  Abdomen is obese and soft  Extremities with edema  Right foot with amputation of toe postsurgical dressing      I have personally reviewed the results from the time of this admission to 2/4/2023 13:03 EST and agree with these findings:  [x]  Laboratory  []  Microbiology  []  Radiology  []  EKG/Telemetry   []  Cardiology/Vascular   []  Pathology  []  Old records  []  Other:    CBC:    WBC   Date Value Ref Range Status   02/04/2023 8.04 3.40 - 10.80 10*3/mm3 Final     RBC   Date Value Ref Range Status    02/04/2023 4.48 3.77 - 5.28 10*6/mm3 Final     Hemoglobin   Date Value Ref Range Status   02/04/2023 12.8 12.0 - 15.9 g/dL Final     Hematocrit   Date Value Ref Range Status   02/04/2023 39.7 34.0 - 46.6 % Final     MCV   Date Value Ref Range Status   02/04/2023 88.6 79.0 - 97.0 fL Final     MCH   Date Value Ref Range Status   02/04/2023 28.6 26.6 - 33.0 pg Final     MCHC   Date Value Ref Range Status   02/04/2023 32.2 31.5 - 35.7 g/dL Final     RDW   Date Value Ref Range Status   02/04/2023 17.6 (H) 12.3 - 15.4 % Final     RDW-SD   Date Value Ref Range Status   02/04/2023 57.1 (H) 37.0 - 54.0 fl Final     MPV   Date Value Ref Range Status   02/04/2023 11.9 6.0 - 12.0 fL Final     Platelets   Date Value Ref Range Status   02/04/2023 78 (L) 140 - 450 10*3/mm3 Final     Neutrophil %   Date Value Ref Range Status   02/04/2023 49.4 42.7 - 76.0 % Final     Lymphocyte %   Date Value Ref Range Status   02/04/2023 48.3 (H) 19.6 - 45.3 % Final     Monocyte %   Date Value Ref Range Status   02/04/2023 1.5 (L) 5.0 - 12.0 % Final     Eosinophil %   Date Value Ref Range Status   02/04/2023 0.1 (L) 0.3 - 6.2 % Final     Basophil %   Date Value Ref Range Status   02/04/2023 0.0 0.0 - 1.5 % Final     Immature Grans %   Date Value Ref Range Status   02/04/2023 0.7 (H) 0.0 - 0.5 % Final     Neutrophils, Absolute   Date Value Ref Range Status   02/04/2023 3.97 1.70 - 7.00 10*3/mm3 Final     Lymphocytes, Absolute   Date Value Ref Range Status   02/04/2023 3.88 (H) 0.70 - 3.10 10*3/mm3 Final     Monocytes, Absolute   Date Value Ref Range Status   02/04/2023 0.12 0.10 - 0.90 10*3/mm3 Final     Eosinophils, Absolute   Date Value Ref Range Status   02/04/2023 0.01 0.00 - 0.40 10*3/mm3 Final     Basophils, Absolute   Date Value Ref Range Status   02/04/2023 0.00 0.00 - 0.20 10*3/mm3 Final     Immature Grans, Absolute   Date Value Ref Range Status   02/04/2023 0.06 (H) 0.00 - 0.05 10*3/mm3 Final     nRBC   Date Value Ref Range Status    02/04/2023 0.0 0.0 - 0.2 /100 WBC Final        BMP:    Lab Results   Component Value Date    GLUCOSE 346 (H) 02/04/2023    BUN 17 02/04/2023    CREATININE 0.72 02/04/2023    EGFRIFNONA 98 02/28/2022    BCR 23.6 02/04/2023    K 4.5 02/04/2023    CO2 24.1 02/04/2023    CALCIUM 8.3 (L) 02/04/2023    ALBUMIN 3.2 (L) 02/04/2023    LABIL2 0.6 (L) 06/04/2021    AST 21 02/04/2023    ALT 14 02/04/2023        CT Chest With Contrast Diagnostic    Result Date: 2/3/2023    1. No pulmonary embolism is seen.  2. There is moderate cardiomegaly.  3. A moderate-to-large pericardial effusion is seen, as before.  4. Bilateral infiltrates are present, which are predominantly centrilobular in their CT appearance; they may represent an infectious pneumonitis/bronchiolitis.  Pulmonary edema cannot be excluded.  5. Minimal if any pleural effusion is seen.  6. Cirrhosis is suspected.  There is splenomegaly.  7. The patient has undergone cholecystectomy. 8. Please see above comments for further detail. 1.   Please note that portions of this note were completed with a voice recognition program.  JAILYN LEÓN JR, MD       Electronically Signed and Approved By: JAILYN LEÓN JR, MD on 2/03/2023 at 22:41              XR Chest 1 View    Result Date: 2/3/2023    1. Bilateral interstitial opacities throughout both lungs concerning for atypical infection.        JUAREZ MUNGUIA MD       Electronically Signed and Approved By: JUAREZ MUNGUIA MD on 2/03/2023 at 19:24                      Assessment & Plan   Assessment / Plan       Current Diagnosis:  Active Hospital Problems    Diagnosis    • **Chest pain in adult    • Pericardial effusion    • Anxiety disorder    • Peripheral neuropathy    • T2DM (type 2 diabetes mellitus) (CMS/HCC)- uncontrolled    • Multifocal pneumonia    • Hypertension      Plan:   Admit to hospital, rule out MI with serial enzymes.  Patient symptomatology mostly appearing related to COPD and volume overload versus CHF.   Patient's chest CT showing bilateral infiltrates.  We will proceed with pneumonia work-up.  We will also get a procalcitonin level and lactic acid which were not done in ER.  Empiric antibiotic for now IV diuretics,  check an echo.    Blood sugars high, will use insulin and sliding scale  Blood pressure high, adjust antihypertensives  Patient very much concerned and fixated on pain meds and sleep meds patient on multiple psychiatric medicines as well as sleeping aids.  We will try Restoril.  DC all other meds as patient not getting any benefit out of it.  Discussed with patient in detail.  Advised to stop smoking.  Wound care nurse from continued wound care from recent amputation of toe and surgery.  Monitor labs closely        DVT prophylaxis:  Medical DVT prophylaxis orders are present.    GI Prophylaxis:    Pepcid    CODE STATUS:    Code Status (Patient has no pulse and is not breathing): CPR (Attempt to Resuscitate)  Medical Interventions (Patient has pulse or is breathing): Full Support    Admission Status:  I believe this patient meets inpatient status.             I have dictated this note utilizing Dragon Dictation.             Please note that portions of this note were completed with a voice recognition program.             Part of this note may be an electronic transcription/translation of spoken language to printed text         using the Dragon Dictation System.       Electronically signed by Rudy Garnett MD, 02/04/23, 12:55 PM EST.    Total time spent with in evaluation and management:

## 2023-02-04 NOTE — PAYOR COMM NOTE
"Italia Olvera (62 y.o. Female)     Date of Birth   1960    Social Security Number       Address   [address] [place] [address]    Home Phone   [phone]    MRN   [id number]       [facility]    Marital Status                               Admission Date   2/3/23    Admission Type   Emergency    Admitting Provider   Rudy Garnett MD    Attending Provider   Rudy Garnett MD    Department, Room/Bed   [facility] 4TH FLOOR MEDICAL TELEMETRY UNIT, 412/1       Discharge Date       Discharge Disposition       Discharge Destination                               Attending Provider: Rudy Garnett MD    Allergies: No Known Allergies    Isolation: None   Infection: None   Code Status: CPR    Ht: 165.1 cm (65\")   Wt: 77.5 kg (170 lb 13.7 oz)    Admission Cmt: None   Principal Problem: Chest pain in adult [R07.9]                 Active Insurance as of 2/3/2023     Primary Coverage     Payor Plan Insurance Group Employer/Plan Group    ANTHEM MEDICARE REPLACEMENT ANTHEM MEDICARE ADVANTAGE [id number]     Payor Plan Address Payor Plan Phone Number Payor Plan Fax Number Effective Dates    [address] [phone]  1/1/2020 - None Entered    [facility] [address]       Subscriber Name Subscriber Birth Date Member ID       ITALIA OLVERA 1960 [id number]                 Emergency Contacts      (Rel.) Home Phone Work Phone Mobile Phone    HANNA OLVERA (Spouse) [phone] -- [phone]    Darrin Olvera (Son) [phone] -- [phone]      AVAILITY# [id number] [P] FAXED CLINICALS  [MIN AVAIL]                                     [facility]            [phone]  F [phone]    History & Physical    No notes of this type exist for this encounter.            Emergency Department Notes      Lizandro Ahn MD at 02/03/23 2018          Time: 8:18 PM EST  Date of encounter:  2/3/2023  Independent Historian/Clinical History and " Information was obtained by:   Patient  Chief Complaint: chest pain    History is limited by: N/A    History of Present Illness:  Patient is a 62 y.o. year old female who presents to the emergency department for evaluation of chest pain. Pt reports she had a R toe amputation last month. She states she woke up today at about 1000 with stabbing chest pain. She states she is still in severe pain now. She also reports pain in her mid back, nausea, and chills. She denies fever. She reports she experienced some relief after nitroglycerin. She reports hx of MI and diabetes. She denies having any stents in her heart but reports she has one in her leg.    HPI    Patient Care Team  Primary Care Provider: Carloz Shoemaker MD    Past Medical History:     No Known Allergies  Past Medical History:   Diagnosis Date   • Acid reflux    • ACL tear 09/01/2015    PCL/ACL TEAR/RUPTURE   • Acute shoulder bursitis, right 08/23/2015   • Anxiety    • Arthritis    • Asthma    • Bipolar 1 disorder ([facility])     untreated   • Bladder disorder    • Cancer ([facility])     CERVICAL CANCER   • CHF (congestive heart failure) ([facility])     ASYMPTOMATIC- NO CARDIOLOGIST- SEE'S DR SHOEMAKER (PCP)- DENIED CP/SOB   • Chronic back pain    • COPD (chronic obstructive pulmonary disease) ([facility])     USES INHALERS   • Depression    • Diabetes mellitus ([facility])     BG RUND AROUND 140 IN AM   • DJD (degenerative joint disease)    • Gangrene ([facility])     RT SECOND AND FIFTH TOES   • GERD (gastroesophageal reflux disease)    • HBP (high blood pressure)    • Hip pain 09/15/2015   • Hypertension    • Knee injury 08/19/2015   • Knee pain, right 09/15/2015   • Limb swelling    • Neuropathy    • Osteoarthritis, knee 09/01/2015   • Osteoporosis    • Peripheral neuropathy    • Renal failure 1994    NO CURRENT PROBLEMS   • Seasonal allergies    • Shoulder tendonitis 08/23/2015   • Sleep apnea    • SOB (shortness of breath)     NONE CURENTLY   • Tendinitis of right rotator cuff 08/23/2015      Past Surgical History:   Procedure Laterality Date   • AMPUTATION DIGIT Right 12/6/2022    Procedure: Amputation of right second and fifth toes;  Surgeon: Gabino Russell MD;  Location: [facility] MAIN OR;  Service: Vascular;  Laterality: Right;   • BACK SURGERY     • BLADDER REPAIR     • BRONCHOSCOPY N/A 07/10/2021    Procedure: BRONCHOSCOPY WITH BRONCHOALVEOLAR LAVAGE, POSSIBLE BIOPSY, BRUSHING, WASHING, AIRWAY INSPECTION;  Surgeon: Rodrigo Reyes MD;  Location: [facility] MAIN OR;  Service: Pulmonary;  Laterality: N/A;   • BRONCHOSCOPY N/A 07/10/2021    Procedure: BRONCHOSCOPY;  Surgeon: Rodrigo Reyes MD;  Location: [facility] ENDOSCOPY;  Service: Pulmonary;  Laterality: N/A;   • CARDIAC CATHETERIZATION Right 11/03/2022    Procedure: Aortogram with right leg angiogram, possible angioplasty or stenting ;  Surgeon: Gabino Russell MD;  Location: [facility] CATH INVASIVE LOCATION;  Service: Vascular;  Laterality: Right;   • CHOLECYSTECTOMY     • ENDOSCOPY     • FRACTURE SURGERY      SKULL FRACTURE   • GALLBLADDER SURGERY     • HERNIA REPAIR     • HYSTERECTOMY     • INTERVENTIONAL RADIOLOGY PROCEDURE Right 03/03/2022    Procedure: Abdominal Aortagram with Runoff;  Surgeon: Marleny Yoon MD;  Location: [facility] CATH INVASIVE LOCATION;  Service: Peripheral Vascular;  Laterality: Right;   • JOINT REPLACEMENT     • OTHER SURGICAL HISTORY      ARTIFICIAL JOINTS/LIMBS   • OTHER SURGICAL HISTORY      FACE SURGERY, UNSPECIFIED   • TOTAL HIP ARTHROPLASTY Right    • TOTAL KNEE ARTHROPLASTY Left      Family History   Problem Relation Age of Onset   • Stroke Mother    • Throat cancer Mother    • Arthritis Mother    • Osteoporosis Mother    • Heart disease Father    • Breast cancer Sister    • Colon cancer Sister    • Lung cancer Sister    • Arthritis Sister    • Osteoporosis Sister    • Heart disease Brother    • Diabetes Brother    • Arthritis Brother    • Arthritis Daughter        Home Medications:  Prior to Admission medications     Medication Sig Start Date End Date Taking? Authorizing Provider   amLODIPine-benazepril (LOTREL) 10-40 MG per capsule Take 1 capsule by mouth Daily.    Patria Hanson MD   aspirin 81 MG EC tablet Take 1 tablet by mouth Daily.  Patient taking differently: Take 81 mg by mouth Daily. PER NAVARRO AT DR SHANAE MCLAUGHLIN TO CONTINUE TAKING FOR SURGERY 10/17/22 10/17/23  Gabino Russell MD   atorvastatin (LIPITOR) 10 MG tablet Take 10 mg by mouth Daily.    Patria Hanson MD   BISOPROLOL-HYDROCHLOROTHIAZIDE PO Take 10 mg by mouth 2 (two) times a day. 8/24/21   Patria Hanson MD   cholestyramine (QUESTRAN) 4 g packet Take 1 packet by mouth 2 (Two) Times a Day With Meals. 3/1/22   Patria Hanson MD   clopidogrel (PLAVIX) 75 MG tablet Take 75 mg by mouth Daily. PER DR SHANAE MCLAUGHLNI TO CONTINUE TAKING FOR SURGERY    Patria Hanson MD   doxepin (SINEquan) 25 MG capsule Take 25 mg by mouth every night at bedtime. 11/17/22   Patria Hanson MD   ferrous sulfate 324 (65 Fe) MG tablet delayed-release EC tablet Take 324 mg by mouth Daily With Breakfast.    Patria Hanson MD   furosemide (LASIX) 80 MG tablet Take 80 mg by mouth 2 (Two) Times a Day As Needed.    Patria Hanson MD   hydrOXYzine (ATARAX) 10 MG/5ML syrup Take 10 mg by mouth 4 (Four) Times a Day. 8/23/21   Patria Hanson MD   Insulin Detemir (LEVEMIR SC) Inject 25 Units under the skin into the appropriate area as directed Every Night. INSTRUCTED PER ANESTHESIA STANDING ORDERS 3/1/22   Patria Hanson MD   insulin NPH-insulin regular (humuLIN 70/30,novoLIN 70/30) (70-30) 100 UNIT/ML injection Inject 2.5 Units under the skin into the appropriate area as directed 2 (Two) Times a Day With Meals. INSTRUCTED PER ANESTHESIA STANDING ORDERS  SLIDING SCALE INSULIN    Patria Hanson MD   ipratropium-albuterol (DUO-NEB) 0.5-2.5 mg/3 ml nebulizer Take 3 mL by nebulization 4 (Four) Times a Day. 3/1/22    Patria Hanson MD   metFORMIN (GLUCOPHAGE) 500 MG tablet Take 500 mg by mouth 2 (Two) Times a Day With Meals. INSTRUCTED PER ANESTHESIA STANDING ORDERS    Patria Hanson MD   mirtazapine (REMERON) 30 MG tablet Take 30 mg by mouth Every Night.    Patria Hanson MD   nortriptyline (PAMELOR) 25 MG capsule Take 25 mg by mouth Every Night.    Patria Hanson MD   O2 (OXYGEN) Inhale 3 L/min Every Night. 8/18/21   Patria Hanson MD   ondansetron (ZOFRAN) 4 MG tablet Take 4 mg by mouth Every 8 (Eight) Hours As Needed for Nausea or Vomiting.    Patria Hanson MD   oxyCODONE-acetaminophen (PERCOCET)  MG per tablet Take 1 tablet by mouth Every 6 (Six) Hours As Needed for Severe Pain. GIVEN PER PCP 12/6/22   Gabino Russell MD   pantoprazole (PROTONIX) 40 MG EC tablet Take 40 mg by mouth Daily.    Patria Hanson MD   potassium chloride 10 MEQ CR tablet Take 20 mEq by mouth 2 (Two) Times a Day.    Patria Hanson MD   sertraline (ZOLOFT) 100 MG tablet Take 100 mg by mouth Daily. In addition to the 50 mg tablet to make 150 mg daily 7/27/21   Patria Hanson MD   sertraline (ZOLOFT) 50 MG tablet Take 50 mg by mouth Daily. TAKES WITH 100 MG TO EQUAL 150 MG    Patria Hanson MD   silver sulfadiazine (SILVADENE, SSD) 1 % cream Apply 1 application topically to the appropriate area as directed 2 (Two) Times a Day. This is for a bed sore that is on her lower back per . 9/30/21   Patria Hanson MD   tiZANidine (ZANAFLEX) 4 MG tablet Take 4 mg by mouth Every 8 (Eight) Hours As Needed for Muscle Spasms. 9/30/21   Patria Hanson MD   traZODone (DESYREL) 150 MG tablet Take 150 mg by mouth Every Night. 12/30/21   Patria Hanson MD   vitamin B-12 (CYANOCOBALAMIN) 2500 MCG sublingual tablet tablet Place  under the tongue Daily.    Patria Hanson MD        Social History:   Social History     Tobacco Use   • Smoking status: Every Day      "Packs/day: 0.50     Years: 50.00     Pack years: 25.00     Types: Cigarettes     Start date: 6/15/1979   • Smokeless tobacco: Never   • Tobacco comments:     SMOKED FOR 31 PLUS YEARS     INSTRUCTED NO SMOKING 24 HR PRIOR TO SURGERY    Vaping Use   • Vaping Use: Never used   Substance Use Topics   • Alcohol use: Never   • Drug use: Never         Review of Systems:  Review of Systems   Constitutional: Positive for chills. Negative for fever.   HENT: Negative for congestion, ear pain and sore throat.    Eyes: Negative for pain.   Respiratory: Negative for cough, chest tightness and shortness of breath.    Cardiovascular: Positive for chest pain.   Gastrointestinal: Positive for nausea. Negative for abdominal pain, diarrhea and vomiting.   Genitourinary: Negative for flank pain and hematuria.   Musculoskeletal: Positive for back pain (mid back). Negative for joint swelling.   Skin: Negative for pallor.   Neurological: Negative for seizures and headaches.   All other systems reviewed and are negative.       Physical Exam:  BP (!) 213/89   Pulse 88   Temp 98.3 °F (36.8 °C) (Oral)   Resp 28   Ht 165.1 cm (65\")   Wt 76.1 kg (167 lb 12.3 oz)   SpO2 96%   BMI 27.92 kg/m²     Physical Exam  Vitals and nursing note reviewed.   Constitutional:       General: She is not in acute distress.     Appearance: Normal appearance. She is not toxic-appearing.   HENT:      Head: Normocephalic and atraumatic.      Mouth/Throat:      Mouth: Mucous membranes are moist.   Eyes:      General: No scleral icterus.  Cardiovascular:      Rate and Rhythm: Normal rate and regular rhythm.      Pulses: Normal pulses.      Heart sounds: Normal heart sounds.   Pulmonary:      Effort: Pulmonary effort is normal. No respiratory distress.      Breath sounds: Normal breath sounds.   Abdominal:      General: Abdomen is flat.      Palpations: Abdomen is soft.      Tenderness: There is no abdominal tenderness.   Musculoskeletal:         General: Normal " range of motion.      Cervical back: Normal range of motion and neck supple.   Feet:      Comments: R foot is in a surgical dressing; Mild erythema to toes of R foot  Skin:     General: Skin is warm and dry.   Neurological:      Mental Status: She is alert and oriented to person, place, and time. Mental status is at baseline.                 Procedures:  Procedures      Medical Decision Making:      Comorbidities that affect care:    Asthma, Cancer, Congestive Heart Failure, COPD, Diabetes, Hypertension, Smoking    External Notes reviewed:    Previous Admission Note      The following orders were placed and all results were independently analyzed by me:  Orders Placed This Encounter   Procedures   • XR Chest 1 View   • Clarence Draw   • Comprehensive Metabolic Panel   • Lipase   • BNP   • Magnesium   • CBC Auto Differential   • Scan Slide   • NPO Diet NPO Type: Strict NPO   • Undress & Gown   • Cardiac Monitoring   • Continuous Pulse Oximetry   • Oxygen Therapy- Nasal Cannula; 2 LPM; Titrate for SPO2: equal to or greater than, 92%   • POC Troponin I   • POC Troponin I   • POC Troponin I   • ECG 12 Lead ED Triage Standing Order; Chest Pain   • ECG 12 Lead ED Triage Standing Order; Chest Pain   • Insert Peripheral IV   • POC Troponin I with Hold Tube   • CBC & Differential   • Green Top (Gel)   • Lavender Top   • Gold Top - SST   • Light Blue Top   • HOLD Troponin-I Tube   • HOLD Troponin-I Tube       Medications Given in the Emergency Department:  Medications   sodium chloride 0.9 % flush 10 mL (has no administration in time range)   aspirin chewable tablet 324 mg (324 mg Oral Not Given 2/3/23 1900)        ED Course:    ED Course as of 02/03/23 2254   Fri Feb 03, 2023 2016 EKG: Rate 72, normal P waves, left ventricular hypertrophy, normal ST segment, normal QT interval, no previous for comparison. [RW]   2016 Troponin I: 0.04 [RW]      ED Course User Index  [RW] Lizandro Ahn MD       Labs:    Lab Results (last 24  hours)     Procedure Component Value Units Date/Time    POC Troponin I with Hold Tube [[id number]] Collected: 02/03/23 1930    Specimen: Blood Updated: 02/03/23 1953    Narrative:      The following orders were created for panel order POC Troponin I with Hold Tube.  Procedure                               Abnormality         Status                     ---------                               -----------         ------                     POC Troponin I[[id number]]                                                              HOLD Troponin-I Tube[[id number]]                             Final result                 Please view results for these tests on the individual orders.    CBC & Differential [[id number]]  (Abnormal) Collected: 02/03/23 1930    Specimen: Blood Updated: 02/03/23 2012    Narrative:      The following orders were created for panel order CBC & Differential.  Procedure                               Abnormality         Status                     ---------                               -----------         ------                     CBC Auto Differential[[id number]]        Abnormal            Final result               Scan Slide[[id number]]                                       Final result                 Please view results for these tests on the individual orders.    Comprehensive Metabolic Panel [[id number]]  (Abnormal) Collected: 02/03/23 1930    Specimen: Blood Updated: 02/03/23 2005     Glucose 197 mg/dL      BUN 14 mg/dL      Creatinine 0.64 mg/dL      Sodium 142 mmol/L      Potassium 3.8 mmol/L      Chloride 108 mmol/L      CO2 24.3 mmol/L      Calcium 8.7 mg/dL      Total Protein 6.8 g/dL      Albumin 3.3 g/dL      ALT (SGPT) 15 U/L      AST (SGOT) 20 U/L      Alkaline Phosphatase 149 U/L      Total Bilirubin 0.5 mg/dL      Globulin 3.5 gm/dL      A/G Ratio 0.9 g/dL      BUN/Creatinine Ratio 21.9     Anion Gap 9.7 mmol/L      eGFR 99.4 mL/min/1.73     Narrative:      GFR Normal >60  Chronic Kidney  Disease <60  Kidney Failure <15      Lipase [[id number]]  (Normal) Collected: 02/03/23 1930    Specimen: Blood Updated: 02/03/23 2005     Lipase 18 U/L     BNP [[id number]]  (Abnormal) Collected: 02/03/23 1930    Specimen: Blood Updated: 02/03/23 2002     proBNP 4,484.0 pg/mL     Narrative:      Among patients with dyspnea, NT-proBNP is highly sensitive for the detection of acute congestive heart failure. In addition NT-proBNP of <300 pg/ml effectively rules out acute congestive heart failure with 99% negative predictive value.      Magnesium [[id number]]  (Normal) Collected: 02/03/23 1930    Specimen: Blood Updated: 02/03/23 2005     Magnesium 1.8 mg/dL     CBC Auto Differential [[id number]]  (Abnormal) Collected: 02/03/23 1930    Specimen: Blood Updated: 02/03/23 2012     WBC 8.01 10*3/mm3      RBC 4.80 10*6/mm3      Hemoglobin 13.6 g/dL      Hematocrit 42.9 %      MCV 89.4 fL      MCH 28.3 pg      MCHC 31.7 g/dL      RDW 18.1 %      RDW-SD 60.3 fl      MPV 11.8 fL      Platelets 79 10*3/mm3      Neutrophil % 47.1 %      Lymphocyte % 44.7 %      Monocyte % 6.2 %      Eosinophil % 1.0 %      Basophil % 0.1 %      Immature Grans % 0.9 %      Neutrophils, Absolute 3.77 10*3/mm3      Lymphocytes, Absolute 3.58 10*3/mm3      Monocytes, Absolute 0.50 10*3/mm3      Eosinophils, Absolute 0.08 10*3/mm3      Basophils, Absolute 0.01 10*3/mm3      Immature Grans, Absolute 0.07 10*3/mm3      nRBC 0.0 /100 WBC     Scan Slide [[id number]] Collected: 02/03/23 1930    Specimen: Blood Updated: 02/03/23 2012     Jack Cells Slight/1+     Crenated RBC's Mod/2+     Dacrocytes Slight/1+     Microcytes Slight/1+     Macrocytes Slight/1+     Ovalocytes Slight/1+     WBC Morphology Normal     Platelet Estimate Decreased     Large Platelets Slight/1+    POC Troponin I [[id number]]  (Normal) Collected: 02/03/23 1933    Specimen: Blood Updated: 02/03/23 1946     Troponin I 0.04 ng/mL      Comment: Serial Number: [id number]:  [id number]               Imaging:    XR Chest 1 View    Result Date: 2/3/2023  PROCEDURE: XR CHEST 1 VW  COMPARISON: [facility], CR, XR CHEST 1 VW, 2/21/2022, 22:13.  INDICATIONS: Chest Pain, SOA  FINDINGS:  There is cardiomegaly.  There is aortic arch atherosclerotic calcification.  There are bilateral interstitial opacities throughout both lungs concerning for atypical infection.  There is no pleural effusion or pneumothorax.  There are degenerative changes of the thoracic spine.        1. Bilateral interstitial opacities throughout both lungs concerning for atypical infection.        JUAREZ MUNGUIA MD       Electronically Signed and Approved By: JUAREZ MUNGUIA MD on 2/03/2023 at 19:24                 Differential Diagnosis and Discussion:    Chest Pain:  Based on the patient's signs and symptoms, I considered aortic dissection, myocardial infaction, pulmonary embolism, cardiac tamponade, pericarditis, pneumothorax, musculoskeletal chest pain and other differential diagnosis as an etiology of the patient's chest pain.     CT scan radiology interpretation was reviewed by me.    MDM  Number of Diagnoses or Management Options  Acute pulmonary edema (HCC)  Chest pain in adult  Diagnosis management comments: She presents with chest pain and shortness of air.  Old records reviewed in epic and she does have a history of COPD and CHF.  Differential considerations include but not limited to pulmonary embolism and or ACS.  Cardiac markers and EKG did not demonstrate findings of ACS.  CT scan of the chest is read by radiology and reviewed by me does not demonstrate findings of PE.  She received analgesics with symptomatic improvement.  Medicine consultations obtained she is admitted to medicine service.  There is concern based on chest radiograph for pneumonia for which antibiotics ordered.  Diuretics also ordered out of concern for pulmonary edema.       Amount and/or Complexity of Data Reviewed  Clinical lab tests:  reviewed  Tests in the radiology section of CPT®: reviewed  Tests in the medicine section of CPT®: reviewed  Decide to obtain previous medical records or to obtain history from someone other than the patient: yes         Critical Care Note: Total Critical Care time of 55 minutes. Total critical care time documented does not include time spent on separately billed procedures for services of nurses or physician assistants. I personally saw and examined the patient. I have reviewed all diagnostic interpretations and treatment plans as written. I was present for the key portions of any procedures performed and the inclusive time noted in any critical care statement. Critical care time includes patient management by me, time spent at the patients bedside,  time to review lab and imaging results, discussing patient care, documentation in the medical record, and time spent with family or caregiver.    Patient Care Considerations:          Consultants/Shared Management Plan:    PCP: I have discussed the case with KASIE Garnett who agrees to accept the patient for admission.    Social Determinants of Health:    Patient is independent, reliable, and has access to care.       Disposition and Care Coordination:    Admit:   Through independent evaluation of the patient's history, physical, and imperical data, the patient meets criteria for observation/admission to the hospital.      Final diagnoses:   None        ED Disposition     None          This medical record created using voice recognition software.      Documentation assistance provided by Ayad Saavedra acting as scribe for Lizandro Ahn MD. Information recorded by the scribe was done at my direction and has been verified and validated by me.       Ayad Saavedra  02/03/23 2026       Lizandro Ahn MD  02/03/23 2257      Electronically signed by Lizandro Ahn MD at 02/03/23 2251       Physician Progress Notes (last 24 hours)  Notes from 02/03/23 1226 through 02/04/23 1226    No notes of this type exist for this encounter.         Consult Notes (last 24 hours)  Notes from 02/03/23 1226 through 02/04/23 1226   No notes of this type exist for this encounter.

## 2023-02-04 NOTE — ED PROVIDER NOTES
Time: 8:18 PM EST  Date of encounter:  2/3/2023  Independent Historian/Clinical History and Information was obtained by:   Patient  Chief Complaint: chest pain    History is limited by: N/A    History of Present Illness:  Patient is a 62 y.o. year old female who presents to the emergency department for evaluation of chest pain. Pt reports she had a R toe amputation last month. She states she woke up today at about 1000 with stabbing chest pain. She states she is still in severe pain now. She also reports pain in her mid back, nausea, and chills. She denies fever. She reports she experienced some relief after nitroglycerin. She reports hx of MI and diabetes. She denies having any stents in her heart but reports she has one in her leg.    HPI    Patient Care Team  Primary Care Provider: Carloz Shoemaker MD    Past Medical History:     No Known Allergies  Past Medical History:   Diagnosis Date   • Acid reflux    • ACL tear 09/01/2015    PCL/ACL TEAR/RUPTURE   • Acute shoulder bursitis, right 08/23/2015   • Anxiety    • Arthritis    • Asthma    • Bipolar 1 disorder ([facility])     untreated   • Bladder disorder    • Cancer ([facility])     CERVICAL CANCER   • CHF (congestive heart failure) ([facility])     ASYMPTOMATIC- NO CARDIOLOGIST- SEE'S DR SHOEMAKER (PCP)- DENIED CP/SOB   • Chronic back pain    • COPD (chronic obstructive pulmonary disease) ([facility])     USES INHALERS   • Depression    • Diabetes mellitus ([facility])     BG RUND AROUND 140 IN AM   • DJD (degenerative joint disease)    • Gangrene ([facility])     RT SECOND AND FIFTH TOES   • GERD (gastroesophageal reflux disease)    • HBP (high blood pressure)    • Hip pain 09/15/2015   • Hypertension    • Knee injury 08/19/2015   • Knee pain, right 09/15/2015   • Limb swelling    • Neuropathy    • Osteoarthritis, knee 09/01/2015   • Osteoporosis    • Peripheral neuropathy    • Renal failure 1994    NO CURRENT PROBLEMS   • Seasonal allergies    • Shoulder tendonitis 08/23/2015   • Sleep apnea    • SOB  (shortness of breath)     NONE CURENTLY   • Tendinitis of right rotator cuff 08/23/2015     Past Surgical History:   Procedure Laterality Date   • AMPUTATION DIGIT Right 12/6/2022    Procedure: Amputation of right second and fifth toes;  Surgeon: Gabino Russell MD;  Location: [facility] MAIN OR;  Service: Vascular;  Laterality: Right;   • BACK SURGERY     • BLADDER REPAIR     • BRONCHOSCOPY N/A 07/10/2021    Procedure: BRONCHOSCOPY WITH BRONCHOALVEOLAR LAVAGE, POSSIBLE BIOPSY, BRUSHING, WASHING, AIRWAY INSPECTION;  Surgeon: Rodrigo Reyes MD;  Location: [facility] MAIN OR;  Service: Pulmonary;  Laterality: N/A;   • BRONCHOSCOPY N/A 07/10/2021    Procedure: BRONCHOSCOPY;  Surgeon: Rodrigo Reyes MD;  Location: [facility] ENDOSCOPY;  Service: Pulmonary;  Laterality: N/A;   • CARDIAC CATHETERIZATION Right 11/03/2022    Procedure: Aortogram with right leg angiogram, possible angioplasty or stenting ;  Surgeon: Gabino Russell MD;  Location: [facility] CATH INVASIVE LOCATION;  Service: Vascular;  Laterality: Right;   • CHOLECYSTECTOMY     • ENDOSCOPY     • FRACTURE SURGERY      SKULL FRACTURE   • GALLBLADDER SURGERY     • HERNIA REPAIR     • HYSTERECTOMY     • INTERVENTIONAL RADIOLOGY PROCEDURE Right 03/03/2022    Procedure: Abdominal Aortagram with Runoff;  Surgeon: Marleny Yoon MD;  Location: [facility] CATH INVASIVE LOCATION;  Service: Peripheral Vascular;  Laterality: Right;   • JOINT REPLACEMENT     • OTHER SURGICAL HISTORY      ARTIFICIAL JOINTS/LIMBS   • OTHER SURGICAL HISTORY      FACE SURGERY, UNSPECIFIED   • TOTAL HIP ARTHROPLASTY Right    • TOTAL KNEE ARTHROPLASTY Left      Family History   Problem Relation Age of Onset   • Stroke Mother    • Throat cancer Mother    • Arthritis Mother    • Osteoporosis Mother    • Heart disease Father    • Breast cancer Sister    • Colon cancer Sister    • Lung cancer Sister    • Arthritis Sister    • Osteoporosis Sister    • Heart disease Brother    • Diabetes Brother    • Arthritis  Brother    • Arthritis Daughter        Home Medications:  Prior to Admission medications    Medication Sig Start Date End Date Taking? Authorizing Provider   amLODIPine-benazepril (LOTREL) 10-40 MG per capsule Take 1 capsule by mouth Daily.    Patria Hanson MD   aspirin 81 MG EC tablet Take 1 tablet by mouth Daily.  Patient taking differently: Take 81 mg by mouth Daily. PER [place]O AT DR SHANAE ROSAS OK TO CONTINUE TAKING FOR SURGERY 10/17/22 10/17/23  Gabino Russell MD   atorvastatin (LIPITOR) 10 MG tablet Take 10 mg by mouth Daily.    Patria Hanson MD   BISOPROLOL-HYDROCHLOROTHIAZIDE PO Take 10 mg by mouth 2 (two) times a day. 8/24/21   Patria Hanson MD   cholestyramine (QUESTRAN) 4 g packet Take 1 packet by mouth 2 (Two) Times a Day With Meals. 3/1/22   Patria Hanson MD   clopidogrel (PLAVIX) 75 MG tablet Take 75 mg by mouth Daily. PER DR SHANAE MCLAUGHLIN TO CONTINUE TAKING FOR SURGERY    Patria Hanson MD   doxepin (SINEquan) 25 MG capsule Take 25 mg by mouth every night at bedtime. 11/17/22   Patria Hanson MD   ferrous sulfate 324 (65 Fe) MG tablet delayed-release EC tablet Take 324 mg by mouth Daily With Breakfast.    Patria Hanson MD   furosemide (LASIX) 80 MG tablet Take 80 mg by mouth 2 (Two) Times a Day As Needed.    Patria Hanson MD   hydrOXYzine (ATARAX) 10 MG/5ML syrup Take 10 mg by mouth 4 (Four) Times a Day. 8/23/21   Patria Hanson MD   Insulin Detemir (LEVEMIR SC) Inject 25 Units under the skin into the appropriate area as directed Every Night. INSTRUCTED PER ANESTHESIA STANDING ORDERS 3/1/22   Patria Hanson MD   insulin NPH-insulin regular (humuLIN 70/30,novoLIN 70/30) (70-30) 100 UNIT/ML injection Inject 2.5 Units under the skin into the appropriate area as directed 2 (Two) Times a Day With Meals. INSTRUCTED PER ANESTHESIA STANDING ORDERS  SLIDING SCALE INSULIN    Patria Hanson MD   ipratropium-albuterol (DUO-NEB)  0.5-2.5 mg/3 ml nebulizer Take 3 mL by nebulization 4 (Four) Times a Day. 3/1/22   Patria Hanson MD   metFORMIN (GLUCOPHAGE) 500 MG tablet Take 500 mg by mouth 2 (Two) Times a Day With Meals. INSTRUCTED PER ANESTHESIA STANDING ORDERS    Patria Hanson MD   mirtazapine (REMERON) 30 MG tablet Take 30 mg by mouth Every Night.    Patria Hanson MD   nortriptyline (PAMELOR) 25 MG capsule Take 25 mg by mouth Every Night.    Patria Hanson MD   O2 (OXYGEN) Inhale 3 L/min Every Night. 8/18/21   Patria Hanson MD   ondansetron (ZOFRAN) 4 MG tablet Take 4 mg by mouth Every 8 (Eight) Hours As Needed for Nausea or Vomiting.    Patria Hanson MD   oxyCODONE-acetaminophen (PERCOCET)  MG per tablet Take 1 tablet by mouth Every 6 (Six) Hours As Needed for Severe Pain. GIVEN PER PCP 12/6/22   Gabino Russell MD   pantoprazole (PROTONIX) 40 MG EC tablet Take 40 mg by mouth Daily.    Patria Hanson MD   potassium chloride 10 MEQ CR tablet Take 20 mEq by mouth 2 (Two) Times a Day.    Patria Hanson MD   sertraline (ZOLOFT) 100 MG tablet Take 100 mg by mouth Daily. In addition to the 50 mg tablet to make 150 mg daily 7/27/21   Patria Hanson MD   sertraline (ZOLOFT) 50 MG tablet Take 50 mg by mouth Daily. TAKES WITH 100 MG TO EQUAL 150 MG    Patria Hanson MD   silver sulfadiazine (SILVADENE, SSD) 1 % cream Apply 1 application topically to the appropriate area as directed 2 (Two) Times a Day. This is for a bed sore that is on her lower back per . 9/30/21   Patria Hanson MD   tiZANidine (ZANAFLEX) 4 MG tablet Take 4 mg by mouth Every 8 (Eight) Hours As Needed for Muscle Spasms. 9/30/21   Patria Hanson MD   traZODone (DESYREL) 150 MG tablet Take 150 mg by mouth Every Night. 12/30/21   Patria Hanson MD   vitamin B-12 (CYANOCOBALAMIN) 2500 MCG sublingual tablet tablet Place  under the tongue Daily.    Patria Hanson MD     "    Social History:   Social History     Tobacco Use   • Smoking status: Every Day     Packs/day: 0.50     Years: 50.00     Pack years: 25.00     Types: Cigarettes     Start date: 6/15/1979   • Smokeless tobacco: Never   • Tobacco comments:     SMOKED FOR 31 PLUS YEARS     INSTRUCTED NO SMOKING 24 HR PRIOR TO SURGERY    Vaping Use   • Vaping Use: Never used   Substance Use Topics   • Alcohol use: Never   • Drug use: Never         Review of Systems:  Review of Systems   Constitutional: Positive for chills. Negative for fever.   HENT: Negative for congestion, ear pain and sore throat.    Eyes: Negative for pain.   Respiratory: Negative for cough, chest tightness and shortness of breath.    Cardiovascular: Positive for chest pain.   Gastrointestinal: Positive for nausea. Negative for abdominal pain, diarrhea and vomiting.   Genitourinary: Negative for flank pain and hematuria.   Musculoskeletal: Positive for back pain (mid back). Negative for joint swelling.   Skin: Negative for pallor.   Neurological: Negative for seizures and headaches.   All other systems reviewed and are negative.       Physical Exam:  BP (!) 213/89   Pulse 88   Temp 98.3 °F (36.8 °C) (Oral)   Resp 28   Ht 165.1 cm (65\")   Wt 76.1 kg (167 lb 12.3 oz)   SpO2 96%   BMI 27.92 kg/m²     Physical Exam  Vitals and nursing note reviewed.   Constitutional:       General: She is not in acute distress.     Appearance: Normal appearance. She is not toxic-appearing.   HENT:      Head: Normocephalic and atraumatic.      Mouth/Throat:      Mouth: Mucous membranes are moist.   Eyes:      General: No scleral icterus.  Cardiovascular:      Rate and Rhythm: Normal rate and regular rhythm.      Pulses: Normal pulses.      Heart sounds: Normal heart sounds.   Pulmonary:      Effort: Pulmonary effort is normal. No respiratory distress.      Breath sounds: Normal breath sounds.   Abdominal:      General: Abdomen is flat.      Palpations: Abdomen is soft.      " Tenderness: There is no abdominal tenderness.   Musculoskeletal:         General: Normal range of motion.      Cervical back: Normal range of motion and neck supple.   Feet:      Comments: R foot is in a surgical dressing; Mild erythema to toes of R foot  Skin:     General: Skin is warm and dry.   Neurological:      Mental Status: She is alert and oriented to person, place, and time. Mental status is at baseline.                  Procedures:  Procedures      Medical Decision Making:      Comorbidities that affect care:    Asthma, Cancer, Congestive Heart Failure, COPD, Diabetes, Hypertension, Smoking    External Notes reviewed:    Previous Admission Note      The following orders were placed and all results were independently analyzed by me:  Orders Placed This Encounter   Procedures   • XR Chest 1 View   • [place] Draw   • Comprehensive Metabolic Panel   • Lipase   • BNP   • Magnesium   • CBC Auto Differential   • Scan Slide   • NPO Diet NPO Type: Strict NPO   • Undress & Gown   • Cardiac Monitoring   • Continuous Pulse Oximetry   • Oxygen Therapy- Nasal Cannula; 2 LPM; Titrate for SPO2: equal to or greater than, 92%   • POC Troponin I   • POC Troponin I   • POC Troponin I   • ECG 12 Lead ED Triage Standing Order; Chest Pain   • ECG 12 Lead ED Triage Standing Order; Chest Pain   • Insert Peripheral IV   • POC Troponin I with Hold Tube   • CBC & Differential   • Green Top (Gel)   • Lavender Top   • Gold Top - SST   • Light Blue Top   • HOLD Troponin-I Tube   • HOLD Troponin-I Tube       Medications Given in the Emergency Department:  Medications   sodium chloride 0.9 % flush 10 mL (has no administration in time range)   aspirin chewable tablet 324 mg (324 mg Oral Not Given 2/3/23 1900)        ED Course:    ED Course as of 02/03/23 2254 Fri Feb 03, 2023 2016 EKG: Rate 72, normal P waves, left ventricular hypertrophy, normal ST segment, normal QT interval, no previous for comparison. [RW]   2016 Troponin I: 0.04  [RW]      ED Course User Index  [RW] Lizandro Ahn MD       Labs:    Lab Results (last 24 hours)     Procedure Component Value Units Date/Time    POC Troponin I with Hold Tube [[id number]] Collected: 02/03/23 1930    Specimen: Blood Updated: 02/03/23 1953    Narrative:      The following orders were created for panel order POC Troponin I with Hold Tube.  Procedure                               Abnormality         Status                     ---------                               -----------         ------                     POC Troponin I[[id number]]                                                              HOLD Troponin-I Tube[[id number]]                             Final result                 Please view results for these tests on the individual orders.    CBC & Differential [[id number]]  (Abnormal) Collected: 02/03/23 1930    Specimen: Blood Updated: 02/03/23 2012    Narrative:      The following orders were created for panel order CBC & Differential.  Procedure                               Abnormality         Status                     ---------                               -----------         ------                     CBC Auto Differential[[id number]]        Abnormal            Final result               Scan Slide[[id number]]                                       Final result                 Please view results for these tests on the individual orders.    Comprehensive Metabolic Panel [[id number]]  (Abnormal) Collected: 02/03/23 1930    Specimen: Blood Updated: 02/03/23 2005     Glucose 197 mg/dL      BUN 14 mg/dL      Creatinine 0.64 mg/dL      Sodium 142 mmol/L      Potassium 3.8 mmol/L      Chloride 108 mmol/L      CO2 24.3 mmol/L      Calcium 8.7 mg/dL      Total Protein 6.8 g/dL      Albumin 3.3 g/dL      ALT (SGPT) 15 U/L      AST (SGOT) 20 U/L      Alkaline Phosphatase 149 U/L      Total Bilirubin 0.5 mg/dL      Globulin 3.5 gm/dL      A/G Ratio 0.9 g/dL      BUN/Creatinine Ratio 21.9     Anion Gap 9.7  mmol/L      eGFR 99.4 mL/min/1.73     Narrative:      GFR Normal >60  Chronic Kidney Disease <60  Kidney Failure <15      Lipase [[id number]]  (Normal) Collected: 02/03/23 1930    Specimen: Blood Updated: 02/03/23 2005     Lipase 18 U/L     BNP [[id number]]  (Abnormal) Collected: 02/03/23 1930    Specimen: Blood Updated: 02/03/23 2002     proBNP 4,484.0 pg/mL     Narrative:      Among patients with dyspnea, NT-proBNP is highly sensitive for the detection of acute congestive heart failure. In addition NT-proBNP of <300 pg/ml effectively rules out acute congestive heart failure with 99% negative predictive value.      Magnesium [[id number]]  (Normal) Collected: 02/03/23 1930    Specimen: Blood Updated: 02/03/23 2005     Magnesium 1.8 mg/dL     CBC Auto Differential [[id number]]  (Abnormal) Collected: 02/03/23 1930    Specimen: Blood Updated: 02/03/23 2012     WBC 8.01 10*3/mm3      RBC 4.80 10*6/mm3      Hemoglobin 13.6 g/dL      Hematocrit 42.9 %      MCV 89.4 fL      MCH 28.3 pg      MCHC 31.7 g/dL      RDW 18.1 %      RDW-SD 60.3 fl      MPV 11.8 fL      Platelets 79 10*3/mm3      Neutrophil % 47.1 %      Lymphocyte % 44.7 %      Monocyte % 6.2 %      Eosinophil % 1.0 %      Basophil % 0.1 %      Immature Grans % 0.9 %      Neutrophils, Absolute 3.77 10*3/mm3      Lymphocytes, Absolute 3.58 10*3/mm3      Monocytes, Absolute 0.50 10*3/mm3      Eosinophils, Absolute 0.08 10*3/mm3      Basophils, Absolute 0.01 10*3/mm3      Immature Grans, Absolute 0.07 10*3/mm3      nRBC 0.0 /100 WBC     Scan Slide [[id number]] Collected: 02/03/23 1930    Specimen: Blood Updated: 02/03/23 2012     [place] Cells Slight/1+     Crenated RBC's Mod/2+     Dacrocytes Slight/1+     Microcytes Slight/1+     Macrocytes Slight/1+     Ovalocytes Slight/1+     WBC Morphology Normal     Platelet Estimate Decreased     Large Platelets Slight/1+    POC Troponin I [[id number]]  (Normal) Collected: 02/03/23 1933    Specimen: Blood Updated: 02/03/23 1946      Troponin I 0.04 ng/mL      Comment: Serial Number: [id number]:  [id number]              Imaging:    XR Chest 1 View    Result Date: 2/3/2023  PROCEDURE: XR CHEST 1 VW  COMPARISON: [facility], CR, XR CHEST 1 VW, 2/21/2022, 22:13.  INDICATIONS: Chest Pain, SOA  FINDINGS:  There is cardiomegaly.  There is aortic arch atherosclerotic calcification.  There are bilateral interstitial opacities throughout both lungs concerning for atypical infection.  There is no pleural effusion or pneumothorax.  There are degenerative changes of the thoracic spine.        1. Bilateral interstitial opacities throughout both lungs concerning for atypical infection.        JUAREZ MUGNUIA MD       Electronically Signed and Approved By: JUAREZ MUNGUIA MD on 2/03/2023 at 19:24                 Differential Diagnosis and Discussion:    Chest Pain:  Based on the patient's signs and symptoms, I considered aortic dissection, myocardial infaction, pulmonary embolism, cardiac tamponade, pericarditis, pneumothorax, musculoskeletal chest pain and other differential diagnosis as an etiology of the patient's chest pain.     CT scan radiology interpretation was reviewed by me.    MDM  Number of Diagnoses or Management Options  Acute pulmonary edema (HCC)  Chest pain in adult  Diagnosis management comments: She presents with chest pain and shortness of air.  Old records reviewed in epic and she does have a history of COPD and CHF.  Differential considerations include but not limited to pulmonary embolism and or ACS.  Cardiac markers and EKG did not demonstrate findings of ACS.  CT scan of the chest is read by radiology and reviewed by me does not demonstrate findings of PE.  She received analgesics with symptomatic improvement.  Medicine consultations obtained she is admitted to medicine service.  There is concern based on chest radiograph for pneumonia for which antibiotics ordered.  Diuretics also ordered out of concern for pulmonary  edema.       Amount and/or Complexity of Data Reviewed  Clinical lab tests: reviewed  Tests in the radiology section of CPT®: reviewed  Tests in the medicine section of CPT®: reviewed  Decide to obtain previous medical records or to obtain history from someone other than the patient: yes         Critical Care Note: Total Critical Care time of 55 minutes. Total critical care time documented does not include time spent on separately billed procedures for services of nurses or physician assistants. I personally saw and examined the patient. I have reviewed all diagnostic interpretations and treatment plans as written. I was present for the key portions of any procedures performed and the inclusive time noted in any critical care statement. Critical care time includes patient management by me, time spent at the patients bedside,  time to review lab and imaging results, discussing patient care, documentation in the medical record, and time spent with family or caregiver.    Patient Care Considerations:          Consultants/Shared Management Plan:    PCP: I have discussed the case with KASIE Garnett who agrees to accept the patient for admission.    Social Determinants of Health:    Patient is independent, reliable, and has access to care.       Disposition and Care Coordination:    Admit:   Through independent evaluation of the patient's history, physical, and imperical data, the patient meets criteria for observation/admission to the hospital.      Final diagnoses:   None        ED Disposition     None          This medical record created using voice recognition software.      Documentation assistance provided by Ayad Saavedra acting as scribe for Lizandro Ahn MD. Information recorded by the scribe was done at my direction and has been verified and validated by me.       Ayad Saavedra  02/03/23 2026       Lizandro Ahn MD  02/03/23 0366

## 2023-02-04 NOTE — PLAN OF CARE
Goal Outcome Evaluation:  Plan of Care Reviewed With: patient           Outcome Evaluation: pt medicated for pain per prn orders. remains in bed at this time.

## 2023-02-05 PROBLEM — L30.4 INTERTRIGO: Chronic | Status: ACTIVE | Noted: 2023-02-05

## 2023-02-05 PROBLEM — E87.20 LACTIC ACIDOSIS: Status: ACTIVE | Noted: 2023-02-05

## 2023-02-05 LAB
D-LACTATE SERPL-SCNC: 2.1 MMOL/L (ref 0.5–2)
D-LACTATE SERPL-SCNC: 3 MMOL/L (ref 0.5–2)
GLUCOSE BLDC GLUCOMTR-MCNC: 242 MG/DL (ref 70–99)
GLUCOSE BLDC GLUCOMTR-MCNC: 252 MG/DL (ref 70–99)
GLUCOSE BLDC GLUCOMTR-MCNC: 263 MG/DL (ref 70–99)
GLUCOSE BLDC GLUCOMTR-MCNC: 277 MG/DL (ref 70–99)
GLUCOSE BLDC GLUCOMTR-MCNC: 282 MG/DL (ref 70–99)
L PNEUMO1 AG UR QL IA: NEGATIVE
S PNEUM AG SPEC QL LA: NEGATIVE

## 2023-02-05 PROCEDURE — 82962 GLUCOSE BLOOD TEST: CPT

## 2023-02-05 PROCEDURE — 25010000002 AZITHROMYCIN PER 500 MG: Performed by: INTERNAL MEDICINE

## 2023-02-05 PROCEDURE — 25010000002 ENOXAPARIN PER 10 MG: Performed by: INTERNAL MEDICINE

## 2023-02-05 PROCEDURE — 94799 UNLISTED PULMONARY SVC/PX: CPT

## 2023-02-05 PROCEDURE — 87070 CULTURE OTHR SPECIMN AEROBIC: CPT | Performed by: INTERNAL MEDICINE

## 2023-02-05 PROCEDURE — 25010000002 FUROSEMIDE PER 20 MG: Performed by: INTERNAL MEDICINE

## 2023-02-05 PROCEDURE — 83605 ASSAY OF LACTIC ACID: CPT | Performed by: INTERNAL MEDICINE

## 2023-02-05 PROCEDURE — 63710000001 INSULIN DETEMIR PER 5 UNITS: Performed by: INTERNAL MEDICINE

## 2023-02-05 PROCEDURE — 25010000002 METHYLPREDNISOLONE PER 40 MG: Performed by: INTERNAL MEDICINE

## 2023-02-05 PROCEDURE — 87205 SMEAR GRAM STAIN: CPT | Performed by: INTERNAL MEDICINE

## 2023-02-05 PROCEDURE — 63710000001 INSULIN LISPRO (HUMAN) PER 5 UNITS: Performed by: INTERNAL MEDICINE

## 2023-02-05 PROCEDURE — 25010000002 HYDROMORPHONE 1 MG/ML SOLUTION: Performed by: INTERNAL MEDICINE

## 2023-02-05 RX ORDER — CLOPIDOGREL BISULFATE 75 MG/1
75 TABLET ORAL DAILY
Status: DISCONTINUED | OUTPATIENT
Start: 2023-02-05 | End: 2023-02-06 | Stop reason: HOSPADM

## 2023-02-05 RX ORDER — ALPRAZOLAM 0.25 MG/1
0.25 TABLET ORAL 3 TIMES DAILY PRN
Status: DISCONTINUED | OUTPATIENT
Start: 2023-02-05 | End: 2023-02-06 | Stop reason: HOSPADM

## 2023-02-05 RX ADMIN — ALPRAZOLAM 0.25 MG: 0.25 TABLET ORAL at 16:35

## 2023-02-05 RX ADMIN — FUROSEMIDE 20 MG: 10 INJECTION, SOLUTION INTRAMUSCULAR; INTRAVENOUS at 00:06

## 2023-02-05 RX ADMIN — IPRATROPIUM BROMIDE AND ALBUTEROL SULFATE 3 ML: .5; 2.5 SOLUTION RESPIRATORY (INHALATION) at 06:34

## 2023-02-05 RX ADMIN — LISINOPRIL 40 MG: 20 TABLET ORAL at 08:01

## 2023-02-05 RX ADMIN — OXYCODONE AND ACETAMINOPHEN 1 TABLET: 10; 325 TABLET ORAL at 00:45

## 2023-02-05 RX ADMIN — INSULIN LISPRO 9 UNITS: 100 INJECTION, SOLUTION INTRAVENOUS; SUBCUTANEOUS at 12:00

## 2023-02-05 RX ADMIN — METHYLPREDNISOLONE SODIUM SUCCINATE 40 MG: 40 INJECTION, POWDER, FOR SOLUTION INTRAMUSCULAR; INTRAVENOUS at 08:00

## 2023-02-05 RX ADMIN — INSULIN LISPRO 7 UNITS: 100 INJECTION, SOLUTION INTRAVENOUS; SUBCUTANEOUS at 20:23

## 2023-02-05 RX ADMIN — CLOPIDOGREL BISULFATE 75 MG: 75 TABLET ORAL at 18:26

## 2023-02-05 RX ADMIN — HYDROXYZINE HYDROCHLORIDE 10 MG: 10 TABLET ORAL at 09:22

## 2023-02-05 RX ADMIN — BISOPROLOL FUMARATE AND HYDROCHLOROTHIAZIDE 1 TABLET: 10; 6.25 TABLET, FILM COATED ORAL at 08:02

## 2023-02-05 RX ADMIN — PANTOPRAZOLE SODIUM 40 MG: 40 TABLET, DELAYED RELEASE ORAL at 08:01

## 2023-02-05 RX ADMIN — OXYCODONE AND ACETAMINOPHEN 1 TABLET: 10; 325 TABLET ORAL at 14:38

## 2023-02-05 RX ADMIN — IPRATROPIUM BROMIDE AND ALBUTEROL SULFATE 3 ML: .5; 2.5 SOLUTION RESPIRATORY (INHALATION) at 18:32

## 2023-02-05 RX ADMIN — INSULIN DETEMIR 30 UNITS: 100 INJECTION, SOLUTION SUBCUTANEOUS at 20:22

## 2023-02-05 RX ADMIN — OXYCODONE AND ACETAMINOPHEN 1 TABLET: 10; 325 TABLET ORAL at 22:53

## 2023-02-05 RX ADMIN — IPRATROPIUM BROMIDE AND ALBUTEROL SULFATE 3 ML: .5; 2.5 SOLUTION RESPIRATORY (INHALATION) at 13:03

## 2023-02-05 RX ADMIN — INSULIN LISPRO 11 UNITS: 100 INJECTION, SOLUTION INTRAVENOUS; SUBCUTANEOUS at 17:10

## 2023-02-05 RX ADMIN — HYDROMORPHONE HYDROCHLORIDE 0.5 MG: 1 INJECTION, SOLUTION INTRAMUSCULAR; INTRAVENOUS; SUBCUTANEOUS at 20:23

## 2023-02-05 RX ADMIN — INSULIN LISPRO 8 UNITS: 100 INJECTION, SOLUTION INTRAVENOUS; SUBCUTANEOUS at 08:00

## 2023-02-05 RX ADMIN — HYDROMORPHONE HYDROCHLORIDE 0.5 MG: 1 INJECTION, SOLUTION INTRAMUSCULAR; INTRAVENOUS; SUBCUTANEOUS at 06:02

## 2023-02-05 RX ADMIN — AMLODIPINE BESYLATE 5 MG: 5 TABLET ORAL at 08:01

## 2023-02-05 RX ADMIN — INSULIN LISPRO 4 UNITS: 100 INJECTION, SOLUTION INTRAVENOUS; SUBCUTANEOUS at 02:14

## 2023-02-05 RX ADMIN — MICONAZOLE NITRATE 1 APPLICATION: 20 CREAM TOPICAL at 13:28

## 2023-02-05 RX ADMIN — AZITHROMYCIN MONOHYDRATE 500 MG: 500 INJECTION, POWDER, LYOPHILIZED, FOR SOLUTION INTRAVENOUS at 00:01

## 2023-02-05 RX ADMIN — OXYCODONE AND ACETAMINOPHEN 1 TABLET: 10; 325 TABLET ORAL at 07:53

## 2023-02-05 RX ADMIN — HYDROMORPHONE HYDROCHLORIDE 0.5 MG: 1 INJECTION, SOLUTION INTRAMUSCULAR; INTRAVENOUS; SUBCUTANEOUS at 16:31

## 2023-02-05 RX ADMIN — METHYLPREDNISOLONE SODIUM SUCCINATE 40 MG: 40 INJECTION, POWDER, FOR SOLUTION INTRAMUSCULAR; INTRAVENOUS at 00:05

## 2023-02-05 RX ADMIN — FUROSEMIDE 20 MG: 10 INJECTION, SOLUTION INTRAMUSCULAR; INTRAVENOUS at 12:00

## 2023-02-05 RX ADMIN — HYDROMORPHONE HYDROCHLORIDE 0.5 MG: 1 INJECTION, SOLUTION INTRAMUSCULAR; INTRAVENOUS; SUBCUTANEOUS at 02:41

## 2023-02-05 RX ADMIN — METHYLPREDNISOLONE SODIUM SUCCINATE 40 MG: 40 INJECTION, POWDER, FOR SOLUTION INTRAMUSCULAR; INTRAVENOUS at 16:35

## 2023-02-05 RX ADMIN — SENNOSIDES AND DOCUSATE SODIUM 1 TABLET: 8.6; 5 TABLET ORAL at 08:01

## 2023-02-05 RX ADMIN — MICONAZOLE NITRATE 1 APPLICATION: 20 CREAM TOPICAL at 20:42

## 2023-02-05 RX ADMIN — MIRTAZAPINE 30 MG: 15 TABLET, FILM COATED ORAL at 20:25

## 2023-02-05 RX ADMIN — ENOXAPARIN SODIUM 40 MG: 100 INJECTION SUBCUTANEOUS at 08:02

## 2023-02-05 RX ADMIN — SENNOSIDES AND DOCUSATE SODIUM 1 TABLET: 8.6; 5 TABLET ORAL at 20:25

## 2023-02-05 RX ADMIN — IPRATROPIUM BROMIDE AND ALBUTEROL SULFATE 3 ML: .5; 2.5 SOLUTION RESPIRATORY (INHALATION) at 00:30

## 2023-02-05 RX ADMIN — SERTRALINE HYDROCHLORIDE 150 MG: 50 TABLET ORAL at 08:00

## 2023-02-05 NOTE — PLAN OF CARE
Goal Outcome Evaluation:      Pt is alert and oriented. Treated with prn pain meds several times this shift. Pt also received prn xanax. Micotin was placed under breasts and folds per MD orders. Will continue with care plan and monitoring.

## 2023-02-05 NOTE — PROGRESS NOTES
[facility]     Progress Note    Patient Name: Italia Olvera  : 1960  MRN: [id number]  Primary Care Physician:  Carloz Shoemaker MD  Date of admission: 2/3/2023      Subjective   Brief summary.  Patient admitted with shortness of breath and chest pain    HPI:  Complains of some increased shortness of breath.  Chest congested.  No cough.  Complains of chest tightness from time to time.  No chills.  Also extensive rash over the chest under the breast and abdominal folds.  Itching and burning.  Patient also indwelling Andino catheter which was placed by ER.  Patient also reports Restoril did not help much she is very anxious, anxiety makes her chest pain worse.  Would like to go back on Xanax.      Review of Systems     Fatigue and weakness  Leg swelling  Chest discomfort on and off  Cough  Shortness of breath with exertion  No nausea vomiting   rash under the breast  No fever chills          Objective     Vitals:   Temp:  [98 °F (36.7 °C)-99 °F (37.2 °C)] 98.4 °F (36.9 °C)  Heart Rate:  [67-75] 70  Resp:  [18-21] 18  BP: (123-173)/(65-77) 150/65  Flow (L/min):  [2-3] 3    Physical Exam :        Elderly female, not in acute distress.  HEENT unremarkable.  No JVD.  Heart regular distant sounds.  Lungs diminished breath sounds, coarse breath sounds.  Abdomen is soft and obese.  Extremities with 1+ edema.  Neurologically awake alert and oriented.  Skin with rash under the breasts and abdominal folds      Result Review:  I have personally reviewed the results from the time of this admission to 2023 11:58 EST and agree with these findings:  [x]  Laboratory  []  Microbiology  []  Radiology  []  EKG/Telemetry   []  Cardiology/Vascular   []  Pathology  []  Old records  []  Other:           Assessment / Plan       Active Hospital Problems:  Active Hospital Problems    Diagnosis    • **Chest pain in adult    • Pericardial effusion    • Anxiety disorder    • Peripheral neuropathy    • T2DM (type 2 diabetes  mellitus) (CMS/HCC)- uncontrolled    • Multifocal pneumonia    • Hypertension        Plan:   Cardiac enzymes negative lactic acid elevation due to  Planning perfusion and circulatory issues and ischemia of lower extremities  At this point we will continue with current treatment  Diuretics for shortness of breath  Check echo  Steroids for COPD exacerbation.  Lotrimin cream for intertriginous rash.  Increase activity  Monitor labs    DVT prophylaxis:  Medical DVT prophylaxis orders are present.    CODE STATUS:   Code Status (Patient has no pulse and is not breathing): CPR (Attempt to Resuscitate)  Medical Interventions (Patient has pulse or is breathing): Full Support            Electronically signed by Rudy Garnett MD, 02/05/23, 11:58 AM EST.

## 2023-02-06 ENCOUNTER — APPOINTMENT (OUTPATIENT)
Dept: CARDIOLOGY | Facility: HOSPITAL | Age: 63
DRG: 189 | End: 2023-02-06
Payer: MEDICARE

## 2023-02-06 ENCOUNTER — READMISSION MANAGEMENT (OUTPATIENT)
Dept: CALL CENTER | Facility: HOSPITAL | Age: 63
End: 2023-02-06
Payer: MEDICARE

## 2023-02-06 VITALS
HEIGHT: 65 IN | DIASTOLIC BLOOD PRESSURE: 74 MMHG | HEART RATE: 76 BPM | SYSTOLIC BLOOD PRESSURE: 158 MMHG | TEMPERATURE: 97.6 F | BODY MASS INDEX: 28.47 KG/M2 | OXYGEN SATURATION: 94 % | RESPIRATION RATE: 18 BRPM | WEIGHT: 170.86 LBS

## 2023-02-06 PROBLEM — R07.9 CHEST PAIN IN ADULT: Status: RESOLVED | Noted: 2023-02-03 | Resolved: 2023-02-06

## 2023-02-06 PROBLEM — E87.20 LACTIC ACIDOSIS: Status: RESOLVED | Noted: 2023-02-05 | Resolved: 2023-02-06

## 2023-02-06 PROBLEM — I31.39 PERICARDIAL EFFUSION: Status: RESOLVED | Noted: 2023-02-04 | Resolved: 2023-02-06

## 2023-02-06 LAB
GLUCOSE BLDC GLUCOMTR-MCNC: 178 MG/DL (ref 70–99)
GLUCOSE BLDC GLUCOMTR-MCNC: 195 MG/DL (ref 70–99)
GLUCOSE BLDC GLUCOMTR-MCNC: 211 MG/DL (ref 70–99)

## 2023-02-06 PROCEDURE — 94799 UNLISTED PULMONARY SVC/PX: CPT

## 2023-02-06 PROCEDURE — 93306 TTE W/DOPPLER COMPLETE: CPT | Performed by: INTERNAL MEDICINE

## 2023-02-06 PROCEDURE — 63710000001 PREDNISONE PER 1 MG: Performed by: INTERNAL MEDICINE

## 2023-02-06 PROCEDURE — 25010000002 AZITHROMYCIN PER 500 MG: Performed by: INTERNAL MEDICINE

## 2023-02-06 PROCEDURE — 25010000002 HYDROMORPHONE 1 MG/ML SOLUTION: Performed by: INTERNAL MEDICINE

## 2023-02-06 PROCEDURE — 82962 GLUCOSE BLOOD TEST: CPT

## 2023-02-06 PROCEDURE — 25010000002 METHYLPREDNISOLONE PER 40 MG: Performed by: INTERNAL MEDICINE

## 2023-02-06 PROCEDURE — 25010000002 ENOXAPARIN PER 10 MG: Performed by: INTERNAL MEDICINE

## 2023-02-06 PROCEDURE — 93306 TTE W/DOPPLER COMPLETE: CPT

## 2023-02-06 PROCEDURE — 63710000001 INSULIN LISPRO (HUMAN) PER 5 UNITS: Performed by: INTERNAL MEDICINE

## 2023-02-06 PROCEDURE — 25010000002 FUROSEMIDE PER 20 MG: Performed by: INTERNAL MEDICINE

## 2023-02-06 RX ORDER — GUAIFENESIN 600 MG/1
600 TABLET, EXTENDED RELEASE ORAL EVERY 12 HOURS SCHEDULED
Status: DISCONTINUED | OUTPATIENT
Start: 2023-02-06 | End: 2023-02-06 | Stop reason: HOSPADM

## 2023-02-06 RX ORDER — GUAIFENESIN 600 MG/1
600 TABLET, EXTENDED RELEASE ORAL EVERY 12 HOURS SCHEDULED
Qty: 60 TABLET | Refills: 0 | Status: ON HOLD | OUTPATIENT
Start: 2023-02-06 | End: 2023-03-08

## 2023-02-06 RX ORDER — AZITHROMYCIN 250 MG/1
250 TABLET, FILM COATED ORAL DAILY
Qty: 6 TABLET | Refills: 0 | Status: SHIPPED | OUTPATIENT
Start: 2023-02-06 | End: 2023-02-12

## 2023-02-06 RX ORDER — NICOTINE 21 MG/24HR
1 PATCH, TRANSDERMAL 24 HOURS TRANSDERMAL
Status: DISCONTINUED | OUTPATIENT
Start: 2023-02-06 | End: 2023-02-06 | Stop reason: HOSPADM

## 2023-02-06 RX ORDER — PREDNISONE 20 MG/1
20 TABLET ORAL
Status: DISCONTINUED | OUTPATIENT
Start: 2023-02-06 | End: 2023-02-06 | Stop reason: HOSPADM

## 2023-02-06 RX ORDER — NICOTINE 21 MG/24HR
1 PATCH, TRANSDERMAL 24 HOURS TRANSDERMAL
Qty: 30 PATCH | Refills: 0 | Status: ON HOLD | OUTPATIENT
Start: 2023-02-07 | End: 2023-03-15

## 2023-02-06 RX ORDER — PREDNISONE 20 MG/1
20 TABLET ORAL
Qty: 5 TABLET | Refills: 0 | Status: SHIPPED | OUTPATIENT
Start: 2023-02-07 | End: 2023-02-12

## 2023-02-06 RX ORDER — NICOTINE 21 MG/24HR
1 PATCH, TRANSDERMAL 24 HOURS TRANSDERMAL
Status: DISCONTINUED | OUTPATIENT
Start: 2023-02-06 | End: 2023-02-06

## 2023-02-06 RX ADMIN — PANTOPRAZOLE SODIUM 40 MG: 40 TABLET, DELAYED RELEASE ORAL at 09:11

## 2023-02-06 RX ADMIN — OXYCODONE AND ACETAMINOPHEN 1 TABLET: 10; 325 TABLET ORAL at 12:43

## 2023-02-06 RX ADMIN — ACETAMINOPHEN 650 MG: 325 TABLET ORAL at 10:38

## 2023-02-06 RX ADMIN — AZITHROMYCIN MONOHYDRATE 500 MG: 500 INJECTION, POWDER, LYOPHILIZED, FOR SOLUTION INTRAVENOUS at 00:08

## 2023-02-06 RX ADMIN — MICONAZOLE NITRATE 1 APPLICATION: 20 CREAM TOPICAL at 09:12

## 2023-02-06 RX ADMIN — CLOPIDOGREL BISULFATE 75 MG: 75 TABLET ORAL at 09:12

## 2023-02-06 RX ADMIN — ALPRAZOLAM 0.25 MG: 0.25 TABLET ORAL at 00:56

## 2023-02-06 RX ADMIN — METHYLPREDNISOLONE SODIUM SUCCINATE 40 MG: 40 INJECTION, POWDER, FOR SOLUTION INTRAMUSCULAR; INTRAVENOUS at 00:08

## 2023-02-06 RX ADMIN — SERTRALINE HYDROCHLORIDE 150 MG: 50 TABLET ORAL at 09:11

## 2023-02-06 RX ADMIN — FUROSEMIDE 20 MG: 10 INJECTION, SOLUTION INTRAMUSCULAR; INTRAVENOUS at 00:08

## 2023-02-06 RX ADMIN — GUAIFENESIN 600 MG: 600 TABLET ORAL at 09:18

## 2023-02-06 RX ADMIN — OXYCODONE AND ACETAMINOPHEN 1 TABLET: 10; 325 TABLET ORAL at 06:15

## 2023-02-06 RX ADMIN — AMLODIPINE BESYLATE 5 MG: 5 TABLET ORAL at 09:12

## 2023-02-06 RX ADMIN — INSULIN LISPRO 12 UNITS: 100 INJECTION, SOLUTION INTRAVENOUS; SUBCUTANEOUS at 09:27

## 2023-02-06 RX ADMIN — ALPRAZOLAM 0.25 MG: 0.25 TABLET ORAL at 10:24

## 2023-02-06 RX ADMIN — HYDROMORPHONE HYDROCHLORIDE 0.5 MG: 1 INJECTION, SOLUTION INTRAMUSCULAR; INTRAVENOUS; SUBCUTANEOUS at 03:06

## 2023-02-06 RX ADMIN — SENNOSIDES AND DOCUSATE SODIUM 1 TABLET: 8.6; 5 TABLET ORAL at 09:11

## 2023-02-06 RX ADMIN — NICOTINE 1 PATCH: 21 PATCH, EXTENDED RELEASE TRANSDERMAL at 11:12

## 2023-02-06 RX ADMIN — FUROSEMIDE 20 MG: 10 INJECTION, SOLUTION INTRAMUSCULAR; INTRAVENOUS at 12:43

## 2023-02-06 RX ADMIN — PREDNISONE 20 MG: 20 TABLET ORAL at 11:13

## 2023-02-06 RX ADMIN — LISINOPRIL 40 MG: 20 TABLET ORAL at 09:11

## 2023-02-06 RX ADMIN — ENOXAPARIN SODIUM 40 MG: 100 INJECTION SUBCUTANEOUS at 09:10

## 2023-02-06 RX ADMIN — INSULIN LISPRO 15 UNITS: 100 INJECTION, SOLUTION INTRAVENOUS; SUBCUTANEOUS at 12:03

## 2023-02-06 RX ADMIN — IPRATROPIUM BROMIDE AND ALBUTEROL SULFATE 3 ML: .5; 2.5 SOLUTION RESPIRATORY (INHALATION) at 00:51

## 2023-02-06 RX ADMIN — Medication 10 ML: at 12:43

## 2023-02-06 RX ADMIN — INSULIN LISPRO 2 UNITS: 100 INJECTION, SOLUTION INTRAVENOUS; SUBCUTANEOUS at 02:04

## 2023-02-06 NOTE — PROGRESS NOTES
Patient admitted with pulmonary complaints.  She was scheduled for follow-up to examine her right foot in the office today.  Asked to see while in-house.  On review of her admission photographs her right foot is significantly improved.  Okay to follow-up again in 1 month as an outpatient.

## 2023-02-06 NOTE — DISCHARGE INSTRUCTIONS
The patient has been recently diagnosed with diabetes, or diabetes is on their Problem List.   Patient is due for Tobacco Cessation Counseling

## 2023-02-06 NOTE — SIGNIFICANT NOTE
Wound Eval / Progress Noted     [place]     Patient Name: Italia Olvera  : 1960  MRN: [id number]  Today's Date: 2023                 Admit Date: 2/3/2023    Visit Dx:    ICD-10-CM ICD-9-CM   1. Chest pain in adult  R07.9 786.50   2. Acute pulmonary edema (HCC)  J81.0 518.4   3. Pneumonia due to infectious organism, unspecified laterality, unspecified part of lung  J18.9 486       Patient Active Problem List   Diagnosis   • Bladder disorder   • Depression   • Hypertension   • Peripheral neuropathy   • Osteoarthritis of knee   • COPD with acute exacerbation (HCC)   • Chronic back pain   • Multifocal pneumonia   • T2DM (type 2 diabetes mellitus) (CMS/HCC)- uncontrolled   • Bipolar disorder (HCC)   • Chronic respiratory failure with hypoxia (HCC)   • Acute on chronic respiratory failure with hypoxia (HCC)   • Chronic respiratory failure with hypoxia, on home oxygen therapy ([facility])   • Overweight (BMI 25.0-29.9)   • Acute on chronic systolic CHF (congestive heart failure) ([facility])   • Essential hypertension   • DM (diabetes mellitus), type 1 ([facility])   • Acute UTI (urinary tract infection)   • Anemia   • Sepsis (HCC)   • COPD with acute exacerbation (HCC)   • Hypokalemia   • Moderate malnutrition (CMS/HCC)   • Decubitus ulcer of back, stage 3 (HCC)   • Acute osteomyelitis of toe of right foot (HCC)   • Anxiety disorder   • Diabetes mellitus with peripheral vascular disease ([facility])   • Decubitus ulcer, unstageable ([facility])   • Atherosclerosis of native artery of extremity ([facility])   • Paroxysmal atrial fibrillation ([facility])   • Intertrigo        Past Medical History:   Diagnosis Date   • Acid reflux    • ACL tear 2015    PCL/ACL TEAR/RUPTURE   • Acute shoulder bursitis, right 2015   • Anxiety    • Arthritis    • Asthma    • Bipolar 1 disorder (HCC)     untreated   • Bladder disorder    • Cancer (HCC)     CERVICAL CANCER   • CHF (congestive heart failure) ([facility])     ASYMPTOMATIC- NO CARDIOLOGIST- SEE'S DR ARRIAZA  (PCP)- DENIED CP/SOB   • Chronic back pain    • Chronic pain     CHRONIC BACK, NECK, LEG, FOOT, & FACE PAIN   • COPD (chronic obstructive pulmonary disease) ([facility])     USES INHALERS   • Depression    • Diabetes mellitus (HCC)     BG RUND AROUND 140 IN AM   • DJD (degenerative joint disease)    • Gangrene (HCC)     RT SECOND AND FIFTH TOES   • GERD (gastroesophageal reflux disease)    • HBP (high blood pressure)    • Hip pain 09/15/2015   • Hypertension    • Knee injury 08/19/2015   • Knee pain, right 09/15/2015   • Limb swelling    • Neuropathy    • On home O2     2-3L/NC PRN   • Osteoarthritis, knee 09/01/2015   • Osteoporosis    • Peripheral neuropathy    • Renal failure 1994    NO CURRENT PROBLEMS   • Seasonal allergies    • Shoulder tendonitis 08/23/2015   • Sleep apnea    • Smoker    • SOB (shortness of breath)     NONE CURENTLY   • Tendinitis of right rotator cuff 08/23/2015        Past Surgical History:   Procedure Laterality Date   • AMPUTATION DIGIT Right 12/6/2022    Procedure: Amputation of right second and fifth toes;  Surgeon: Gabino Russell MD;  Location: [facility] MAIN OR;  Service: Vascular;  Laterality: Right;   • BACK SURGERY     • BLADDER REPAIR     • BRONCHOSCOPY N/A 07/10/2021    Procedure: BRONCHOSCOPY WITH BRONCHOALVEOLAR LAVAGE, POSSIBLE BIOPSY, BRUSHING, WASHING, AIRWAY INSPECTION;  Surgeon: Rodrigo Reyes MD;  Location: [facility] MAIN OR;  Service: Pulmonary;  Laterality: N/A;   • BRONCHOSCOPY N/A 07/10/2021    Procedure: BRONCHOSCOPY;  Surgeon: Rodrigo Reyes MD;  Location: [facility] ENDOSCOPY;  Service: Pulmonary;  Laterality: N/A;   • CARDIAC CATHETERIZATION Right 11/03/2022    Procedure: Aortogram with right leg angiogram, possible angioplasty or stenting ;  Surgeon: Gabino Russell MD;  Location: [facility] CATH INVASIVE LOCATION;  Service: Vascular;  Laterality: Right;   • CHOLECYSTECTOMY     • ENDOSCOPY     • FRACTURE SURGERY      SKULL FRACTURE   • GALLBLADDER SURGERY     • HERNIA REPAIR     •  HYSTERECTOMY     • INTERVENTIONAL RADIOLOGY PROCEDURE Right 03/03/2022    Procedure: Abdominal Aortagram with Runoff;  Surgeon: Marleny Yoon MD;  Location: [facility] INVASIVE LOCATION;  Service: Peripheral Vascular;  Laterality: Right;   • JOINT REPLACEMENT     • OTHER SURGICAL HISTORY      ARTIFICIAL JOINTS/LIMBS   • OTHER SURGICAL HISTORY      FACE SURGERY, UNSPECIFIED   • TOTAL HIP ARTHROPLASTY Right    • TOTAL KNEE ARTHROPLASTY Left          Physical Assessment:  Wound 02/14/22 1249 Right anterior fifth toe (Active)   Wound Image   02/06/23 1215   Dressing Appearance intact;dry 02/06/23 1215   Closure None 02/06/23 1215   Base yellow;scab;dry 02/06/23 1215   Yellow (%), Wound Tissue Color 100 02/06/23 1215   Periwound intact;dry 02/06/23 1215   Periwound Temperature warm 02/06/23 1215   Periwound Skin Turgor firm 02/06/23 1215   Edges callused;rolled/closed 02/06/23 1215   Drainage Amount none 02/06/23 1215   Care, Wound cleansed with;sterile normal saline 02/06/23 1215   Dressing Care dressing applied;dressing moistened;silver impregnated;hydrofiber;silicone;border dressing 02/06/23 1215   Periwound Care absorptive dressing applied 02/06/23 1215       Wound 02/22/22 1423 Right anterior second toe (Active)   Wound Image   02/06/23 1215   Dressing Appearance intact;dry 02/06/23 1215   Closure None 02/06/23 1215   Base red;scab;dry 02/06/23 1215   Periwound intact;dry 02/06/23 1215   Periwound Temperature warm 02/06/23 1215   Periwound Skin Turgor firm 02/06/23 1215   Edges rolled/closed 02/06/23 1215   Drainage Amount none 02/06/23 1215   Care, Wound cleansed with;sterile normal saline 02/06/23 1215   Dressing Care dressing moistened;silver impregnated;hydrofiber;dressing applied;gauze;tubular wrap 02/06/23 1215   Periwound Care absorptive dressing applied 02/06/23 1215       Wound 02/04/23 0100 Left anterior foot (Active)   Dressing Appearance open to air 02/05/23 2000   Closure Open to air 02/05/23 2000    Base dry;clean;red 02/05/23 2000   Care, Wound cleansed with;sterile normal saline 02/05/23 2000   Wound 02/06/23 1855 Right anterior great toe (Active)   Wound Image   02/06/23 1215   Dressing Appearance intact;dry 02/06/23 1215   Closure None 02/06/23 1215   Base red;scab;dry 02/06/23 1215   Periwound intact;dry 02/06/23 1215   Periwound Temperature warm 02/06/23 1215   Periwound Skin Turgor soft 02/06/23 1215   Edges rolled/closed 02/06/23 1215   Drainage Amount none 02/06/23 1215   Care, Wound cleansed with;sterile normal saline 02/06/23 1215   Dressing Care dressing applied;dressing moistened;silver impregnated;hydrofiber;silicone;border dressing 02/06/23 1215   Periwound Care absorptive dressing applied 02/06/23 1215        Wound Check / Follow-up:  Patient seen today for a wound consult. Ulcerations to right great toe, second toe and lateral right foot with thick dry crusted tissue noted. Cleansed with NS. No visible moist wound base at this time. Patient to discharge today; recommending to follow current wound care provided to her by her vascular surgeo    Nikki Perla RN    2/6/2023    18:56 EST

## 2023-02-06 NOTE — DISCHARGE SUMMARY
[facility]         DISCHARGE SUMMARY    Patient Name: Italia Olvera  : 1960  MRN: [id number]    Date of Admission: 2/3/2023  Date of Discharge: 2023  Primary Care Physician: Carloz Shoemaker MD    Consults     Date and Time Order Name Status Description    2023  6:19 PM Inpatient Vascular Surgery Consult      2/3/2023 10:50 PM IP General Consult (Use specialty-specific consult if known)            Presenting Problem:   Acute pulmonary edema (HCC) [J81.0]  Chest pain in adult [R07.9]  Pneumonia due to infectious organism, unspecified laterality, unspecified part of lung [J18.9]    Active and Resolved Hospital Problems:  Active Hospital Problems    Diagnosis POA   • Intertrigo [L30.4] Yes   • Anxiety disorder [F41.9] Yes   • Peripheral neuropathy [G62.9] Yes   • T2DM (type 2 diabetes mellitus) (CMS/HCC)- uncontrolled [E11.9] Yes   • Multifocal pneumonia [J18.9] Yes   • Hypertension [I10] Yes      Resolved Hospital Problems    Diagnosis POA   • **Chest pain in adult [R07.9] Yes   • Lactic acidosis [E87.20] Yes   • Pericardial effusion [I31.39] Yes     Suspected cirrhosis    Hospital Course     Hospital Course:  Italia Olvera is a 62 y.o. female admitted to hospital for chest pain and shortness of breath, work-up in the ER included a CT which ruled out PE but showed pericardial effusion and bilateral infiltrates.  Pericardial effusion was unchanged from the past as well as infiltrates were noted on the previous x-rays and imaging studies.  On admission when examined patient denied any chest pain she is more concerned about her sleeping meds and pain meds.  She complains of some congestion.  Patient was started on steroids for COPD exacerbation.  Pneumonia work-up was initiated.  Patient's work-up was negative for any growth on the cultures.  She was continued antibiotics and steroids which improved her symptoms.  Echo was done, results of which are still pending.  This  morning patient was feeling better she wanted to see vascular surgeon who has an amputation of the toe.  Patient was supposed to see them in office.  I consulted Dr. Russell he looked at the pictures of the foot and recommended follow-up as an outpatient.  Patient also had elevated lactic acid mildly elevated.  No evidence of sepsis.  Most likely source of lactic acid is peripheral vascular disease, chronic CHF as well as use of metformin..  Patient has been feeling good and does not have much pain she is back on her regular pain meds.  Her IV medicines were discontinued and she was unhappy with it.  She wanted to be discharged.  After I seen this morning patient nurse called me and she wanted to leave and she is feeling better.  We will discharge her to home with outpatient follow-up recommendations      DISCHARGE Follow Up Recommendations for labs and diagnostics:   Discharge to home, follow-up with Dr. Shoemaker in 2 to 3 days, follow-up with vascular surgeons as recommended      Day of Discharge     Vital Signs:  Temp:  [97.6 °F (36.4 °C)-99 °F (37.2 °C)] 97.6 °F (36.4 °C)  Heart Rate:  [71-83] 76  Resp:  [18-21] 18  BP: (146-167)/(68-74) 158/74  Flow (L/min):  [2-3] 2    Physical Exam:  Elderly female not in acute distress.  Heart regular.  Lungs with bilateral coarse breath sounds and rhonchi.  Abdomen obese and soft.  Extremities trace of edema.  Neurologically awake alert and oriented.    Pertinent  and/or Most Recent Results     LAB RESULTS:      Lab 02/05/23  1046 02/05/23  0511 02/04/23  2320 02/04/23 2014 02/04/23  1650 02/04/23  1355 02/04/23  0329 02/03/23  1930   WBC  --   --   --   --   --   --  8.04 8.01   HEMOGLOBIN  --   --   --   --   --   --  12.8 13.6   HEMATOCRIT  --   --   --   --   --   --  39.7 42.9   PLATELETS  --   --   --   --   --   --  78* 79*   NEUTROS ABS  --   --   --   --   --   --  3.97 3.77   IMMATURE GRANS (ABS)  --   --   --   --   --   --  0.06* 0.07*   LYMPHS ABS  --   --   --    --   --   --  3.88* 3.58*   MONOS ABS  --   --   --   --   --   --  0.12 0.50   EOS ABS  --   --   --   --   --   --  0.01 0.08   MCV  --   --   --   --   --   --  88.6 89.4   PROCALCITONIN  --   --   --   --   --   --  0.05  --    LACTATE 3.0* 2.1* 2.1* 3.3* 2.4*   < >  --   --     < > = values in this interval not displayed.         Lab 02/04/23  0329 02/03/23 1930   SODIUM 139 142   POTASSIUM 4.5 3.8   CHLORIDE 106 108*   CO2 24.1 24.3   ANION GAP 8.9 9.7   BUN 17 14   CREATININE 0.72 0.64   EGFR 94.1 99.4   GLUCOSE 346* 197*   CALCIUM 8.3* 8.7   MAGNESIUM  --  1.8         Lab 02/04/23  0329 02/03/23 1930   TOTAL PROTEIN 6.4 6.8   ALBUMIN 3.2* 3.3*   GLOBULIN 3.2 3.5   ALT (SGPT) 14 15   AST (SGOT) 21 20   BILIRUBIN 0.4 0.5   ALK PHOS 131* 149*   LIPASE  --  18         Lab 02/04/23 0329 02/03/23 1930   PROBNP  --  4,484.0*   TROPONIN T 0.017 0.034*                 Brief Urine Lab Results     None        Microbiology Results (last 10 days)     Procedure Component Value - Date/Time    Respiratory Culture - Sputum, Cough [[id number]] Collected: 02/05/23 0843    Lab Status: Preliminary result Specimen: Sputum from Cough Updated: 02/05/23 1427     Gram Stain Rare (1+) Epithelial cells seen      Moderate (3+) WBCs seen      Few (2+) Gram positive cocci in pairs, chains and clusters    S. Pneumo Ag Urine or CSF - Urine, Urine, Clean Catch [[id number]]  (Normal) Collected: 02/04/23 1828    Lab Status: Final result Specimen: Urine, Clean Catch Updated: 02/05/23 0846     Strep Pneumo Ag Negative    Legionella Antigen, Urine - Urine, Urine, Clean Catch [[id number]]  (Normal) Collected: 02/04/23 1828    Lab Status: Final result Specimen: Urine, Clean Catch Updated: 02/05/23 0845     LEGIONELLA ANTIGEN, URINE Negative    Mycoplasma Pneumoniae Antibody, IgM - Blood, [[id number]]  (Normal) Collected: 02/03/23 1930    Lab Status: Final result Specimen: Blood Updated: 02/04/23 1544     Mycoplasma pneumo IgM Negative           PROCEDURES:    [unfilled]    CT Chest With Contrast Diagnostic    Result Date: 2/3/2023  Impression:   1. No pulmonary embolism is seen.  2. There is moderate cardiomegaly.  3. A moderate-to-large pericardial effusion is seen, as before.  4. Bilateral infiltrates are present, which are predominantly centrilobular in their CT appearance; they may represent an infectious pneumonitis/bronchiolitis.  Pulmonary edema cannot be excluded.  5. Minimal if any pleural effusion is seen.  6. Cirrhosis is suspected.  There is splenomegaly.  7. The patient has undergone cholecystectomy. 8. Please see above comments for further detail. 1.   Please note that portions of this note were completed with a voice recognition program.  JAILYN LEÓN JR, MD       Electronically Signed and Approved By: JAILYN LEÓN JR, MD on 2/03/2023 at 22:41              XR Chest 1 View    Result Date: 2/3/2023  Impression:   1. Bilateral interstitial opacities throughout both lungs concerning for atypical infection.        JUAREZ MUNGUIA MD       Electronically Signed and Approved By: JUAREZ MUNGUIA MD on 2/03/2023 at 19:24               Results for orders placed during the hospital encounter of 02/21/22    Doppler Arterial Multi Level Lower Extremity - Bilateral CAR    Interpretation Summary  · Right Conclusion: The right SHARON is moderately reduced. Severe digital ischemia.  · Left Conclusion: The left SHARON is moderately reduced. Moderate digital ischemia.  · Right SHARON (0.74) is improved in comparison to results (0.48) noted on study dated 2/23/2022  · The left SHARON(0.67) is slightly worse in comparison to results (0.78) noted on study dated 2/23/2022.      Results for orders placed during the hospital encounter of 02/21/22    Doppler Arterial Multi Level Lower Extremity - Bilateral CAR    Interpretation Summary  · Right Conclusion: The right SHARON is moderately reduced. Severe digital ischemia.  · Left Conclusion: The left SHARON is moderately  reduced. Moderate digital ischemia.  · Right SHARON (0.74) is improved in comparison to results (0.48) noted on study dated 2/23/2022  · The left SHARON(0.67) is slightly worse in comparison to results (0.78) noted on study dated 2/23/2022.      Results for orders placed during the hospital encounter of 06/14/21    Adult Transthoracic Echo Complete W/ Cont if Necessary Per Protocol    Interpretation Summary  · Left ventricular ejection fraction appears to be 51 - 55%.  · The left ventricular cavity is mildly dilated.  · Left ventricular wall thickness is consistent with moderate concentric hypertrophy.  · The right atrial cavity is mildly dilated.  · There is moderate calcification of the aortic valve.  · Estimated right ventricular systolic pressure from tricuspid regurgitation is normal (<35 mmHg).      Labs Pending at Discharge:  Pending Labs     Order Current Status    Respiratory Culture - Sputum, Cough Preliminary result            Discharge Details        Discharge Medications      New Medications      Instructions Start Date   azithromycin 250 MG tablet  Commonly known as: Zithromax   250 mg, Oral, Daily      guaiFENesin 600 MG 12 hr tablet  Commonly known as: MUCINEX   600 mg, Oral, Every 12 Hours Scheduled      miconazole 2 % cream  Commonly known as: MICOTIN   1 application, Topical, Every 12 Hours Scheduled      nicotine 21 MG/24HR patch  Commonly known as: NICODERM CQ   1 patch, Transdermal, Every 24 Hours Scheduled   Start Date: February 7, 2023     predniSONE 20 MG tablet  Commonly known as: DELTASONE   20 mg, Oral, Daily With Breakfast   Start Date: February 7, 2023        Changes to Medications      Instructions Start Date   aspirin 81 MG EC tablet  What changed: when to take this   81 mg, Oral, Daily      oxyCODONE-acetaminophen  MG per tablet  Commonly known as: PERCOCET  What changed: additional instructions   1 tablet, Oral, Every 6 Hours PRN, GIVEN PER PCP         Continue These Medications       Instructions Start Date   amLODIPine-benazepril 10-40 MG per capsule  Commonly known as: LOTREL   1 capsule, Oral, Daily      bisoprolol-hydrochlorothiazide 10-6.25 MG per tablet  Commonly known as: ZIAC   1 tablet, Oral, Every 12 Hours Scheduled      cholestyramine 4 g packet  Commonly known as: QUESTRAN   1 packet, Oral, 2 Times Daily With Meals      clopidogrel 75 MG tablet  Commonly known as: PLAVIX   75 mg, Oral, Daily      ferrous sulfate 324 (65 Fe) MG tablet delayed-release EC tablet   324 mg, Oral, Every Other Day      furosemide 80 MG tablet  Commonly known as: LASIX   80 mg, Oral, 2 Times Daily      hydrOXYzine 10 MG/5ML syrup  Commonly known as: ATARAX   10 mg, Oral, 4 Times Daily      insulin detemir 100 UNIT/ML injection  Commonly known as: LEVEMIR   25 Units, Subcutaneous, Nightly      insulin NPH-insulin regular (70-30) 100 UNIT/ML injection  Commonly known as: humuLIN 70/30,novoLIN 70/30   2.5 Units, Subcutaneous, 2 Times Daily With Meals      ipratropium-albuterol 0.5-2.5 mg/3 ml nebulizer  Commonly known as: DUO-NEB   3 mL, Nebulization, 4 Times Daily - RT      mirtazapine 45 MG tablet  Commonly known as: REMERON   45 mg, Oral, Every Night at Bedtime      ondansetron 4 MG tablet  Commonly known as: ZOFRAN   4 mg, Oral, Every 8 Hours PRN      pantoprazole 40 MG EC tablet  Commonly known as: PROTONIX   40 mg, Oral, Daily      potassium chloride 10 MEQ CR tablet   20 mEq, Oral, 2 Times Daily      sertraline 50 MG tablet  Commonly known as: ZOLOFT   50 mg, Oral, Daily, TAKES WITH 100 MG TO EQUAL 150 MG      sertraline 100 MG tablet  Commonly known as: ZOLOFT   100 mg, Oral, Daily, In addition to the 50 mg tablet to make 150 mg daily      tiZANidine 4 MG tablet  Commonly known as: ZANAFLEX   4 mg, Oral, Every 8 Hours PRN      traZODone 150 MG tablet  Commonly known as: DESYREL   150 mg, Oral, Nightly PRN      vitamin B-12 2500 MCG sublingual tablet tablet  Commonly known as: CYANOCOBALAMIN   2,500  mcg, Sublingual, Daily      zolpidem 10 MG tablet  Commonly known as: AMBIEN   10 mg, Oral, Every Night at Bedtime         Stop These Medications    metFORMIN 500 MG tablet  Commonly known as: GLUCOPHAGE            No Known Allergies      Discharge Disposition:    Home or Self Care    Diet:    Heart healthy    Discharge Activity:     Activity Instructions     Activity as Tolerated              No future appointments.    Additional Instructions for the Follow-ups that You Need to Schedule     Discharge Follow-up with PCP   As directed       Currently Documented PCP:    Carloz Shoemaker MD    PCP Phone Number:    [phone]     Follow Up Details: in 2-3 days         Discharge Follow-up with Specified Provider: Dr Russell in  3-4 weeks   As directed      To: Dr Russell in  3-4 weeks               Time spent on Discharge including face to face service: 33 minutes.            I have dictated this note utilizing Dragon Dictation.             Please note that portions of this note were completed with a voice recognition program.             Part of this note may be an electronic transcription/translation of spoken language to printed text         using the Dragon Dictation System.       Electronically signed by Rudy Garnett MD, 02/06/23, 3:23 PM EST.

## 2023-02-07 LAB
AORTIC DIMENSIONLESS INDEX: 0.32 (DI)
ASCENDING AORTA: 3.8 CM
BACTERIA SPEC RESP CULT: NORMAL
BH CV ECHO MEAS - AI P1/2T: 336 MSEC
BH CV ECHO MEAS - AO MAX PG: 27 MMHG
BH CV ECHO MEAS - AO MEAN PG: 14 MMHG
BH CV ECHO MEAS - AO ROOT DIAM: 3.9 CM
BH CV ECHO MEAS - AO V2 MAX: 260 CM/SEC
BH CV ECHO MEAS - AO V2 VTI: 53.6 CM
BH CV ECHO MEAS - AVA(I,D): 1.1 CM2
BH CV ECHO MEAS - EDV(MOD-SP2): 88 ML
BH CV ECHO MEAS - EDV(MOD-SP4): 114 ML
BH CV ECHO MEAS - EF(MOD-BP): 27 %
BH CV ECHO MEAS - ESV(MOD-SP2): 56 ML
BH CV ECHO MEAS - ESV(MOD-SP4): 89 ML
BH CV ECHO MEAS - IVSD: 1.7 CM
BH CV ECHO MEAS - LA DIMENSION: 4.9 CM
BH CV ECHO MEAS - LAT PEAK E' VEL: 5.7 CM/SEC
BH CV ECHO MEAS - LV MAX PG: 3 MMHG
BH CV ECHO MEAS - LV MEAN PG: 2 MMHG
BH CV ECHO MEAS - LV V1 MAX: 85.3 CM/SEC
BH CV ECHO MEAS - LV V1 VTI: 17.2 CM
BH CV ECHO MEAS - LVIDD: 5.2 CM
BH CV ECHO MEAS - LVIDS: 4.5 CM
BH CV ECHO MEAS - LVOT DIAM: 2 CM
BH CV ECHO MEAS - LVPWD: 1.8 CM
BH CV ECHO MEAS - MED PEAK E' VEL: 3.08 CM/SEC
BH CV ECHO MEAS - MV A MAX VEL: 109 CM/SEC
BH CV ECHO MEAS - MV DEC TIME: 244 MSEC
BH CV ECHO MEAS - MV E MAX VEL: 141 CM/SEC
BH CV ECHO MEAS - MV E/A: 1.3
BH CV ECHO MEAS - MV MAX PG: 9 MMHG
BH CV ECHO MEAS - MV MEAN PG: 4 MMHG
BH CV ECHO MEAS - MV P1/2T: 73 MSEC
BH CV ECHO MEAS - MV V2 VTI: 44.7 CM
BH CV ECHO MEAS - MVA(P1/2T): 3 CM2
BH CV ECHO MEAS - RVDD: 3.1 CM
BH CV ECHO MEAS - RVSP: 64 MMHG
BH CV ECHO MEAS - TR MAX PG: 49 MMHG
BH CV ECHO MEAS - TR MAX VEL: 349 CM/SEC
BH CV ECHO MEASUREMENTS AVERAGE E/E' RATIO: 32.12
GRAM STN SPEC: NORMAL
IVRT: 82 MSEC
LEFT ATRIUM VOLUME INDEX: 70.8 ML/M2
MAXIMAL PREDICTED HEART RATE: 157 BPM
STRESS TARGET HR: 133 BPM

## 2023-02-07 NOTE — OUTREACH NOTE
Prep Survey    Flowsheet Row Responses   [facility]? [facility]   Is LACE score < 7 ? No   Eligibility Readm Mgmt   Discharge diagnosis Acute pulmonary edema   Does the patient have one of the following disease processes/diagnoses(primary or secondary)? Pneumonia   Does the patient have Home health ordered? No   Is there a DME ordered? No   Prep survey completed? Yes          KHUSHBOO VELASQUEZ - Registered Nurse

## 2023-02-10 ENCOUNTER — READMISSION MANAGEMENT (OUTPATIENT)
Dept: CALL CENTER | Facility: HOSPITAL | Age: 63
End: 2023-02-10
Payer: MEDICARE

## 2023-02-10 NOTE — OUTREACH NOTE
COPD/PN Week 1 Survey    Flowsheet Row Responses   [facility] patient discharged from? [facility]   Does the patient have one of the following disease processes/diagnoses(primary or secondary)? Pneumonia   Week 1 attempt successful? Yes   Call start time 1140   Call end time 1202   Discharge diagnosis Acute pulmonary edema, chest pain   Person spoke with today (if not patient) and relationship spouse   Meds reviewed with patient/caregiver? Yes   Does the patient have all medications ordered at discharge? No   What is keeping the patient from filling the prescriptions? --  [Spouse reports that they have been unable to  meds from pharmacy. He reports that he has also been ill. ]   Nursing Interventions Nurse provided patient education   Is the patient taking all medications as directed (includes completed medication regime)? No   What is preventing the patient from taking all medications as directed? Other  [See above. ]   Medication comments Spouse to check with pharmacy today and will try to get meds picked up.    Does the patient have a primary care provider?  Yes   Does the patient have an appointment with their PCP or specialist within 7 days of discharge? Yes   Comments regarding PCP Appt Monday 2/13   Has the patient kept scheduled appointments due by today? N/A   Has home health visited the patient within 72 hours of discharge? N/A   Pulse Ox monitoring None   Psychosocial issues? No   Did the patient receive a copy of their discharge instructions? Yes   Nursing interventions Reviewed instructions with patient   What is the patient's perception of their health status since discharge? Improving   If the patient is a current smoker, are they able to teach back resources for cessation? Smoking cessation medications  [Nicotine patches ordered at discharge,  has not started them. ]   Is the patient/caregiver able to teach back the hierarchy of who to call/visit for symptoms/problems? PCP, Specialist, Home  health nurse, Urgent Care, ED, 911 Yes   Is the patient/caregiver able to teach back signs and symptoms of worsening condition: Fever/chills, Shortness of breath, Chest pain   Is the patient/caregiver able to teach back importance of completing antibiotic course of treatment? Yes  [Stressed importance of getting antibiotic at pharmacy ]   Week 1 call completed? Yes          JOSE R MARAVILLA - Registered Nurse

## 2023-02-15 LAB
QT INTERVAL: 390 MS
QT INTERVAL: 398 MS

## 2023-03-07 ENCOUNTER — APPOINTMENT (OUTPATIENT)
Dept: CT IMAGING | Facility: HOSPITAL | Age: 63
DRG: 240 | End: 2023-03-07
Payer: MEDICARE

## 2023-03-07 ENCOUNTER — HOSPITAL ENCOUNTER (INPATIENT)
Facility: HOSPITAL | Age: 63
LOS: 7 days | Discharge: HOME HEALTH | DRG: 240 | End: 2023-03-15
Attending: EMERGENCY MEDICINE | Admitting: INTERNAL MEDICINE
Payer: MEDICARE

## 2023-03-07 DIAGNOSIS — I99.8 ISCHEMIC FOOT: ICD-10-CM

## 2023-03-07 DIAGNOSIS — R13.12 OROPHARYNGEAL DYSPHAGIA: ICD-10-CM

## 2023-03-07 DIAGNOSIS — A41.9 SEPSIS, DUE TO UNSPECIFIED ORGANISM, UNSPECIFIED WHETHER ACUTE ORGAN DYSFUNCTION PRESENT: ICD-10-CM

## 2023-03-07 DIAGNOSIS — R26.2 DIFFICULTY WALKING: ICD-10-CM

## 2023-03-07 DIAGNOSIS — J96.11 CHRONIC RESPIRATORY FAILURE WITH HYPOXIA, ON HOME OXYGEN THERAPY: ICD-10-CM

## 2023-03-07 DIAGNOSIS — M86.9 OSTEOMYELITIS OF RIGHT FOOT, UNSPECIFIED TYPE: Primary | ICD-10-CM

## 2023-03-07 DIAGNOSIS — E11.51 DIABETES MELLITUS WITH PERIPHERAL VASCULAR DISEASE: ICD-10-CM

## 2023-03-07 DIAGNOSIS — Z99.81 CHRONIC RESPIRATORY FAILURE WITH HYPOXIA, ON HOME OXYGEN THERAPY: ICD-10-CM

## 2023-03-07 DIAGNOSIS — L08.9 FOOT INFECTION: ICD-10-CM

## 2023-03-07 LAB
ALBUMIN SERPL-MCNC: 2.1 G/DL (ref 3.5–5.2)
ALBUMIN/GLOB SERPL: 0.5 G/DL
ALP SERPL-CCNC: 134 U/L (ref 39–117)
ALT SERPL W P-5'-P-CCNC: 11 U/L (ref 1–33)
ANION GAP SERPL CALCULATED.3IONS-SCNC: 10.6 MMOL/L (ref 5–15)
AST SERPL-CCNC: 12 U/L (ref 1–32)
BASOPHILS # BLD AUTO: 0.03 10*3/MM3 (ref 0–0.2)
BASOPHILS NFR BLD AUTO: 0.2 % (ref 0–1.5)
BILIRUB SERPL-MCNC: 0.4 MG/DL (ref 0–1.2)
BUN SERPL-MCNC: 12 MG/DL (ref 8–23)
BUN/CREAT SERPL: 21.4 (ref 7–25)
CALCIUM SPEC-SCNC: 7.9 MG/DL (ref 8.6–10.5)
CHLORIDE SERPL-SCNC: 102 MMOL/L (ref 98–107)
CO2 SERPL-SCNC: 24.4 MMOL/L (ref 22–29)
CREAT SERPL-MCNC: 0.56 MG/DL (ref 0.57–1)
CRP SERPL-MCNC: 14.33 MG/DL (ref 0–0.5)
D-LACTATE SERPL-SCNC: 2.6 MMOL/L (ref 0.5–2)
DEPRECATED RDW RBC AUTO: 50.3 FL (ref 37–54)
EGFRCR SERPLBLD CKD-EPI 2021: 102.7 ML/MIN/1.73
EOSINOPHIL # BLD AUTO: 0.07 10*3/MM3 (ref 0–0.4)
EOSINOPHIL NFR BLD AUTO: 0.5 % (ref 0.3–6.2)
ERYTHROCYTE [DISTWIDTH] IN BLOOD BY AUTOMATED COUNT: 16.3 % (ref 12.3–15.4)
ERYTHROCYTE [SEDIMENTATION RATE] IN BLOOD: 72 MM/HR (ref 0–30)
GLOBULIN UR ELPH-MCNC: 4 GM/DL
GLUCOSE SERPL-MCNC: 263 MG/DL (ref 65–99)
HCT VFR BLD AUTO: 33.1 % (ref 34–46.6)
HGB BLD-MCNC: 11 G/DL (ref 12–15.9)
IMM GRANULOCYTES # BLD AUTO: 0.07 10*3/MM3 (ref 0–0.05)
IMM GRANULOCYTES NFR BLD AUTO: 0.5 % (ref 0–0.5)
INR PPP: 1.35 (ref 0.86–1.15)
LYMPHOCYTES # BLD AUTO: 1.38 10*3/MM3 (ref 0.7–3.1)
LYMPHOCYTES NFR BLD AUTO: 10.1 % (ref 19.6–45.3)
MCH RBC QN AUTO: 27.9 PG (ref 26.6–33)
MCHC RBC AUTO-ENTMCNC: 33.2 G/DL (ref 31.5–35.7)
MCV RBC AUTO: 84 FL (ref 79–97)
MONOCYTES # BLD AUTO: 0.46 10*3/MM3 (ref 0.1–0.9)
MONOCYTES NFR BLD AUTO: 3.4 % (ref 5–12)
NEUTROPHILS NFR BLD AUTO: 11.7 10*3/MM3 (ref 1.7–7)
NEUTROPHILS NFR BLD AUTO: 85.3 % (ref 42.7–76)
NRBC BLD AUTO-RTO: 0 /100 WBC (ref 0–0.2)
PLATELET # BLD AUTO: 152 10*3/MM3 (ref 140–450)
PMV BLD AUTO: 11 FL (ref 6–12)
POTASSIUM SERPL-SCNC: 4.1 MMOL/L (ref 3.5–5.2)
PROT SERPL-MCNC: 6.1 G/DL (ref 6–8.5)
PROTHROMBIN TIME: 16.7 SECONDS (ref 11.8–14.9)
RBC # BLD AUTO: 3.94 10*6/MM3 (ref 3.77–5.28)
SODIUM SERPL-SCNC: 137 MMOL/L (ref 136–145)
WBC NRBC COR # BLD: 13.71 10*3/MM3 (ref 3.4–10.8)

## 2023-03-07 PROCEDURE — 36415 COLL VENOUS BLD VENIPUNCTURE: CPT

## 2023-03-07 PROCEDURE — 86140 C-REACTIVE PROTEIN: CPT

## 2023-03-07 PROCEDURE — 25010000002 CEFEPIME PER 500 MG: Performed by: EMERGENCY MEDICINE

## 2023-03-07 PROCEDURE — 83605 ASSAY OF LACTIC ACID: CPT

## 2023-03-07 PROCEDURE — 0 IOHEXOL 300 MG/ML SOLUTION

## 2023-03-07 PROCEDURE — 85610 PROTHROMBIN TIME: CPT

## 2023-03-07 PROCEDURE — 82550 ASSAY OF CK (CPK): CPT | Performed by: EMERGENCY MEDICINE

## 2023-03-07 PROCEDURE — 73701 CT LOWER EXTREMITY W/DYE: CPT

## 2023-03-07 PROCEDURE — 85730 THROMBOPLASTIN TIME PARTIAL: CPT | Performed by: EMERGENCY MEDICINE

## 2023-03-07 PROCEDURE — 87040 BLOOD CULTURE FOR BACTERIA: CPT

## 2023-03-07 PROCEDURE — 85025 COMPLETE CBC W/AUTO DIFF WBC: CPT

## 2023-03-07 PROCEDURE — 85652 RBC SED RATE AUTOMATED: CPT

## 2023-03-07 PROCEDURE — 80053 COMPREHEN METABOLIC PANEL: CPT

## 2023-03-07 PROCEDURE — 99285 EMERGENCY DEPT VISIT HI MDM: CPT

## 2023-03-07 RX ADMIN — SODIUM CHLORIDE 1000 ML: 9 INJECTION, SOLUTION INTRAVENOUS at 23:25

## 2023-03-07 RX ADMIN — IOHEXOL 100 ML: 300 INJECTION, SOLUTION INTRAVENOUS at 22:06

## 2023-03-07 RX ADMIN — CEFEPIME 2 G: 2 INJECTION, POWDER, FOR SOLUTION INTRAVENOUS at 23:27

## 2023-03-08 ENCOUNTER — TELEPHONE (OUTPATIENT)
Dept: VASCULAR SURGERY | Facility: HOSPITAL | Age: 63
End: 2023-03-08

## 2023-03-08 PROBLEM — I99.8 ISCHEMIC FOOT: Status: ACTIVE | Noted: 2023-03-08

## 2023-03-08 LAB
ANION GAP SERPL CALCULATED.3IONS-SCNC: 8 MMOL/L (ref 5–15)
APTT PPP: 48.5 SECONDS (ref 78–95.9)
APTT PPP: 54 SECONDS (ref 78–95.9)
APTT PPP: 69.5 SECONDS (ref 78–95.9)
BACTERIA UR QL AUTO: ABNORMAL /HPF
BASOPHILS # BLD AUTO: 0.03 10*3/MM3 (ref 0–0.2)
BASOPHILS NFR BLD AUTO: 0.2 % (ref 0–1.5)
BILIRUB UR QL STRIP: NEGATIVE
BUN SERPL-MCNC: 12 MG/DL (ref 8–23)
BUN/CREAT SERPL: 23.1 (ref 7–25)
CALCIUM SPEC-SCNC: 7.3 MG/DL (ref 8.6–10.5)
CHLORIDE SERPL-SCNC: 106 MMOL/L (ref 98–107)
CK SERPL-CCNC: 44 U/L (ref 20–180)
CLARITY UR: ABNORMAL
CO2 SERPL-SCNC: 23 MMOL/L (ref 22–29)
COLOR UR: ABNORMAL
CREAT SERPL-MCNC: 0.52 MG/DL (ref 0.57–1)
D-LACTATE SERPL-SCNC: 1.7 MMOL/L (ref 0.5–2)
DEPRECATED RDW RBC AUTO: 51.4 FL (ref 37–54)
EGFRCR SERPLBLD CKD-EPI 2021: 104.5 ML/MIN/1.73
EOSINOPHIL # BLD AUTO: 0.08 10*3/MM3 (ref 0–0.4)
EOSINOPHIL NFR BLD AUTO: 0.6 % (ref 0.3–6.2)
ERYTHROCYTE [DISTWIDTH] IN BLOOD BY AUTOMATED COUNT: 16.4 % (ref 12.3–15.4)
GLUCOSE BLDC GLUCOMTR-MCNC: 133 MG/DL (ref 70–99)
GLUCOSE BLDC GLUCOMTR-MCNC: 148 MG/DL (ref 70–99)
GLUCOSE BLDC GLUCOMTR-MCNC: 159 MG/DL (ref 70–99)
GLUCOSE BLDC GLUCOMTR-MCNC: 181 MG/DL (ref 70–99)
GLUCOSE SERPL-MCNC: 158 MG/DL (ref 65–99)
GLUCOSE UR STRIP-MCNC: ABNORMAL MG/DL
HCT VFR BLD AUTO: 34 % (ref 34–46.6)
HGB BLD-MCNC: 11 G/DL (ref 12–15.9)
HGB UR QL STRIP.AUTO: ABNORMAL
HYALINE CASTS UR QL AUTO: ABNORMAL /LPF
IMM GRANULOCYTES # BLD AUTO: 0.07 10*3/MM3 (ref 0–0.05)
IMM GRANULOCYTES NFR BLD AUTO: 0.5 % (ref 0–0.5)
INR PPP: 1.29 (ref 0.86–1.15)
KETONES UR QL STRIP: NEGATIVE
LEUKOCYTE ESTERASE UR QL STRIP.AUTO: ABNORMAL
LYMPHOCYTES # BLD AUTO: 1.45 10*3/MM3 (ref 0.7–3.1)
LYMPHOCYTES NFR BLD AUTO: 11 % (ref 19.6–45.3)
MCH RBC QN AUTO: 27.6 PG (ref 26.6–33)
MCHC RBC AUTO-ENTMCNC: 32.4 G/DL (ref 31.5–35.7)
MCV RBC AUTO: 85.2 FL (ref 79–97)
MONOCYTES # BLD AUTO: 0.53 10*3/MM3 (ref 0.1–0.9)
MONOCYTES NFR BLD AUTO: 4 % (ref 5–12)
NEUTROPHILS NFR BLD AUTO: 11.05 10*3/MM3 (ref 1.7–7)
NEUTROPHILS NFR BLD AUTO: 83.7 % (ref 42.7–76)
NITRITE UR QL STRIP: NEGATIVE
NRBC BLD AUTO-RTO: 0 /100 WBC (ref 0–0.2)
PH UR STRIP.AUTO: 5.5 [PH] (ref 5–8)
PLATELET # BLD AUTO: 155 10*3/MM3 (ref 140–450)
PMV BLD AUTO: 11.9 FL (ref 6–12)
POTASSIUM SERPL-SCNC: 4.1 MMOL/L (ref 3.5–5.2)
PROT UR QL STRIP: ABNORMAL
PROTHROMBIN TIME: 16.1 SECONDS (ref 11.8–14.9)
RBC # BLD AUTO: 3.99 10*6/MM3 (ref 3.77–5.28)
RBC # UR STRIP: ABNORMAL /HPF
REF LAB TEST METHOD: ABNORMAL
SODIUM SERPL-SCNC: 137 MMOL/L (ref 136–145)
SP GR UR STRIP: >1.03 (ref 1–1.03)
SQUAMOUS #/AREA URNS HPF: ABNORMAL /HPF
UROBILINOGEN UR QL STRIP: ABNORMAL
WBC # UR STRIP: ABNORMAL /HPF
WBC NRBC COR # BLD: 13.21 10*3/MM3 (ref 3.4–10.8)
WHOLE BLOOD HOLD SPECIMEN: NORMAL
YEAST URNS QL MICRO: ABNORMAL /HPF

## 2023-03-08 PROCEDURE — 82962 GLUCOSE BLOOD TEST: CPT

## 2023-03-08 PROCEDURE — C1894 INTRO/SHEATH, NON-LASER: HCPCS | Performed by: SURGERY

## 2023-03-08 PROCEDURE — 94640 AIRWAY INHALATION TREATMENT: CPT

## 2023-03-08 PROCEDURE — C1769 GUIDE WIRE: HCPCS | Performed by: SURGERY

## 2023-03-08 PROCEDURE — 87086 URINE CULTURE/COLONY COUNT: CPT | Performed by: INTERNAL MEDICINE

## 2023-03-08 PROCEDURE — 85610 PROTHROMBIN TIME: CPT | Performed by: EMERGENCY MEDICINE

## 2023-03-08 PROCEDURE — 25010000002 MIDAZOLAM PER 1 MG: Performed by: SURGERY

## 2023-03-08 PROCEDURE — C1887 CATHETER, GUIDING: HCPCS | Performed by: SURGERY

## 2023-03-08 PROCEDURE — 85730 THROMBOPLASTIN TIME PARTIAL: CPT | Performed by: INTERNAL MEDICINE

## 2023-03-08 PROCEDURE — C1760 CLOSURE DEV, VASC: HCPCS | Performed by: SURGERY

## 2023-03-08 PROCEDURE — 25010000002 HYDROMORPHONE 1 MG/ML SOLUTION: Performed by: SURGERY

## 2023-03-08 PROCEDURE — 81001 URINALYSIS AUTO W/SCOPE: CPT | Performed by: INTERNAL MEDICINE

## 2023-03-08 PROCEDURE — 25010000002 PIPERACILLIN SOD-TAZOBACTAM PER 1 G: Performed by: SURGERY

## 2023-03-08 PROCEDURE — 25010000002 PIPERACILLIN SOD-TAZOBACTAM PER 1 G: Performed by: INTERNAL MEDICINE

## 2023-03-08 PROCEDURE — 25010000002 FUROSEMIDE PER 20 MG: Performed by: INTERNAL MEDICINE

## 2023-03-08 PROCEDURE — 76937 US GUIDE VASCULAR ACCESS: CPT | Performed by: SURGERY

## 2023-03-08 PROCEDURE — 63710000001 INSULIN REGULAR HUMAN PER 5 UNITS: Performed by: INTERNAL MEDICINE

## 2023-03-08 PROCEDURE — 36245 INS CATH ABD/L-EXT ART 1ST: CPT | Performed by: SURGERY

## 2023-03-08 PROCEDURE — 75716 ARTERY X-RAYS ARMS/LEGS: CPT | Performed by: SURGERY

## 2023-03-08 PROCEDURE — 25010000002 HEPARIN (PORCINE) PER 1000 UNITS: Performed by: EMERGENCY MEDICINE

## 2023-03-08 PROCEDURE — 99222 1ST HOSP IP/OBS MODERATE 55: CPT | Performed by: SURGERY

## 2023-03-08 PROCEDURE — 25010000002 HYDROMORPHONE 1 MG/ML SOLUTION: Performed by: EMERGENCY MEDICINE

## 2023-03-08 PROCEDURE — 80048 BASIC METABOLIC PNL TOTAL CA: CPT | Performed by: INTERNAL MEDICINE

## 2023-03-08 PROCEDURE — 85730 THROMBOPLASTIN TIME PARTIAL: CPT | Performed by: SURGERY

## 2023-03-08 PROCEDURE — 83605 ASSAY OF LACTIC ACID: CPT

## 2023-03-08 PROCEDURE — 75710 ARTERY X-RAYS ARM/LEG: CPT | Performed by: SURGERY

## 2023-03-08 PROCEDURE — 75625 CONTRAST EXAM ABDOMINL AORTA: CPT | Performed by: SURGERY

## 2023-03-08 PROCEDURE — 87077 CULTURE AEROBIC IDENTIFY: CPT | Performed by: INTERNAL MEDICINE

## 2023-03-08 PROCEDURE — 25010000002 HYDROMORPHONE 1 MG/ML SOLUTION: Performed by: INTERNAL MEDICINE

## 2023-03-08 PROCEDURE — 25010000002 FENTANYL CITRATE (PF) 100 MCG/2ML SOLUTION: Performed by: SURGERY

## 2023-03-08 PROCEDURE — 99152 MOD SED SAME PHYS/QHP 5/>YRS: CPT | Performed by: SURGERY

## 2023-03-08 PROCEDURE — 94799 UNLISTED PULMONARY SVC/PX: CPT

## 2023-03-08 PROCEDURE — 25010000002 SODIUM CHLORIDE 0.9 % WITH KCL 20 MEQ 20-0.9 MEQ/L-% SOLUTION: Performed by: INTERNAL MEDICINE

## 2023-03-08 PROCEDURE — B41D1ZZ FLUOROSCOPY OF AORTA AND BILATERAL LOWER EXTREMITY ARTERIES USING LOW OSMOLAR CONTRAST: ICD-10-PCS | Performed by: SURGERY

## 2023-03-08 PROCEDURE — 87186 SC STD MICRODIL/AGAR DIL: CPT | Performed by: INTERNAL MEDICINE

## 2023-03-08 PROCEDURE — 99153 MOD SED SAME PHYS/QHP EA: CPT | Performed by: SURGERY

## 2023-03-08 PROCEDURE — 85025 COMPLETE CBC W/AUTO DIFF WBC: CPT | Performed by: EMERGENCY MEDICINE

## 2023-03-08 PROCEDURE — 25010000002 VANCOMYCIN 5 G RECONSTITUTED SOLUTION: Performed by: EMERGENCY MEDICINE

## 2023-03-08 PROCEDURE — 63710000001 INSULIN REGULAR HUMAN PER 5 UNITS: Performed by: SURGERY

## 2023-03-08 PROCEDURE — 0 IODIXANOL PER 1 ML: Performed by: SURGERY

## 2023-03-08 RX ORDER — NICOTINE 21 MG/24HR
1 PATCH, TRANSDERMAL 24 HOURS TRANSDERMAL
Status: COMPLETED | OUTPATIENT
Start: 2023-03-08 | End: 2023-03-09

## 2023-03-08 RX ORDER — IPRATROPIUM BROMIDE AND ALBUTEROL SULFATE 2.5; .5 MG/3ML; MG/3ML
3 SOLUTION RESPIRATORY (INHALATION)
Status: DISCONTINUED | OUTPATIENT
Start: 2023-03-08 | End: 2023-03-15 | Stop reason: HOSPADM

## 2023-03-08 RX ORDER — FENTANYL CITRATE 50 UG/ML
INJECTION, SOLUTION INTRAMUSCULAR; INTRAVENOUS
Status: DISCONTINUED | OUTPATIENT
Start: 2023-03-08 | End: 2023-03-08 | Stop reason: HOSPADM

## 2023-03-08 RX ORDER — HYDROXYZINE HYDROCHLORIDE 25 MG/1
50 TABLET, FILM COATED ORAL EVERY 6 HOURS PRN
COMMUNITY
Start: 2023-02-17

## 2023-03-08 RX ORDER — ONDANSETRON 2 MG/ML
4 INJECTION INTRAMUSCULAR; INTRAVENOUS EVERY 6 HOURS PRN
Status: DISCONTINUED | OUTPATIENT
Start: 2023-03-08 | End: 2023-03-11 | Stop reason: SDUPTHER

## 2023-03-08 RX ORDER — NORTRIPTYLINE HYDROCHLORIDE 50 MG/1
50 CAPSULE ORAL
COMMUNITY
Start: 2023-02-15

## 2023-03-08 RX ORDER — SERTRALINE HYDROCHLORIDE 100 MG/1
100 TABLET, FILM COATED ORAL DAILY
Status: DISCONTINUED | OUTPATIENT
Start: 2023-03-08 | End: 2023-03-15 | Stop reason: HOSPADM

## 2023-03-08 RX ORDER — BISACODYL 5 MG/1
5 TABLET, DELAYED RELEASE ORAL DAILY PRN
Status: DISCONTINUED | OUTPATIENT
Start: 2023-03-08 | End: 2023-03-15 | Stop reason: HOSPADM

## 2023-03-08 RX ORDER — NORTRIPTYLINE HYDROCHLORIDE 50 MG/1
50 CAPSULE ORAL 3 TIMES DAILY PRN
COMMUNITY
End: 2023-03-15 | Stop reason: HOSPADM

## 2023-03-08 RX ORDER — OXYCODONE AND ACETAMINOPHEN 10; 325 MG/1; MG/1
1 TABLET ORAL EVERY 6 HOURS PRN
Status: DISCONTINUED | OUTPATIENT
Start: 2023-03-08 | End: 2023-03-15 | Stop reason: HOSPADM

## 2023-03-08 RX ORDER — NICOTINE POLACRILEX 4 MG
15 LOZENGE BUCCAL
Status: DISCONTINUED | OUTPATIENT
Start: 2023-03-08 | End: 2023-03-15 | Stop reason: HOSPADM

## 2023-03-08 RX ORDER — SODIUM CHLORIDE AND POTASSIUM CHLORIDE 150; 900 MG/100ML; MG/100ML
100 INJECTION, SOLUTION INTRAVENOUS CONTINUOUS
Status: DISCONTINUED | OUTPATIENT
Start: 2023-03-08 | End: 2023-03-08

## 2023-03-08 RX ORDER — NALOXONE HCL 0.4 MG/ML
0.4 VIAL (ML) INJECTION
Status: DISCONTINUED | OUTPATIENT
Start: 2023-03-08 | End: 2023-03-11 | Stop reason: SDUPTHER

## 2023-03-08 RX ORDER — ATORVASTATIN CALCIUM 10 MG/1
1 TABLET, FILM COATED ORAL DAILY
COMMUNITY
Start: 2023-02-10

## 2023-03-08 RX ORDER — ACETAMINOPHEN 160 MG/5ML
650 SOLUTION ORAL EVERY 4 HOURS PRN
Status: DISCONTINUED | OUTPATIENT
Start: 2023-03-08 | End: 2023-03-15 | Stop reason: HOSPADM

## 2023-03-08 RX ORDER — FUROSEMIDE 10 MG/ML
20 INJECTION INTRAMUSCULAR; INTRAVENOUS ONCE
Status: DISCONTINUED | OUTPATIENT
Start: 2023-03-09 | End: 2023-03-09

## 2023-03-08 RX ORDER — POLYETHYLENE GLYCOL 3350 17 G/17G
17 POWDER, FOR SOLUTION ORAL DAILY PRN
Status: DISCONTINUED | OUTPATIENT
Start: 2023-03-08 | End: 2023-03-15 | Stop reason: HOSPADM

## 2023-03-08 RX ORDER — MIDAZOLAM HYDROCHLORIDE 1 MG/ML
INJECTION INTRAMUSCULAR; INTRAVENOUS
Status: DISCONTINUED | OUTPATIENT
Start: 2023-03-08 | End: 2023-03-08 | Stop reason: HOSPADM

## 2023-03-08 RX ORDER — HEPARIN SOD,PORCINE/0.9 % NACL 25000/250
18 INTRAVENOUS SOLUTION INTRAVENOUS
Status: DISCONTINUED | OUTPATIENT
Start: 2023-03-08 | End: 2023-03-09

## 2023-03-08 RX ORDER — BENZONATATE 100 MG/1
100-200 CAPSULE ORAL 3 TIMES DAILY PRN
COMMUNITY

## 2023-03-08 RX ORDER — HYDROCODONE BITARTRATE AND ACETAMINOPHEN 5; 325 MG/1; MG/1
1 TABLET ORAL EVERY 4 HOURS PRN
Status: DISCONTINUED | OUTPATIENT
Start: 2023-03-08 | End: 2023-03-08

## 2023-03-08 RX ORDER — TRAZODONE HYDROCHLORIDE 50 MG/1
150 TABLET ORAL NIGHTLY PRN
Status: DISCONTINUED | OUTPATIENT
Start: 2023-03-08 | End: 2023-03-15 | Stop reason: HOSPADM

## 2023-03-08 RX ORDER — FAMOTIDINE 20 MG/1
20 TABLET, FILM COATED ORAL DAILY
Status: DISCONTINUED | OUTPATIENT
Start: 2023-03-08 | End: 2023-03-08

## 2023-03-08 RX ORDER — FUROSEMIDE 10 MG/ML
20 INJECTION INTRAMUSCULAR; INTRAVENOUS ONCE
Status: COMPLETED | OUTPATIENT
Start: 2023-03-08 | End: 2023-03-08

## 2023-03-08 RX ORDER — MONTELUKAST SODIUM 4 MG/1
1 TABLET, CHEWABLE ORAL DAILY
COMMUNITY

## 2023-03-08 RX ORDER — LISINOPRIL 20 MG/1
40 TABLET ORAL
Status: DISCONTINUED | OUTPATIENT
Start: 2023-03-08 | End: 2023-03-15 | Stop reason: HOSPADM

## 2023-03-08 RX ORDER — AMOXICILLIN 250 MG
2 CAPSULE ORAL NIGHTLY
Status: DISCONTINUED | OUTPATIENT
Start: 2023-03-08 | End: 2023-03-15 | Stop reason: HOSPADM

## 2023-03-08 RX ORDER — ACETAMINOPHEN 650 MG/1
650 SUPPOSITORY RECTAL EVERY 4 HOURS PRN
Status: DISCONTINUED | OUTPATIENT
Start: 2023-03-08 | End: 2023-03-15 | Stop reason: HOSPADM

## 2023-03-08 RX ORDER — AMLODIPINE BESYLATE 5 MG/1
5 TABLET ORAL
Status: DISCONTINUED | OUTPATIENT
Start: 2023-03-08 | End: 2023-03-15 | Stop reason: HOSPADM

## 2023-03-08 RX ORDER — IODIXANOL 320 MG/ML
INJECTION, SOLUTION INTRAVASCULAR
Status: DISCONTINUED | OUTPATIENT
Start: 2023-03-08 | End: 2023-03-08 | Stop reason: HOSPADM

## 2023-03-08 RX ORDER — PANTOPRAZOLE SODIUM 40 MG/1
40 TABLET, DELAYED RELEASE ORAL DAILY
Status: DISCONTINUED | OUTPATIENT
Start: 2023-03-08 | End: 2023-03-15 | Stop reason: HOSPADM

## 2023-03-08 RX ORDER — LIDOCAINE HYDROCHLORIDE 20 MG/ML
INJECTION, SOLUTION INFILTRATION; PERINEURAL
Status: DISCONTINUED | OUTPATIENT
Start: 2023-03-08 | End: 2023-03-08 | Stop reason: HOSPADM

## 2023-03-08 RX ORDER — ACETAMINOPHEN 325 MG/1
650 TABLET ORAL EVERY 4 HOURS PRN
Status: DISCONTINUED | OUTPATIENT
Start: 2023-03-08 | End: 2023-03-15 | Stop reason: HOSPADM

## 2023-03-08 RX ORDER — SODIUM CHLORIDE 9 MG/ML
INJECTION, SOLUTION INTRAVENOUS
Status: COMPLETED | OUTPATIENT
Start: 2023-03-08 | End: 2023-03-08

## 2023-03-08 RX ORDER — DEXTROSE MONOHYDRATE 25 G/50ML
25 INJECTION, SOLUTION INTRAVENOUS
Status: DISCONTINUED | OUTPATIENT
Start: 2023-03-08 | End: 2023-03-15 | Stop reason: HOSPADM

## 2023-03-08 RX ORDER — BISACODYL 10 MG
10 SUPPOSITORY, RECTAL RECTAL DAILY PRN
Status: DISCONTINUED | OUTPATIENT
Start: 2023-03-08 | End: 2023-03-15 | Stop reason: HOSPADM

## 2023-03-08 RX ADMIN — HYDROMORPHONE HYDROCHLORIDE 0.5 MG: 1 INJECTION, SOLUTION INTRAMUSCULAR; INTRAVENOUS; SUBCUTANEOUS at 12:35

## 2023-03-08 RX ADMIN — HEPARIN SODIUM 18 UNITS/KG/HR: 5000 INJECTION INTRAVENOUS; SUBCUTANEOUS at 01:05

## 2023-03-08 RX ADMIN — IPRATROPIUM BROMIDE AND ALBUTEROL SULFATE 3 ML: .5; 3 SOLUTION RESPIRATORY (INHALATION) at 22:55

## 2023-03-08 RX ADMIN — HYDROMORPHONE HYDROCHLORIDE 0.5 MG: 1 INJECTION, SOLUTION INTRAMUSCULAR; INTRAVENOUS; SUBCUTANEOUS at 07:20

## 2023-03-08 RX ADMIN — HYDROMORPHONE HYDROCHLORIDE 0.5 MG: 1 INJECTION, SOLUTION INTRAMUSCULAR; INTRAVENOUS; SUBCUTANEOUS at 10:05

## 2023-03-08 RX ADMIN — SODIUM CHLORIDE 1000 ML: 9 INJECTION, SOLUTION INTRAVENOUS at 00:59

## 2023-03-08 RX ADMIN — OXYCODONE HYDROCHLORIDE AND ACETAMINOPHEN 1 TABLET: 10; 325 TABLET ORAL at 13:52

## 2023-03-08 RX ADMIN — LISINOPRIL 40 MG: 20 TABLET ORAL at 13:01

## 2023-03-08 RX ADMIN — PANTOPRAZOLE SODIUM 40 MG: 40 TABLET, DELAYED RELEASE ORAL at 10:05

## 2023-03-08 RX ADMIN — SERTRALINE 100 MG: 100 TABLET, FILM COATED ORAL at 13:01

## 2023-03-08 RX ADMIN — HYDROMORPHONE HYDROCHLORIDE 0.5 MG: 1 INJECTION, SOLUTION INTRAMUSCULAR; INTRAVENOUS; SUBCUTANEOUS at 00:57

## 2023-03-08 RX ADMIN — FAMOTIDINE 20 MG: 20 TABLET ORAL at 08:56

## 2023-03-08 RX ADMIN — TAZOBACTAM SODIUM AND PIPERACILLIN SODIUM 3.38 G: 375; 3 INJECTION, SOLUTION INTRAVENOUS at 20:27

## 2023-03-08 RX ADMIN — NICOTINE 1 PATCH: 21 PATCH, EXTENDED RELEASE TRANSDERMAL at 10:05

## 2023-03-08 RX ADMIN — INSULIN HUMAN 2 UNITS: 100 INJECTION, SOLUTION PARENTERAL at 17:19

## 2023-03-08 RX ADMIN — INSULIN HUMAN 2 UNITS: 100 INJECTION, SOLUTION PARENTERAL at 05:10

## 2023-03-08 RX ADMIN — TAZOBACTAM SODIUM AND PIPERACILLIN SODIUM 3.38 G: 375; 3 INJECTION, SOLUTION INTRAVENOUS at 04:21

## 2023-03-08 RX ADMIN — HYDROMORPHONE HYDROCHLORIDE 0.5 MG: 1 INJECTION, SOLUTION INTRAMUSCULAR; INTRAVENOUS; SUBCUTANEOUS at 20:27

## 2023-03-08 RX ADMIN — IPRATROPIUM BROMIDE AND ALBUTEROL SULFATE 3 ML: .5; 3 SOLUTION RESPIRATORY (INHALATION) at 19:16

## 2023-03-08 RX ADMIN — HYDROCHLOROTHIAZIDE: 25 TABLET ORAL at 10:05

## 2023-03-08 RX ADMIN — AMLODIPINE BESYLATE 5 MG: 5 TABLET ORAL at 13:01

## 2023-03-08 RX ADMIN — HYDROMORPHONE HYDROCHLORIDE 0.5 MG: 1 INJECTION, SOLUTION INTRAMUSCULAR; INTRAVENOUS; SUBCUTANEOUS at 16:58

## 2023-03-08 RX ADMIN — HYDROMORPHONE HYDROCHLORIDE 0.5 MG: 1 INJECTION, SOLUTION INTRAMUSCULAR; INTRAVENOUS; SUBCUTANEOUS at 02:53

## 2023-03-08 RX ADMIN — VANCOMYCIN HYDROCHLORIDE 1500 MG: 5 INJECTION, POWDER, LYOPHILIZED, FOR SOLUTION INTRAVENOUS at 00:10

## 2023-03-08 RX ADMIN — HYDROMORPHONE HYDROCHLORIDE 0.5 MG: 1 INJECTION, SOLUTION INTRAMUSCULAR; INTRAVENOUS; SUBCUTANEOUS at 00:08

## 2023-03-08 RX ADMIN — HYDROCHLOROTHIAZIDE: 25 TABLET ORAL at 21:00

## 2023-03-08 RX ADMIN — POTASSIUM CHLORIDE AND SODIUM CHLORIDE 100 ML/HR: 900; 150 INJECTION, SOLUTION INTRAVENOUS at 02:10

## 2023-03-08 RX ADMIN — TAZOBACTAM SODIUM AND PIPERACILLIN SODIUM 3.38 G: 375; 3 INJECTION, SOLUTION INTRAVENOUS at 12:36

## 2023-03-08 RX ADMIN — FUROSEMIDE 20 MG: 10 INJECTION, SOLUTION INTRAMUSCULAR; INTRAVENOUS at 12:40

## 2023-03-08 RX ADMIN — SENNOSIDES AND DOCUSATE SODIUM 2 TABLET: 8.6; 5 TABLET ORAL at 20:27

## 2023-03-08 RX ADMIN — HYDROMORPHONE HYDROCHLORIDE 0.5 MG: 1 INJECTION, SOLUTION INTRAMUSCULAR; INTRAVENOUS; SUBCUTANEOUS at 22:53

## 2023-03-08 RX ADMIN — HYDROMORPHONE HYDROCHLORIDE 0.5 MG: 1 INJECTION, SOLUTION INTRAMUSCULAR; INTRAVENOUS; SUBCUTANEOUS at 05:10

## 2023-03-08 NOTE — TELEPHONE ENCOUNTER
Caller: HANNA CHATMAN    Relationship to patient: Emergency Contact    Chief complaint: IN ER     Type of visit: 3WK FOLLOW UP     If rescheduling, when is the original appointment: 3/8/23

## 2023-03-08 NOTE — PROGRESS NOTES
Angiogram performed.  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  Only short segments of the peroneal artery visualized.  Not a candidate for vascular reconstruction.  For details find full report under chart review, cardiology tab.

## 2023-03-08 NOTE — CONSULTS
Nutrition Services    Patient Name: Italia Olvera  YOB: 1960  MRN: [id number]  Admission date: 3/7/2023      CLINICAL NUTRITION ASSESSMENT      Reason for Assessment  Nonhealing wound or pressure ulcer   H&P:    Past Medical History:   Diagnosis Date   • Acid reflux    • ACL tear 09/01/2015    PCL/ACL TEAR/RUPTURE   • Acute shoulder bursitis, right 08/23/2015   • Anxiety    • Arthritis    • Asthma    • Bipolar 1 disorder ([facility])     untreated   • Bladder disorder    • Cancer ([facility])     CERVICAL CANCER   • CHF (congestive heart failure) ([facility])     ASYMPTOMATIC- NO CARDIOLOGIST- SEE'S DR ARRIAZA (PCP)- DENIED CP/SOB   • Chronic back pain    • Chronic pain     CHRONIC BACK, NECK, LEG, FOOT, & FACE PAIN   • COPD (chronic obstructive pulmonary disease) ([facility])     USES INHALERS   • Depression    • Diabetes mellitus ([facility])     BG RUND AROUND 140 IN AM   • DJD (degenerative joint disease)    • Gangrene ([facility])     RT SECOND AND FIFTH TOES   • GERD (gastroesophageal reflux disease)    • HBP (high blood pressure)    • Hip pain 09/15/2015   • Hypertension    • Knee injury 08/19/2015   • Knee pain, right 09/15/2015   • Limb swelling    • Neuropathy    • On home O2     2-3L/NC PRN   • Osteoarthritis, knee 09/01/2015   • Osteoporosis    • Peripheral neuropathy    • Renal failure 1994    NO CURRENT PROBLEMS   • Seasonal allergies    • Shoulder tendonitis 08/23/2015   • Sleep apnea    • Smoker    • SOB (shortness of breath)     NONE CURENTLY   • Tendinitis of right rotator cuff 08/23/2015        Current Problems:   Active Hospital Problems    Diagnosis    • **Ischemic foot         Nutrition/Diet History         Narrative     RD consult triggered by nonhealing wound or pressure ulcer. Pt admitted to ED with an ischemic foot.  Per physician's note, pt may need BKA. Pt was NPO this morning. Angiogram performed this afternoon, revealing pt is not a candidate for vascular reconstruction. After procedure, pt's diet advanced to  "consistent carb diabetic diet. Pt's wt has fluctuated x 1 year. Pt has COPD and CHF. Wt fluctuations most likely due to fluid changes.     Per ED note, pt says the top layer of skin on bottom of foot is peeling off. Pt would benefit from Jeremy and extra protein, especially if pt moves forward with BKA to help with wound healing.  Will order ONS and follow for acceptance. RD will continue to monitor per protocol.      Anthropometrics        Current Height, Weight Height: 167.6 cm (66\")  Weight: 71.9 kg (158 lb 8.2 oz)   Current BMI Body mass index is 25.58 kg/m².       Weight Hx  Wt Readings from Last 30 Encounters:   03/07/23 2117 71.9 kg (158 lb 8.2 oz)   02/04/23 0036 77.5 kg (170 lb 13.7 oz)   02/03/23 1856 76.1 kg (167 lb 12.3 oz)   12/06/22 0606 79.6 kg (175 lb 7.8 oz)   11/29/22 1341 70.3 kg (155 lb)   11/03/22 0722 89.6 kg (197 lb 8.5 oz)   02/26/22 1300 66.9 kg (147 lb 7.8 oz)   02/21/22 1721 68.5 kg (151 lb 1.6 oz)   11/29/21 0258 51.3 kg (113 lb 1.5 oz)   11/28/21 1611 51.3 kg (113 lb 1.5 oz)   10/13/21 1538 64.4 kg (142 lb)   08/13/21 0230 60.2 kg (132 lb 11.5 oz)   08/12/21 1712 58.7 kg (129 lb 6.6 oz)   07/22/21 0517 54.3 kg (119 lb 11.4 oz)   07/21/21 0500 53.1 kg (117 lb 1 oz)   07/20/21 0525 54.3 kg (119 lb 11.4 oz)   07/18/21 0414 54.2 kg (119 lb 7.8 oz)   07/17/21 0518 53.3 kg (117 lb 8.1 oz)   07/16/21 0551 51 kg (112 lb 7 oz)   07/15/21 0515 54.5 kg (120 lb 2.4 oz)   07/14/21 0452 57.1 kg (125 lb 14.1 oz)   07/12/21 0500 51.8 kg (114 lb 3.2 oz)   07/11/21 0500 52 kg (114 lb 10.2 oz)   07/10/21 0541 57.4 kg (126 lb 8.7 oz)   07/09/21 0500 60 kg (132 lb 4.4 oz)   07/08/21 0500 60.7 kg (133 lb 13.1 oz)   07/07/21 0449 63 kg (138 lb 14.2 oz)   07/06/21 0500 73.8 kg (162 lb 11.2 oz)   07/05/21 0500 73.5 kg (162 lb 0.6 oz)   07/04/21 0500 73.4 kg (161 lb 13.1 oz)   07/03/21 0508 70.7 kg (155 lb 13.8 oz)   07/01/21 0500 74.2 kg (163 lb 9.3 oz)   06/29/21 0600 75.4 kg (166 lb 3.6 oz)   06/15/21 0431 84.3 " kg (185 lb 13.6 oz)   06/14/21 0958 88.6 kg (195 lb 5.2 oz)   06/12/21 0518 80.1 kg (176 lb 9.4 oz)   06/10/21 0503 80.1 kg (176 lb 9.4 oz)   06/04/21 1231 78.8 kg (173 lb 11.6 oz)   03/24/21 0000 83.5 kg (184 lb)   07/10/18 0000 82.6 kg (182 lb)            Wt Change Observation Unable to assess due to fluctuations     Estimated/Assessed Needs       Energy Requirements 25-30 kcal/kg   EST Needs (kcal/day) 7060-4760 kcal       Protein Requirements 1.2-1.5 g/kg   EST Daily Needs (g/day)   g       Fluid Requirements 1 ml/kcal    Estimated Needs (mL/day) 1983-9180 ml     Labs/Medications         Pertinent Labs Reviewed.   Results from last 7 days   Lab Units 03/08/23  0651 03/07/23  2108   SODIUM mmol/L 137 137   POTASSIUM mmol/L 4.1 4.1   CHLORIDE mmol/L 106 102   CO2 mmol/L 23.0 24.4   BUN mg/dL 12 12   CREATININE mg/dL 0.52* 0.56*   CALCIUM mg/dL 7.3* 7.9*   BILIRUBIN mg/dL  --  0.4   ALK PHOS U/L  --  134*   ALT (SGPT) U/L  --  11   AST (SGOT) U/L  --  12   GLUCOSE mg/dL 158* 263*     Results from last 7 days   Lab Units 03/08/23  0040   HEMOGLOBIN g/dL 11.0*   HEMATOCRIT % 34.0     COVID19   Date Value Ref Range Status   08/12/2021 Not Detected Not Detected - Ref. Range Final     Lab Results   Component Value Date    HGBA1C 7.10 (H) 02/21/2022         Pertinent Medications Reviewed.     Current Nutrition Orders & Evaluation of Intake       Oral Nutrition     Current PO Diet NPO Diet NPO Type: Sips with Meds   Supplement No active supplement orders       Malnutrition Severity Assessment                Nutrition Diagnosis         Nutrition Dx Problem 1 Increased nutrient needs related to increased nutrient needs due to catabolic disease as evidenced by poor wound healing and COPD.     Nutrition Intervention         Boost Glucose Control daily (+250 kcal, 14 g pro)    If BKA happens, order Jeremy BID (+180 kcal, 5 g pro)     Medical Nutrition Therapy/Nutrition Education          Learner     Readiness N/A  N/A      Method     Response N/A  N/A     Monitor/Evaluation        Monitor PO intake, Supplement intake, Pertinent labs, Skin status, POC/GOC     Nutrition Discharge Plan         To be determined     Electronically signed by:  Tamiko Portillo RD  03/08/23 08:36 EST

## 2023-03-08 NOTE — H&P (VIEW-ONLY)
[facility]   VASCULAR SURGERY CONSULT    Patient Name: Italia Olvera  : 1960  MRN: [id number]  Primary Care Physician:  Carloz Shoemaker MD  Date of admission: 3/7/2023    Subjective   Subjective     Chief Complaint: Right foot pain    HPI:    Italia Olvera is a 62 y.o. female with known significant peripheral vascular disease.  She had previously undergone endovascular interventions of the right lower extremity dating back to 2022 and again 2022 due to severe peripheral vascular disease.  She was doing well until 2 days prior to admission when she indicates she started having significant pain in the right foot.  She was admitted through the emergency room last night.  We have been asked to evaluate from the vascular standpoint.    Review of Systems    Noncontributory except for the history of present illness.    Personal History     Past Medical History:   Diagnosis Date   • Acid reflux    • ACL tear 2015    PCL/ACL TEAR/RUPTURE   • Acute shoulder bursitis, right 2015   • Anxiety    • Arthritis    • Asthma    • Bipolar 1 disorder ([facility])     untreated   • Bladder disorder    • Cancer ([facility])     CERVICAL CANCER   • CHF (congestive heart failure) ([facility])     ASYMPTOMATIC- NO CARDIOLOGIST- SEE'S DR SHOEMAKER (PCP)- DENIED CP/SOB   • Chronic back pain    • Chronic pain     CHRONIC BACK, NECK, LEG, FOOT, & FACE PAIN   • COPD (chronic obstructive pulmonary disease) ([facility])     USES INHALERS   • Depression    • Diabetes mellitus ([facility])     BG RUND AROUND 140 IN AM   • DJD (degenerative joint disease)    • Gangrene ([facility])     RT SECOND AND FIFTH TOES   • GERD (gastroesophageal reflux disease)    • HBP (high blood pressure)    • Hip pain 09/15/2015   • Hypertension    • Knee injury 2015   • Knee pain, right 09/15/2015   • Limb swelling    • Neuropathy    • On home O2     2-3L/NC PRN   • Osteoarthritis, knee 2015   • Osteoporosis    • Peripheral neuropathy    • Renal failure      NO CURRENT PROBLEMS   • Seasonal allergies    • Shoulder tendonitis 08/23/2015   • Sleep apnea    • Smoker    • SOB (shortness of breath)     NONE CURENTLY   • Tendinitis of right rotator cuff 08/23/2015       Past Surgical History:   Procedure Laterality Date   • AMPUTATION DIGIT Right 12/6/2022    Procedure: Amputation of right second and fifth toes;  Surgeon: Gabino Russell MD;  Location: [facility] MAIN OR;  Service: Vascular;  Laterality: Right;   • BACK SURGERY     • BLADDER REPAIR     • BRONCHOSCOPY N/A 07/10/2021    Procedure: BRONCHOSCOPY WITH BRONCHOALVEOLAR LAVAGE, POSSIBLE BIOPSY, BRUSHING, WASHING, AIRWAY INSPECTION;  Surgeon: Rodrigo Reyes MD;  Location: [facility] MAIN OR;  Service: Pulmonary;  Laterality: N/A;   • BRONCHOSCOPY N/A 07/10/2021    Procedure: BRONCHOSCOPY;  Surgeon: Rodrigo Reyes MD;  Location: [facility] ENDOSCOPY;  Service: Pulmonary;  Laterality: N/A;   • CARDIAC CATHETERIZATION Right 11/03/2022    Procedure: Aortogram with right leg angiogram, possible angioplasty or stenting ;  Surgeon: Gabino Russell MD;  Location: [facility] CATH INVASIVE LOCATION;  Service: Vascular;  Laterality: Right;   • CHOLECYSTECTOMY     • ENDOSCOPY     • FRACTURE SURGERY      SKULL FRACTURE   • GALLBLADDER SURGERY     • HERNIA REPAIR     • HYSTERECTOMY     • INTERVENTIONAL RADIOLOGY PROCEDURE Right 03/03/2022    Procedure: Abdominal Aortagram with Runoff;  Surgeon: Marleny Yoon MD;  Location: [facility] CATH INVASIVE LOCATION;  Service: Peripheral Vascular;  Laterality: Right;   • JOINT REPLACEMENT     • OTHER SURGICAL HISTORY      ARTIFICIAL JOINTS/LIMBS   • OTHER SURGICAL HISTORY      FACE SURGERY, UNSPECIFIED   • TOTAL HIP ARTHROPLASTY Right    • TOTAL KNEE ARTHROPLASTY Left        Family History: family history includes Arthritis in her brother, daughter, mother, and sister; Breast cancer in her sister; Colon cancer in her sister; Diabetes in her brother; Heart disease in her brother and father; Lung cancer  in her sister; Osteoporosis in her mother and sister; Stroke in her mother; Throat cancer in her mother. Otherwise pertinent FHx was reviewed and not pertinent to current issue.    Social History:  reports that she has been smoking cigarettes. She started smoking about 43 years ago. She has a 25.00 pack-year smoking history. She has never used smokeless tobacco. She reports that she does not drink alcohol and does not use drugs.    Home Medications:  amLODIPine-benazepril, aspirin, bisoprolol-hydrochlorothiazide, cholestyramine, clopidogrel, ferrous sulfate, furosemide, guaiFENesin, hydrOXYzine, insulin NPH-insulin regular, insulin detemir, ipratropium-albuterol, mirtazapine, nicotine, ondansetron, oxyCODONE-acetaminophen, pantoprazole, potassium chloride, sertraline, tiZANidine, traZODone, vitamin B-12, and zolpidem      Allergies:  No Known Allergies    Objective   Objective     Vitals:   Temp:  [98.5 °F (36.9 °C)-99 °F (37.2 °C)] 99 °F (37.2 °C)  Heart Rate:  [] 109  Resp:  [16-24] 20  BP: ()/(57-79) 163/74  Flow (L/min):  [2] 2    Physical Exam   General: Alert, no acute distress.  Neck: Supple  Heart: Regular rate  Lungs: Clear  Abdomen: Benign  Extremities: Symmetric  Right foot: Purplish discoloration of the forefoot and midfoot with moderate to severe edema.  Minimal intrinsic movement.  Pulses: Nonpalpable right pedal pulses.    Diagnostic studies: A CT of the right lower extremity with contrast was reviewed and demonstrates evidence of severe peroneal disease and no significant flow visualized reaching the foot.    Assessment & Plan   Assessment / Plan     Active Hospital Problems:  Active Hospital Problems    Diagnosis    • **Ischemic foot        Assessment/plan:   Mrs. Olvera presents with advanced ischemia of the right foot.  I have discussed with her that it is very possible her foot may not be salvageable.  I am recommending that we proceed to angiography with possible endovascular  intervention in an attempt to save her foot.  It is known from previous interventions that she had severe tibial disease and significant small vessel disease.  I advised her that there is a very high likelihood she will ultimately require a below the knee amputation.  I have discussed with the patient in detail the mechanics of the procedure, the indications, benefits, risks, alternatives as well as potential complications to include but not limited to infection, bleeding, inability to cross the occlusion, vascular injury requiring surgical repair.  She appears to understand and desires to proceed.        Electronically signed by Gabino Russell MD, 03/08/23, 7:39 AM EST.

## 2023-03-08 NOTE — ED NOTES
Patient right foot is purple in color with blisters that are peeling off.  Right foot is swollen, with 2nd and 5th toes missing. Bottom of foot is peeling off.

## 2023-03-08 NOTE — PLAN OF CARE
Goal Outcome Evaluation:      Patient is a new admit this shift from ED. Patient is alert and oriented x4. Patient is on heparin drip. IVF's and IV ABX's given per orders. Patient c/o pain in right foot, prn pain meds given. BG is stable. All needs met at this time.

## 2023-03-08 NOTE — ED PROVIDER NOTES
Time: 9:00 PM EST  Date of encounter:  3/7/2023  Independent Historian/Clinical History and Information was obtained by:   Patient  Chief Complaint   Patient presents with   • Foot Pain       History is limited by: N/A    History of Present Illness:  Patient is a 62 y.o. year old female who presents to the emergency department for evaluation of right foot wound.  Patient states the foot wound has been worsening for the last 3 days.  Patient states she just finished antibiotics yesterday.  Patient denies any fever.  (Provider in triage, Anish Sevilla PA-C) Pt reports she had two toes amputated in December. Pt notes the top layer of skin on the bottom of her foot started peeling off when her  tried to change the bandage. Pt notes R foot pain. Pt denies nausea, vomiting, and diarrhea.       History provided by:  Patient   used: No        Patient Care Team  Primary Care Provider: Carloz Shoemaker MD    Past Medical History:     No Known Allergies  Past Medical History:   Diagnosis Date   • Acid reflux    • ACL tear 09/01/2015    PCL/ACL TEAR/RUPTURE   • Acute shoulder bursitis, right 08/23/2015   • Anxiety    • Arthritis    • Asthma    • Bipolar 1 disorder ([facility])     untreated   • Bladder disorder    • Cancer ([facility])     CERVICAL CANCER   • CHF (congestive heart failure) ([facility])     ASYMPTOMATIC- NO CARDIOLOGIST- SEE'S DR SHOEMAKER (PCP)- DENIED CP/SOB   • Chronic back pain    • Chronic pain     CHRONIC BACK, NECK, LEG, FOOT, & FACE PAIN   • COPD (chronic obstructive pulmonary disease) ([facility])     USES INHALERS   • Depression    • Diabetes mellitus ([facility])     BG RUND AROUND 140 IN AM   • DJD (degenerative joint disease)    • Gangrene ([facility])     RT SECOND AND FIFTH TOES   • GERD (gastroesophageal reflux disease)    • HBP (high blood pressure)    • Hip pain 09/15/2015   • Hypertension    • Knee injury 08/19/2015   • Knee pain, right 09/15/2015   • Limb swelling    • Neuropathy    • On home O2     2-3L/NC  PRN   • Osteoarthritis, knee 09/01/2015   • Osteoporosis    • Peripheral neuropathy    • Renal failure 1994    NO CURRENT PROBLEMS   • Seasonal allergies    • Shoulder tendonitis 08/23/2015   • Sleep apnea    • Smoker    • SOB (shortness of breath)     NONE CURENTLY   • Tendinitis of right rotator cuff 08/23/2015     Past Surgical History:   Procedure Laterality Date   • AMPUTATION DIGIT Right 12/6/2022    Procedure: Amputation of right second and fifth toes;  Surgeon: Gabino Russell MD;  Location: [facility] MAIN OR;  Service: Vascular;  Laterality: Right;   • BACK SURGERY     • BLADDER REPAIR     • BRONCHOSCOPY N/A 07/10/2021    Procedure: BRONCHOSCOPY WITH BRONCHOALVEOLAR LAVAGE, POSSIBLE BIOPSY, BRUSHING, WASHING, AIRWAY INSPECTION;  Surgeon: Rodrigo Reyes MD;  Location: [facility] MAIN OR;  Service: Pulmonary;  Laterality: N/A;   • BRONCHOSCOPY N/A 07/10/2021    Procedure: BRONCHOSCOPY;  Surgeon: Rodrigo Reyes MD;  Location: [facility] ENDOSCOPY;  Service: Pulmonary;  Laterality: N/A;   • CARDIAC CATHETERIZATION Right 11/03/2022    Procedure: Aortogram with right leg angiogram, possible angioplasty or stenting ;  Surgeon: Gabino Russell MD;  Location: [facility] CATH INVASIVE LOCATION;  Service: Vascular;  Laterality: Right;   • CHOLECYSTECTOMY     • ENDOSCOPY     • FRACTURE SURGERY      SKULL FRACTURE   • GALLBLADDER SURGERY     • HERNIA REPAIR     • HYSTERECTOMY     • INTERVENTIONAL RADIOLOGY PROCEDURE Right 03/03/2022    Procedure: Abdominal Aortagram with Runoff;  Surgeon: Marleny Yoon MD;  Location: [facility] CATH INVASIVE LOCATION;  Service: Peripheral Vascular;  Laterality: Right;   • JOINT REPLACEMENT     • OTHER SURGICAL HISTORY      ARTIFICIAL JOINTS/LIMBS   • OTHER SURGICAL HISTORY      FACE SURGERY, UNSPECIFIED   • TOTAL HIP ARTHROPLASTY Right    • TOTAL KNEE ARTHROPLASTY Left      Family History   Problem Relation Age of Onset   • Stroke Mother    • Throat cancer Mother    • Arthritis Mother    •  Osteoporosis Mother    • Heart disease Father    • Breast cancer Sister    • Colon cancer Sister    • Lung cancer Sister    • Arthritis Sister    • Osteoporosis Sister    • Heart disease Brother    • Diabetes Brother    • Arthritis Brother    • Arthritis Daughter        Home Medications:  Prior to Admission medications    Medication Sig Start Date End Date Taking? Authorizing Provider   amLODIPine-benazepril (LOTREL) 10-40 MG per capsule Take 1 capsule by mouth Daily.    Patria Hanson MD   aspirin 81 MG EC tablet Take 1 tablet by mouth Daily. 10/17/22 10/17/23  Gabino Russell MD   bisoprolol-hydrochlorothiazide (ZIAC) 10-6.25 MG per tablet Take 1 tablet by mouth Every 12 (Twelve) Hours. 1/12/23   Patria Hanson MD   cholestyramine (QUESTRAN) 4 g packet Take 1 packet by mouth 2 (Two) Times a Day With Meals. 3/1/22   Patria Hanson MD   clopidogrel (PLAVIX) 75 MG tablet Take 75 mg by mouth Daily.    Patria Hanson MD   ferrous sulfate 324 (65 Fe) MG tablet delayed-release EC tablet Take 324 mg by mouth Every Other Day.    Patria Hanson MD   furosemide (LASIX) 80 MG tablet Take 80 mg by mouth 2 (Two) Times a Day.    Patria Hanson MD   guaiFENesin (MUCINEX) 600 MG 12 hr tablet Take 1 tablet by mouth Every 12 (Twelve) Hours for 30 days. 2/6/23 3/8/23  Rudy Garnett MD   hydrOXYzine (ATARAX) 10 MG/5ML syrup Take 10 mg by mouth 4 (Four) Times a Day. 8/23/21   Patria Hanson MD   insulin detemir (LEVEMIR) 100 UNIT/ML injection Inject 25 Units under the skin into the appropriate area as directed Every Night. 3/1/22   Patria Hanson MD   insulin NPH-insulin regular (humuLIN 70/30,novoLIN 70/30) (70-30) 100 UNIT/ML injection Inject 2.5 Units under the skin into the appropriate area as directed 2 (Two) Times a Day With Meals.    Patria Hanson MD   ipratropium-albuterol (DUO-NEB) 0.5-2.5 mg/3 ml nebulizer Take 3 mL by nebulization 4 (Four) Times a Day. 3/1/22    Patria Hanson MD   mirtazapine (REMERON) 45 MG tablet Take 45 mg by mouth every night at bedtime. 1/8/23   Patria Hanson MD   nicotine (NICODERM CQ) 21 MG/24HR patch Place 1 patch on the skin as directed by provider Daily for 30 days. 2/7/23 3/9/23  Rudy Garnett MD   ondansetron (ZOFRAN) 4 MG tablet Take 4 mg by mouth Every 8 (Eight) Hours As Needed for Nausea or Vomiting.    Patria Hanson MD   oxyCODONE-acetaminophen (PERCOCET)  MG per tablet Take 1 tablet by mouth Every 6 (Six) Hours As Needed for Severe Pain. GIVEN PER PCP 12/6/22   Gabino Russell MD   pantoprazole (PROTONIX) 40 MG EC tablet Take 40 mg by mouth Daily.    Patria Hanson MD   potassium chloride 10 MEQ CR tablet Take 20 mEq by mouth 2 (Two) Times a Day.    Patria Hanson MD   sertraline (ZOLOFT) 100 MG tablet Take 100 mg by mouth Daily. In addition to the 50 mg tablet to make 150 mg daily 7/27/21   Patria Hanson MD   sertraline (ZOLOFT) 50 MG tablet Take 50 mg by mouth Daily. TAKES WITH 100 MG TO EQUAL 150 MG    Patria Hanson MD   tiZANidine (ZANAFLEX) 4 MG tablet Take 4 mg by mouth Every 8 (Eight) Hours As Needed for Muscle Spasms. 9/30/21   Patria Hanson MD   traZODone (DESYREL) 150 MG tablet Take 150 mg by mouth At Night As Needed for Sleep. 12/30/21   Patria Hanson MD   vitamin B-12 (CYANOCOBALAMIN) 2500 MCG sublingual tablet tablet Place 2,500 mcg under the tongue Daily.    Patria Hanson MD   zolpidem (AMBIEN) 10 MG tablet Take 10 mg by mouth every night at bedtime. 1/13/23   Patria Hanson MD        Social History:   Social History     Tobacco Use   • Smoking status: Every Day     Packs/day: 0.50     Years: 50.00     Pack years: 25.00     Types: Cigarettes     Start date: 6/15/1979   • Smokeless tobacco: Never   • Tobacco comments:     SMOKED FOR 31 PLUS YEARS     INSTRUCTED NO SMOKING 24 HR PRIOR TO SURGERY    Vaping Use   • Vaping Use: Never used  "  Substance Use Topics   • Alcohol use: Never   • Drug use: Never         Review of Systems:  Review of Systems   Constitutional: Negative for chills and fever.   HENT: Negative for congestion, ear pain and sore throat.    Eyes: Negative for pain.   Respiratory: Negative for cough, chest tightness and shortness of breath.    Cardiovascular: Negative for chest pain.   Gastrointestinal: Negative for abdominal pain, diarrhea, nausea and vomiting.   Genitourinary: Negative for flank pain and hematuria.   Musculoskeletal: Positive for arthralgias. Negative for joint swelling.   Skin: Positive for wound. Negative for pallor.   Neurological: Negative for seizures and headaches.        Physical Exam:  /74 (BP Location: Right arm, Patient Position: Lying) Comment: nurse aware  Pulse 106 Comment: nurse aware  Temp 98.9 °F (37.2 °C) (Oral)   Resp 22 Comment: nurse aware  Ht 167.6 cm (66\")   Wt 71.9 kg (158 lb 8.2 oz)   SpO2 95%   BMI 25.58 kg/m²     Physical Exam  Vitals and nursing note reviewed.   Constitutional:       General: She is not in acute distress.     Appearance: Normal appearance. She is not toxic-appearing.   HENT:      Head: Normocephalic and atraumatic.      Mouth/Throat:      Mouth: Mucous membranes are moist.   Eyes:      General: No scleral icterus.     Pupils: Pupils are equal, round, and reactive to light.   Cardiovascular:      Rate and Rhythm: Normal rate and regular rhythm.      Pulses: Normal pulses.      Heart sounds: Normal heart sounds.   Pulmonary:      Effort: Pulmonary effort is normal. No respiratory distress.      Breath sounds: Wheezing present.   Abdominal:      General: Abdomen is flat. There is no distension.      Palpations: Abdomen is soft.      Tenderness: There is no abdominal tenderness.   Musculoskeletal:         General: Normal range of motion.      Cervical back: Normal range of motion and neck supple.   Feet:      Comments: R foot: No pulse, swollen, discolored. No cap " refill. No sensation. Unable to move toes  See image below  Skin:     General: Skin is warm and dry.   Neurological:      General: No focal deficit present.      Mental Status: She is alert and oriented to person, place, and time. Mental status is at baseline.   Psychiatric:         Mood and Affect: Mood normal.         Behavior: Behavior normal.                              Procedures:  Procedures      Medical Decision Making:      Comorbidities that affect care:    Asthma, Congestive Heart Failure, COPD, Diabetes, Hypertension    External Notes reviewed:    Hospital Discharge Summary: Patient recently admitted for pulmonary edema and pneumonia      The following orders were placed and all results were independently analyzed by me:  Orders Placed This Encounter   Procedures   • Blood Culture - Blood,   • Blood Culture - Blood,   • CT Lower Extremity Right With Contrast   • Comprehensive Metabolic Panel   • Lactic Acid, Plasma   • Sedimentation Rate   • C-reactive Protein   • Protime-INR   • CBC Auto Differential   • STAT Lactic Acid, Reflex   • CK   • Protime-INR   • aPTT   • aPTT   • CBC Auto Differential   • Basic Metabolic Panel   • NPO Diet NPO Type: Strict NPO   • Notify Provider Platelet Count Less Than 34739   • Notify Provider if PTT Not in Therapeutic Range After 24 Hours   • Stop Infusion & Notify Provider if Bleeding Occurs   • RN To Release aPTT Order 6 Hours After Heparin Bolus & 6 Hours After Any Heparin Rate Change   • After 2 Consecutive Therapeutic aPTTs, Obtain aPTT Daily.  If a Rate Adjustment is Necessary, Resume Every 6 Hour aPTT Draws   • Vital Signs   • Activity - Ad Indy   • Intake & Output   • Weigh Patient   • Oral Care   • Code Status and Medical Interventions:   • IP General Consult (Use specialty-specific consult if known)   • IP General Consult (Use specialty-specific consult if known)   • Oxygen Therapy- Nasal Cannula; Titrate for SPO2: 90% - 95%   • POC Glucose Q6H   • Inpatient  Admission   • CBC & Differential   • CBC & Differential   • CBC & Differential       Medications Given in the Emergency Department:  Medications   heparin 06186 units/250 mL (100 units/mL) in 0.9% NaCl infusion (18 Units/kg/hr × 71.9 kg Intravenous New Bag 3/8/23 0105)   heparin bolus from bag 3,600 Units (has no administration in time range)   heparin bolus from bag 1,800 Units (has no administration in time range)   bisoprolol (ZEBeta) 5 mg, hydroCHLOROthiazide (HYDRODIURIL) 6.25 mg for Ziac 5-6.25 (has no administration in time range)   sodium chloride 0.9 % with KCl 20 mEq/L infusion (100 mL/hr Intravenous New Bag 3/8/23 0210)   acetaminophen (TYLENOL) tablet 650 mg (has no administration in time range)     Or   acetaminophen (TYLENOL) 160 MG/5ML solution 650 mg (has no administration in time range)     Or   acetaminophen (TYLENOL) suppository 650 mg (has no administration in time range)   HYDROcodone-acetaminophen (NORCO) 5-325 MG per tablet 1 tablet (has no administration in time range)   HYDROmorphone (DILAUDID) injection 0.5 mg (has no administration in time range)     And   naloxone (NARCAN) injection 0.4 mg (has no administration in time range)   famotidine (PEPCID) tablet 20 mg (has no administration in time range)   sennosides-docusate (PERICOLACE) 8.6-50 MG per tablet 2 tablet (2 tablets Oral Not Given 3/8/23 0148)     And   polyethylene glycol (MIRALAX) packet 17 g (has no administration in time range)     And   bisacodyl (DULCOLAX) EC tablet 5 mg (has no administration in time range)     And   bisacodyl (DULCOLAX) suppository 10 mg (has no administration in time range)   ondansetron (ZOFRAN) injection 4 mg (has no administration in time range)   Pharmacy to Dose Zosyn (has no administration in time range)   dextrose (GLUTOSE) oral gel 15 g (has no administration in time range)   dextrose (D50W) (25 g/50 mL) IV injection 25 g (has no administration in time range)   glucagon (GLUCAGEN) injection 1 mg  (has no administration in time range)   insulin regular (humuLIN R,novoLIN R) injection 2-9 Units (has no administration in time range)   piperacillin-tazobactam (ZOSYN) 3.375 g in iso-osmotic dextrose 50 ml (premix) (has no administration in time range)   piperacillin-tazobactam (ZOSYN) 3.375 g in iso-osmotic dextrose 50 ml (premix) (has no administration in time range)   iohexol (OMNIPAQUE) 300 MG/ML injection 100 mL (100 mL Intravenous Given 3/7/23 2206)   sodium chloride 0.9 % bolus 1,000 mL (0 mL Intravenous Stopped 3/7/23 2340)   vancomycin 1500 mg/250 mL 0.9% NS IVPB ([facility]) (1,500 mg Intravenous Given 3/8/23 0010)   cefepime (MAXIPIME) 2 g/100 mL 0.9% NS (mbp) (0 g Intravenous Stopped 3/7/23 2357)   HYDROmorphone (DILAUDID) injection 0.5 mg (0.5 mg Intravenous Given 3/8/23 0008)   sodium chloride 0.9 % bolus 1,000 mL (1,000 mL Intravenous New Bag 3/8/23 0059)   heparin bolus from bag 5,800 Units (5,800 Units Intravenous Bolus from Bag 3/8/23 0107)   HYDROmorphone (DILAUDID) injection 0.5 mg (0.5 mg Intravenous Given 3/8/23 0057)        ED Course:                The patient was initially evaluated in the triage area where orders were placed. The patient was later dispositioned by Raine Lacy MD.      The patient was advised to stay for completion of workup which includes but is not limited to communication of labs and radiological results, reassessment and plan. The patient was advised that leaving prior to disposition by a provider could result in critical findings that are not communicated to the patient.     ED Course as of 03/08/23 0223   Tue Mar 07, 2023   2101 PROVIDER IN TRIAGE  Patient was evaluated by me in triage, Anish Sevilla PA-C.  Orders were placed and patient is currently awaiting final results and disposition.  [MD]      ED Course User Index  [MD] Anish Sevilla PA-C       Labs:    Lab Results (last 24 hours)     Procedure Component Value Units Date/Time    CBC & Differential  [[id number]]  (Abnormal) Collected: 03/07/23 2108    Specimen: Blood Updated: 03/07/23 2117    Narrative:      The following orders were created for panel order CBC & Differential.  Procedure                               Abnormality         Status                     ---------                               -----------         ------                     CBC Auto Differential[[id number]]        Abnormal            Final result                 Please view results for these tests on the individual orders.    Comprehensive Metabolic Panel [[id number]]  (Abnormal) Collected: 03/07/23 2108    Specimen: Blood Updated: 03/07/23 2136     Glucose 263 mg/dL      BUN 12 mg/dL      Creatinine 0.56 mg/dL      Sodium 137 mmol/L      Potassium 4.1 mmol/L      Chloride 102 mmol/L      CO2 24.4 mmol/L      Calcium 7.9 mg/dL      Total Protein 6.1 g/dL      Albumin 2.1 g/dL      ALT (SGPT) 11 U/L      AST (SGOT) 12 U/L      Alkaline Phosphatase 134 U/L      Total Bilirubin 0.4 mg/dL      Globulin 4.0 gm/dL      A/G Ratio 0.5 g/dL      BUN/Creatinine Ratio 21.4     Anion Gap 10.6 mmol/L      eGFR 102.7 mL/min/1.73     Narrative:      GFR Normal >60  Chronic Kidney Disease <60  Kidney Failure <15      Blood Culture - Blood, Arm, Right [[id number]] Collected: 03/07/23 2108    Specimen: Blood from Arm, Right Updated: 03/07/23 2113    Lactic Acid, Plasma [[id number]]  (Abnormal) Collected: 03/07/23 2108    Specimen: Blood Updated: 03/07/23 2148     Lactate 2.6 mmol/L     Sedimentation Rate [[id number]]  (Abnormal) Collected: 03/07/23 2108    Specimen: Blood Updated: 03/07/23 2118     Sed Rate 72 mm/hr     C-reactive Protein [[id number]]  (Abnormal) Collected: 03/07/23 2108    Specimen: Blood Updated: 03/07/23 2136     C-Reactive Protein 14.33 mg/dL     Protime-INR [[id number]]  (Abnormal) Collected: 03/07/23 2108    Specimen: Blood Updated: 03/07/23 2125     Protime 16.7 Seconds      INR 1.35    Narrative:      Suggested Therapeutic Ranges For  Oral Anticoagulant Therapy:  Level of Therapy                      INR Target Range  Standard Dose                            2.0-3.0  High Dose                                2.5-3.5  Patients not receiving anticoagulant  Therapy Normal Range                     0.86-1.15    CBC Auto Differential [[id number]]  (Abnormal) Collected: 03/07/23 2108    Specimen: Blood Updated: 03/07/23 2117     WBC 13.71 10*3/mm3      RBC 3.94 10*6/mm3      Hemoglobin 11.0 g/dL      Hematocrit 33.1 %      MCV 84.0 fL      MCH 27.9 pg      MCHC 33.2 g/dL      RDW 16.3 %      RDW-SD 50.3 fl      MPV 11.0 fL      Platelets 152 10*3/mm3      Neutrophil % 85.3 %      Lymphocyte % 10.1 %      Monocyte % 3.4 %      Eosinophil % 0.5 %      Basophil % 0.2 %      Immature Grans % 0.5 %      Neutrophils, Absolute 11.70 10*3/mm3      Lymphocytes, Absolute 1.38 10*3/mm3      Monocytes, Absolute 0.46 10*3/mm3      Eosinophils, Absolute 0.07 10*3/mm3      Basophils, Absolute 0.03 10*3/mm3      Immature Grans, Absolute 0.07 10*3/mm3      nRBC 0.0 /100 WBC     CK [[id number]]  (Normal) Collected: 03/07/23 2108    Specimen: Blood Updated: 03/08/23 0015     Creatine Kinase 44 U/L     aPTT [[id number]]  (Abnormal) Collected: 03/07/23 2108    Specimen: Blood Updated: 03/08/23 0024     PTT 54.0 seconds     Blood Culture - Blood, Arm, Left [[id number]] Collected: 03/07/23 2131    Specimen: Blood from Arm, Left Updated: 03/07/23 2138    STAT Lactic Acid, Reflex [[id number]]  (Normal) Collected: 03/08/23 0021    Specimen: Blood Updated: 03/08/23 0040     Lactate 1.7 mmol/L     Protime-INR [[id number]]  (Abnormal) Collected: 03/08/23 0040    Specimen: Blood Updated: 03/08/23 0053     Protime 16.1 Seconds      INR 1.29    Narrative:      Suggested Therapeutic Ranges For Oral Anticoagulant Therapy:  Level of Therapy                      INR Target Range  Standard Dose                            2.0-3.0  High Dose                                2.5-3.5  Patients not  receiving anticoagulant  Therapy Normal Range                     0.86-1.15    CBC & Differential [[id number]]  (Abnormal) Collected: 03/08/23 0040    Specimen: Blood Updated: 03/08/23 0046    Narrative:      The following orders were created for panel order CBC & Differential.  Procedure                               Abnormality         Status                     ---------                               -----------         ------                     CBC Auto Differential[[id number]]        Abnormal            Final result                 Please view results for these tests on the individual orders.    CBC Auto Differential [[id number]]  (Abnormal) Collected: 03/08/23 0040    Specimen: Blood Updated: 03/08/23 0046     WBC 13.21 10*3/mm3      RBC 3.99 10*6/mm3      Hemoglobin 11.0 g/dL      Hematocrit 34.0 %      MCV 85.2 fL      MCH 27.6 pg      MCHC 32.4 g/dL      RDW 16.4 %      RDW-SD 51.4 fl      MPV 11.9 fL      Platelets 155 10*3/mm3      Neutrophil % 83.7 %      Lymphocyte % 11.0 %      Monocyte % 4.0 %      Eosinophil % 0.6 %      Basophil % 0.2 %      Immature Grans % 0.5 %      Neutrophils, Absolute 11.05 10*3/mm3      Lymphocytes, Absolute 1.45 10*3/mm3      Monocytes, Absolute 0.53 10*3/mm3      Eosinophils, Absolute 0.08 10*3/mm3      Basophils, Absolute 0.03 10*3/mm3      Immature Grans, Absolute 0.07 10*3/mm3      nRBC 0.0 /100 WBC            Imaging:    CT Lower Extremity Right With Contrast    Result Date: 3/7/2023  PROCEDURE: CT LOWER EXTREMITY RIGHT W CONTRAST  COMPARISON: None  INDICATIONS: DISCOLORATION, SWELLING, RAW, BLISTERING, AND PAIN IN RIGHT FOOT X 3 DAYS  PROTOCOL:   Standard imaging protocol performed    RADIATION:   DLP: 898.4 mGy*cm   Automated exposure control was utilized to minimize radiation dose. CONTRAST: 92 cc Omnipaque 300 I.V.  TECHNIQUE: After obtaining the patient's consent, multi-planar CT images of the extremity were created with non-ionic intravenous contrast.   FINDINGS:   There is extensive subcutaneous and deep soft tissue edema throughout the foot with blistering along the lateral dorsum of the midfoot and hindfoot.  Subcutaneous edema extends up through the ankle and into the mid tibial soft tissues.  The edema appears relatively diffuse throughout the foot.  There is evidence of prior 2nd digit amputation at the metatarsal head.  This amputation site appears sharp without obvious bony erosion.  There is also amputation of the 5th digit at the distal aspect of the 5th metatarsal.  This amputation site appears mildly irregular and erosions cannot be excluded.  There is no evidence of fracture or dislocation.  The bones are markedly demineralized possibly from disuse.  There is ossification along the distal tibiofibular syndesmosis.  Joint spaces appear relatively intact throughout the foot with minimal osteoarthritis at the 1st digit distally.  There is diffuse muscular atrophy.  Tendons appear grossly intact throughout the foot and ankle.  There is no soft tissue gas.        1. Extensive soft tissue edema primarily throughout the foot, but also involving the ankle and mid tibial soft tissues.  There is also blistering along the lateral midfoot all concerning for extensive cellulitis.  No soft tissue gas. 2. Amputations at the 2nd and 5th digit metatarsal heads.  The amputation site at the 2nd digit appears sharp and corticated.  The amputation site at the 5th digit appears less well-defined.  If there is concern for osteomyelitis, consider follow-up MRI. 3. Marked osteopenia.     IRA LARSEN MD       Electronically Signed and Approved By: IRA LARSEN MD on 3/07/2023 at 22:27                 Differential Diagnosis and Discussion:      Extremity Pain: Differential diagnosis includes but is not limited to soft tissue sprain, tendonitis, tendon injury, dislocation, fracture, deep vein thrombosis, arterial insufficiency, osteoarthritis, bursitis, and ligamentous damage.    All  labs were reviewed and interpreted by me.  CT scan radiology interpretation was reviewed by me.    Sepsis criteria was met in the emergency department and the Sepsis protocol (including antibiotic administration) was initiated.      SIRS criteria considered:   1.  Temperature > 100.4 or <98.6    2.  Heart Rate > 90    3.  Respiratory Rate > 22    4.  WBC > 12K or <4K.             Severe Sepsis:     Respiratory: Mechanical Ventilation or Bipap  Hypotension: SBP > 90 or MAP < 65  Renal: Creatinine > 2  Metabolic: Lactic Acid > 2  Hematologic: Platelets < 100K or INR > 1.5  Hepatic: BILI  >  2  CNS: Sudden AMS     Septic Shock:     Severe Sepsis + Persistent hypotension or Lactic Acid > 4     Normal saline bolus, Antibiotics, and final disposition was based on these definitions.        Sepsis was recognized at 2300    Antibiotics were ordered.     30 cc/kg bolus was not indicated.         MDM  Number of Diagnoses or Management Options  Diagnosis management comments: Patient presented to the emergency department with right foot wound.  Patient had toe amputation recently done by Dr. Russell.  Patient states the last 2 days she has noticed increased swelling pain and discoloration of her right foot.  She does not have a DP or PT pulse on exam.  Foot is discolored.  No cap refill.  No sensation to her foot.  She is unable to move her foot.  CT was obtained that showed extensive soft tissue edema probably throughout the foot but also involving the ankle and mid tibial soft tissue there is also blistering along the lateral midfoot all concerning for extensive cellulitis no soft tissue gas.  She has amputation of the second and fifth digit metatarsal heads the amputation site at the second digit appears sharp and corticated the amputation site of the fifth digit appears less well-defined there is concern for osteomyelitis.  Patient was started on broad-spectrum antibiotics.  Discussed patient with vascular surgeon Dr. Russell  recommend admission.  Also discussed patient with primary care physician group and she will be admitted for further care.       Amount and/or Complexity of Data Reviewed  Clinical lab tests: reviewed  Tests in the radiology section of CPT®: reviewed  Review and summarize past medical records: yes  Independent visualization of images, tracings, or specimens: yes    Risk of Complications, Morbidity, and/or Mortality  Presenting problems: moderate  Management options: moderate         Critical Care Note: Total Critical Care time of 45 minutes. Total critical care time documented does not include time spent on separately billed procedures for services of nurses or physician assistants. I personally saw and examined the patient. I have reviewed all diagnostic interpretations and treatment plans as written. I was present for the key portions of any procedures performed and the inclusive time noted in any critical care statement. Critical care time includes patient management by me, time spent at the patients bedside,  time to review lab and imaging results, discussing patient care, documentation in the medical record, and time spent with family or caregiver.    Patient Care Considerations:          Consultants/Shared Management Plan:    Consultant: I have discussed the case with Dr Russell who agrees to consult on the patient.  PCP: I have discussed the case with Dr Garnett who agrees to accept the patient for admission.    Social Determinants of Health:    Patient is independent, reliable, and has access to care.       Disposition and Care Coordination:    Admit:   Through independent evaluation of the patient's history, physical, and imperical data, the patient meets criteria for observation/admission to the hospital.        Final diagnoses:   Ischemic foot   Osteomyelitis of right foot, unspecified type (HCC)   Sepsis, due to unspecified organism, unspecified whether acute organ dysfunction present (HCC)   Foot infection         ED Disposition     ED Disposition   Decision to Admit    Condition   --    Comment   Level of Care: Med/Surg [1]   Diagnosis: Ischemic foot [[id number]]   Admitting Physician: RAN HAMM [[id number]]   Certification: I Certify That Inpatient Hospital Services Are Medically Necessary For Greater Than 2 Midnights               This medical record created using voice recognition software.    Documentation assistance provided by Kieran arndt, acting as scribe for Raine Lacy MD. Information recorded by the scribe was done at my direction and has been verified and validated by me.         Kieran Arndt  03/07/23 2349       Kieran Arndt  03/08/23 0003       Raine Lacy MD  03/08/23 0224

## 2023-03-08 NOTE — PLAN OF CARE
Goal Outcome Evaluation:           Progress: no change   Patient A&O x4. Patient is on 3L of O2. VSS. Patient got an angiogram today. Patient was started on lasix and needed to be straight cathed. Patient had complaints of pain in R foot, see MAR. IV antibiotics completed per MAR. Wound care orders added by wound care RN. No new orders at this time.

## 2023-03-08 NOTE — H&P
[facility]   HISTORY AND PHYSICAL    Patient Name: Italia Olvera  : 1960  MRN: [id number]  Primary Care Physician:  Carloz Shoemaker MD  Date of admission: 3/7/2023    Subjective   Subjective     Chief Complaint:   Right foot pain and swelling      HPI:    Italia Olvera is a 62 y.o. female admitted to hospital for right foot pain and swelling and ischemic foot.  Patient came to emergency room yesterday night.  Vascular surgeon Dr. Russell who follows patient as an outpatient was consulted, recommended admission to medical service on heparin.  Patient has ischemic foot started on heparin.  Patient extremely noncompliant not taking medication as prescribed and not following with any of the consultants as well as PCP on a regular basis.  Patient continues to smoke.  When I saw her this morning she had a lot of swelling and pain in the leg and foot.  No fever chills.  Denies any shortness of breath        Review of Systems:    Fatigue and weakness.  Denies fever chills.  Pain in foot.  Swelling of foot.  Discoloration of foot    Personal History     Past Medical History:   Diagnosis Date   • Acid reflux    • ACL tear 2015    PCL/ACL TEAR/RUPTURE   • Acute shoulder bursitis, right 2015   • Anxiety    • Arthritis    • Asthma    • Bipolar 1 disorder (HCC)     untreated   • Bladder disorder    • Cancer ([facility])     CERVICAL CANCER   • CHF (congestive heart failure) ([facility])     ASYMPTOMATIC- NO CARDIOLOGIST- SEE'S DR SHOEMAKER (PCP)- DENIED CP/SOB   • Chronic back pain    • Chronic pain     CHRONIC BACK, NECK, LEG, FOOT, & FACE PAIN   • COPD (chronic obstructive pulmonary disease) ([facility])     USES INHALERS   • Depression    • Diabetes mellitus ([facility])     BG RUND AROUND 140 IN AM   • DJD (degenerative joint disease)    • Gangrene ([facility])     RT SECOND AND FIFTH TOES   • GERD (gastroesophageal reflux disease)    • HBP (high blood pressure)    • Hip pain 09/15/2015   • Hypertension    • Knee injury 2015   •  Knee pain, right 09/15/2015   • Limb swelling    • Neuropathy    • On home O2     2-3L/NC PRN   • Osteoarthritis, knee 09/01/2015   • Osteoporosis    • Peripheral neuropathy    • Renal failure 1994    NO CURRENT PROBLEMS   • Seasonal allergies    • Shoulder tendonitis 08/23/2015   • Sleep apnea    • Smoker    • SOB (shortness of breath)     NONE CURENTLY   • Tendinitis of right rotator cuff 08/23/2015       Past Surgical History:   Procedure Laterality Date   • AMPUTATION DIGIT Right 12/6/2022    Procedure: Amputation of right second and fifth toes;  Surgeon: Gabino Russell MD;  Location: [facility] MAIN OR;  Service: Vascular;  Laterality: Right;   • BACK SURGERY     • BLADDER REPAIR     • BRONCHOSCOPY N/A 07/10/2021    Procedure: BRONCHOSCOPY WITH BRONCHOALVEOLAR LAVAGE, POSSIBLE BIOPSY, BRUSHING, WASHING, AIRWAY INSPECTION;  Surgeon: Rodrigo Reyes MD;  Location: [facility] MAIN OR;  Service: Pulmonary;  Laterality: N/A;   • BRONCHOSCOPY N/A 07/10/2021    Procedure: BRONCHOSCOPY;  Surgeon: Rodrigo Reyes MD;  Location: [facility] ENDOSCOPY;  Service: Pulmonary;  Laterality: N/A;   • CARDIAC CATHETERIZATION Right 11/03/2022    Procedure: Aortogram with right leg angiogram, possible angioplasty or stenting ;  Surgeon: Gabino Russell MD;  Location: [facility] CATH INVASIVE LOCATION;  Service: Vascular;  Laterality: Right;   • CHOLECYSTECTOMY     • ENDOSCOPY     • FRACTURE SURGERY      SKULL FRACTURE   • GALLBLADDER SURGERY     • HERNIA REPAIR     • HYSTERECTOMY     • INTERVENTIONAL RADIOLOGY PROCEDURE Right 03/03/2022    Procedure: Abdominal Aortagram with Runoff;  Surgeon: Marleny Yoon MD;  Location: [facility] CATH INVASIVE LOCATION;  Service: Peripheral Vascular;  Laterality: Right;   • JOINT REPLACEMENT     • OTHER SURGICAL HISTORY      ARTIFICIAL JOINTS/LIMBS   • OTHER SURGICAL HISTORY      FACE SURGERY, UNSPECIFIED   • TOTAL HIP ARTHROPLASTY Right    • TOTAL KNEE ARTHROPLASTY Left        Family History: family history  includes Arthritis in her brother, daughter, mother, and sister; Breast cancer in her sister; Colon cancer in her sister; Diabetes in her brother; Heart disease in her brother and father; Lung cancer in her sister; Osteoporosis in her mother and sister; Stroke in her mother; Throat cancer in her mother. Otherwise pertinent FHx was reviewed and not pertinent to current issue.    Social History:  reports that she has been smoking cigarettes. She started smoking about 43 years ago. She has a 25.00 pack-year smoking history. She has never used smokeless tobacco. She reports that she does not drink alcohol and does not use drugs.    Home Medications:  amLODIPine-benazepril, aspirin, atorvastatin, benzonatate, bisoprolol-hydrochlorothiazide, clopidogrel, colestipol, furosemide, guaiFENesin, hydrOXYzine, mirtazapine, nicotine, nortriptyline, ondansetron, oxyCODONE-acetaminophen, pantoprazole, potassium chloride, sertraline, tiZANidine, and traZODone      Allergies:  No Known Allergies    Objective   Objective     Vitals:   Temp:  [97.9 °F (36.6 °C)-99 °F (37.2 °C)] 97.9 °F (36.6 °C)  Heart Rate:  [] 70  Resp:  [16-24] 20  BP: ()/(46-79) 123/60  Flow (L/min):  [2-3] 3    Physical Exam  Elderly female not in acute distress.  Neck supple,  Heart regular.  Lungs diminished breath sounds,, occasional rhonchi.  Abdomen obese and soft nontender  Right lower extremity with edema and swelling below the knee joint.  Swelling getting worse around ankle and foot.  Significant 3+ edema with purple and bluish discoloration of midfoot and toes turning dark black.  Pulses cannot be felt, movement minimal in the foot        I have personally reviewed the results from the time of this admission to 3/8/2023 18:24 EST and agree with these findings:  [x]  Laboratory  []  Microbiology  [x]  Radiology  []  EKG/Telemetry   []  Cardiology/Vascular   []  Pathology  []  Old records  []  Other:    CBC:    WBC   Date Value Ref Range Status    03/08/2023 13.21 (H) 3.40 - 10.80 10*3/mm3 Final     RBC   Date Value Ref Range Status   03/08/2023 3.99 3.77 - 5.28 10*6/mm3 Final     Hemoglobin   Date Value Ref Range Status   03/08/2023 11.0 (L) 12.0 - 15.9 g/dL Final     Hematocrit   Date Value Ref Range Status   03/08/2023 34.0 34.0 - 46.6 % Final     MCV   Date Value Ref Range Status   03/08/2023 85.2 79.0 - 97.0 fL Final     MCH   Date Value Ref Range Status   03/08/2023 27.6 26.6 - 33.0 pg Final     MCHC   Date Value Ref Range Status   03/08/2023 32.4 31.5 - 35.7 g/dL Final     RDW   Date Value Ref Range Status   03/08/2023 16.4 (H) 12.3 - 15.4 % Final     RDW-SD   Date Value Ref Range Status   03/08/2023 51.4 37.0 - 54.0 fl Final     MPV   Date Value Ref Range Status   03/08/2023 11.9 6.0 - 12.0 fL Final     Platelets   Date Value Ref Range Status   03/08/2023 155 140 - 450 10*3/mm3 Final     Neutrophil %   Date Value Ref Range Status   03/08/2023 83.7 (H) 42.7 - 76.0 % Final     Lymphocyte %   Date Value Ref Range Status   03/08/2023 11.0 (L) 19.6 - 45.3 % Final     Monocyte %   Date Value Ref Range Status   03/08/2023 4.0 (L) 5.0 - 12.0 % Final     Eosinophil %   Date Value Ref Range Status   03/08/2023 0.6 0.3 - 6.2 % Final     Basophil %   Date Value Ref Range Status   03/08/2023 0.2 0.0 - 1.5 % Final     Immature Grans %   Date Value Ref Range Status   03/08/2023 0.5 0.0 - 0.5 % Final     Neutrophils, Absolute   Date Value Ref Range Status   03/08/2023 11.05 (H) 1.70 - 7.00 10*3/mm3 Final     Lymphocytes, Absolute   Date Value Ref Range Status   03/08/2023 1.45 0.70 - 3.10 10*3/mm3 Final     Monocytes, Absolute   Date Value Ref Range Status   03/08/2023 0.53 0.10 - 0.90 10*3/mm3 Final     Eosinophils, Absolute   Date Value Ref Range Status   03/08/2023 0.08 0.00 - 0.40 10*3/mm3 Final     Basophils, Absolute   Date Value Ref Range Status   03/08/2023 0.03 0.00 - 0.20 10*3/mm3 Final     Immature Grans, Absolute   Date Value Ref Range Status    03/08/2023 0.07 (H) 0.00 - 0.05 10*3/mm3 Final     nRBC   Date Value Ref Range Status   03/08/2023 0.0 0.0 - 0.2 /100 WBC Final        BMP:    Lab Results   Component Value Date    GLUCOSE 158 (H) 03/08/2023    BUN 12 03/08/2023    CREATININE 0.52 (L) 03/08/2023    EGFRIFNONA 98 02/28/2022    BCR 23.1 03/08/2023    K 4.1 03/08/2023    CO2 23.0 03/08/2023    CALCIUM 7.3 (L) 03/08/2023    ALBUMIN 2.1 (L) 03/07/2023    LABIL2 0.6 (L) 06/04/2021    AST 12 03/07/2023    ALT 11 03/07/2023        CT Lower Extremity Right With Contrast    Result Date: 3/7/2023    1. Extensive soft tissue edema primarily throughout the foot, but also involving the ankle and mid tibial soft tissues.  There is also blistering along the lateral midfoot all concerning for extensive cellulitis.  No soft tissue gas. 2. Amputations at the 2nd and 5th digit metatarsal heads.  The amputation site at the 2nd digit appears sharp and corticated.  The amputation site at the 5th digit appears less well-defined.  If there is concern for osteomyelitis, consider follow-up MRI. 3. Marked osteopenia.     IRA LARSEN MD       Electronically Signed and Approved By: IRA LARSEN MD on 3/07/2023 at 22:27                      Assessment & Plan   Assessment / Plan       Current Diagnosis:  Active Hospital Problems    Diagnosis    • **Ischemic foot    • Atherosclerosis of native artery of extremity (HCC)    • Chronic respiratory failure with hypoxia ([facility])    • T2DM (type 2 diabetes mellitus) (CMS/[facility])- uncontrolled    • Bipolar disorder ([facility])      Plan:   Patient presented with ischemic foot.  Started on heparin drip.  Patient extremely noncompliant.  Continues to smoke despite advanced disease and previous surgeries.  Poorly controlled sugar diabetes.  At this point will continue with heparin.  Vascular surgeon Dr. Russell notified by ER', seen patient earlier this morning and planning for angiogram..  Poor prognosis.  Discussed with patient that she may lose  the foot and possible below-knee amputation with extensive disease and noncompliance.  Continue pain control with narcotics.  Empiric antibiotics.  Monitor sugars.  Sliding scale coverage.  Neb treatments as needed.  Further management will based on clinical course, lab results and consultant input      DVT prophylaxis:  Medical DVT prophylaxis orders are present.    GI Prophylaxis:    Pepcid/PPI    CODE STATUS:    Code Status (Patient has no pulse and is not breathing): CPR (Attempt to Resuscitate)  Medical Interventions (Patient has pulse or is breathing): Full Support    Admission Status:  I believe this patient meets inpatient status.             I have dictated this note utilizing Dragon Dictation.             Please note that portions of this note were completed with a voice recognition program.             Part of this note may be an electronic transcription/translation of spoken language to printed text         using the Dragon Dictation System.       Electronically signed by Rudy Garnett MD, 03/08/23, 8:57 AM EST.    Total time spent with in evaluation and management: 38

## 2023-03-08 NOTE — CONSULTS
[facility]   VASCULAR SURGERY CONSULT    Patient Name: Italia Olvera  : 1960  MRN: [id number]  Primary Care Physician:  Carloz Shoemaker MD  Date of admission: 3/7/2023    Subjective   Subjective     Chief Complaint: Right foot pain    HPI:    Italia Olvear is a 62 y.o. female with known significant peripheral vascular disease.  She had previously undergone endovascular interventions of the right lower extremity dating back to 2022 and again 2022 due to severe peripheral vascular disease.  She was doing well until 2 days prior to admission when she indicates she started having significant pain in the right foot.  She was admitted through the emergency room last night.  We have been asked to evaluate from the vascular standpoint.    Review of Systems    Noncontributory except for the history of present illness.    Personal History     Past Medical History:   Diagnosis Date   • Acid reflux    • ACL tear 2015    PCL/ACL TEAR/RUPTURE   • Acute shoulder bursitis, right 2015   • Anxiety    • Arthritis    • Asthma    • Bipolar 1 disorder ([facility])     untreated   • Bladder disorder    • Cancer ([facility])     CERVICAL CANCER   • CHF (congestive heart failure) ([facility])     ASYMPTOMATIC- NO CARDIOLOGIST- SEE'S DR SHOEMAKER (PCP)- DENIED CP/SOB   • Chronic back pain    • Chronic pain     CHRONIC BACK, NECK, LEG, FOOT, & FACE PAIN   • COPD (chronic obstructive pulmonary disease) ([facility])     USES INHALERS   • Depression    • Diabetes mellitus ([facility])     BG RUND AROUND 140 IN AM   • DJD (degenerative joint disease)    • Gangrene ([facility])     RT SECOND AND FIFTH TOES   • GERD (gastroesophageal reflux disease)    • HBP (high blood pressure)    • Hip pain 09/15/2015   • Hypertension    • Knee injury 2015   • Knee pain, right 09/15/2015   • Limb swelling    • Neuropathy    • On home O2     2-3L/NC PRN   • Osteoarthritis, knee 2015   • Osteoporosis    • Peripheral neuropathy    • Renal failure      NO CURRENT PROBLEMS   • Seasonal allergies    • Shoulder tendonitis 08/23/2015   • Sleep apnea    • Smoker    • SOB (shortness of breath)     NONE CURENTLY   • Tendinitis of right rotator cuff 08/23/2015       Past Surgical History:   Procedure Laterality Date   • AMPUTATION DIGIT Right 12/6/2022    Procedure: Amputation of right second and fifth toes;  Surgeon: Gabino Russell MD;  Location: [facility] MAIN OR;  Service: Vascular;  Laterality: Right;   • BACK SURGERY     • BLADDER REPAIR     • BRONCHOSCOPY N/A 07/10/2021    Procedure: BRONCHOSCOPY WITH BRONCHOALVEOLAR LAVAGE, POSSIBLE BIOPSY, BRUSHING, WASHING, AIRWAY INSPECTION;  Surgeon: Rodrigo Reyes MD;  Location: [facility] MAIN OR;  Service: Pulmonary;  Laterality: N/A;   • BRONCHOSCOPY N/A 07/10/2021    Procedure: BRONCHOSCOPY;  Surgeon: Rodrigo Reyes MD;  Location: [facility] ENDOSCOPY;  Service: Pulmonary;  Laterality: N/A;   • CARDIAC CATHETERIZATION Right 11/03/2022    Procedure: Aortogram with right leg angiogram, possible angioplasty or stenting ;  Surgeon: Gabino Russell MD;  Location: [facility] CATH INVASIVE LOCATION;  Service: Vascular;  Laterality: Right;   • CHOLECYSTECTOMY     • ENDOSCOPY     • FRACTURE SURGERY      SKULL FRACTURE   • GALLBLADDER SURGERY     • HERNIA REPAIR     • HYSTERECTOMY     • INTERVENTIONAL RADIOLOGY PROCEDURE Right 03/03/2022    Procedure: Abdominal Aortagram with Runoff;  Surgeon: Marleny Yoon MD;  Location: [facility] CATH INVASIVE LOCATION;  Service: Peripheral Vascular;  Laterality: Right;   • JOINT REPLACEMENT     • OTHER SURGICAL HISTORY      ARTIFICIAL JOINTS/LIMBS   • OTHER SURGICAL HISTORY      FACE SURGERY, UNSPECIFIED   • TOTAL HIP ARTHROPLASTY Right    • TOTAL KNEE ARTHROPLASTY Left        Family History: family history includes Arthritis in her brother, daughter, mother, and sister; Breast cancer in her sister; Colon cancer in her sister; Diabetes in her brother; Heart disease in her brother and father; Lung cancer  in her sister; Osteoporosis in her mother and sister; Stroke in her mother; Throat cancer in her mother. Otherwise pertinent FHx was reviewed and not pertinent to current issue.    Social History:  reports that she has been smoking cigarettes. She started smoking about 43 years ago. She has a 25.00 pack-year smoking history. She has never used smokeless tobacco. She reports that she does not drink alcohol and does not use drugs.    Home Medications:  amLODIPine-benazepril, aspirin, bisoprolol-hydrochlorothiazide, cholestyramine, clopidogrel, ferrous sulfate, furosemide, guaiFENesin, hydrOXYzine, insulin NPH-insulin regular, insulin detemir, ipratropium-albuterol, mirtazapine, nicotine, ondansetron, oxyCODONE-acetaminophen, pantoprazole, potassium chloride, sertraline, tiZANidine, traZODone, vitamin B-12, and zolpidem      Allergies:  No Known Allergies    Objective   Objective     Vitals:   Temp:  [98.5 °F (36.9 °C)-99 °F (37.2 °C)] 99 °F (37.2 °C)  Heart Rate:  [] 109  Resp:  [16-24] 20  BP: ()/(57-79) 163/74  Flow (L/min):  [2] 2    Physical Exam   General: Alert, no acute distress.  Neck: Supple  Heart: Regular rate  Lungs: Clear  Abdomen: Benign  Extremities: Symmetric  Right foot: Purplish discoloration of the forefoot and midfoot with moderate to severe edema.  Minimal intrinsic movement.  Pulses: Nonpalpable right pedal pulses.    Diagnostic studies: A CT of the right lower extremity with contrast was reviewed and demonstrates evidence of severe peroneal disease and no significant flow visualized reaching the foot.    Assessment & Plan   Assessment / Plan     Active Hospital Problems:  Active Hospital Problems    Diagnosis    • **Ischemic foot        Assessment/plan:   Mrs. Olvera presents with advanced ischemia of the right foot.  I have discussed with her that it is very possible her foot may not be salvageable.  I am recommending that we proceed to angiography with possible endovascular  intervention in an attempt to save her foot.  It is known from previous interventions that she had severe tibial disease and significant small vessel disease.  I advised her that there is a very high likelihood she will ultimately require a below the knee amputation.  I have discussed with the patient in detail the mechanics of the procedure, the indications, benefits, risks, alternatives as well as potential complications to include but not limited to infection, bleeding, inability to cross the occlusion, vascular injury requiring surgical repair.  She appears to understand and desires to proceed.        Electronically signed by Gabino Russell MD, 03/08/23, 7:39 AM EST.

## 2023-03-08 NOTE — SIGNIFICANT NOTE
03/08/23 1055   Plan   Plan Met with patient that is a readmission from 02/08/23 for chest pain.  Reports compliance with discharge orders and follow up care.  Reports lives with spouse that can assist with needs.  Patient is agreeable to rehab or home health if needed.  Patient is unable to provide own ADL's at this time.  Facesheet verified.  Will continue to monitor for possible needs

## 2023-03-08 NOTE — SIGNIFICANT NOTE
Wound Eval / Progress Noted    JUNG Palomo     Patient Name: Italia Olvera  : 1960  MRN: [id number]  Today's Date: 3/8/2023                 Admit Date: 3/7/2023    Visit Dx:    ICD-10-CM ICD-9-CM   1. Ischemic foot  I99.8 459.9   2. Osteomyelitis of right foot, unspecified type ([facility])  M86.9 730.27   3. Sepsis, due to unspecified organism, unspecified whether acute organ dysfunction present ([facility])  A41.9 038.9     995.91   4. Foot infection  L08.9 686.9       Patient Active Problem List   Diagnosis   • Bladder disorder   • Depression   • Hypertension   • Peripheral neuropathy   • Osteoarthritis of knee   • COPD with acute exacerbation ([facility])   • Chronic back pain   • Multifocal pneumonia   • T2DM (type 2 diabetes mellitus) (CMS/[facility])- uncontrolled   • Bipolar disorder ([facility])   • Chronic respiratory failure with hypoxia ([facility])   • Acute on chronic respiratory failure with hypoxia ([facility])   • Chronic respiratory failure with hypoxia, on home oxygen therapy ([facility])   • Overweight (BMI 25.0-29.9)   • Acute on chronic systolic CHF (congestive heart failure) ([facility])   • Essential hypertension   • DM (diabetes mellitus), type 1 ([facility])   • Acute UTI (urinary tract infection)   • Anemia   • Sepsis ([facility])   • COPD with acute exacerbation ([facility])   • Hypokalemia   • Moderate malnutrition (CMS/[facility])   • Decubitus ulcer of back, stage 3 ([facility])   • Acute osteomyelitis of toe of right foot ([facility])   • Anxiety disorder   • Diabetes mellitus with peripheral vascular disease ([facility])   • Decubitus ulcer, unstageable ([facility])   • Atherosclerosis of native artery of extremity ([facility])   • Paroxysmal atrial fibrillation ([facility])   • Intertrigo   • Ischemic foot        Past Medical History:   Diagnosis Date   • Acid reflux    • ACL tear 2015    PCL/ACL TEAR/RUPTURE   • Acute shoulder bursitis, right 2015   • Anxiety    • Arthritis    • Asthma    • Bipolar 1 disorder ([facility])     untreated   • Bladder disorder    • Cancer ([facility])     CERVICAL CANCER    • CHF (congestive heart failure) ([facility])     ASYMPTOMATIC- NO CARDIOLOGIST- SEE'S DR ARRIAZA (PCP)- DENIED CP/SOB   • Chronic back pain    • Chronic pain     CHRONIC BACK, NECK, LEG, FOOT, & FACE PAIN   • COPD (chronic obstructive pulmonary disease) ([facility])     USES INHALERS   • Depression    • Diabetes mellitus ([facility])     BG RUND AROUND 140 IN AM   • DJD (degenerative joint disease)    • Gangrene ([facility])     RT SECOND AND FIFTH TOES   • GERD (gastroesophageal reflux disease)    • HBP (high blood pressure)    • Hip pain 09/15/2015   • Hypertension    • Knee injury 08/19/2015   • Knee pain, right 09/15/2015   • Limb swelling    • Neuropathy    • On home O2     2-3L/NC PRN   • Osteoarthritis, knee 09/01/2015   • Osteoporosis    • Peripheral neuropathy    • Renal failure 1994    NO CURRENT PROBLEMS   • Seasonal allergies    • Shoulder tendonitis 08/23/2015   • Sleep apnea    • Smoker    • SOB (shortness of breath)     NONE CURENTLY   • Tendinitis of right rotator cuff 08/23/2015        Past Surgical History:   Procedure Laterality Date   • AMPUTATION DIGIT Right 12/6/2022    Procedure: Amputation of right second and fifth toes;  Surgeon: Gabino Russell MD;  Location: [facility] MAIN OR;  Service: Vascular;  Laterality: Right;   • BACK SURGERY     • BLADDER REPAIR     • BRONCHOSCOPY N/A 07/10/2021    Procedure: BRONCHOSCOPY WITH BRONCHOALVEOLAR LAVAGE, POSSIBLE BIOPSY, BRUSHING, WASHING, AIRWAY INSPECTION;  Surgeon: Rodrigo Reyes MD;  Location: [facility] MAIN OR;  Service: Pulmonary;  Laterality: N/A;   • BRONCHOSCOPY N/A 07/10/2021    Procedure: BRONCHOSCOPY;  Surgeon: Rodrigo Reyes MD;  Location: [facility] ENDOSCOPY;  Service: Pulmonary;  Laterality: N/A;   • CARDIAC CATHETERIZATION Right 11/03/2022    Procedure: Aortogram with right leg angiogram, possible angioplasty or stenting ;  Surgeon: Gabino Russell MD;  Location: [facility] CATH INVASIVE LOCATION;  Service: Vascular;  Laterality: Right;   • CHOLECYSTECTOMY     • ENDOSCOPY      • FRACTURE SURGERY      SKULL FRACTURE   • GALLBLADDER SURGERY     • HERNIA REPAIR     • HYSTERECTOMY     • INTERVENTIONAL RADIOLOGY PROCEDURE Right 03/03/2022    Procedure: Abdominal Aortagram with Runoff;  Surgeon: Marleny Yoon MD;  Location: [facility] INVASIVE LOCATION;  Service: Peripheral Vascular;  Laterality: Right;   • JOINT REPLACEMENT     • OTHER SURGICAL HISTORY      ARTIFICIAL JOINTS/LIMBS   • OTHER SURGICAL HISTORY      FACE SURGERY, UNSPECIFIED   • TOTAL HIP ARTHROPLASTY Right    • TOTAL KNEE ARTHROPLASTY Left          Physical Assessment:  Wound 02/04/23 0100 Bilateral coccyx Pressure Injury (Active)   Wound Image   03/08/23 1325   Dressing Appearance intact;dry 03/08/23 1325   Closure None 03/08/23 1325   Base blanchable;dry;red;scab 03/08/23 1325   Periwound intact;blanchable;dry 03/08/23 1325   Periwound Temperature warm 03/08/23 1325   Periwound Skin Turgor soft 03/08/23 1325   Edges rolled/closed 03/08/23 1325   Drainage Amount none 03/08/23 1325   Care, Wound cleansed with;sterile normal saline 03/08/23 1325   Dressing Care dressing applied;silicone;border dressing;skin barrier agent applied 03/08/23 1325   Periwound Care absorptive dressing applied;barrier ointment applied 03/08/23 1325       Wound 03/08/23 1537 Right anterior foot Soft Tissue Necrosis (Active)   Wound Image    03/08/23 1325   Dressing Appearance open to air 03/08/23 1325   Closure None 03/08/23 1325   Base black eschar;bleeding;red;purple;moist;slough;yellow;gray 03/08/23 1325   Periwound moist;edematous;blistered;swelling 03/08/23 1325   Periwound Temperature cool 03/08/23 1325   Periwound Skin Turgor firm 03/08/23 1325   Edges open;rolled/closed 03/08/23 1325   Drainage Characteristics/Odor bleeding controlled;serosanguineous;yellow 03/08/23 1325   Drainage Amount small 03/08/23 1325   Care, Wound cleansed with;irrigated with;sterile normal saline 03/08/23 1325   Dressing Care dressing  applied;non-adherent;petroleum-based;gauze;abdominal pad;gauze, dry;tubular wrap 03/08/23 1325   Periwound Care absorptive dressing applied 03/08/23 1325        Wound Check / Follow-up:  Patient seen today for a wound consult. Patient with soft tissue necrosis present to the right foot. Anterior aspect of foot is cool no palpable pulse noted. Tissue is purple with areas of blistering noted. Missing second and fifth digits; open ulceration noted to amputation sites with thick yellow tissue. Right great toe with black stable eschar as well as blistering noted. Plantar aspect of the right foot with tissue that has been de-roofed. Red moist wound base present at the the midfoot; hindfoot with purple intact tissue. Cleansed entire foot with NS. Covered all tissue with non-adherent petroleum gauze and secured with an ABD pad and gauze roll. Recommend daily dressing changes with non-adherent petroleum gauze to foot. Keep tissue protected.  Bilateral gluteal aspects with resolving injuries noted. Dry flaking peeling skin noted with pink epithelium to surrounding periwound. Tissue is blanchable and dry. Recommend TID/PRN application of blue top moisture barrier and leave open to air. Implement Q2H turns with patient and offload heels at all times.     Impression: soft tissue necrosis, superficial tissue loss to buttocks    Short term goals:  Regain skin integrity, pressure reduction, skin protection, daily dressing changes, topical treatment    Nikki Perla RN    3/8/2023    15:40 EST

## 2023-03-09 LAB
ANION GAP SERPL CALCULATED.3IONS-SCNC: 9.7 MMOL/L (ref 5–15)
BASOPHILS # BLD AUTO: 0.03 10*3/MM3 (ref 0–0.2)
BASOPHILS NFR BLD AUTO: 0.2 % (ref 0–1.5)
BUN SERPL-MCNC: 13 MG/DL (ref 8–23)
BUN/CREAT SERPL: 17.6 (ref 7–25)
CALCIUM SPEC-SCNC: 7.5 MG/DL (ref 8.6–10.5)
CHLORIDE SERPL-SCNC: 106 MMOL/L (ref 98–107)
CO2 SERPL-SCNC: 21.3 MMOL/L (ref 22–29)
CREAT SERPL-MCNC: 0.74 MG/DL (ref 0.57–1)
DEPRECATED RDW RBC AUTO: 53 FL (ref 37–54)
EGFRCR SERPLBLD CKD-EPI 2021: 91 ML/MIN/1.73
EOSINOPHIL # BLD AUTO: 0.06 10*3/MM3 (ref 0–0.4)
EOSINOPHIL NFR BLD AUTO: 0.5 % (ref 0.3–6.2)
ERYTHROCYTE [DISTWIDTH] IN BLOOD BY AUTOMATED COUNT: 16.7 % (ref 12.3–15.4)
GLUCOSE BLDC GLUCOMTR-MCNC: 135 MG/DL (ref 70–99)
GLUCOSE BLDC GLUCOMTR-MCNC: 168 MG/DL (ref 70–99)
GLUCOSE BLDC GLUCOMTR-MCNC: 258 MG/DL (ref 70–99)
GLUCOSE BLDC GLUCOMTR-MCNC: 268 MG/DL (ref 70–99)
GLUCOSE SERPL-MCNC: 159 MG/DL (ref 65–99)
HCT VFR BLD AUTO: 35 % (ref 34–46.6)
HGB BLD-MCNC: 11.1 G/DL (ref 12–15.9)
IMM GRANULOCYTES # BLD AUTO: 0.08 10*3/MM3 (ref 0–0.05)
IMM GRANULOCYTES NFR BLD AUTO: 0.6 % (ref 0–0.5)
LYMPHOCYTES # BLD AUTO: 1.19 10*3/MM3 (ref 0.7–3.1)
LYMPHOCYTES NFR BLD AUTO: 9.6 % (ref 19.6–45.3)
MCH RBC QN AUTO: 27.5 PG (ref 26.6–33)
MCHC RBC AUTO-ENTMCNC: 31.7 G/DL (ref 31.5–35.7)
MCV RBC AUTO: 86.6 FL (ref 79–97)
MONOCYTES # BLD AUTO: 0.54 10*3/MM3 (ref 0.1–0.9)
MONOCYTES NFR BLD AUTO: 4.4 % (ref 5–12)
NEUTROPHILS NFR BLD AUTO: 10.44 10*3/MM3 (ref 1.7–7)
NEUTROPHILS NFR BLD AUTO: 84.7 % (ref 42.7–76)
NRBC BLD AUTO-RTO: 0 /100 WBC (ref 0–0.2)
PLATELET # BLD AUTO: 149 10*3/MM3 (ref 140–450)
PMV BLD AUTO: 11.2 FL (ref 6–12)
POTASSIUM SERPL-SCNC: 4.3 MMOL/L (ref 3.5–5.2)
RBC # BLD AUTO: 4.04 10*6/MM3 (ref 3.77–5.28)
SODIUM SERPL-SCNC: 137 MMOL/L (ref 136–145)
WBC NRBC COR # BLD: 12.34 10*3/MM3 (ref 3.4–10.8)

## 2023-03-09 PROCEDURE — 94799 UNLISTED PULMONARY SVC/PX: CPT

## 2023-03-09 PROCEDURE — 63710000001 PREDNISONE PER 1 MG: Performed by: INTERNAL MEDICINE

## 2023-03-09 PROCEDURE — 85025 COMPLETE CBC W/AUTO DIFF WBC: CPT | Performed by: SURGERY

## 2023-03-09 PROCEDURE — 25010000002 FUROSEMIDE PER 20 MG: Performed by: INTERNAL MEDICINE

## 2023-03-09 PROCEDURE — 82962 GLUCOSE BLOOD TEST: CPT

## 2023-03-09 PROCEDURE — 99231 SBSQ HOSP IP/OBS SF/LOW 25: CPT | Performed by: SURGERY

## 2023-03-09 PROCEDURE — 25010000002 HYDROMORPHONE 1 MG/ML SOLUTION: Performed by: SURGERY

## 2023-03-09 PROCEDURE — 94664 DEMO&/EVAL PT USE INHALER: CPT

## 2023-03-09 PROCEDURE — 25010000002 PIPERACILLIN SOD-TAZOBACTAM PER 1 G: Performed by: SURGERY

## 2023-03-09 PROCEDURE — 80048 BASIC METABOLIC PNL TOTAL CA: CPT | Performed by: SURGERY

## 2023-03-09 PROCEDURE — 63710000001 INSULIN REGULAR HUMAN PER 5 UNITS: Performed by: SURGERY

## 2023-03-09 PROCEDURE — 94760 N-INVAS EAR/PLS OXIMETRY 1: CPT

## 2023-03-09 RX ORDER — FUROSEMIDE 10 MG/ML
20 INJECTION INTRAMUSCULAR; INTRAVENOUS DAILY
Status: DISCONTINUED | OUTPATIENT
Start: 2023-03-09 | End: 2023-03-15 | Stop reason: HOSPADM

## 2023-03-09 RX ORDER — PREDNISONE 20 MG/1
20 TABLET ORAL
Status: DISCONTINUED | OUTPATIENT
Start: 2023-03-09 | End: 2023-03-14

## 2023-03-09 RX ORDER — ZOLPIDEM TARTRATE 5 MG/1
5 TABLET ORAL NIGHTLY PRN
Status: DISCONTINUED | OUTPATIENT
Start: 2023-03-09 | End: 2023-03-15 | Stop reason: HOSPADM

## 2023-03-09 RX ORDER — FUROSEMIDE 10 MG/ML
20 INJECTION INTRAMUSCULAR; INTRAVENOUS ONCE
Status: COMPLETED | OUTPATIENT
Start: 2023-03-09 | End: 2023-03-09

## 2023-03-09 RX ADMIN — INSULIN HUMAN 6 UNITS: 100 INJECTION, SOLUTION PARENTERAL at 17:44

## 2023-03-09 RX ADMIN — TAZOBACTAM SODIUM AND PIPERACILLIN SODIUM 3.38 G: 375; 3 INJECTION, SOLUTION INTRAVENOUS at 12:09

## 2023-03-09 RX ADMIN — IPRATROPIUM BROMIDE AND ALBUTEROL SULFATE 3 ML: .5; 3 SOLUTION RESPIRATORY (INHALATION) at 19:21

## 2023-03-09 RX ADMIN — OXYCODONE HYDROCHLORIDE AND ACETAMINOPHEN 1 TABLET: 10; 325 TABLET ORAL at 21:32

## 2023-03-09 RX ADMIN — HYDROCHLOROTHIAZIDE: 25 TABLET ORAL at 21:32

## 2023-03-09 RX ADMIN — LISINOPRIL 40 MG: 20 TABLET ORAL at 08:54

## 2023-03-09 RX ADMIN — HYDROCHLOROTHIAZIDE: 25 TABLET ORAL at 09:15

## 2023-03-09 RX ADMIN — HYDROMORPHONE HYDROCHLORIDE 0.5 MG: 1 INJECTION, SOLUTION INTRAMUSCULAR; INTRAVENOUS; SUBCUTANEOUS at 17:43

## 2023-03-09 RX ADMIN — PANTOPRAZOLE SODIUM 40 MG: 40 TABLET, DELAYED RELEASE ORAL at 08:54

## 2023-03-09 RX ADMIN — INSULIN HUMAN 2 UNITS: 100 INJECTION, SOLUTION PARENTERAL at 05:10

## 2023-03-09 RX ADMIN — INSULIN HUMAN 6 UNITS: 100 INJECTION, SOLUTION PARENTERAL at 23:11

## 2023-03-09 RX ADMIN — SERTRALINE 100 MG: 100 TABLET, FILM COATED ORAL at 08:53

## 2023-03-09 RX ADMIN — TAZOBACTAM SODIUM AND PIPERACILLIN SODIUM 3.38 G: 375; 3 INJECTION, SOLUTION INTRAVENOUS at 19:39

## 2023-03-09 RX ADMIN — OXYCODONE HYDROCHLORIDE AND ACETAMINOPHEN 1 TABLET: 10; 325 TABLET ORAL at 07:11

## 2023-03-09 RX ADMIN — HYDROMORPHONE HYDROCHLORIDE 0.5 MG: 1 INJECTION, SOLUTION INTRAMUSCULAR; INTRAVENOUS; SUBCUTANEOUS at 19:39

## 2023-03-09 RX ADMIN — FUROSEMIDE 20 MG: 10 INJECTION, SOLUTION INTRAMUSCULAR; INTRAVENOUS at 10:21

## 2023-03-09 RX ADMIN — TAZOBACTAM SODIUM AND PIPERACILLIN SODIUM 3.38 G: 375; 3 INJECTION, SOLUTION INTRAVENOUS at 03:36

## 2023-03-09 RX ADMIN — HYDROMORPHONE HYDROCHLORIDE 0.5 MG: 1 INJECTION, SOLUTION INTRAMUSCULAR; INTRAVENOUS; SUBCUTANEOUS at 04:12

## 2023-03-09 RX ADMIN — IPRATROPIUM BROMIDE AND ALBUTEROL SULFATE 3 ML: .5; 3 SOLUTION RESPIRATORY (INHALATION) at 22:54

## 2023-03-09 RX ADMIN — HYDROMORPHONE HYDROCHLORIDE 0.5 MG: 1 INJECTION, SOLUTION INTRAMUSCULAR; INTRAVENOUS; SUBCUTANEOUS at 12:22

## 2023-03-09 RX ADMIN — SENNOSIDES AND DOCUSATE SODIUM 2 TABLET: 8.6; 5 TABLET ORAL at 20:06

## 2023-03-09 RX ADMIN — IPRATROPIUM BROMIDE AND ALBUTEROL SULFATE 3 ML: .5; 3 SOLUTION RESPIRATORY (INHALATION) at 11:40

## 2023-03-09 RX ADMIN — IPRATROPIUM BROMIDE AND ALBUTEROL SULFATE 3 ML: .5; 3 SOLUTION RESPIRATORY (INHALATION) at 16:17

## 2023-03-09 RX ADMIN — HYDROMORPHONE HYDROCHLORIDE 0.5 MG: 1 INJECTION, SOLUTION INTRAMUSCULAR; INTRAVENOUS; SUBCUTANEOUS at 22:31

## 2023-03-09 RX ADMIN — IPRATROPIUM BROMIDE AND ALBUTEROL SULFATE 3 ML: .5; 3 SOLUTION RESPIRATORY (INHALATION) at 02:40

## 2023-03-09 RX ADMIN — OXYCODONE HYDROCHLORIDE AND ACETAMINOPHEN 1 TABLET: 10; 325 TABLET ORAL at 15:36

## 2023-03-09 RX ADMIN — IPRATROPIUM BROMIDE AND ALBUTEROL SULFATE 3 ML: .5; 3 SOLUTION RESPIRATORY (INHALATION) at 07:42

## 2023-03-09 RX ADMIN — PREDNISONE 20 MG: 20 TABLET ORAL at 10:22

## 2023-03-09 RX ADMIN — OXYCODONE HYDROCHLORIDE AND ACETAMINOPHEN 1 TABLET: 10; 325 TABLET ORAL at 00:35

## 2023-03-09 RX ADMIN — HYDROMORPHONE HYDROCHLORIDE 0.5 MG: 1 INJECTION, SOLUTION INTRAMUSCULAR; INTRAVENOUS; SUBCUTANEOUS at 08:55

## 2023-03-09 RX ADMIN — FUROSEMIDE 20 MG: 10 INJECTION, SOLUTION INTRAMUSCULAR; INTRAVENOUS at 00:32

## 2023-03-09 RX ADMIN — AMLODIPINE BESYLATE 5 MG: 5 TABLET ORAL at 08:54

## 2023-03-09 NOTE — PROGRESS NOTES
[facility]     Progress Note    Patient Name: Italia Olvera  : 1960  MRN: [id number]  Primary Care Physician:  Carloz Shoemaker MD  Date of admission: 3/7/2023    Subjective   Subjective     Here for follow-up for ischemic right foot.    No new complaints.    Objective   Objective     Vitals:   Temp:  [97.3 °F (36.3 °C)-97.9 °F (36.6 °C)] 97.5 °F (36.4 °C)  Heart Rate:  [64-82] 69  Resp:  [16-20] 20  BP: (122-144)/(46-81) 122/81  Flow (L/min):  [3] 3    Physical Exam   General: Alert, no acute distress  Left groin: No bleeding, no hematoma.  Right foot: Ischemic.        Assessment & Plan   Assessment / Plan     Assessment/Plan:    Discussed the angiographic findings with the patient.  No surgical or endovascular options exist for vascular reconstruction.  Recommend right above-knee amputation.  At this time she needs time to process it and to discuss it with her .  We will schedule if/when patient agreeable.    Active Hospital Problems:  Active Hospital Problems    Diagnosis    • **Ischemic foot    • Atherosclerosis of native artery of extremity (HCC)    • Chronic respiratory failure with hypoxia ([facility])    • T2DM (type 2 diabetes mellitus) (CMS/HCC)- uncontrolled    • Bipolar disorder ([facility])            Electronically signed by Gabino Russell MD, 23, 1:31 PM EST.

## 2023-03-09 NOTE — PROGRESS NOTES
[facility]     Progress Note    Patient Name: Italia Olvera  : 1960  MRN: [id number]  Primary Care Physician:  Carloz Shoemaker MD  Date of admission: 3/7/2023      Subjective   Brief summary.  Patient admitted with ischemic foot and leg      HPI:  More awake and alert.  Sitting at bedside.  Some wheezing and coughing.  Patient requesting sleeping pills.  Less pain in the foot    Review of Systems     No chest pain, no shortness of breath, no pain in the foot, swelling and redness persist, bluish discoloration slightly better      Objective     Vitals:   Temp:  [97.3 °F (36.3 °C)-98.6 °F (37 °C)] 98.6 °F (37 °C)  Heart Rate:  [64-82] 74  Resp:  [16-20] 18  BP: (122-144)/(53-81) 129/70  Flow (L/min):  [3] 3    Physical Exam :       Elderly female not in acute distress.  Neck supple,  Heart regular.  Bilateral occasional wheezing  Abdomen obese and soft nontender  Right lower extremity edema and foot swelling slightly better still discolored, cyanotic and dark blue toes     Result Review:  I have personally reviewed the results from the time of this admission to 3/9/2023 17:29 EST and agree with these findings:  []  Laboratory  []  Microbiology  []  Radiology  []  EKG/Telemetry   []  Cardiology/Vascular   []  Pathology  []  Old records  []  Other:    Reviewed      Assessment / Plan       Active Hospital Problems:  Active Hospital Problems    Diagnosis    • **Ischemic foot    • Atherosclerosis of native artery of extremity ([facility])    • Chronic respiratory failure with hypoxia ([facility])    • T2DM (type 2 diabetes mellitus) (CMS/[facility])- uncontrolled    • Bipolar disorder ([facility])        Plan:   Patient with ischemic foot and clog arteries.  Discussed with vascular surgeon needs surgical intervention and amputation.  Patient reluctant.  Wants to wait and discuss with family and decide.  Continue supportive care.  Discussed with patient that she may get worse waiting and infection can set in and cause more  problems.  Patient understands.  Ambien for sleep       DVT prophylaxis:  No DVT prophylaxis order currently exists.    CODE STATUS:   Code Status (Patient has no pulse and is not breathing): CPR (Attempt to Resuscitate)  Medical Interventions (Patient has pulse or is breathing): Full Support            Electronically signed by Rudy Garnett MD, 03/09/23, 5:29 PM EST.

## 2023-03-09 NOTE — PLAN OF CARE
Goal Outcome Evaluation:           Progress: no change   Patient A&O x4. VSS. Patient had to be straight cathed once this shift. Patient complained of pain this shift, see MAR. Wound care complete per order. No new orders at this time.

## 2023-03-09 NOTE — PLAN OF CARE
Problem: Adult Inpatient Plan of Care  Goal: Plan of Care Review  Outcome: Ongoing, Progressing  Goal: Patient-Specific Goal (Individualized)  Outcome: Ongoing, Progressing  Goal: Absence of Hospital-Acquired Illness or Injury  Outcome: Ongoing, Progressing  Intervention: Identify and Manage Fall Risk  Recent Flowsheet Documentation  Taken 3/9/2023 0300 by Lisa Bone RN  Safety Promotion/Fall Prevention: safety round/check completed  Taken 3/9/2023 0240 by Lisa Bone RN  Safety Promotion/Fall Prevention: safety round/check completed  Taken 3/9/2023 0200 by Lisa Bone RN  Safety Promotion/Fall Prevention: safety round/check completed  Taken 3/9/2023 0100 by Lisa Bone RN  Safety Promotion/Fall Prevention: safety round/check completed  Taken 3/9/2023 0000 by Lisa Bone RN  Safety Promotion/Fall Prevention: safety round/check completed  Taken 3/8/2023 2300 by Lisa Bone RN  Safety Promotion/Fall Prevention: safety round/check completed  Taken 3/8/2023 2200 by Lisa Bone RN  Safety Promotion/Fall Prevention: safety round/check completed  Taken 3/8/2023 2000 by Lisa Bone RN  Safety Promotion/Fall Prevention: activity supervised  Intervention: Prevent Skin Injury  Recent Flowsheet Documentation  Taken 3/8/2023 2000 by Lisa Bone RN  Body Position: weight shifting  Skin Protection: adhesive use limited  Intervention: Prevent and Manage VTE (Venous Thromboembolism) Risk  Recent Flowsheet Documentation  Taken 3/8/2023 2000 by Lisa Bone RN  Activity Management: activity adjusted per tolerance  VTE Prevention/Management: (see mar)   patient refused intervention   other (see comments)  Range of Motion: active ROM (range of motion) encouraged  Intervention: Prevent Infection  Recent Flowsheet Documentation  Taken 3/8/2023 2000 by Lisa Bone RN  Infection Prevention:   hand hygiene promoted   personal protective equipment utilized   rest/sleep promoted   single patient room provided   visitors  restricted/screened  Goal: Optimal Comfort and Wellbeing  Outcome: Ongoing, Progressing  Intervention: Monitor Pain and Promote Comfort  Recent Flowsheet Documentation  Taken 3/8/2023 2000 by Lisa Bone RN  Pain Management Interventions:   see MAR   quiet environment facilitated   position adjusted   pillow support provided  Intervention: Provide Person-Centered Care  Recent Flowsheet Documentation  Taken 3/8/2023 2000 by Lisa Bone RN  Trust Relationship/Rapport:   care explained   choices provided   emotional support provided   empathic listening provided   questions answered   questions encouraged   reassurance provided   thoughts/feelings acknowledged  Goal: Readiness for Transition of Care  Outcome: Ongoing, Progressing     Problem: Skin Injury Risk Increased  Goal: Skin Health and Integrity  Outcome: Ongoing, Progressing  Intervention: Optimize Skin Protection  Recent Flowsheet Documentation  Taken 3/8/2023 2000 by Lisa Bone RN  Pressure Reduction Techniques:   frequent weight shift encouraged   weight shift assistance provided   sit time limited to 2 hours  Head of Bed (HOB) Positioning: HOB elevated  Pressure Reduction Devices: positioning supports utilized  Skin Protection: adhesive use limited     Problem: Hypertension Comorbidity  Goal: Blood Pressure in Desired Range  Outcome: Ongoing, Progressing  Intervention: Maintain Blood Pressure Management  Recent Flowsheet Documentation  Taken 3/8/2023 2000 by Lisa Bone RN  Medication Review/Management: medications reviewed   Goal Outcome Evaluation:         No changes at this time

## 2023-03-10 ENCOUNTER — ANESTHESIA EVENT (OUTPATIENT)
Dept: PERIOP | Facility: HOSPITAL | Age: 63
DRG: 240 | End: 2023-03-10
Payer: MEDICARE

## 2023-03-10 LAB
ANION GAP SERPL CALCULATED.3IONS-SCNC: 10.7 MMOL/L (ref 5–15)
BUN SERPL-MCNC: 18 MG/DL (ref 8–23)
BUN/CREAT SERPL: 26.5 (ref 7–25)
CALCIUM SPEC-SCNC: 7.9 MG/DL (ref 8.6–10.5)
CHLORIDE SERPL-SCNC: 107 MMOL/L (ref 98–107)
CO2 SERPL-SCNC: 23.3 MMOL/L (ref 22–29)
CREAT SERPL-MCNC: 0.68 MG/DL (ref 0.57–1)
EGFRCR SERPLBLD CKD-EPI 2021: 98 ML/MIN/1.73
GLUCOSE BLDC GLUCOMTR-MCNC: 143 MG/DL (ref 70–99)
GLUCOSE BLDC GLUCOMTR-MCNC: 186 MG/DL (ref 70–99)
GLUCOSE BLDC GLUCOMTR-MCNC: 224 MG/DL (ref 70–99)
GLUCOSE BLDC GLUCOMTR-MCNC: 258 MG/DL (ref 70–99)
GLUCOSE SERPL-MCNC: 183 MG/DL (ref 65–99)
POTASSIUM SERPL-SCNC: 4.3 MMOL/L (ref 3.5–5.2)
SODIUM SERPL-SCNC: 141 MMOL/L (ref 136–145)
WHOLE BLOOD HOLD SPECIMEN: NORMAL

## 2023-03-10 PROCEDURE — 25010000002 PIPERACILLIN SOD-TAZOBACTAM PER 1 G: Performed by: SURGERY

## 2023-03-10 PROCEDURE — 94799 UNLISTED PULMONARY SVC/PX: CPT

## 2023-03-10 PROCEDURE — 25010000002 HYDROMORPHONE 1 MG/ML SOLUTION: Performed by: SURGERY

## 2023-03-10 PROCEDURE — 92610 EVALUATE SWALLOWING FUNCTION: CPT

## 2023-03-10 PROCEDURE — 63710000001 INSULIN REGULAR HUMAN PER 5 UNITS: Performed by: SURGERY

## 2023-03-10 PROCEDURE — 80048 BASIC METABOLIC PNL TOTAL CA: CPT | Performed by: INTERNAL MEDICINE

## 2023-03-10 PROCEDURE — 94664 DEMO&/EVAL PT USE INHALER: CPT

## 2023-03-10 PROCEDURE — 25010000002 FUROSEMIDE PER 20 MG: Performed by: INTERNAL MEDICINE

## 2023-03-10 PROCEDURE — 99231 SBSQ HOSP IP/OBS SF/LOW 25: CPT | Performed by: SURGERY

## 2023-03-10 PROCEDURE — 63710000001 PREDNISONE PER 1 MG: Performed by: INTERNAL MEDICINE

## 2023-03-10 PROCEDURE — 82962 GLUCOSE BLOOD TEST: CPT

## 2023-03-10 RX ORDER — SODIUM CHLORIDE 9 MG/ML
50 INJECTION, SOLUTION INTRAVENOUS CONTINUOUS
Status: DISCONTINUED | OUTPATIENT
Start: 2023-03-11 | End: 2023-03-14

## 2023-03-10 RX ORDER — ALPRAZOLAM 0.25 MG/1
0.25 TABLET ORAL 3 TIMES DAILY PRN
Status: DISCONTINUED | OUTPATIENT
Start: 2023-03-10 | End: 2023-03-15 | Stop reason: HOSPADM

## 2023-03-10 RX ADMIN — LISINOPRIL 40 MG: 20 TABLET ORAL at 07:26

## 2023-03-10 RX ADMIN — IPRATROPIUM BROMIDE AND ALBUTEROL SULFATE 3 ML: .5; 3 SOLUTION RESPIRATORY (INHALATION) at 11:50

## 2023-03-10 RX ADMIN — HYDROMORPHONE HYDROCHLORIDE 0.5 MG: 1 INJECTION, SOLUTION INTRAMUSCULAR; INTRAVENOUS; SUBCUTANEOUS at 03:08

## 2023-03-10 RX ADMIN — SENNOSIDES AND DOCUSATE SODIUM 2 TABLET: 8.6; 5 TABLET ORAL at 20:11

## 2023-03-10 RX ADMIN — OXYCODONE HYDROCHLORIDE AND ACETAMINOPHEN 1 TABLET: 10; 325 TABLET ORAL at 14:16

## 2023-03-10 RX ADMIN — HYDROMORPHONE HYDROCHLORIDE 0.5 MG: 1 INJECTION, SOLUTION INTRAMUSCULAR; INTRAVENOUS; SUBCUTANEOUS at 15:18

## 2023-03-10 RX ADMIN — IPRATROPIUM BROMIDE AND ALBUTEROL SULFATE 3 ML: .5; 3 SOLUTION RESPIRATORY (INHALATION) at 07:59

## 2023-03-10 RX ADMIN — HYDROMORPHONE HYDROCHLORIDE 0.5 MG: 1 INJECTION, SOLUTION INTRAMUSCULAR; INTRAVENOUS; SUBCUTANEOUS at 05:25

## 2023-03-10 RX ADMIN — HYDROMORPHONE HYDROCHLORIDE 0.5 MG: 1 INJECTION, SOLUTION INTRAMUSCULAR; INTRAVENOUS; SUBCUTANEOUS at 00:32

## 2023-03-10 RX ADMIN — INSULIN HUMAN 2 UNITS: 100 INJECTION, SOLUTION PARENTERAL at 05:25

## 2023-03-10 RX ADMIN — IPRATROPIUM BROMIDE AND ALBUTEROL SULFATE 3 ML: .5; 3 SOLUTION RESPIRATORY (INHALATION) at 19:20

## 2023-03-10 RX ADMIN — SERTRALINE 100 MG: 100 TABLET, FILM COATED ORAL at 07:24

## 2023-03-10 RX ADMIN — HYDROCHLOROTHIAZIDE: 25 TABLET ORAL at 07:25

## 2023-03-10 RX ADMIN — PANTOPRAZOLE SODIUM 40 MG: 40 TABLET, DELAYED RELEASE ORAL at 07:27

## 2023-03-10 RX ADMIN — FUROSEMIDE 20 MG: 10 INJECTION, SOLUTION INTRAMUSCULAR; INTRAVENOUS at 07:27

## 2023-03-10 RX ADMIN — SODIUM CHLORIDE 50 ML/HR: 9 INJECTION, SOLUTION INTRAVENOUS at 23:53

## 2023-03-10 RX ADMIN — INSULIN HUMAN 4 UNITS: 100 INJECTION, SOLUTION PARENTERAL at 23:19

## 2023-03-10 RX ADMIN — AMLODIPINE BESYLATE 5 MG: 5 TABLET ORAL at 07:24

## 2023-03-10 RX ADMIN — PREDNISONE 20 MG: 20 TABLET ORAL at 07:24

## 2023-03-10 RX ADMIN — HYDROMORPHONE HYDROCHLORIDE 0.5 MG: 1 INJECTION, SOLUTION INTRAMUSCULAR; INTRAVENOUS; SUBCUTANEOUS at 09:23

## 2023-03-10 RX ADMIN — OXYCODONE HYDROCHLORIDE AND ACETAMINOPHEN 1 TABLET: 10; 325 TABLET ORAL at 07:23

## 2023-03-10 RX ADMIN — INSULIN HUMAN 6 UNITS: 100 INJECTION, SOLUTION PARENTERAL at 17:11

## 2023-03-10 RX ADMIN — TAZOBACTAM SODIUM AND PIPERACILLIN SODIUM 3.38 G: 375; 3 INJECTION, SOLUTION INTRAVENOUS at 11:28

## 2023-03-10 RX ADMIN — HYDROMORPHONE HYDROCHLORIDE 0.5 MG: 1 INJECTION, SOLUTION INTRAMUSCULAR; INTRAVENOUS; SUBCUTANEOUS at 19:56

## 2023-03-10 RX ADMIN — HYDROMORPHONE HYDROCHLORIDE 0.5 MG: 1 INJECTION, SOLUTION INTRAMUSCULAR; INTRAVENOUS; SUBCUTANEOUS at 11:34

## 2023-03-10 RX ADMIN — ALPRAZOLAM 0.25 MG: 0.25 TABLET ORAL at 22:19

## 2023-03-10 RX ADMIN — TAZOBACTAM SODIUM AND PIPERACILLIN SODIUM 3.38 G: 375; 3 INJECTION, SOLUTION INTRAVENOUS at 19:56

## 2023-03-10 RX ADMIN — TAZOBACTAM SODIUM AND PIPERACILLIN SODIUM 3.38 G: 375; 3 INJECTION, SOLUTION INTRAVENOUS at 03:08

## 2023-03-10 RX ADMIN — HYDROMORPHONE HYDROCHLORIDE 0.5 MG: 1 INJECTION, SOLUTION INTRAMUSCULAR; INTRAVENOUS; SUBCUTANEOUS at 17:34

## 2023-03-10 NOTE — PLAN OF CARE
Goal Outcome Evaluation:  Plan of Care Reviewed With: patient      ASSESSMENT/ PLAN OF CARE:  Pt presents with limitations, noted below, that impede her ability to swallow safely and maintain nutrition. The skills of a therapist will be required to safely and effectively implement the following treatment plan to restore maximal level of function.    PROBLEMS:  1. risk of aspiration                                             TREATMENT: Dysphagia therapy to address swallow function through exercises and education of strategies.     FREQUENCY/DURATION: Once daily 5 times per week    REHAB POTENTIAL:  Pt has fair to good rehab potential.  The following limitations may influence improvement/ length of tx: Medical status.    RECOMMENDATIONS:   1.   DIET: Diet with regular solids cut small additional sauces and gravies, thin liquid.    2.  POSITION: Positioning fully upright for all p.o. intake and 30 minutes following.    3.  COMPENSATORY STRATEGIES: Single sips of liquid.  Alternate small bites and small sips of solids and liquids at a slow rate.  Medications whole in applesauce.

## 2023-03-10 NOTE — PROGRESS NOTES
[facility]     Progress Note    Patient Name: Italia Olvera  : 1960  MRN: [id number]  Primary Care Physician:  Carloz Shoemaker MD  Date of admission: 3/7/2023      Subjective   Brief summary.  Patient admitted with ischemic foot and leg.  Agreeable for surgery now      HPI:  Patient awake and alert,  at bedside.  Patient also was seen by speech per nursing recommendations for swallow.  Doing better.  No complaints of difficulty swallowing, very anxious    Review of Systems     Complains of foot pain, anxiety, some cough and shortness of breath but better      Objective     Vitals:   Temp:  [97.9 °F (36.6 °C)-98.9 °F (37.2 °C)] 98.5 °F (36.9 °C)  Heart Rate:  [68-81] 81  Resp:  [16-20] 18  BP: (122-161)/(54-74) 151/65  Flow (L/min):  [3] 3    Physical Exam :       Elderly female not in acute distress.  Neck supple,  Heart regular.  Bilateral occasional wheezing  Abdomen obese and soft nontender  Right lower extremity edema and foot swelling slightly better still discolored, cyanotic and dark blue toes     Result Review:  I have personally reviewed the results from the time of this admission to 3/10/2023 16:26 EST and agree with these findings:  []  Laboratory  []  Microbiology  []  Radiology  []  EKG/Telemetry   []  Cardiology/Vascular   []  Pathology  []  Old records  []  Other:    Reviewed      Assessment / Plan       Active Hospital Problems:  Active Hospital Problems    Diagnosis    • **Ischemic foot    • Atherosclerosis of native artery of extremity ([facility])    • Chronic respiratory failure with hypoxia ([facility])    • T2DM (type 2 diabetes mellitus) (CMS/[facility])- uncontrolled    • Bipolar disorder ([facility])        Plan:   Patient with ischemic foot and severe peripheral vascular disease, vascular surgeon recommended amputation, patient agreeable now.    Vascular to follow and decide for surgical time.  At this point Xanax as needed for anxiety.  Continue rest of the meds.    DVT prophylaxis:  No DVT  prophylaxis order currently exists.    CODE STATUS:   Code Status (Patient has no pulse and is not breathing): CPR (Attempt to Resuscitate)  Medical Interventions (Patient has pulse or is breathing): Full Support              Electronically signed by Rudy Garnett MD, 03/10/23, 4:28 PM EST.  .

## 2023-03-10 NOTE — PLAN OF CARE
Problem: Adult Inpatient Plan of Care  Goal: Plan of Care Review  Outcome: Ongoing, Progressing  Goal: Patient-Specific Goal (Individualized)  Outcome: Ongoing, Progressing  Goal: Absence of Hospital-Acquired Illness or Injury  Outcome: Ongoing, Progressing  Intervention: Identify and Manage Fall Risk  Recent Flowsheet Documentation  Taken 3/10/2023 0300 by Lisa Bone RN  Safety Promotion/Fall Prevention: safety round/check completed  Taken 3/10/2023 0200 by Lisa Bone RN  Safety Promotion/Fall Prevention: safety round/check completed  Taken 3/10/2023 0000 by Lisa Bone RN  Safety Promotion/Fall Prevention: safety round/check completed  Taken 3/9/2023 2200 by Lisa Bone RN  Safety Promotion/Fall Prevention: safety round/check completed  Taken 3/9/2023 2000 by Lisa Bone RN  Safety Promotion/Fall Prevention: safety round/check completed  Intervention: Prevent Skin Injury  Recent Flowsheet Documentation  Taken 3/9/2023 2000 by Lisa Bone RN  Body Position: position changed independently  Skin Protection: adhesive use limited  Intervention: Prevent and Manage VTE (Venous Thromboembolism) Risk  Recent Flowsheet Documentation  Taken 3/9/2023 2000 by Lisa Bone RN  Activity Management:   activity adjusted per tolerance   bedrest  VTE Prevention/Management: (See mar) other (see comments)  Range of Motion: active ROM (range of motion) encouraged  Intervention: Prevent Infection  Recent Flowsheet Documentation  Taken 3/9/2023 2000 by Lisa Bone RN  Infection Prevention:   visitors restricted/screened   single patient room provided   rest/sleep promoted   personal protective equipment utilized   hand hygiene promoted   equipment surfaces disinfected  Goal: Optimal Comfort and Wellbeing  Outcome: Ongoing, Progressing  Intervention: Monitor Pain and Promote Comfort  Recent Flowsheet Documentation  Taken 3/9/2023 2000 by Lisa Bone RN  Pain Management Interventions:   see MAR   quiet environment  facilitated   relaxation techniques promoted   position adjusted   pillow support provided  Intervention: Provide Person-Centered Care  Recent Flowsheet Documentation  Taken 3/9/2023 2000 by Lisa Bone RN  Trust Relationship/Rapport:   care explained   choices provided   emotional support provided   empathic listening provided   questions answered   questions encouraged   reassurance provided   thoughts/feelings acknowledged  Goal: Readiness for Transition of Care  Outcome: Ongoing, Progressing     Problem: Skin Injury Risk Increased  Goal: Skin Health and Integrity  Outcome: Ongoing, Progressing  Intervention: Optimize Skin Protection  Recent Flowsheet Documentation  Taken 3/9/2023 2000 by Lisa Bone RN  Pressure Reduction Techniques: frequent weight shift encouraged  Head of Bed (HOB) Positioning: HOB elevated  Pressure Reduction Devices: positioning supports utilized  Skin Protection: adhesive use limited     Problem: Hypertension Comorbidity  Goal: Blood Pressure in Desired Range  Outcome: Ongoing, Progressing  Intervention: Maintain Blood Pressure Management  Recent Flowsheet Documentation  Taken 3/9/2023 2000 by Lisa Bone RN  Medication Review/Management: medications reviewed   Goal Outcome Evaluation:         No change in status

## 2023-03-10 NOTE — PLAN OF CARE
Goal Outcome Evaluation:      Patient alert & oriented x 4, VSS, wound care complete per order, medicated for pain per MAR, informed consent obtained for scheduled surgery for tomorrow.

## 2023-03-10 NOTE — PROGRESS NOTES
[facility]     Progress Note    Patient Name: Italia Olvera  : 1960  MRN: [id number]  Primary Care Physician:  Carloz Shoemaker MD  Date of admission: 3/7/2023    Subjective   Subjective     Here for follow-up for ischemic right foot.    The patient is agreeable to proceed with right above-knee amputation.    Objective   Objective     Vitals:   Temp:  [97.9 °F (36.6 °C)-98.9 °F (37.2 °C)] 98.5 °F (36.9 °C)  Heart Rate:  [68-81] 81  Resp:  [16-20] 18  BP: (122-161)/(54-74) 151/65  Flow (L/min):  [3] 3    Physical Exam   General: Alert, no acute distress  Left groin: No bleeding, no hematoma.  Right foot: Ischemic.        Assessment & Plan   Assessment / Plan     Assessment/Plan:    Mrs. Olvera is agreeable to proceeding to a right AKA.  I have discussed with the patient extensively the mechanics of the procedure, the indications, benefits, risks, alternatives as well as potential complications to include but not limited to infection, bleeding, transfusion, reoperation, death.  She appears to understand and desires to proceed.  Dr. Haywood will be covering and will take care of her.  I have informed Mrs. Olvera of such.      Active Hospital Problems:  Active Hospital Problems    Diagnosis    • **Ischemic foot    • Atherosclerosis of native artery of extremity (HCC)    • Chronic respiratory failure with hypoxia (HCC)    • T2DM (type 2 diabetes mellitus) (CMS/HCC)- uncontrolled    • Bipolar disorder ([facility])            Electronically signed by Gabino Russell MD, 23, 1:31 PM EST.

## 2023-03-10 NOTE — THERAPY EVALUATION
Acute Care - Speech Language Pathology   Swallow Initial Evaluation  [facility]     Patient Name: Italia Olvera  : 1960  MRN: [id number]  Today's Date: 3/10/2023               Admit Date: 3/7/2023    Visit Dx:     ICD-10-CM ICD-9-CM   1. Ischemic foot  I99.8 459.9   2. Osteomyelitis of right foot, unspecified type ([facility])  M86.9 730.27   3. Sepsis, due to unspecified organism, unspecified whether acute organ dysfunction present ([facility])  A41.9 038.9     995.91   4. Foot infection  L08.9 686.9   5. Oropharyngeal dysphagia  R13.12 787.22     Patient Active Problem List   Diagnosis   • Bladder disorder   • Depression   • Hypertension   • Peripheral neuropathy   • Osteoarthritis of knee   • COPD with acute exacerbation ([facility])   • Chronic back pain   • Multifocal pneumonia   • T2DM (type 2 diabetes mellitus) (CMS/[facility])- uncontrolled   • Bipolar disorder ([facility])   • Chronic respiratory failure with hypoxia ([facility])   • Acute on chronic respiratory failure with hypoxia ([facility])   • Chronic respiratory failure with hypoxia, on home oxygen therapy ([facility])   • Overweight (BMI 25.0-29.9)   • Acute on chronic systolic CHF (congestive heart failure) ([facility])   • Essential hypertension   • DM (diabetes mellitus), type 1 ([facility])   • Acute UTI (urinary tract infection)   • Anemia   • Sepsis ([facility])   • COPD with acute exacerbation ([facility])   • Hypokalemia   • Moderate malnutrition (CMS/[facility])   • Decubitus ulcer of back, stage 3 ([facility])   • Acute osteomyelitis of toe of right foot ([facility])   • Anxiety disorder   • Diabetes mellitus with peripheral vascular disease ([facility])   • Decubitus ulcer, unstageable ([facility])   • Atherosclerosis of native artery of extremity ([facility])   • Paroxysmal atrial fibrillation ([facility])   • Intertrigo   • Ischemic foot     Past Medical History:   Diagnosis Date   • Acid reflux    • ACL tear 2015    PCL/ACL TEAR/RUPTURE   • Acute shoulder bursitis, right 2015   • Anxiety    • Arthritis    • Asthma    • Bipolar 1 disorder ([facility])      untreated   • Bladder disorder    • Cancer (HCC)     CERVICAL CANCER   • CHF (congestive heart failure) ([facility])     ASYMPTOMATIC- NO CARDIOLOGIST- SEE'S DR ARRIAZA (PCP)- DENIED CP/SOB   • Chronic back pain    • Chronic pain     CHRONIC BACK, NECK, LEG, FOOT, & FACE PAIN   • COPD (chronic obstructive pulmonary disease) ([facility])     USES INHALERS   • Depression    • Diabetes mellitus ([facility])     BG RUND AROUND 140 IN AM   • DJD (degenerative joint disease)    • Gangrene ([facility])     RT SECOND AND FIFTH TOES   • GERD (gastroesophageal reflux disease)    • HBP (high blood pressure)    • Hip pain 09/15/2015   • Hypertension    • Knee injury 08/19/2015   • Knee pain, right 09/15/2015   • Limb swelling    • Neuropathy    • On home O2     2-3L/NC PRN   • Osteoarthritis, knee 09/01/2015   • Osteoporosis    • Peripheral neuropathy    • Renal failure 1994    NO CURRENT PROBLEMS   • Seasonal allergies    • Shoulder tendonitis 08/23/2015   • Sleep apnea    • Smoker    • SOB (shortness of breath)     NONE CURENTLY   • Tendinitis of right rotator cuff 08/23/2015     Past Surgical History:   Procedure Laterality Date   • AMPUTATION DIGIT Right 12/6/2022    Procedure: Amputation of right second and fifth toes;  Surgeon: Gabino Russell MD;  Location: [facility] MAIN OR;  Service: Vascular;  Laterality: Right;   • BACK SURGERY     • BLADDER REPAIR     • BRONCHOSCOPY N/A 07/10/2021    Procedure: BRONCHOSCOPY WITH BRONCHOALVEOLAR LAVAGE, POSSIBLE BIOPSY, BRUSHING, WASHING, AIRWAY INSPECTION;  Surgeon: Rodrigo Reyes MD;  Location: [facility] MAIN OR;  Service: Pulmonary;  Laterality: N/A;   • BRONCHOSCOPY N/A 07/10/2021    Procedure: BRONCHOSCOPY;  Surgeon: Rodrigo Reyes MD;  Location: [facility] ENDOSCOPY;  Service: Pulmonary;  Laterality: N/A;   • CARDIAC CATHETERIZATION Right 11/03/2022    Procedure: Aortogram with right leg angiogram, possible angioplasty or stenting ;  Surgeon: Gabino Russell MD;  Location: [facility] CATH INVASIVE LOCATION;   Service: Vascular;  Laterality: Right;   • CARDIAC CATHETERIZATION Right 3/8/2023    Procedure: Right leg angiogram, possible angioplasty or stenting;  Surgeon: Gabino Russell MD;  Location: [facility] INVASIVE LOCATION;  Service: Vascular;  Laterality: Right;   • CHOLECYSTECTOMY     • ENDOSCOPY     • FRACTURE SURGERY      SKULL FRACTURE   • GALLBLADDER SURGERY     • HERNIA REPAIR     • HYSTERECTOMY     • INTERVENTIONAL RADIOLOGY PROCEDURE Right 03/03/2022    Procedure: Abdominal Aortagram with Runoff;  Surgeon: Marleny Yoon MD;  Location: [facility] CATH INVASIVE LOCATION;  Service: Peripheral Vascular;  Laterality: Right;   • JOINT REPLACEMENT     • OTHER SURGICAL HISTORY      ARTIFICIAL JOINTS/LIMBS   • OTHER SURGICAL HISTORY      FACE SURGERY, UNSPECIFIED   • TOTAL HIP ARTHROPLASTY Right    • TOTAL KNEE ARTHROPLASTY Left          Inpatient Speech Pathology Dysphagia Evaluation        PAIN SCALE: None indicated.    PRECAUTIONS/CONTRAINDICATIONS: Standard    SUSPECTED ABUSE/NEGLECT/EXPLOITATION: None indicated.    SOCIAL/PSYCHOLOGICAL NEEDS/BARRIERS: None indicated.    PAST SOCIAL HISTORY: 62-year-old female lives at home with her spouse    PRIOR FUNCTION: Independent    PATIENT GOALS/EXPECTATIONS: Continue eating regular diet    HISTORY: 62-year-old female the above diagnosis referred for speech therapy evaluation to assess for swallowing.  Nurse reports patient getting choked especially with pills.  Patient admits having difficulty swallowing pills with them not going down when taken with water.  Patient denies difficulty chewing and swallowing.  Patient states complaint of dry mouth.    CURRENT DIET LEVEL: Regular carb consistent, thin liquid    OBJECTIVE:    TEST ADMINISTERED: Clinical dysphagia evaluation    COGNITION/SAFETY AWARENESS: Patient followed basic directions and answer questions without difficulty    BEHAVIORAL OBSERVATIONS: Alert and cooperative    ORAL MOTOR EXAM: Grossly within limits,  patient is edentulous.    VOICE QUALITY: Adequate    REFLEX EXAM: Patient demonstrating cough    POSTURE: Sitting upright in bed    FEEDING/SWALLOWING FUNCTION: Assessed with nectar liquid, thin liquids, puréed solids, crunchy solid.    CLINICAL OBSERVATIONS: Nectar liquid  by cup appeared timely with vocal quality remaining clear to cervical auscultation.  Thin liquid by cup and by straw appeared timely with vocal quality remaining clear to cervical auscultation.  Patient noted with cough x1/5 trials.  Purée solid with swallow completed with laryngeal elevation noted to palpation.  Crunchy solid with extended chewing followed by swallow completed clearing the oral cavity.    DYSPHAGIA CRITERIA: Risk of aspiration, signs of possible aspiration, complaint of dry mouth.    FUNCTIONAL ASSESSMENT INSTRUMENT: Patient currently scored a level 6 of 7 on Functional Communication Measures for swallowing indicating a 1-19% limitation in function.    ASSESSMENT/ PLAN OF CARE:  Pt presents with limitations, noted below, that impede her ability to swallow safely and maintain nutrition. The skills of a therapist will be required to safely and effectively implement the following treatment plan to restore maximal level of function.    PROBLEMS:  1. risk of aspiration                       LTG 1: 30 days: Patient will tolerate least restrictive diet utilizing strategies with minimal assistance and min to no signs or symptoms of aspiration.                       STG 1a: 14 days:  Patient will tolerate diet of of regular solids and thin liquid utilizing appropriate positioning and strategies with minimal assistance.                       STG 1b: 14 days: Patient will tolerate 8 of 10 trials of thin liquids with min to no signs or symptoms of aspiration.                       STG 1c: 14 days: Patient will demonstrate adequate swallow for medications utilizing strategies with minimal assistance.                       STG 1d: 14 days:  Patient/family education.                       TREATMENT: Dysphagia therapy to address swallow function through exercises and education of strategies.     FREQUENCY/DURATION: Once daily 5 times per week    REHAB POTENTIAL:  Pt has fair to good rehab potential.  The following limitations may influence improvement/ length of tx: Medical status.    RECOMMENDATIONS:   1.   DIET: Diet with regular solids cut small additional sauces and gravies, thin liquid.    2.  POSITION: Positioning fully upright for all p.o. intake and 30 minutes following.    3.  COMPENSATORY STRATEGIES: Single sips of liquid.  Alternate small bites and small sips of solids and liquids at a slow rate.  Medications whole in applesauce.      Pt/responsible party agrees with plan of care and has been informed of all alternatives, risks and benefits.                              Anticipated Discharge Disposition (SLP): inpatient rehabilitation facility, skilled nursing facility (03/10/23 1125)                                                            EDUCATION  The patient has been educated in the following areas:   Modified Diet Instruction.              Time Calculation:    Time Calculation- SLP     Row Name 03/10/23 1125             Time Calculation- SLP    SLP Start Time 1030  -TB      SLP Stop Time 1130  -TB      SLP Time Calculation (min) 60 min  -TB      SLP Received On 03/10/23  -TB         Untimed Charges    SLP Eval/Re-eval  ST Eval Oral Pharyng Swallow - 24454  -TB      25801-AO Eval Oral Pharyng Swallow Minutes 60  -TB         Total Minutes    Untimed Charges Total Minutes 60  -TB       Total Minutes 60  -TB            User Key  (r) = Recorded By, (t) = Taken By, (c) = Cosigned By    Initials Name Provider Type    Maira Garcia SLP Speech and Language Pathologist                Therapy Charges for Today     Code Description Service Date Service Provider Modifiers Qty    [id number]  ST EVAL ORAL PHARYNG SWALLOW 4 3/10/2023 Elina  Maira, SLP GN 1               Maira Antoine, LINDSEY  3/10/2023

## 2023-03-10 NOTE — ANESTHESIA PREPROCEDURE EVALUATION
Anesthesia Evaluation     Patient summary reviewed and Nursing notes reviewed   no history of anesthetic complications:  NPO Solid Status: > 8 hours  NPO Liquid Status: > 2 hours           Airway   Mallampati: II  TM distance: >3 FB  Neck ROM: full  No difficulty expected  Dental      Pulmonary - normal exam    breath sounds clear to auscultation  (+) pneumonia resolved , COPD, asthma,shortness of breath, sleep apnea,   Cardiovascular - normal exam  Exercise tolerance: poor (<4 METS)    ECG reviewed  PT is on anticoagulation therapy  Rhythm: regular  Rate: normal    (+) hypertension, CHF Systolic <55%, PVD,       Neuro/Psych- negative ROS  GI/Hepatic/Renal/Endo    (+)  GERD,  diabetes mellitus type 2 poorly controlled,     Musculoskeletal     Abdominal    Substance History - negative use     OB/GYN negative ob/gyn ROS         Other   arthritis,    history of cancer    ROS/Med Hx Other: PAT Nursing Notes unavailable.             Latest Reference Range & Units Most Recent   Glucose 70 - 99 mg/dL 258 (H)  3/10/23 17:04   Sodium 136 - 145 mmol/L 141  3/10/23 04:28   Potassium 3.5 - 5.2 mmol/L 4.3  3/10/23 04:28   CO2 22.0 - 29.0 mmol/L 23.3  3/10/23 04:28   Chloride 98 - 107 mmol/L 107  3/10/23 04:28   Anion Gap 5.0 - 15.0 mmol/L 10.7  3/10/23 04:28   Creatinine 0.57 - 1.00 mg/dL 0.68  3/10/23 04:28   BUN 8 - 23 mg/dL 18  3/10/23 04:28   BUN/Creatinine Ratio 7.0 - 25.0  26.5 (H)  3/10/23 04:28   Calcium 8.6 - 10.5 mg/dL 7.9 (L)  3/10/23 04:28   eGFR >60.0 mL/min/1.73 98.0  3/10/23 04:28   Alkaline Phosphatase 39 - 117 U/L 134 (H)  3/7/23 21:08   Total Protein 6.0 - 8.5 g/dL 6.1  3/7/23 21:08   ALT (SGPT) 1 - 33 U/L 11  3/7/23 21:08   AST (SGOT) 1 - 32 U/L 12  3/7/23 21:08   Total Bilirubin 0.0 - 1.2 mg/dL 0.4  3/7/23 21:08   Albumin 3.5 - 5.2 g/dL 2.1 (L)  3/7/23 21:08   Globulin gm/dL 4.0  3/7/23 21:08   A/G Ratio g/dL 0.5  3/7/23 21:08   (H): Data is abnormally high  (L): Data is abnormally low     Latest Reference  Range & Units Most Recent   Protime 11.8 - 14.9 Seconds 16.1 (H)  3/8/23 00:40   INR 0.86 - 1.15  1.29 (H)  3/8/23 00:40   (H): Data is abnormally high         Latest Reference Range & Units Most Recent   Hemoglobin 12.0 - 15.9 g/dL 11.1 (L)  3/9/23 04:28   Hematocrit 34.0 - 46.6 % 35.0  3/9/23 04:28   (L): Data is abnormally low    ABNORMAL ECG -  Sinus rhythm  Probable left atrial enlargement  Nonspecific IVCD  Left ventricular hypertrophy  Anterior Q waves, possibly due to LVH    Echocardiogram Findings    Left Ventricle Left ventricular systolic function is moderately decreased. Calculated left ventricular EF = 27%   Estimated ejection fraction = 30-35%. The left ventricular cavity is borderline dilated. Left ventricular wall thickness is consistent with severe concentric hypertrophy. Moderate global hypokinesis was observed. Left ventricular diastolic function is consistent with (grade II w/high LAP) pseudonormalization. No evidence of left ventricular thrombus or mass present.   Right Ventricle Normal right ventricular cavity size noted. Mildly reduced right ventricular systolic function noted.   Left Atrium The left atrial cavity is severely dilated. No left atrial thrombus or mass visualized.   Right Atrium The right atrial cavity is moderately dilated.  The diameter of the inferior vena cava is 2.4 cm.   Aortic Valve The aortic valve is abnormal in structure. The aortic valve exhibits sclerosis. There is calcification of the aortic valve. The aortic valve appears trileaflet. Mild aortic valve regurgitation is present. Mild to moderate aortic valve stenosis is present. Peak velocity of the flow distal to the aortic valve is 260 cm/s. Aortic valve maximum pressure gradient is 27 mmHg. Aortic valve mean pressure gradient is 14 mmHg. Aortic valve dimensionless index is 0.32 .   Mitral Valve Moderate mitral annular calcification is present. Mild to moderate mitral valve regurgitation is present. No significant  mitral valve stenosis is present. No evidence of mitral valve prolapse.   Tricuspid Valve Mild tricuspid valve regurgitation is present. Estimated right ventricular systolic pressure from tricuspid regurgitation is markedly elevated (>55 mmHg). Calculated right ventricular systolic pressure from tricuspid regurgitation is 64 mmHg.   Pulmonic Valve The pulmonic valve is grossly normal in structure. There is trace pulmonic valve regurgitation present. There is no pulmonic valve stenosis present.   Greater Vessels Mild dilation of the aortic root is present. Aortic root = 3.9 cm  The inferior vena cava is dilated. Partial IVC inspiratory collapse of less than 50% noted.   Pericardium There is a moderate (1-2cm) circumferential pericardial effusion. There is no evidence of cardiac tamponade.         Anesthesia Plan    ASA 4     general and Purvi     (Patient understands anesthesia not responsible for dental damage, no hcg - hysterectomy    Recent echo reveals moderate aortic stenosis and ef approximately 27%, pt may benefit from arterial line placement for close monitoring of bp)  intravenous induction     Anesthetic plan, risks, benefits, and alternatives have been provided, discussed and informed consent has been obtained with: patient.    Use of blood products discussed with patient .   Plan discussed with CRNA.        CODE STATUS:    Code Status (Patient has no pulse and is not breathing): CPR (Attempt to Resuscitate)  Medical Interventions (Patient has pulse or is breathing): Full Support

## 2023-03-11 ENCOUNTER — ANESTHESIA (OUTPATIENT)
Dept: PERIOP | Facility: HOSPITAL | Age: 63
DRG: 240 | End: 2023-03-11
Payer: MEDICARE

## 2023-03-11 LAB
ABO GROUP BLD: NORMAL
ANION GAP SERPL CALCULATED.3IONS-SCNC: 10.1 MMOL/L (ref 5–15)
ANION GAP SERPL CALCULATED.3IONS-SCNC: 10.4 MMOL/L (ref 5–15)
APTT PPP: 41.5 SECONDS (ref 24.2–34.2)
APTT PPP: 47.4 SECONDS (ref 24.2–34.2)
BACTERIA SPEC AEROBE CULT: ABNORMAL
BASOPHILS # BLD AUTO: 0.04 10*3/MM3 (ref 0–0.2)
BASOPHILS NFR BLD AUTO: 0.2 % (ref 0–1.5)
BLD GP AB SCN SERPL QL: NEGATIVE
BUN SERPL-MCNC: 19 MG/DL (ref 8–23)
BUN SERPL-MCNC: 22 MG/DL (ref 8–23)
BUN/CREAT SERPL: 32.2 (ref 7–25)
BUN/CREAT SERPL: 37.9 (ref 7–25)
CALCIUM SPEC-SCNC: 8.2 MG/DL (ref 8.6–10.5)
CALCIUM SPEC-SCNC: 8.3 MG/DL (ref 8.6–10.5)
CHLORIDE SERPL-SCNC: 103 MMOL/L (ref 98–107)
CHLORIDE SERPL-SCNC: 104 MMOL/L (ref 98–107)
CO2 SERPL-SCNC: 22.6 MMOL/L (ref 22–29)
CO2 SERPL-SCNC: 22.9 MMOL/L (ref 22–29)
CREAT SERPL-MCNC: 0.58 MG/DL (ref 0.57–1)
CREAT SERPL-MCNC: 0.59 MG/DL (ref 0.57–1)
DEPRECATED RDW RBC AUTO: 53.3 FL (ref 37–54)
DEPRECATED RDW RBC AUTO: 53.5 FL (ref 37–54)
EGFRCR SERPLBLD CKD-EPI 2021: 101.4 ML/MIN/1.73
EGFRCR SERPLBLD CKD-EPI 2021: 101.8 ML/MIN/1.73
EOSINOPHIL # BLD AUTO: 0.01 10*3/MM3 (ref 0–0.4)
EOSINOPHIL NFR BLD AUTO: 0.1 % (ref 0.3–6.2)
ERYTHROCYTE [DISTWIDTH] IN BLOOD BY AUTOMATED COUNT: 16.9 % (ref 12.3–15.4)
ERYTHROCYTE [DISTWIDTH] IN BLOOD BY AUTOMATED COUNT: 17 % (ref 12.3–15.4)
GLUCOSE BLDC GLUCOMTR-MCNC: 111 MG/DL (ref 70–99)
GLUCOSE BLDC GLUCOMTR-MCNC: 136 MG/DL (ref 70–99)
GLUCOSE BLDC GLUCOMTR-MCNC: 181 MG/DL (ref 70–99)
GLUCOSE BLDC GLUCOMTR-MCNC: 207 MG/DL (ref 70–99)
GLUCOSE BLDC GLUCOMTR-MCNC: 217 MG/DL (ref 70–99)
GLUCOSE BLDC GLUCOMTR-MCNC: 64 MG/DL (ref 70–99)
GLUCOSE SERPL-MCNC: 240 MG/DL (ref 65–99)
GLUCOSE SERPL-MCNC: 65 MG/DL (ref 65–99)
HCT VFR BLD AUTO: 32.5 % (ref 34–46.6)
HCT VFR BLD AUTO: 32.7 % (ref 34–46.6)
HGB BLD-MCNC: 10.5 G/DL (ref 12–15.9)
HGB BLD-MCNC: 10.5 G/DL (ref 12–15.9)
IMM GRANULOCYTES # BLD AUTO: 0.16 10*3/MM3 (ref 0–0.05)
IMM GRANULOCYTES NFR BLD AUTO: 0.8 % (ref 0–0.5)
INR PPP: 1.14 (ref 0.86–1.15)
INR PPP: 1.18 (ref 0.86–1.15)
LYMPHOCYTES # BLD AUTO: 1.83 10*3/MM3 (ref 0.7–3.1)
LYMPHOCYTES NFR BLD AUTO: 9.4 % (ref 19.6–45.3)
MAGNESIUM SERPL-MCNC: 1.6 MG/DL (ref 1.6–2.4)
MCH RBC QN AUTO: 27.6 PG (ref 26.6–33)
MCH RBC QN AUTO: 27.7 PG (ref 26.6–33)
MCHC RBC AUTO-ENTMCNC: 32.1 G/DL (ref 31.5–35.7)
MCHC RBC AUTO-ENTMCNC: 32.3 G/DL (ref 31.5–35.7)
MCV RBC AUTO: 85.8 FL (ref 79–97)
MCV RBC AUTO: 86.1 FL (ref 79–97)
MONOCYTES # BLD AUTO: 0.66 10*3/MM3 (ref 0.1–0.9)
MONOCYTES NFR BLD AUTO: 3.4 % (ref 5–12)
NEUTROPHILS NFR BLD AUTO: 16.71 10*3/MM3 (ref 1.7–7)
NEUTROPHILS NFR BLD AUTO: 86.1 % (ref 42.7–76)
NRBC BLD AUTO-RTO: 0 /100 WBC (ref 0–0.2)
PLATELET # BLD AUTO: 221 10*3/MM3 (ref 140–450)
PLATELET # BLD AUTO: 248 10*3/MM3 (ref 140–450)
PMV BLD AUTO: 11.1 FL (ref 6–12)
PMV BLD AUTO: 11.8 FL (ref 6–12)
POTASSIUM SERPL-SCNC: 4 MMOL/L (ref 3.5–5.2)
POTASSIUM SERPL-SCNC: 4.5 MMOL/L (ref 3.5–5.2)
PROTHROMBIN TIME: 14.7 SECONDS (ref 11.8–14.9)
PROTHROMBIN TIME: 15.1 SECONDS (ref 11.8–14.9)
RBC # BLD AUTO: 3.79 10*6/MM3 (ref 3.77–5.28)
RBC # BLD AUTO: 3.8 10*6/MM3 (ref 3.77–5.28)
RH BLD: POSITIVE
SODIUM SERPL-SCNC: 136 MMOL/L (ref 136–145)
SODIUM SERPL-SCNC: 137 MMOL/L (ref 136–145)
T&S EXPIRATION DATE: NORMAL
WBC NRBC COR # BLD: 16.09 10*3/MM3 (ref 3.4–10.8)
WBC NRBC COR # BLD: 19.41 10*3/MM3 (ref 3.4–10.8)
WHOLE BLOOD HOLD SPECIMEN: NORMAL

## 2023-03-11 PROCEDURE — 80048 BASIC METABOLIC PNL TOTAL CA: CPT | Performed by: SURGERY

## 2023-03-11 PROCEDURE — 25010000002 HYDROMORPHONE 1 MG/ML SOLUTION: Performed by: NURSE ANESTHETIST, CERTIFIED REGISTERED

## 2023-03-11 PROCEDURE — 25010000002 CEFAZOLIN PER 500 MG: Performed by: NURSE ANESTHETIST, CERTIFIED REGISTERED

## 2023-03-11 PROCEDURE — 25010000002 ONDANSETRON PER 1 MG: Performed by: NURSE ANESTHETIST, CERTIFIED REGISTERED

## 2023-03-11 PROCEDURE — 27590 AMPUTATE LEG AT THIGH: CPT

## 2023-03-11 PROCEDURE — 88311 DECALCIFY TISSUE: CPT | Performed by: SURGERY

## 2023-03-11 PROCEDURE — 27590 AMPUTATE LEG AT THIGH: CPT | Performed by: SURGERY

## 2023-03-11 PROCEDURE — 94799 UNLISTED PULMONARY SVC/PX: CPT

## 2023-03-11 PROCEDURE — 63710000001 INSULIN REGULAR HUMAN PER 5 UNITS: Performed by: SURGERY

## 2023-03-11 PROCEDURE — 0 HYDROMORPHONE 1 MG/ML SOLUTION: Performed by: NURSE ANESTHETIST, CERTIFIED REGISTERED

## 2023-03-11 PROCEDURE — 0 ACETAMINOPHEN 10 MG/ML SOLUTION: Performed by: NURSE ANESTHETIST, CERTIFIED REGISTERED

## 2023-03-11 PROCEDURE — 82962 GLUCOSE BLOOD TEST: CPT

## 2023-03-11 PROCEDURE — 25010000002 PIPERACILLIN SOD-TAZOBACTAM PER 1 G: Performed by: SURGERY

## 2023-03-11 PROCEDURE — 0Y6C0Z1 DETACHMENT AT RIGHT UPPER LEG, HIGH, OPEN APPROACH: ICD-10-PCS | Performed by: SURGERY

## 2023-03-11 PROCEDURE — 25010000002 MORPHINE PER 10 MG: Performed by: SURGERY

## 2023-03-11 PROCEDURE — 25010000002 MIDAZOLAM PER 1 MG: Performed by: NURSE ANESTHETIST, CERTIFIED REGISTERED

## 2023-03-11 PROCEDURE — 85730 THROMBOPLASTIN TIME PARTIAL: CPT | Performed by: SURGERY

## 2023-03-11 PROCEDURE — 25010000002 EPINEPHRINE 1 MG/ML SOLUTION: Performed by: NURSE ANESTHETIST, CERTIFIED REGISTERED

## 2023-03-11 PROCEDURE — P9041 ALBUMIN (HUMAN),5%, 50ML: HCPCS | Performed by: NURSE ANESTHETIST, CERTIFIED REGISTERED

## 2023-03-11 PROCEDURE — 94664 DEMO&/EVAL PT USE INHALER: CPT

## 2023-03-11 PROCEDURE — 88307 TISSUE EXAM BY PATHOLOGIST: CPT | Performed by: SURGERY

## 2023-03-11 PROCEDURE — 25010000002 MAGNESIUM SULFATE PER 500 MG OF MAGNESIUM: Performed by: NURSE ANESTHETIST, CERTIFIED REGISTERED

## 2023-03-11 PROCEDURE — 86901 BLOOD TYPING SEROLOGIC RH(D): CPT | Performed by: SURGERY

## 2023-03-11 PROCEDURE — 85025 COMPLETE CBC W/AUTO DIFF WBC: CPT | Performed by: SURGERY

## 2023-03-11 PROCEDURE — 85027 COMPLETE CBC AUTOMATED: CPT | Performed by: SURGERY

## 2023-03-11 PROCEDURE — 25010000002 FENTANYL CITRATE (PF) 50 MCG/ML SOLUTION: Performed by: NURSE ANESTHETIST, CERTIFIED REGISTERED

## 2023-03-11 PROCEDURE — 25010000002 ALBUMIN HUMAN 5% PER 50 ML: Performed by: NURSE ANESTHETIST, CERTIFIED REGISTERED

## 2023-03-11 PROCEDURE — 94760 N-INVAS EAR/PLS OXIMETRY 1: CPT

## 2023-03-11 PROCEDURE — 86900 BLOOD TYPING SEROLOGIC ABO: CPT | Performed by: SURGERY

## 2023-03-11 PROCEDURE — 86850 RBC ANTIBODY SCREEN: CPT | Performed by: SURGERY

## 2023-03-11 PROCEDURE — 85610 PROTHROMBIN TIME: CPT | Performed by: SURGERY

## 2023-03-11 PROCEDURE — 25010000002 DEXAMETHASONE PER 1 MG: Performed by: NURSE ANESTHETIST, CERTIFIED REGISTERED

## 2023-03-11 PROCEDURE — 25010000002 HYDROMORPHONE 1 MG/ML SOLUTION: Performed by: SURGERY

## 2023-03-11 PROCEDURE — 83735 ASSAY OF MAGNESIUM: CPT | Performed by: SURGERY

## 2023-03-11 DEVICE — CLIP LIGAT VASC HORIZON TI SM YEL 6CT: Type: IMPLANTABLE DEVICE | Site: LEG | Status: FUNCTIONAL

## 2023-03-11 DEVICE — CLIP LIGAT VASC HORIZON TI LG ORNG 6CT: Type: IMPLANTABLE DEVICE | Site: LEG | Status: FUNCTIONAL

## 2023-03-11 DEVICE — CLIP LIGAT VASC HORIZON TI MD BLU 6CT: Type: IMPLANTABLE DEVICE | Site: LEG | Status: FUNCTIONAL

## 2023-03-11 RX ORDER — ALBUMIN, HUMAN INJ 5% 5 %
SOLUTION INTRAVENOUS CONTINUOUS PRN
Status: DISCONTINUED | OUTPATIENT
Start: 2023-03-11 | End: 2023-03-11 | Stop reason: SURG

## 2023-03-11 RX ORDER — ACETAMINOPHEN 325 MG/1
650 TABLET ORAL EVERY 4 HOURS PRN
Status: DISCONTINUED | OUTPATIENT
Start: 2023-03-11 | End: 2023-03-11 | Stop reason: SDUPTHER

## 2023-03-11 RX ORDER — FENTANYL CITRATE 50 UG/ML
INJECTION, SOLUTION INTRAMUSCULAR; INTRAVENOUS AS NEEDED
Status: DISCONTINUED | OUTPATIENT
Start: 2023-03-11 | End: 2023-03-11 | Stop reason: SURG

## 2023-03-11 RX ORDER — PROMETHAZINE HYDROCHLORIDE 12.5 MG/1
25 TABLET ORAL ONCE AS NEEDED
Status: DISCONTINUED | OUTPATIENT
Start: 2023-03-11 | End: 2023-03-11 | Stop reason: HOSPADM

## 2023-03-11 RX ORDER — ONDANSETRON 4 MG/1
4 TABLET, FILM COATED ORAL EVERY 6 HOURS PRN
Status: DISCONTINUED | OUTPATIENT
Start: 2023-03-11 | End: 2023-03-15 | Stop reason: HOSPADM

## 2023-03-11 RX ORDER — ONDANSETRON 2 MG/ML
4 INJECTION INTRAMUSCULAR; INTRAVENOUS EVERY 6 HOURS PRN
Status: DISCONTINUED | OUTPATIENT
Start: 2023-03-11 | End: 2023-03-15 | Stop reason: HOSPADM

## 2023-03-11 RX ORDER — MIDAZOLAM HYDROCHLORIDE 1 MG/ML
INJECTION INTRAMUSCULAR; INTRAVENOUS AS NEEDED
Status: DISCONTINUED | OUTPATIENT
Start: 2023-03-11 | End: 2023-03-11 | Stop reason: SURG

## 2023-03-11 RX ORDER — DEXAMETHASONE SODIUM PHOSPHATE 4 MG/ML
INJECTION, SOLUTION INTRA-ARTICULAR; INTRALESIONAL; INTRAMUSCULAR; INTRAVENOUS; SOFT TISSUE AS NEEDED
Status: DISCONTINUED | OUTPATIENT
Start: 2023-03-11 | End: 2023-03-11 | Stop reason: SURG

## 2023-03-11 RX ORDER — BUPIVACAINE HCL/0.9 % NACL/PF 0.1 %
2 PLASTIC BAG, INJECTION (ML) EPIDURAL ONCE
Status: DISCONTINUED | OUTPATIENT
Start: 2023-03-11 | End: 2023-03-11 | Stop reason: HOSPADM

## 2023-03-11 RX ORDER — OXYCODONE HYDROCHLORIDE 5 MG/1
5 TABLET ORAL
Status: DISCONTINUED | OUTPATIENT
Start: 2023-03-11 | End: 2023-03-11 | Stop reason: HOSPADM

## 2023-03-11 RX ORDER — SODIUM CHLORIDE, SODIUM LACTATE, POTASSIUM CHLORIDE, CALCIUM CHLORIDE 600; 310; 30; 20 MG/100ML; MG/100ML; MG/100ML; MG/100ML
INJECTION, SOLUTION INTRAVENOUS CONTINUOUS PRN
Status: DISCONTINUED | OUTPATIENT
Start: 2023-03-11 | End: 2023-03-11 | Stop reason: SURG

## 2023-03-11 RX ORDER — MAGNESIUM SULFATE HEPTAHYDRATE 500 MG/ML
INJECTION, SOLUTION INTRAMUSCULAR; INTRAVENOUS AS NEEDED
Status: DISCONTINUED | OUTPATIENT
Start: 2023-03-11 | End: 2023-03-11 | Stop reason: SURG

## 2023-03-11 RX ORDER — KETAMINE HCL IN NACL, ISO-OSM 100MG/10ML
SYRINGE (ML) INJECTION AS NEEDED
Status: DISCONTINUED | OUTPATIENT
Start: 2023-03-11 | End: 2023-03-11 | Stop reason: SURG

## 2023-03-11 RX ORDER — ROCURONIUM BROMIDE 10 MG/ML
INJECTION, SOLUTION INTRAVENOUS AS NEEDED
Status: DISCONTINUED | OUTPATIENT
Start: 2023-03-11 | End: 2023-03-11 | Stop reason: SURG

## 2023-03-11 RX ORDER — HEPARIN SODIUM 5000 [USP'U]/ML
5000 INJECTION, SOLUTION INTRAVENOUS; SUBCUTANEOUS EVERY 8 HOURS SCHEDULED
Status: DISCONTINUED | OUTPATIENT
Start: 2023-03-12 | End: 2023-03-15 | Stop reason: HOSPADM

## 2023-03-11 RX ORDER — PROMETHAZINE HYDROCHLORIDE 25 MG/1
25 SUPPOSITORY RECTAL ONCE AS NEEDED
Status: DISCONTINUED | OUTPATIENT
Start: 2023-03-11 | End: 2023-03-11 | Stop reason: HOSPADM

## 2023-03-11 RX ORDER — ACETAMINOPHEN 10 MG/ML
INJECTION, SOLUTION INTRAVENOUS AS NEEDED
Status: DISCONTINUED | OUTPATIENT
Start: 2023-03-11 | End: 2023-03-11 | Stop reason: SURG

## 2023-03-11 RX ORDER — NALOXONE HCL 0.4 MG/ML
0.4 VIAL (ML) INJECTION
Status: DISCONTINUED | OUTPATIENT
Start: 2023-03-11 | End: 2023-03-15 | Stop reason: HOSPADM

## 2023-03-11 RX ORDER — HYDROCODONE BITARTRATE AND ACETAMINOPHEN 5; 325 MG/1; MG/1
1 TABLET ORAL EVERY 4 HOURS PRN
Status: DISCONTINUED | OUTPATIENT
Start: 2023-03-11 | End: 2023-03-15 | Stop reason: HOSPADM

## 2023-03-11 RX ORDER — ONDANSETRON 2 MG/ML
4 INJECTION INTRAMUSCULAR; INTRAVENOUS ONCE AS NEEDED
Status: DISCONTINUED | OUTPATIENT
Start: 2023-03-11 | End: 2023-03-11 | Stop reason: HOSPADM

## 2023-03-11 RX ORDER — CALCIUM CHLORIDE 100 MG/ML
INJECTION INTRAVENOUS; INTRAVENTRICULAR AS NEEDED
Status: DISCONTINUED | OUTPATIENT
Start: 2023-03-11 | End: 2023-03-11 | Stop reason: SURG

## 2023-03-11 RX ORDER — CEFAZOLIN SODIUM 1 G/3ML
INJECTION, POWDER, FOR SOLUTION INTRAMUSCULAR; INTRAVENOUS AS NEEDED
Status: DISCONTINUED | OUTPATIENT
Start: 2023-03-11 | End: 2023-03-11 | Stop reason: SURG

## 2023-03-11 RX ORDER — NITROGLYCERIN 0.4 MG/1
0.4 TABLET SUBLINGUAL
Status: DISCONTINUED | OUTPATIENT
Start: 2023-03-11 | End: 2023-03-15 | Stop reason: HOSPADM

## 2023-03-11 RX ORDER — MORPHINE SULFATE 2 MG/ML
2 INJECTION, SOLUTION INTRAMUSCULAR; INTRAVENOUS EVERY 4 HOURS PRN
Status: DISCONTINUED | OUTPATIENT
Start: 2023-03-11 | End: 2023-03-15 | Stop reason: HOSPADM

## 2023-03-11 RX ORDER — ETOMIDATE 2 MG/ML
INJECTION INTRAVENOUS AS NEEDED
Status: DISCONTINUED | OUTPATIENT
Start: 2023-03-11 | End: 2023-03-11 | Stop reason: SURG

## 2023-03-11 RX ORDER — ONDANSETRON 2 MG/ML
INJECTION INTRAMUSCULAR; INTRAVENOUS AS NEEDED
Status: DISCONTINUED | OUTPATIENT
Start: 2023-03-11 | End: 2023-03-11 | Stop reason: SURG

## 2023-03-11 RX ORDER — LIDOCAINE HYDROCHLORIDE 20 MG/ML
INJECTION, SOLUTION EPIDURAL; INFILTRATION; INTRACAUDAL; PERINEURAL AS NEEDED
Status: DISCONTINUED | OUTPATIENT
Start: 2023-03-11 | End: 2023-03-11 | Stop reason: SURG

## 2023-03-11 RX ORDER — BUPIVACAINE HCL/0.9 % NACL/PF 0.1 %
2 PLASTIC BAG, INJECTION (ML) EPIDURAL EVERY 8 HOURS
Status: DISCONTINUED | OUTPATIENT
Start: 2023-03-11 | End: 2023-03-11

## 2023-03-11 RX ADMIN — ONDANSETRON 4 MG: 2 INJECTION INTRAMUSCULAR; INTRAVENOUS at 15:33

## 2023-03-11 RX ADMIN — HYDROMORPHONE HYDROCHLORIDE 0.5 MG: 1 INJECTION, SOLUTION INTRAMUSCULAR; INTRAVENOUS; SUBCUTANEOUS at 16:15

## 2023-03-11 RX ADMIN — IPRATROPIUM BROMIDE AND ALBUTEROL SULFATE 3 ML: .5; 3 SOLUTION RESPIRATORY (INHALATION) at 04:31

## 2023-03-11 RX ADMIN — SODIUM CHLORIDE, POTASSIUM CHLORIDE, SODIUM LACTATE AND CALCIUM CHLORIDE: 600; 310; 30; 20 INJECTION, SOLUTION INTRAVENOUS at 14:10

## 2023-03-11 RX ADMIN — EPINEPHRINE 5 MCG: 1 INJECTION INTRAMUSCULAR; INTRAVENOUS; SUBCUTANEOUS at 13:43

## 2023-03-11 RX ADMIN — TAZOBACTAM SODIUM AND PIPERACILLIN SODIUM 3.38 G: 375; 3 INJECTION, SOLUTION INTRAVENOUS at 03:53

## 2023-03-11 RX ADMIN — ROCURONIUM BROMIDE 20 MG: 10 INJECTION, SOLUTION INTRAVENOUS at 14:46

## 2023-03-11 RX ADMIN — Medication 10 MG: at 14:00

## 2023-03-11 RX ADMIN — ROCURONIUM BROMIDE 10 MG: 10 INJECTION, SOLUTION INTRAVENOUS at 15:05

## 2023-03-11 RX ADMIN — SUGAMMADEX 200 MG: 100 INJECTION, SOLUTION INTRAVENOUS at 15:45

## 2023-03-11 RX ADMIN — ALBUMIN HUMAN: 0.05 INJECTION, SOLUTION INTRAVENOUS at 14:00

## 2023-03-11 RX ADMIN — IPRATROPIUM BROMIDE AND ALBUTEROL SULFATE 3 ML: .5; 3 SOLUTION RESPIRATORY (INHALATION) at 06:30

## 2023-03-11 RX ADMIN — MIDAZOLAM HYDROCHLORIDE 2 MG: 1 INJECTION, SOLUTION INTRAMUSCULAR; INTRAVENOUS at 12:42

## 2023-03-11 RX ADMIN — Medication 10 MG: at 14:15

## 2023-03-11 RX ADMIN — DEXAMETHASONE SODIUM PHOSPHATE 4 MG: 4 INJECTION, SOLUTION INTRA-ARTICULAR; INTRALESIONAL; INTRAMUSCULAR; INTRAVENOUS; SOFT TISSUE at 13:26

## 2023-03-11 RX ADMIN — HYDROMORPHONE HYDROCHLORIDE 0.5 MG: 1 INJECTION, SOLUTION INTRAMUSCULAR; INTRAVENOUS; SUBCUTANEOUS at 15:51

## 2023-03-11 RX ADMIN — INSULIN HUMAN 4 UNITS: 100 INJECTION, SOLUTION PARENTERAL at 18:01

## 2023-03-11 RX ADMIN — HYDROMORPHONE HYDROCHLORIDE 0.5 MG: 1 INJECTION, SOLUTION INTRAMUSCULAR; INTRAVENOUS; SUBCUTANEOUS at 06:04

## 2023-03-11 RX ADMIN — FENTANYL CITRATE 50 MCG: 50 INJECTION, SOLUTION INTRAMUSCULAR; INTRAVENOUS at 13:32

## 2023-03-11 RX ADMIN — TAZOBACTAM SODIUM AND PIPERACILLIN SODIUM 3.38 G: 375; 3 INJECTION, SOLUTION INTRAVENOUS at 21:15

## 2023-03-11 RX ADMIN — EPINEPHRINE 5 MCG: 1 INJECTION INTRAMUSCULAR; INTRAVENOUS; SUBCUTANEOUS at 13:49

## 2023-03-11 RX ADMIN — HYDROCHLOROTHIAZIDE: 25 TABLET ORAL at 09:48

## 2023-03-11 RX ADMIN — ETOMIDATE 20 MG: 2 INJECTION, SOLUTION INTRAVENOUS at 13:26

## 2023-03-11 RX ADMIN — FENTANYL CITRATE 50 MCG: 50 INJECTION, SOLUTION INTRAMUSCULAR; INTRAVENOUS at 13:34

## 2023-03-11 RX ADMIN — ETOMIDATE 6 MG: 2 INJECTION, SOLUTION INTRAVENOUS at 15:50

## 2023-03-11 RX ADMIN — CALCIUM CHLORIDE INJECTION 200 MG: 100 INJECTION, SOLUTION INTRAVENOUS at 14:25

## 2023-03-11 RX ADMIN — CEFAZOLIN SODIUM 2 G: 1 INJECTION, POWDER, FOR SOLUTION INTRAMUSCULAR; INTRAVENOUS at 13:57

## 2023-03-11 RX ADMIN — IPRATROPIUM BROMIDE AND ALBUTEROL SULFATE 3 ML: .5; 3 SOLUTION RESPIRATORY (INHALATION) at 00:09

## 2023-03-11 RX ADMIN — TAZOBACTAM SODIUM AND PIPERACILLIN SODIUM 3.38 G: 375; 3 INJECTION, SOLUTION INTRAVENOUS at 14:26

## 2023-03-11 RX ADMIN — SODIUM CHLORIDE, POTASSIUM CHLORIDE, SODIUM LACTATE AND CALCIUM CHLORIDE: 600; 310; 30; 20 INJECTION, SOLUTION INTRAVENOUS at 13:00

## 2023-03-11 RX ADMIN — Medication 5 MG: at 14:24

## 2023-03-11 RX ADMIN — ROCURONIUM BROMIDE 40 MG: 10 INJECTION, SOLUTION INTRAVENOUS at 13:26

## 2023-03-11 RX ADMIN — Medication 10 MG: at 14:16

## 2023-03-11 RX ADMIN — LIDOCAINE HYDROCHLORIDE 50 MG: 20 INJECTION, SOLUTION EPIDURAL; INFILTRATION; INTRACAUDAL; PERINEURAL at 13:22

## 2023-03-11 RX ADMIN — IPRATROPIUM BROMIDE AND ALBUTEROL SULFATE 3 ML: .5; 3 SOLUTION RESPIRATORY (INHALATION) at 16:11

## 2023-03-11 RX ADMIN — Medication 5 MG: at 14:02

## 2023-03-11 RX ADMIN — MORPHINE SULFATE 2 MG: 2 INJECTION, SOLUTION INTRAMUSCULAR; INTRAVENOUS at 21:16

## 2023-03-11 RX ADMIN — HYDROMORPHONE HYDROCHLORIDE 0.5 MG: 1 INJECTION, SOLUTION INTRAMUSCULAR; INTRAVENOUS; SUBCUTANEOUS at 15:53

## 2023-03-11 RX ADMIN — SODIUM CHLORIDE 50 ML/HR: 9 INJECTION, SOLUTION INTRAVENOUS at 17:32

## 2023-03-11 RX ADMIN — HYDROMORPHONE HYDROCHLORIDE 0.5 MG: 1 INJECTION, SOLUTION INTRAMUSCULAR; INTRAVENOUS; SUBCUTANEOUS at 08:21

## 2023-03-11 RX ADMIN — ACETAMINOPHEN 1000 MG: 10 INJECTION INTRAVENOUS at 14:44

## 2023-03-11 RX ADMIN — LIDOCAINE HYDROCHLORIDE 50 MG: 20 INJECTION, SOLUTION EPIDURAL; INFILTRATION; INTRACAUDAL; PERINEURAL at 13:19

## 2023-03-11 RX ADMIN — IPRATROPIUM BROMIDE AND ALBUTEROL SULFATE 3 ML: .5; 3 SOLUTION RESPIRATORY (INHALATION) at 23:49

## 2023-03-11 RX ADMIN — DEXTROSE MONOHYDRATE 25 G: 25 INJECTION, SOLUTION INTRAVENOUS at 06:01

## 2023-03-11 RX ADMIN — HYDROMORPHONE HYDROCHLORIDE 0.5 MG: 1 INJECTION, SOLUTION INTRAMUSCULAR; INTRAVENOUS; SUBCUTANEOUS at 00:33

## 2023-03-11 RX ADMIN — FENTANYL CITRATE 50 MCG: 50 INJECTION, SOLUTION INTRAMUSCULAR; INTRAVENOUS at 13:28

## 2023-03-11 RX ADMIN — Medication 10 MG: at 14:10

## 2023-03-11 RX ADMIN — ROCURONIUM BROMIDE 20 MG: 10 INJECTION, SOLUTION INTRAVENOUS at 14:17

## 2023-03-11 RX ADMIN — FENTANYL CITRATE 50 MCG: 50 INJECTION, SOLUTION INTRAMUSCULAR; INTRAVENOUS at 13:19

## 2023-03-11 RX ADMIN — IPRATROPIUM BROMIDE AND ALBUTEROL SULFATE 3 ML: .5; 3 SOLUTION RESPIRATORY (INHALATION) at 18:41

## 2023-03-11 RX ADMIN — MAGNESIUM SULFATE HEPTAHYDRATE 1 G: 500 INJECTION, SOLUTION INTRAMUSCULAR; INTRAVENOUS at 13:45

## 2023-03-11 RX ADMIN — ETOMIDATE 6 MG: 2 INJECTION, SOLUTION INTRAVENOUS at 14:14

## 2023-03-11 NOTE — PERIOPERATIVE NURSING NOTE
"Patient arrived in PACU soaked in sweat, temp was 97.1F. Anesthesia aware and states to just monitor her. Patient has no c/o. Patient states, \"just leave me alone and stop touching me\".   "

## 2023-03-11 NOTE — OP NOTE
AMPUTATION ABOVE KNEE  Procedure Report    Patient Name:  Italia Olvera  YOB: 1960    Date of Surgery:  3/11/2023     Indications: Nonhealing right foot wound with a non-reconstructable peripheral arterial disease    Pre-op Diagnosis:   Ischemic foot [I99.8]       Post-Op Diagnosis Codes:     * Ischemic foot [I99.8]    Procedure(s):  Right AMPUTATION ABOVE KNEE    Staff:  Surgeon(s):  Zacarias Haywood MD    Assistant: Evangelina Marquez RN    Anesthesia: General    Estimated Blood Loss: 100 mL    IV fluids: 500 mL    Implants:    Implant Name Type Inv. Item Serial No.  Lot No. LRB No. Used Action   CLIP LIGAT VASC HORIZON TI MD YADIEL 6CT - [id number] Implant CLIP LIGAT VASC HORIZON TI MD YADIEL 6CT  [employer] [id number] Right 3 Implanted   CLIP LIGAT VASC HORIZON TI LG ORNG 6CT - [id number] Implant CLIP LIGAT VASC HORIZON TI LG ORNG 6CT  [employer] [id number] Right 1 Implanted   CLIP LIGAT VASC HORIZON TI SM YEL 6CT - [id number] Implant CLIP LIGAT VASC HORIZON TI SM YEL 6CT  [employer] [id number] Right 2 Implanted   CLIP LIGAT VASC HORIZON TI LG ORNG 6CT - [id number] Implant CLIP LIGAT VASC HORIZON TI LG ORNG 6CT  [employer] [id number] Right 1 Implanted   CLIP LIGAT VASC HORIZON TI MD YADIEL 6CT - [id number] - [id number] Implant CLIP LIGAT VASC HORIZON TI MD YADIEL 6CT [id number] [employer] [id number] Right 1 Implanted   CLIP LIGAT VASC HORIZON TI MD YADIEL 6CT - [id number] - [id number] Implant CLIP LIGAT VASC HORIZON TI MD YADIEL 6CT [id number] [employer] [id number] Right 1 Implanted       Specimen: Right knee and lower leg       Findings: Adequate tissue responsive to Bovie and healthy enough for closure.    Complications: None    Description of Procedure: Patient was brought back to the operating room and placed on the operating table in supine position where general endotracheal anesthesia was induced.   The right lower extremity was prepped and draped in normal sterile fashion and following appropriate timeout and administration of IV antibiotic therapy anterior posterior skin flaps were outlined with a marking pen approximate centimeters proximal to the knee joint.  Skin incision was then performed and deepened through the subcutaneous tissues using #10 blade scalpel and electrocautery until muscular fascia was identified.   On the medial aspect of the leg the greater saphenous vein was identified and ligated and subsequently divided.  Muscle groups overlying the anterior medial thighs were divided with hook cautery at the level of skin incision.  The neurovascular bundle was identified along the posterior medial aspect of the thigh and popliteal artery and veins were individually suture ligated and divided and subsequently clipped with 2-0 silk sutures and large clips.  The sciatic nerve was identified posteriorly placed on traction ligated with 2-0 silk tie and subsequently divided and allowed to retract back into the wound.  Posterior thigh muscles were then divided with electrocautery.  Once the muscle groups were circumferentially divided the periosteum was incised and elevated 5 cm proximally off of the femur.  The femur was then divided with an electric saw and the proximal end of the transected femur was smoothed with a rasp.  Small amount of Surgicel was placed within the marrow to stop the oozing.  The amputation stump was then copiously irrigated with antibiotic solution and hemostasis was achieved with ligation of any small vessel bleeders.   The periosteum was then closed with a running 2-0 Vicryl suture overlying the transected femur followed by the deep fascia of the thigh musculature which was closed with a running 2-0 Vicryl suture.  The more superficial fascia and deep dermis was reapproximated with a 3-0 Vicryl suture followed by closure of the skin incision staples.  The amputation site was  then dressed with l Aquaphor and a knee immobilizer was placed for further protection against potential fall and wound dehiscence.  Please note the patient tolerated the procedure well was taken back to the postanesthesia care unit in stable condition.    Assistant: Evangelina Marquez RN  was responsible for performing the following activities: First assist, closure and dressing changes and their skilled assistance was necessary for the success of this case.    Zacarias Haywood MD     Date: 3/11/2023  Time: 16:37 EST

## 2023-03-11 NOTE — ANESTHESIA PROCEDURE NOTES
Arterial Line      Patient reassessed immediately prior to procedure    Patient location during procedure: holding area  Start time: 3/11/2023 1:39 PM  Stop Time:3/11/2023 1:39 PM       Line placed for hemodynamic monitoring.  Preanesthetic Checklist  Completed: patient identified, IV checked, site marked, risks and benefits discussed, surgical consent, monitors and equipment checked, pre-op evaluation and timeout performed  Arterial Line Prep    Sterile Tech: cap, gloves and mask  Prep: ChloraPrep  Patient monitoring: blood pressure monitoring, continuous pulse oximetry and EKG  Arterial Line Procedure   Laterality:left  Catheter size: 20 G   Guidance: landmark technique and palpation technique  Number of attempts: 2 (multiple B attempts withour success)  Successful placement: no   Post Assessment   Dressing Type: occlusive dressing applied, secured with tape and wrist guard applied.   Complications no  Circ/Move/Sens Assessment: normal.   Patient Tolerance: patient tolerated the procedure well with no apparent complications  Additional Notes  Wire intact. Calibrated/Connections checked, line flushed.  Tried in prep area multiple times on B radial arteries.  No blood flow noted on right side, L radial arterial pulsitile but tiny and unable to cannulate  Aborted a line due to multiple attempts and need to perform surgery.  Will rely on NIBP , vss.

## 2023-03-11 NOTE — ANESTHESIA POSTPROCEDURE EVALUATION
Patient: Italia Olvera    Procedure Summary     Date: 03/11/23 Room / Location: [facility] OR 03 / [facility] MAIN OR    Anesthesia Start: 1309 Anesthesia Stop: 1618    Procedure: AMPUTATION ABOVE KNEE (Right: Thigh) Diagnosis:       Ischemic foot      (Ischemic foot [I99.8])    Surgeons: Zacarias Haywood MD Provider: Jennifer Terrazas CRNA    Anesthesia Type: general, [place] ASA Status: 4          Anesthesia Type: general, [place]    Vitals  Vitals Value Taken Time   /60 03/11/23 1628   Temp 36.2 °C (97.1 °F) 03/11/23 1611   Pulse 74 03/11/23 1631   Resp 20 03/11/23 1625   SpO2 99 % 03/11/23 1631   Vitals shown include unvalidated device data.        Post Anesthesia Care and Evaluation    Patient location during evaluation: bedside  Patient participation: complete - patient participated  Level of consciousness: awake and alert  Pain management: adequate    Airway patency: patent  Anesthetic complications: No anesthetic complications  PONV Status: none  Cardiovascular status: acceptable  Respiratory status: acceptable  Hydration status: acceptable    Comments: An Anesthesiologist personally participated in the most demanding procedures (including induction and emergence if applicable) in the anesthesia plan, monitored the course of anesthesia administration at frequent intervals and remained physically present and available for immediate diagnosis and treatment of emergencies.

## 2023-03-11 NOTE — NURSING NOTE
Specimen taken to Lab, coordinated with Billy at lab, placed specimen in refrigerator, and label in book.

## 2023-03-11 NOTE — INTERVAL H&P NOTE
Vascular surgery H&P/consult and progress notes reviewed.  Patient was examined and plan is for right above knee amputation after angiogram findings.  Discussed with both patient and  at bedside.  Type and cross for 1 unit to be held for procedure if needed.

## 2023-03-11 NOTE — PLAN OF CARE
Problem: Adult Inpatient Plan of Care  Goal: Plan of Care Review  Outcome: Ongoing, Progressing  Goal: Patient-Specific Goal (Individualized)  Outcome: Ongoing, Progressing  Goal: Absence of Hospital-Acquired Illness or Injury  Outcome: Ongoing, Progressing  Intervention: Identify and Manage Fall Risk  Recent Flowsheet Documentation  Taken 3/11/2023 0200 by Lisa Bone RN  Safety Promotion/Fall Prevention: safety round/check completed  Taken 3/11/2023 0000 by Lisa Bone RN  Safety Promotion/Fall Prevention: safety round/check completed  Taken 3/10/2023 2200 by Lisa Bone RN  Safety Promotion/Fall Prevention: safety round/check completed  Taken 3/10/2023 2100 by Lisa Bone RN  Safety Promotion/Fall Prevention: safety round/check completed  Taken 3/10/2023 2000 by Lisa Bone RN  Safety Promotion/Fall Prevention: safety round/check completed  Intervention: Prevent Skin Injury  Recent Flowsheet Documentation  Taken 3/10/2023 2000 by Lisa Bone RN  Body Position: position changed independently  Intervention: Prevent and Manage VTE (Venous Thromboembolism) Risk  Recent Flowsheet Documentation  Taken 3/10/2023 2000 by Lisa Bone RN  Activity Management: activity adjusted per tolerance  VTE Prevention/Management: (see mar)   sequential compression devices off   patient refused intervention  Intervention: Prevent Infection  Recent Flowsheet Documentation  Taken 3/10/2023 2000 by Lisa Bone RN  Infection Prevention:   visitors restricted/screened   single patient room provided   rest/sleep promoted   personal protective equipment utilized   hand hygiene promoted   equipment surfaces disinfected   environmental surveillance performed   cohorting utilized  Goal: Optimal Comfort and Wellbeing  Outcome: Ongoing, Progressing  Intervention: Monitor Pain and Promote Comfort  Recent Flowsheet Documentation  Taken 3/10/2023 2000 by Lisa Bone RN  Pain Management Interventions:   see MAR   quiet environment  facilitated   position adjusted   pillow support provided  Intervention: Provide Person-Centered Care  Recent Flowsheet Documentation  Taken 3/10/2023 2000 by Lisa Bone, RN  Trust Relationship/Rapport:   care explained   choices provided   emotional support provided   empathic listening provided   questions answered   questions encouraged   reassurance provided  Goal: Readiness for Transition of Care  Outcome: Ongoing, Progressing     Problem: Skin Injury Risk Increased  Goal: Skin Health and Integrity  Outcome: Ongoing, Progressing  Intervention: Optimize Skin Protection  Recent Flowsheet Documentation  Taken 3/10/2023 2000 by Lisa Bone, RN  Head of Bed (HOB) Positioning: HOB elevated     Problem: Hypertension Comorbidity  Goal: Blood Pressure in Desired Range  Outcome: Ongoing, Progressing  Intervention: Maintain Blood Pressure Management  Recent Flowsheet Documentation  Taken 3/10/2023 2000 by Lisa Bone, RN  Medication Review/Management: medications reviewed   Goal Outcome Evaluation:      No changes

## 2023-03-11 NOTE — PROGRESS NOTES
[facility]     Progress Note    Patient Name: Italia Olvera  : 1960  MRN: [id number]  Primary Care Physician:  Carloz Shoemaker MD  Date of admission: 3/7/2023      Subjective   Brief summary.  Patient admitted with ischemic foot and leg.  Agreeable for surgery now, surgery planned for today      HPI:  Patient awake and alert,  at bedside.  Less anxious today.  No chest pain, no shortness of breath    Review of Systems     Complains of foot pain,  Breathing better  Wheezing improved      Objective     Vitals:   Temp:  [97.5 °F (36.4 °C)-98.6 °F (37 °C)] 97.6 °F (36.4 °C)  Heart Rate:  [52-81] 71  Resp:  [16-18] 18  BP: (141-176)/(49-91) 176/67  Flow (L/min):  [3] 3    Physical Exam :       Elderly female not in acute distress.  Oral mucosa moist  Heart regular.  Bilateral occasional wheezing  Abdomen obese and soft nontender  Right lower extremity edema and foot swelling slightly better still discolored, cyanotic and dark blue toes     Result Review:  I have personally reviewed the results from the time of this admission to 3/11/2023 10:59 EST and agree with these findings:  []  Laboratory  []  Microbiology  []  Radiology  []  EKG/Telemetry   []  Cardiology/Vascular   []  Pathology  []  Old records  []  Other:    Reviewed      Assessment / Plan       Active Hospital Problems:  Active Hospital Problems    Diagnosis    • **Ischemic foot    • Atherosclerosis of native artery of extremity ([facility])    • Chronic respiratory failure with hypoxia ([facility])    • T2DM (type 2 diabetes mellitus) (CMS/[facility])- uncontrolled    • Bipolar disorder ([facility])        Plan:   Patient with ischemic foot and severe peripheral vascular disease, vascular surgeon recommended amputation, patient agreeable now.   Overall stable.  Surgery today.  Monitor closely postsurgery    DVT prophylaxis:  No DVT prophylaxis order currently exists.    CODE STATUS:   Code Status (Patient has no pulse and is not breathing): CPR (Attempt to  Resuscitate)  Medical Interventions (Patient has pulse or is breathing): Full Support                Electronically signed by Rudy Garnett MD, 03/11/23, 11:01 AM EST.

## 2023-03-12 LAB
BACTERIA SPEC AEROBE CULT: NORMAL
BACTERIA SPEC AEROBE CULT: NORMAL
GLUCOSE BLDC GLUCOMTR-MCNC: 148 MG/DL (ref 70–99)
GLUCOSE BLDC GLUCOMTR-MCNC: 174 MG/DL (ref 70–99)
GLUCOSE BLDC GLUCOMTR-MCNC: 241 MG/DL (ref 70–99)
GLUCOSE BLDC GLUCOMTR-MCNC: 280 MG/DL (ref 70–99)

## 2023-03-12 PROCEDURE — 94664 DEMO&/EVAL PT USE INHALER: CPT

## 2023-03-12 PROCEDURE — 94799 UNLISTED PULMONARY SVC/PX: CPT

## 2023-03-12 PROCEDURE — 25010000002 FUROSEMIDE PER 20 MG: Performed by: SURGERY

## 2023-03-12 PROCEDURE — 25010000002 MORPHINE PER 10 MG: Performed by: SURGERY

## 2023-03-12 PROCEDURE — 63710000001 INSULIN REGULAR HUMAN PER 5 UNITS: Performed by: SURGERY

## 2023-03-12 PROCEDURE — 97530 THERAPEUTIC ACTIVITIES: CPT

## 2023-03-12 PROCEDURE — 97161 PT EVAL LOW COMPLEX 20 MIN: CPT

## 2023-03-12 PROCEDURE — 25010000002 PIPERACILLIN SOD-TAZOBACTAM PER 1 G: Performed by: SURGERY

## 2023-03-12 PROCEDURE — 82962 GLUCOSE BLOOD TEST: CPT

## 2023-03-12 PROCEDURE — 25010000002 HEPARIN (PORCINE) PER 1000 UNITS: Performed by: SURGERY

## 2023-03-12 PROCEDURE — 94761 N-INVAS EAR/PLS OXIMETRY MLT: CPT

## 2023-03-12 PROCEDURE — 63710000001 PREDNISONE PER 1 MG: Performed by: SURGERY

## 2023-03-12 PROCEDURE — 25010000002 HYDROMORPHONE 1 MG/ML SOLUTION: Performed by: SURGERY

## 2023-03-12 RX ORDER — NYSTATIN 100000 [USP'U]/G
POWDER TOPICAL EVERY 12 HOURS SCHEDULED
Status: DISCONTINUED | OUTPATIENT
Start: 2023-03-12 | End: 2023-03-15 | Stop reason: HOSPADM

## 2023-03-12 RX ADMIN — SERTRALINE 100 MG: 100 TABLET, FILM COATED ORAL at 10:06

## 2023-03-12 RX ADMIN — HEPARIN SODIUM 5000 UNITS: 5000 INJECTION INTRAVENOUS; SUBCUTANEOUS at 14:31

## 2023-03-12 RX ADMIN — TAZOBACTAM SODIUM AND PIPERACILLIN SODIUM 3.38 G: 375; 3 INJECTION, SOLUTION INTRAVENOUS at 20:03

## 2023-03-12 RX ADMIN — MORPHINE SULFATE 2 MG: 2 INJECTION, SOLUTION INTRAMUSCULAR; INTRAVENOUS at 01:22

## 2023-03-12 RX ADMIN — AMLODIPINE BESYLATE 5 MG: 5 TABLET ORAL at 10:06

## 2023-03-12 RX ADMIN — IPRATROPIUM BROMIDE AND ALBUTEROL SULFATE 3 ML: .5; 3 SOLUTION RESPIRATORY (INHALATION) at 03:38

## 2023-03-12 RX ADMIN — IPRATROPIUM BROMIDE AND ALBUTEROL SULFATE 3 ML: .5; 3 SOLUTION RESPIRATORY (INHALATION) at 19:30

## 2023-03-12 RX ADMIN — HYDROMORPHONE HYDROCHLORIDE 0.5 MG: 1 INJECTION, SOLUTION INTRAMUSCULAR; INTRAVENOUS; SUBCUTANEOUS at 14:32

## 2023-03-12 RX ADMIN — IPRATROPIUM BROMIDE AND ALBUTEROL SULFATE 3 ML: .5; 3 SOLUTION RESPIRATORY (INHALATION) at 06:13

## 2023-03-12 RX ADMIN — SODIUM CHLORIDE 50 ML/HR: 9 INJECTION, SOLUTION INTRAVENOUS at 20:03

## 2023-03-12 RX ADMIN — INSULIN HUMAN 6 UNITS: 100 INJECTION, SOLUTION PARENTERAL at 18:09

## 2023-03-12 RX ADMIN — HYDROCHLOROTHIAZIDE: 25 TABLET ORAL at 21:10

## 2023-03-12 RX ADMIN — INSULIN HUMAN 4 UNITS: 100 INJECTION, SOLUTION PARENTERAL at 00:22

## 2023-03-12 RX ADMIN — MORPHINE SULFATE 2 MG: 2 INJECTION, SOLUTION INTRAMUSCULAR; INTRAVENOUS at 06:26

## 2023-03-12 RX ADMIN — HYDROMORPHONE HYDROCHLORIDE 0.5 MG: 1 INJECTION, SOLUTION INTRAMUSCULAR; INTRAVENOUS; SUBCUTANEOUS at 12:24

## 2023-03-12 RX ADMIN — LISINOPRIL 40 MG: 20 TABLET ORAL at 10:06

## 2023-03-12 RX ADMIN — PREDNISONE 20 MG: 20 TABLET ORAL at 10:06

## 2023-03-12 RX ADMIN — TAZOBACTAM SODIUM AND PIPERACILLIN SODIUM 3.38 G: 375; 3 INJECTION, SOLUTION INTRAVENOUS at 12:54

## 2023-03-12 RX ADMIN — PANTOPRAZOLE SODIUM 40 MG: 40 TABLET, DELAYED RELEASE ORAL at 10:06

## 2023-03-12 RX ADMIN — ALPRAZOLAM 0.25 MG: 0.25 TABLET ORAL at 12:54

## 2023-03-12 RX ADMIN — NYSTATIN 1 APPLICATION: 100000 POWDER TOPICAL at 21:13

## 2023-03-12 RX ADMIN — FUROSEMIDE 20 MG: 10 INJECTION, SOLUTION INTRAMUSCULAR; INTRAVENOUS at 10:05

## 2023-03-12 RX ADMIN — OXYCODONE HYDROCHLORIDE AND ACETAMINOPHEN 1 TABLET: 10; 325 TABLET ORAL at 22:52

## 2023-03-12 RX ADMIN — IPRATROPIUM BROMIDE AND ALBUTEROL SULFATE 3 ML: .5; 3 SOLUTION RESPIRATORY (INHALATION) at 16:07

## 2023-03-12 RX ADMIN — HEPARIN SODIUM 5000 UNITS: 5000 INJECTION INTRAVENOUS; SUBCUTANEOUS at 21:10

## 2023-03-12 RX ADMIN — IPRATROPIUM BROMIDE AND ALBUTEROL SULFATE 3 ML: .5; 3 SOLUTION RESPIRATORY (INHALATION) at 23:27

## 2023-03-12 RX ADMIN — TAZOBACTAM SODIUM AND PIPERACILLIN SODIUM 3.38 G: 375; 3 INJECTION, SOLUTION INTRAVENOUS at 06:26

## 2023-03-12 RX ADMIN — HEPARIN SODIUM 5000 UNITS: 5000 INJECTION INTRAVENOUS; SUBCUTANEOUS at 06:26

## 2023-03-12 RX ADMIN — INSULIN HUMAN 2 UNITS: 100 INJECTION, SOLUTION PARENTERAL at 12:53

## 2023-03-12 RX ADMIN — HYDROCODONE BITARTRATE AND ACETAMINOPHEN 1 TABLET: 5; 325 TABLET ORAL at 11:01

## 2023-03-12 RX ADMIN — HYDROMORPHONE HYDROCHLORIDE 0.5 MG: 1 INJECTION, SOLUTION INTRAMUSCULAR; INTRAVENOUS; SUBCUTANEOUS at 10:01

## 2023-03-12 RX ADMIN — IPRATROPIUM BROMIDE AND ALBUTEROL SULFATE 3 ML: .5; 3 SOLUTION RESPIRATORY (INHALATION) at 11:25

## 2023-03-12 RX ADMIN — MORPHINE SULFATE 2 MG: 2 INJECTION, SOLUTION INTRAMUSCULAR; INTRAVENOUS at 20:03

## 2023-03-12 RX ADMIN — HYDROMORPHONE HYDROCHLORIDE 0.5 MG: 1 INJECTION, SOLUTION INTRAMUSCULAR; INTRAVENOUS; SUBCUTANEOUS at 03:56

## 2023-03-12 RX ADMIN — OXYCODONE HYDROCHLORIDE AND ACETAMINOPHEN 1 TABLET: 10; 325 TABLET ORAL at 00:28

## 2023-03-12 RX ADMIN — HYDROCODONE BITARTRATE AND ACETAMINOPHEN 1 TABLET: 5; 325 TABLET ORAL at 16:30

## 2023-03-12 NOTE — PROGRESS NOTES
[facility]     Progress Note    Patient Name: Italia Olvera  : 1960  MRN: [id number]  Primary Care Physician:  Carloz Shoemaker MD  Date of admission: 3/7/2023      Subjective   Brief summary.   *Patient admitted with ischemic foot.  Status post amputation right AKA      HPI:  Patient awake alert no chest pain, no shortness of breath, anxious also itching in groin area and skin folds    Review of Systems     No chest pain or shortness of breath, anxiety.      Objective     Vitals:   Temp:  [97.7 °F (36.5 °C)-98.8 °F (37.1 °C)] 98.8 °F (37.1 °C)  Heart Rate:  [69-80] 80  Resp:  [16-22] 18  BP: (151-196)/() 151/56  Flow (L/min):  [2-3] 2    Physical Exam :     Elderly female not in acute distress, heart regular.  Lungs clear.  Status post right AKA.      Result Review:  I have personally reviewed the results from the time of this admission to 3/12/2023 18:05 EDT and agree with these findings:  []  Laboratory  []  Microbiology  []  Radiology  []  EKG/Telemetry   []  Cardiology/Vascular   []  Pathology  []  Old records  []  Other:           Assessment / Plan       Active Hospital Problems:  Active Hospital Problems    Diagnosis    • **Ischemic foot    • Atherosclerosis of native artery of extremity (HCC)    • Chronic respiratory failure with hypoxia (HCC)    • T2DM (type 2 diabetes mellitus) (CMS/HCC)- uncontrolled    • Bipolar disorder (H[facility])        Plan:   Stable  Appreciate vascular surgeon's input  To watch for additional couple of days  Continue current meds and pain control  Mycostatin powder for intertriginous rash  Social service consult for discharge planning       DVT prophylaxis:  Medical DVT prophylaxis orders are present.    CODE STATUS:   Code Status (Patient has no pulse and is not breathing): CPR (Attempt to Resuscitate)  Medical Interventions (Patient has pulse or is breathing): Full Support            Electronically signed by Rudy Garnett MD, 23, 6:05 PM EDT.

## 2023-03-12 NOTE — THERAPY EVALUATION
Acute Care - Physical Therapy Initial Evaluation   [facility]     Patient Name: Italia Olvera  : 1960  MRN: [id number]  Today's Date: 3/12/2023      Visit Dx:     ICD-10-CM ICD-9-CM   1. Ischemic foot  I99.8 459.9   2. Osteomyelitis of right foot, unspecified type ([facility])  M86.9 730.27   3. Sepsis, due to unspecified organism, unspecified whether acute organ dysfunction present ([facility])  A41.9 038.9     995.91   4. Foot infection  L08.9 686.9   5. Oropharyngeal dysphagia  R13.12 787.22   6. Diabetes mellitus with peripheral vascular disease ([facility])  E11.51 250.70     443.81   7. Difficulty walking  R26.2 719.7     Patient Active Problem List   Diagnosis   • Bladder disorder   • Depression   • Hypertension   • Peripheral neuropathy   • Osteoarthritis of knee   • COPD with acute exacerbation ([facility])   • Chronic back pain   • Multifocal pneumonia   • T2DM (type 2 diabetes mellitus) (CMS/[facility])- uncontrolled   • Bipolar disorder ([facility])   • Chronic respiratory failure with hypoxia ([facility])   • Acute on chronic respiratory failure with hypoxia ([facility])   • Chronic respiratory failure with hypoxia, on home oxygen therapy ([facility])   • Overweight (BMI 25.0-29.9)   • Acute on chronic systolic CHF (congestive heart failure) ([facility])   • Essential hypertension   • DM (diabetes mellitus), type 1 ([facility])   • Acute UTI (urinary tract infection)   • Anemia   • Sepsis ([facility])   • COPD with acute exacerbation ([facility])   • Hypokalemia   • Moderate malnutrition (CMS/[facility])   • Decubitus ulcer of back, stage 3 ([facility])   • Acute osteomyelitis of toe of right foot ([facility])   • Anxiety disorder   • Diabetes mellitus with peripheral vascular disease ([facility])   • Decubitus ulcer, unstageable ([facility])   • Atherosclerosis of native artery of extremity ([facility])   • Paroxysmal atrial fibrillation ([facility])   • Intertrigo   • Ischemic foot     Past Medical History:   Diagnosis Date   • Acid reflux    • ACL tear 2015    PCL/ACL TEAR/RUPTURE   • Acute shoulder bursitis, right  08/23/2015   • Anxiety    • Arthritis    • Asthma    • Bipolar 1 disorder ([facility])     untreated   • Bladder disorder    • Cancer ([facility])     CERVICAL CANCER   • CHF (congestive heart failure) ([facility])     ASYMPTOMATIC- NO CARDIOLOGIST- SEE'S DR ARRIAZA (PCP)- DENIED CP/SOB   • Chronic back pain    • Chronic pain     CHRONIC BACK, NECK, LEG, FOOT, & FACE PAIN   • COPD (chronic obstructive pulmonary disease) ([facility])     USES INHALERS   • Depression    • Diabetes mellitus ([facility])     BG RUND AROUND 140 IN AM   • DJD (degenerative joint disease)    • Gangrene ([facility])     RT SECOND AND FIFTH TOES   • GERD (gastroesophageal reflux disease)    • HBP (high blood pressure)    • Hip pain 09/15/2015   • Hypertension    • Knee injury 08/19/2015   • Knee pain, right 09/15/2015   • Limb swelling    • Neuropathy    • On home O2     2-3L/NC PRN   • Osteoarthritis, knee 09/01/2015   • Osteoporosis    • Peripheral neuropathy    • Renal failure 1994    NO CURRENT PROBLEMS   • Seasonal allergies    • Shoulder tendonitis 08/23/2015   • Sleep apnea    • Smoker    • SOB (shortness of breath)     NONE CURENTLY   • Tendinitis of right rotator cuff 08/23/2015     Past Surgical History:   Procedure Laterality Date   • AMPUTATION DIGIT Right 12/6/2022    Procedure: Amputation of right second and fifth toes;  Surgeon: Gabino Russell MD;  Location: [facility] MAIN OR;  Service: Vascular;  Laterality: Right;   • BACK SURGERY     • BLADDER REPAIR     • BRONCHOSCOPY N/A 07/10/2021    Procedure: BRONCHOSCOPY WITH BRONCHOALVEOLAR LAVAGE, POSSIBLE BIOPSY, BRUSHING, WASHING, AIRWAY INSPECTION;  Surgeon: Rodrigo Reyes MD;  Location: [facility] MAIN OR;  Service: Pulmonary;  Laterality: N/A;   • BRONCHOSCOPY N/A 07/10/2021    Procedure: BRONCHOSCOPY;  Surgeon: Rodrigo Reyes MD;  Location: [facility] ENDOSCOPY;  Service: Pulmonary;  Laterality: N/A;   • CARDIAC CATHETERIZATION Right 11/03/2022    Procedure: Aortogram with right leg angiogram, possible angioplasty or stenting  ;  Surgeon: Gabino Russell MD;  Location: [facility] CATH INVASIVE LOCATION;  Service: Vascular;  Laterality: Right;   • CARDIAC CATHETERIZATION Right 3/8/2023    Procedure: Right leg angiogram, possible angioplasty or stenting;  Surgeon: Gabino Russell MD;  Location: [facility] CATH INVASIVE LOCATION;  Service: Vascular;  Laterality: Right;   • CHOLECYSTECTOMY     • ENDOSCOPY     • FRACTURE SURGERY      SKULL FRACTURE   • GALLBLADDER SURGERY     • HERNIA REPAIR     • HYSTERECTOMY     • INTERVENTIONAL RADIOLOGY PROCEDURE Right 03/03/2022    Procedure: Abdominal Aortagram with Runoff;  Surgeon: Marleny Yoon MD;  Location: [facility] CATH INVASIVE LOCATION;  Service: Peripheral Vascular;  Laterality: Right;   • JOINT REPLACEMENT     • OTHER SURGICAL HISTORY      ARTIFICIAL JOINTS/LIMBS   • OTHER SURGICAL HISTORY      FACE SURGERY, UNSPECIFIED   • TOTAL HIP ARTHROPLASTY Right    • TOTAL KNEE ARTHROPLASTY Left      PT Assessment (last 12 hours)     PT Evaluation and Treatment     Row Name 03/12/23 1609          Physical Therapy Time and Intention    Subjective Information complains of;pain  -CS     Document Type evaluation  -CS     Mode of Treatment individual therapy;physical therapy  -CS     Patient Effort fair  -CS     Symptoms Noted During/After Treatment increased pain  -CS     Row Name 03/12/23 1609          General Information    Patient Profile Reviewed yes  -CS     Patient Observations alert;cooperative;agree to therapy  -CS     Prior Level of Function --  Pt reported she has assist at home with all functional mobility, transfers &ADLs. She completes stand pivot tfs w/  to/from w/c, uses tub bench, & BSC over regular commode. She does have a rolling walker to assist w/stand pivot transfers  -CS     Equipment Currently Used at Home walker, rolling;wheelchair;oxygen;commode, 3-in-1;bath bench;ramp  Patient utilizes 2 L of supplemental oxygen at night  -CS     Existing Precautions/Restrictions fall;oxygen  therapy device and L/min  Patient on 2 L of supplemental oxygen  -CS     Limitations/Impairments safety/cognitive  After pain medication patient does have decreased safety awareness and intermittent confusion  -CS     Barriers to Rehab none identified  -CS     Row Name 03/12/23 1609          Living Environment    Current Living Arrangements home  -CS     Home Accessibility wheelchair accessible  -CS     People in Home spouse  -CS     Primary Care Provided by spouse/significant other;self  -CS     Row Name 03/12/23 1609          Pain    Pretreatment Pain Rating 10/10  -CS     Posttreatment Pain Rating 8/10  -CS     Pain Location - Side/Orientation Right  -CS     Pain Location - residual limb  -CS     Pain Intervention(s) Nursing Notified  Patient received pain medication during session  -CS     Row Name 03/12/23 1609          Cognition    Orientation Status (Cognition) oriented x 3  -CS     Row Name 03/12/23 1609          Range of Motion Comprehensive    General Range of Motion lower extremity range of motion deficits identified  -CS     Comment, General Range of Motion Left lower extremity within functional limits for active assistive range of motion and right hip limited due to prior total hip arthroplasty and pain from right above-the-knee amputation on 3/11/2023  -CS     Row Name 03/12/23 1609          General Lower Extremity Assessment (Range of Motion)    Comment: Lower Extremity ROM Patient educated on performing right hip range of motion to reduce risk of hip flexion contracture.  Attempted to move patient in small range of hip abduction, hip extension, and flexion with little motion achieved due to patient's increased pain.  -CS     Row Name 03/12/23 1609          Strength Comprehensive (MMT)    General Manual Muscle Testing (MMT) Assessment lower extremity strength deficits identified  -CS     Comment, General Manual Muscle Testing (MMT) Assessment Left lower extremity assessed at 3 -/5 and right hip  assessed at 2 -/5  -     Row Name 03/12/23 1609          Bed Mobility    Bed Mobility bed mobility (all) activities  -     All Activities, [place] (Bed Mobility) maximum assist (25% patient effort);dependent (less than 25% patient effort);1 person assist;2 person assist;verbal cues  -     Bed Mobility, Safety Issues decreased use of arms for pushing/pulling;decreased use of legs for bridging/pushing  -     Assistive Device (Bed Mobility) bed rails;draw sheet  -     Comment, (Bed Mobility) No further functional transfers completed this date due to patient's increased pain and difficulty completing bed mobility.  PT assisted nurse on multiple rounds of rolling right to left in the bed to remove bedpan and change linens.  Patient and nurse educated on appropriate techniques for bed mobility using bed rails and patient's left lower extremity to assist.  -     Row Name 03/12/23 1609          Gait/Stairs (Locomotion)    Comment, (Gait/Stairs) Patient was not ambulating prior to hospitalization; she was only taking a couple steps for stand pivot transfers.  -     Row Name 03/12/23 1609          Safety Issues, Functional Mobility    Impairments Affecting Function (Mobility) balance;endurance/activity tolerance;strength;pain  -     Row Name 03/12/23 1609          Balance    Comment, Balance Unable to be assessed but likely impaired  -     Row Name             Wound 02/04/23 0100 Bilateral coccyx Pressure Injury    Wound - Properties Group Placement Date: 02/04/23  - Placement Time: 0100  -LH Present on Hospital Admission: Y  -LH Side: Bilateral  -LH Location: coccyx  -LH Primary Wound Type: Pressure inj  -LH    Retired Wound - Properties Group Placement Date: 02/04/23  - Placement Time: 0100  -LH Present on Hospital Admission: Y  -LH Side: Bilateral  -LH Location: coccyx  -LH Primary Wound Type: Pressure inj  -LH    Retired Wound - Properties Group Date first assessed: 02/04/23  - Time first  assessed: 0100  -LH Present on Hospital Admission: Y  -LH Side: Bilateral  -LH Location: coccyx  -LH Primary Wound Type: Pressure inj  -LH    Row Name             Wound 03/08/23 1537 Right anterior foot Soft Tissue Necrosis    Wound - Properties Group Placement Date: 03/08/23  -ANUPAMA Placement Time: 1537  -ANUPAMA Present on Hospital Admission: Y  -ANUPAMA Side: Right  -ANUPAMA Orientation: anterior  -ANUPAMA Location: foot  -ANUPAMA Primary Wound Type: Soft tissue  -ANUPAMA    Retired Wound - Properties Group Placement Date: 03/08/23  -ANUPAMA Placement Time: 1537  -ANUPAMA Present on Hospital Admission: Y  -ANUPAMA Side: Right  -ANUPAMA Orientation: anterior  -ANUPAMA Location: foot  -ANUPAMA Primary Wound Type: Soft tissue  -ANUPAMA    Retired Wound - Properties Group Date first assessed: 03/08/23  -ANUPAMA Time first assessed: 1537  -ANUPAMA Present on Hospital Admission: Y  -ANUPAMA Side: Right  -ANUPAMA Location: foot  -ANUPAMA Primary Wound Type: Soft tissue  -ANUPAMA    Row Name             Wound 03/11/23 1355 Right anterior thigh Incision    Wound - Properties Group Placement Date: 03/11/23  -SM Placement Time: 1355  -SM Side: Right  -SM Orientation: anterior  -SM Location: thigh  -SM Primary Wound Type: Incision  -SM    Retired Wound - Properties Group Placement Date: 03/11/23  -SM Placement Time: 1355  -SM Side: Right  -SM Orientation: anterior  -SM Location: thigh  -SM Primary Wound Type: Incision  -SM    Retired Wound - Properties Group Date first assessed: 03/11/23  -SM Time first assessed: 1355  -SM Side: Right  -SM Location: thigh  -SM Primary Wound Type: Incision  -SM    Row Name 03/12/23 1609          Plan of Care Review    Plan of Care Reviewed With patient  -CS     Progress no change  -CS     Outcome Evaluation Pt presents with limitations that impede their ability to safely and independently transfer and ambulate. The skills of a therapist will be required to safely and effectively implement the following treatment plan to restore maximal level of function.  -CS     Row Name 03/12/23 1601           Therapy Assessment/Plan (PT)    Rehab Potential (PT) good, to achieve stated therapy goals  -CS     Criteria for Skilled Interventions Met (PT) yes;skilled treatment is necessary  -CS     Therapy Frequency (PT) daily  -CS     Predicted Duration of Therapy Intervention (PT) 10 days  -CS     Problem List (PT) problems related to;balance;mobility;strength;pain;range of motion (ROM)  -CS     Activity Limitations Related to Problem List (PT) unable to ambulate safely;unable to transfer safely  -CS     Row Name 03/12/23 1609          PT Evaluation Complexity    History, PT Evaluation Complexity 1-2 personal factors and/or comorbidities  -CS     Examination of Body Systems (PT Eval Complexity) total of 4 or more elements  -CS     Clinical Presentation (PT Evaluation Complexity) stable  -CS     Clinical Decision Making (PT Evaluation Complexity) low complexity  -CS     Overall Complexity (PT Evaluation Complexity) low complexity  -CS     Row Name 03/12/23 1605          Therapy Plan Review/Discharge Plan (PT)    Therapy Plan Review (PT) evaluation/treatment results reviewed;patient  -CS     Row Name 03/12/23 1605          Physical Therapy Goals    Bed Mobility Goal Selection (PT) bed mobility, PT goal 1  -CS     Transfer Goal Selection (PT) transfer, PT goal 1  -CS     Row Name 03/12/23 1608          Bed Mobility Goal 1 (PT)    Activity/Assistive Device (Bed Mobility Goal 1, PT) bed mobility activities, all  -CS     [place] Level/Cues Needed (Bed Mobility Goal 1, PT) moderate assist (50-74% patient effort)  -CS     Time Frame (Bed Mobility Goal 1, PT) long term goal (LTG);10 days  -CS     Row Name 03/12/23 1605          Transfer Goal 1 (PT)    Activity/Assistive Device (Transfer Goal 1, PT) sit-to-stand/stand-to-sit;bed-to-chair/chair-to-bed;walker, rolling;sliding board  -CS     [place] Level/Cues Needed (Transfer Goal 1, PT) moderate assist (50-74% patient effort)  -CS     Time Frame (Transfer Goal 1, PT)  long term goal (LTG);10 days  -           User Key  (r) = Recorded By, (t) = Taken By, (c) = Cosigned By    Initials Name Provider Type    Nikki Rodney, RN Registered Nurse    Audrey Mcgowan RN Registered Nurse    Alix Powell, PT Physical Therapist    Homero Nance RN Registered Nurse                Physical Therapy Education     Title: PT OT SLP Therapies (In Progress)     Topic: Physical Therapy (In Progress)     Point: Mobility training (In Progress)     Learning Progress Summary           Patient Acceptance, E, NR by CS at 3/12/2023 1647                   Point: Home exercise program (Not Started)     Learner Progress:  Not documented in this visit.          Point: Body mechanics (Not Started)     Learner Progress:  Not documented in this visit.          Point: Precautions (In Progress)     Learning Progress Summary           Patient Acceptance, E, NR by  at 3/12/2023 1647                               User Key     Initials Effective Dates Name Provider Type Discipline     04/25/21 -  Alix Miramontes, PT Physical Therapist PT              PT Recommendation and Plan  Anticipated Discharge Disposition (PT): inpatient rehabilitation facility, sub acute care setting  Planned Therapy Interventions (PT): balance training, bed mobility training, gait training, strengthening, transfer training  Therapy Frequency (PT): daily  Plan of Care Reviewed With: patient  Progress: no change  Outcome Evaluation: Pt presents with limitations that impede their ability to safely and independently transfer and ambulate. The skills of a therapist will be required to safely and effectively implement the following treatment plan to restore maximal level of function.   Outcome Measures     Row Name 03/12/23 1600             How much help from another person do you currently need...    Turning from your back to your side while in flat bed without using bedrails? 1  -CS      Moving from lying on back to  sitting on the side of a flat bed without bedrails? 1  -CS      Moving to and from a bed to a chair (including a wheelchair)? 1  -CS      Standing up from a chair using your arms (e.g., wheelchair, bedside chair)? 1  -CS      Climbing 3-5 steps with a railing? 1  -CS      To walk in hospital room? 1  -CS      AM-PAC 6 Clicks Score (PT) 6  -CS         Functional Assessment    Outcome Measure Options AM-PAC 6 Clicks Basic Mobility (PT)  -CS            User Key  (r) = Recorded By, (t) = Taken By, (c) = Cosigned By    Initials Name Provider Type    CS Alix Miramontes, PT Physical Therapist                 Time Calculation:    PT Charges     Row Name 03/12/23 1629             Time Calculation    PT Received On 03/12/23  -CS      PT Goal Re-Cert Due Date 03/21/23  -CS         Timed Charges    31138 - PT Therapeutic Activity Minutes 23  -CS         Untimed Charges    PT Eval/Re-eval Minutes 32  -CS         Total Minutes    Timed Charges Total Minutes 23  -CS      Untimed Charges Total Minutes 32  -CS       Total Minutes 55  -CS            User Key  (r) = Recorded By, (t) = Taken By, (c) = Cosigned By    Initials Name Provider Type    CS Alix Miramontes, PT Physical Therapist              Therapy Charges for Today     Code Description Service Date Service Provider Modifiers Qty    54926522159 HC PT THERAPEUTIC ACT EA 15 MIN 3/12/2023 Alix Miramontes, PT GP 2    41563099199 HC PT EVAL LOW COMPLEXITY 3 3/12/2023 Alix Miramontes, PT GP 1          PT G-Codes  Outcome Measure Options: AM-PAC 6 Clicks Basic Mobility (PT)  AM-PAC 6 Clicks Score (PT): 6    Alix Miramontes PT  3/12/2023

## 2023-03-12 NOTE — PLAN OF CARE
Goal Outcome Evaluation:  Plan of Care Reviewed With: patient        Progress: no change  Outcome Evaluation: Unable to void. Required straight catheritization once during shift. Pain medication given PRN. Pankaj Smith RN

## 2023-03-12 NOTE — PROGRESS NOTES
[facility]     Progress Note    Patient Name: Italia Olvera  : 1960  MRN: [id number]  Primary Care Physician:  Carloz Shoemaker MD  Date of admission: 3/7/2023    Subjective   Subjective     Patient is now POD 1 S/p right above-knee amputation. Doing okay with pain controlled.    Objective   Objective     Vitals:   Temp:  [97.1 °F (36.2 °C)-98.3 °F (36.8 °C)] 97.8 °F (36.6 °C)  Heart Rate:  [69-81] 75  Resp:  [15-22] 18  BP: (130-196)/() 166/60  Flow (L/min):  [2-5] 2    Physical Exam   General: Alert, no acute distress  Left groin: No bleeding, no hematoma.  R BKA site: C/D/I      Assessment & Plan   Assessment / Plan     Assessment/Plan:    POD # 1 S/p R AKA doing fine.  2. PT Eval and treat.  They would like a different rehab than [place] which she has been to in past but were not happy with.  3. Cont DVT prophylaxis.  4. Lab check in AM.  5. Will follow along at a distance.  6. Dispo per primary team after POD # 3 wound check.    Active Hospital Problems:  Active Hospital Problems    Diagnosis    • **Ischemic foot    • Atherosclerosis of native artery of extremity (HCC)    • Chronic respiratory failure with hypoxia ([facility])    • T2DM (type 2 diabetes mellitus) (CMS/HCC)- uncontrolled    • Bipolar disorder ([facility])

## 2023-03-13 LAB
ALBUMIN SERPL-MCNC: 2.3 G/DL (ref 3.5–5.2)
ALBUMIN/GLOB SERPL: 0.6 G/DL
ALP SERPL-CCNC: 104 U/L (ref 39–117)
ALT SERPL W P-5'-P-CCNC: 6 U/L (ref 1–33)
ANION GAP SERPL CALCULATED.3IONS-SCNC: 9.9 MMOL/L (ref 5–15)
AST SERPL-CCNC: 18 U/L (ref 1–32)
BASOPHILS # BLD AUTO: 0.02 10*3/MM3 (ref 0–0.2)
BASOPHILS NFR BLD AUTO: 0.2 % (ref 0–1.5)
BILIRUB SERPL-MCNC: 0.4 MG/DL (ref 0–1.2)
BUN SERPL-MCNC: 15 MG/DL (ref 8–23)
BUN/CREAT SERPL: 28.3 (ref 7–25)
CALCIUM SPEC-SCNC: 8.5 MG/DL (ref 8.6–10.5)
CHLORIDE SERPL-SCNC: 103 MMOL/L (ref 98–107)
CO2 SERPL-SCNC: 24.1 MMOL/L (ref 22–29)
CREAT SERPL-MCNC: 0.53 MG/DL (ref 0.57–1)
DEPRECATED RDW RBC AUTO: 53.5 FL (ref 37–54)
EGFRCR SERPLBLD CKD-EPI 2021: 104.1 ML/MIN/1.73
EOSINOPHIL # BLD AUTO: 0.06 10*3/MM3 (ref 0–0.4)
EOSINOPHIL NFR BLD AUTO: 0.5 % (ref 0.3–6.2)
ERYTHROCYTE [DISTWIDTH] IN BLOOD BY AUTOMATED COUNT: 17.2 % (ref 12.3–15.4)
GLOBULIN UR ELPH-MCNC: 4.1 GM/DL
GLUCOSE BLDC GLUCOMTR-MCNC: 161 MG/DL (ref 70–99)
GLUCOSE BLDC GLUCOMTR-MCNC: 226 MG/DL (ref 70–99)
GLUCOSE BLDC GLUCOMTR-MCNC: 258 MG/DL (ref 70–99)
GLUCOSE SERPL-MCNC: 164 MG/DL (ref 65–99)
HCT VFR BLD AUTO: 29.9 % (ref 34–46.6)
HGB BLD-MCNC: 9.4 G/DL (ref 12–15.9)
IMM GRANULOCYTES # BLD AUTO: 0.12 10*3/MM3 (ref 0–0.05)
IMM GRANULOCYTES NFR BLD AUTO: 1.1 % (ref 0–0.5)
LYMPHOCYTES # BLD AUTO: 2.37 10*3/MM3 (ref 0.7–3.1)
LYMPHOCYTES NFR BLD AUTO: 20.8 % (ref 19.6–45.3)
MCH RBC QN AUTO: 27.1 PG (ref 26.6–33)
MCHC RBC AUTO-ENTMCNC: 31.4 G/DL (ref 31.5–35.7)
MCV RBC AUTO: 86.2 FL (ref 79–97)
MONOCYTES # BLD AUTO: 0.88 10*3/MM3 (ref 0.1–0.9)
MONOCYTES NFR BLD AUTO: 7.7 % (ref 5–12)
NEUTROPHILS NFR BLD AUTO: 69.7 % (ref 42.7–76)
NEUTROPHILS NFR BLD AUTO: 7.92 10*3/MM3 (ref 1.7–7)
NRBC BLD AUTO-RTO: 0 /100 WBC (ref 0–0.2)
PLATELET # BLD AUTO: 253 10*3/MM3 (ref 140–450)
PMV BLD AUTO: 10.6 FL (ref 6–12)
POTASSIUM SERPL-SCNC: 4.5 MMOL/L (ref 3.5–5.2)
PROT SERPL-MCNC: 6.4 G/DL (ref 6–8.5)
RBC # BLD AUTO: 3.47 10*6/MM3 (ref 3.77–5.28)
SODIUM SERPL-SCNC: 137 MMOL/L (ref 136–145)
WBC NRBC COR # BLD: 11.37 10*3/MM3 (ref 3.4–10.8)

## 2023-03-13 PROCEDURE — 25010000002 HEPARIN (PORCINE) PER 1000 UNITS: Performed by: SURGERY

## 2023-03-13 PROCEDURE — 82962 GLUCOSE BLOOD TEST: CPT

## 2023-03-13 PROCEDURE — 94799 UNLISTED PULMONARY SVC/PX: CPT

## 2023-03-13 PROCEDURE — 25010000002 HYDROMORPHONE 1 MG/ML SOLUTION: Performed by: SURGERY

## 2023-03-13 PROCEDURE — 25010000002 FUROSEMIDE PER 20 MG: Performed by: SURGERY

## 2023-03-13 PROCEDURE — 97110 THERAPEUTIC EXERCISES: CPT

## 2023-03-13 PROCEDURE — 94761 N-INVAS EAR/PLS OXIMETRY MLT: CPT

## 2023-03-13 PROCEDURE — 25010000002 PIPERACILLIN SOD-TAZOBACTAM PER 1 G: Performed by: SURGERY

## 2023-03-13 PROCEDURE — 85025 COMPLETE CBC W/AUTO DIFF WBC: CPT | Performed by: INTERNAL MEDICINE

## 2023-03-13 PROCEDURE — 80053 COMPREHEN METABOLIC PANEL: CPT | Performed by: INTERNAL MEDICINE

## 2023-03-13 PROCEDURE — 63710000001 INSULIN REGULAR HUMAN PER 5 UNITS: Performed by: SURGERY

## 2023-03-13 PROCEDURE — 25010000002 MORPHINE PER 10 MG: Performed by: SURGERY

## 2023-03-13 PROCEDURE — 94664 DEMO&/EVAL PT USE INHALER: CPT

## 2023-03-13 PROCEDURE — 63710000001 PREDNISONE PER 1 MG: Performed by: SURGERY

## 2023-03-13 RX ADMIN — OXYCODONE HYDROCHLORIDE AND ACETAMINOPHEN 1 TABLET: 10; 325 TABLET ORAL at 14:20

## 2023-03-13 RX ADMIN — HYDROCHLOROTHIAZIDE: 25 TABLET ORAL at 12:09

## 2023-03-13 RX ADMIN — IPRATROPIUM BROMIDE AND ALBUTEROL SULFATE 3 ML: .5; 3 SOLUTION RESPIRATORY (INHALATION) at 07:06

## 2023-03-13 RX ADMIN — MORPHINE SULFATE 2 MG: 2 INJECTION, SOLUTION INTRAMUSCULAR; INTRAVENOUS at 19:52

## 2023-03-13 RX ADMIN — INSULIN HUMAN 2 UNITS: 100 INJECTION, SOLUTION PARENTERAL at 05:12

## 2023-03-13 RX ADMIN — OXYCODONE HYDROCHLORIDE AND ACETAMINOPHEN 1 TABLET: 10; 325 TABLET ORAL at 08:16

## 2023-03-13 RX ADMIN — PREDNISONE 20 MG: 20 TABLET ORAL at 08:15

## 2023-03-13 RX ADMIN — TAZOBACTAM SODIUM AND PIPERACILLIN SODIUM 3.38 G: 375; 3 INJECTION, SOLUTION INTRAVENOUS at 04:59

## 2023-03-13 RX ADMIN — ACETAMINOPHEN 650 MG: 325 TABLET ORAL at 17:34

## 2023-03-13 RX ADMIN — HYDROMORPHONE HYDROCHLORIDE 0.5 MG: 1 INJECTION, SOLUTION INTRAMUSCULAR; INTRAVENOUS; SUBCUTANEOUS at 02:01

## 2023-03-13 RX ADMIN — IPRATROPIUM BROMIDE AND ALBUTEROL SULFATE 3 ML: .5; 3 SOLUTION RESPIRATORY (INHALATION) at 12:20

## 2023-03-13 RX ADMIN — HEPARIN SODIUM 5000 UNITS: 5000 INJECTION INTRAVENOUS; SUBCUTANEOUS at 14:20

## 2023-03-13 RX ADMIN — HEPARIN SODIUM 5000 UNITS: 5000 INJECTION INTRAVENOUS; SUBCUTANEOUS at 21:52

## 2023-03-13 RX ADMIN — SERTRALINE 100 MG: 100 TABLET, FILM COATED ORAL at 08:16

## 2023-03-13 RX ADMIN — INSULIN HUMAN 6 UNITS: 100 INJECTION, SOLUTION PARENTERAL at 17:34

## 2023-03-13 RX ADMIN — MORPHINE SULFATE 2 MG: 2 INJECTION, SOLUTION INTRAMUSCULAR; INTRAVENOUS at 16:04

## 2023-03-13 RX ADMIN — SODIUM CHLORIDE 50 ML/HR: 9 INJECTION, SOLUTION INTRAVENOUS at 15:15

## 2023-03-13 RX ADMIN — HYDROCHLOROTHIAZIDE: 25 TABLET ORAL at 21:51

## 2023-03-13 RX ADMIN — NYSTATIN: 100000 POWDER TOPICAL at 21:53

## 2023-03-13 RX ADMIN — NYSTATIN: 100000 POWDER TOPICAL at 08:16

## 2023-03-13 RX ADMIN — FUROSEMIDE 20 MG: 10 INJECTION, SOLUTION INTRAMUSCULAR; INTRAVENOUS at 08:16

## 2023-03-13 RX ADMIN — POLYETHYLENE GLYCOL 3350 17 G: 17 POWDER, FOR SOLUTION ORAL at 12:49

## 2023-03-13 RX ADMIN — IPRATROPIUM BROMIDE AND ALBUTEROL SULFATE 3 ML: .5; 3 SOLUTION RESPIRATORY (INHALATION) at 15:34

## 2023-03-13 RX ADMIN — IPRATROPIUM BROMIDE AND ALBUTEROL SULFATE 3 ML: .5; 3 SOLUTION RESPIRATORY (INHALATION) at 18:48

## 2023-03-13 RX ADMIN — PANTOPRAZOLE SODIUM 40 MG: 40 TABLET, DELAYED RELEASE ORAL at 08:16

## 2023-03-13 RX ADMIN — SENNOSIDES AND DOCUSATE SODIUM 2 TABLET: 8.6; 5 TABLET ORAL at 21:52

## 2023-03-13 RX ADMIN — INSULIN HUMAN 4 UNITS: 100 INJECTION, SOLUTION PARENTERAL at 12:48

## 2023-03-13 RX ADMIN — LISINOPRIL 40 MG: 20 TABLET ORAL at 08:15

## 2023-03-13 RX ADMIN — AMLODIPINE BESYLATE 5 MG: 5 TABLET ORAL at 08:15

## 2023-03-13 RX ADMIN — MORPHINE SULFATE 2 MG: 2 INJECTION, SOLUTION INTRAMUSCULAR; INTRAVENOUS at 00:10

## 2023-03-13 RX ADMIN — HEPARIN SODIUM 5000 UNITS: 5000 INJECTION INTRAVENOUS; SUBCUTANEOUS at 05:01

## 2023-03-13 RX ADMIN — MORPHINE SULFATE 2 MG: 2 INJECTION, SOLUTION INTRAMUSCULAR; INTRAVENOUS at 11:50

## 2023-03-13 RX ADMIN — IPRATROPIUM BROMIDE AND ALBUTEROL SULFATE 3 ML: .5; 3 SOLUTION RESPIRATORY (INHALATION) at 03:06

## 2023-03-13 RX ADMIN — OXYCODONE HYDROCHLORIDE AND ACETAMINOPHEN 1 TABLET: 10; 325 TABLET ORAL at 21:52

## 2023-03-13 RX ADMIN — INSULIN HUMAN 4 UNITS: 100 INJECTION, SOLUTION PARENTERAL at 00:07

## 2023-03-13 NOTE — THERAPY TREATMENT NOTE
Acute Care - Physical Therapy Treatment Note  [facility]     Patient Name: Italia Olvera  : 1960  MRN: [id number]  Today's Date: 3/13/2023      Visit Dx:     ICD-10-CM ICD-9-CM   1. Ischemic foot  I99.8 459.9   2. Osteomyelitis of right foot, unspecified type ([facility])  M86.9 730.27   3. Sepsis, due to unspecified organism, unspecified whether acute organ dysfunction present ([facility])  A41.9 038.9     995.91   4. Foot infection  L08.9 686.9   5. Oropharyngeal dysphagia  R13.12 787.22   6. Diabetes mellitus with peripheral vascular disease ([facility])  E11.51 250.70     443.81   7. Difficulty walking  R26.2 719.7     Patient Active Problem List   Diagnosis   • Bladder disorder   • Depression   • Hypertension   • Peripheral neuropathy   • Osteoarthritis of knee   • COPD with acute exacerbation ([facility])   • Chronic back pain   • Multifocal pneumonia   • T2DM (type 2 diabetes mellitus) (CMS/[facility])- uncontrolled   • Bipolar disorder ([facility])   • Chronic respiratory failure with hypoxia ([facility])   • Acute on chronic respiratory failure with hypoxia ([facility])   • Chronic respiratory failure with hypoxia, on home oxygen therapy ([facility])   • Overweight (BMI 25.0-29.9)   • Acute on chronic systolic CHF (congestive heart failure) ([facility])   • Essential hypertension   • DM (diabetes mellitus), type 1 ([facility])   • Acute UTI (urinary tract infection)   • Anemia   • Sepsis ([facility])   • COPD with acute exacerbation ([facility])   • Hypokalemia   • Moderate malnutrition (CMS/[facility])   • Decubitus ulcer of back, stage 3 ([facility])   • Acute osteomyelitis of toe of right foot ([facility])   • Anxiety disorder   • Diabetes mellitus with peripheral vascular disease ([facility])   • Decubitus ulcer, unstageable ([facility])   • Atherosclerosis of native artery of extremity ([facility])   • Paroxysmal atrial fibrillation ([facility])   • Intertrigo   • Ischemic foot     Past Medical History:   Diagnosis Date   • Acid reflux    • ACL tear 2015    PCL/ACL TEAR/RUPTURE   • Acute shoulder bursitis, right 2015    • Anxiety    • Arthritis    • Asthma    • Bipolar 1 disorder ([facility])     untreated   • Bladder disorder    • Cancer (HCC)     CERVICAL CANCER   • CHF (congestive heart failure) ([facility])     ASYMPTOMATIC- NO CARDIOLOGIST- SEE'S DR ARRIAZA (PCP)- DENIED CP/SOB   • Chronic back pain    • Chronic pain     CHRONIC BACK, NECK, LEG, FOOT, & FACE PAIN   • COPD (chronic obstructive pulmonary disease) ([facility])     USES INHALERS   • Depression    • Diabetes mellitus ([facility])     BG RUND AROUND 140 IN AM   • DJD (degenerative joint disease)    • Gangrene (HCC)     RT SECOND AND FIFTH TOES   • GERD (gastroesophageal reflux disease)    • HBP (high blood pressure)    • Hip pain 09/15/2015   • Hypertension    • Knee injury 08/19/2015   • Knee pain, right 09/15/2015   • Limb swelling    • Neuropathy    • On home O2     2-3L/NC PRN   • Osteoarthritis, knee 09/01/2015   • Osteoporosis    • Peripheral neuropathy    • Renal failure 1994    NO CURRENT PROBLEMS   • Seasonal allergies    • Shoulder tendonitis 08/23/2015   • Sleep apnea    • Smoker    • SOB (shortness of breath)     NONE CURENTLY   • Tendinitis of right rotator cuff 08/23/2015     Past Surgical History:   Procedure Laterality Date   • ABOVE KNEE AMPUTATION Right 3/11/2023    Procedure: AMPUTATION ABOVE KNEE;  Surgeon: Zacarias Haywood MD;  Location: [facility] MAIN OR;  Service: Vascular;  Laterality: Right;   • AMPUTATION DIGIT Right 12/6/2022    Procedure: Amputation of right second and fifth toes;  Surgeon: Gabino Russell MD;  Location: [facility] MAIN OR;  Service: Vascular;  Laterality: Right;   • BACK SURGERY     • BLADDER REPAIR     • BRONCHOSCOPY N/A 07/10/2021    Procedure: BRONCHOSCOPY WITH BRONCHOALVEOLAR LAVAGE, POSSIBLE BIOPSY, BRUSHING, WASHING, AIRWAY INSPECTION;  Surgeon: Rodrigo Reyes MD;  Location: [facility] MAIN OR;  Service: Pulmonary;  Laterality: N/A;   • BRONCHOSCOPY N/A 07/10/2021    Procedure: BRONCHOSCOPY;  Surgeon: Rodrigo Reyes MD;  Location: [facility] ENDOSCOPY;   Service: Pulmonary;  Laterality: N/A;   • CARDIAC CATHETERIZATION Right 11/03/2022    Procedure: Aortogram with right leg angiogram, possible angioplasty or stenting ;  Surgeon: Gabino Russell MD;  Location: [facility] CATH INVASIVE LOCATION;  Service: Vascular;  Laterality: Right;   • CARDIAC CATHETERIZATION Right 3/8/2023    Procedure: Right leg angiogram, possible angioplasty or stenting;  Surgeon: Gabino Russell MD;  Location: [facility] CATH INVASIVE LOCATION;  Service: Vascular;  Laterality: Right;   • CHOLECYSTECTOMY     • ENDOSCOPY     • FRACTURE SURGERY      SKULL FRACTURE   • GALLBLADDER SURGERY     • HERNIA REPAIR     • HYSTERECTOMY     • INTERVENTIONAL RADIOLOGY PROCEDURE Right 03/03/2022    Procedure: Abdominal Aortagram with Runoff;  Surgeon: Marleny Yoon MD;  Location: [facility] CATH INVASIVE LOCATION;  Service: Peripheral Vascular;  Laterality: Right;   • JOINT REPLACEMENT     • OTHER SURGICAL HISTORY      ARTIFICIAL JOINTS/LIMBS   • OTHER SURGICAL HISTORY      FACE SURGERY, UNSPECIFIED   • TOTAL HIP ARTHROPLASTY Right    • TOTAL KNEE ARTHROPLASTY Left      PT Assessment (last 12 hours)     PT Evaluation and Treatment     Row Name 03/13/23 1439          Physical Therapy Time and Intention    Subjective Information complains of;weakness;fatigue;pain  -AYDEN     Document Type therapy note (daily note)  -AYDEN     Mode of Treatment individual therapy;physical therapy  -AYDEN     Patient Effort fair  -AYDEN     Symptoms Noted During/After Treatment increased pain;fatigue  -AYDEN     Row Name 03/13/23 1439          General Information    Patient Profile Reviewed yes  -AYDEN     Patient Observations alert;cooperative;agree to therapy  -AYDEN     Row Name 03/13/23 1439          Cognition    Orientation Status (Cognition) oriented x 3  -AYDEN     Row Name 03/13/23 1439          Bed Mobility    Bed Mobility supine-sit-supine  -AYDEN     Supine-Sit-Supine [place] (Bed Mobility) maximum assist (25% patient effort)  -AYDEN     Comment,  (Bed Mobility) Attempted supine to sit with maxA, but unable to complete as pt reported it was causing too much pain in RLE.  -AYDEN     Row Name 03/13/23 1439          Transfers    Comment, (Transfers) pt declined further transfers  -AYDEN     Row Name 03/13/23 1439          Gait/Stairs (Locomotion)    Comment, (Gait/Stairs) pt declined further transfers  -AYDEN     Row Name 03/13/23 1439          Balance    Comment, Balance unable to assess  -AYDEN     Row Name             Wound 02/04/23 0100 Bilateral coccyx Pressure Injury    Wound - Properties Group Placement Date: 02/04/23  - Placement Time: 0100  -LH Present on Hospital Admission: Y  -LH Side: Bilateral  -LH Location: coccyx  -LH Primary Wound Type: Pressure inj  -LH    Retired Wound - Properties Group Placement Date: 02/04/23  - Placement Time: 0100  -LH Present on Hospital Admission: Y  -LH Side: Bilateral  -LH Location: coccyx  -LH Primary Wound Type: Pressure inj  -LH    Retired Wound - Properties Group Date first assessed: 02/04/23  - Time first assessed: 0100  -LH Present on Hospital Admission: Y  -LH Side: Bilateral  -LH Location: coccyx  -LH Primary Wound Type: Pressure inj  -LH    Row Name             Wound 03/08/23 1537 Right anterior foot Soft Tissue Necrosis    Wound - Properties Group Placement Date: 03/08/23  -ANUPAMA Placement Time: 1537  -ANUPAMA Present on Hospital Admission: Y  -ANUPAMA Side: Right  -ANUPAMA Orientation: anterior  -ANUPAMA Location: foot  -ANUPAMA Primary Wound Type: Soft tissue  -ANUPAMA    Retired Wound - Properties Group Placement Date: 03/08/23  -ANUPAMA Placement Time: 1537  -ANUPAMA Present on Hospital Admission: Y  -ANUPAMA Side: Right  -ANUPAMA Orientation: anterior  -ANUPAMA Location: foot  -ANUPAMA Primary Wound Type: Soft tissue  -ANUPAMA    Retired Wound - Properties Group Date first assessed: 03/08/23  -ANUPAMA Time first assessed: 1537  -ANUPAMA Present on Hospital Admission: Y  -ANUPAMA Side: Right  -ANUPAMA Location: foot  -ANUPAMA Primary Wound Type: Soft tissue  -ANUPAMA    Row Name             Wound 03/11/23  1355 Right anterior thigh Incision    Wound - Properties Group Placement Date: 03/11/23 -SM Placement Time: 1355  -SM Side: Right  -SM Orientation: anterior  -SM Location: thigh  -SM Primary Wound Type: Incision  -SM    Retired Wound - Properties Group Placement Date: 03/11/23 -SM Placement Time: 1355  -SM Side: Right  -SM Orientation: anterior  -SM Location: thigh  -SM Primary Wound Type: Incision  -SM    Retired Wound - Properties Group Date first assessed: 03/11/23 -SM Time first assessed: 1355  -SM Side: Right  -SM Location: thigh  -SM Primary Wound Type: Incision  -SM    Row Name 03/13/23 1439          Plan of Care Review    Plan of Care Reviewed With patient  -AYDEN     Progress no change  -AYDEN     Row Name 03/13/23 1439          Progress Summary (PT)    Daily Progress Summary (PT) Pt declined out of bed activities. Performed supine therex with fair participation. Reported increased pain in RLE throughout and following session.  -AYDEN           User Key  (r) = Recorded By, (t) = Taken By, (c) = Cosigned By    Initials Name Provider Type    Nikki Rodney, RN Registered Nurse    Audrey Mcgowan RN Registered Nurse    Manny Mcgee, PT Physical Therapist    SM Homero Stevenson, RN Registered Nurse               Bilateral Knee Ther-ex   Exercise  Reps  Sets    Heel Slides on LLE 10 1   Ankle Pumps on LLE 10 1   Glut sets  10 1             Physical Therapy Education     Title: PT OT SLP Therapies (In Progress)     Topic: Physical Therapy (In Progress)     Point: Mobility training (In Progress)     Learning Progress Summary           Patient Acceptance, E, NR by CS at 3/12/2023 0041                   Point: Home exercise program (Not Started)     Learner Progress:  Not documented in this visit.          Point: Body mechanics (Not Started)     Learner Progress:  Not documented in this visit.          Point: Precautions (In Progress)     Learning Progress Summary           Patient Acceptance, E, NR by CS  at 3/12/2023 1647                               User Key     Initials Effective Dates Name Provider Type Discipline     04/25/21 -  Alix Miramontes, MARY Physical Therapist PT              PT Recommendation and Plan     Progress Summary (PT)  Daily Progress Summary (PT): Pt declined out of bed activities. Performed supine therex with fair participation. Reported increased pain in RLE throughout and following session.  Plan of Care Reviewed With: patient  Progress: no change   Outcome Measures     Row Name 03/13/23 1400 03/12/23 1600          How much help from another person do you currently need...    Turning from your back to your side while in flat bed without using bedrails? 2  -AYDEN 1  -CS     Moving from lying on back to sitting on the side of a flat bed without bedrails? 2  -AYDEN 1  -CS     Moving to and from a bed to a chair (including a wheelchair)? 1  -AYDEN 1  -CS     Standing up from a chair using your arms (e.g., wheelchair, bedside chair)? 1  -AYDEN 1  -CS     Climbing 3-5 steps with a railing? 1  -AYDEN 1  -CS     To walk in hospital room? 1  -AYDEN 1  -CS     AM-PAC 6 Clicks Score (PT) 8  -AYDEN 6  -CS        Functional Assessment    Outcome Measure Options AM-PAC 6 Clicks Basic Mobility (PT)  -AYDEN AM-PAC 6 Clicks Basic Mobility (PT)  -CS           User Key  (r) = Recorded By, (t) = Taken By, (c) = Cosigned By    Initials Name Provider Type    AYDEN Manny Sevilla, PT Physical Therapist    CS Alix Miramontes PT Physical Therapist                 Time Calculation:    PT Charges     Row Name 03/13/23 1444             Time Calculation    PT Received On 03/13/23  -AYDEN         Timed Charges    17424 - PT Therapeutic Exercise Minutes 5  -AYDEN      42343 - PT Therapeutic Activity Minutes 5  -AYDEN         Total Minutes    Timed Charges Total Minutes 10  -AYDEN       Total Minutes 10  -AYDEN            User Key  (r) = Recorded By, (t) = Taken By, (c) = Cosigned By    Initials Name Provider Type    AYDENManny Mendoza, PT Physical Therapist               Therapy Charges for Today     Code Description Service Date Service Provider Modifiers Qty    30942413332 HC PT THER PROC EA 15 MIN 3/13/2023 Manny Sevilla, PT GP 1          PT G-Codes  Outcome Measure Options: AM-PAC 6 Clicks Basic Mobility (PT)  AM-PAC 6 Clicks Score (PT): 8    Manny Sevilla, PT  3/13/2023

## 2023-03-13 NOTE — PROGRESS NOTES
[facility]     Progress Note    Patient Name: Italia Olvera  : 1960  MRN: [id number]  Primary Care Physician:  Carloz Shoemaker MD  Date of admission: 3/7/2023      Subjective   Brief summary.   *Patient admitted with ischemic foot.  Status post amputation right AKA      HPI:  Postop day #2.  Awake alert.  No significant shortness of breath, chronic wheezing and coughing nebulizer helping.  Continues to have pain issues    Review of Systems     No chest pain or shortness of breath, anxiety.  Pain in the stump    Objective     Vitals:   Temp:  [97.3 °F (36.3 °C)-99.3 °F (37.4 °C)] 97.5 °F (36.4 °C)  Heart Rate:  [76-83] 76  Resp:  [18-20] 18  BP: (142-191)/() 166/70  Flow (L/min):  [1-2] 1    Physical Exam :     Elderly female not in acute distress, heart regular.  Lungs clear.  Abdomen soft nontender   status post right AKA.      Result Review:  I have personally reviewed the results from the time of this admission to 3/13/2023 16:29 EDT and agree with these findings:  []  Laboratory  []  Microbiology  []  Radiology  []  EKG/Telemetry   []  Cardiology/Vascular   []  Pathology  []  Old records  []  Other:           Assessment / Plan       Active Hospital Problems:  Active Hospital Problems    Diagnosis    • **Ischemic foot    • Atherosclerosis of native artery of extremity (HCC)    • Chronic respiratory failure with hypoxia ([facility])    • T2DM (type 2 diabetes mellitus) (CMS/[facility])- uncontrolled    • Bipolar disorder ([facility])        Plan:   Stable  Continue nursing care  Mycostatin for intertriginous rash  Check labs in a.m.  Continue nebs  Discussed with , discharge planner, plan to discharge to rehab/nursing home       DVT prophylaxis:  Medical DVT prophylaxis orders are present.    CODE STATUS:   Code Status (Patient has no pulse and is not breathing): CPR (Attempt to Resuscitate)  Medical Interventions (Patient has pulse or is breathing): Full Support              Electronically signed  by Rudy Garnett MD, 03/13/23, 4:30 PM EDT.

## 2023-03-13 NOTE — PLAN OF CARE
Problem: Adult Inpatient Plan of Care  Goal: Plan of Care Review  Outcome: Ongoing, Progressing  Flowsheets (Taken 3/13/2023 1553)  Outcome Evaluation: Resting in bed. Worked with physical therapy today. Pain control. Repositions self. Dressing dry and intact. Using purewick.  Goal: Patient-Specific Goal (Individualized)  Outcome: Ongoing, Progressing  Goal: Absence of Hospital-Acquired Illness or Injury  Outcome: Ongoing, Progressing  Intervention: Identify and Manage Fall Risk  Recent Flowsheet Documentation  Taken 3/13/2023 0828 by Deb Crook RN  Safety Promotion/Fall Prevention: safety round/check completed  Intervention: Prevent and Manage VTE (Venous Thromboembolism) Risk  Recent Flowsheet Documentation  Taken 3/13/2023 0828 by Deb Crook RN  Activity Management: bedrest  Goal: Optimal Comfort and Wellbeing  Outcome: Ongoing, Progressing  Goal: Readiness for Transition of Care  Outcome: Ongoing, Progressing     Problem: Skin Injury Risk Increased  Goal: Skin Health and Integrity  Outcome: Ongoing, Progressing     Problem: Hypertension Comorbidity  Goal: Blood Pressure in Desired Range  Outcome: Ongoing, Progressing  Intervention: Maintain Blood Pressure Management  Recent Flowsheet Documentation  Taken 3/13/2023 0828 by Deb Crook RN  Medication Review/Management: medications reviewed     Problem: Fall Injury Risk  Goal: Absence of Fall and Fall-Related Injury  Outcome: Ongoing, Progressing  Intervention: Identify and Manage Contributors  Recent Flowsheet Documentation  Taken 3/13/2023 0828 by Deb Crook RN  Medication Review/Management: medications reviewed  Intervention: Promote Injury-Free Environment  Recent Flowsheet Documentation  Taken 3/13/2023 0828 by Deb Crook RN  Safety Promotion/Fall Prevention: safety round/check completed   Goal Outcome Evaluation:              Outcome Evaluation: Resting in bed. Worked with physical therapy today. Pain control. Repositions  self. Dressing dry and intact. Using purewick.

## 2023-03-13 NOTE — CONSULTS
Nutrition Services    Patient Name: Italia Olvera  YOB: 1960  MRN: [id number]  Admission date: 3/7/2023      CLINICAL NUTRITION ASSESSMENT      Reason for Assessment  Follow-up protocol   H&P:    Past Medical History:   Diagnosis Date   • Acid reflux    • ACL tear 09/01/2015    PCL/ACL TEAR/RUPTURE   • Acute shoulder bursitis, right 08/23/2015   • Anxiety    • Arthritis    • Asthma    • Bipolar 1 disorder ([facility])     untreated   • Bladder disorder    • Cancer ([facility])     CERVICAL CANCER   • CHF (congestive heart failure) ([facility]ly [facility])     ASYMPTOMATIC- NO CARDIOLOGIST- SEE'S DR ARRIAZA (PCP)- DENIED CP/SOB   • Chronic back pain    • Chronic pain     CHRONIC BACK, NECK, LEG, FOOT, & FACE PAIN   • COPD (chronic obstructive pulmonary disease) ([facility])     USES INHALERS   • Depression    • Diabetes mellitus ([facility])     BG RUND AROUND 140 IN AM   • DJD (degenerative joint disease)    • Gangrene ([facility])     RT SECOND AND FIFTH TOES   • GERD (gastroesophageal reflux disease)    • HBP (high blood pressure)    • Hip pain 09/15/2015   • Hypertension    • Knee injury 08/19/2015   • Knee pain, right 09/15/2015   • Limb swelling    • Neuropathy    • On home O2     2-3L/NC PRN   • Osteoarthritis, knee 09/01/2015   • Osteoporosis    • Peripheral neuropathy    • Renal failure 1994    NO CURRENT PROBLEMS   • Seasonal allergies    • Shoulder tendonitis 08/23/2015   • Sleep apnea    • Smoker    • SOB (shortness of breath)     NONE CURENTLY   • Tendinitis of right rotator cuff 08/23/2015        Current Problems:   Active Hospital Problems    Diagnosis    • **Ischemic foot    • Atherosclerosis of native artery of extremity ([facility])    • Chronic respiratory failure with hypoxia ([facility])    • T2DM (type 2 diabetes mellitus) (CMS/[facility])- uncontrolled    • Bipolar disorder ([facility])         Nutrition/Diet History         Narrative     Nutrition follow up. Pt is s/p R AKA. Pt intake ~50%. Pt is receiving Boost GC once daily. RD to order Jeremy BID for  "improved wound healing. Will continue to monitor per protocol.      Anthropometrics        Current Height, Weight Height: 167.6 cm (66\")  Weight: 71.2 kg (156 lb 15.5 oz)   Current BMI Body mass index is 25.34 kg/m².       Weight Hx  Wt Readings from Last 30 Encounters:   03/11/23 1722 71.2 kg (156 lb 15.5 oz)   03/07/23 2117 71.9 kg (158 lb 8.2 oz)   02/04/23 0036 77.5 kg (170 lb 13.7 oz)   02/03/23 1856 76.1 kg (167 lb 12.3 oz)   12/06/22 0606 79.6 kg (175 lb 7.8 oz)   11/29/22 1341 70.3 kg (155 lb)   11/03/22 0722 89.6 kg (197 lb 8.5 oz)   02/26/22 1300 66.9 kg (147 lb 7.8 oz)   02/21/22 1721 68.5 kg (151 lb 1.6 oz)   11/29/21 0258 51.3 kg (113 lb 1.5 oz)   11/28/21 1611 51.3 kg (113 lb 1.5 oz)   10/13/21 1538 64.4 kg (142 lb)   08/13/21 0230 60.2 kg (132 lb 11.5 oz)   08/12/21 1712 58.7 kg (129 lb 6.6 oz)   07/22/21 0517 54.3 kg (119 lb 11.4 oz)   07/21/21 0500 53.1 kg (117 lb 1 oz)   07/20/21 0525 54.3 kg (119 lb 11.4 oz)   07/18/21 0414 54.2 kg (119 lb 7.8 oz)   07/17/21 0518 53.3 kg (117 lb 8.1 oz)   07/16/21 0551 51 kg (112 lb 7 oz)   07/15/21 0515 54.5 kg (120 lb 2.4 oz)   07/14/21 0452 57.1 kg (125 lb 14.1 oz)   07/12/21 0500 51.8 kg (114 lb 3.2 oz)   07/11/21 0500 52 kg (114 lb 10.2 oz)   07/10/21 0541 57.4 kg (126 lb 8.7 oz)   07/09/21 0500 60 kg (132 lb 4.4 oz)   07/08/21 0500 60.7 kg (133 lb 13.1 oz)   07/07/21 0449 63 kg (138 lb 14.2 oz)   07/06/21 0500 73.8 kg (162 lb 11.2 oz)   07/05/21 0500 73.5 kg (162 lb 0.6 oz)   07/04/21 0500 73.4 kg (161 lb 13.1 oz)   07/03/21 0508 70.7 kg (155 lb 13.8 oz)   07/01/21 0500 74.2 kg (163 lb 9.3 oz)   06/29/21 0600 75.4 kg (166 lb 3.6 oz)   06/15/21 0431 84.3 kg (185 lb 13.6 oz)   06/14/21 0958 88.6 kg (195 lb 5.2 oz)   06/12/21 0518 80.1 kg (176 lb 9.4 oz)   06/10/21 0503 80.1 kg (176 lb 9.4 oz)   06/04/21 1231 78.8 kg (173 lb 11.6 oz)   03/24/21 0000 83.5 kg (184 lb)   07/10/18 0000 82.6 kg (182 lb)            Wt Change Observation Unable to assess due to " fluctuations     Estimated/Assessed Needs    AKA Adj BW 60.2 kg   Energy Requirements 25-30 kcal/kg   EST Needs (kcal/day) 0894-5963 kcal       Protein Requirements 1.2-1.5 g/kg   EST Daily Needs (g/day)  72-90g       Fluid Requirements 1 ml/kcal    Estimated Needs (mL/day) 5166-9926     Labs/Medications         Pertinent Labs Reviewed.   Results from last 7 days   Lab Units 03/13/23  0410 03/11/23  2258 03/11/23  0553 03/08/23  0651 03/07/23  2108   SODIUM mmol/L 137 136 137   < > 137   POTASSIUM mmol/L 4.5 4.5 4.0   < > 4.1   CHLORIDE mmol/L 103 103 104   < > 102   CO2 mmol/L 24.1 22.6 22.9   < > 24.4   BUN mg/dL 15 19 22   < > 12   CREATININE mg/dL 0.53* 0.59 0.58   < > 0.56*   CALCIUM mg/dL 8.5* 8.3* 8.2*   < > 7.9*   BILIRUBIN mg/dL 0.4  --   --   --  0.4   ALK PHOS U/L 104  --   --   --  134*   ALT (SGPT) U/L 6  --   --   --  11   AST (SGOT) U/L 18  --   --   --  12   GLUCOSE mg/dL 164* 240* 65   < > 263*    < > = values in this interval not displayed.     Results from last 7 days   Lab Units 03/13/23  0410 03/11/23  0615 03/11/23  0553   MAGNESIUM mg/dL  --   --  1.6   HEMOGLOBIN g/dL 9.4*   < >  --    HEMATOCRIT % 29.9*   < >  --     < > = values in this interval not displayed.     COVID19   Date Value Ref Range Status   08/12/2021 Not Detected Not Detected - Ref. Range Final     Lab Results   Component Value Date    HGBA1C 7.10 (H) 02/21/2022         Pertinent Medications Reviewed.     Current Nutrition Orders & Evaluation of Intake       Oral Nutrition     Current PO Diet Diet: Cardiac Diets; Healthy Heart (2-3 Na+); Texture: Regular Texture (IDDSI 7); Fluid Consistency: Thin (IDDSI 0)   Supplement Orders Placed This Encounter      Dietary Nutrition Supplements Boost Glucose Control (Glucerna Shake); vanilla      Dietary Nutrition Supplements Jeremy       Malnutrition Severity Assessment                Nutrition Diagnosis         Nutrition Dx Problem 1 Increased nutrient needs related to increased nutrient  needs due to catabolic disease as evidenced by R AKA and COPD.     Nutrition Intervention         Boost Glucose Control daily (+250 kcal, 14 g pro)    Jeremy BID (+180 kcal, 5 g pro) for improved wound healing     Medical Nutrition Therapy/Nutrition Education          Learner     Readiness Patient  Education not appropriate at this time     Method     Response N/A  N/A     Monitor/Evaluation        Monitor PO intake, Supplement intake, Pertinent labs, Skin status, POC/GOC     Nutrition Discharge Plan         To be determined     Electronically signed by:  Kaitlynn Stevenson RD  03/13/23 15:29 EDT

## 2023-03-13 NOTE — SIGNIFICANT NOTE
03/13/23 1029   Plan   Plan SW discussed discharge planning with patient. Per MD patient will need inpatient rehab. SW discussed this with patient, and patient is agreeable for rehab referrals. SW initiated referral process, and will follow for bed offers.

## 2023-03-14 LAB
GLUCOSE BLDC GLUCOMTR-MCNC: 160 MG/DL (ref 70–99)
GLUCOSE BLDC GLUCOMTR-MCNC: 164 MG/DL (ref 70–99)
GLUCOSE BLDC GLUCOMTR-MCNC: 238 MG/DL (ref 70–99)
GLUCOSE BLDC GLUCOMTR-MCNC: 303 MG/DL (ref 70–99)

## 2023-03-14 PROCEDURE — 94799 UNLISTED PULMONARY SVC/PX: CPT

## 2023-03-14 PROCEDURE — 97110 THERAPEUTIC EXERCISES: CPT

## 2023-03-14 PROCEDURE — 25010000002 FUROSEMIDE PER 20 MG: Performed by: SURGERY

## 2023-03-14 PROCEDURE — 63710000001 INSULIN REGULAR HUMAN PER 5 UNITS: Performed by: SURGERY

## 2023-03-14 PROCEDURE — 63710000001 PREDNISONE PER 1 MG: Performed by: SURGERY

## 2023-03-14 PROCEDURE — 25010000002 HEPARIN (PORCINE) PER 1000 UNITS: Performed by: SURGERY

## 2023-03-14 PROCEDURE — 94664 DEMO&/EVAL PT USE INHALER: CPT

## 2023-03-14 PROCEDURE — 94761 N-INVAS EAR/PLS OXIMETRY MLT: CPT

## 2023-03-14 PROCEDURE — 82962 GLUCOSE BLOOD TEST: CPT

## 2023-03-14 PROCEDURE — 25010000002 MORPHINE PER 10 MG: Performed by: SURGERY

## 2023-03-14 RX ORDER — PREDNISONE 10 MG/1
10 TABLET ORAL
Status: DISCONTINUED | OUTPATIENT
Start: 2023-03-15 | End: 2023-03-15 | Stop reason: HOSPADM

## 2023-03-14 RX ADMIN — INSULIN HUMAN 4 UNITS: 100 INJECTION, SOLUTION PARENTERAL at 00:34

## 2023-03-14 RX ADMIN — ZOLPIDEM TARTRATE 5 MG: 5 TABLET ORAL at 20:55

## 2023-03-14 RX ADMIN — HEPARIN SODIUM 5000 UNITS: 5000 INJECTION INTRAVENOUS; SUBCUTANEOUS at 06:06

## 2023-03-14 RX ADMIN — IPRATROPIUM BROMIDE AND ALBUTEROL SULFATE 3 ML: .5; 3 SOLUTION RESPIRATORY (INHALATION) at 06:52

## 2023-03-14 RX ADMIN — MORPHINE SULFATE 2 MG: 2 INJECTION, SOLUTION INTRAMUSCULAR; INTRAVENOUS at 17:07

## 2023-03-14 RX ADMIN — INSULIN HUMAN 7 UNITS: 100 INJECTION, SOLUTION PARENTERAL at 17:55

## 2023-03-14 RX ADMIN — NYSTATIN: 100000 POWDER TOPICAL at 20:55

## 2023-03-14 RX ADMIN — IPRATROPIUM BROMIDE AND ALBUTEROL SULFATE 3 ML: .5; 3 SOLUTION RESPIRATORY (INHALATION) at 19:49

## 2023-03-14 RX ADMIN — HEPARIN SODIUM 5000 UNITS: 5000 INJECTION INTRAVENOUS; SUBCUTANEOUS at 22:18

## 2023-03-14 RX ADMIN — IPRATROPIUM BROMIDE AND ALBUTEROL SULFATE 3 ML: .5; 3 SOLUTION RESPIRATORY (INHALATION) at 15:45

## 2023-03-14 RX ADMIN — HYDROCODONE BITARTRATE AND ACETAMINOPHEN 1 TABLET: 5; 325 TABLET ORAL at 06:16

## 2023-03-14 RX ADMIN — INSULIN HUMAN 2 UNITS: 100 INJECTION, SOLUTION PARENTERAL at 06:15

## 2023-03-14 RX ADMIN — TRAZODONE HYDROCHLORIDE 150 MG: 50 TABLET ORAL at 20:55

## 2023-03-14 RX ADMIN — PANTOPRAZOLE SODIUM 40 MG: 40 TABLET, DELAYED RELEASE ORAL at 08:56

## 2023-03-14 RX ADMIN — AMLODIPINE BESYLATE 5 MG: 5 TABLET ORAL at 08:56

## 2023-03-14 RX ADMIN — FUROSEMIDE 20 MG: 10 INJECTION, SOLUTION INTRAMUSCULAR; INTRAVENOUS at 08:57

## 2023-03-14 RX ADMIN — HYDROCODONE BITARTRATE AND ACETAMINOPHEN 1 TABLET: 5; 325 TABLET ORAL at 15:36

## 2023-03-14 RX ADMIN — HYDROCODONE BITARTRATE AND ACETAMINOPHEN 1 TABLET: 5; 325 TABLET ORAL at 11:27

## 2023-03-14 RX ADMIN — IPRATROPIUM BROMIDE AND ALBUTEROL SULFATE 3 ML: .5; 3 SOLUTION RESPIRATORY (INHALATION) at 01:10

## 2023-03-14 RX ADMIN — HYDROCHLOROTHIAZIDE: 25 TABLET ORAL at 20:51

## 2023-03-14 RX ADMIN — HYDROCHLOROTHIAZIDE: 25 TABLET ORAL at 08:56

## 2023-03-14 RX ADMIN — ALPRAZOLAM 0.25 MG: 0.25 TABLET ORAL at 20:55

## 2023-03-14 RX ADMIN — HEPARIN SODIUM 5000 UNITS: 5000 INJECTION INTRAVENOUS; SUBCUTANEOUS at 14:35

## 2023-03-14 RX ADMIN — MORPHINE SULFATE 2 MG: 2 INJECTION, SOLUTION INTRAMUSCULAR; INTRAVENOUS at 02:46

## 2023-03-14 RX ADMIN — MORPHINE SULFATE 2 MG: 2 INJECTION, SOLUTION INTRAMUSCULAR; INTRAVENOUS at 12:59

## 2023-03-14 RX ADMIN — IPRATROPIUM BROMIDE AND ALBUTEROL SULFATE 3 ML: .5; 3 SOLUTION RESPIRATORY (INHALATION) at 11:09

## 2023-03-14 RX ADMIN — NYSTATIN 1 APPLICATION: 100000 POWDER TOPICAL at 08:57

## 2023-03-14 RX ADMIN — LISINOPRIL 40 MG: 20 TABLET ORAL at 08:56

## 2023-03-14 RX ADMIN — SERTRALINE 100 MG: 100 TABLET, FILM COATED ORAL at 08:57

## 2023-03-14 RX ADMIN — ACETAMINOPHEN 650 MG: 325 TABLET ORAL at 14:35

## 2023-03-14 RX ADMIN — PREDNISONE 20 MG: 20 TABLET ORAL at 08:56

## 2023-03-14 RX ADMIN — MORPHINE SULFATE 2 MG: 2 INJECTION, SOLUTION INTRAMUSCULAR; INTRAVENOUS at 08:57

## 2023-03-14 NOTE — SIGNIFICANT NOTE
Wound Eval / Progress Noted    JUNG Palomo     Patient Name: Italia Olvera  : 1960  MRN: [id number]  Today's Date: 3/14/2023                 Admit Date: 3/7/2023    Visit Dx:    ICD-10-CM ICD-9-CM   1. Ischemic foot  I99.8 459.9   2. Osteomyelitis of right foot, unspecified type ([facility])  M86.9 730.27   3. Sepsis, due to unspecified organism, unspecified whether acute organ dysfunction present ([facility])  A41.9 038.9     995.91   4. Foot infection  L08.9 686.9   5. Oropharyngeal dysphagia  R13.12 787.22   6. Diabetes mellitus with peripheral vascular disease ([facility])  E11.51 250.70     443.81   7. Difficulty walking  R26.2 719.7       Patient Active Problem List   Diagnosis   • Bladder disorder   • Depression   • Hypertension   • Peripheral neuropathy   • Osteoarthritis of knee   • COPD with acute exacerbation ([facility])   • Chronic back pain   • Multifocal pneumonia   • T2DM (type 2 diabetes mellitus) (CMS/[facility])- uncontrolled   • Bipolar disorder ([facility])   • Chronic respiratory failure with hypoxia ([facility])   • Acute on chronic respiratory failure with hypoxia ([facility])   • Chronic respiratory failure with hypoxia, on home oxygen therapy ([facility])   • Overweight (BMI 25.0-29.9)   • Acute on chronic systolic CHF (congestive heart failure) ([facility])   • Essential hypertension   • DM (diabetes mellitus), type 1 ([facility])   • Acute UTI (urinary tract infection)   • Anemia   • Sepsis ([facility])   • COPD with acute exacerbation ([facility])   • Hypokalemia   • Moderate malnutrition (CMS/[facility])   • Decubitus ulcer of back, stage 3 ([facility])   • Acute osteomyelitis of toe of right foot ([facility])   • Anxiety disorder   • Diabetes mellitus with peripheral vascular disease ([facility])   • Decubitus ulcer, unstageable ([facility])   • Atherosclerosis of native artery of extremity ([facility])   • Paroxysmal atrial fibrillation ([facility])   • Intertrigo   • Ischemic foot        Past Medical History:   Diagnosis Date   • Acid reflux    • ACL tear 2015    PCL/ACL TEAR/RUPTURE   • Acute shoulder  bursitis, right 08/23/2015   • Anxiety    • Arthritis    • Asthma    • Bipolar 1 disorder ([facility])     untreated   • Bladder disorder    • Cancer ([facility])     CERVICAL CANCER   • CHF (congestive heart failure) ([facility])     ASYMPTOMATIC- NO CARDIOLOGIST- SEE'S DR ARRIAZA (PCP)- DENIED CP/SOB   • Chronic back pain    • Chronic pain     CHRONIC BACK, NECK, LEG, FOOT, & FACE PAIN   • COPD (chronic obstructive pulmonary disease) ([facility])     USES INHALERS   • Depression    • Diabetes mellitus ([facility])     BG RUND AROUND 140 IN AM   • DJD (degenerative joint disease)    • Gangrene ([facility])     RT SECOND AND FIFTH TOES   • GERD (gastroesophageal reflux disease)    • HBP (high blood pressure)    • Hip pain 09/15/2015   • Hypertension    • Knee injury 08/19/2015   • Knee pain, right 09/15/2015   • Limb swelling    • Neuropathy    • On home O2     2-3L/NC PRN   • Osteoarthritis, knee 09/01/2015   • Osteoporosis    • Peripheral neuropathy    • Renal failure 1994    NO CURRENT PROBLEMS   • Seasonal allergies    • Shoulder tendonitis 08/23/2015   • Sleep apnea    • Smoker    • SOB (shortness of breath)     NONE CURENTLY   • Tendinitis of right rotator cuff 08/23/2015        Past Surgical History:   Procedure Laterality Date   • ABOVE KNEE AMPUTATION Right 3/11/2023    Procedure: AMPUTATION ABOVE KNEE;  Surgeon: Zacarias Haywood MD;  Location: [facility] MAIN OR;  Service: Vascular;  Laterality: Right;   • AMPUTATION DIGIT Right 12/6/2022    Procedure: Amputation of right second and fifth toes;  Surgeon: Gabino Russell MD;  Location: [facility] MAIN OR;  Service: Vascular;  Laterality: Right;   • BACK SURGERY     • BLADDER REPAIR     • BRONCHOSCOPY N/A 07/10/2021    Procedure: BRONCHOSCOPY WITH BRONCHOALVEOLAR LAVAGE, POSSIBLE BIOPSY, BRUSHING, WASHING, AIRWAY INSPECTION;  Surgeon: Rodrigo Reyes MD;  Location: [facility] MAIN OR;  Service: Pulmonary;  Laterality: N/A;   • BRONCHOSCOPY N/A 07/10/2021    Procedure: BRONCHOSCOPY;  Surgeon: Rodrigo Reyes MD;   Location: [facility] ENDOSCOPY;  Service: Pulmonary;  Laterality: N/A;   • CARDIAC CATHETERIZATION Right 11/03/2022    Procedure: Aortogram with right leg angiogram, possible angioplasty or stenting ;  Surgeon: Gabino Russell MD;  Location: [facility] CATH INVASIVE LOCATION;  Service: Vascular;  Laterality: Right;   • CARDIAC CATHETERIZATION Right 3/8/2023    Procedure: Right leg angiogram, possible angioplasty or stenting;  Surgeon: Gabino Russell MD;  Location: [facility] CATH INVASIVE LOCATION;  Service: Vascular;  Laterality: Right;   • CHOLECYSTECTOMY     • ENDOSCOPY     • FRACTURE SURGERY      SKULL FRACTURE   • GALLBLADDER SURGERY     • HERNIA REPAIR     • HYSTERECTOMY     • INTERVENTIONAL RADIOLOGY PROCEDURE Right 03/03/2022    Procedure: Abdominal Aortagram with Runoff;  Surgeon: Marleny Yoon MD;  Location: [facility] CATH INVASIVE LOCATION;  Service: Peripheral Vascular;  Laterality: Right;   • JOINT REPLACEMENT     • OTHER SURGICAL HISTORY      ARTIFICIAL JOINTS/LIMBS   • OTHER SURGICAL HISTORY      FACE SURGERY, UNSPECIFIED   • TOTAL HIP ARTHROPLASTY Right    • TOTAL KNEE ARTHROPLASTY Left          Physical Assessment:  Wound 02/04/23 0100 Bilateral coccyx Pressure Injury (Active)   Wound Image   03/14/23 0905   Dressing Appearance open to air 03/14/23 0905   Closure None 03/14/23 0905   Base epithelialization;pink;scab 03/14/23 0905   Periwound intact;blanchable 03/14/23 0905   Periwound Temperature warm 03/14/23 0905   Periwound Skin Turgor soft 03/14/23 0905   Edges rolled/closed 03/14/23 0905   Drainage Amount none 03/14/23 0905   Care, Wound cleansed with;sterile normal saline;barrier applied 03/14/23 0905   Dressing Care open to air;skin barrier agent applied 03/14/23 0905   Periwound Care barrier ointment applied 03/14/23 0905     Wound Check / Follow-up:  Patient seen today for a wound check. Patient is s/p amputation above the knee, right; performed 3/11. Dressing is clean dry and intact. Recommend to  follow vascular recommendations at this time.  Buttocks is pink and intact with new epithelium noted; small area with scab from previous injury remains in place. Tissue is blanchable. Cleansed with NS. Applied moisture barrier to buttocks. Recommend to continue current skin care orders.    Impression: above the knee amputation, resolving injury to coccyx    Short term goals: regain skin integrity, pressure reduction, skin protection, topical treatment    Nikki Perla RN    3/14/2023    11:42 EDT

## 2023-03-14 NOTE — PROGRESS NOTES
[facility]     Progress Note    Patient Name: Italia Olvera  : 1960  MRN: [id number]  Primary Care Physician:  Carloz Shoemaker MD  Date of admission: 3/7/2023      Subjective   Brief summary.   *Patient admitted with ischemic foot.  Status post amputation right AKA      HPI:  Postop day # 3.  Awake alert.  No significant shortness of breath, chronic wheezing and coughing nebulizer helping.Blood pressure slightly high        Review of Systems     No chest pain or shortness of breath, anxiety.  Pain better    Objective     Vitals:   Temp:  [97.5 °F (36.4 °C)-99.3 °F (37.4 °C)] 98.2 °F (36.8 °C)  Heart Rate:  [73-81] 74  Resp:  [18] 18  BP: (142-200)/() 154/68  Flow (L/min):  [1] 1    Physical Exam :     Elderly female not in acute distress,   heart regular.  Lungs clear.  Abdomen soft nontender  Extremities without any edema    Result Review:  I have personally reviewed the results from the time of this admission to 3/14/2023 15:02 EDT and agree with these findings:  []  Laboratory  []  Microbiology  []  Radiology  []  EKG/Telemetry   []  Cardiology/Vascular   []  Pathology  []  Old records  []  Other:       Reviewed  Assessment / Plan       Active Hospital Problems:  Active Hospital Problems    Diagnosis    • **Ischemic foot    • Atherosclerosis of native artery of extremity (HCC)    • Chronic respiratory failure with hypoxia ([facility])    • T2DM (type 2 diabetes mellitus) (CMS/[facility])- uncontrolled    • Bipolar disorder (HCC)        Plan:   Stable  DC fluids  Blood pressure slightly high continue to monitor closely  Discharge home tomorrow       DVT prophylaxis:  Medical DVT prophylaxis orders are present.    CODE STATUS:   Code Status (Patient has no pulse and is not breathing): CPR (Attempt to Resuscitate)  Medical Interventions (Patient has pulse or is breathing): Full Support                Electronically signed by Rudy Garnett MD, 23, 3:06 PM EDT.

## 2023-03-14 NOTE — PLAN OF CARE
Problem: Adult Inpatient Plan of Care  Goal: Plan of Care Review  Outcome: Ongoing, Progressing  Flowsheets (Taken 3/14/2023 1641)  Outcome Evaluation: Resting in bed. patient wants to go home and not to rehab. Pain control. Dressing dry and intact. Using purewick. Bedpan for BM  Goal: Patient-Specific Goal (Individualized)  Outcome: Ongoing, Progressing  Goal: Absence of Hospital-Acquired Illness or Injury  Outcome: Ongoing, Progressing  Intervention: Identify and Manage Fall Risk  Recent Flowsheet Documentation  Taken 3/14/2023 0746 by Deb Crook RN  Safety Promotion/Fall Prevention: safety round/check completed  Intervention: Prevent and Manage VTE (Venous Thromboembolism) Risk  Recent Flowsheet Documentation  Taken 3/14/2023 0746 by Deb Crook RN  Activity Management: bedrest  Goal: Optimal Comfort and Wellbeing  Outcome: Ongoing, Progressing  Goal: Readiness for Transition of Care  Outcome: Ongoing, Progressing     Problem: Skin Injury Risk Increased  Goal: Skin Health and Integrity  Outcome: Ongoing, Progressing  Intervention: Optimize Skin Protection  Recent Flowsheet Documentation  Taken 3/14/2023 0746 by Deb Crook RN  Pressure Reduction Devices: positioning supports utilized     Problem: Hypertension Comorbidity  Goal: Blood Pressure in Desired Range  Outcome: Ongoing, Progressing     Problem: Fall Injury Risk  Goal: Absence of Fall and Fall-Related Injury  Outcome: Ongoing, Progressing  Intervention: Promote Injury-Free Environment  Recent Flowsheet Documentation  Taken 3/14/2023 0746 by Deb Crook RN  Safety Promotion/Fall Prevention: safety round/check completed   Goal Outcome Evaluation:              Outcome Evaluation: Resting in bed. patient wants to go home and not to rehab. Pain control. Dressing dry and intact. Using purewick. Bedpan for BM

## 2023-03-14 NOTE — THERAPY TREATMENT NOTE
Acute [facility] - Physical Therapy Treatment Note  [facility]     Patient Name: Italia Olvera  : 1960  MRN: [id number]  Today's Date: 3/14/2023      Visit Dx:     ICD-10-CM ICD-9-CM   1. Ischemic foot  I99.8 459.9   2. Osteomyelitis of right foot, unspecified type ([facility])  M86.9 730.27   3. Sepsis, due to unspecified organism, unspecified whether acute organ dysfunction present ([facility])  A41.9 038.9     995.91   4. Foot infection  L08.9 686.9   5. Oropharyngeal dysphagia  R13.12 787.22   6. Diabetes mellitus with peripheral vascular disease ([facility])  E11.51 250.70     443.81   7. Difficulty walking  R26.2 719.7     Patient Active Problem List   Diagnosis   • Bladder disorder   • Depression   • Hypertension   • Peripheral neuropathy   • Osteoarthritis of knee   • COPD with acute exacerbation ([facility])   • Chronic back pain   • Multifocal pneumonia   • T2DM (type 2 diabetes mellitus) (CMS/[facility])- uncontrolled   • Bipolar disorder ([facility])   • Chronic respiratory failure with hypoxia ([facility])   • Acute on chronic respiratory failure with hypoxia ([facility])   • Chronic respiratory failure with hypoxia, on home oxygen therapy ([facility])   • Overweight (BMI 25.0-29.9)   • Acute on chronic systolic CHF (congestive heart failure) ([facility])   • Essential hypertension   • DM (diabetes mellitus), type 1 ([facility])   • Acute UTI (urinary tract infection)   • Anemia   • Sepsis ([facility])   • COPD with acute exacerbation ([facility])   • Hypokalemia   • Moderate malnutrition (CMS/[facility])   • Decubitus ulcer of back, stage 3 ([facility])   • Acute osteomyelitis of toe of right foot ([facility])   • Anxiety disorder   • Diabetes mellitus with peripheral vascular disease ([facility])   • Decubitus ulcer, unstageable ([facility])   • Atherosclerosis of native artery of extremity ([facility])   • Paroxysmal atrial fibrillation ([facility])   • Intertrigo   • Ischemic foot     Past Medical History:   Diagnosis Date   • Acid reflux    • ACL tear 2015    PCL/ACL TEAR/RUPTURE   • Acute shoulder bursitis, right 2015    • Anxiety    • Arthritis    • Asthma    • Bipolar 1 disorder ([facility])     untreated   • Bladder disorder    • Cancer (HCC)     CERVICAL CANCER   • CHF (congestive heart failure) ([facility])     ASYMPTOMATIC- NO CARDIOLOGIST- SEE'S DR ARRIAZA (PCP)- DENIED CP/SOB   • Chronic back pain    • Chronic pain     CHRONIC BACK, NECK, LEG, FOOT, & FACE PAIN   • COPD (chronic obstructive pulmonary disease) ([facility])     USES INHALERS   • Depression    • Diabetes mellitus ([facility])     BG RUND AROUND 140 IN AM   • DJD (degenerative joint disease)    • Gangrene (HCC)     RT SECOND AND FIFTH TOES   • GERD (gastroesophageal reflux disease)    • HBP (high blood pressure)    • Hip pain 09/15/2015   • Hypertension    • Knee injury 08/19/2015   • Knee pain, right 09/15/2015   • Limb swelling    • Neuropathy    • On home O2     2-3L/NC PRN   • Osteoarthritis, knee 09/01/2015   • Osteoporosis    • Peripheral neuropathy    • Renal failure 1994    NO CURRENT PROBLEMS   • Seasonal allergies    • Shoulder tendonitis 08/23/2015   • Sleep apnea    • Smoker    • SOB (shortness of breath)     NONE CURENTLY   • Tendinitis of right rotator cuff 08/23/2015     Past Surgical History:   Procedure Laterality Date   • ABOVE KNEE AMPUTATION Right 3/11/2023    Procedure: AMPUTATION ABOVE KNEE;  Surgeon: Zacarias Haywood MD;  Location: [facility] MAIN OR;  Service: Vascular;  Laterality: Right;   • AMPUTATION DIGIT Right 12/6/2022    Procedure: Amputation of right second and fifth toes;  Surgeon: Gabino Russell MD;  Location: [facility] MAIN OR;  Service: Vascular;  Laterality: Right;   • BACK SURGERY     • BLADDER REPAIR     • BRONCHOSCOPY N/A 07/10/2021    Procedure: BRONCHOSCOPY WITH BRONCHOALVEOLAR LAVAGE, POSSIBLE BIOPSY, BRUSHING, WASHING, AIRWAY INSPECTION;  Surgeon: Rodrigo Reyes MD;  Location: [facility] MAIN OR;  Service: Pulmonary;  Laterality: N/A;   • BRONCHOSCOPY N/A 07/10/2021    Procedure: BRONCHOSCOPY;  Surgeon: Rodrigo Reyes MD;  Location: [facility] ENDOSCOPY;   Service: Pulmonary;  Laterality: N/A;   • CARDIAC CATHETERIZATION Right 11/03/2022    Procedure: Aortogram with right leg angiogram, possible angioplasty or stenting ;  Surgeon: Gabino Russell MD;  Location: [facility] CATH INVASIVE LOCATION;  Service: Vascular;  Laterality: Right;   • CARDIAC CATHETERIZATION Right 3/8/2023    Procedure: Right leg angiogram, possible angioplasty or stenting;  Surgeon: Gabino Russell MD;  Location: [facility] CATH INVASIVE LOCATION;  Service: Vascular;  Laterality: Right;   • CHOLECYSTECTOMY     • ENDOSCOPY     • FRACTURE SURGERY      SKULL FRACTURE   • GALLBLADDER SURGERY     • HERNIA REPAIR     • HYSTERECTOMY     • INTERVENTIONAL RADIOLOGY PROCEDURE Right 03/03/2022    Procedure: Abdominal Aortagram with Runoff;  Surgeon: Marleny Yoon MD;  Location: [facility] CATH INVASIVE LOCATION;  Service: Peripheral Vascular;  Laterality: Right;   • JOINT REPLACEMENT     • OTHER SURGICAL HISTORY      ARTIFICIAL JOINTS/LIMBS   • OTHER SURGICAL HISTORY      FACE SURGERY, UNSPECIFIED   • TOTAL HIP ARTHROPLASTY Right    • TOTAL KNEE ARTHROPLASTY Left      PT Assessment (last 12 hours)     PT Evaluation and Treatment     Row Name 03/14/23 1141          Physical Therapy Time and Intention    Subjective Information complains of;weakness;fatigue;pain (P)   -TG     Document Type therapy note (daily note) (P)   -TG     Mode of Treatment individual therapy;physical therapy (P)   -TG     Patient Effort fair (P)   -TG     Symptoms Noted During/After Treatment increased pain;fatigue (P)   -TG     Row Name 03/14/23 1141          General Information    Patient Profile Reviewed yes (P)   -TG     Patient Observations alert;cooperative;agree to therapy (P)   -TG     Row Name 03/14/23 1141          Cognition    Orientation Status (Cognition) oriented x 3 (P)   -TG     Row Name 03/14/23 1141          Bed Mobility    Comment, (Bed Mobility) pt adamantly declined out of bed activities d/t pain (P)   -TG     Row Name  03/14/23 1141          Transfers    Comment, (Transfers) pt adamantly declined out of bed activities d/t pain (P)   -TG     Row Name 03/14/23 1141          Gait/Stairs (Locomotion)    Comment, (Gait/Stairs) pt adamantly declined out of bed activities d/t pain (P)   -TG     Row Name 03/14/23 1141          Balance    Comment, Balance unable to assess (P)   -TG     Row Name             Residual Limb Assessment 03/14/23 1140 transfemoral (above knee), right    Residual Limb Assessment - Properties Group Placement Date: 03/14/23  - Placement Time: 1140  -ANUPAMA Amputation Date: 03/11/23  - Location: transfemoral (above knee), right  -ANUPAMA    Retired Residual Limb Assessment - Properties Group Placement Date: 03/14/23  - Placement Time: 1140  -ANUPAMA Amputation Date: 03/11/23  - Location: transfemoral (above knee), right  -ANUPAMA    Retired Residual Limb Assessment - Properties Group Date first assessed: 03/14/23  - Time first assessed: 1140  -ANUPAMA Amputation Date: 03/11/23  - Location: transfemoral (above knee), right  -ANUPAMA    Row Name             Wound 02/04/23 0100 Bilateral coccyx Pressure Injury    Wound - Properties Group Placement Date: 02/04/23  - Placement Time: 0100  -LH Present on Hospital Admission: Y  -LH Side: Bilateral  -LH Location: coccyx  -LH Primary Wound Type: Pressure inj  -LH    Retired Wound - Properties Group Placement Date: 02/04/23  - Placement Time: 0100  -LH Present on Hospital Admission: Y  -LH Side: Bilateral  -LH Location: coccyx  -LH Primary Wound Type: Pressure inj  -LH    Retired Wound - Properties Group Date first assessed: 02/04/23  - Time first assessed: 0100  -LH Present on Hospital Admission: Y  -LH Side: Bilateral  -LH Location: coccyx  -LH Primary Wound Type: Pressure inj  -LH    Row Name             Wound 03/11/23 1355 Right anterior thigh Incision    Wound - Properties Group Placement Date: 03/11/23  -SM Placement Time: 1355  -SM Side: Right  -SM Orientation: anterior  -SM  Location: thigh  -SM Primary Wound Type: Incision  -SM    Retired Wound - Properties Group Placement Date: 03/11/23  -SM Placement Time: 1355  -SM Side: Right  -SM Orientation: anterior  -SM Location: thigh  -SM Primary Wound Type: Incision  -SM    Retired Wound - Properties Group Date first assessed: 03/11/23  - Time first assessed: 1355  -SM Side: Right  -SM Location: thigh  -SM Primary Wound Type: Incision  -SM    Row Name 03/14/23 1141          Plan of Care Review    Plan of Care Reviewed With patient (P)   -TG     Progress no change (P)   -TG     Row Name 03/14/23 1141          Progress Summary (PT)    Daily Progress Summary (PT) Pt adamantly declined all transfers/out of bed activities d/t pain in RLE. Tolerated supine therex fair with rest breaks throughout session. C/oi increased pain following session. (P)   -TG           User Key  (r) = Recorded By, (t) = Taken By, (c) = Cosigned By    Initials Name Provider Type    Nikki Rodney, RN Registered Nurse     Audrey Green, RN Registered Nurse    TG Chandrika Mckeon, PT Student PT Student    SM Homero Stevenson, RN Registered Nurse              Pt performed supine therapeutic exercises: heel slides (on LLE) 3 x 5, glut sets 2 x 10, and ankle pumps (on LLE) 2 x 10.     Physical Therapy Education     Title: PT OT SLP Therapies (In Progress)     Topic: Physical Therapy (In Progress)     Point: Mobility training (In Progress)     Learning Progress Summary           Patient Acceptance, E, NR by CS at 3/12/2023 1647                   Point: Home exercise program (Not Started)     Learner Progress:  Not documented in this visit.          Point: Body mechanics (Not Started)     Learner Progress:  Not documented in this visit.          Point: Precautions (In Progress)     Learning Progress Summary           Patient Acceptance, E, NR by CS at 3/12/2023 1647                               User Key     Initials Effective Dates Name Provider Type Discipline    CS  04/25/21 -  Alix Miramontes, PT Physical Therapist PT              PT Recommendation and Plan     Progress Summary (PT)  Daily Progress Summary (PT): (P) Pt adamantly declined all transfers/out of bed activities d/t pain in RLE. Tolerated supine therex fair with rest breaks throughout session. C/oi increased pain following session.  Plan of Care Reviewed With: (P) patient  Progress: (P) no change   Outcome Measures     Row Name 03/14/23 1100 03/13/23 1400 03/12/23 1600       How much help from another person do you currently need...    Turning from your back to your side while in flat bed without using bedrails? 3 (P)   -TG 2  -AYDEN 1  -CS    Moving from lying on back to sitting on the side of a flat bed without bedrails? 2 (P)   -TG 2  -AYDEN 1  -CS    Moving to and from a bed to a chair (including a wheelchair)? 1 (P)   -TG 1  -AYDEN 1  -CS    Standing up from a chair using your arms (e.g., wheelchair, bedside chair)? 1 (P)   -TG 1  -AYDEN 1  -CS    Climbing 3-5 steps with a railing? 1 (P)   -TG 1  -AYDEN 1  -CS    To walk in hospital room? 1 (P)   -TG 1  -AYDEN 1  -CS    AM-PAC 6 Clicks Score (PT) 9 (P)   -TG 8  -AYDEN 6  -CS       Functional Assessment    Outcome Measure Options AM-PAC 6 Clicks Basic Mobility (PT) (P)   -TG AM-PAC 6 Clicks Basic Mobility (PT)  -AYDEN AM-PAC 6 Clicks Basic Mobility (PT)  -CS          User Key  (r) = Recorded By, (t) = Taken By, (c) = Cosigned By    Initials Name Provider Type    AYDEN Manny Sevilla, PT Physical Therapist    CS Alix Miramontes, PT Physical Therapist    TG Chandrkia Mckeon, MARY Student PT Student                 Time Calculation:    PT Charges     Row Name 03/14/23 1148             Time Calculation    PT Received On 03/14/23 (P)   -TG         Timed Charges    26692 - PT Therapeutic Exercise Minutes 10 (P)   -TG         Total Minutes    Timed Charges Total Minutes 10 (P)   -TG       Total Minutes 10 (P)   -TG            User Key  (r) = Recorded By, (t) = Taken By, (c) = Cosigned By     Initials Name Provider Type    TG Chandrika Mckeon, PT Student PT Student              Therapy Charges for Today     Code Description Service Date Service Provider Modifiers Qty    47123712307 HC PT THER PROC EA 15 MIN 3/14/2023 Chandrika Mckeon, PT Student GP 1          PT G-Codes  Outcome Measure Options: (P) AM-PAC 6 Clicks Basic Mobility (PT)  AM-PAC 6 Clicks Score (PT): (P) 9    Chandrika Mckeon PT Student  3/14/2023

## 2023-03-15 ENCOUNTER — READMISSION MANAGEMENT (OUTPATIENT)
Dept: CALL CENTER | Facility: HOSPITAL | Age: 63
End: 2023-03-15
Payer: MEDICARE

## 2023-03-15 VITALS
SYSTOLIC BLOOD PRESSURE: 146 MMHG | TEMPERATURE: 97.7 F | HEART RATE: 64 BPM | BODY MASS INDEX: 25.23 KG/M2 | RESPIRATION RATE: 20 BRPM | DIASTOLIC BLOOD PRESSURE: 43 MMHG | HEIGHT: 66 IN | OXYGEN SATURATION: 95 % | WEIGHT: 156.97 LBS

## 2023-03-15 PROBLEM — I99.8 ISCHEMIC FOOT: Status: RESOLVED | Noted: 2023-03-08 | Resolved: 2023-03-15

## 2023-03-15 LAB
CYTO UR: NORMAL
GLUCOSE BLDC GLUCOMTR-MCNC: 163 MG/DL (ref 70–99)
GLUCOSE BLDC GLUCOMTR-MCNC: 166 MG/DL (ref 70–99)
LAB AP CASE REPORT: NORMAL
LAB AP CLINICAL INFORMATION: NORMAL
PATH REPORT.FINAL DX SPEC: NORMAL
PATH REPORT.GROSS SPEC: NORMAL

## 2023-03-15 PROCEDURE — 25010000002 HEPARIN (PORCINE) PER 1000 UNITS: Performed by: SURGERY

## 2023-03-15 PROCEDURE — 25010000002 FUROSEMIDE PER 20 MG: Performed by: SURGERY

## 2023-03-15 PROCEDURE — 82962 GLUCOSE BLOOD TEST: CPT

## 2023-03-15 PROCEDURE — 94799 UNLISTED PULMONARY SVC/PX: CPT

## 2023-03-15 PROCEDURE — 94664 DEMO&/EVAL PT USE INHALER: CPT

## 2023-03-15 PROCEDURE — 63710000001 INSULIN REGULAR HUMAN PER 5 UNITS: Performed by: SURGERY

## 2023-03-15 PROCEDURE — 63710000001 PREDNISONE PER 1 MG: Performed by: INTERNAL MEDICINE

## 2023-03-15 PROCEDURE — 97110 THERAPEUTIC EXERCISES: CPT

## 2023-03-15 PROCEDURE — 92526 ORAL FUNCTION THERAPY: CPT

## 2023-03-15 PROCEDURE — 94761 N-INVAS EAR/PLS OXIMETRY MLT: CPT

## 2023-03-15 PROCEDURE — 25010000002 MORPHINE PER 10 MG: Performed by: SURGERY

## 2023-03-15 RX ORDER — NICOTINE 21 MG/24HR
1 PATCH, TRANSDERMAL 24 HOURS TRANSDERMAL
Qty: 30 PATCH | Refills: 0 | Status: SHIPPED | OUTPATIENT
Start: 2023-03-15 | End: 2023-04-14

## 2023-03-15 RX ORDER — OXYCODONE AND ACETAMINOPHEN 10; 325 MG/1; MG/1
1 TABLET ORAL EVERY 8 HOURS PRN
Qty: 21 TABLET | Refills: 0 | Status: SHIPPED | OUTPATIENT
Start: 2023-03-15 | End: 2023-03-22

## 2023-03-15 RX ORDER — ALPRAZOLAM 0.25 MG/1
0.25 TABLET ORAL 3 TIMES DAILY PRN
Qty: 20 TABLET | Refills: 0 | Status: SHIPPED | OUTPATIENT
Start: 2023-03-15 | End: 2023-03-22

## 2023-03-15 RX ADMIN — PANTOPRAZOLE SODIUM 40 MG: 40 TABLET, DELAYED RELEASE ORAL at 08:15

## 2023-03-15 RX ADMIN — SERTRALINE 100 MG: 100 TABLET, FILM COATED ORAL at 08:14

## 2023-03-15 RX ADMIN — HYDROCODONE BITARTRATE AND ACETAMINOPHEN 1 TABLET: 5; 325 TABLET ORAL at 00:11

## 2023-03-15 RX ADMIN — LISINOPRIL 40 MG: 20 TABLET ORAL at 08:14

## 2023-03-15 RX ADMIN — MORPHINE SULFATE 2 MG: 2 INJECTION, SOLUTION INTRAMUSCULAR; INTRAVENOUS at 05:47

## 2023-03-15 RX ADMIN — OXYCODONE HYDROCHLORIDE AND ACETAMINOPHEN 1 TABLET: 10; 325 TABLET ORAL at 11:55

## 2023-03-15 RX ADMIN — ALPRAZOLAM 0.25 MG: 0.25 TABLET ORAL at 09:04

## 2023-03-15 RX ADMIN — AMLODIPINE BESYLATE 5 MG: 5 TABLET ORAL at 08:15

## 2023-03-15 RX ADMIN — PREDNISONE 10 MG: 20 TABLET ORAL at 08:14

## 2023-03-15 RX ADMIN — IPRATROPIUM BROMIDE AND ALBUTEROL SULFATE 3 ML: .5; 3 SOLUTION RESPIRATORY (INHALATION) at 07:29

## 2023-03-15 RX ADMIN — INSULIN HUMAN 2 UNITS: 100 INJECTION, SOLUTION PARENTERAL at 05:50

## 2023-03-15 RX ADMIN — HYDROCODONE BITARTRATE AND ACETAMINOPHEN 1 TABLET: 5; 325 TABLET ORAL at 08:15

## 2023-03-15 RX ADMIN — FUROSEMIDE 20 MG: 10 INJECTION, SOLUTION INTRAMUSCULAR; INTRAVENOUS at 08:15

## 2023-03-15 RX ADMIN — MORPHINE SULFATE 2 MG: 2 INJECTION, SOLUTION INTRAMUSCULAR; INTRAVENOUS at 01:04

## 2023-03-15 RX ADMIN — HYDROCHLOROTHIAZIDE: 25 TABLET ORAL at 08:35

## 2023-03-15 RX ADMIN — NYSTATIN: 100000 POWDER TOPICAL at 08:15

## 2023-03-15 RX ADMIN — HEPARIN SODIUM 5000 UNITS: 5000 INJECTION INTRAVENOUS; SUBCUTANEOUS at 05:47

## 2023-03-15 RX ADMIN — HYDROCODONE BITARTRATE AND ACETAMINOPHEN 1 TABLET: 5; 325 TABLET ORAL at 04:14

## 2023-03-15 NOTE — DISCHARGE SUMMARY
[facility]         DISCHARGE SUMMARY    Patient Name: Italia Olvera  : 1960  MRN: [id number]    Date of Admission: 3/7/2023  Date of Discharge: March 15, 2023  Primary Care Physician: Carloz Shoemaker MD    Consults     Date and Time Order Name Status Description    3/8/2023 12:01 AM IP General Consult (Use specialty-specific consult if known)      3/7/2023 11:57 PM IP General Consult (Use specialty-specific consult if known) Completed           Presenting Problem:   Ischemic foot [I99.8]    Active and Resolved Hospital Problems:  Active Hospital Problems    Diagnosis POA   • Atherosclerosis of native artery of extremity (HCC) [I70.209] Yes   • Chronic respiratory failure with hypoxia ([facility]) [J96.11] Yes   • T2DM (type 2 diabetes mellitus) (CMS/HCC)- uncontrolled [E11.9] Yes   • Bipolar disorder ([facility]) [F31.9] Yes      Resolved Hospital Problems    Diagnosis POA   • **Ischemic foot [I99.8] Yes         Hospital Course     Hospital Course:  Italia Olvera is a 62 y.o. female admitted to hospital with ischemic foot on the right side.  Patient was started on anticoagulation, please see H&P for further details.    Patient has known history of peripheral vascular disease and ulcers in the foot.  She had previous vascular surgeries.  Extremely noncompliant not followed with vascular surgeon either PCP for continued care.  She continues to smoke.  When she arrived to hospital she had ischemic foot.  Vascular studies were done and patient was not a candidate for revascularization.    Vascular surgeon Dr. Whiting and Dr. Haywood discussed with patient in detail and she agreed for amputation.  Surgical amputation above-knee on the right side was performed on .    Subsequent to That patient had good recovery pain control was achieved with narcotics.  Patient was advised to go to inpatient rehab to have a better care and wound healing patient refused and wanted to be discharged home.  She was  discharged to home on 15th with outpatient follow-up recommendations      DISCHARGE Follow Up Recommendations for labs and diagnostics:   Discharge to home follow-up with home health  Follow-up with PCP and vascular surgeon  PT OT recommendations        Day of Discharge     Vital Signs:  Temp:  [98.1 °F (36.7 °C)-98.9 °F (37.2 °C)] 98.8 °F (37.1 °C)  Heart Rate:  [65-74] 65  Resp:  [16-20] 20  BP: (154-185)/(42-68) 165/42  Flow (L/min):  [1-2] 2    Physical Exam:    Elderly female not in acute distress  Heart regular lungs clear  Diminished breath sounds bilaterally  Stump on the right foot healing nicely  Neurologically patient awake alert and oriented      Pertinent  and/or Most Recent Results     LAB RESULTS:      Lab 03/13/23 0410 03/11/23 2258 03/11/23  0814 03/11/23  0615 03/09/23  0428 03/08/23  1335   WBC 11.37* 19.41*  --  16.09* 12.34*  --    HEMOGLOBIN 9.4* 10.5*  --  10.5* 11.1*  --    HEMATOCRIT 29.9* 32.5*  --  32.7* 35.0  --    PLATELETS 253 248  --  221 149  --    NEUTROS ABS 7.92* 16.71*  --   --  10.44*  --    IMMATURE GRANS (ABS) 0.12* 0.16*  --   --  0.08*  --    LYMPHS ABS 2.37 1.83  --   --  1.19  --    MONOS ABS 0.88 0.66  --   --  0.54  --    EOS ABS 0.06 0.01  --   --  0.06  --    MCV 86.2 85.8  --  86.1 86.6  --    PROTIME  --  14.7 15.1*  --   --   --    APTT  --  41.5* 47.4*  --   --  48.5*         Lab 03/13/23 0410 03/11/23 2258 03/11/23  0553 03/10/23  0428 03/09/23 0428   SODIUM 137 136 137 141 137   POTASSIUM 4.5 4.5 4.0 4.3 4.3   CHLORIDE 103 103 104 107 106   CO2 24.1 22.6 22.9 23.3 21.3*   ANION GAP 9.9 10.4 10.1 10.7 9.7   BUN 15 19 22 18 13   CREATININE 0.53* 0.59 0.58 0.68 0.74   EGFR 104.1 101.4 101.8 98.0 91.0   GLUCOSE 164* 240* 65 183* 159*   CALCIUM 8.5* 8.3* 8.2* 7.9* 7.5*   MAGNESIUM  --   --  1.6  --   --          Lab 03/13/23  0410   TOTAL PROTEIN 6.4   ALBUMIN 2.3*   GLOBULIN 4.1   ALT (SGPT) 6   AST (SGOT) 18   BILIRUBIN 0.4   ALK PHOS 104         Lab  03/11/23  2258 03/11/23  0814   PROTIME 14.7 15.1*   INR 1.14 1.18*             Lab 03/11/23  1347   ABO TYPING O   RH TYPING Positive   ANTIBODY SCREEN Negative         Brief Urine Lab Results  (Last result in the past 365 days)      Color   Clarity   Blood   Leuk Est   Nitrite   Protein   CREAT   Urine HCG        03/08/23 0648 Dark Yellow   Turbid   Moderate (2+)   Moderate (2+)   Negative   >=300 mg/dL (3+)               Microbiology Results (last 10 days)     Procedure Component Value - Date/Time    Urine Culture - Urine, Straight Cath [[id number]]  (Abnormal)  (Susceptibility) Collected: 03/08/23 0648    Lab Status: Final result Specimen: Urine from Straight Cath Updated: 03/11/23 0948     Urine Culture >100,000 CFU/mL Enterococcus faecalis    Narrative:      Colonization of the urinary tract without infection is common. Treatment is discouraged unless the patient is symptomatic, pregnant, or undergoing an invasive urologic procedure.    Susceptibility      Enterococcus faecalis      BAY      Ampicillin Susceptible      Levofloxacin Resistant     Nitrofurantoin Susceptible      Tetracycline Resistant     Vancomycin Susceptible                           Blood Culture - Blood, Arm, Left [[id number]]  (Normal) Collected: 03/07/23 2131    Lab Status: Final result Specimen: Blood from Arm, Left Updated: 03/12/23 2246     Blood Culture No growth at 5 days    Blood Culture - Blood, Arm, Right [[id number]]  (Normal) Collected: 03/07/23 2108    Lab Status: Final result Specimen: Blood from Arm, Right Updated: 03/12/23 2215     Blood Culture No growth at 5 days          PROCEDURES:    [unfilled]    CT Lower Extremity Right With Contrast    Result Date: 3/7/2023  Impression:   1. Extensive soft tissue edema primarily throughout the foot, but also involving the ankle and mid tibial soft tissues.  There is also blistering along the lateral midfoot all concerning for extensive cellulitis.  No soft tissue gas. 2. Amputations at  the 2nd and 5th digit metatarsal heads.  The amputation site at the 2nd digit appears sharp and corticated.  The amputation site at the 5th digit appears less well-defined.  If there is concern for osteomyelitis, consider follow-up MRI. 3. Marked osteopenia.     IRA LARSEN MD       Electronically Signed and Approved By: IRA LARSEN MD on 3/07/2023 at 22:27               Results for orders placed during the hospital encounter of 02/21/22    Doppler Arterial Multi Level Lower Extremity - Bilateral CAR    Interpretation Summary  · Right Conclusion: The right SHARON is moderately reduced. Severe digital ischemia.  · Left Conclusion: The left SHARON is moderately reduced. Moderate digital ischemia.  · Right SHARON (0.74) is improved in comparison to results (0.48) noted on study dated 2/23/2022  · The left SHARON(0.67) is slightly worse in comparison to results (0.78) noted on study dated 2/23/2022.      Results for orders placed during the hospital encounter of 02/21/22    Doppler Arterial Multi Level Lower Extremity - Bilateral CAR    Interpretation Summary  · Right Conclusion: The right SHARON is moderately reduced. Severe digital ischemia.  · Left Conclusion: The left SHARON is moderately reduced. Moderate digital ischemia.  · Right SHARON (0.74) is improved in comparison to results (0.48) noted on study dated 2/23/2022  · The left SHARON(0.67) is slightly worse in comparison to results (0.78) noted on study dated 2/23/2022.      Results for orders placed during the hospital encounter of 02/03/23    Adult Transthoracic Echo Complete W/ Cont if Necessary Per Protocol    Interpretation Summary  Borderline dilated left ventricle.  Moderately reduced left ventricular systolic function with an estimated ejection fraction of 30-35% and moderate global hypokinesis.  Left ventricular diastolic function is consistent with pseudonormalization (grade II diastolic dysfunction with high LAP).  Severe concentric left ventricular  hypertrophy.  Mildly reduced right ventricular systolic function.  Severely dilated left atrium.  Moderately dilated right atrium.  Moderate posterior mitral annular calcification with mild-moderate mitral regurgitation.  Trileaflet aortic valve with moderate sclerosis/calcification.  Mild to moderate aortic stenosis was observed with peak and mean gradients across the valve of 27 and 14 mmHg, respectively.  Maximum velocity across the aortic valve was 260 cm/s.  Calculated dimensionless index = 0.32.  Mild aortic regurgitation.  Mild tricuspid regurgitation.  Estimated right ventricular systolic pressure was severely elevated at 64 mmHg.  Mildly dilated aortic root and ascending aorta, measuring up to 3.9 cm in diameter.  Dilated IVC with blunted respirophasic changes, consistent with significantly elevated central venous pressures.  A moderate to large circumferential pericardial effusion was observed.  No echocardiographic evidence of pericardial tamponade.      Labs Pending at Discharge:  Pending Labs     Order Current Status    Tissue Pathology Exam In process            Discharge Details        Discharge Medications      New Medications      Instructions Start Date   ALPRAZolam 0.25 MG tablet  Commonly known as: XANAX   0.25 mg, Oral, 3 Times Daily PRN         Changes to Medications      Instructions Start Date   nortriptyline 50 MG capsule  Commonly known as: PAMELOR  What changed: Another medication with the same name was removed. Continue taking this medication, and follow the directions you see here.   50 mg, Oral, Every Night at Bedtime      oxyCODONE-acetaminophen  MG per tablet  Commonly known as: PERCOCET  What changed: additional instructions   1 tablet, Oral, Every 6 Hours PRN, GIVEN PER PCP         Continue These Medications      Instructions Start Date   amLODIPine-benazepril 10-40 MG per capsule  Commonly known as: LOTREL   1 capsule, Oral, Daily      aspirin 81 MG EC tablet   81 mg, Oral,  Daily      atorvastatin 10 MG tablet  Commonly known as: LIPITOR   1 tablet, Oral, Daily      benzonatate 100 MG capsule  Commonly known as: TESSALON   100-200 mg, Oral, 3 Times Daily PRN      bisoprolol-hydrochlorothiazide 10-6.25 MG per tablet  Commonly known as: ZIAC   1 tablet, Oral, Every 12 Hours Scheduled      clopidogrel 75 MG tablet  Commonly known as: PLAVIX   75 mg, Oral, Daily      colestipol 1 g tablet  Commonly known as: COLESTID   1 g, Oral, Daily      furosemide 80 MG tablet  Commonly known as: LASIX   80 mg, Oral, Every Other Day      hydrOXYzine 25 MG tablet  Commonly known as: ATARAX   50 mg, Oral, Every 6 Hours PRN      mirtazapine 45 MG tablet  Commonly known as: REMERON   45 mg, Oral, Nightly PRN      nicotine 21 MG/24HR patch  Commonly known as: NICODERM CQ   1 patch, Transdermal, Every 24 Hours Scheduled      ondansetron 4 MG tablet  Commonly known as: ZOFRAN   4 mg, Oral, Every 8 Hours PRN      pantoprazole 40 MG EC tablet  Commonly known as: PROTONIX   40 mg, Oral, Daily      potassium chloride 10 MEQ CR tablet   20 mEq, Oral, Daily      sertraline 50 MG tablet  Commonly known as: ZOLOFT   50 mg, Oral, Daily      sertraline 100 MG tablet  Commonly known as: ZOLOFT   100 mg, Oral, Daily      tiZANidine 4 MG tablet  Commonly known as: ZANAFLEX   4 mg, Oral, Every 8 Hours PRN      traZODone 150 MG tablet  Commonly known as: DESYREL   150 mg, Oral, Nightly PRN         ASK your doctor about these medications      Instructions Start Date   guaiFENesin 600 MG 12 hr tablet  Commonly known as: MUCINEX  Ask about: Should I take this medication?   600 mg, Oral, Every 12 Hours Scheduled             No Known Allergies      Discharge Disposition:    Home-Health Care [facility] [place]    Diet:    Heart healthy diet    Discharge Activity:     Activity Instructions     Activity as Tolerated              No future appointments.    Additional Instructions for the Follow-ups that You Need to Schedule     Discharge Follow-up  with PCP   As directed       Currently Documented PCP:    Carloz Shoemaker MD    PCP Phone Number:    [phone]     Follow Up Details: Next week         Discharge Follow-up with Specified Provider: Dr Russell in 1-2 week   As directed      To: Dr Russell in 1-2 week               Time spent on Discharge including face to face service: 27 minutes.            I have dictated this note utilizing Dragon Dictation.             Please note that portions of this note were completed with a voice recognition program.             Part of this note may be an electronic transcription/translation of spoken language to printed text         using the Dragon Dictation System.       Electronically signed by Rudy Garnett MD, 03/15/23, 9:17 AM EDT.

## 2023-03-15 NOTE — PLAN OF CARE
Goal Outcome Evaluation:      Patient ready for discharge per MD. Vital signs stable, patient educated on importance of OOB activity and proper pain med regimen.

## 2023-03-15 NOTE — SIGNIFICANT NOTE
08/13/21 5099   Plan   Plan Comments Pt's HCS is listed as Lyndsey Horner (primary) and Kristi Olvera (secondary). Discussed this with pt's , Lloyd, who stated this is no longer current and it was supposed to be removed last admission. Per Lloyd, Kristi is their daughter and she has mental health concerns that would not allow her to make appropriate decisions. Lloyd was unsure who Lyndsey was. Will attempt to discuss this with pt once her mentaion clears.      Home

## 2023-03-15 NOTE — THERAPY TREATMENT NOTE
Acute Care - Speech Language Pathology   Swallow Treatment Note  Dewey     Patient Name: Italia lOvera  : 1960  MRN: [id number]  Today's Date: 3/15/2023               Admit Date: 3/7/2023    Visit Dx:     ICD-10-CM ICD-9-CM   1. Chronic respiratory failure with hypoxia, on home oxygen therapy ([facility])  J96.11 518.83    Z99.81 799.02     V46.2   2. Ischemic foot  I99.8 459.9   3. Osteomyelitis of right foot, unspecified type ([facility])  M86.9 730.27   4. Sepsis, due to unspecified organism, unspecified whether acute organ dysfunction present ([facility])  A41.9 038.9     995.91   5. Foot infection  L08.9 686.9   6. Oropharyngeal dysphagia  R13.12 787.22   7. Diabetes mellitus with peripheral vascular disease ([facility])  E11.51 250.70     443.81   8. Difficulty walking  R26.2 719.7     Patient Active Problem List   Diagnosis   • Bladder disorder   • Depression   • Hypertension   • Peripheral neuropathy   • Osteoarthritis of knee   • COPD with acute exacerbation ([facility])   • Chronic back pain   • Multifocal pneumonia   • T2DM (type 2 diabetes mellitus) (CMS/[facility])- uncontrolled   • Bipolar disorder ([facility])   • Chronic respiratory failure with hypoxia ([facility])   • Acute on chronic respiratory failure with hypoxia ([facility])   • Chronic respiratory failure with hypoxia, on home oxygen therapy ([facility])   • Overweight (BMI 25.0-29.9)   • Acute on chronic systolic CHF (congestive heart failure) ([facility])   • Essential hypertension   • DM (diabetes mellitus), type 1 ([facility])   • Acute UTI (urinary tract infection)   • Anemia   • Sepsis ([facility])   • COPD with acute exacerbation ([facility])   • Hypokalemia   • Moderate malnutrition (CMS/[facility])   • Decubitus ulcer of back, stage 3 ([facility])   • Acute osteomyelitis of toe of right foot ([facility])   • Anxiety disorder   • Diabetes mellitus with peripheral vascular disease ([facility])   • Decubitus ulcer, unstageable ([facility])   • Atherosclerosis of native artery of extremity ([facility])   • Paroxysmal atrial fibrillation ([facility])   • Intertrigo      Past Medical History:   Diagnosis Date   • Acid reflux    • ACL tear 09/01/2015    PCL/ACL TEAR/RUPTURE   • Acute shoulder bursitis, right 08/23/2015   • Anxiety    • Arthritis    • Asthma    • Bipolar 1 disorder ([facility])     untreated   • Bladder disorder    • Cancer ([facility])     CERVICAL CANCER   • CHF (congestive heart failure) ([facility])     ASYMPTOMATIC- NO CARDIOLOGIST- SEE'S DR ARRIAZA (PCP)- DENIED CP/SOB   • Chronic back pain    • Chronic pain     CHRONIC BACK, NECK, LEG, FOOT, & FACE PAIN   • COPD (chronic obstructive pulmonary disease) ([facility])     USES INHALERS   • Depression    • Diabetes mellitus ([facility])     BG RUND AROUND 140 IN AM   • DJD (degenerative joint disease)    • Gangrene ([facility])     RT SECOND AND FIFTH TOES   • GERD (gastroesophageal reflux disease)    • HBP (high blood pressure)    • Hip pain 09/15/2015   • Hypertension    • Knee injury 08/19/2015   • Knee pain, right 09/15/2015   • Limb swelling    • Neuropathy    • On home O2     2-3L/NC PRN   • Osteoarthritis, knee 09/01/2015   • Osteoporosis    • Peripheral neuropathy    • Renal failure 1994    NO CURRENT PROBLEMS   • Seasonal allergies    • Shoulder tendonitis 08/23/2015   • Sleep apnea    • Smoker    • SOB (shortness of breath)     NONE CURENTLY   • Tendinitis of right rotator cuff 08/23/2015     Past Surgical History:   Procedure Laterality Date   • ABOVE KNEE AMPUTATION Right 3/11/2023    Procedure: AMPUTATION ABOVE KNEE;  Surgeon: Zacarias Haywood MD;  Location: [facility];  Service: Vascular;  Laterality: Right;   • AMPUTATION DIGIT Right 12/6/2022    Procedure: Amputation of right second and fifth toes;  Surgeon: Gabino Russell MD;  Location: [facility] OR;  Service: Vascular;  Laterality: Right;   • BACK SURGERY     • BLADDER REPAIR     • BRONCHOSCOPY N/A 07/10/2021    Procedure: BRONCHOSCOPY WITH BRONCHOALVEOLAR LAVAGE, POSSIBLE BIOPSY, BRUSHING, WASHING, AIRWAY INSPECTION;  Surgeon: Rodrigo Reyes MD;  Location: [facility] OR;   Service: Pulmonary;  Laterality: N/A;   • BRONCHOSCOPY N/A 07/10/2021    Procedure: BRONCHOSCOPY;  Surgeon: Rodrigo Reyes MD;  Location: [facility] ENDOSCOPY;  Service: Pulmonary;  Laterality: N/A;   • CARDIAC CATHETERIZATION Right 11/03/2022    Procedure: Aortogram with right leg angiogram, possible angioplasty or stenting ;  Surgeon: Gabino Russell MD;  Location: [facility] CATH INVASIVE LOCATION;  Service: Vascular;  Laterality: Right;   • CARDIAC CATHETERIZATION Right 3/8/2023    Procedure: Right leg angiogram, possible angioplasty or stenting;  Surgeon: Gabino Russell MD;  Location: [facility] CATH INVASIVE LOCATION;  Service: Vascular;  Laterality: Right;   • CHOLECYSTECTOMY     • ENDOSCOPY     • FRACTURE SURGERY      SKULL FRACTURE   • GALLBLADDER SURGERY     • HERNIA REPAIR     • HYSTERECTOMY     • INTERVENTIONAL RADIOLOGY PROCEDURE Right 03/03/2022    Procedure: Abdominal Aortagram with Runoff;  Surgeon: Marleny Yoon MD;  Location: [facility] CATH INVASIVE LOCATION;  Service: Peripheral Vascular;  Laterality: Right;   • JOINT REPLACEMENT     • OTHER SURGICAL HISTORY      ARTIFICIAL JOINTS/LIMBS   • OTHER SURGICAL HISTORY      FACE SURGERY, UNSPECIFIED   • TOTAL HIP ARTHROPLASTY Right    • TOTAL KNEE ARTHROPLASTY Left        SPEECH PATHOLOGY DYSPHAGIA TREATMENT    Subjective/Behavioral Observations: Alert and cooperative, sitting up in bed feeding self.  Patient cued to adjust positioning.      Day/time of Treatment: 3/15/2023      Current Diet: Regular solid, thin liquid.      Current Strategies:Single sips of liquid.  Alternate small bites and small sips of solids and liquids at a slow rate.  Medications whole in applesauce.      Treatment received: Dysphagia therapy to address swallow function through exercises and education of strategies.      Results of treatment: Patient with p.o. intake near 100% of a.m. meal.  Patient with min mod cue for decreasing bite-size.  Patient taking single sips of liquid from  straw, no overt clinical signs or symptoms of aspiration noted.  Patient educated for strategies.  Patient states taking medications whole with water with pills occasionally sticking.  Educated for taking pills and thicker substance such as partially melted ice cream.      Progress toward goals: Adequate      Barriers to Achieving goals: N/A      Plan of care:/changes in plan: Continue per current plan.  Assist patient with set up.  Patient to feed self.  Medications whole in partially melted ice cream.                                                                                          Plan of Care Reviewed With: patient          EDUCATION  The patient has been educated in the following areas:   Modified Diet Instruction.              Time Calculation:    Time Calculation- SLP     Row Name 03/15/23 1115             Time Calculation- SLP    SLP Stop Time 0930  -TB      SLP Received On 03/15/23  -TB         Untimed Charges    40016-FU Treatment Swallow Minutes 40  -TB         Total Minutes    Untimed Charges Total Minutes 40  -TB       Total Minutes 40  -TB            User Key  (r) = Recorded By, (t) = Taken By, (c) = Cosigned By    Initials Name Provider Type    TB Maira Antoine SLP Speech and Language Pathologist                Therapy Charges for Today     Code Description Service Date Service Provider Modifiers Qty    [id number]  ST TREATMENT SWALLOW 3 3/15/2023 Maira Antoine SLP GN 1               LINDSEY Rasmussen  3/15/2023

## 2023-03-15 NOTE — THERAPY TREATMENT NOTE
Acute Care - Physical Therapy Treatment Note   [place]     Patient Name: Italia Olvera  : 1960  MRN: [id number]  Today's Date: 3/15/2023      Visit Dx:     ICD-10-CM ICD-9-CM   1. Chronic respiratory failure with hypoxia, on home oxygen therapy ([facility])  J96.11 518.83    Z99.81 799.02     V46.2   2. Ischemic foot  I99.8 459.9   3. Osteomyelitis of right foot, unspecified type ([facility])  M86.9 730.27   4. Sepsis, due to unspecified organism, unspecified whether acute organ dysfunction present ([facility])  A41.9 038.9     995.91   5. Foot infection  L08.9 686.9   6. Oropharyngeal dysphagia  R13.12 787.22   7. Diabetes mellitus with peripheral vascular disease ([facility])  E11.51 250.70     443.81   8. Difficulty walking  R26.2 719.7     Patient Active Problem List   Diagnosis   • Bladder disorder   • Depression   • Hypertension   • Peripheral neuropathy   • Osteoarthritis of knee   • COPD with acute exacerbation ([facility])   • Chronic back pain   • Multifocal pneumonia   • T2DM (type 2 diabetes mellitus) (CMS/[facility])- uncontrolled   • Bipolar disorder ([facility])   • Chronic respiratory failure with hypoxia ([facility])   • Acute on chronic respiratory failure with hypoxia ([facility])   • Chronic respiratory failure with hypoxia, on home oxygen therapy ([facility])   • Overweight (BMI 25.0-29.9)   • Acute on chronic systolic CHF (congestive heart failure) ([facility])   • Essential hypertension   • DM (diabetes mellitus), type 1 ([facility])   • Acute UTI (urinary tract infection)   • Anemia   • Sepsis ([facility])   • COPD with acute exacerbation ([facility])   • Hypokalemia   • Moderate malnutrition (CMS/[facility])   • Decubitus ulcer of back, stage 3 ([facility])   • Acute osteomyelitis of toe of right foot ([facility])   • Anxiety disorder   • Diabetes mellitus with peripheral vascular disease ([facility])   • Decubitus ulcer, unstageable ([facility])   • Atherosclerosis of native artery of extremity ([facility])   • Paroxysmal atrial fibrillation ([facility])   • Intertrigo     Past Medical History:   Diagnosis Date   • Acid  reflux    • ACL tear 09/01/2015    PCL/ACL TEAR/RUPTURE   • Acute shoulder bursitis, right 08/23/2015   • Anxiety    • Arthritis    • Asthma    • Bipolar 1 disorder ([facility])     untreated   • Bladder disorder    • Cancer ([facility])     CERVICAL CANCER   • CHF (congestive heart failure) ([facility])     ASYMPTOMATIC- NO CARDIOLOGIST- SEE'S DR ARRIAZA (PCP)- DENIED CP/SOB   • Chronic back pain    • Chronic pain     CHRONIC BACK, NECK, LEG, FOOT, & FACE PAIN   • COPD (chronic obstructive pulmonary disease) ([facility])     USES INHALERS   • Depression    • Diabetes mellitus ([facility])     BG RUND AROUND 140 IN AM   • DJD (degenerative joint disease)    • Gangrene ([facility])     RT SECOND AND FIFTH TOES   • GERD (gastroesophageal reflux disease)    • HBP (high blood pressure)    • Hip pain 09/15/2015   • Hypertension    • Knee injury 08/19/2015   • Knee pain, right 09/15/2015   • Limb swelling    • Neuropathy    • On home O2     2-3L/NC PRN   • Osteoarthritis, knee 09/01/2015   • Osteoporosis    • Peripheral neuropathy    • Renal failure 1994    NO CURRENT PROBLEMS   • Seasonal allergies    • Shoulder tendonitis 08/23/2015   • Sleep apnea    • Smoker    • SOB (shortness of breath)     NONE CURENTLY   • Tendinitis of right rotator cuff 08/23/2015     Past Surgical History:   Procedure Laterality Date   • ABOVE KNEE AMPUTATION Right 3/11/2023    Procedure: AMPUTATION ABOVE KNEE;  Surgeon: Zacarias Haywood MD;  Location: [facility];  Service: Vascular;  Laterality: Right;   • AMPUTATION DIGIT Right 12/6/2022    Procedure: Amputation of right second and fifth toes;  Surgeon: Gabino Russell MD;  Location: [facility] MAIN OR;  Service: Vascular;  Laterality: Right;   • BACK SURGERY     • BLADDER REPAIR     • BRONCHOSCOPY N/A 07/10/2021    Procedure: BRONCHOSCOPY WITH BRONCHOALVEOLAR LAVAGE, POSSIBLE BIOPSY, BRUSHING, WASHING, AIRWAY INSPECTION;  Surgeon: Rodrigo Reyes MD;  Location: [facility] MAIN OR;  Service: Pulmonary;  Laterality: N/A;   • BRONCHOSCOPY  N/A 07/10/2021    Procedure: BRONCHOSCOPY;  Surgeon: Rodrigo Reyes MD;  Location: [facility] ENDOSCOPY;  Service: Pulmonary;  Laterality: N/A;   • CARDIAC CATHETERIZATION Right 11/03/2022    Procedure: Aortogram with right leg angiogram, possible angioplasty or stenting ;  Surgeon: Gabino Russell MD;  Location: [facility] CATH INVASIVE LOCATION;  Service: Vascular;  Laterality: Right;   • CARDIAC CATHETERIZATION Right 3/8/2023    Procedure: Right leg angiogram, possible angioplasty or stenting;  Surgeon: Gabino Russell MD;  Location: [facility] CATH INVASIVE LOCATION;  Service: Vascular;  Laterality: Right;   • CHOLECYSTECTOMY     • ENDOSCOPY     • FRACTURE SURGERY      SKULL FRACTURE   • GALLBLADDER SURGERY     • HERNIA REPAIR     • HYSTERECTOMY     • INTERVENTIONAL RADIOLOGY PROCEDURE Right 03/03/2022    Procedure: Abdominal Aortagram with Runoff;  Surgeon: Marleny Yoon MD;  Location: [facility] CATH INVASIVE LOCATION;  Service: Peripheral Vascular;  Laterality: Right;   • JOINT REPLACEMENT     • OTHER SURGICAL HISTORY      ARTIFICIAL JOINTS/LIMBS   • OTHER SURGICAL HISTORY      FACE SURGERY, UNSPECIFIED   • TOTAL HIP ARTHROPLASTY Right    • TOTAL KNEE ARTHROPLASTY Left      PT Assessment (last 12 hours)     PT Evaluation and Treatment     Row Name 03/15/23 1020          Physical Therapy Time and Intention    Subjective Information complains of;pain (P)   -JL     Document Type therapy note (daily note) (P)   -JL     Mode of Treatment individual therapy;physical therapy (P)   -JL     Patient Effort good (P)   -JL     Row Name 03/15/23 1020          General Information    Patient Profile Reviewed yes (P)   -JL     Patient Observations alert;cooperative;agree to therapy (P)   -JL     Row Name 03/15/23 1020          Bed Mobility    Supine-Sit-Supine [place] (Bed Mobility) moderate assist (50% patient effort) (P)   -JL     Bed Mobility, Safety Issues decreased use of legs for bridging/pushing (P)   -JL     Assistive  Device (Bed Mobility) bed rails;head of bed elevated;draw sheet (P)   -JL     Row Name 03/15/23 1020          Balance    Balance Assessment sitting dynamic balance (P)   -JL     Dynamic Sitting Balance contact guard (P)   -JL     Position, Sitting Balance sitting edge of bed (P)   -JL     Row Name 03/15/23 1020          Motor Skills    Therapeutic Exercise hip;knee (P)   -JL     Row Name 03/15/23 1020          Hip (Therapeutic Exercise)    Hip (Therapeutic Exercise) AROM (active range of motion) (P)   -JL     Hip AROM (Therapeutic Exercise) right;flexion;aBduction;aDduction;sitting (P)   3x10 seated marches, abdution, and adduction  -JL     Row Name 03/15/23 1020          Knee (Therapeutic Exercise)    Knee (Therapeutic Exercise) AROM (active range of motion) (P)   -JL     Knee AROM (Therapeutic Exercise) left;LAQ (long arc quad);3 sets;10 repetitions;sitting (P)   -     Row Name             Residual Limb Assessment 03/14/23 1140 transfemoral (above knee), right    Residual Limb Assessment - Properties Group Placement Date: 03/14/23  - Placement Time: 1140  -ANUPAMA Amputation Date: 03/11/23  - Location: transfemoral (above knee), right  -ANUPAMA    Retired Residual Limb Assessment - Properties Group Placement Date: 03/14/23  - Placement Time: 1140  -ANUPAMA Amputation Date: 03/11/23  - Location: transfemoral (above knee), right  -ANUPAMA    Retired Residual Limb Assessment - Properties Group Date first assessed: 03/14/23  - Time first assessed: 1140  - Amputation Date: 03/11/23  - Location: transfemoral (above knee), right  -ANUPAMA    Row Name             Wound 02/04/23 0100 Bilateral coccyx Pressure Injury    Wound - Properties Group Placement Date: 02/04/23  -LH Placement Time: 0100  -LH Present on Hospital Admission: Y  -LH Side: Bilateral  -LH Location: coccyx  -LH Primary Wound Type: Pressure inj  -LH    Retired Wound - Properties Group Placement Date: 02/04/23  -LH Placement Time: 0100  -LH Present on Hospital  Admission: Y  -LH Side: Bilateral  -LH Location: coccyx  -LH Primary Wound Type: Pressure inj  -LH    Retired Wound - Properties Group Date first assessed: 02/04/23  -LH Time first assessed: 0100  -LH Present on Hospital Admission: Y  -LH Side: Bilateral  -LH Location: coccyx  -LH Primary Wound Type: Pressure inj  -LH    Row Name             Wound 03/11/23 1355 Right anterior thigh Incision    Wound - Properties Group Placement Date: 03/11/23  - Placement Time: 1355  -SM Side: Right  -SM Orientation: anterior  -SM Location: thigh  -SM Primary Wound Type: Incision  -SM    Retired Wound - Properties Group Placement Date: 03/11/23  - Placement Time: 1355  -SM Side: Right  -SM Orientation: anterior  -SM Location: thigh  -SM Primary Wound Type: Incision  -SM    Retired Wound - Properties Group Date first assessed: 03/11/23  - Time first assessed: 1355  -SM Side: Right  -SM Location: thigh  -SM Primary Wound Type: Incision  -SM    Row Name 03/15/23 1020          Positioning and Restraints    Pre-Treatment Position in bed (P)   -JL     Post Treatment Position bed (P)   -JL     In Bed supine;call light within reach;encouraged to call for assist (P)   -JL     Row Name 03/15/23 1020          Progress Summary (PT)    Daily Progress Summary (PT) Pt. performed supine to sit transfer with Mod A due to RLE pain, weakness. Pt. able to assist with supine to sit by pulling with UE's to achieve upright sitting, increased time required to perform. Seated therex performed with pain noted during R hip AROM,. Pt. left in supine with call light in reach. (P)   -JL           User Key  (r) = Recorded By, (t) = Taken By, (c) = Cosigned By    Initials Name Provider Type    Nikki Rodney, RN Registered Nurse    Audrey Mcgowan, RN Registered Nurse    Homero Nance, RN Registered Nurse    Jareth Guzman, PT Student PT Student                Physical Therapy Education     Title: PT OT SLP Therapies (In Progress)     Topic:  Physical Therapy (In Progress)     Point: Mobility training (In Progress)     Learning Progress Summary           Patient Acceptance, E, NR by  at 3/12/2023 1647                   Point: Home exercise program (Not Started)     Learner Progress:  Not documented in this visit.          Point: Body mechanics (Not Started)     Learner Progress:  Not documented in this visit.          Point: Precautions (In Progress)     Learning Progress Summary           Patient Acceptance, E, NR by  at 3/12/2023 1647                               User Key     Initials Effective Dates Name Provider Type Discipline     04/25/21 -  Alix Miramontes, PT Physical Therapist PT              PT Recommendation and Plan     Progress Summary (PT)  Daily Progress Summary (PT): (P) Pt. performed supine to sit transfer with Mod A due to RLE pain, weakness. Pt. able to assist with supine to sit by pulling with UE's to achieve upright sitting, increased time required to perform. Seated therex performed with pain noted during R hip AROM,. Pt. left in supine with call light in reach.   Outcome Measures     Row Name 03/15/23 1023 03/14/23 1100 03/13/23 1400       How much help from another person do you currently need...    Turning from your back to your side while in flat bed without using bedrails? 3 (P)   -JL 3  -DP (r) TG (t) DP (c) 2  -AYDEN    Moving from lying on back to sitting on the side of a flat bed without bedrails? 2 (P)   -JL 2  -DP (r) TG (t) DP (c) 2  -AYDEN    Moving to and from a bed to a chair (including a wheelchair)? 1 (P)   -JL 1  -DP (r) TG (t) DP (c) 1  -AYDEN    Standing up from a chair using your arms (e.g., wheelchair, bedside chair)? 1 (P)   -JL 1  -DP (r) TG (t) DP (c) 1  -AYDEN    Climbing 3-5 steps with a railing? 1 (P)   -JL 1  -DP (r) TG (t) DP (c) 1  -AYDEN    To walk in hospital room? 1 (P)   -JL 1  -DP (r) TG (t) DP (c) 1  -AYDEN    AM-PAC 6 Clicks Score (PT) 9 (P)   -JL 9  -DP (r) TG (t) 8  -AYDEN       Functional Assessment     Outcome Measure Options AM-PAC 6 Clicks Basic Mobility (PT) (P)   -JL AM-PAC 6 Clicks Basic Mobility (PT)  -DP (r) TG (t) DP (c) AM-PAC 6 Clicks Basic Mobility (PT)  -AYDEN    Row Name 03/12/23 1600             How much help from another person do you currently need...    Turning from your back to your side while in flat bed without using bedrails? 1  -CS      Moving from lying on back to sitting on the side of a flat bed without bedrails? 1  -CS      Moving to and from a bed to a chair (including a wheelchair)? 1  -CS      Standing up from a chair using your arms (e.g., wheelchair, bedside chair)? 1  -CS      Climbing 3-5 steps with a railing? 1  -CS      To walk in hospital room? 1  -CS      AM-PAC 6 Clicks Score (PT) 6  -CS         Functional Assessment    Outcome Measure Options AM-PAC 6 Clicks Basic Mobility (PT)  -CS            User Key  (r) = Recorded By, (t) = Taken By, (c) = Cosigned By    Initials Name Provider Type    Aaron Porras, PT Physical Therapist    AYDENManny Mendoza, PT Physical Therapist    CS Alxi Miramontes, PT Physical Therapist    TG Chandrika Mckeon, PT Student PT Student    Jareth Guzman, PT Student PT Student                 Time Calculation:    PT Charges     Row Name 03/15/23 1019             Time Calculation    PT Received On 03/15/23 (P)   -JL         Timed Charges    70506 - PT Therapeutic Exercise Minutes 15 (P)   -JL      21713 - PT Therapeutic Activity Minutes 5 (P)   -JL         Total Minutes    Timed Charges Total Minutes 20 (P)   -JL       Total Minutes 20 (P)   -JL            User Key  (r) = Recorded By, (t) = Taken By, (c) = Cosigned By    Initials Name Provider Type    Jareth Guzman, PT Student PT Student              Therapy Charges for Today     Code Description Service Date Service Provider Modifiers Qty    [id number] HC PT THER PROC EA 15 MIN 3/15/2023 Jareth Mead, PT Student GP 1          PT G-Codes  Outcome Measure Options: (P) AM-PAC 6 Clicks Basic  Mobility (PT)  AM-PAC 6 Clicks Score (PT): (P) 9    Jareth Mead, PT Student  3/15/2023

## 2023-03-16 NOTE — OUTREACH NOTE
Prep Survey    Flowsheet Row Responses   [facility] patient discharged from? [facility]   Is LACE score < 7 ? No   Eligibility Readm Mgmt   Discharge diagnosis **Ischemic footAtherosclerosis of native artery of extremity    Does the patient have one of the following disease processes/diagnoses(primary or secondary)? Other   Does the patient have Home health ordered? Yes   What is the Home health agency?   [facility]   Is there a DME ordered? No   Prep survey completed? Yes          BERTO SMATR - Registered Nurse

## 2023-03-20 ENCOUNTER — DOCUMENTATION (OUTPATIENT)
Dept: VASCULAR SURGERY | Facility: HOSPITAL | Age: 63
End: 2023-03-20
Payer: MEDICARE

## 2023-03-20 NOTE — PROGRESS NOTES
Enter Query Response Below      Query Response: No.  Preoperative diagnosis and surgery based on ischemic foot.    Electronically signed by Gabino Russell MD, 23, 7:35 AM EDT.               If applicable, please update the problem list.   Patient: Italia Olvera        : 1960  Account: [id number]           Admit Date: 3/7/2023        How to Respond to this query:       a. Click New Note     b. Answer query within the yellow box.                c. Update the Problem List, if applicable.      If you have any questions about this query contact me at:  [email or web address]     Dr. Russell,     62-year female patient admitted 23 with Ischemic Right Foot, Uncontrolled Type 2 Diabetes Mellitus. Treatment has included Right Above Knee Amputation, Sliding Scale Insulin Protocol.     Based on Medicare billing guidelines [facility] are not allowed to use diagnoses from pathology reports as the sole basis for billing. Any findings noted on a pathology report must be affirmed by the treating physician before billing.    The pathologist reported that the specimen shows : Right lower extremity, above-the-knee amputation:               -Second and fifth toe amputation sites with gangrenous necrosis               -Negative for osteomyelitis in sections examined               -Calcific atherosclerosis               -Skin and soft tissue margin viable    Is this finding consistent with your clinical impression and treatment plan for this patient?  > Yes  > No  > Other explanation for clinical impression and treatment plan_________  > Clinically indeterminable    By submitting this query, we are merely seeking further clarification of documentation to accurately reflect all conditions that you are monitoring, evaluating, treating or that extend the hospitalization or utilize additional resources of care. Please utilize your independent clinical judgment when addressing the question(s) above.     This  query and your response, once completed, will be entered into the legal medical record.    Sincerely,  Nadia Daugherty RN, [facility]  Clinical Documentation Integrity Program   [email or web address]

## 2023-03-21 ENCOUNTER — READMISSION MANAGEMENT (OUTPATIENT)
Dept: CALL CENTER | Facility: HOSPITAL | Age: 63
End: 2023-03-21
Payer: MEDICARE

## 2023-03-21 NOTE — OUTREACH NOTE
Medical Week 1 Survey    Flowsheet Row Responses   [facility] patient discharged from? [facility]   Does the patient have one of the following disease processes/diagnoses(primary or secondary)? Other   Week 1 attempt successful? Yes   Call start time 0857   Call end time 0859   Discharge diagnosis **Ischemic footAtherosclerosis of native artery of extremity    Meds reviewed with patient/caregiver? Yes   Is the patient having any side effects they believe may be caused by any medication additions or changes? No   Does the patient have all medications ordered at discharge? Yes   Is the patient taking all medications as directed (includes completed medication regime)? Yes   Does the patient have a primary care provider?  Yes   Comments regarding PCP PATIENT HAS A VIRTUAL VISIT SCHEDULED WITH HER PCP TOMORROW   Has the patient kept scheduled appointments due by today? N/A   What is the Home health agency?   [facility]    Has home health visited the patient within 72 hours of discharge? Yes   Psychosocial issues? No   Did the patient receive a copy of their discharge instructions? Yes   Nursing interventions Reviewed instructions with patient   What is the patient's perception of their health status since discharge? Improving   Is the patient/caregiver able to teach back signs and symptoms related to disease process for when to call PCP? Yes   Is the patient/caregiver able to teach back signs and symptoms related to disease process for when to call 911? Yes   Is the patient/caregiver able to teach back the hierarchy of who to call/visit for symptoms/problems? PCP, Specialist, Home health nurse, Urgent Care, ED, 911 Yes   If the patient is a current smoker, are they able to teach back resources for cessation? [id number]   Week 1 call completed? Yes          Jeanie HARLEY - Licensed Nurse

## 2023-03-29 ENCOUNTER — READMISSION MANAGEMENT (OUTPATIENT)
Dept: CALL CENTER | Facility: HOSPITAL | Age: 63
End: 2023-03-29
Payer: MEDICARE

## 2023-03-29 NOTE — OUTREACH NOTE
Medical Week 2 Survey    Flowsheet Row Responses   [facility] patient discharged from? [facility]   Does the patient have one of the following disease processes/diagnoses(primary or secondary)? Other   Week 2 attempt successful? Yes   Call start time 1141   Call end time 1147   Person spoke with today (if not patient) and relationship Lloyd-spouse and patient.   Meds reviewed with patient/caregiver? Yes   Is the patient having any side effects they believe may be caused by any medication additions or changes? No   Does the patient have all medications ordered at discharge? Yes   Is the patient taking all medications as directed (includes completed medication regime)? Yes   Comments regarding appointments Surgeon appt changed to 04/10/23 by  nurse due to staples not ready to be removed.   Does the patient have a primary care provider?  Yes   Does the patient have an appointment with their PCP within 7 days of discharge? Yes   Has the patient kept scheduled appointments due by today? Yes   Has home health visited the patient within 72 hours of discharge? Yes   Psychosocial issues? No   Did the patient receive a copy of their discharge instructions? Yes   Nursing interventions Reviewed instructions with patient   What is the patient's perception of their health status since discharge? Improving   Is the patient/caregiver able to teach back signs and symptoms related to disease process for when to call PCP? Yes   Is the patient/caregiver able to teach back signs and symptoms related to disease process for when to call 911? Yes   Is the patient/caregiver able to teach back the hierarchy of who to call/visit for symptoms/problems? PCP, Specialist, Home health nurse, Urgent Care, ED, 911 Yes   If the patient is a current smoker, are they able to teach back resources for cessation? Smoking cessation medications, Smoking cessation classes, Smoking cessation support groups, [phone]   Week 2 Call Completed? Yes   Is  the patient interested in additional calls from an ambulatory ?  NOTE:  applies to high risk patients requiring additional follow-up. No   Wrap up additional comments Patient states overall is improving. States still some edema at incision, but no other s/s of infection. States  nurse continues to monitor. Denies any needs today. States would appreciate another f/u call next week.          Alie ACEVES - Registered Nurse

## 2023-04-10 ENCOUNTER — OFFICE VISIT (OUTPATIENT)
Dept: VASCULAR SURGERY | Facility: HOSPITAL | Age: 63
End: 2023-04-10
Payer: MEDICARE

## 2023-04-10 VITALS
OXYGEN SATURATION: 96 % | RESPIRATION RATE: 16 BRPM | DIASTOLIC BLOOD PRESSURE: 80 MMHG | TEMPERATURE: 97.7 F | HEART RATE: 82 BPM | SYSTOLIC BLOOD PRESSURE: 130 MMHG

## 2023-04-10 DIAGNOSIS — I73.9 PAD (PERIPHERAL ARTERY DISEASE): Primary | ICD-10-CM

## 2023-04-10 DIAGNOSIS — Z89.611 STATUS POST ABOVE-KNEE AMPUTATION OF RIGHT LOWER EXTREMITY: ICD-10-CM

## 2023-04-10 PROCEDURE — 1160F RVW MEDS BY RX/DR IN RCRD: CPT | Performed by: SURGERY

## 2023-04-10 PROCEDURE — G0463 HOSPITAL OUTPT CLINIC VISIT: HCPCS | Performed by: SURGERY

## 2023-04-10 PROCEDURE — 99024 POSTOP FOLLOW-UP VISIT: CPT | Performed by: SURGERY

## 2023-04-10 PROCEDURE — 3075F SYST BP GE 130 - 139MM HG: CPT | Performed by: SURGERY

## 2023-04-10 PROCEDURE — 3079F DIAST BP 80-89 MM HG: CPT | Performed by: SURGERY

## 2023-04-10 PROCEDURE — 1159F MED LIST DOCD IN RCRD: CPT | Performed by: SURGERY

## 2023-04-10 RX ORDER — AMOXICILLIN AND CLAVULANATE POTASSIUM 500; 125 MG/1; MG/1
1 TABLET, FILM COATED ORAL 2 TIMES DAILY
Qty: 20 TABLET | Refills: 0 | Status: SHIPPED | OUTPATIENT
Start: 2023-04-10

## 2023-04-10 NOTE — PROGRESS NOTES
[facility]   Follow up Office    Patient Name: Italia Olvera  : 1960  MRN: [id number]  Primary Care Physician:  Carloz Shoemaker MD      Subjective   Subjective     HPI:    Italia Olvera is a 62 y.o. female here for follow-up after right AKA 3/11/2023.  Doing well.  No complaints.      Objective     Vitals:   Temp:  [97.7 °F (36.5 °C)] 97.7 °F (36.5 °C)  Heart Rate:  [82] 82  Resp:  [16] 16  BP: (130)/(80) 130/80    Physical Exam      General: Alert, no acute distress  Wound: Staple line intact.  Slight surrounding erythema.    Assessment & Plan   Assessment / Plan     Diagnoses and all orders for this visit:    1. PAD (peripheral artery disease) (Primary)    2. Status post above-knee amputation of right lower extremity    Other orders  -     amoxicillin-clavulanate (Augmentin) 500-125 MG per tablet; Take 1 tablet by mouth 2 (Two) Times a Day.  Dispense: 20 tablet; Refill: 0       Assessment/Plan:   Satisfactory progress following right AKA.  DC staples.  Oral antibiotics for 10 days.  Follow-up in 2 weeks.        Electronically signed by Gabino Russell MD, 04/10/23, 10:24 AM EDT.

## 2023-04-24 ENCOUNTER — APPOINTMENT (OUTPATIENT)
Dept: GENERAL RADIOLOGY | Facility: HOSPITAL | Age: 63
End: 2023-04-24
Payer: MEDICARE

## 2023-04-24 ENCOUNTER — APPOINTMENT (OUTPATIENT)
Dept: CARDIOLOGY | Facility: HOSPITAL | Age: 63
End: 2023-04-24
Payer: MEDICARE

## 2023-04-24 ENCOUNTER — HOSPITAL ENCOUNTER (EMERGENCY)
Facility: HOSPITAL | Age: 63
Discharge: HOME | End: 2023-04-25
Attending: EMERGENCY MEDICINE | Admitting: EMERGENCY MEDICINE
Payer: MEDICARE

## 2023-04-24 DIAGNOSIS — R07.89 CHEST DISCOMFORT: Primary | ICD-10-CM

## 2023-04-24 DIAGNOSIS — G89.29 OTHER CHRONIC PAIN: ICD-10-CM

## 2023-04-24 DIAGNOSIS — I10 UNCONTROLLED HYPERTENSION: ICD-10-CM

## 2023-04-24 LAB
ALBUMIN SERPL-MCNC: 2.7 G/DL (ref 3.5–5.2)
ALBUMIN/GLOB SERPL: 0.7 G/DL
ALP SERPL-CCNC: 153 U/L (ref 39–117)
ALT SERPL W P-5'-P-CCNC: 6 U/L (ref 1–33)
ANION GAP SERPL CALCULATED.3IONS-SCNC: 9 MMOL/L (ref 5–15)
AST SERPL-CCNC: 19 U/L (ref 1–32)
BASOPHILS # BLD AUTO: 0.02 10*3/MM3 (ref 0–0.2)
BASOPHILS NFR BLD AUTO: 0.3 % (ref 0–1.5)
BILIRUB SERPL-MCNC: 0.3 MG/DL (ref 0–1.2)
BUN SERPL-MCNC: 14 MG/DL (ref 8–23)
BUN/CREAT SERPL: 25.5 (ref 7–25)
CALCIUM SPEC-SCNC: 8.4 MG/DL (ref 8.6–10.5)
CHLORIDE SERPL-SCNC: 103 MMOL/L (ref 98–107)
CO2 SERPL-SCNC: 23 MMOL/L (ref 22–29)
CREAT SERPL-MCNC: 0.55 MG/DL (ref 0.57–1)
D-LACTATE SERPL-SCNC: 1.1 MMOL/L (ref 0.5–2)
DEPRECATED RDW RBC AUTO: 55.3 FL (ref 37–54)
EGFRCR SERPLBLD CKD-EPI 2021: 103.1 ML/MIN/1.73
EOSINOPHIL # BLD AUTO: 0.08 10*3/MM3 (ref 0–0.4)
EOSINOPHIL NFR BLD AUTO: 1.1 % (ref 0.3–6.2)
ERYTHROCYTE [DISTWIDTH] IN BLOOD BY AUTOMATED COUNT: 16.8 % (ref 12.3–15.4)
FLUAV AG NPH QL: NEGATIVE
FLUBV AG NPH QL IA: NEGATIVE
GEN 5 2HR TROPONIN T REFLEX: 54 NG/L
GLOBULIN UR ELPH-MCNC: 4.1 GM/DL
GLUCOSE SERPL-MCNC: 136 MG/DL (ref 65–99)
HCT VFR BLD AUTO: 35.5 % (ref 34–46.6)
HGB BLD-MCNC: 11.4 G/DL (ref 12–15.9)
HOLD SPECIMEN: NORMAL
HOLD SPECIMEN: NORMAL
IMM GRANULOCYTES # BLD AUTO: 0.02 10*3/MM3 (ref 0–0.05)
IMM GRANULOCYTES NFR BLD AUTO: 0.3 % (ref 0–0.5)
LIPASE SERPL-CCNC: 6 U/L (ref 13–60)
LYMPHOCYTES # BLD AUTO: 1.61 10*3/MM3 (ref 0.7–3.1)
LYMPHOCYTES NFR BLD AUTO: 23 % (ref 19.6–45.3)
MAGNESIUM SERPL-MCNC: 1.7 MG/DL (ref 1.6–2.4)
MCH RBC QN AUTO: 28.7 PG (ref 26.6–33)
MCHC RBC AUTO-ENTMCNC: 32.1 G/DL (ref 31.5–35.7)
MCV RBC AUTO: 89.4 FL (ref 79–97)
MONOCYTES # BLD AUTO: 0.43 10*3/MM3 (ref 0.1–0.9)
MONOCYTES NFR BLD AUTO: 6.1 % (ref 5–12)
NEUTROPHILS NFR BLD AUTO: 4.85 10*3/MM3 (ref 1.7–7)
NEUTROPHILS NFR BLD AUTO: 69.2 % (ref 42.7–76)
NRBC BLD AUTO-RTO: 0 /100 WBC (ref 0–0.2)
NT-PROBNP SERPL-MCNC: 5062 PG/ML (ref 0–900)
PLATELET # BLD AUTO: 161 10*3/MM3 (ref 140–450)
PMV BLD AUTO: 10.5 FL (ref 6–12)
POTASSIUM SERPL-SCNC: 4.7 MMOL/L (ref 3.5–5.2)
PROT SERPL-MCNC: 6.8 G/DL (ref 6–8.5)
QT INTERVAL: 453 MS
QT INTERVAL: 469 MS
RBC # BLD AUTO: 3.97 10*6/MM3 (ref 3.77–5.28)
SODIUM SERPL-SCNC: 135 MMOL/L (ref 136–145)
TROPONIN T DELTA: -3 NG/L
TROPONIN T SERPL HS-MCNC: 57 NG/L
WBC NRBC COR # BLD: 7.01 10*3/MM3 (ref 3.4–10.8)
WHOLE BLOOD HOLD COAG: NORMAL
WHOLE BLOOD HOLD SPECIMEN: NORMAL

## 2023-04-24 PROCEDURE — U0004 COV-19 TEST NON-CDC HGH THRU: HCPCS | Performed by: EMERGENCY MEDICINE

## 2023-04-24 PROCEDURE — 83605 ASSAY OF LACTIC ACID: CPT

## 2023-04-24 PROCEDURE — 83880 ASSAY OF NATRIURETIC PEPTIDE: CPT

## 2023-04-24 PROCEDURE — 25010000002 MORPHINE PER 10 MG: Performed by: EMERGENCY MEDICINE

## 2023-04-24 PROCEDURE — 93005 ELECTROCARDIOGRAM TRACING: CPT | Performed by: EMERGENCY MEDICINE

## 2023-04-24 PROCEDURE — 99284 EMERGENCY DEPT VISIT MOD MDM: CPT

## 2023-04-24 PROCEDURE — 96375 TX/PRO/DX INJ NEW DRUG ADDON: CPT

## 2023-04-24 PROCEDURE — 71045 X-RAY EXAM CHEST 1 VIEW: CPT

## 2023-04-24 PROCEDURE — 96374 THER/PROPH/DIAG INJ IV PUSH: CPT

## 2023-04-24 PROCEDURE — 87040 BLOOD CULTURE FOR BACTERIA: CPT

## 2023-04-24 PROCEDURE — 84484 ASSAY OF TROPONIN QUANT: CPT | Performed by: EMERGENCY MEDICINE

## 2023-04-24 PROCEDURE — 93005 ELECTROCARDIOGRAM TRACING: CPT

## 2023-04-24 PROCEDURE — 83690 ASSAY OF LIPASE: CPT

## 2023-04-24 PROCEDURE — 83735 ASSAY OF MAGNESIUM: CPT | Performed by: EMERGENCY MEDICINE

## 2023-04-24 PROCEDURE — 36415 COLL VENOUS BLD VENIPUNCTURE: CPT

## 2023-04-24 PROCEDURE — 93971 EXTREMITY STUDY: CPT | Performed by: SURGERY

## 2023-04-24 PROCEDURE — 80053 COMPREHEN METABOLIC PANEL: CPT | Performed by: EMERGENCY MEDICINE

## 2023-04-24 PROCEDURE — 87804 INFLUENZA ASSAY W/OPTIC: CPT | Performed by: EMERGENCY MEDICINE

## 2023-04-24 PROCEDURE — 85025 COMPLETE CBC W/AUTO DIFF WBC: CPT

## 2023-04-24 PROCEDURE — 93971 EXTREMITY STUDY: CPT

## 2023-04-24 RX ORDER — SODIUM CHLORIDE 0.9 % (FLUSH) 0.9 %
10 SYRINGE (ML) INJECTION AS NEEDED
Status: DISCONTINUED | OUTPATIENT
Start: 2023-04-24 | End: 2023-04-25 | Stop reason: HOSPADM

## 2023-04-24 RX ORDER — LABETALOL HYDROCHLORIDE 5 MG/ML
20 INJECTION, SOLUTION INTRAVENOUS ONCE
Status: COMPLETED | OUTPATIENT
Start: 2023-04-24 | End: 2023-04-24

## 2023-04-24 RX ORDER — OXYCODONE AND ACETAMINOPHEN 10; 325 MG/1; MG/1
1 TABLET ORAL ONCE
Status: COMPLETED | OUTPATIENT
Start: 2023-04-24 | End: 2023-04-24

## 2023-04-24 RX ORDER — ASPIRIN 81 MG/1
324 TABLET, CHEWABLE ORAL ONCE
Status: DISCONTINUED | OUTPATIENT
Start: 2023-04-24 | End: 2023-04-25 | Stop reason: HOSPADM

## 2023-04-24 RX ADMIN — OXYCODONE AND ACETAMINOPHEN 1 TABLET: 10; 325 TABLET ORAL at 20:21

## 2023-04-24 RX ADMIN — MORPHINE SULFATE 4 MG: 4 INJECTION, SOLUTION INTRAMUSCULAR; INTRAVENOUS at 18:14

## 2023-04-24 RX ADMIN — LABETALOL HYDROCHLORIDE 20 MG: 5 INJECTION, SOLUTION INTRAVENOUS at 23:23

## 2023-04-24 NOTE — ED PROVIDER NOTES
Time: 5:44 PM EDT  Date of encounter:  4/24/2023  Independent Historian/Clinical History and Information was obtained by:   Patient  Chief Complaint: chest discomfort    History is limited by: N/A    History of Present Illness:  Patient is a 62 y.o. year old female who presents to the emergency department for evaluation of chest discomfort. The patient's symptoms started last Friday, lasted for several hours, and then resolved. The patient did have the discomfort on Saturday as well. There was no discomfort on Sunday. The patient's symptoms returned this morning. It is intermittent, lasting for several minutes, and then resolves on its own. It is located underneath the left breast and radiates to the shoulder blades. The pain is Described as sharp. It is not worse with deep inspiration. Denies nausea. Denies diaphoresis. Claims dry cough that started yesterday. Denies fever. The patient states that she has had similar symptoms in 2012. Denies medical history of aortic aneurism. The patient is normally immobile and is confined to a wheelchair. The patient notes swelling in her left lower extremity. She states that she had a right sided above the knee amputation in [facility]. Claims tobacco use. Claims medical history of hypertension. Denies medically history of high cholesterol. Claims medical history of diabetes. The patient does have a family history of CAD. The patient notes that her blood pressure was found to be elevated today by home health nurse. Top number was 214. The patient states that she took an extra blood pressure medication          Patient Care Team  Primary Care Provider: Carloz Shoemaker MD    Past Medical History:     No Known Allergies  Past Medical History:   Diagnosis Date   • Acid reflux    • ACL tear 09/01/2015    PCL/ACL TEAR/RUPTURE   • Acute shoulder bursitis, right 08/23/2015   • Anxiety    • Arthritis    • Asthma    • Bipolar 1 disorder     untreated   • Bladder disorder    • Cancer      CERVICAL CANCER   • CHF (congestive heart failure)     ASYMPTOMATIC- NO CARDIOLOGIST- SEE'S DR ARRIAZA (PCP)- DENIED CP/SOB   • Chronic back pain    • Chronic pain     CHRONIC BACK, NECK, LEG, FOOT, & FACE PAIN   • COPD (chronic obstructive pulmonary disease)     USES INHALERS   • Depression    • Diabetes mellitus     BG RUND AROUND 140 IN AM   • DJD (degenerative joint disease)    • Gangrene     RT SECOND AND FIFTH TOES   • GERD (gastroesophageal reflux disease)    • HBP (high blood pressure)    • Hip pain 09/15/2015   • Hypertension    • Knee injury 08/19/2015   • Knee pain, right 09/15/2015   • Limb swelling    • Neuropathy    • On home O2     2-3L/NC PRN   • Osteoarthritis, knee 09/01/2015   • Osteoporosis    • Peripheral neuropathy    • Renal failure 1994    NO CURRENT PROBLEMS   • Seasonal allergies    • Shoulder tendonitis 08/23/2015   • Sleep apnea    • Smoker    • SOB (shortness of breath)     NONE CURENTLY   • Tendinitis of right rotator cuff 08/23/2015     Past Surgical History:   Procedure Laterality Date   • ABOVE KNEE AMPUTATION Right 3/11/2023    Procedure: AMPUTATION ABOVE KNEE;  Surgeon: Zacarias Haywood MD;  Location: [facility] MAIN OR;  Service: Vascular;  Laterality: Right;   • AMPUTATION DIGIT Right 12/6/2022    Procedure: Amputation of right second and fifth toes;  Surgeon: Gabino Russell MD;  Location: [facility] MAIN OR;  Service: Vascular;  Laterality: Right;   • BACK SURGERY     • BLADDER REPAIR     • BRONCHOSCOPY N/A 07/10/2021    Procedure: BRONCHOSCOPY WITH BRONCHOALVEOLAR LAVAGE, POSSIBLE BIOPSY, BRUSHING, WASHING, AIRWAY INSPECTION;  Surgeon: Rodrigo Reyes MD;  Location: [facility] MAIN OR;  Service: Pulmonary;  Laterality: N/A;   • BRONCHOSCOPY N/A 07/10/2021    Procedure: BRONCHOSCOPY;  Surgeon: Rodrigo Reyes MD;  Location: [facility] ENDOSCOPY;  Service: Pulmonary;  Laterality: N/A;   • CARDIAC CATHETERIZATION Right 11/03/2022    Procedure: Aortogram with right leg angiogram, possible  angioplasty or stenting ;  Surgeon: Gabino Russell MD;  Location: [facility] CATH INVASIVE LOCATION;  Service: Vascular;  Laterality: Right;   • CARDIAC CATHETERIZATION Right 3/8/2023    Procedure: Right leg angiogram, possible angioplasty or stenting;  Surgeon: Gabino Russell MD;  Location: [facility] CATH INVASIVE LOCATION;  Service: Vascular;  Laterality: Right;   • CHOLECYSTECTOMY     • ENDOSCOPY     • FRACTURE SURGERY      SKULL FRACTURE   • GALLBLADDER SURGERY     • HERNIA REPAIR     • HYSTERECTOMY     • INTERVENTIONAL RADIOLOGY PROCEDURE Right 03/03/2022    Procedure: Abdominal Aortagram with Runoff;  Surgeon: Marleny Yoon MD;  Location: [facility] CATH INVASIVE LOCATION;  Service: Peripheral Vascular;  Laterality: Right;   • JOINT REPLACEMENT     • OTHER SURGICAL HISTORY      ARTIFICIAL JOINTS/LIMBS   • OTHER SURGICAL HISTORY      FACE SURGERY, UNSPECIFIED   • TOTAL HIP ARTHROPLASTY Right    • TOTAL KNEE ARTHROPLASTY Left      Family History   Problem Relation Age of Onset   • Stroke Mother    • Throat cancer Mother    • Arthritis Mother    • Osteoporosis Mother    • Heart disease Father    • Breast cancer Sister    • Colon cancer Sister    • Lung cancer Sister    • Arthritis Sister    • Osteoporosis Sister    • Heart disease Brother    • Diabetes Brother    • Arthritis Brother    • Arthritis Daughter        Home Medications:  Prior to Admission medications    Medication Sig Start Date End Date Taking? Authorizing Provider   amLODIPine-benazepril (LOTREL) 10-40 MG per capsule Take 1 capsule by mouth Daily.    Provider, MD Patria   amoxicillin-clavulanate (Augmentin) 500-125 MG per tablet Take 1 tablet by mouth 2 (Two) Times a Day. 4/10/23   Gabino Russell MD   aspirin 81 MG EC tablet Take 1 tablet by mouth Daily. 10/17/22 10/17/23  Gabino Russell MD   atorvastatin (LIPITOR) 10 MG tablet Take 1 tablet by mouth Daily. 2/10/23   ProviderPatria MD   benzonatate (TESSALON) 100 MG capsule Take 1-2  capsules by mouth 3 (Three) Times a Day As Needed for Cough.    Patria Hanson MD   bisoprolol-hydrochlorothiazide (ZIAC) 10-6.25 MG per tablet Take 1 tablet by mouth Every 12 (Twelve) Hours. 1/12/23   Patria Hanson MD   clopidogrel (PLAVIX) 75 MG tablet Take 1 tablet by mouth Daily.    Patria Hanson MD   colestipol (COLESTID) 1 g tablet Take 1 tablet by mouth Daily.    Patria Hanson MD   furosemide (LASIX) 80 MG tablet Take 1 tablet by mouth Every Other Day.    Patria Hanson MD   hydrOXYzine (ATARAX) 25 MG tablet Take 2 tablets by mouth Every 6 (Six) Hours As Needed. 2/17/23   Patria Hanson MD   mirtazapine (REMERON) 45 MG tablet Take 1 tablet by mouth At Night As Needed. 1/8/23   Patria Hanson MD   nortriptyline (PAMELOR) 50 MG capsule Take 1 capsule by mouth every night at bedtime. 2/15/23   Patria Hanson MD   ondansetron (ZOFRAN) 4 MG tablet Take 1 tablet by mouth Every 8 (Eight) Hours As Needed for Nausea or Vomiting.    Patria Hanson MD   oxyCODONE-acetaminophen (PERCOCET)  MG per tablet Take 1 tablet by mouth Every 6 (Six) Hours As Needed for Severe Pain. GIVEN PER PCP  Patient taking differently: Take 1 tablet by mouth Every 6 (Six) Hours As Needed for Severe Pain. 12/6/22   Gabino Russell MD   pantoprazole (PROTONIX) 40 MG EC tablet Take 1 tablet by mouth Daily.    Patria Hanson MD   potassium chloride 10 MEQ CR tablet Take 2 tablets by mouth Daily.    Patria Hanson MD   sertraline (ZOLOFT) 100 MG tablet Take 1 tablet by mouth Daily. 7/27/21   Patria Hanson MD   sertraline (ZOLOFT) 50 MG tablet Take 1 tablet by mouth Daily.    Patria Hanson MD   tiZANidine (ZANAFLEX) 4 MG tablet Take 1 tablet by mouth Every 8 (Eight) Hours As Needed for Muscle Spasms. 9/30/21   Patria Hanson MD   traZODone (DESYREL) 150 MG tablet Take 1 tablet by mouth At Night As Needed for Sleep. 12/30/21   Margie  "Historical, MD        Social History:   Social History     Tobacco Use   • Smoking status: Every Day     Packs/day: 0.50     Years: 50.00     Pack years: 25.00     Types: Cigarettes     Start date: 6/15/1979   • Smokeless tobacco: Never   • Tobacco comments:     SMOKED FOR 31 PLUS YEARS     INSTRUCTED NO SMOKING 24 HR PRIOR TO SURGERY    Vaping Use   • Vaping Use: Never used   Substance Use Topics   • Alcohol use: Never   • Drug use: Never         Review of Systems:  Review of Systems   Constitutional: Negative for chills, diaphoresis and fever.   HENT: Negative for congestion, postnasal drip, rhinorrhea and sore throat.    Eyes: Negative for photophobia.   Respiratory: Positive for cough. Negative for chest tightness and shortness of breath.    Cardiovascular: Positive for chest pain. Negative for palpitations and leg swelling.   Gastrointestinal: Negative for abdominal pain, diarrhea, nausea and vomiting.   Genitourinary: Negative for difficulty urinating, dysuria, flank pain, frequency, hematuria and urgency.   Musculoskeletal: Negative for neck pain and neck stiffness.   Skin: Negative for pallor and rash.   Neurological: Negative for dizziness, syncope, weakness, numbness and headaches.   Hematological: Negative for adenopathy. Does not bruise/bleed easily.   Psychiatric/Behavioral: Negative.         Physical Exam:  /61   Pulse 62   Temp 98.2 °F (36.8 °C) (Oral)   Resp 16   Ht 167.6 cm (66\")   Wt 69.2 kg (152 lb 8.9 oz)   SpO2 91%   BMI 24.62 kg/m²     Physical Exam  Vitals and nursing note reviewed.   Constitutional:       General: She is not in acute distress.     Appearance: Normal appearance. She is not ill-appearing, toxic-appearing or diaphoretic.   HENT:      Head: Normocephalic and atraumatic.      Mouth/Throat:      Mouth: Mucous membranes are moist.   Eyes:      Pupils: Pupils are equal, round, and reactive to light.   Cardiovascular:      Rate and Rhythm: Normal rate and regular rhythm. "      Pulses:           Carotid pulses are 2+ on the right side and 2+ on the left side.       Radial pulses are 2+ on the right side and 2+ on the left side.        Femoral pulses are 2+ on the right side and 2+ on the left side.       Popliteal pulses are 2+ on the right side and 2+ on the left side.        Dorsalis pedis pulses are 1+ on the right side and 1+ on the left side.        Posterior tibial pulses are 2+ on the right side and 2+ on the left side.      Heart sounds: Normal heart sounds. No murmur heard.  Pulmonary:      Effort: Pulmonary effort is normal. No accessory muscle usage, respiratory distress or retractions.      Breath sounds: Examination of the right-upper field reveals wheezing. Examination of the left-upper field reveals wheezing. Examination of the right-middle field reveals wheezing. Examination of the left-middle field reveals wheezing. Examination of the right-lower field reveals wheezing. Examination of the left-lower field reveals wheezing and rhonchi. Wheezing and rhonchi present. No rales.   Abdominal:      General: Abdomen is flat. There is no distension.      Palpations: Abdomen is soft. There is no mass or pulsatile mass.      Tenderness: There is no abdominal tenderness. There is no right CVA tenderness, left CVA tenderness, guarding or rebound.      Comments: No rigidity.   Musculoskeletal:         General: No swelling, tenderness or deformity.      Cervical back: Neck supple. No tenderness.      Right lower leg: No edema.      Left lower leg: Pitting Edema present.      Comments: The patient has a right above the knee amputation. The surgical wound looks good, there is no vehism. No redness. No dishcharge. The patient's left lower extremity has pitting edema. There is Mild erytena consistent with venous stasis dermatitis. No cellulitus. No abcess. No red streaking. The patient complains of Pain on the anterior aspect of the left leg.      Right Lower Extremity: Right leg is  amputated above knee.   Skin:     General: Skin is warm and dry.      Capillary Refill: Capillary refill takes less than 2 seconds.      Coloration: Skin is not jaundiced or pale.      Findings: No erythema.   Neurological:      General: No focal deficit present.      Mental Status: She is alert and oriented to person, place, and time. Mental status is at baseline.      Cranial Nerves: Cranial nerves 2-12 are intact. No cranial nerve deficit.      Sensory: Sensation is intact. No sensory deficit.      Motor: Motor function is intact. No weakness or pronator drift.      Coordination: Coordination is intact. Coordination normal.   Psychiatric:         Mood and Affect: Mood normal.         Behavior: Behavior normal.                  Procedures:  Procedures      Medical Decision Making:      Comorbidities that affect care:    Asthma, Cancer, Congestive Heart Failure, COPD, Diabetes, Hypertension    External Notes reviewed:    None      The following orders were placed and all results were independently analyzed by me:  Orders Placed This Encounter   Procedures   • Blood Culture - Blood,   • Blood Culture - Blood,   • COVID-19,APTIMA PANTHER(HERON),[facility], NP/OP SWAB IN UTM/VTM/SALINE TRANSPORT MEDIA,24 HR TAT - Swab, Nasal Cavity   • Influenza Antigen, Rapid - Swab, Nasopharynx   • XR Chest 1 View   • Starr Draw   • High Sensitivity Troponin T   • Comprehensive Metabolic Panel   • Lipase   • BNP   • Magnesium   • CBC Auto Differential   • Lactic Acid, Plasma   • High Sensitivity Troponin T 2Hr   • Undress & Gown   • Continuous Pulse Oximetry   • Please perform rectal temperature and notify physician with result  Nursing Communication   • ECG 12 Lead ED Triage Standing Order; Chest Pain   • CBC & Differential   • Green Top (Gel)   • Lavender Top   • Gold Top - SST   • Light Blue Top       Medications Given in the Emergency Department:  Medications   morphine injection 4 mg (4 mg Intravenous Given 4/24/23 1814)    oxyCODONE-acetaminophen (PERCOCET)  MG per tablet 1 tablet (1 tablet Oral Given 4/24/23 2021)   labetalol (NORMODYNE,TRANDATE) injection 20 mg (20 mg Intravenous Given 4/24/23 2323)        ED Course:    ED Course as of 04/28/23 2323 Mon Apr 24, 2023   1455 EKG:    Rhythm: Sinus rhythm with slight baseline artifact  Rate: 65  Nonspecific intraventricular conduction delay  Intervals: Normal RI and QT interval  T-wave: Nonspecific T wave flattening  ST Segment: Nonspecific ST segments V1, V2, V3, slight ST depression probable V6, II, III, aVF, no obvious pathological ST elevation or reciprocal ST depression to suggest acute myocardial infarction    EKG Comparison: No EKG available for comparison    Interpreted by me   [SD]   1540 ECG 12 Lead ED Triage Standing Order; Chest Pain [AJ]   1541 Currently taking antibiotics prescribed by PCP for cellulitis of left lower extremity [AJ]   1657 EKG:    Rhythm: Normal sinus rhythm  Rate: 63  Intervals: Normal RI and QT interval  Interventricular conduction delay  T-wave: Nonspecific T wave flattening  ST Segment: Nonspecific ST segment V1, V2, probable slight ST depression in V6, III, aVF, no obvious pathological ST elevation or reciprocal ST depression to suggest acute myocardial infarction    EKG Comparison: Probably no change in the QRS and ST morphology from the EKG that was performed earlier in the department  Interpreted by me   [SD]      ED Course User Index  [AJ] Greogry Pham PA-C  [SD] Darrin Ford DO       Labs:    Lab Results (last 24 hours)     ** No results found for the last 24 hours. **           Imaging:    No Radiology Exams Resulted Within Past 24 Hours      Differential Diagnosis and Discussion:    Chest Pain:  Based on the patient's signs and symptoms, I considered aortic dissection, myocardial infaction, pulmonary embolism, cardiac tamponade, pericarditis, pneumothorax, musculoskeletal chest pain and other differential diagnosis as an  etiology of the patient's chest pain.     All labs were reviewed and interpreted by me.    MDM  Number of Diagnoses or Management Options  Chest discomfort  Other chronic pain  Uncontrolled hypertension  Diagnosis management comments:         Duplex Doppler of the left lower extremity was performed.  It was negative for DVT      HEART Score for Major Cardiac Events  on 4/24/2023    RESULT SUMMARY:  4 points    INPUTS:  History = Slightly suspicious  EKG = Non-specific repolarization disturbance  Age = 45-64  Risk factors = 3 risk factors or history of atherosclerotic disease  Initial troponin = normal limit      Patient's vital signs were stable in the emergency room.  Patient's chest x-ray demonstrated patchy bilateral airspace disease.  However, improved since the comparison chest x-ray from February 3, 2023.  The patient's CBC was reviewed and shows no abnormalities of critical concern.  Of note, there is no anemia requiring a blood transfusion and the platelet count is acceptable  The patient's CMP was reviewed and shows no abnormalities of critical concern.  Of note, the patient's sodium and potassium are acceptable.  The patient's liver enzymes are unremarkable.  The patient's renal function including creatinine is preserved.  The patient has a normal anion gap.  Patient's flu was negative.  Patient's blood cultures are pending at the time of discharge.  Since COVID was pending at the time of discharge.  The patient's first troponin was 57.  It was repeated over 2 hours later.  The patient had a negative delta of 3.  The patient's EKG demonstrated normal sinus rhythm with a rate of 63.  The patient's EKG demonstrated no evidence of acute myocardial infarction or significant ST elevation or reciprocal ST depression.  Patient had 2 EKGs in the emergency room which demonstrated no evidence of myocardial infarction.  The patient's BNP was elevated at 5000.  The patient had a normal lactate of 1.1.  The patient was  given labetalol for blood pressure emergency room.  The patient was given Percocet for her chronic pain.  I offered and recommended admission to the hospital due to the patient's blood pressure and chest pain with a moderate heart score 4..  The patient states that she does not want to stay in the hospital.  And she wants to be discharged.  The patient does understand that she has moderate risk for cardiovascular event over the next 6 weeks.  The patient voiced understanding and states that she would prefer to follow-up with her doctor on outpatient basis.  The patient has a capacity make her own medical decision making.  The patient was able to voice understanding her risk.  I will have the patient follow-up with her doctor the following day for evaluation of her blood pressure, her chest pain and to review her COVID-19 results.  The patient will stay quarantined at home until she can review those COVID-19 results with her primary care physician.  The patient will continue to monitor blood pressure at home.  Again, the patient is being discharged at her request.       Amount and/or Complexity of Data Reviewed  Clinical lab tests: reviewed  Tests in the radiology section of CPT®: reviewed  Tests in the medicine section of CPT®: reviewed           Social Determinants of Health:    Patient is independent, reliable, and has access to care.       I have explained discharge medications and the need for follow up with the patient/caretakers. This was also printed in the discharge instructions. Patient was discharged with the following medications and follow up:      Medication List      Changed    oxyCODONE-acetaminophen  MG per tablet  Commonly known as: PERCOCET  Take 1 tablet by mouth Every 6 (Six) Hours As Needed for Severe Pain. GIVEN PER PCP  What changed: additional instructions         Carloz Shoemaker MD  [address] [facility] [address] [place] [address]  [phone]    On 4/26/2023  Uncontrolled hypertension and chest  discomfort, call for appointment       Final diagnoses:   Chest discomfort   Other chronic pain   Uncontrolled hypertension        ED Disposition     ED Disposition   Discharge    Condition   Stable    Comment   --             This medical record created using voice recognition software.        Documentation assistance provided by Berto Doyle acting as scribe for No att. providers found. Information recorded by the scribe was done at my direction and has been verified and validated by me.          Berto Doyle  04/24/23 2076       Darrin Ford DO  04/28/23 [id number]

## 2023-04-25 VITALS
WEIGHT: 152.56 LBS | TEMPERATURE: 98.2 F | OXYGEN SATURATION: 91 % | SYSTOLIC BLOOD PRESSURE: 133 MMHG | BODY MASS INDEX: 24.52 KG/M2 | RESPIRATION RATE: 16 BRPM | HEART RATE: 62 BPM | DIASTOLIC BLOOD PRESSURE: 61 MMHG | HEIGHT: 66 IN

## 2023-04-25 LAB
BH CV LOWER VASCULAR LEFT COMMON FEMORAL AUGMENT: NORMAL
BH CV LOWER VASCULAR LEFT COMMON FEMORAL COMPETENT: NORMAL
BH CV LOWER VASCULAR LEFT COMMON FEMORAL COMPRESS: NORMAL
BH CV LOWER VASCULAR LEFT COMMON FEMORAL PHASIC: NORMAL
BH CV LOWER VASCULAR LEFT COMMON FEMORAL SPONT: NORMAL
BH CV LOWER VASCULAR LEFT DISTAL FEMORAL COMPRESS: NORMAL
BH CV LOWER VASCULAR LEFT GASTRONEMIUS COMPRESS: NORMAL
BH CV LOWER VASCULAR LEFT GREATER SAPH AK COMPRESS: NORMAL
BH CV LOWER VASCULAR LEFT GREATER SAPH BK COMPRESS: NORMAL
BH CV LOWER VASCULAR LEFT LESSER SAPH COMPRESS: NORMAL
BH CV LOWER VASCULAR LEFT MID FEMORAL AUGMENT: NORMAL
BH CV LOWER VASCULAR LEFT MID FEMORAL COMPETENT: NORMAL
BH CV LOWER VASCULAR LEFT MID FEMORAL COMPRESS: NORMAL
BH CV LOWER VASCULAR LEFT MID FEMORAL PHASIC: NORMAL
BH CV LOWER VASCULAR LEFT MID FEMORAL SPONT: NORMAL
BH CV LOWER VASCULAR LEFT PERONEAL COMPRESS: NORMAL
BH CV LOWER VASCULAR LEFT POPLITEAL AUGMENT: NORMAL
BH CV LOWER VASCULAR LEFT POPLITEAL COMPETENT: NORMAL
BH CV LOWER VASCULAR LEFT POPLITEAL COMPRESS: NORMAL
BH CV LOWER VASCULAR LEFT POPLITEAL PHASIC: NORMAL
BH CV LOWER VASCULAR LEFT POPLITEAL SPONT: NORMAL
BH CV LOWER VASCULAR LEFT POSTERIOR TIBIAL COMPRESS: NORMAL
BH CV LOWER VASCULAR LEFT PROXIMAL FEMORAL COMPRESS: NORMAL
BH CV LOWER VASCULAR RIGHT COMMON FEMORAL AUGMENT: NORMAL
BH CV LOWER VASCULAR RIGHT COMMON FEMORAL COMPETENT: NORMAL
BH CV LOWER VASCULAR RIGHT COMMON FEMORAL COMPRESS: NORMAL
BH CV LOWER VASCULAR RIGHT COMMON FEMORAL PHASIC: NORMAL
BH CV LOWER VASCULAR RIGHT COMMON FEMORAL SPONT: NORMAL
BH CV VAS PRELIMINARY FINDINGS SCRIPTING: 1
MAXIMAL PREDICTED HEART RATE: 157 BPM
SARS-COV-2 RNA RESP QL NAA+PROBE: DETECTED
STRESS TARGET HR: 133 BPM

## 2023-04-25 NOTE — DISCHARGE INSTRUCTIONS
Please return to the emergency room if you change your mind and want the chest discomfort evaluated in the hospital with cardiac consultation    No strenuous activity until released by the cardiologist.   Please return to the emergency room for worsening chest pain, radiating chest pain, shortness of breath, near passing out, passing out, extreme fatigue, extreme sweating, nausea or vomiting or new or worrisome symptoms    Please check your blood pressure twice a day, record and discuss those readings with your primary care physician.  Please continue your current blood pressure medications.  Please return to the emergency room for headache, vomiting, strokelike symptoms, chest pain, chest pressure, shortness of breath, near passing out, passing out, extreme fatigue, decreased urinary output, new symptoms or blood pressure greater than 230/120.

## 2023-04-29 LAB
BACTERIA SPEC AEROBE CULT: NORMAL
BACTERIA SPEC AEROBE CULT: NORMAL

## 2023-05-09 ENCOUNTER — OFFICE VISIT (OUTPATIENT)
Dept: VASCULAR SURGERY | Facility: HOSPITAL | Age: 63
End: 2023-05-09
Payer: MEDICARE

## 2023-05-09 VITALS
DIASTOLIC BLOOD PRESSURE: 70 MMHG | HEART RATE: 59 BPM | SYSTOLIC BLOOD PRESSURE: 140 MMHG | OXYGEN SATURATION: 96 % | TEMPERATURE: 96.6 F | RESPIRATION RATE: 18 BRPM

## 2023-05-09 DIAGNOSIS — Z89.611 S/P AKA (ABOVE KNEE AMPUTATION), RIGHT: Primary | ICD-10-CM

## 2023-05-09 PROCEDURE — G0463 HOSPITAL OUTPT CLINIC VISIT: HCPCS | Performed by: NURSE PRACTITIONER

## 2023-05-09 NOTE — PROGRESS NOTES
[facility] Vascular Surgery Office Follow Up Note     Date of Encounter: 05/09/2023     MRN Number: [id number]  Name: Italia Olvera  Phone Number: [phone]     Referred By: Carloz Shoemaker MD  PCP: Carloz Shoemaker MD    Chief Complaint:    Chief Complaint   Patient presents with   • Follow-up     Patient is here as a one month follow up s/p right AKA. Surgical site is clean, dry, and well approximated with no staples or sutures in place. Patient complains of continual pain in the right residual limb.        Subjective      History of Present Illness:    Italia Olvera is a 62 y.o. female resents for scheduled follow-up.  She had a right AKA performed by Dr. Haywood on 3/11/2023 for right foot ischemia.  Dr. Russell had previously did an amputation of the right foot second and fifth toes on 12/6/2022 for gangrene.  She completed 2 weeks of antibiotics ordered by Dr. Whiting on 4/10/2023.  The incision is well-healed, no redness or signs of infection.  She denies any fever or chills.  She does have pain and what she thinks is a knot but she has been doing physical therapy at home. She is also working on her glucose control, last lab blood sugar was 136 on 4/23/23.    Review of Systems:  ROS  Review of Systems   Constitutional: Negative.   HENT: Negative.    Cardiovascular: Negative.    Respiratory: Negative.    Skin: Negative.    Musculoskeletal: Negative.    Gastrointestinal: Negative.    Neurological: Negative.    Psychiatric/Behavioral: Negative.      I have reviewed the following portions of the patient's history: allergies, current medications, past family history, past medical history, past social history, past surgical history and problem list and confirm it's accurate.    Allergies:  No Known Allergies    Medications:      Current Outpatient Medications:   •  amLODIPine-benazepril (LOTREL) 10-40 MG per capsule, Take 1 capsule by mouth Daily., Disp: , Rfl:   •  amoxicillin-clavulanate  (Augmentin) 500-125 MG per tablet, Take 1 tablet by mouth 2 (Two) Times a Day., Disp: 20 tablet, Rfl: 0  •  aspirin 81 MG EC tablet, Take 1 tablet by mouth Daily., Disp: 90 tablet, Rfl: 3  •  atorvastatin (LIPITOR) 10 MG tablet, Take 1 tablet by mouth Daily., Disp: , Rfl:   •  benzonatate (TESSALON) 100 MG capsule, Take 1-2 capsules by mouth 3 (Three) Times a Day As Needed for Cough., Disp: , Rfl:   •  bisoprolol-hydrochlorothiazide (ZIAC) 10-6.25 MG per tablet, Take 1 tablet by mouth Every 12 (Twelve) Hours., Disp: , Rfl:   •  clopidogrel (PLAVIX) 75 MG tablet, Take 1 tablet by mouth Daily., Disp: , Rfl:   •  colestipol (COLESTID) 1 g tablet, Take 1 tablet by mouth Daily., Disp: , Rfl:   •  furosemide (LASIX) 80 MG tablet, Take 1 tablet by mouth Every Other Day., Disp: , Rfl:   •  hydrOXYzine (ATARAX) 25 MG tablet, Take 2 tablets by mouth Every 6 (Six) Hours As Needed., Disp: , Rfl:   •  mirtazapine (REMERON) 45 MG tablet, Take 1 tablet by mouth At Night As Needed., Disp: , Rfl:   •  nortriptyline (PAMELOR) 50 MG capsule, Take 1 capsule by mouth every night at bedtime., Disp: , Rfl:   •  ondansetron (ZOFRAN) 4 MG tablet, Take 1 tablet by mouth Every 8 (Eight) Hours As Needed for Nausea or Vomiting., Disp: , Rfl:   •  oxyCODONE-acetaminophen (PERCOCET)  MG per tablet, Take 1 tablet by mouth Every 6 (Six) Hours As Needed for Severe Pain. GIVEN PER PCP (Patient taking differently: Take 1 tablet by mouth Every 6 (Six) Hours As Needed for Severe Pain.), Disp: 20 tablet, Rfl: 0  •  pantoprazole (PROTONIX) 40 MG EC tablet, Take 1 tablet by mouth Daily., Disp: , Rfl:   •  potassium chloride 10 MEQ CR tablet, Take 2 tablets by mouth Daily., Disp: , Rfl:   •  sertraline (ZOLOFT) 100 MG tablet, Take 1 tablet by mouth Daily., Disp: , Rfl:   •  sertraline (ZOLOFT) 50 MG tablet, Take 1 tablet by mouth Daily., Disp: , Rfl:   •  tiZANidine (ZANAFLEX) 4 MG tablet, Take 1 tablet by mouth Every 8 (Eight) Hours As Needed for  Muscle Spasms., Disp: , Rfl:   •  traZODone (DESYREL) 150 MG tablet, Take 1 tablet by mouth At Night As Needed for Sleep., Disp: , Rfl:     History:   Past Medical History:   Diagnosis Date   • Acid reflux    • ACL tear 09/01/2015    PCL/ACL TEAR/RUPTURE   • Acute shoulder bursitis, right 08/23/2015   • Anxiety    • Arthritis    • Asthma    • Bipolar 1 disorder     untreated   • Bladder disorder    • Cancer     CERVICAL CANCER   • CHF (congestive heart failure)     ASYMPTOMATIC- NO CARDIOLOGIST- SEE'S DR ARRIAZA (PCP)- DENIED CP/SOB   • Chronic back pain    • Chronic pain     CHRONIC BACK, NECK, LEG, FOOT, & FACE PAIN   • COPD (chronic obstructive pulmonary disease)     USES INHALERS   • Depression    • Diabetes mellitus     BG RUND AROUND 140 IN AM   • DJD (degenerative joint disease)    • Gangrene     RT SECOND AND FIFTH TOES   • GERD (gastroesophageal reflux disease)    • HBP (high blood pressure)    • Hip pain 09/15/2015   • Hypertension    • Knee injury 08/19/2015   • Knee pain, right 09/15/2015   • Limb swelling    • Neuropathy    • On home O2     2-3L/NC PRN   • Osteoarthritis, knee 09/01/2015   • Osteoporosis    • Peripheral neuropathy    • Renal failure 1994    NO CURRENT PROBLEMS   • Seasonal allergies    • Shoulder tendonitis 08/23/2015   • Sleep apnea    • Smoker    • SOB (shortness of breath)     NONE CURENTLY   • Tendinitis of right rotator cuff 08/23/2015       Past Surgical History:   Procedure Laterality Date   • ABOVE KNEE AMPUTATION Right 3/11/2023    Procedure: AMPUTATION ABOVE KNEE;  Surgeon: Zacarias Haywood MD;  Location: [facility] MAIN OR;  Service: Vascular;  Laterality: Right;   • AMPUTATION DIGIT Right 12/6/2022    Procedure: Amputation of right second and fifth toes;  Surgeon: Gabino Russell MD;  Location: [facility] MAIN OR;  Service: Vascular;  Laterality: Right;   • BACK SURGERY     • BLADDER REPAIR     • BRONCHOSCOPY N/A 07/10/2021    Procedure: BRONCHOSCOPY WITH BRONCHOALVEOLAR LAVAGE,  POSSIBLE BIOPSY, BRUSHING, WASHING, AIRWAY INSPECTION;  Surgeon: Rodrigo Reyes MD;  Location: [facility] MAIN OR;  Service: Pulmonary;  Laterality: N/A;   • BRONCHOSCOPY N/A 07/10/2021    Procedure: BRONCHOSCOPY;  Surgeon: Rodrigo Reyes MD;  Location: [facility] ENDOSCOPY;  Service: Pulmonary;  Laterality: N/A;   • CARDIAC CATHETERIZATION Right 11/03/2022    Procedure: Aortogram with right leg angiogram, possible angioplasty or stenting ;  Surgeon: Gabino Russell MD;  Location: [facility] CATH INVASIVE LOCATION;  Service: Vascular;  Laterality: Right;   • CARDIAC CATHETERIZATION Right 3/8/2023    Procedure: Right leg angiogram, possible angioplasty or stenting;  Surgeon: Gabino Russell MD;  Location: [facility] CATH INVASIVE LOCATION;  Service: Vascular;  Laterality: Right;   • CHOLECYSTECTOMY     • ENDOSCOPY     • FRACTURE SURGERY      SKULL FRACTURE   • GALLBLADDER SURGERY     • HERNIA REPAIR     • HYSTERECTOMY     • INTERVENTIONAL RADIOLOGY PROCEDURE Right 03/03/2022    Procedure: Abdominal Aortagram with Runoff;  Surgeon: Marleny Yoon MD;  Location: [facility] CATH INVASIVE LOCATION;  Service: Peripheral Vascular;  Laterality: Right;   • JOINT REPLACEMENT     • OTHER SURGICAL HISTORY      ARTIFICIAL JOINTS/LIMBS   • OTHER SURGICAL HISTORY      FACE SURGERY, UNSPECIFIED   • TOTAL HIP ARTHROPLASTY Right    • TOTAL KNEE ARTHROPLASTY Left        Social History     Socioeconomic History   • Marital status:    Tobacco Use   • Smoking status: Every Day     Packs/day: 0.50     Years: 50.00     Pack years: 25.00     Types: Cigarettes     Start date: 6/15/1979   • Smokeless tobacco: Never   • Tobacco comments:     SMOKED FOR 31 PLUS YEARS     INSTRUCTED NO SMOKING 24 HR PRIOR TO SURGERY    Vaping Use   • Vaping Use: Never used   Substance and Sexual Activity   • Alcohol use: Never   • Drug use: Never   • Sexual activity: Defer        Family History   Problem Relation Age of Onset   • Stroke Mother    • Throat cancer Mother     • Arthritis Mother    • Osteoporosis Mother    • Heart disease Father    • Breast cancer Sister    • Colon cancer Sister    • Lung cancer Sister    • Arthritis Sister    • Osteoporosis Sister    • Heart disease Brother    • Diabetes Brother    • Arthritis Brother    • Arthritis Daughter        Objective     Physical Exam:  Vitals:    05/09/23 1438   BP: 140/70   BP Location: Right arm   Patient Position: Sitting   Cuff Size: Large Adult   Pulse: 59   Resp: 18   Temp: 96.6 °F (35.9 °C)   TempSrc: Temporal   SpO2: 96%   PainSc:   7      There is no height or weight on file to calculate BMI.    Physical Exam  Physical Exam  Constitutional:       Appearance: Normal appearance.   HENT:      Head: Normocephalic.   Cardiovascular:      Rate and Rhythm: Normal rate.      Pulses: Normal pulses.     Pulmonary:      Effort: Pulmonary effort is normal.   Musculoskeletal:         General: Normal range of motion.      Cervical back: Normal range of motion.   Skin:     General: Skin is warm and dry.      Capillary Refill: Capillary refill takes less than 2 seconds.      Comments: Right AKA: Surgical incision well-healed, clean and dry.  No erythema no open areas or signs of infection.  Neurological:      General: No focal deficit present.      Mental Status: Alert and oriented to person, place, and time.   Psychiatric:         Mood and Affect: Mood normal.         Behavior: Behavior normal.  Imaging/Labs:      Assessment / Plan      Assessment / Plan:  Diagnoses and all orders for this visit:    1. S/P AKA (above knee amputation), right (Primary)  -     Ambulatory Referral to Prosthetist    Ms. Olvera had a right above-the-knee amputation performed by Dr. Haywood on 3/11/2023 for an ischemic right foot with peripheral artery disease. The surgical incision is completely healed and she is currently doing physical therapy 3 times a week at home. She is highly motivated to resume regular daily activities including walking. She has  the ability for ambulation at a K level 2. We will refer to [facility] clinic for a stump sock and prosthetic evaluation and treatment.     I have answered all their questions and they are in agreement with the plan at this time. I have advised that I will review her case with DR. Haywood and if he has any additional recommendations I will advise accordingly.     Thank you for allowing me to participate in your patient's care.      Follow Up:   No follow-ups on file.   Or sooner for any further concerns or worsening sign and symptoms. If unable to reach us in the office please dial 911 or go to the nearest emergency department.      Yanique VERDUGO  [facility] Vascular Surgery

## 2023-05-16 LAB
QT INTERVAL: 453 MS
QT INTERVAL: 469 MS

## 2023-05-20 NOTE — DISCHARGE INSTR - ACTIVITY
Wound care to necrotic toes on right foot Paint with betadine BID  Coccyx cleanse with NS and gauze. Apply moist aqua mona ag and cover with optifom dressing. Change daily and prn    No

## 2023-09-11 ENCOUNTER — HOSPITAL ENCOUNTER (EMERGENCY)
Facility: HOSPITAL | Age: 63
Discharge: ANOTHER HOSPITAL | End: 2023-09-11
Attending: EMERGENCY MEDICINE | Admitting: EMERGENCY MEDICINE
Payer: MEDICARE

## 2023-09-11 ENCOUNTER — APPOINTMENT (OUTPATIENT)
Dept: GENERAL RADIOLOGY | Facility: HOSPITAL | Age: 63
End: 2023-09-11
Payer: MEDICARE

## 2023-09-11 ENCOUNTER — APPOINTMENT (OUTPATIENT)
Dept: CT IMAGING | Facility: HOSPITAL | Age: 63
End: 2023-09-11
Payer: MEDICARE

## 2023-09-11 VITALS
WEIGHT: 166.67 LBS | SYSTOLIC BLOOD PRESSURE: 220 MMHG | RESPIRATION RATE: 16 BRPM | TEMPERATURE: 98 F | HEIGHT: 66 IN | OXYGEN SATURATION: 95 % | HEART RATE: 90 BPM | BODY MASS INDEX: 26.79 KG/M2 | DIASTOLIC BLOOD PRESSURE: 91 MMHG

## 2023-09-11 DIAGNOSIS — M97.12XA PERIPROSTHETIC FRACTURE AROUND INTERNAL PROSTHETIC LEFT KNEE JOINT, INITIAL ENCOUNTER: Primary | ICD-10-CM

## 2023-09-11 DIAGNOSIS — S81.012A KNEE LACERATION, LEFT, INITIAL ENCOUNTER: ICD-10-CM

## 2023-09-11 DIAGNOSIS — L03.116 CELLULITIS OF LEFT LOWER EXTREMITY: ICD-10-CM
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
## 2023-09-11 DIAGNOSIS — N39.0 URINARY TRACT INFECTION IN FEMALE: ICD-10-CM

## 2023-09-11 LAB
ALBUMIN SERPL-MCNC: 2.3 G/DL (ref 3.5–5.2)
ALBUMIN/GLOB SERPL: 0.5 G/DL
ALP SERPL-CCNC: 128 U/L (ref 39–117)
ALT SERPL W P-5'-P-CCNC: 11 U/L (ref 1–33)
ANION GAP SERPL CALCULATED.3IONS-SCNC: 12 MMOL/L (ref 5–15)
APTT PPP: 39 SECONDS (ref 78–95.9)
AST SERPL-CCNC: 16 U/L (ref 1–32)
BACTERIA UR QL AUTO: ABNORMAL /HPF
BASOPHILS # BLD AUTO: 0.03 10*3/MM3 (ref 0–0.2)
BASOPHILS NFR BLD AUTO: 0.3 % (ref 0–1.5)
BILIRUB SERPL-MCNC: 0.5 MG/DL (ref 0–1.2)
BILIRUB UR QL STRIP: NEGATIVE
BUN SERPL-MCNC: 19 MG/DL (ref 8–23)
BUN/CREAT SERPL: 20.7 (ref 7–25)
CALCIUM SPEC-SCNC: 8.3 MG/DL (ref 8.6–10.5)
CHLORIDE SERPL-SCNC: 101 MMOL/L (ref 98–107)
CLARITY UR: ABNORMAL
CO2 SERPL-SCNC: 26 MMOL/L (ref 22–29)
COLOR UR: YELLOW
CREAT SERPL-MCNC: 0.92 MG/DL (ref 0.57–1)
D-LACTATE SERPL-SCNC: 1.5 MMOL/L (ref 0.5–2)
DEPRECATED RDW RBC AUTO: 52.9 FL (ref 37–54)
EGFRCR SERPLBLD CKD-EPI 2021: 70.1 ML/MIN/1.73
EOSINOPHIL # BLD AUTO: 0.07 10*3/MM3 (ref 0–0.4)
EOSINOPHIL NFR BLD AUTO: 0.7 % (ref 0.3–6.2)
ERYTHROCYTE [DISTWIDTH] IN BLOOD BY AUTOMATED COUNT: 16.8 % (ref 12.3–15.4)
ERYTHROCYTE [SEDIMENTATION RATE] IN BLOOD: 62 MM/HR (ref 0–30)
GLOBULIN UR ELPH-MCNC: 4.2 GM/DL
GLUCOSE SERPL-MCNC: 177 MG/DL (ref 65–99)
GLUCOSE UR STRIP-MCNC: ABNORMAL MG/DL
HCT VFR BLD AUTO: 38.9 % (ref 34–46.6)
HGB BLD-MCNC: 13 G/DL (ref 12–15.9)
HGB UR QL STRIP.AUTO: ABNORMAL
HOLD SPECIMEN: NORMAL
HOLD SPECIMEN: NORMAL
HYALINE CASTS UR QL AUTO: ABNORMAL /LPF
IMM GRANULOCYTES # BLD AUTO: 0.05 10*3/MM3 (ref 0–0.05)
IMM GRANULOCYTES NFR BLD AUTO: 0.5 % (ref 0–0.5)
INR PPP: 1.08 (ref 0.86–1.15)
KETONES UR QL STRIP: NEGATIVE
LEUKOCYTE ESTERASE UR QL STRIP.AUTO: ABNORMAL
LIPASE SERPL-CCNC: 14 U/L (ref 13–60)
LYMPHOCYTES # BLD AUTO: 0.94 10*3/MM3 (ref 0.7–3.1)
LYMPHOCYTES NFR BLD AUTO: 9.6 % (ref 19.6–45.3)
MCH RBC QN AUTO: 29 PG (ref 26.6–33)
MCHC RBC AUTO-ENTMCNC: 33.4 G/DL (ref 31.5–35.7)
MCV RBC AUTO: 86.8 FL (ref 79–97)
MONOCYTES # BLD AUTO: 0.51 10*3/MM3 (ref 0.1–0.9)
MONOCYTES NFR BLD AUTO: 5.2 % (ref 5–12)
NEUTROPHILS NFR BLD AUTO: 8.21 10*3/MM3 (ref 1.7–7)
NEUTROPHILS NFR BLD AUTO: 83.7 % (ref 42.7–76)
NITRITE UR QL STRIP: NEGATIVE
NRBC BLD AUTO-RTO: 0 /100 WBC (ref 0–0.2)
PH UR STRIP.AUTO: 6 [PH] (ref 5–8)
PLATELET # BLD AUTO: 165 10*3/MM3 (ref 140–450)
PMV BLD AUTO: 10.7 FL (ref 6–12)
POTASSIUM SERPL-SCNC: 3.1 MMOL/L (ref 3.5–5.2)
PROT SERPL-MCNC: 6.5 G/DL (ref 6–8.5)
PROT UR QL STRIP: ABNORMAL
PROTHROMBIN TIME: 14.1 SECONDS (ref 11.8–14.9)
RBC # BLD AUTO: 4.48 10*6/MM3 (ref 3.77–5.28)
RBC # UR STRIP: ABNORMAL /HPF
REF LAB TEST METHOD: ABNORMAL
SODIUM SERPL-SCNC: 139 MMOL/L (ref 136–145)
SP GR UR STRIP: 1.03 (ref 1–1.03)
SQUAMOUS #/AREA URNS HPF: ABNORMAL /HPF
UROBILINOGEN UR QL STRIP: ABNORMAL
WBC # UR STRIP: ABNORMAL /HPF
WBC NRBC COR # BLD: 9.81 10*3/MM3 (ref 3.4–10.8)
WHOLE BLOOD HOLD COAG: NORMAL
WHOLE BLOOD HOLD SPECIMEN: NORMAL

## 2023-09-11 PROCEDURE — 81001 URINALYSIS AUTO W/SCOPE: CPT | Performed by: NURSE PRACTITIONER

## 2023-09-11 PROCEDURE — 96375 TX/PRO/DX INJ NEW DRUG ADDON: CPT

## 2023-09-11 PROCEDURE — 85652 RBC SED RATE AUTOMATED: CPT | Performed by: NURSE PRACTITIONER

## 2023-09-11 PROCEDURE — 83605 ASSAY OF LACTIC ACID: CPT | Performed by: NURSE PRACTITIONER

## 2023-09-11 PROCEDURE — 83690 ASSAY OF LIPASE: CPT | Performed by: NURSE PRACTITIONER

## 2023-09-11 PROCEDURE — 96365 THER/PROPH/DIAG IV INF INIT: CPT

## 2023-09-11 PROCEDURE — 36415 COLL VENOUS BLD VENIPUNCTURE: CPT

## 2023-09-11 PROCEDURE — 85610 PROTHROMBIN TIME: CPT | Performed by: NURSE PRACTITIONER

## 2023-09-11 PROCEDURE — 72125 CT NECK SPINE W/O DYE: CPT

## 2023-09-11 PROCEDURE — 85025 COMPLETE CBC W/AUTO DIFF WBC: CPT | Performed by: NURSE PRACTITIONER

## 2023-09-11 PROCEDURE — 73560 X-RAY EXAM OF KNEE 1 OR 2: CPT

## 2023-09-11 PROCEDURE — 85730 THROMBOPLASTIN TIME PARTIAL: CPT | Performed by: NURSE PRACTITIONER

## 2023-09-11 PROCEDURE — 70450 CT HEAD/BRAIN W/O DYE: CPT

## 2023-09-11 PROCEDURE — 80053 COMPREHEN METABOLIC PANEL: CPT | Performed by: NURSE PRACTITIONER

## 2023-09-11 PROCEDURE — 99284 EMERGENCY DEPT VISIT MOD MDM: CPT

## 2023-09-11 PROCEDURE — 25010000002 CEFTRIAXONE PER 250 MG: Performed by: NURSE PRACTITIONER

## 2023-09-11 PROCEDURE — 87040 BLOOD CULTURE FOR BACTERIA: CPT | Performed by: NURSE PRACTITIONER

## 2023-09-11 PROCEDURE — 25010000002 ONDANSETRON PER 1 MG: Performed by: NURSE PRACTITIONER

## 2023-09-11 PROCEDURE — 25010000002 HYDROMORPHONE 1 MG/ML SOLUTION: Performed by: EMERGENCY MEDICINE

## 2023-09-11 PROCEDURE — 25010000002 MORPHINE PER 10 MG: Performed by: EMERGENCY MEDICINE

## 2023-09-11 RX ORDER — ONDANSETRON 2 MG/ML
4 INJECTION INTRAMUSCULAR; INTRAVENOUS ONCE
Status: COMPLETED | OUTPATIENT
Start: 2023-09-11 | End: 2023-09-11

## 2023-09-11 RX ORDER — POTASSIUM CHLORIDE 750 MG/1
40 CAPSULE, EXTENDED RELEASE ORAL ONCE
Status: COMPLETED | OUTPATIENT
Start: 2023-09-11 | End: 2023-09-11

## 2023-09-11 RX ORDER — CEFTRIAXONE SODIUM 1 G/50ML
1000 INJECTION, SOLUTION INTRAVENOUS ONCE
Status: COMPLETED | OUTPATIENT
Start: 2023-09-11 | End: 2023-09-11

## 2023-09-11 RX ADMIN — CEFTRIAXONE SODIUM 1000 MG: 1 INJECTION, SOLUTION INTRAVENOUS at 19:54

## 2023-09-11 RX ADMIN — MORPHINE SULFATE 4 MG: 4 INJECTION, SOLUTION INTRAMUSCULAR; INTRAVENOUS at 17:12

## 2023-09-11 RX ADMIN — HYDROMORPHONE HYDROCHLORIDE 1 MG: 1 INJECTION, SOLUTION INTRAMUSCULAR; INTRAVENOUS; SUBCUTANEOUS at 20:41

## 2023-09-11 RX ADMIN — POTASSIUM CHLORIDE 40 MEQ: 10 CAPSULE, COATED, EXTENDED RELEASE ORAL at 19:54

## 2023-09-11 RX ADMIN — ONDANSETRON 4 MG: 2 INJECTION INTRAMUSCULAR; INTRAVENOUS at 20:44

## 2023-09-11 RX ADMIN — SODIUM CHLORIDE 1000 ML: 9 INJECTION, SOLUTION INTRAVENOUS at 20:44

## 2023-09-11 NOTE — ED PROVIDER NOTES
Time: 3:00 PM EDT  Date of encounter:  9/11/2023  Independent Historian/Clinical History and Information was obtained by:   Patient    History is limited by: N/A    Chief Complaint   Patient presents with    Fall    Laceration         History of Present Illness:  Patient is a 62 y.o. year old female who presents to the emergency department for evaluation of left leg laceration.  Patient reports that she was trying to get her out of her lift chair when she lost her balance and fell straight forward.  Does not recall if she hit her head or not.  She denies loss of consciousness.  Complains of neck pain and right-sided headache.  LUCINA Negrete      HPI    Patient Care Team  Primary Care Provider: Carloz Shoemaker MD    Past Medical History:     No Known Allergies  Past Medical History:   Diagnosis Date    Acid reflux     ACL tear 09/01/2015    PCL/ACL TEAR/RUPTURE    Acute shoulder bursitis, right 08/23/2015    Anxiety     Arthritis     Asthma     Bipolar 1 disorder     untreated    Bladder disorder     Cancer     CERVICAL CANCER    CHF (congestive heart failure)     ASYMPTOMATIC- NO CARDIOLOGIST- SEE'S DR SHOEMAKER (PCP)- DENIED CP/SOB    Chronic back pain     Chronic pain     CHRONIC BACK, NECK, LEG, FOOT, & FACE PAIN    COPD (chronic obstructive pulmonary disease)     USES INHALERS    Depression     Diabetes mellitus     BG RUND AROUND 140 IN AM    DJD (degenerative joint disease)     Gangrene     RT SECOND AND FIFTH TOES    GERD (gastroesophageal reflux disease)     HBP (high blood pressure)     Hip pain 09/15/2015    Hypertension     Knee injury 08/19/2015    Knee pain, right 09/15/2015    Limb swelling     Neuropathy     On home O2     2-3L/NC PRN    Osteoarthritis, knee 09/01/2015    Osteoporosis     Peripheral neuropathy     Renal failure 1994    NO CURRENT PROBLEMS    Seasonal allergies     Shoulder tendonitis 08/23/2015    Sleep apnea     Smoker     SOB (shortness of breath)     NONE CURENTLY    Tendinitis of  right rotator cuff 08/23/2015     Past Surgical History:   Procedure Laterality Date    ABOVE KNEE AMPUTATION Right 3/11/2023    Procedure: AMPUTATION ABOVE KNEE;  Surgeon: Zacarias Haywood MD;  Location: [facility] MAIN OR;  Service: Vascular;  Laterality: Right;    AMPUTATION DIGIT Right 12/6/2022    Procedure: Amputation of right second and fifth toes;  Surgeon: Gabino Russell MD;  Location: [facility] MAIN OR;  Service: Vascular;  Laterality: Right;    BACK SURGERY      BLADDER REPAIR      BRONCHOSCOPY N/A 07/10/2021    Procedure: BRONCHOSCOPY WITH BRONCHOALVEOLAR LAVAGE, POSSIBLE BIOPSY, BRUSHING, WASHING, AIRWAY INSPECTION;  Surgeon: Rodrigo Reyes MD;  Location: [facility] MAIN OR;  Service: Pulmonary;  Laterality: N/A;    BRONCHOSCOPY N/A 07/10/2021    Procedure: BRONCHOSCOPY;  Surgeon: Rodrigo Reyes MD;  Location: [facility] ENDOSCOPY;  Service: Pulmonary;  Laterality: N/A;    CARDIAC CATHETERIZATION Right 11/03/2022    Procedure: Aortogram with right leg angiogram, possible angioplasty or stenting ;  Surgeon: Gabino Russell MD;  Location: [facility] CATH INVASIVE LOCATION;  Service: Vascular;  Laterality: Right;    CARDIAC CATHETERIZATION Right 3/8/2023    Procedure: Right leg angiogram, possible angioplasty or stenting;  Surgeon: Gabino Russell MD;  Location: [facility] CATH INVASIVE LOCATION;  Service: Vascular;  Laterality: Right;    CHOLECYSTECTOMY      ENDOSCOPY      FRACTURE SURGERY      SKULL FRACTURE    GALLBLADDER SURGERY      HERNIA REPAIR      HYSTERECTOMY      INTERVENTIONAL RADIOLOGY PROCEDURE Right 03/03/2022    Procedure: Abdominal Aortagram with Runoff;  Surgeon: Marleny Yoon MD;  Location: [facility] CATH INVASIVE LOCATION;  Service: Peripheral Vascular;  Laterality: Right;    JOINT REPLACEMENT      OTHER SURGICAL HISTORY      ARTIFICIAL JOINTS/LIMBS    OTHER SURGICAL HISTORY      FACE SURGERY, UNSPECIFIED    TOTAL HIP ARTHROPLASTY Right     TOTAL KNEE ARTHROPLASTY Left      Family History   Problem  Relation Age of Onset    Stroke Mother     Throat cancer Mother     Arthritis Mother     Osteoporosis Mother     Heart disease Father     Breast cancer Sister     Colon cancer Sister     Lung cancer Sister     Arthritis Sister     Osteoporosis Sister     Heart disease Brother     Diabetes Brother     Arthritis Brother     Arthritis Daughter        Home Medications:  Prior to Admission medications    Medication Sig Start Date End Date Taking? Authorizing Provider   amLODIPine-benazepril (LOTREL) 10-40 MG per capsule Take 1 capsule by mouth Daily.    Patria Hanson MD   amoxicillin-clavulanate (Augmentin) 500-125 MG per tablet Take 1 tablet by mouth 2 (Two) Times a Day. 4/10/23   Gabino Russell MD   aspirin 81 MG EC tablet Take 1 tablet by mouth Daily. 10/17/22 10/17/23  Gabino Russell MD   atorvastatin (LIPITOR) 10 MG tablet Take 1 tablet by mouth Daily. 2/10/23   Patria Hanson MD   benzonatate (TESSALON) 100 MG capsule Take 1-2 capsules by mouth 3 (Three) Times a Day As Needed for Cough.    Patria Hanson MD   bisoprolol-hydrochlorothiazide (ZIAC) 10-6.25 MG per tablet Take 1 tablet by mouth Every 12 (Twelve) Hours. 1/12/23   Patria Hanson MD   clopidogrel (PLAVIX) 75 MG tablet Take 1 tablet by mouth Daily.    Patria Hanson MD   colestipol (COLESTID) 1 g tablet Take 1 tablet by mouth Daily.    Patria Hanson MD   furosemide (LASIX) 80 MG tablet Take 1 tablet by mouth Every Other Day.    Patria Hanson MD   hydrOXYzine (ATARAX) 25 MG tablet Take 2 tablets by mouth Every 6 (Six) Hours As Needed. 2/17/23   Patria Hanson MD   mirtazapine (REMERON) 45 MG tablet Take 1 tablet by mouth At Night As Needed. 1/8/23   Patria Hanson MD   nortriptyline (PAMELOR) 50 MG capsule Take 1 capsule by mouth every night at bedtime. 2/15/23   Patria Hanson MD   ondansetron (ZOFRAN) 4 MG tablet Take 1 tablet by mouth Every 8 (Eight) Hours As Needed for Nausea or  "Vomiting.    Patria Hanson MD   oxyCODONE-acetaminophen (PERCOCET)  MG per tablet Take 1 tablet by mouth Every 6 (Six) Hours As Needed for Severe Pain. GIVEN PER PCP  Patient taking differently: Take 1 tablet by mouth Every 6 (Six) Hours As Needed for Severe Pain. 12/6/22   Gabino Russell MD   pantoprazole (PROTONIX) 40 MG EC tablet Take 1 tablet by mouth Daily.    Patria Hanson MD   potassium chloride 10 MEQ CR tablet Take 2 tablets by mouth Daily.    Patria Hanson MD   sertraline (ZOLOFT) 100 MG tablet Take 1 tablet by mouth Daily. 7/27/21   Patria Hanson MD   sertraline (ZOLOFT) 50 MG tablet Take 1 tablet by mouth Daily.    Patria Hanson MD   tiZANidine (ZANAFLEX) 4 MG tablet Take 1 tablet by mouth Every 8 (Eight) Hours As Needed for Muscle Spasms. 9/30/21   Patria Hanson MD   traZODone (DESYREL) 150 MG tablet Take 1 tablet by mouth At Night As Needed for Sleep. 12/30/21   Patria Hanson MD        Social History:   Social History     Tobacco Use    Smoking status: Every Day     Packs/day: 0.50     Years: 50.00     Pack years: 25.00     Types: Cigarettes     Start date: 6/15/1979    Smokeless tobacco: Never    Tobacco comments:     SMOKED FOR 31 PLUS YEARS     INSTRUCTED NO SMOKING 24 HR PRIOR TO SURGERY    Vaping Use    Vaping Use: Never used   Substance Use Topics    Alcohol use: Never    Drug use: Never         Review of Systems:  Review of Systems   Musculoskeletal:  Positive for arthralgias, gait problem, myalgias and neck pain.        Left lower extremity pain   Skin:  Positive for wound.   Neurological:  Positive for headaches.      Physical Exam:  BP (!) 220/91   Pulse 90   Temp 98 °F (36.7 °C)   Resp 16   Ht 167.6 cm (66\")   Wt 75.6 kg (166 lb 10.7 oz)   SpO2 95%   BMI 26.90 kg/m²         Physical Exam  HENT:      Head: Normocephalic.      Mouth/Throat:      Mouth: Mucous membranes are moist.   Eyes:      Pupils: Pupils are equal, round, " and reactive to light.   Pulmonary:      Effort: Pulmonary effort is normal.   Abdominal:      General: There is no distension.   Musculoskeletal:      Cervical back: Neck supple.      Left lower leg: Swelling, laceration, tenderness and bony tenderness present. Edema present.      Right Lower Extremity: Right leg is amputated above knee.   Skin:     General: Skin is warm and dry.      Findings: Erythema present.          Neurological:      General: No focal deficit present.      Mental Status: She is alert and oriented to person, place, and time.   Psychiatric:         Mood and Affect: Mood normal.         Behavior: Behavior normal.                    Procedures:  Procedures      Medical Decision Making:      Comorbidities that affect care:    Asthma, Congestive Heart Failure, COPD, Hypertension    External Notes reviewed:    Previous Clinic Note: Patient had a follow-up appointment in May after total knee replacement surgery in March.      The following orders were placed and all results were independently analyzed by me:  Orders Placed This Encounter   Procedures    Blood Culture - Blood,    Blood Culture - Blood,    CT Head Without Contrast    CT Cervical Spine Without Contrast    XR Knee 1 or 2 View Left    Comprehensive Metabolic Panel    [place] Draw    Lipase    Sedimentation Rate    Urinalysis With Culture If Indicated - Urine, Clean Catch    Protime-INR    aPTT    Lactic Acid, Plasma    CBC Auto Differential    Urinalysis, Microscopic Only - Urine, Clean Catch    CBC & Differential    Green Top (Gel)    Lavender Top    Gold Top - SST    Light Blue Top       Medications Given in the Emergency Department:  Medications   morphine injection 4 mg (4 mg Intravenous Given 9/11/23 1712)   cefTRIAXone (ROCEPHIN) IVPB 1,000 mg (0 mg Intravenous Stopped 9/11/23 2024)   potassium chloride (MICRO-K) CR capsule 40 mEq (40 mEq Oral Given 9/11/23 1954)   HYDROmorphone (DILAUDID) injection 1 mg (1 mg Intravenous Given  9/11/23 2041)   sodium chloride 0.9 % bolus 1,000 mL (0 mL Intravenous Stopped 9/11/23 2114)   ondansetron (ZOFRAN) injection 4 mg (4 mg Intravenous Given 9/11/23 2044)        ED Course:    The patient was initially evaluated in the triage area where orders were placed. The patient was later dispositioned by LUCINA Jo.      The patient was advised to stay for completion of workup which includes but is not limited to communication of labs and radiological results, reassessment and plan. The patient was advised that leaving prior to disposition by a provider could result in critical findings that are not communicated to the patient.          Labs:    Lab Results (last 24 hours)       Procedure Component Value Units Date/Time    CBC & Differential [[id number]]  (Abnormal) Collected: 09/11/23 1740    Specimen: Blood Updated: 09/11/23 1752    Narrative:      The following orders were created for panel order CBC & Differential.  Procedure                               Abnormality         Status                     ---------                               -----------         ------                     CBC Auto Differential[[id number]]        Abnormal            Final result                 Please view results for these tests on the individual orders.    Comprehensive Metabolic Panel [[id number]]  (Abnormal) Collected: 09/11/23 1740    Specimen: Blood Updated: 09/11/23 1810     Glucose 177 mg/dL      BUN 19 mg/dL      Creatinine 0.92 mg/dL      Sodium 139 mmol/L      Potassium 3.1 mmol/L      Chloride 101 mmol/L      CO2 26.0 mmol/L      Calcium 8.3 mg/dL      Total Protein 6.5 g/dL      Albumin 2.3 g/dL      ALT (SGPT) 11 U/L      AST (SGOT) 16 U/L      Alkaline Phosphatase 128 U/L      Total Bilirubin 0.5 mg/dL      Globulin 4.2 gm/dL      A/G Ratio 0.5 g/dL      BUN/Creatinine Ratio 20.7     Anion Gap 12.0 mmol/L      eGFR 70.1 mL/min/1.73     Narrative:      GFR Normal >60  Chronic Kidney Disease <60  Kidney Failure  <15      Lipase [[id number]]  (Normal) Collected: 09/11/23 1740    Specimen: Blood Updated: 09/11/23 1810     Lipase 14 U/L     Sedimentation Rate [[id number]]  (Abnormal) Collected: 09/11/23 1740    Specimen: Blood Updated: 09/11/23 1756     Sed Rate 62 mm/hr     Protime-INR [[id number]]  (Normal) Collected: 09/11/23 1740    Specimen: Blood Updated: 09/11/23 1801     Protime 14.1 Seconds      INR 1.08    Narrative:      Suggested Therapeutic Ranges For Oral Anticoagulant Therapy:  Level of Therapy                      INR Target Range  Standard Dose                            2.0-3.0  High Dose                                2.5-3.5  Patients not receiving anticoagulant  Therapy Normal Range                     0.86-1.15    aPTT [[id number]]  (Abnormal) Collected: 09/11/23 1740    Specimen: Blood Updated: 09/11/23 1801     PTT 39.0 seconds     Lactic Acid, Plasma [[id number]]  (Normal) Collected: 09/11/23 1740    Specimen: Blood Updated: 09/11/23 1808     Lactate 1.5 mmol/L     CBC Auto Differential [[id number]]  (Abnormal) Collected: 09/11/23 1740    Specimen: Blood Updated: 09/11/23 1752     WBC 9.81 10*3/mm3      RBC 4.48 10*6/mm3      Hemoglobin 13.0 g/dL      Hematocrit 38.9 %      MCV 86.8 fL      MCH 29.0 pg      MCHC 33.4 g/dL      RDW 16.8 %      RDW-SD 52.9 fl      MPV 10.7 fL      Platelets 165 10*3/mm3      Neutrophil % 83.7 %      Lymphocyte % 9.6 %      Monocyte % 5.2 %      Eosinophil % 0.7 %      Basophil % 0.3 %      Immature Grans % 0.5 %      Neutrophils, Absolute 8.21 10*3/mm3      Lymphocytes, Absolute 0.94 10*3/mm3      Monocytes, Absolute 0.51 10*3/mm3      Eosinophils, Absolute 0.07 10*3/mm3      Basophils, Absolute 0.03 10*3/mm3      Immature Grans, Absolute 0.05 10*3/mm3      nRBC 0.0 /100 WBC     Blood Culture - Blood, Arm, Left [[id number]] Collected: 09/11/23 1740    Specimen: Blood from Arm, Left Updated: 09/11/23 1748    Blood Culture - Blood, Arm, Right [[id number]] Collected: 09/11/23 1740     Specimen: Blood from Arm, Right Updated: 09/11/23 1748    Urinalysis With Culture If Indicated - Urine, Clean Catch [[id number]]  (Abnormal) Collected: 09/11/23 1911    Specimen: Urine, Clean Catch Updated: 09/11/23 1932     Color, UA Yellow     Appearance, UA Turbid     pH, UA 6.0     Specific Gravity, UA 1.027     Glucose,  mg/dL (Trace)     Ketones, UA Negative     Bilirubin, UA Negative     Blood, UA Large (3+)     Protein, UA >=300 mg/dL (3+)     Leuk Esterase, UA Large (3+)     Nitrite, UA Negative     Urobilinogen, UA 1.0 E.U./dL    Narrative:      In absence of clinical symptoms, the presence of pyuria, bacteria, and/or nitrites on the urinalysis result does not correlate with infection.    Urinalysis, Microscopic Only - Urine, Clean Catch [[id number]]  (Abnormal) Collected: 09/11/23 1911    Specimen: Urine, Clean Catch Updated: 09/11/23 2005     RBC, UA 6-12 /HPF      WBC, UA Too Numerous to Count /HPF      Bacteria, UA 2+ /HPF      Squamous Epithelial Cells, UA 0-2 /HPF      Hyaline Casts, UA None Seen /LPF      Methodology Manual Light Microscopy             Imaging:    XR Knee 1 or 2 View Left    Result Date: 9/11/2023  PROCEDURE: XR KNEE 1 OR 2 VW LEFT  COMPARISON: [facility], CR, KNEE 3 VIEWS LT, 3/15/2020, 18:08.  INDICATIONS: Fall, LEFT KNEE pain  FINDINGS:   Prior total left knee arthroplasty.  There are nondisplaced fractures of the proximal metadiaphysis of the left tibia.  Probable proximal right fibular fracture.  IMPRESSION: Nondisplaced fractures of the proximal metadiaphysis of the left tibia.  Probable left fibular fracture.   JENNY TOBAR MD       Electronically Signed and Approved By: JENNY TOBAR MD on 9/11/2023 at 15:33             CT Head Without Contrast    Result Date: 9/11/2023  PROCEDURE: CT HEAD WO CONTRAST  COMPARISON:  [facility], MR, MRI BRAIN WO CONTRAST, 6/24/2021, 19:09.  CT, CT CERVICAL SPINE WO CONTRAST, 9/11/2023, 16:06.  [place]  [facility], CT, CT HEAD WO CONTRAST, 7/04/2021, 16:33.  INDICATIONS: FALL. GENERALIZED HEADACHE AND NECK PAIN.  PROTOCOL:   Standard imaging protocol performed    RADIATION:   DLP: 1700mGy*cm   MA and/or KV was adjusted to minimize radiation dose.     TECHNIQUE: After obtaining the patient's consent, CT images were obtained without non-ionic intravenous contrast material.  FINDINGS:  Despite repeating a portion of the exam, images are limited by motion artifact.  Allowing for this limitation, no acute intracranial hemorrhage, mass, or mass effect is seen.  Ventricles are nondilated.  Stable moderate periventricular/subcortical white matter hypodensities are nonspecific, but are most commonly seen in the setting of chronic small vessel ischemic change.  Atherosclerotic calcifications are noted in vessels of the skull base.  No fractures are seen.  Included paranasal sinuses and mastoid air cells appear clear.        1. Despite repeating a portion of the exam, images are limited by motion artifact.  2. Allowing for this limitation, no acute intracranial abnormality is identified.     DINAH CORBIN MD       Electronically Signed and Approved By: DINAH CORBIN MD on 9/11/2023 at 16:23             CT Cervical Spine Without Contrast    Result Date: 9/11/2023  PROCEDURE: CT CERVICAL SPINE WO CONTRAST  COMPARISON: None  INDICATIONS: FALL. GENERALIZED HEADACHE AND NECK PAIN.  PROTOCOL:   Standard imaging protocol performed    RADIATION:   DLP: 560mGy*cm   MA and/or KV was adjusted to minimize radiation dose.     TECHNIQUE: After obtaining the patient's consent, multi-planar CT images were created without contrast material.   FINDINGS:  The visualized intracranial contents appear within normal limits.  The unenhanced soft tissues of the neck are within normal limits.  Large amount of calcified plaque is noted throughout the aortic arch and carotid arteries.  There is normal height and alignment of the cervical  vertebral bodies.  There is severe dextroconvex scoliosis at the cervicothoracic junction.  Facet joints appear intact.  No definite vertebral body anomaly identified.  The craniovertebral junction is intact.  There is no evidence of acute fracture  There is emphysematous change of the lung apices.  Visualized portions of the lung apices are clear.        1. Dextroconvex scoliosis at the thoracolumbar junction.  2. No acute fracture or malalignment of the cervical spine.     KIYA POLANCO MD       Electronically Signed and Approved By: KIYA POLANCO MD on 9/11/2023 at 16:25                Differential Diagnosis and Discussion:      Extremity Pain: Differential diagnosis includes but is not limited to soft tissue sprain, tendonitis, tendon injury, dislocation, fracture, deep vein thrombosis, arterial insufficiency, osteoarthritis, bursitis, and ligamentous damage.    All labs were reviewed and interpreted by me.  All X-rays impressions were independently interpreted by me.  CT scan radiology impression was interpreted by me.    MDM     Amount and/or Complexity of Data Reviewed  Clinical lab tests: reviewed  Tests in the radiology section of CPT®: reviewed             Patient Care Considerations:    CT EXTREMITY: I considered ordering an extremity CT, however it was not indicated prior to transfer.      Consultants/Shared Management Plan:    Consultant: I have discussed the case with Dr. Barajas who states patient should be transferred to the hospital where her knee surgery was performed  Transfer Provider: I have discussed the case with Dr. Dave Johnson at [facility] orthopedics who agrees to accept the patient as a transfer.    Social Determinants of Health:          Disposition and Care Coordination:    Transferred: Through independent evaluation of the patient's history, physical, and imperical data, the patient meets criteria to be transferred to another hospital for evaluation/admission.        Final diagnoses:    Periprosthetic fracture around internal prosthetic left ankle joint, initial encounter   Fall, initial encounter   Knee laceration, left, initial encounter   Cellulitis of left lower extremity   Urinary tract infection in female        ED Disposition       ED Disposition   Transfer to Another Facility     Condition   --    Comment   Transfer to [facility] emergency department               This medical record created using voice recognition software.             Latonya Corrigan, APRN  09/12/23 0027

## 2023-09-11 NOTE — ED NOTES
"Pt reports back, head, and leg pain after falling out of her chair today. Dressing changed. 1.5\" laceration to LE present. Controlled bleeding with re-bandaging.   "

## 2023-09-11 NOTE — ED PROVIDER NOTES
"Patient is 62 y.o. year old female that presents to the ED for evaluation of left knee injury.     Physical Exam  Limited range of motion of the left knee.  There is moderate to severe tenderness with soft tissue swelling and bruising noted just distal to the knee itself.  Distal pulses are intact.    ED Course:    BP (!) 212/86   Pulse 101   Temp 98 °F (36.7 °C)   Resp 20   Ht 167.6 cm (66\")   Wt 75.6 kg (166 lb 10.7 oz)   SpO2 92%   BMI 26.90 kg/m²   Results for orders placed or performed during the hospital encounter of 09/11/23   Comprehensive Metabolic Panel    Specimen: Blood   Result Value Ref Range    Glucose 177 (H) 65 - 99 mg/dL    BUN 19 8 - 23 mg/dL    Creatinine 0.92 0.57 - 1.00 mg/dL    Sodium 139 136 - 145 mmol/L    Potassium 3.1 (L) 3.5 - 5.2 mmol/L    Chloride 101 98 - 107 mmol/L    CO2 26.0 22.0 - 29.0 mmol/L    Calcium 8.3 (L) 8.6 - 10.5 mg/dL    Total Protein 6.5 6.0 - 8.5 g/dL    Albumin 2.3 (L) 3.5 - 5.2 g/dL    ALT (SGPT) 11 1 - 33 U/L    AST (SGOT) 16 1 - 32 U/L    Alkaline Phosphatase 128 (H) 39 - 117 U/L    Total Bilirubin 0.5 0.0 - 1.2 mg/dL    Globulin 4.2 gm/dL    A/G Ratio 0.5 g/dL    BUN/Creatinine Ratio 20.7 7.0 - 25.0    Anion Gap 12.0 5.0 - 15.0 mmol/L    eGFR 70.1 >60.0 mL/min/1.73   Lipase    Specimen: Blood   Result Value Ref Range    Lipase 14 13 - 60 U/L   Sedimentation Rate    Specimen: Blood   Result Value Ref Range    Sed Rate 62 (H) 0 - 30 mm/hr   Urinalysis With Culture If Indicated - Urine, Clean Catch    Specimen: Urine, Clean Catch   Result Value Ref Range    Color, UA Yellow Yellow, Straw    Appearance, UA Turbid (A) Clear    pH, UA 6.0 5.0 - 8.0    Specific Gravity, UA 1.027 1.005 - 1.030    Glucose,  mg/dL (Trace) (A) Negative    Ketones, UA Negative Negative    Bilirubin, UA Negative Negative    Blood, UA Large (3+) (A) Negative    Protein, UA >=300 mg/dL (3+) (A) Negative    Leuk Esterase, UA Large (3+) (A) Negative    Nitrite, UA Negative Negative    " Urobilinogen, UA 1.0 E.U./dL 0.2 - 1.0 E.U./dL   Protime-INR    Specimen: Blood   Result Value Ref Range    Protime 14.1 11.8 - 14.9 Seconds    INR 1.08 0.86 - 1.15   aPTT    Specimen: Blood   Result Value Ref Range    PTT 39.0 (L) 78.0 - 95.9 seconds   Lactic Acid, Plasma    Specimen: Blood   Result Value Ref Range    Lactate 1.5 0.5 - 2.0 mmol/L   CBC Auto Differential    Specimen: Blood   Result Value Ref Range    WBC 9.81 3.40 - 10.80 10*3/mm3    RBC 4.48 3.77 - 5.28 10*6/mm3    Hemoglobin 13.0 12.0 - 15.9 g/dL    Hematocrit 38.9 34.0 - 46.6 %    MCV 86.8 79.0 - 97.0 fL    MCH 29.0 26.6 - 33.0 pg    MCHC 33.4 31.5 - 35.7 g/dL    RDW 16.8 (H) 12.3 - 15.4 %    RDW-SD 52.9 37.0 - 54.0 fl    MPV 10.7 6.0 - 12.0 fL    Platelets 165 140 - 450 10*3/mm3    Neutrophil % 83.7 (H) 42.7 - 76.0 %    Lymphocyte % 9.6 (L) 19.6 - 45.3 %    Monocyte % 5.2 5.0 - 12.0 %    Eosinophil % 0.7 0.3 - 6.2 %    Basophil % 0.3 0.0 - 1.5 %    Immature Grans % 0.5 0.0 - 0.5 %    Neutrophils, Absolute 8.21 (H) 1.70 - 7.00 10*3/mm3    Lymphocytes, Absolute 0.94 0.70 - 3.10 10*3/mm3    Monocytes, Absolute 0.51 0.10 - 0.90 10*3/mm3    Eosinophils, Absolute 0.07 0.00 - 0.40 10*3/mm3    Basophils, Absolute 0.03 0.00 - 0.20 10*3/mm3    Immature Grans, Absolute 0.05 0.00 - 0.05 10*3/mm3    nRBC 0.0 0.0 - 0.2 /100 WBC   Green Top (Gel)   Result Value Ref Range    Extra Tube Hold for add-ons.    Lavender Top   Result Value Ref Range    Extra Tube hold for add-on    Gold Top - SST   Result Value Ref Range    Extra Tube Hold for add-ons.    Light Blue Top   Result Value Ref Range    Extra Tube Hold for add-ons.      Medications   morphine injection 4 mg (4 mg Intravenous Given 9/11/23 1712)     XR Knee 1 or 2 View Left    Result Date: 9/11/2023  Narrative: PROCEDURE: XR KNEE 1 OR 2 VW LEFT  COMPARISON: [facility], CR, KNEE 3 VIEWS LT, 3/15/2020, 18:08.  INDICATIONS: Fall, LEFT KNEE pain  FINDINGS:   Prior total left knee arthroplasty.   There are nondisplaced fractures of the proximal metadiaphysis of the left tibia.  Probable proximal right fibular fracture.  IMPRESSION: Nondisplaced fractures of the proximal metadiaphysis of the left tibia.  Probable left fibular fracture.   JENNY TOBAR MD       Electronically Signed and Approved By: JENNY TOBAR MD on 9/11/2023 at 15:33             CT Head Without Contrast    Result Date: 9/11/2023  Narrative: PROCEDURE: CT HEAD WO CONTRAST  COMPARISON:  [facility], MR, MRI BRAIN WO CONTRAST, 6/24/2021, 19:09.  CT, CT CERVICAL SPINE WO CONTRAST, 9/11/2023, 16:06.  [facility], CT, CT HEAD WO CONTRAST, 7/04/2021, 16:33.  INDICATIONS: FALL. GENERALIZED HEADACHE AND NECK PAIN.  PROTOCOL:   Standard imaging protocol performed    RADIATION:   DLP: 1700mGy*cm   MA and/or KV was adjusted to minimize radiation dose.     TECHNIQUE: After obtaining the patient's consent, CT images were obtained without non-ionic intravenous contrast material.  FINDINGS:  Despite repeating a portion of the exam, images are limited by motion artifact.  Allowing for this limitation, no acute intracranial hemorrhage, mass, or mass effect is seen.  Ventricles are nondilated.  Stable moderate periventricular/subcortical white matter hypodensities are nonspecific, but are most commonly seen in the setting of chronic small vessel ischemic change.  Atherosclerotic calcifications are noted in vessels of the skull base.  No fractures are seen.  Included paranasal sinuses and mastoid air cells appear clear.      Impression:   1. Despite repeating a portion of the exam, images are limited by motion artifact.  2. Allowing for this limitation, no acute intracranial abnormality is identified.     DINAH CORBIN MD       Electronically Signed and Approved By: DINAH CORBIN MD on 9/11/2023 at 16:23             CT Cervical Spine Without Contrast    Result Date: 9/11/2023  Narrative: PROCEDURE: CT CERVICAL SPINE WO  CONTRAST  COMPARISON: None  INDICATIONS: FALL. GENERALIZED HEADACHE AND NECK PAIN.  PROTOCOL:   Standard imaging protocol performed    RADIATION:   DLP: 560mGy*cm   MA and/or KV was adjusted to minimize radiation dose.     TECHNIQUE: After obtaining the patient's consent, multi-planar CT images were created without contrast material.   FINDINGS:  The visualized intracranial contents appear within normal limits.  The unenhanced soft tissues of the neck are within normal limits.  Large amount of calcified plaque is noted throughout the aortic arch and carotid arteries.  There is normal height and alignment of the cervical vertebral bodies.  There is severe dextroconvex scoliosis at the cervicothoracic junction.  Facet joints appear intact.  No definite vertebral body anomaly identified.  The craniovertebral junction is intact.  There is no evidence of acute fracture  There is emphysematous change of the lung apices.  Visualized portions of the lung apices are clear.      Impression:   1. Dextroconvex scoliosis at the thoracolumbar junction.  2. No acute fracture or malalignment of the cervical spine.     KIYA POLANCO MD       Electronically Signed and Approved By: KIYA POLANCO MD on 9/11/2023 at 16:25              MDM:    Procedures      The case was discussed between the IVAN and myself. Patient  care including, but not limited to ordered imaging, medications, and lab results were reviewed. I then performed the substantive portion of the visit including all aspects of the medical decision making.    The patient will require transfer to higher level of orthopedic care that deals with periprosthetic fractures.  I advised that we attempt to get a hold of the patient's orthopedic surgeon who placed in the left TKA to see if he is available.  Otherwise patient may need to go to a university setting.      Italo Barajas DO  19:42 EDT  09/11/23         Italo Barajas DO  09/14/23 1136

## 2023-09-14 NOTE — PAYOR COMM NOTE
"SUBJECT:     AMBULANCE TRANSPORTATION AUTHORIZATION REQUEST         PATIENT'S NAME:     Italia HURTADO [facility] MRN:     [id number]     DOS:     9/11/2023     INSURANCE MEMBER ID:     [id number]         SERVICE PROVIDER    Company        [facility]    NPI                  [id number]    Tax ID             [id number]    Address          [address] [place] [address]    Phone              [phone]    Fax                  [phone]         CASE MANAGEMENT CONTACT INFORMATION    Name              Deb Ratliff            Phone              [phone]    Fax                  [phone]              ++++++++++++++++++++++++++++++++++++++++++++++++++++++++++++++        Italia Olvera (62 y.o. Female)       Date of Birth   1960    Social Security Number       Address   [address] [address]    Home Phone   [phone]    MRN   [id number]       Anabaptist   Pentecostal    Marital Status                               Admission Date   9/11/23    Admission Type   Emergency    Admitting Provider       Attending Provider       Department, Room/Bed   [facility] EMERGENCY ROOM, 12/12       Discharge Date   9/11/2023    Discharge Disposition   Another Health Care Institution Not Defined    Discharge Destination                                 Attending Provider: (none)   Allergies: No Known Allergies    Isolation: None   Infection: None   Code Status: Prior    Ht: 167.6 cm (66\")   Wt: 75.6 kg (166 lb 10.7 oz)    Admission Cmt: None   Principal Problem: None                  Active Insurance as of 9/11/2023       Primary Coverage       Payor Plan Insurance Group Employer/Plan Group    ANTHEM MEDICARE REPLACEMENT ANTHEM MEDICARE ADVANTAGE [id number]       Payor Plan Address Payor Plan Phone Number Payor Plan Fax Number Effective Dates    [address] [phone]  1/1/2020 - None Entered    [facility] [id number]         Subscriber Name " "Subscriber Birth Date Member ID       JUANCHO OLVERA 1960 [id number]                     Emergency Contacts        (Rel.) Home Phone Work Phone Mobile Phone    HANNA OLVERA (Spouse) [phone] -- [phone]    Darrin Olvera (Son) [phone] -- [phone]    KASSANDRA OLVERA (Daughter) [phone] -- --                                               Emergency Department Notes        Eliza Abebe RN at 09/11/23 2022          MID provider notified of SBP.     Electronically signed by Eliza Abebe RN at 09/11/23 2022       Italo Barajas DO at 09/11/23 1942          Patient is 62 y.o. year old female that presents to the ED for evaluation of left knee injury.     Physical Exam  Limited range of motion of the left knee.  There is moderate to severe tenderness with soft tissue swelling and bruising noted just distal to the knee itself.  Distal pulses are intact.    ED Course:    BP (!) 212/86   Pulse 101   Temp 98 °F (36.7 °C)   Resp 20   Ht 167.6 cm (66\")   Wt 75.6 kg (166 lb 10.7 oz)   SpO2 92%   BMI 26.90 kg/m²   Results for orders placed or performed during the hospital encounter of 09/11/23   Comprehensive Metabolic Panel    Specimen: Blood   Result Value Ref Range    Glucose 177 (H) 65 - 99 mg/dL    BUN 19 8 - 23 mg/dL    Creatinine 0.92 0.57 - 1.00 mg/dL    Sodium 139 136 - 145 mmol/L    Potassium 3.1 (L) 3.5 - 5.2 mmol/L    Chloride 101 98 - 107 mmol/L    CO2 26.0 22.0 - 29.0 mmol/L    Calcium 8.3 (L) 8.6 - 10.5 mg/dL    Total Protein 6.5 6.0 - 8.5 g/dL    Albumin 2.3 (L) 3.5 - 5.2 g/dL    ALT (SGPT) 11 1 - 33 U/L    AST (SGOT) 16 1 - 32 U/L    Alkaline Phosphatase 128 (H) 39 - 117 U/L    Total Bilirubin 0.5 0.0 - 1.2 mg/dL    Globulin 4.2 gm/dL    A/G Ratio 0.5 g/dL    BUN/Creatinine Ratio 20.7 7.0 - 25.0    Anion Gap 12.0 5.0 - 15.0 mmol/L    eGFR 70.1 >60.0 mL/min/1.73   Lipase    Specimen: Blood   Result Value Ref Range    Lipase 14 13 - 60 U/L "   Sedimentation Rate    Specimen: Blood   Result Value Ref Range    Sed Rate 62 (H) 0 - 30 mm/hr   Urinalysis With Culture If Indicated - Urine, Clean Catch    Specimen: Urine, Clean Catch   Result Value Ref Range    Color, UA Yellow Yellow, Straw    Appearance, UA Turbid (A) Clear    pH, UA 6.0 5.0 - 8.0    Specific Gravity, UA 1.027 1.005 - 1.030    Glucose,  mg/dL (Trace) (A) Negative    Ketones, UA Negative Negative    Bilirubin, UA Negative Negative    Blood, UA Large (3+) (A) Negative    Protein, UA >=300 mg/dL (3+) (A) Negative    Leuk Esterase, UA Large (3+) (A) Negative    Nitrite, UA Negative Negative    Urobilinogen, UA 1.0 E.U./dL 0.2 - 1.0 E.U./dL   Protime-INR    Specimen: Blood   Result Value Ref Range    Protime 14.1 11.8 - 14.9 Seconds    INR 1.08 0.86 - 1.15   aPTT    Specimen: Blood   Result Value Ref Range    PTT 39.0 (L) 78.0 - 95.9 seconds   Lactic Acid, Plasma    Specimen: Blood   Result Value Ref Range    Lactate 1.5 0.5 - 2.0 mmol/L   CBC Auto Differential    Specimen: Blood   Result Value Ref Range    WBC 9.81 3.40 - 10.80 10*3/mm3    RBC 4.48 3.77 - 5.28 10*6/mm3    Hemoglobin 13.0 12.0 - 15.9 g/dL    Hematocrit 38.9 34.0 - 46.6 %    MCV 86.8 79.0 - 97.0 fL    MCH 29.0 26.6 - 33.0 pg    MCHC 33.4 31.5 - 35.7 g/dL    RDW 16.8 (H) 12.3 - 15.4 %    RDW-SD 52.9 37.0 - 54.0 fl    MPV 10.7 6.0 - 12.0 fL    Platelets 165 140 - 450 10*3/mm3    Neutrophil % 83.7 (H) 42.7 - 76.0 %    Lymphocyte % 9.6 (L) 19.6 - 45.3 %    Monocyte % 5.2 5.0 - 12.0 %    Eosinophil % 0.7 0.3 - 6.2 %    Basophil % 0.3 0.0 - 1.5 %    Immature Grans % 0.5 0.0 - 0.5 %    Neutrophils, Absolute 8.21 (H) 1.70 - 7.00 10*3/mm3    Lymphocytes, Absolute 0.94 0.70 - 3.10 10*3/mm3    Monocytes, Absolute 0.51 0.10 - 0.90 10*3/mm3    Eosinophils, Absolute 0.07 0.00 - 0.40 10*3/mm3    Basophils, Absolute 0.03 0.00 - 0.20 10*3/mm3    Immature Grans, Absolute 0.05 0.00 - 0.05 10*3/mm3    nRBC 0.0 0.0 - 0.2 /100 WBC   Green Top  (Gel)   Result Value Ref Range    Extra Tube Hold for add-ons.    Lavender Top   Result Value Ref Range    Extra Tube hold for add-on    Gold Top - SST   Result Value Ref Range    Extra Tube Hold for add-ons.    Light Blue Top   Result Value Ref Range    Extra Tube Hold for add-ons.      Medications   morphine injection 4 mg (4 mg Intravenous Given 9/11/23 1712)     XR Knee 1 or 2 View Left    Result Date: 9/11/2023  Narrative: PROCEDURE: XR KNEE 1 OR 2 VW LEFT  COMPARISON: [facility], CR, KNEE 3 VIEWS LT, 3/15/2020, 18:08.  INDICATIONS: Fall, LEFT KNEE pain  FINDINGS:   Prior total left knee arthroplasty.  There are nondisplaced fractures of the proximal metadiaphysis of the left tibia.  Probable proximal right fibular fracture.  IMPRESSION: Nondisplaced fractures of the proximal metadiaphysis of the left tibia.  Probable left fibular fracture.   JENNY TOBAR MD       Electronically Signed and Approved By: JENNY TOBAR MD on 9/11/2023 at 15:33             CT Head Without Contrast    Result Date: 9/11/2023  Narrative: PROCEDURE: CT HEAD WO CONTRAST  COMPARISON:  [facility], MR, MRI BRAIN WO CONTRAST, 6/24/2021, 19:09.  CT, CT CERVICAL SPINE WO CONTRAST, 9/11/2023, 16:06.  [facility], CT, CT HEAD WO CONTRAST, 7/04/2021, 16:33.  INDICATIONS: FALL. GENERALIZED HEADACHE AND NECK PAIN.  PROTOCOL:   Standard imaging protocol performed    RADIATION:   DLP: 1700mGy*cm   MA and/or KV was adjusted to minimize radiation dose.     TECHNIQUE: After obtaining the patient's consent, CT images were obtained without non-ionic intravenous contrast material.  FINDINGS:  Despite repeating a portion of the exam, images are limited by motion artifact.  Allowing for this limitation, no acute intracranial hemorrhage, mass, or mass effect is seen.  Ventricles are nondilated.  Stable moderate periventricular/subcortical white matter hypodensities are nonspecific, but are most commonly seen in  the setting of chronic small vessel ischemic change.  Atherosclerotic calcifications are noted in vessels of the skull base.  No fractures are seen.  Included paranasal sinuses and mastoid air cells appear clear.      Impression:   1. Despite repeating a portion of the exam, images are limited by motion artifact.  2. Allowing for this limitation, no acute intracranial abnormality is identified.     DINAH CORBIN MD       Electronically Signed and Approved By: DINAH OCRBIN MD on 9/11/2023 at 16:23             CT Cervical Spine Without Contrast    Result Date: 9/11/2023  Narrative: PROCEDURE: CT CERVICAL SPINE WO CONTRAST  COMPARISON: None  INDICATIONS: FALL. GENERALIZED HEADACHE AND NECK PAIN.  PROTOCOL:   Standard imaging protocol performed    RADIATION:   DLP: 560mGy*cm   MA and/or KV was adjusted to minimize radiation dose.     TECHNIQUE: After obtaining the patient's consent, multi-planar CT images were created without contrast material.   FINDINGS:  The visualized intracranial contents appear within normal limits.  The unenhanced soft tissues of the neck are within normal limits.  Large amount of calcified plaque is noted throughout the aortic arch and carotid arteries.  There is normal height and alignment of the cervical vertebral bodies.  There is severe dextroconvex scoliosis at the cervicothoracic junction.  Facet joints appear intact.  No definite vertebral body anomaly identified.  The craniovertebral junction is intact.  There is no evidence of acute fracture  There is emphysematous change of the lung apices.  Visualized portions of the lung apices are clear.      Impression:   1. Dextroconvex scoliosis at the thoracolumbar junction.  2. No acute fracture or malalignment of the cervical spine.     KIYA POLANCO MD       Electronically Signed and Approved By: KIYA POLANCO MD on 9/11/2023 at 16:25              MDM:    Procedures      The case was discussed between the IVAN and myself. Patient   "care including, but not limited to ordered imaging, medications, and lab results were reviewed. I then performed the substantive portion of the visit including all aspects of the medical decision making.    The patient will require transfer to higher level of orthopedic care that deals with periprosthetic fractures.  I advised that we attempt to get a hold of the patient's orthopedic surgeon who placed in the left TKA to see if he is available.  Otherwise patient may need to go to a university setting.      Italo Barajas DO  19:42 EDT  09/11/23         Italo Barajas DO  09/14/23 1136      Electronically signed by Italo Barajas DO at 09/14/23 1136       Eliza Abebe, RN at 09/11/23 1547          Pt reports back, head, and leg pain after falling out of her chair today. Dressing changed. 1.5\" laceration to LE present. Controlled bleeding with re-bandaging.     Electronically signed by Eliza Abebe RN at 09/11/23 1554       Latonya Corrigan APRN at 09/11/23 1500       Attestation signed by Italo Barajas DO at 09/12/23 0959          Italo Barajas DO 9/12/2023 09:59 EDT                         Time: 3:00 PM EDT  Date of encounter:  9/11/2023  Independent Historian/Clinical History and Information was obtained by:   Patient    History is limited by: N/A    Chief Complaint   Patient presents with    Fall    Laceration         History of Present Illness:  Patient is a 62 y.o. year old female who presents to the emergency department for evaluation of left leg laceration.  Patient reports that she was trying to get her out of her lift chair when she lost her balance and fell straight forward.  Does not recall if she hit her head or not.  She denies loss of consciousness.  Complains of neck pain and right-sided headache.  LUCINA Negrete      HPI    Patient Care Team  Primary Care Provider: Carloz Shoemaker MD    Past Medical History:     No Known Allergies  Past Medical History:   Diagnosis Date    Acid reflux     ACL tear 09/01/2015 "    PCL/ACL TEAR/RUPTURE    Acute shoulder bursitis, right 08/23/2015    Anxiety     Arthritis     Asthma     Bipolar 1 disorder     untreated    Bladder disorder     Cancer     CERVICAL CANCER    CHF (congestive heart failure)     ASYMPTOMATIC- NO CARDIOLOGIST- SEE'S DR ARRIAZA (PCP)- DENIED CP/SOB    Chronic back pain     Chronic pain     CHRONIC BACK, NECK, LEG, FOOT, & FACE PAIN    COPD (chronic obstructive pulmonary disease)     USES INHALERS    Depression     Diabetes mellitus     BG RUND AROUND 140 IN AM    DJD (degenerative joint disease)     Gangrene     RT SECOND AND FIFTH TOES    GERD (gastroesophageal reflux disease)     HBP (high blood pressure)     Hip pain 09/15/2015    Hypertension     Knee injury 08/19/2015    Knee pain, right 09/15/2015    Limb swelling     Neuropathy     On home O2     2-3L/NC PRN    Osteoarthritis, knee 09/01/2015    Osteoporosis     Peripheral neuropathy     Renal failure 1994    NO CURRENT PROBLEMS    Seasonal allergies     Shoulder tendonitis 08/23/2015    Sleep apnea     Smoker     SOB (shortness of breath)     NONE CURENTLY    Tendinitis of right rotator cuff 08/23/2015     Past Surgical History:   Procedure Laterality Date    ABOVE KNEE AMPUTATION Right 3/11/2023    Procedure: AMPUTATION ABOVE KNEE;  Surgeon: Zacarias Haywood MD;  Location: [facility] MAIN OR;  Service: Vascular;  Laterality: Right;    AMPUTATION DIGIT Right 12/6/2022    Procedure: Amputation of right second and fifth toes;  Surgeon: Gabino Russell MD;  Location: [facility] MAIN OR;  Service: Vascular;  Laterality: Right;    BACK SURGERY      BLADDER REPAIR      BRONCHOSCOPY N/A 07/10/2021    Procedure: BRONCHOSCOPY WITH BRONCHOALVEOLAR LAVAGE, POSSIBLE BIOPSY, BRUSHING, WASHING, AIRWAY INSPECTION;  Surgeon: Rodrigo Reyes MD;  Location: [facility] MAIN OR;  Service: Pulmonary;  Laterality: N/A;    BRONCHOSCOPY N/A 07/10/2021    Procedure: BRONCHOSCOPY;  Surgeon: Rodrigo Reyes MD;  Location: [facility] ENDOSCOPY;   Service: Pulmonary;  Laterality: N/A;    CARDIAC CATHETERIZATION Right 11/03/2022    Procedure: Aortogram with right leg angiogram, possible angioplasty or stenting ;  Surgeon: Gabino Russell MD;  Location: [facility] CATH INVASIVE LOCATION;  Service: Vascular;  Laterality: Right;    CARDIAC CATHETERIZATION Right 3/8/2023    Procedure: Right leg angiogram, possible angioplasty or stenting;  Surgeon: Gabino Russell MD;  Location: [facility] CATH INVASIVE LOCATION;  Service: Vascular;  Laterality: Right;    CHOLECYSTECTOMY      ENDOSCOPY      FRACTURE SURGERY      SKULL FRACTURE    GALLBLADDER SURGERY      HERNIA REPAIR      HYSTERECTOMY      INTERVENTIONAL RADIOLOGY PROCEDURE Right 03/03/2022    Procedure: Abdominal Aortagram with Runoff;  Surgeon: Marleny Yoon MD;  Location: [facility] CATH INVASIVE LOCATION;  Service: Peripheral Vascular;  Laterality: Right;    JOINT REPLACEMENT      OTHER SURGICAL HISTORY      ARTIFICIAL JOINTS/LIMBS    OTHER SURGICAL HISTORY      FACE SURGERY, UNSPECIFIED    TOTAL HIP ARTHROPLASTY Right     TOTAL KNEE ARTHROPLASTY Left      Family History   Problem Relation Age of Onset    Stroke Mother     Throat cancer Mother     Arthritis Mother     Osteoporosis Mother     Heart disease Father     Breast cancer Sister     Colon cancer Sister     Lung cancer Sister     Arthritis Sister     Osteoporosis Sister     Heart disease Brother     Diabetes Brother     Arthritis Brother     Arthritis Daughter        Home Medications:  Prior to Admission medications    Medication Sig Start Date End Date Taking? Authorizing Provider   amLODIPine-benazepril (LOTREL) 10-40 MG per capsule Take 1 capsule by mouth Daily.    Provider, MD Patria   amoxicillin-clavulanate (Augmentin) 500-125 MG per tablet Take 1 tablet by mouth 2 (Two) Times a Day. 4/10/23   Gabino Russell MD   aspirin 81 MG EC tablet Take 1 tablet by mouth Daily. 10/17/22 10/17/23  Gabino Russell MD   atorvastatin (LIPITOR) 10 MG tablet Take 1  tablet by mouth Daily. 2/10/23   Patria Hanson MD   benzonatate (TESSALON) 100 MG capsule Take 1-2 capsules by mouth 3 (Three) Times a Day As Needed for Cough.    Patria Hanson MD   bisoprolol-hydrochlorothiazide (ZIAC) 10-6.25 MG per tablet Take 1 tablet by mouth Every 12 (Twelve) Hours. 1/12/23   Patria Hanson MD   clopidogrel (PLAVIX) 75 MG tablet Take 1 tablet by mouth Daily.    Patria Hanson MD   colestipol (COLESTID) 1 g tablet Take 1 tablet by mouth Daily.    Patria Hanson MD   furosemide (LASIX) 80 MG tablet Take 1 tablet by mouth Every Other Day.    Patria Hanson MD   hydrOXYzine (ATARAX) 25 MG tablet Take 2 tablets by mouth Every 6 (Six) Hours As Needed. 2/17/23   Patria Hanson MD   mirtazapine (REMERON) 45 MG tablet Take 1 tablet by mouth At Night As Needed. 1/8/23   Patria Hanson MD   nortriptyline (PAMELOR) 50 MG capsule Take 1 capsule by mouth every night at bedtime. 2/15/23   Patria Hanson MD   ondansetron (ZOFRAN) 4 MG tablet Take 1 tablet by mouth Every 8 (Eight) Hours As Needed for Nausea or Vomiting.    Patria Hanson MD   oxyCODONE-acetaminophen (PERCOCET)  MG per tablet Take 1 tablet by mouth Every 6 (Six) Hours As Needed for Severe Pain. GIVEN PER PCP  Patient taking differently: Take 1 tablet by mouth Every 6 (Six) Hours As Needed for Severe Pain. 12/6/22   Gabino Russell MD   pantoprazole (PROTONIX) 40 MG EC tablet Take 1 tablet by mouth Daily.    Patria Hanson MD   potassium chloride 10 MEQ CR tablet Take 2 tablets by mouth Daily.    Patria Hanson MD   sertraline (ZOLOFT) 100 MG tablet Take 1 tablet by mouth Daily. 7/27/21   Patria Hanson MD   sertraline (ZOLOFT) 50 MG tablet Take 1 tablet by mouth Daily.    Patria Hanson MD   tiZANidine (ZANAFLEX) 4 MG tablet Take 1 tablet by mouth Every 8 (Eight) Hours As Needed for Muscle Spasms. 9/30/21   Patria Hanson MD  "  traZODone (DESYREL) 150 MG tablet Take 1 tablet by mouth At Night As Needed for Sleep. 12/30/21   Provider, MD Patria        Social History:   Social History     Tobacco Use    Smoking status: Every Day     Packs/day: 0.50     Years: 50.00     Pack years: 25.00     Types: Cigarettes     Start date: 6/15/1979    Smokeless tobacco: Never    Tobacco comments:     SMOKED FOR 31 PLUS YEARS     INSTRUCTED NO SMOKING 24 HR PRIOR TO SURGERY    Vaping Use    Vaping Use: Never used   Substance Use Topics    Alcohol use: Never    Drug use: Never         Review of Systems:  Review of Systems   Musculoskeletal:  Positive for arthralgias, gait problem, myalgias and neck pain.        Left lower extremity pain   Skin:  Positive for wound.   Neurological:  Positive for headaches.      Physical Exam:  BP (!) 220/91   Pulse 90   Temp 98 °F (36.7 °C)   Resp 16   Ht 167.6 cm (66\")   Wt 75.6 kg (166 lb 10.7 oz)   SpO2 95%   BMI 26.90 kg/m²         Physical Exam  HENT:      Head: Normocephalic.      Mouth/Throat:      Mouth: Mucous membranes are moist.   Eyes:      Pupils: Pupils are equal, round, and reactive to light.   Pulmonary:      Effort: Pulmonary effort is normal.   Abdominal:      General: There is no distension.   Musculoskeletal:      Cervical back: Neck supple.      Left lower leg: Swelling, laceration, tenderness and bony tenderness present. Edema present.      Right Lower Extremity: Right leg is amputated above knee.   Skin:     General: Skin is warm and dry.      Findings: Erythema present.          Neurological:      General: No focal deficit present.      Mental Status: She is alert and oriented to person, place, and time.   Psychiatric:         Mood and Affect: Mood normal.         Behavior: Behavior normal.                    Procedures:  Procedures      Medical Decision Making:      Comorbidities that affect care:    Asthma, Congestive Heart Failure, COPD, Hypertension    External Notes " reviewed:    Previous Clinic Note: Patient had a follow-up appointment in May after total knee replacement surgery in March.      The following orders were placed and all results were independently analyzed by me:  Orders Placed This Encounter   Procedures    Blood Culture - Blood,    Blood Culture - Blood,    CT Head Without Contrast    CT Cervical Spine Without Contrast    XR Knee 1 or 2 View Left    Comprehensive Metabolic Panel    [place] Draw    Lipase    Sedimentation Rate    Urinalysis With Culture If Indicated - Urine, Clean Catch    Protime-INR    aPTT    Lactic Acid, Plasma    CBC Auto Differential    Urinalysis, Microscopic Only - Urine, Clean Catch    CBC & Differential    Green Top (Gel)    Lavender Top    Gold Top - SST    Light Blue Top       Medications Given in the Emergency Department:  Medications   morphine injection 4 mg (4 mg Intravenous Given 9/11/23 1712)   cefTRIAXone (ROCEPHIN) IVPB 1,000 mg (0 mg Intravenous Stopped 9/11/23 2024)   potassium chloride (MICRO-K) CR capsule 40 mEq (40 mEq Oral Given 9/11/23 1954)   HYDROmorphone (DILAUDID) injection 1 mg (1 mg Intravenous Given 9/11/23 2041)   sodium chloride 0.9 % bolus 1,000 mL (0 mL Intravenous Stopped 9/11/23 2114)   ondansetron (ZOFRAN) injection 4 mg (4 mg Intravenous Given 9/11/23 2044)        ED Course:    The patient was initially evaluated in the triage area where orders were placed. The patient was later dispositioned by LUCINA Jo.      The patient was advised to stay for completion of workup which includes but is not limited to communication of labs and radiological results, reassessment and plan. The patient was advised that leaving prior to disposition by a provider could result in critical findings that are not communicated to the patient.          Labs:    Lab Results (last 24 hours)       Procedure Component Value Units Date/Time    CBC & Differential [[id number]]  (Abnormal) Collected: 09/11/23 1740    Specimen: Blood  Updated: 09/11/23 1752    Narrative:      The following orders were created for panel order CBC & Differential.  Procedure                               Abnormality         Status                     ---------                               -----------         ------                     CBC Auto Differential[[id number]]        Abnormal            Final result                 Please view results for these tests on the individual orders.    Comprehensive Metabolic Panel [[id number]]  (Abnormal) Collected: 09/11/23 1740    Specimen: Blood Updated: 09/11/23 1810     Glucose 177 mg/dL      BUN 19 mg/dL      Creatinine 0.92 mg/dL      Sodium 139 mmol/L      Potassium 3.1 mmol/L      Chloride 101 mmol/L      CO2 26.0 mmol/L      Calcium 8.3 mg/dL      Total Protein 6.5 g/dL      Albumin 2.3 g/dL      ALT (SGPT) 11 U/L      AST (SGOT) 16 U/L      Alkaline Phosphatase 128 U/L      Total Bilirubin 0.5 mg/dL      Globulin 4.2 gm/dL      A/G Ratio 0.5 g/dL      BUN/Creatinine Ratio 20.7     Anion Gap 12.0 mmol/L      eGFR 70.1 mL/min/1.73     Narrative:      GFR Normal >60  Chronic Kidney Disease <60  Kidney Failure <15      Lipase [[id number]]  (Normal) Collected: 09/11/23 1740    Specimen: Blood Updated: 09/11/23 1810     Lipase 14 U/L     Sedimentation Rate [[id number]]  (Abnormal) Collected: 09/11/23 1740    Specimen: Blood Updated: 09/11/23 1756     Sed Rate 62 mm/hr     Protime-INR [[id number]]  (Normal) Collected: 09/11/23 1740    Specimen: Blood Updated: 09/11/23 1801     Protime 14.1 Seconds      INR 1.08    Narrative:      Suggested Therapeutic Ranges For Oral Anticoagulant Therapy:  Level of Therapy                      INR Target Range  Standard Dose                            2.0-3.0  High Dose                                2.5-3.5  Patients not receiving anticoagulant  Therapy Normal Range                     0.86-1.15    aPTT [[id number]]  (Abnormal) Collected: 09/11/23 1740    Specimen: Blood Updated: 09/11/23 1801      PTT 39.0 seconds     Lactic Acid, Plasma [[id number]]  (Normal) Collected: 09/11/23 1740    Specimen: Blood Updated: 09/11/23 1808     Lactate 1.5 mmol/L     CBC Auto Differential [[id number]]  (Abnormal) Collected: 09/11/23 1740    Specimen: Blood Updated: 09/11/23 1752     WBC 9.81 10*3/mm3      RBC 4.48 10*6/mm3      Hemoglobin 13.0 g/dL      Hematocrit 38.9 %      MCV 86.8 fL      MCH 29.0 pg      MCHC 33.4 g/dL      RDW 16.8 %      RDW-SD 52.9 fl      MPV 10.7 fL      Platelets 165 10*3/mm3      Neutrophil % 83.7 %      Lymphocyte % 9.6 %      Monocyte % 5.2 %      Eosinophil % 0.7 %      Basophil % 0.3 %      Immature Grans % 0.5 %      Neutrophils, Absolute 8.21 10*3/mm3      Lymphocytes, Absolute 0.94 10*3/mm3      Monocytes, Absolute 0.51 10*3/mm3      Eosinophils, Absolute 0.07 10*3/mm3      Basophils, Absolute 0.03 10*3/mm3      Immature Grans, Absolute 0.05 10*3/mm3      nRBC 0.0 /100 WBC     Blood Culture - Blood, Arm, Left [[id number]] Collected: 09/11/23 1740    Specimen: Blood from Arm, Left Updated: 09/11/23 1748    Blood Culture - Blood, Arm, Right [[id number]] Collected: 09/11/23 1740    Specimen: Blood from Arm, Right Updated: 09/11/23 1748    Urinalysis With Culture If Indicated - Urine, Clean Catch [[id number]]  (Abnormal) Collected: 09/11/23 1911    Specimen: Urine, Clean Catch Updated: 09/11/23 1932     Color, UA Yellow     Appearance, UA Turbid     pH, UA 6.0     Specific Gravity, UA 1.027     Glucose,  mg/dL (Trace)     Ketones, UA Negative     Bilirubin, UA Negative     Blood, UA Large (3+)     Protein, UA >=300 mg/dL (3+)     Leuk Esterase, UA Large (3+)     Nitrite, UA Negative     Urobilinogen, UA 1.0 E.U./dL    Narrative:      In absence of clinical symptoms, the presence of pyuria, bacteria, and/or nitrites on the urinalysis result does not correlate with infection.    Urinalysis, Microscopic Only - Urine, Clean Catch [[id number]]  (Abnormal) Collected: 09/11/23 9171    Specimen:  Urine, Clean Catch Updated: 09/11/23 2005     RBC, UA 6-12 /HPF      WBC, UA Too Numerous to Count /HPF      Bacteria, UA 2+ /HPF      Squamous Epithelial Cells, UA 0-2 /HPF      Hyaline Casts, UA None Seen /LPF      Methodology Manual Light Microscopy             Imaging:    XR Knee 1 or 2 View Left    Result Date: 9/11/2023  PROCEDURE: XR KNEE 1 OR 2 VW LEFT  COMPARISON: [facility], CR, KNEE 3 VIEWS LT, 3/15/2020, 18:08.  INDICATIONS: Fall, LEFT KNEE pain  FINDINGS:   Prior total left knee arthroplasty.  There are nondisplaced fractures of the proximal metadiaphysis of the left tibia.  Probable proximal right fibular fracture.  IMPRESSION: Nondisplaced fractures of the proximal metadiaphysis of the left tibia.  Probable left fibular fracture.   JENNY TOBAR MD       Electronically Signed and Approved By: JENNY TOBAR MD on 9/11/2023 at 15:33             CT Head Without Contrast    Result Date: 9/11/2023  PROCEDURE: CT HEAD WO CONTRAST  COMPARISON:  [facility], MR, MRI BRAIN WO CONTRAST, 6/24/2021, 19:09.  CT, CT CERVICAL SPINE WO CONTRAST, 9/11/2023, 16:06.  [facility], CT, CT HEAD WO CONTRAST, 7/04/2021, 16:33.  INDICATIONS: FALL. GENERALIZED HEADACHE AND NECK PAIN.  PROTOCOL:   Standard imaging protocol performed    RADIATION:   DLP: 1700mGy*cm   MA and/or KV was adjusted to minimize radiation dose.     TECHNIQUE: After obtaining the patient's consent, CT images were obtained without non-ionic intravenous contrast material.  FINDINGS:  Despite repeating a portion of the exam, images are limited by motion artifact.  Allowing for this limitation, no acute intracranial hemorrhage, mass, or mass effect is seen.  Ventricles are nondilated.  Stable moderate periventricular/subcortical white matter hypodensities are nonspecific, but are most commonly seen in the setting of chronic small vessel ischemic change.  Atherosclerotic calcifications are noted in vessels of the  skull base.  No fractures are seen.  Included paranasal sinuses and mastoid air cells appear clear.        1. Despite repeating a portion of the exam, images are limited by motion artifact.  2. Allowing for this limitation, no acute intracranial abnormality is identified.     DINAH CORBIN MD       Electronically Signed and Approved By: DINAH CORBIN MD on 9/11/2023 at 16:23             CT Cervical Spine Without Contrast    Result Date: 9/11/2023  PROCEDURE: CT CERVICAL SPINE WO CONTRAST  COMPARISON: None  INDICATIONS: FALL. GENERALIZED HEADACHE AND NECK PAIN.  PROTOCOL:   Standard imaging protocol performed    RADIATION:   DLP: 560mGy*cm   MA and/or KV was adjusted to minimize radiation dose.     TECHNIQUE: After obtaining the patient's consent, multi-planar CT images were created without contrast material.   FINDINGS:  The visualized intracranial contents appear within normal limits.  The unenhanced soft tissues of the neck are within normal limits.  Large amount of calcified plaque is noted throughout the aortic arch and carotid arteries.  There is normal height and alignment of the cervical vertebral bodies.  There is severe dextroconvex scoliosis at the cervicothoracic junction.  Facet joints appear intact.  No definite vertebral body anomaly identified.  The craniovertebral junction is intact.  There is no evidence of acute fracture  There is emphysematous change of the lung apices.  Visualized portions of the lung apices are clear.        1. Dextroconvex scoliosis at the thoracolumbar junction.  2. No acute fracture or malalignment of the cervical spine.     KIYA POLANCO MD       Electronically Signed and Approved By: KIYA POLANCO MD on 9/11/2023 at 16:25                Differential Diagnosis and Discussion:      Extremity Pain: Differential diagnosis includes but is not limited to soft tissue sprain, tendonitis, tendon injury, dislocation, fracture, deep vein thrombosis, arterial insufficiency,  osteoarthritis, bursitis, and ligamentous damage.    All labs were reviewed and interpreted by me.  All X-rays impressions were independently interpreted by me.  CT scan radiology impression was interpreted by me.    MDM     Amount and/or Complexity of Data Reviewed  Clinical lab tests: reviewed  Tests in the radiology section of CPT®: reviewed             Patient Care Considerations:    CT EXTREMITY: I considered ordering an extremity CT, however it was not indicated prior to transfer.      Consultants/Shared Management Plan:    Consultant: I have discussed the case with Dr. Barajas who states patient should be transferred to the hospital where her knee surgery was performed  Transfer Provider: I have discussed the case with Dr. Dave Johnson at [facility] who agrees to accept the patient as a transfer.    Social Determinants of Health:          Disposition and Care Coordination:    Transferred: Through independent evaluation of the patient's history, physical, and imperical data, the patient meets criteria to be transferred to another hospital for evaluation/admission.        Final diagnoses:   Periprosthetic fracture around internal prosthetic left ankle joint, initial encounter   Fall, initial encounter   Knee laceration, left, initial encounter   Cellulitis of left lower extremity   Urinary tract infection in female        ED Disposition       ED Disposition   Transfer to Another Facility     Condition   --    Comment   Transfer to [facility] emergency department               This medical record created using voice recognition software.             Latonya Corrigan APRN  09/12/23 0027      Electronically signed by Italo Barajas DO at 09/12/23 0959         Physician Certification:    Reason for Transfer: Unable to provide services necessary for care (List in Comment)  Comment: Patient to be treated by Ortho team who did her total knee       Potential risk of transfer: Displacement of IV or other  tubes en route, Transportation Risk   Potential benefits of transfer: Availability of resources, Continuity of care   Risks/benefits explained to: Patient     Receiving facility notified and accepted patient, indicating capability and capacity to treat.    Accepting physician: Dr. Trever Ace    Accepted date/time: 9/11/2023  8:14 PM   Transferring physician: Italo Barajas DO     Patient condition: The patient has been stabilized such that within reasonable medical probability, no deterioration of the patient's condition is likely to result from the transfer.    Physician certification: After examination of this patient, and based on information available at this time, the medical benefits reasonable expected from provision of appropriate treatment at the receiving facility, outweigh the increased risk, if any, to the individual's medical condition and, in the case of labor, to the unborn child's medical condition from the effecting transfer.       Nursing Documentation:    Accepting hospital: [facility]      Report given to: Mj Mace Report time: 9/11/2023  8:26 PM  Medications reviewed with next service provider: Yes     Copies of medical records sent: History and Physical, Provider note, Nursing note, Transfer form, X-ray/diagnostic procedures, Orders, Med Rec form   Belongings disposition: Sent with patient     Transport via: Ambulance   -  Service: [facility]   -  Level of Care: Advanced Life Support (ALS)     Condition at transfer: Stable

## 2023-09-16 LAB
BACTERIA SPEC AEROBE CULT: NORMAL
BACTERIA SPEC AEROBE CULT: NORMAL

## 2023-10-04 ENCOUNTER — HOSPITAL ENCOUNTER (INPATIENT)
Facility: HOSPITAL | Age: 63
LOS: 6 days | Discharge: HOME HEALTH | DRG: 863 | End: 2023-10-10
Attending: EMERGENCY MEDICINE | Admitting: INTERNAL MEDICINE
Payer: MEDICARE

## 2023-10-04 ENCOUNTER — APPOINTMENT (OUTPATIENT)
Dept: GENERAL RADIOLOGY | Facility: HOSPITAL | Age: 63
DRG: 863 | End: 2023-10-04
Payer: MEDICARE

## 2023-10-04 DIAGNOSIS — I96 GANGRENE: ICD-10-CM

## 2023-10-04 DIAGNOSIS — I10 UNCONTROLLED HYPERTENSION: ICD-10-CM

## 2023-10-04 DIAGNOSIS — L03.116 CELLULITIS OF LEFT LOWER EXTREMITY: Primary | ICD-10-CM

## 2023-10-04 DIAGNOSIS — R26.2 DIFFICULTY IN WALKING: ICD-10-CM

## 2023-10-04 DIAGNOSIS — Z78.9 DECREASED ACTIVITIES OF DAILY LIVING (ADL): ICD-10-CM

## 2023-10-04 PROBLEM — S82.92XA FRACTURE OF LEFT LOWER LEG: Status: ACTIVE | Noted: 2023-10-04

## 2023-10-04 PROBLEM — L03.119 CELLULITIS OF LEG: Status: ACTIVE | Noted: 2023-10-04

## 2023-10-04 PROBLEM — L03.119 CELLULITIS, LEG: Status: ACTIVE | Noted: 2023-10-04

## 2023-10-04 LAB
ALBUMIN SERPL-MCNC: 1.8 G/DL (ref 3.5–5.2)
ALBUMIN/GLOB SERPL: 0.4 G/DL
ALP SERPL-CCNC: 193 U/L (ref 39–117)
ALT SERPL W P-5'-P-CCNC: 7 U/L (ref 1–33)
ANION GAP SERPL CALCULATED.3IONS-SCNC: 6.3 MMOL/L (ref 5–15)
AST SERPL-CCNC: 23 U/L (ref 1–32)
BASOPHILS # BLD AUTO: 0.04 10*3/MM3 (ref 0–0.2)
BASOPHILS NFR BLD AUTO: 0.7 % (ref 0–1.5)
BILIRUB SERPL-MCNC: 0.4 MG/DL (ref 0–1.2)
BUN SERPL-MCNC: 12 MG/DL (ref 8–23)
BUN/CREAT SERPL: 19.4 (ref 7–25)
CALCIUM SPEC-SCNC: 7.8 MG/DL (ref 8.6–10.5)
CHLORIDE SERPL-SCNC: 104 MMOL/L (ref 98–107)
CO2 SERPL-SCNC: 25.7 MMOL/L (ref 22–29)
CREAT SERPL-MCNC: 0.62 MG/DL (ref 0.57–1)
D-LACTATE SERPL-SCNC: 1.7 MMOL/L (ref 0.5–2)
D-LACTATE SERPL-SCNC: 2.1 MMOL/L (ref 0.5–2)
DEPRECATED RDW RBC AUTO: 58.3 FL (ref 37–54)
EGFRCR SERPLBLD CKD-EPI 2021: 100.2 ML/MIN/1.73
EOSINOPHIL # BLD AUTO: 0.23 10*3/MM3 (ref 0–0.4)
EOSINOPHIL NFR BLD AUTO: 4.2 % (ref 0.3–6.2)
ERYTHROCYTE [DISTWIDTH] IN BLOOD BY AUTOMATED COUNT: 17.2 % (ref 12.3–15.4)
FERRITIN SERPL-MCNC: 253.9 NG/ML (ref 13–150)
FOLATE SERPL-MCNC: 5.64 NG/ML (ref 4.78–24.2)
GLOBULIN UR ELPH-MCNC: 4.6 GM/DL
GLUCOSE BLDC GLUCOMTR-MCNC: 195 MG/DL (ref 70–99)
GLUCOSE SERPL-MCNC: 208 MG/DL (ref 65–99)
HCT VFR BLD AUTO: 28.7 % (ref 34–46.6)
HGB BLD-MCNC: 8.7 G/DL (ref 12–15.9)
HOLD SPECIMEN: NORMAL
HOLD SPECIMEN: NORMAL
IMM GRANULOCYTES # BLD AUTO: 0.01 10*3/MM3 (ref 0–0.05)
IMM GRANULOCYTES NFR BLD AUTO: 0.2 % (ref 0–0.5)
IRON 24H UR-MRATE: 26 MCG/DL (ref 37–145)
IRON SATN MFR SERPL: 12 % (ref 20–50)
LDH SERPL-CCNC: 405 U/L (ref 135–214)
LYMPHOCYTES # BLD AUTO: 0.97 10*3/MM3 (ref 0.7–3.1)
LYMPHOCYTES NFR BLD AUTO: 17.8 % (ref 19.6–45.3)
MCH RBC QN AUTO: 28.6 PG (ref 26.6–33)
MCHC RBC AUTO-ENTMCNC: 30.3 G/DL (ref 31.5–35.7)
MCV RBC AUTO: 94.4 FL (ref 79–97)
MONOCYTES # BLD AUTO: 0.38 10*3/MM3 (ref 0.1–0.9)
MONOCYTES NFR BLD AUTO: 7 % (ref 5–12)
NEUTROPHILS NFR BLD AUTO: 3.81 10*3/MM3 (ref 1.7–7)
NEUTROPHILS NFR BLD AUTO: 70.1 % (ref 42.7–76)
NRBC BLD AUTO-RTO: 0 /100 WBC (ref 0–0.2)
PLATELET # BLD AUTO: 180 10*3/MM3 (ref 140–450)
PMV BLD AUTO: 11 FL (ref 6–12)
POTASSIUM SERPL-SCNC: 4.5 MMOL/L (ref 3.5–5.2)
PROT SERPL-MCNC: 6.4 G/DL (ref 6–8.5)
RBC # BLD AUTO: 3.04 10*6/MM3 (ref 3.77–5.28)
RETICS # AUTO: 0.08 10*6/MM3 (ref 0.02–0.13)
RETICS/RBC NFR AUTO: 2.66 % (ref 0.7–1.9)
SODIUM SERPL-SCNC: 136 MMOL/L (ref 136–145)
TIBC SERPL-MCNC: 210 MCG/DL (ref 298–536)
TRANSFERRIN SERPL-MCNC: 141 MG/DL (ref 200–360)
VIT B12 BLD-MCNC: 978 PG/ML (ref 211–946)
WBC NRBC COR # BLD: 5.44 10*3/MM3 (ref 3.4–10.8)
WHOLE BLOOD HOLD COAG: NORMAL
WHOLE BLOOD HOLD SPECIMEN: NORMAL

## 2023-10-04 PROCEDURE — 82948 REAGENT STRIP/BLOOD GLUCOSE: CPT

## 2023-10-04 PROCEDURE — 85045 AUTOMATED RETICULOCYTE COUNT: CPT | Performed by: INTERNAL MEDICINE

## 2023-10-04 PROCEDURE — 99285 EMERGENCY DEPT VISIT HI MDM: CPT

## 2023-10-04 PROCEDURE — 82746 ASSAY OF FOLIC ACID SERUM: CPT | Performed by: INTERNAL MEDICINE

## 2023-10-04 PROCEDURE — 25010000002 CEFAZOLIN 1-4 GM/50ML-% SOLUTION: Performed by: INTERNAL MEDICINE

## 2023-10-04 PROCEDURE — 82607 VITAMIN B-12: CPT | Performed by: INTERNAL MEDICINE

## 2023-10-04 PROCEDURE — 25010000002 HYDRALAZINE PER 20 MG: Performed by: INTERNAL MEDICINE

## 2023-10-04 PROCEDURE — 25010000002 CEFTRIAXONE PER 250 MG: Performed by: EMERGENCY MEDICINE

## 2023-10-04 PROCEDURE — 80053 COMPREHEN METABOLIC PANEL: CPT | Performed by: EMERGENCY MEDICINE

## 2023-10-04 PROCEDURE — 84466 ASSAY OF TRANSFERRIN: CPT | Performed by: INTERNAL MEDICINE

## 2023-10-04 PROCEDURE — 87070 CULTURE OTHR SPECIMN AEROBIC: CPT | Performed by: EMERGENCY MEDICINE

## 2023-10-04 PROCEDURE — 87205 SMEAR GRAM STAIN: CPT | Performed by: EMERGENCY MEDICINE

## 2023-10-04 PROCEDURE — 85025 COMPLETE CBC W/AUTO DIFF WBC: CPT | Performed by: EMERGENCY MEDICINE

## 2023-10-04 PROCEDURE — 83605 ASSAY OF LACTIC ACID: CPT | Performed by: EMERGENCY MEDICINE

## 2023-10-04 PROCEDURE — 25010000002 HYDROMORPHONE 1 MG/ML SOLUTION: Performed by: EMERGENCY MEDICINE

## 2023-10-04 PROCEDURE — 25010000002 HYDROMORPHONE 1 MG/ML SOLUTION: Performed by: INTERNAL MEDICINE

## 2023-10-04 PROCEDURE — 83540 ASSAY OF IRON: CPT | Performed by: INTERNAL MEDICINE

## 2023-10-04 PROCEDURE — 63710000001 INSULIN LISPRO (HUMAN) PER 5 UNITS: Performed by: INTERNAL MEDICINE

## 2023-10-04 PROCEDURE — 73590 X-RAY EXAM OF LOWER LEG: CPT

## 2023-10-04 PROCEDURE — 83615 LACTATE (LD) (LDH) ENZYME: CPT | Performed by: INTERNAL MEDICINE

## 2023-10-04 PROCEDURE — 36415 COLL VENOUS BLD VENIPUNCTURE: CPT | Performed by: EMERGENCY MEDICINE

## 2023-10-04 PROCEDURE — 36415 COLL VENOUS BLD VENIPUNCTURE: CPT

## 2023-10-04 PROCEDURE — 82728 ASSAY OF FERRITIN: CPT | Performed by: INTERNAL MEDICINE

## 2023-10-04 RX ORDER — CEFAZOLIN SODIUM 1 G/3ML
1 INJECTION, POWDER, FOR SOLUTION INTRAMUSCULAR; INTRAVENOUS EVERY 8 HOURS
Status: DISCONTINUED | OUTPATIENT
Start: 2023-10-04 | End: 2023-10-04

## 2023-10-04 RX ORDER — IBUPROFEN 600 MG/1
1 TABLET ORAL
Status: DISCONTINUED | OUTPATIENT
Start: 2023-10-04 | End: 2023-10-10 | Stop reason: HOSPADM

## 2023-10-04 RX ORDER — APIXABAN 5 MG/1
5 TABLET, FILM COATED ORAL EVERY 12 HOURS SCHEDULED
COMMUNITY
Start: 2023-10-03

## 2023-10-04 RX ORDER — CEFAZOLIN SODIUM 1 G/50ML
1000 INJECTION, SOLUTION INTRAVENOUS EVERY 8 HOURS
Status: COMPLETED | OUTPATIENT
Start: 2023-10-04 | End: 2023-10-09

## 2023-10-04 RX ORDER — DEXTROSE MONOHYDRATE 25 G/50ML
25 INJECTION, SOLUTION INTRAVENOUS
Status: DISCONTINUED | OUTPATIENT
Start: 2023-10-04 | End: 2023-10-10 | Stop reason: HOSPADM

## 2023-10-04 RX ORDER — NICOTINE POLACRILEX 4 MG
15 LOZENGE BUCCAL
Status: DISCONTINUED | OUTPATIENT
Start: 2023-10-04 | End: 2023-10-10 | Stop reason: HOSPADM

## 2023-10-04 RX ORDER — HYDROCODONE BITARTRATE AND ACETAMINOPHEN 10; 325 MG/1; MG/1
1 TABLET ORAL EVERY 6 HOURS PRN
Status: DISCONTINUED | OUTPATIENT
Start: 2023-10-04 | End: 2023-10-09

## 2023-10-04 RX ORDER — CEFTRIAXONE SODIUM 1 G/50ML
1000 INJECTION, SOLUTION INTRAVENOUS ONCE
Status: COMPLETED | OUTPATIENT
Start: 2023-10-04 | End: 2023-10-04

## 2023-10-04 RX ORDER — ONDANSETRON 2 MG/ML
4 INJECTION INTRAMUSCULAR; INTRAVENOUS EVERY 6 HOURS PRN
Status: DISCONTINUED | OUTPATIENT
Start: 2023-10-04 | End: 2023-10-10 | Stop reason: HOSPADM

## 2023-10-04 RX ORDER — ACETAMINOPHEN 325 MG/1
650 TABLET ORAL EVERY 6 HOURS PRN
Status: DISCONTINUED | OUTPATIENT
Start: 2023-10-04 | End: 2023-10-10 | Stop reason: HOSPADM

## 2023-10-04 RX ORDER — SULFAMETHOXAZOLE AND TRIMETHOPRIM 800; 160 MG/1; MG/1
1 TABLET ORAL ONCE
Status: COMPLETED | OUTPATIENT
Start: 2023-10-04 | End: 2023-10-04

## 2023-10-04 RX ORDER — HYDROXYZINE PAMOATE 50 MG/1
50 CAPSULE ORAL 2 TIMES DAILY PRN
COMMUNITY

## 2023-10-04 RX ORDER — HYDRALAZINE HYDROCHLORIDE 20 MG/ML
20 INJECTION INTRAMUSCULAR; INTRAVENOUS EVERY 4 HOURS PRN
Status: DISCONTINUED | OUTPATIENT
Start: 2023-10-04 | End: 2023-10-10 | Stop reason: HOSPADM

## 2023-10-04 RX ORDER — SODIUM CHLORIDE 0.9 % (FLUSH) 0.9 %
10 SYRINGE (ML) INJECTION AS NEEDED
Status: DISCONTINUED | OUTPATIENT
Start: 2023-10-04 | End: 2023-10-10 | Stop reason: HOSPADM

## 2023-10-04 RX ORDER — INSULIN LISPRO 100 [IU]/ML
10 INJECTION, SOLUTION INTRAVENOUS; SUBCUTANEOUS
Status: DISCONTINUED | OUTPATIENT
Start: 2023-10-04 | End: 2023-10-10 | Stop reason: HOSPADM

## 2023-10-04 RX ADMIN — SULFAMETHOXAZOLE AND TRIMETHOPRIM 1 TABLET: 800; 160 TABLET ORAL at 13:15

## 2023-10-04 RX ADMIN — Medication 10 ML: at 20:26

## 2023-10-04 RX ADMIN — HYDROMORPHONE HYDROCHLORIDE 0.5 MG: 1 INJECTION, SOLUTION INTRAMUSCULAR; INTRAVENOUS; SUBCUTANEOUS at 22:51

## 2023-10-04 RX ADMIN — HYDROMORPHONE HYDROCHLORIDE 0.5 MG: 1 INJECTION, SOLUTION INTRAMUSCULAR; INTRAVENOUS; SUBCUTANEOUS at 14:12

## 2023-10-04 RX ADMIN — CEFAZOLIN SODIUM 1000 MG: 1 INJECTION, SOLUTION INTRAVENOUS at 20:27

## 2023-10-04 RX ADMIN — HYDROMORPHONE HYDROCHLORIDE 1 MG: 1 INJECTION, SOLUTION INTRAMUSCULAR; INTRAVENOUS; SUBCUTANEOUS at 11:23

## 2023-10-04 RX ADMIN — CEFTRIAXONE SODIUM 1000 MG: 1 INJECTION, SOLUTION INTRAVENOUS at 13:15

## 2023-10-04 RX ADMIN — HYDROMORPHONE HYDROCHLORIDE 0.5 MG: 1 INJECTION, SOLUTION INTRAMUSCULAR; INTRAVENOUS; SUBCUTANEOUS at 20:23

## 2023-10-04 RX ADMIN — HYDRALAZINE HYDROCHLORIDE 20 MG: 20 INJECTION, SOLUTION INTRAMUSCULAR; INTRAVENOUS at 23:33

## 2023-10-04 RX ADMIN — INSULIN LISPRO 10 UNITS: 100 INJECTION, SOLUTION INTRAVENOUS; SUBCUTANEOUS at 20:29

## 2023-10-04 RX ADMIN — HYDROMORPHONE HYDROCHLORIDE 0.5 MG: 1 INJECTION, SOLUTION INTRAMUSCULAR; INTRAVENOUS; SUBCUTANEOUS at 16:58

## 2023-10-04 NOTE — Clinical Note
Level of Care: Med/Surg [1]   Diagnosis: Cellulitis, leg [351177]   Admitting Physician: RAN HAMM [[id number]]   Attending Physician: RAN HAMM [[id number]]

## 2023-10-04 NOTE — H&P
He left [address] [place] [address]   HISTORY AND PHYSICAL    Patient Name: Italia lOvera  : 1960  MRN: [id number]  Primary Care Physician:  Carloz Shoemaker MD  Date of admission: 10/4/2023    Subjective   Subjective     Chief Complaint:   Left leg wound and cellulitis      HPI:    Italia Olvera is a 62 y.o. female with known history of peripheral vascular disease amputation of right leg and recent fall and fracture of the left leg.  Patient sustained a fracture on  and was admitted to [facility] where she had's surgical intervention, details of which are not available to us.  Patient was sent home after 3 days of stay and had extensive work-up according to her.  Her  is at bedside.  Patient is getting home therapy.  She has wound on her leg which has gotten worse.  There is no fever chills reported but complains of severe pain.  Patient is now being admitted for cellulitis and wound care, patient was advised to go to [place] for further care as she is postop but patient refused and would like to stay here        Review of Systems:    Weakness and fatigue  No fever chills  Leg pain and swelling  No nausea vomiting or diarrhea.  Not checking blood sugars    Personal History     Past Medical History:   Diagnosis Date    Acid reflux     ACL tear 2015    PCL/ACL TEAR/RUPTURE    Acute shoulder bursitis, right 2015    Anxiety     Arthritis     Asthma     Bipolar 1 disorder     untreated    Bladder disorder     Cancer     CERVICAL CANCER    CHF (congestive heart failure)     ASYMPTOMATIC- NO CARDIOLOGIST- SEE'S DR SHOEMAKER (PCP)- DENIED CP/SOB    Chronic back pain     Chronic pain     CHRONIC BACK, NECK, LEG, FOOT, & FACE PAIN    COPD (chronic obstructive pulmonary disease)     USES INHALERS    Depression     Diabetes mellitus     BG RUND AROUND 140 IN AM    DJD (degenerative joint disease)     Gangrene     RT SECOND AND FIFTH TOES    GERD  (gastroesophageal reflux disease)     HBP (high blood pressure)     Hip pain 09/15/2015    Hypertension     Knee injury 08/19/2015    Knee pain, right 09/15/2015    Limb swelling     Neuropathy     On home O2     2-3L/NC PRN    Osteoarthritis, knee 09/01/2015    Osteoporosis     Peripheral neuropathy     Renal failure 1994    NO CURRENT PROBLEMS    Seasonal allergies     Shoulder tendonitis 08/23/2015    Sleep apnea     Smoker     SOB (shortness of breath)     NONE CURENTLY    Tendinitis of right rotator cuff 08/23/2015       Past Surgical History:   Procedure Laterality Date    ABOVE KNEE AMPUTATION Right 3/11/2023    Procedure: AMPUTATION ABOVE KNEE;  Surgeon: Zacarias Haywood MD;  Location: [facility] MAIN OR;  Service: Vascular;  Laterality: Right;    AMPUTATION DIGIT Right 12/6/2022    Procedure: Amputation of right second and fifth toes;  Surgeon: Gabino Russell MD;  Location: [facility] MAIN OR;  Service: Vascular;  Laterality: Right;    BACK SURGERY      BLADDER REPAIR      BRONCHOSCOPY N/A 07/10/2021    Procedure: BRONCHOSCOPY WITH BRONCHOALVEOLAR LAVAGE, POSSIBLE BIOPSY, BRUSHING, WASHING, AIRWAY INSPECTION;  Surgeon: Rodrigo Reyes MD;  Location: [facility] MAIN OR;  Service: Pulmonary;  Laterality: N/A;    BRONCHOSCOPY N/A 07/10/2021    Procedure: BRONCHOSCOPY;  Surgeon: Rodrigo Reyes MD;  Location: [facility] ENDOSCOPY;  Service: Pulmonary;  Laterality: N/A;    CARDIAC CATHETERIZATION Right 11/03/2022    Procedure: Aortogram with right leg angiogram, possible angioplasty or stenting ;  Surgeon: Gabino Russell MD;  Location: [facility] CATH INVASIVE LOCATION;  Service: Vascular;  Laterality: Right;    CARDIAC CATHETERIZATION Right 3/8/2023    Procedure: Right leg angiogram, possible angioplasty or stenting;  Surgeon: Gabino Russell MD;  Location: [facility] CATH INVASIVE LOCATION;  Service: Vascular;  Laterality: Right;    CHOLECYSTECTOMY      ENDOSCOPY      FRACTURE SURGERY      SKULL FRACTURE     GALLBLADDER SURGERY      HERNIA REPAIR      HYSTERECTOMY      INTERVENTIONAL RADIOLOGY PROCEDURE Right 03/03/2022    Procedure: Abdominal Aortagram with Runoff;  Surgeon: Marleny Yoon MD;  Location: [facility] INVASIVE LOCATION;  Service: Peripheral Vascular;  Laterality: Right;    JOINT REPLACEMENT      OTHER SURGICAL HISTORY      ARTIFICIAL JOINTS/LIMBS    OTHER SURGICAL HISTORY      FACE SURGERY, UNSPECIFIED    TOTAL HIP ARTHROPLASTY Right     TOTAL KNEE ARTHROPLASTY Left        Family History: family history includes Arthritis in her brother, daughter, mother, and sister; Breast cancer in her sister; Colon cancer in her sister; Diabetes in her brother; Heart disease in her brother and father; Lung cancer in her sister; Osteoporosis in her mother and sister; Stroke in her mother; Throat cancer in her mother. Otherwise pertinent FHx was reviewed and not pertinent to current issue.    Social History:  reports that she has been smoking cigarettes. She started smoking about 44 years ago. She has a 25.00 pack-year smoking history. She has never used smokeless tobacco. She reports that she does not drink alcohol and does not use drugs.    Home Medications:  amLODIPine-benazepril, apixaban, aspirin, atorvastatin, benzonatate, hydrOXYzine pamoate, mirtazapine, nortriptyline, oxyCODONE-acetaminophen, pantoprazole, sertraline, tiZANidine, and traZODone      Allergies:  No Known Allergies    Objective   Objective     Vitals:   Temp:  [98.2 øF (36.8 øC)] 98.2 øF (36.8 øC)  Heart Rate:  [] 110  Resp:  [20] 20  BP: (184-202)/() 202/108    Physical Exam    Elderly female, looks older than his stated age, in mild distress.  HEENT unremarkable except for pallor.  Neck supple.  Heart regular.  Lungs diminished breath sounds.  Abdomen obese and soft.  Extremities with edema right lower extremity above-knee amputation.  Left leg in surgical dressing and redness of the lower extremity extending all the  leg.  And some excoriation of skin      I have personally reviewed the results from the time of this admission to 10/4/2023 18:12 EDT and agree with these findings:  [x]  Laboratory  []  Microbiology  []  Radiology  []  EKG/Telemetry   []  Cardiology/Vascular   []  Pathology  []  Old records  []  Other:    CBC:    WBC   Date Value Ref Range Status   10/04/2023 5.44 3.40 - 10.80 10*3/mm3 Final     RBC   Date Value Ref Range Status   10/04/2023 3.04 (L) 3.77 - 5.28 10*6/mm3 Final     Hemoglobin   Date Value Ref Range Status   10/04/2023 8.7 (L) 12.0 - 15.9 g/dL Final     Hematocrit   Date Value Ref Range Status   10/04/2023 28.7 (L) 34.0 - 46.6 % Final     MCV   Date Value Ref Range Status   10/04/2023 94.4 79.0 - 97.0 fL Final     MCH   Date Value Ref Range Status   10/04/2023 28.6 26.6 - 33.0 pg Final     MCHC   Date Value Ref Range Status   10/04/2023 30.3 (L) 31.5 - 35.7 g/dL Final     RDW   Date Value Ref Range Status   10/04/2023 17.2 (H) 12.3 - 15.4 % Final     RDW-SD   Date Value Ref Range Status   10/04/2023 58.3 (H) 37.0 - 54.0 fl Final     MPV   Date Value Ref Range Status   10/04/2023 11.0 6.0 - 12.0 fL Final     Platelets   Date Value Ref Range Status   10/04/2023 180 140 - 450 10*3/mm3 Final     Neutrophil %   Date Value Ref Range Status   10/04/2023 70.1 42.7 - 76.0 % Final     Lymphocyte %   Date Value Ref Range Status   10/04/2023 17.8 (L) 19.6 - 45.3 % Final     Monocyte %   Date Value Ref Range Status   10/04/2023 7.0 5.0 - 12.0 % Final     Eosinophil %   Date Value Ref Range Status   10/04/2023 4.2 0.3 - 6.2 % Final     Basophil %   Date Value Ref Range Status   10/04/2023 0.7 0.0 - 1.5 % Final     Immature Grans %   Date Value Ref Range Status   10/04/2023 0.2 0.0 - 0.5 % Final     Neutrophils, Absolute   Date Value Ref Range Status   10/04/2023 3.81 1.70 - 7.00 10*3/mm3 Final     Lymphocytes, Absolute   Date Value Ref Range Status   10/04/2023 0.97 0.70 - 3.10 10*3/mm3 Final     Monocytes,  Absolute   Date Value Ref Range Status   10/04/2023 0.38 0.10 - 0.90 10*3/mm3 Final     Eosinophils, Absolute   Date Value Ref Range Status   10/04/2023 0.23 0.00 - 0.40 10*3/mm3 Final     Basophils, Absolute   Date Value Ref Range Status   10/04/2023 0.04 0.00 - 0.20 10*3/mm3 Final     Immature Grans, Absolute   Date Value Ref Range Status   10/04/2023 0.01 0.00 - 0.05 10*3/mm3 Final     nRBC   Date Value Ref Range Status   10/04/2023 0.0 0.0 - 0.2 /100 WBC Final        BMP:    Lab Results   Component Value Date    GLUCOSE 208 (H) 10/04/2023    BUN 12 10/04/2023    CREATININE 0.62 10/04/2023    EGFRIFNONA 98 02/28/2022    BCR 19.4 10/04/2023    K 4.5 10/04/2023    CO2 25.7 10/04/2023    CALCIUM 7.8 (L) 10/04/2023    ALBUMIN 1.8 (L) 10/04/2023    LABIL2 0.6 (L) 06/04/2021    AST 23 10/04/2023    ALT 7 10/04/2023        No radiology results for the last day           Assessment & Plan   Assessment / Plan       Current Diagnosis:  Active Hospital Problems    Diagnosis     **Cellulitis, leg     Fracture of left lower leg     Paroxysmal atrial fibrillation     Anxiety disorder     Anemia     T2DM (type 2 diabetes mellitus)      Plan:     Admit to hospital.  Wound care.  Wound care nurse consult.  IV antibiotic.  Venous Doppler to rule out underlying DVT.  Resume essential home meds.  Monitor sugars.  Pain control with narcotics.  Further management will based on clinical course and lab results      DVT prophylaxis:  Medical DVT prophylaxis orders are present.    GI Prophylaxis:       Pepcid    CODE STATUS:       Admission Status:  I believe this patient meets inpatient status.             I have dictated this note utilizing Dragon Dictation.             Please note that portions of this note were completed with a voice recognition program.             Part of this note may be an electronic transcription/translation of spoken language to printed text         using the Dragon Dictation System.       Electronically  signed by Rudy Garnett MD, 10/04/23, 6:01 PM EDT.    Total time spent with in evaluation and management: 36 minutes

## 2023-10-04 NOTE — ED PROVIDER NOTES
Time: 11:07 AM EDT  Date of encounter:  10/4/2023  Independent Historian/Clinical History and Information was obtained by:   Patient  Chief Complaint: Possible infection of left lower extremity    History is limited by: N/A    History of Present Illness:  Patient is a 62 y.o. year old female who presents to the emergency department for evaluation of possible infection involving her left lower extremity.  Patient is that she fell and fractured her left leg a week ago Friday.  She was taken to [facility] where she had surgery on Friday.  Patient was also discharged home this past Friday.  Patient states that she is home with her  along with home health nursing.  Patient states that yesterday the home health nurse was concerned that her leg is infected and that she needed to go to the ER.  Patient states all her doctors are at [facility] and she wants to come here for evaluation.  Patient complains of uncontrolled pain but also reports that she is out of her Percocet that she was prescribed.  Patient denies any fevers.  Patient does state that the boot that they placed her and is rubbing and causing blisters.  She also admits to swelling of her legs.    HPI    Patient Care Team  Primary Care Provider: Carloz Shoemaker MD    Past Medical History:     No Known Allergies  Past Medical History:   Diagnosis Date    Acid reflux     ACL tear 09/01/2015    PCL/ACL TEAR/RUPTURE    Acute shoulder bursitis, right 08/23/2015    Anxiety     Arthritis     Asthma     Bipolar 1 disorder     untreated    Bladder disorder     Cancer     CERVICAL CANCER    CHF (congestive heart failure)     ASYMPTOMATIC- NO CARDIOLOGIST- SEE'S DR SHOEMAKER (PCP)- DENIED CP/SOB    Chronic back pain     Chronic pain     CHRONIC BACK, NECK, LEG, FOOT, & FACE PAIN    COPD (chronic obstructive pulmonary disease)     USES INHALERS    Depression     Diabetes mellitus     BG RUND AROUND 140 IN AM    DJD (degenerative joint disease)     Gangrene     RT  SECOND AND FIFTH TOES    GERD (gastroesophageal reflux disease)     HBP (high blood pressure)     Hip pain 09/15/2015    Hypertension     Knee injury 08/19/2015    Knee pain, right 09/15/2015    Limb swelling     Neuropathy     On home O2     2-3L/NC PRN    Osteoarthritis, knee 09/01/2015    Osteoporosis     Peripheral neuropathy     Renal failure 1994    NO CURRENT PROBLEMS    Seasonal allergies     Shoulder tendonitis 08/23/2015    Sleep apnea     Smoker     SOB (shortness of breath)     NONE CURENTLY    Tendinitis of right rotator cuff 08/23/2015     Past Surgical History:   Procedure Laterality Date    ABOVE KNEE AMPUTATION Right 3/11/2023    Procedure: AMPUTATION ABOVE KNEE;  Surgeon: Zacarias Haywood MD;  Location: [facility] MAIN OR;  Service: Vascular;  Laterality: Right;    AMPUTATION DIGIT Right 12/6/2022    Procedure: Amputation of right second and fifth toes;  Surgeon: Gabino Russell MD;  Location: [facility] MAIN OR;  Service: Vascular;  Laterality: Right;    BACK SURGERY      BLADDER REPAIR      BRONCHOSCOPY N/A 07/10/2021    Procedure: BRONCHOSCOPY WITH BRONCHOALVEOLAR LAVAGE, POSSIBLE BIOPSY, BRUSHING, WASHING, AIRWAY INSPECTION;  Surgeon: Rodrigo Reyes MD;  Location: [facility] MAIN OR;  Service: Pulmonary;  Laterality: N/A;    BRONCHOSCOPY N/A 07/10/2021    Procedure: BRONCHOSCOPY;  Surgeon: Rodrigo Reyes MD;  Location: [facility] ENDOSCOPY;  Service: Pulmonary;  Laterality: N/A;    CARDIAC CATHETERIZATION Right 11/03/2022    Procedure: Aortogram with right leg angiogram, possible angioplasty or stenting ;  Surgeon: Gabino Russell MD;  Location: [facility] CATH INVASIVE LOCATION;  Service: Vascular;  Laterality: Right;    CARDIAC CATHETERIZATION Right 3/8/2023    Procedure: Right leg angiogram, possible angioplasty or stenting;  Surgeon: Gabino Russell MD;  Location: [facility] CATH INVASIVE LOCATION;  Service: Vascular;  Laterality: Right;    CHOLECYSTECTOMY      ENDOSCOPY      FRACTURE SURGERY      SKULL FRACTURE     GALLBLADDER SURGERY      HERNIA REPAIR      HYSTERECTOMY      INTERVENTIONAL RADIOLOGY PROCEDURE Right 03/03/2022    Procedure: Abdominal Aortagram with Runoff;  Surgeon: Marleny Yoon MD;  Location: [facility] INVASIVE LOCATION;  Service: Peripheral Vascular;  Laterality: Right;    JOINT REPLACEMENT      OTHER SURGICAL HISTORY      ARTIFICIAL JOINTS/LIMBS    OTHER SURGICAL HISTORY      FACE SURGERY, UNSPECIFIED    TOTAL HIP ARTHROPLASTY Right     TOTAL KNEE ARTHROPLASTY Left      Family History   Problem Relation Age of Onset    Stroke Mother     Throat cancer Mother     Arthritis Mother     Osteoporosis Mother     Heart disease Father     Breast cancer Sister     Colon cancer Sister     Lung cancer Sister     Arthritis Sister     Osteoporosis Sister     Heart disease Brother     Diabetes Brother     Arthritis Brother     Arthritis Daughter        Home Medications:  Prior to Admission medications    Medication Sig Start Date End Date Taking? Authorizing Provider   amLODIPine-benazepril (LOTREL) 10-40 MG per capsule Take 1 capsule by mouth Daily.    Ptaria Hanson MD   amoxicillin-clavulanate (Augmentin) 500-125 MG per tablet Take 1 tablet by mouth 2 (Two) Times a Day. 4/10/23   Gabino Russell MD   aspirin 81 MG EC tablet Take 1 tablet by mouth Daily. 10/17/22 10/17/23  Gabino Russell MD   atorvastatin (LIPITOR) 10 MG tablet Take 1 tablet by mouth Daily. 2/10/23   Patria Hanson MD   benzonatate (TESSALON) 100 MG capsule Take 1-2 capsules by mouth 3 (Three) Times a Day As Needed for Cough.    Patria Hanson MD   bisoprolol-hydrochlorothiazide (ZIAC) 10-6.25 MG per tablet Take 1 tablet by mouth Every 12 (Twelve) Hours. 1/12/23   Patria Hanson MD   clopidogrel (PLAVIX) 75 MG tablet Take 1 tablet by mouth Daily.    Patria Hanson MD   colestipol (COLESTID) 1 g tablet Take 1 tablet by mouth Daily.    Patria Hanson MD   furosemide (LASIX) 80 MG tablet Take 1 tablet  by mouth Every Other Day.    Patria Hanson MD   hydrOXYzine (ATARAX) 25 MG tablet Take 2 tablets by mouth Every 6 (Six) Hours As Needed. 2/17/23   Patria Hanson MD   mirtazapine (REMERON) 45 MG tablet Take 1 tablet by mouth At Night As Needed. 1/8/23   Patria Hanson MD   nortriptyline (PAMELOR) 50 MG capsule Take 1 capsule by mouth every night at bedtime. 2/15/23   Patria Hanson MD   ondansetron (ZOFRAN) 4 MG tablet Take 1 tablet by mouth Every 8 (Eight) Hours As Needed for Nausea or Vomiting.    Patria Hanson MD   oxyCODONE-acetaminophen (PERCOCET)  MG per tablet Take 1 tablet by mouth Every 6 (Six) Hours As Needed for Severe Pain. GIVEN PER PCP  Patient taking differently: Take 1 tablet by mouth Every 6 (Six) Hours As Needed for Severe Pain. 12/6/22   Gabino Russell MD   pantoprazole (PROTONIX) 40 MG EC tablet Take 1 tablet by mouth Daily.    Patria Hanson MD   potassium chloride 10 MEQ CR tablet Take 2 tablets by mouth Daily.    Patria Hanson MD   sertraline (ZOLOFT) 100 MG tablet Take 1 tablet by mouth Daily. 7/27/21   Patria Hanson MD   sertraline (ZOLOFT) 50 MG tablet Take 1 tablet by mouth Daily.    Patria Hanson MD   tiZANidine (ZANAFLEX) 4 MG tablet Take 1 tablet by mouth Every 8 (Eight) Hours As Needed for Muscle Spasms. 9/30/21   Patria Hanson MD   traZODone (DESYREL) 150 MG tablet Take 1 tablet by mouth At Night As Needed for Sleep. 12/30/21   Patria Hanson MD        Social History:   Social History     Tobacco Use    Smoking status: Every Day     Packs/day: 0.50     Years: 50.00     Pack years: 25.00     Types: Cigarettes     Start date: 6/15/1979    Smokeless tobacco: Never    Tobacco comments:     SMOKED FOR 31 PLUS YEARS     INSTRUCTED NO SMOKING 24 HR PRIOR TO SURGERY    Vaping Use    Vaping Use: Never used   Substance Use Topics    Alcohol use: Never    Drug use: Never         Review of Systems:  Review  "of Systems   Constitutional:  Negative for chills and fever.   HENT:  Negative for congestion, ear pain and sore throat.    Eyes:  Negative for pain.   Respiratory:  Negative for cough, chest tightness and shortness of breath.    Cardiovascular:  Positive for leg swelling (Left lower extremity). Negative for chest pain.   Gastrointestinal:  Negative for abdominal pain, diarrhea, nausea and vomiting.   Genitourinary:  Negative for flank pain and hematuria.   Musculoskeletal:  Negative for joint swelling.   Skin:  Positive for wound. Negative for pallor.   Neurological:  Negative for seizures and headaches.   All other systems reviewed and are negative.     Physical Exam:  /100 (BP Location: Left arm, Patient Position: Lying)   Pulse 107   Temp 97.3 øF (36.3 øC) (Oral)   Resp 20   Ht 167.6 cm (66\")   Wt 75.6 kg (166 lb 10.7 oz)   SpO2 91%   BMI 26.90 kg/mý     Physical Exam      Vital signs were reviewed under triage note.  General appearance - Patient appears well-developed and well-nourished.  Patient is in no acute distress.  Head - Normocephalic, atraumatic.  Pupils - Equal, round, reactive to light.  Extraocular muscles are intact.  Conjunctiva is clear.  Nasal - Normal inspection.  No evidence of trauma or epistaxis.  Tympanic membranes - Gray, intact without erythema or retractions.  Oral mucosa - Pink and moist without lesions or erythema.  Uvula is midline.  Chest wall - Atraumatic.  Chest wall is nontender.  There are no vesicular rashes noted.  Neck - Supple.  Trachea was midline.  There is no palpable lymphadenopathy or thyromegaly.  There are no meningeal signs  Lungs - Clear to auscultation and percussion bilaterally.  Heart - Regular rate and rhythm without any murmurs, clicks, or gallops.  Abdomen - Soft.  Bowel sounds are present.  There is no palpable tenderness.  There is no rebound, guarding, or rigidity.  There are no palpable masses.  There are no pulsatile masses.  Back - Spine is " straight and midline.  There is no CVA tenderness.  Extremities -  The patient has a prior right lower extremity amputation.  Involving the left lower extremity the patient has wound that is closed with sutures.  There is a scab overlying this wound.  Patient has an area where she had several large blisters that have ruptured and denuded.  Patient has a couple other wounds on her dorsum and heel of the foot.  The blisters on the heel are still intact.  The wound on the dorsum of the foot has a yellow exudative tissue appearance.  There is no draining mucopurulent liquid.  Patient has moderate amount of swelling from the knee through the foot.  Patient does have a generalized  or reddish appearance.  There is no palpable edema.  Pulses are intact x4 and equal.  Neurologic - Patient is awake, alert, and oriented x3.  Cranial nerves II through XII are grossly intact.  Motor and sensory functions grossly intact.  Cerebellar function was normal.  Integument - There are no rashes.  There are no petechia or purpura lesions noted.  There are no vesicular lesions noted.      Procedures:  Procedures      Medical Decision Making:      Comorbidities that affect care:    CHF, asthma, hypertension, diabetes, GERD, degenerative disc disease, osteoporosis, peripheral neuropathy, bipolar disorder, COPD, depression    External Notes reviewed:    EMS Run sheet: EMS run sheet was reviewed by me.  Vital signs were stable with the patient was somewhat hypertensive.  No treatment was administered in route.      The following orders were placed and all results were independently analyzed by me:  Orders Placed This Encounter   Procedures    Wound Culture - Wound, Leg, Left    XR Tibia Fibula 2 View Left    Comprehensive Metabolic Panel    Upper Tract Draw    CBC Auto Differential    Lactic Acid, Plasma    STAT Lactic Acid, Reflex    Vitamin B12    Folate    Ferritin    Iron Profile    Lactate Dehydrogenase    Reticulocytes    Diet:  Cardiac Diets, Diabetic Diets; Healthy Heart (2-3 Na+); Consistent Carbohydrate; Texture: Regular Texture (IDDSI 7); Fluid Consistency: Thin (IDDSI 0)    Cleanse wounds and cover with sterile dressing.  Nursing Communication    POC Glucose Finger 4x Daily Before Meals & at Bedtime    POC Glucose Once    Consult to Wound / Ostomy Care    Insert Peripheral IV    Initiate Observation Status    Inpatient Admission    CBC & Differential    Green Top (Gel)    Lavender Top    Gold Top - SST    Light Blue Top       Medications Given in the Emergency Department:  Medications   sodium chloride 0.9 % flush 10 mL (10 mL Intravenous Given 10/4/23 2026)   ondansetron (ZOFRAN) injection 4 mg (has no administration in time range)   acetaminophen (TYLENOL) tablet 650 mg (has no administration in time range)   Insulin Lispro (humaLOG) injection 10 Units (10 Units Subcutaneous Given 10/4/23 2029)   hydrALAZINE (APRESOLINE) injection 20 mg (has no administration in time range)   dextrose (GLUTOSE) oral gel 15 g (has no administration in time range)   dextrose (D50W) (25 g/50 mL) IV injection 25 g (has no administration in time range)   glucagon (GLUCAGEN) injection 1 mg (has no administration in time range)   ceFAZolin (ANCEF) IVPB 1,000 mg (1,000 mg Intravenous New Bag 10/4/23 2027)   HYDROcodone-acetaminophen (NORCO)  MG per tablet 1 tablet (has no administration in time range)   HYDROmorphone (DILAUDID) injection 0.5 mg (0.5 mg Intravenous Given 10/4/23 2023)   HYDROmorphone (DILAUDID) injection 1 mg (1 mg Intravenous Given 10/4/23 1123)   cefTRIAXone (ROCEPHIN) IVPB 1,000 mg (0 mg Intravenous Stopped 10/4/23 1402)   sulfamethoxazole-trimethoprim (BACTRIM DS,SEPTRA DS) 800-160 MG per tablet 1 tablet (1 tablet Oral Given 10/4/23 1315)   HYDROmorphone (DILAUDID) injection 0.5 mg (0.5 mg Intravenous Given 10/4/23 1412)        ED Course:     Patient was seen and evaluated in the ED by me.  The above history and physical  examination was performed as documented.  Diagnostic data was obtained.  Results reviewed.  The patient was resistant to being discharged home.  Patient is concerned for developing post wound infection.  Patient does have some diffuse erythema.  Patient will be admitted for possible developing cellulitis.  Patient may benefit from rehab as well as wound care.  I did discuss case with the patient's primary care provider who agreed to admit the patient.    Labs:    Lab Results (last 24 hours)       Procedure Component Value Units Date/Time    CBC & Differential [[id number]]  (Abnormal) Collected: 10/04/23 1041    Specimen: Blood Updated: 10/04/23 1103    Narrative:      The following orders were created for panel order CBC & Differential.  Procedure                               Abnormality         Status                     ---------                               -----------         ------                     CBC Auto Differential[[id number]]        Abnormal            Final result                 Please view results for these tests on the individual orders.    Comprehensive Metabolic Panel [[id number]]  (Abnormal) Collected: 10/04/23 1041    Specimen: Blood Updated: 10/04/23 1127     Glucose 208 mg/dL      BUN 12 mg/dL      Creatinine 0.62 mg/dL      Sodium 136 mmol/L      Potassium 4.5 mmol/L      Comment: Slight hemolysis detected by analyzer. Results may be affected.        Chloride 104 mmol/L      CO2 25.7 mmol/L      Calcium 7.8 mg/dL      Total Protein 6.4 g/dL      Albumin 1.8 g/dL      ALT (SGPT) 7 U/L      AST (SGOT) 23 U/L      Comment: Slight hemolysis detected by analyzer. Results may be affected.        Alkaline Phosphatase 193 U/L      Total Bilirubin 0.4 mg/dL      Globulin 4.6 gm/dL      A/G Ratio 0.4 g/dL      BUN/Creatinine Ratio 19.4     Anion Gap 6.3 mmol/L      eGFR 100.2 mL/min/1.73     Narrative:      GFR Normal >60  Chronic Kidney Disease <60  Kidney Failure <15      CBC Auto Differential  [[id number]]  (Abnormal) Collected: 10/04/23 1041    Specimen: Blood Updated: 10/04/23 1103     WBC 5.44 10*3/mm3      RBC 3.04 10*6/mm3      Hemoglobin 8.7 g/dL      Hematocrit 28.7 %      MCV 94.4 fL      MCH 28.6 pg      MCHC 30.3 g/dL      RDW 17.2 %      RDW-SD 58.3 fl      MPV 11.0 fL      Platelets 180 10*3/mm3      Neutrophil % 70.1 %      Lymphocyte % 17.8 %      Monocyte % 7.0 %      Eosinophil % 4.2 %      Basophil % 0.7 %      Immature Grans % 0.2 %      Neutrophils, Absolute 3.81 10*3/mm3      Lymphocytes, Absolute 0.97 10*3/mm3      Monocytes, Absolute 0.38 10*3/mm3      Eosinophils, Absolute 0.23 10*3/mm3      Basophils, Absolute 0.04 10*3/mm3      Immature Grans, Absolute 0.01 10*3/mm3      nRBC 0.0 /100 WBC     Vitamin B12 [[id number]] Collected: 10/04/23 1041    Specimen: Blood Updated: 10/04/23 1647    Folate [[id number]] Collected: 10/04/23 1041    Specimen: Blood Updated: 10/04/23 1647    Ferritin [[id number]]  (Abnormal) Collected: 10/04/23 1041    Specimen: Blood Updated: 10/04/23 1716     Ferritin 253.90 ng/mL     Narrative:      <12 ng/mL usually associated with Iron Deficiency Anemia. Above normal range levels may be due to Hepatic and/or Chronic Inflammatory Disease.  Results may be falsely decreased if patient taking Biotin.      Iron Profile [[id number]]  (Abnormal) Collected: 10/04/23 1041    Specimen: Blood Updated: 10/04/23 1711     Iron 26 mcg/dL      Iron Saturation (TSAT) 12 %      Transferrin 141 mg/dL      TIBC 210 mcg/dL     Lactate Dehydrogenase [[id number]]  (Abnormal) Collected: 10/04/23 1041    Specimen: Blood Updated: 10/04/23 1740      U/L      Comment: Specimen slightly hemolyzed.  Results may be affected.       Reticulocytes [[id number]]  (Abnormal) Collected: 10/04/23 1041    Specimen: Blood Updated: 10/04/23 1651     Reticulocyte % 2.66 %      Reticulocyte Absolute 0.0817 10*6/mm3     Wound Culture - Wound, Leg, Left [[id number]] Collected: 10/04/23 1042    Specimen:  Wound from Leg, Left Updated: 10/04/23 1442     Gram Stain Few (2+) WBCs seen      No organisms seen    Lactic Acid, Plasma [[id number]]  (Abnormal) Collected: 10/04/23 1128    Specimen: Blood Updated: 10/04/23 1211     Lactate 2.1 mmol/L     STAT Lactic Acid, Reflex [[id number]]  (Normal) Collected: 10/04/23 1846    Specimen: Blood from Hand, Right Updated: 10/04/23 1911     Lactate 1.7 mmol/L     POC Glucose Once [[id number]]  (Abnormal) Collected: 10/04/23 2029    Specimen: Blood Updated: 10/04/23 2030     Glucose 195 mg/dL      Comment: Serial Number: [id number]:  [id number]                Imaging:    XR Tibia Fibula 2 View Left    Result Date: 10/4/2023  PROCEDURE: XR TIBIA FIBULA 2 VW LEFT  COMPARISON: [facility], CR, XR KNEE 1 OR 2 VW LEFT, 9/11/2023, 15:16.  INDICATIONS: Pain post surgery in left tib fib. Wounds along medial side of leg  FINDINGS:   There are mildly displaced fractures of the proximal metadiaphysis of the left tibia and left fibula.  Prior total left knee arthroplasty.  Soft tissue swelling is present.  IMPRESSION: Mildly displaced fractures of the proximal metadiaphysis of the left tibia and left fibula.   JENNY TOBAR MD       Electronically Signed and Approved By: JENNY TOBAR MD on 10/04/2023 at 11:48                Differential Diagnosis and Discussion:    Rash: Differential diagnosis includes but is not limited to sepsis, cellulitis, [place] Spotted Fever, meningitis, meningococcemia, Varicella, Strep infection, dermatitis, allergic reaction, Lyme disease, and toxic shock syndrome.    All labs were reviewed and interpreted by me.  All X-rays impressions were independently interpreted by me.    MDM     Amount and/or Complexity of Data Reviewed  Clinical lab tests: reviewed             Patient Care Considerations:    I did consider ordering a venous duplex of the left lower extremity however this can be done during the inpatient work-up further deemed  necessary.      Consultants/Shared Management Plan:    PCP: I have discussed the case with Dr. Shoemaker who agrees to accept the patient for admission.    Social Determinants of Health:    Patient is independent, reliable, and has access to care.       Disposition and Care Coordination:    Admit:   Through independent evaluation of the patient's history, physical, and imperical data, the patient meets criteria for observation/admission to the hospital.        Final diagnoses:   Cellulitis of left lower extremity   Uncontrolled hypertension        ED Disposition       ED Disposition   Decision to Admit    Condition   --    Comment   Level of Care: Med/Surg [1]   Diagnosis: Cellulitis of leg [[id number]]   Admitting Physician: RAN HAMM [[id number]]   Attending Physician: RAN HAMM [[id number]]   Certification: I Certify That Inpatient Hospital Services Are Medically Necessary For Greater Than 2 Midnights                 This medical record created using voice recognition software.             Italo Barajas DO  10/04/23 2124

## 2023-10-05 ENCOUNTER — APPOINTMENT (OUTPATIENT)
Dept: CARDIOLOGY | Facility: HOSPITAL | Age: 63
DRG: 863 | End: 2023-10-05
Payer: MEDICARE

## 2023-10-05 LAB
BH CV LOWER VASCULAR LEFT COMMON FEMORAL AUGMENT: NORMAL
BH CV LOWER VASCULAR LEFT COMMON FEMORAL COMPETENT: NORMAL
BH CV LOWER VASCULAR LEFT COMMON FEMORAL COMPRESS: NORMAL
BH CV LOWER VASCULAR LEFT COMMON FEMORAL PHASIC: NORMAL
BH CV LOWER VASCULAR LEFT COMMON FEMORAL SPONT: NORMAL
BH CV LOWER VASCULAR LEFT DISTAL FEMORAL AUGMENT: NORMAL
BH CV LOWER VASCULAR LEFT DISTAL FEMORAL COMPETENT: NORMAL
BH CV LOWER VASCULAR LEFT DISTAL FEMORAL COMPRESS: NORMAL
BH CV LOWER VASCULAR LEFT DISTAL FEMORAL PHASIC: NORMAL
BH CV LOWER VASCULAR LEFT DISTAL FEMORAL SPONT: NORMAL
BH CV LOWER VASCULAR LEFT GASTRONEMIUS COMPRESS: NORMAL
BH CV LOWER VASCULAR LEFT LESSER SAPH COMPRESS: NORMAL
BH CV LOWER VASCULAR LEFT MID FEMORAL COMPRESS: NORMAL
BH CV LOWER VASCULAR LEFT POPLITEAL AUGMENT: NORMAL
BH CV LOWER VASCULAR LEFT POPLITEAL COMPETENT: NORMAL
BH CV LOWER VASCULAR LEFT POPLITEAL COMPRESS: NORMAL
BH CV LOWER VASCULAR LEFT POPLITEAL PHASIC: NORMAL
BH CV LOWER VASCULAR LEFT POPLITEAL SPONT: NORMAL
BH CV LOWER VASCULAR LEFT PROXIMAL FEMORAL COMPRESS: NORMAL
BH CV LOWER VASCULAR RIGHT COMMON FEMORAL AUGMENT: NORMAL
BH CV LOWER VASCULAR RIGHT COMMON FEMORAL COMPETENT: NORMAL
BH CV LOWER VASCULAR RIGHT COMMON FEMORAL COMPRESS: NORMAL
BH CV LOWER VASCULAR RIGHT COMMON FEMORAL PHASIC: NORMAL
BH CV LOWER VASCULAR RIGHT COMMON FEMORAL SPONT: NORMAL
GLUCOSE BLDC GLUCOMTR-MCNC: 105 MG/DL (ref 70–99)
GLUCOSE BLDC GLUCOMTR-MCNC: 134 MG/DL (ref 70–99)
GLUCOSE BLDC GLUCOMTR-MCNC: 189 MG/DL (ref 70–99)
GLUCOSE BLDC GLUCOMTR-MCNC: 53 MG/DL (ref 70–99)
GLUCOSE BLDC GLUCOMTR-MCNC: 70 MG/DL (ref 70–99)
GLUCOSE BLDC GLUCOMTR-MCNC: 84 MG/DL (ref 70–99)
GLUCOSE BLDC GLUCOMTR-MCNC: 98 MG/DL (ref 70–99)

## 2023-10-05 PROCEDURE — 93971 EXTREMITY STUDY: CPT

## 2023-10-05 PROCEDURE — 63710000001 INSULIN LISPRO (HUMAN) PER 5 UNITS: Performed by: INTERNAL MEDICINE

## 2023-10-05 PROCEDURE — 93971 EXTREMITY STUDY: CPT | Performed by: SURGERY

## 2023-10-05 PROCEDURE — 93970 EXTREMITY STUDY: CPT

## 2023-10-05 PROCEDURE — 94799 UNLISTED PULMONARY SVC/PX: CPT

## 2023-10-05 PROCEDURE — 25010000002 HYDROMORPHONE 1 MG/ML SOLUTION: Performed by: INTERNAL MEDICINE

## 2023-10-05 PROCEDURE — 25010000002 CEFAZOLIN 1-4 GM/50ML-% SOLUTION: Performed by: INTERNAL MEDICINE

## 2023-10-05 PROCEDURE — 94761 N-INVAS EAR/PLS OXIMETRY MLT: CPT

## 2023-10-05 PROCEDURE — 25010000002 NA FERRIC GLUC CPLX PER 12.5 MG: Performed by: INTERNAL MEDICINE

## 2023-10-05 PROCEDURE — 82948 REAGENT STRIP/BLOOD GLUCOSE: CPT

## 2023-10-05 PROCEDURE — 25010000002 HYDRALAZINE PER 20 MG: Performed by: INTERNAL MEDICINE

## 2023-10-05 RX ORDER — CARVEDILOL 3.12 MG/1
3.12 TABLET ORAL 2 TIMES DAILY WITH MEALS
Status: DISCONTINUED | OUTPATIENT
Start: 2023-10-05 | End: 2023-10-10 | Stop reason: HOSPADM

## 2023-10-05 RX ORDER — MIRTAZAPINE 15 MG/1
15 TABLET, FILM COATED ORAL NIGHTLY
Status: DISCONTINUED | OUTPATIENT
Start: 2023-10-05 | End: 2023-10-10 | Stop reason: HOSPADM

## 2023-10-05 RX ORDER — LISINOPRIL 20 MG/1
20 TABLET ORAL
Status: DISCONTINUED | OUTPATIENT
Start: 2023-10-05 | End: 2023-10-10 | Stop reason: HOSPADM

## 2023-10-05 RX ORDER — ATORVASTATIN CALCIUM 10 MG/1
10 TABLET, FILM COATED ORAL DAILY
Status: DISCONTINUED | OUTPATIENT
Start: 2023-10-05 | End: 2023-10-10 | Stop reason: HOSPADM

## 2023-10-05 RX ORDER — TRAZODONE HYDROCHLORIDE 100 MG/1
100 TABLET ORAL NIGHTLY PRN
Status: DISCONTINUED | OUTPATIENT
Start: 2023-10-05 | End: 2023-10-09

## 2023-10-05 RX ORDER — SERTRALINE HYDROCHLORIDE 25 MG/1
50 TABLET, FILM COATED ORAL DAILY
Status: DISCONTINUED | OUTPATIENT
Start: 2023-10-05 | End: 2023-10-10 | Stop reason: HOSPADM

## 2023-10-05 RX ADMIN — APIXABAN 5 MG: 5 TABLET, FILM COATED ORAL at 20:06

## 2023-10-05 RX ADMIN — CEFAZOLIN SODIUM 1000 MG: 1 INJECTION, SOLUTION INTRAVENOUS at 20:05

## 2023-10-05 RX ADMIN — HYDROCODONE BITARTRATE AND ACETAMINOPHEN 1 TABLET: 10; 325 TABLET ORAL at 09:12

## 2023-10-05 RX ADMIN — HYDROMORPHONE HYDROCHLORIDE 0.5 MG: 1 INJECTION, SOLUTION INTRAMUSCULAR; INTRAVENOUS; SUBCUTANEOUS at 14:37

## 2023-10-05 RX ADMIN — HYDRALAZINE HYDROCHLORIDE 20 MG: 20 INJECTION, SOLUTION INTRAMUSCULAR; INTRAVENOUS at 17:15

## 2023-10-05 RX ADMIN — HYDROMORPHONE HYDROCHLORIDE 0.5 MG: 1 INJECTION, SOLUTION INTRAMUSCULAR; INTRAVENOUS; SUBCUTANEOUS at 17:16

## 2023-10-05 RX ADMIN — HYDROMORPHONE HYDROCHLORIDE 0.5 MG: 1 INJECTION, SOLUTION INTRAMUSCULAR; INTRAVENOUS; SUBCUTANEOUS at 20:06

## 2023-10-05 RX ADMIN — LISINOPRIL 20 MG: 20 TABLET ORAL at 18:14

## 2023-10-05 RX ADMIN — SODIUM CHLORIDE 125 MG: 9 INJECTION, SOLUTION INTRAVENOUS at 23:51

## 2023-10-05 RX ADMIN — INSULIN LISPRO 10 UNITS: 100 INJECTION, SOLUTION INTRAVENOUS; SUBCUTANEOUS at 12:30

## 2023-10-05 RX ADMIN — HYDROMORPHONE HYDROCHLORIDE 0.5 MG: 1 INJECTION, SOLUTION INTRAMUSCULAR; INTRAVENOUS; SUBCUTANEOUS at 03:25

## 2023-10-05 RX ADMIN — HYDROMORPHONE HYDROCHLORIDE 0.5 MG: 1 INJECTION, SOLUTION INTRAMUSCULAR; INTRAVENOUS; SUBCUTANEOUS at 12:30

## 2023-10-05 RX ADMIN — Medication 10 ML: at 20:07

## 2023-10-05 RX ADMIN — HYDROMORPHONE HYDROCHLORIDE 0.5 MG: 1 INJECTION, SOLUTION INTRAMUSCULAR; INTRAVENOUS; SUBCUTANEOUS at 01:07

## 2023-10-05 RX ADMIN — SERTRALINE HYDROCHLORIDE 50 MG: 25 TABLET ORAL at 18:14

## 2023-10-05 RX ADMIN — ATORVASTATIN CALCIUM 10 MG: 10 TABLET, FILM COATED ORAL at 18:20

## 2023-10-05 RX ADMIN — HYDROMORPHONE HYDROCHLORIDE 0.5 MG: 1 INJECTION, SOLUTION INTRAMUSCULAR; INTRAVENOUS; SUBCUTANEOUS at 10:10

## 2023-10-05 RX ADMIN — CEFAZOLIN SODIUM 1000 MG: 1 INJECTION, SOLUTION INTRAVENOUS at 03:25

## 2023-10-05 RX ADMIN — HYDROCODONE BITARTRATE AND ACETAMINOPHEN 1 TABLET: 10; 325 TABLET ORAL at 18:14

## 2023-10-05 RX ADMIN — HYDRALAZINE HYDROCHLORIDE 20 MG: 20 INJECTION, SOLUTION INTRAMUSCULAR; INTRAVENOUS at 03:31

## 2023-10-05 RX ADMIN — CEFAZOLIN SODIUM 1000 MG: 1 INJECTION, SOLUTION INTRAVENOUS at 12:29

## 2023-10-05 RX ADMIN — MIRTAZAPINE 15 MG: 15 TABLET, FILM COATED ORAL at 20:07

## 2023-10-05 RX ADMIN — INSULIN LISPRO 10 UNITS: 100 INJECTION, SOLUTION INTRAVENOUS; SUBCUTANEOUS at 17:13

## 2023-10-05 RX ADMIN — CARVEDILOL 3.12 MG: 3.12 TABLET, FILM COATED ORAL at 18:14

## 2023-10-05 RX ADMIN — HYDROCODONE BITARTRATE AND ACETAMINOPHEN 1 TABLET: 10; 325 TABLET ORAL at 02:16

## 2023-10-05 RX ADMIN — HYDROMORPHONE HYDROCHLORIDE 0.5 MG: 1 INJECTION, SOLUTION INTRAMUSCULAR; INTRAVENOUS; SUBCUTANEOUS at 05:25

## 2023-10-05 RX ADMIN — HYDROMORPHONE HYDROCHLORIDE 0.5 MG: 1 INJECTION, SOLUTION INTRAMUSCULAR; INTRAVENOUS; SUBCUTANEOUS at 07:46

## 2023-10-05 RX ADMIN — INSULIN LISPRO 10 UNITS: 100 INJECTION, SOLUTION INTRAVENOUS; SUBCUTANEOUS at 08:22

## 2023-10-05 NOTE — SIGNIFICANT NOTE
Wound Eval / Progress Noted     [place]     Patient Name: Italia Olvera  : 1960  MRN: [id number]  Today's Date: 10/5/2023                 Admit Date: 10/4/2023    Visit Dx:    ICD-10-CM ICD-9-CM   1. Cellulitis of left lower extremity  L03.116 682.6   2. Uncontrolled hypertension  I10 401.9         Cellulitis, leg    T2DM (type 2 diabetes mellitus)    Anemia    Anxiety disorder    Paroxysmal atrial fibrillation    Fracture of left lower leg    Cellulitis of leg        Past Medical History:   Diagnosis Date    Acid reflux     ACL tear 2015    PCL/ACL TEAR/RUPTURE    Acute shoulder bursitis, right 2015    Anxiety     Arthritis     Asthma     Bipolar 1 disorder     untreated    Bladder disorder     Cancer     CERVICAL CANCER    CHF (congestive heart failure)     ASYMPTOMATIC- NO CARDIOLOGIST- SEE'S DR ARRIAZA (PCP)- DENIED CP/SOB    Chronic back pain     Chronic pain     CHRONIC BACK, NECK, LEG, FOOT, & FACE PAIN    COPD (chronic obstructive pulmonary disease)     USES INHALERS    Depression     Diabetes mellitus     BG RUND AROUND 140 IN AM    DJD (degenerative joint disease)     Gangrene     RT SECOND AND FIFTH TOES    GERD (gastroesophageal reflux disease)     HBP (high blood pressure)     Hip pain 09/15/2015    Hypertension     Knee injury 2015    Knee pain, right 09/15/2015    Limb swelling     Neuropathy     On home O2     2-3L/NC PRN    Osteoarthritis, knee 2015    Osteoporosis     Peripheral neuropathy     Renal failure 1994    NO CURRENT PROBLEMS    Seasonal allergies     Shoulder tendonitis 2015    Sleep apnea     Smoker     SOB (shortness of breath)     NONE CURENTLY    Tendinitis of right rotator cuff 2015        Past Surgical History:   Procedure Laterality Date    ABOVE KNEE AMPUTATION Right 3/11/2023    Procedure: AMPUTATION ABOVE KNEE;  Surgeon: Zacarias Haywood MD;  Location: [facility] MAIN OR;  Service: Vascular;  Laterality: Right;    AMPUTATION DIGIT  Right 12/6/2022    Procedure: Amputation of right second and fifth toes;  Surgeon: Gabino Russell MD;  Location: [facility] MAIN OR;  Service: Vascular;  Laterality: Right;    BACK SURGERY      BLADDER REPAIR      BRONCHOSCOPY N/A 07/10/2021    Procedure: BRONCHOSCOPY WITH BRONCHOALVEOLAR LAVAGE, POSSIBLE BIOPSY, BRUSHING, WASHING, AIRWAY INSPECTION;  Surgeon: Rodrigo Reyes MD;  Location: [facility] MAIN OR;  Service: Pulmonary;  Laterality: N/A;    BRONCHOSCOPY N/A 07/10/2021    Procedure: BRONCHOSCOPY;  Surgeon: Rodrigo Reyes MD;  Location: [facility] ENDOSCOPY;  Service: Pulmonary;  Laterality: N/A;    CARDIAC CATHETERIZATION Right 11/03/2022    Procedure: Aortogram with right leg angiogram, possible angioplasty or stenting ;  Surgeon: Gabino Russell MD;  Location: [facility] CATH INVASIVE LOCATION;  Service: Vascular;  Laterality: Right;    CARDIAC CATHETERIZATION Right 3/8/2023    Procedure: Right leg angiogram, possible angioplasty or stenting;  Surgeon: Gabino Russell MD;  Location: [facility] CATH INVASIVE LOCATION;  Service: Vascular;  Laterality: Right;    CHOLECYSTECTOMY      ENDOSCOPY      FRACTURE SURGERY      SKULL FRACTURE    GALLBLADDER SURGERY      HERNIA REPAIR      HYSTERECTOMY      INTERVENTIONAL RADIOLOGY PROCEDURE Right 03/03/2022    Procedure: Abdominal Aortagram with Runoff;  Surgeon: Marleny Yoon MD;  Location: [facility] CATH INVASIVE LOCATION;  Service: Peripheral Vascular;  Laterality: Right;    JOINT REPLACEMENT      OTHER SURGICAL HISTORY      ARTIFICIAL JOINTS/LIMBS    OTHER SURGICAL HISTORY      FACE SURGERY, UNSPECIFIED    TOTAL HIP ARTHROPLASTY Right     TOTAL KNEE ARTHROPLASTY Left          Physical Assessment:  Wound 02/04/23 0100 Bilateral coccyx Pressure Injury (Active)   Wound Image   10/05/23 0915   Pressure Injury Stage 3 10/05/23 0915   Dressing Appearance open to air 10/05/23 0915   Closure None 10/05/23 0915   Base non-blanchable;dry;yellow 10/05/23 0915   Periwound pink;redness  10/05/23 0915   Periwound Temperature warm 10/05/23 0915   Periwound Skin Turgor soft 10/05/23 0915   Edges open 10/05/23 0915   Drainage Amount none 10/05/23 0915   Care, Wound cleansed with;sterile normal saline 10/05/23 0915   Dressing Care dressing applied;dressing moistened;silver impregnated;hydrofiber;silicone;border dressing 10/05/23 0915   Periwound Care absorptive dressing applied 10/05/23 0915       Wound 10/04/23 2300 Left proximal leg Incision (Active)   Wound Image   10/05/23 0915   Dressing Appearance intact;dry 10/05/23 0915   Closure Sutures 10/05/23 0915   Base dry;scab 10/05/23 0915   Periwound pink;dry 10/05/23 0915   Periwound Temperature warm 10/05/23 0915   Periwound Skin Turgor soft 10/05/23 0915   Edges rolled/closed 10/05/23 0915   Drainage Amount none 10/05/23 0915   Care, Wound cleansed with;sterile normal saline 10/05/23 0915   Dressing Care dressing applied;non-adherent;petroleum-based;gauze;gauze, dry;tubular wrap;abdominal pad 10/05/23 0915   Periwound Care absorptive dressing applied 10/05/23 0915       Wound 10/04/23 2300 Left medial leg Traumatic (Active)   Wound Image   10/05/23 0915   Dressing Appearance intact;dry 10/05/23 0915   Closure None 10/05/23 0915   Base red;moist;pink 10/05/23 0915   Red (%), Wound Tissue Color 100 10/05/23 0915   Periwound redness;intact;dry 10/05/23 0915   Periwound Temperature warm 10/05/23 0915   Periwound Skin Turgor soft 10/05/23 0915   Edges open 10/05/23 0915   Drainage Characteristics/Odor serosanguineous 10/05/23 0915   Drainage Amount small 10/05/23 0915   Care, Wound cleansed with;sterile normal saline 10/05/23 0915   Dressing Care dressing applied;non-adherent;petroleum-based;gauze;silver impregnated;hydrofiber;abdominal pad;gauze, dry;tubular wrap 10/05/23 0915   Periwound Care absorptive dressing applied 10/05/23 0915       Wound 10/04/23 2300 Left posterior calf Traumatic (Active)   Wound Image   10/05/23 0915   Pressure Injury Stage U  10/05/23 0915   Dressing Appearance intact;dry 10/05/23 0915   Closure None 10/05/23 0915   Base dry;red;scab 10/05/23 0915   Periwound intact;dry;redness 10/05/23 0915   Periwound Temperature warm 10/05/23 0915   Periwound Skin Turgor soft 10/05/23 0915   Edges open 10/05/23 0915   Drainage Amount none 10/05/23 0915   Care, Wound cleansed with;sterile normal saline 10/05/23 0915   Dressing Care dressing applied;dressing moistened;silver impregnated;hydrofiber;silicone;border dressing 10/05/23 0915   Periwound Care absorptive dressing applied 10/05/23 0915       Wound 10/04/23 2300 Left anterior ankle Blisters (Active)   Wound Image   10/05/23 0915   Pressure Injury Stage U 10/05/23 0915   Dressing Appearance intact;dry 10/05/23 0915   Closure None 10/05/23 0915   Base yellow;dry;scab 10/05/23 0915   Yellow (%), Wound Tissue Color 100 10/05/23 0915   Periwound intact;dry;redness 10/05/23 0915   Periwound Temperature warm 10/05/23 0915   Periwound Skin Turgor soft 10/05/23 0915   Edges open 10/05/23 0915   Drainage Amount none 10/05/23 0915   Care, Wound cleansed with;sterile normal saline 10/05/23 0915   Dressing Care dressing applied;dressing moistened;silver impregnated;hydrofiber;silicone;border dressing 10/05/23 0915   Periwound Care absorptive dressing applied 10/05/23 0915       Wound 10/04/23 2300 Left posterior plantar Blisters (Active)   Wound Image   10/05/23 0915   Pressure Injury Stage DTPI 10/05/23 0915   Dressing Appearance intact;dry 10/05/23 0915   Closure None 10/05/23 0915   Base non-blanchable;dry;yellow 10/05/23 0915   Periwound intact;dry;pink;blistered 10/05/23 0915   Periwound Temperature warm 10/05/23 0915   Periwound Skin Turgor soft 10/05/23 0915   Edges rolled/closed 10/05/23 0915   Drainage Amount none 10/05/23 0915   Care, Wound cleansed with;sterile normal saline 10/05/23 0915   Dressing Care dressing applied;silver impregnated;hydrofiber;gauze;tubular wrap 10/05/23 0915   Periwound  Care absorptive dressing applied 10/05/23 0915       Wound 10/04/23 2300 Left posterior great toe Pressure Injury (Active)   Wound Image   10/05/23 0915   Pressure Injury Stage DTPI 10/05/23 0915   Dressing Appearance intact;dry 10/05/23 0915   Closure None 10/05/23 0915   Base maroon/purple;non-blanchable;dry 10/05/23 0915   Periwound intact;dry;pink 10/05/23 0915   Periwound Temperature warm 10/05/23 0915   Periwound Skin Turgor soft 10/05/23 0915   Edges rolled/closed 10/05/23 0915   Drainage Amount none 10/05/23 0915   Care, Wound cleansed with;sterile normal saline 10/05/23 0915   Dressing Care dressing applied;tubular wrap;gauze 10/05/23 0915   Periwound Care absorptive dressing applied 10/05/23 0915       Wound 10/04/23 2300 Left posterior heel Pressure Injury (Active)   Wound Image   10/05/23 0915   Pressure Injury Stage DTPI 10/05/23 0915   Dressing Appearance intact;dry 10/05/23 0915   Closure None 10/05/23 0915   Base maroon/purple;red;non-blanchable;dry 10/05/23 0915   Periwound intact;blistered;dry 10/05/23 0915   Periwound Temperature warm 10/05/23 0915   Periwound Skin Turgor soft 10/05/23 0915   Edges rolled/closed 10/05/23 0915   Drainage Amount none 10/05/23 0915   Care, Wound cleansed with;sterile normal saline 10/05/23 0915   Dressing Care dressing applied;silver impregnated;hydrofiber;silicone;border dressing 10/05/23 0915   Periwound Care absorptive dressing applied 10/05/23 0915       Wound 10/05/23 1254 Left posterior fifth toe Pressure Injury (Active)   Wound Image   10/05/23 0915   Pressure Injury Stage DTPI 10/05/23 0915   Dressing Appearance intact;dry 10/05/23 0915   Closure None 10/05/23 0915   Base non-blanchable;maroon/purple;dry 10/05/23 0915   Periwound intact;blistered;dry 10/05/23 0915   Periwound Temperature warm 10/05/23 0915   Periwound Skin Turgor soft 10/05/23 0915   Edges rolled/closed 10/05/23 0915   Drainage Amount none 10/05/23 0915   Care, Wound cleansed with;sterile  normal saline 10/05/23 0915   Dressing Care dressing applied;tubular wrap;gauze 10/05/23 0915   Periwound Care absorptive dressing applied 10/05/23 0915       Wound 10/05/23 1256 Left posterior ankle Pressure Injury (Active)   Wound Image   10/05/23 0915   Pressure Injury Stage DTPI 10/05/23 0915   Dressing Appearance intact;dry 10/05/23 0915   Closure None 10/05/23 0915   Base non-blanchable;maroon/purple;dry 10/05/23 0915   Periwound intact;dry;redness 10/05/23 0915   Periwound Temperature warm 10/05/23 0915   Periwound Skin Turgor soft 10/05/23 0915   Edges rolled/closed 10/05/23 0915   Drainage Amount none 10/05/23 0915   Care, Wound cleansed with;sterile normal saline 10/05/23 0915   Dressing Care dressing applied;silver impregnated;hydrofiber;tubular wrap;gauze 10/05/23 0915   Periwound Care absorptive dressing applied 10/05/23 0915        Wound Check / Follow-up:  Patient seen today for a wound consult. Patient reports that she had fallen and broken her leg requiring surgical intervention at the [facility]; reports that after the surgery she was instructed to wear a boot and that the boot had cause the wounds to her left lower leg. Patient c/o of increased pain to the left lower leg when at rest and during wound care. Primary asked to medicate patient with oral pain medication.  Patient with scattered DTPI to the left lower leg; affected areas are the left medial / posterior heel, left posterior ankle, left plantar aspect of the great toe, left plantar aspect of the foot, and lateral fifth toe. Tissue is maroon / purple non-blanchable. Cleansed with NS. Recommend daily application of non-adherent petroleum gauze to all deep tissue pressure areas.  Anterior ankle and posterior left lower leg with unstageable wounds present. Wound to the posterior lower leg is obscured at this time with dry eschar. Some redness noted to the wound edge. Periwound is pink and soft. Anterior left ankle with dry  yellow tissue. Cleansed with NS. Recommend daily application of NS moistened silver impregnated hydrofiber to the wound beds.   Left medial lower leg with large red moist wound base; patient reports the wound was a blister that has ruptured. Cleansed with NS. Recommend daily application of non-adherent petroleum gauze to the wound base and then cover with silver impregnated hydrofiber.  Left lower leg incision with sutures in place; dry crusted eschar present. Cleansed with NS. Recommend daily cleansing with NS and application of non-adherent petroleum gauze.   Coccyx with stage III pressure injury present at this time. Wound base is pink and dry and non-blanchable. Periwound is pink and intact. Will recommend at this time to do every other day dressing changes with silver impregnated hydrofiber to the wound and secure with a hydrocolloid dressing. Implement Q2H turns and offload heels at all time. Keep the patient clean and free from moisture.    Impression: DTPI to the left lower extremities, unstageable pressure injuries, stage III to coccyx    Short term goals:  regain skin integrity, pressure reduction, skin protection, moisture management, daily / every other dressing changes    Nikki Perla RN    10/5/2023    15:06 EDT

## 2023-10-05 NOTE — PLAN OF CARE
Problem: Adult Inpatient Plan of Care  Goal: Plan of Care Review  Outcome: Ongoing, Progressing  Flowsheets (Taken 10/5/2023 1709)  Plan of Care Reviewed With: patient  Goal: Patient-Specific Goal (Individualized)  Outcome: Ongoing, Progressing  Goal: Absence of Hospital-Acquired Illness or Injury  Outcome: Ongoing, Progressing  Intervention: Identify and Manage Fall Risk  Recent Flowsheet Documentation  Taken 10/5/2023 1700 by Briana Maya RN  Safety Promotion/Fall Prevention: safety round/check completed  Taken 10/5/2023 1500 by Briana Maya RN  Safety Promotion/Fall Prevention: safety round/check completed  Taken 10/5/2023 1300 by Briana Maya RN  Safety Promotion/Fall Prevention: safety round/check completed  Taken 10/5/2023 1100 by Briana Maya RN  Safety Promotion/Fall Prevention: safety round/check completed  Taken 10/5/2023 0900 by Briana Maya RN  Safety Promotion/Fall Prevention: safety round/check completed  Taken 10/5/2023 0746 by Briana Maya RN  Safety Promotion/Fall Prevention: safety round/check completed  Intervention: Prevent and Manage VTE (Venous Thromboembolism) Risk  Recent Flowsheet Documentation  Taken 10/5/2023 0746 by Briana Maya RN  Activity Management: bedrest  Intervention: Prevent Infection  Recent Flowsheet Documentation  Taken 10/5/2023 1700 by Briana Maya RN  Infection Prevention:   hand hygiene promoted   single patient room provided  Taken 10/5/2023 1500 by Briana Maya RN  Infection Prevention:   hand hygiene promoted   single patient room provided  Taken 10/5/2023 1300 by Briana Maya RN  Infection Prevention:   hand hygiene promoted   single patient room provided  Taken 10/5/2023 1100 by Briana Maya RN  Infection Prevention:   hand hygiene promoted   single patient room provided  Taken 10/5/2023 0900 by Briana Maya RN  Infection Prevention:   hand hygiene promoted   single patient room provided  Taken 10/5/2023 0746 by Briana Maya RN  Infection Prevention:   hand hygiene  promoted   single patient room provided  Goal: Optimal Comfort and Wellbeing  Outcome: Ongoing, Progressing  Intervention: Provide Person-Centered Care  Recent Flowsheet Documentation  Taken 10/5/2023 0746 by Briana Maya RN  Trust Relationship/Rapport:   care explained   choices provided   emotional support provided   questions answered   empathic listening provided   questions encouraged   reassurance provided   thoughts/feelings acknowledged  Goal: Readiness for Transition of Care  Outcome: Ongoing, Progressing     Problem: Fall Injury Risk  Goal: Absence of Fall and Fall-Related Injury  Outcome: Ongoing, Progressing  Intervention: Promote Injury-Free Environment  Recent Flowsheet Documentation  Taken 10/5/2023 1700 by Briana Maya RN  Safety Promotion/Fall Prevention: safety round/check completed  Taken 10/5/2023 1500 by Briana Maya RN  Safety Promotion/Fall Prevention: safety round/check completed  Taken 10/5/2023 1300 by Briana Maya RN  Safety Promotion/Fall Prevention: safety round/check completed  Taken 10/5/2023 1100 by Briana Maya RN  Safety Promotion/Fall Prevention: safety round/check completed  Taken 10/5/2023 0900 by Briana Maya RN  Safety Promotion/Fall Prevention: safety round/check completed  Taken 10/5/2023 0746 by Briana Maya RN  Safety Promotion/Fall Prevention: safety round/check completed     Problem: Asthma Comorbidity  Goal: Maintenance of Asthma Control  Outcome: Ongoing, Progressing     Problem: Behavioral Health Comorbidity  Goal: Maintenance of Behavioral Health Symptom Control  Outcome: Ongoing, Progressing     Problem: COPD (Chronic Obstructive Pulmonary Disease) Comorbidity  Goal: Maintenance of COPD Symptom Control  Outcome: Ongoing, Progressing  Intervention: Maintain COPD-Symptom Control  Recent Flowsheet Documentation  Taken 10/5/2023 0746 by Briana Maya RN  Supportive Measures: active listening utilized     Problem: Diabetes Comorbidity  Goal: Blood Glucose Level Within  Targeted Range  Outcome: Ongoing, Progressing     Problem: Heart Failure Comorbidity  Goal: Maintenance of Heart Failure Symptom Control  Outcome: Ongoing, Progressing     Problem: Hypertension Comorbidity  Goal: Blood Pressure in Desired Range  Outcome: Ongoing, Progressing     Problem: Obstructive Sleep Apnea Risk or Actual Comorbidity Management  Goal: Unobstructed Breathing During Sleep  Outcome: Ongoing, Progressing     Problem: Osteoarthritis Comorbidity  Goal: Maintenance of Osteoarthritis Symptom Control  Outcome: Ongoing, Progressing  Intervention: Maintain Osteoarthritis Symptom Control  Recent Flowsheet Documentation  Taken 10/5/2023 0746 by Briana Maya, RN  Activity Management: bedrest     Problem: Pain Chronic (Persistent) (Comorbidity Management)  Goal: Acceptable Pain Control and Functional Ability  Outcome: Ongoing, Progressing  Intervention: Optimize Psychosocial Wellbeing  Recent Flowsheet Documentation  Taken 10/5/2023 0746 by Briana Maya, RN  Supportive Measures: active listening utilized  Diversional Activities: television  Family/Support System Care:   support provided   self-care encouraged     Problem: Seizure Disorder Comorbidity  Goal: Maintenance of Seizure Control  Outcome: Ongoing, Progressing     Problem: Skin Injury Risk Increased  Goal: Skin Health and Integrity  Outcome: Ongoing, Progressing  Intervention: Optimize Skin Protection  Recent Flowsheet Documentation  Taken 10/5/2023 0746 by Briana Maya, RN  Head of Bed (HOB) Positioning: HOB elevated   Goal Outcome Evaluation:  Plan of Care Reviewed With: patient         Pt is alert and orient x4.  PRN pain medication adm as needed.  Pt is very focused of her pain managed.  VS WNL for pt.

## 2023-10-05 NOTE — CONSULTS
Nutrition Services    Patient Name: Italia Olvera  YOB: 1960  MRN: [id number]  Admission date: 10/4/2023      CLINICAL NUTRITION ASSESSMENT      Reason for Assessment  Identified at risk by screening criteria, MST score 2+     H&P:    Past Medical History:   Diagnosis Date    Acid reflux     ACL tear 09/01/2015    PCL/ACL TEAR/RUPTURE    Acute shoulder bursitis, right 08/23/2015    Anxiety     Arthritis     Asthma     Bipolar 1 disorder     untreated    Bladder disorder     Cancer     CERVICAL CANCER    CHF (congestive heart failure)     ASYMPTOMATIC- NO CARDIOLOGIST- SEE'S DR ARRIAZA (PCP)- DENIED CP/SOB    Chronic back pain     Chronic pain     CHRONIC BACK, NECK, LEG, FOOT, & FACE PAIN    COPD (chronic obstructive pulmonary disease)     USES INHALERS    Depression     Diabetes mellitus     BG RUND AROUND 140 IN AM    DJD (degenerative joint disease)     Gangrene     RT SECOND AND FIFTH TOES    GERD (gastroesophageal reflux disease)     HBP (high blood pressure)     Hip pain 09/15/2015    Hypertension     Knee injury 08/19/2015    Knee pain, right 09/15/2015    Limb swelling     Neuropathy     On home O2     2-3L/NC PRN    Osteoarthritis, knee 09/01/2015    Osteoporosis     Peripheral neuropathy     Renal failure 1994    NO CURRENT PROBLEMS    Seasonal allergies     Shoulder tendonitis 08/23/2015    Sleep apnea     Smoker     SOB (shortness of breath)     NONE CURENTLY    Tendinitis of right rotator cuff 08/23/2015        Current Problems:   Active Hospital Problems    Diagnosis     **Cellulitis, leg     Fracture of left lower leg     Cellulitis of leg     Paroxysmal atrial fibrillation     Anxiety disorder     Anemia     T2DM (type 2 diabetes mellitus)         Nutrition/Diet History         Narrative     Admitted with cellulitis, leg fracture.  Patient reports recent weight loss of 140 lbs x 1 month.  This is inconsistent with chart review., which reveals stable body weight x 1  year.   "    Patient noted to have multiple areas of impaired skin integrity on admission.  Discussed the need for adequate protein intake.  Patient has had 50% or less intake at meals since admission.  Patient agreeable to Boost Pudding BID.       Anthropometrics        Current Height, Weight Height: 167.6 cm (66\")  Weight: 75.6 kg (166 lb 10.7 oz)   Current BMI Body mass index is 26.9 kg/mý.       Weight Hx  Wt Readings from Last 30 Encounters:   10/04/23 1007 75.6 kg (166 lb 10.7 oz)   09/11/23 1544 75.6 kg (166 lb 10.7 oz)   04/24/23 1454 69.2 kg (152 lb 8.9 oz)   03/11/23 1722 71.2 kg (156 lb 15.5 oz)   03/07/23 2117 71.9 kg (158 lb 8.2 oz)   02/04/23 0036 77.5 kg (170 lb 13.7 oz)   02/03/23 1856 76.1 kg (167 lb 12.3 oz)   12/06/22 0606 79.6 kg (175 lb 7.8 oz)   11/29/22 1341 70.3 kg (155 lb)   11/03/22 0722 89.6 kg (197 lb 8.5 oz)   02/26/22 1300 66.9 kg (147 lb 7.8 oz)   02/21/22 1721 68.5 kg (151 lb 1.6 oz)   11/29/21 0258 51.3 kg (113 lb 1.5 oz)   11/28/21 1611 51.3 kg (113 lb 1.5 oz)   10/13/21 1538 64.4 kg (142 lb)   08/13/21 0230 60.2 kg (132 lb 11.5 oz)   08/12/21 1712 58.7 kg (129 lb 6.6 oz)   07/22/21 0517 54.3 kg (119 lb 11.4 oz)   07/21/21 0500 53.1 kg (117 lb 1 oz)   07/20/21 0525 54.3 kg (119 lb 11.4 oz)   07/18/21 0414 54.2 kg (119 lb 7.8 oz)   07/17/21 0518 53.3 kg (117 lb 8.1 oz)   07/16/21 0551 51 kg (112 lb 7 oz)   07/15/21 0515 54.5 kg (120 lb 2.4 oz)   07/14/21 0452 57.1 kg (125 lb 14.1 oz)   07/12/21 0500 51.8 kg (114 lb 3.2 oz)   07/11/21 0500 52 kg (114 lb 10.2 oz)   07/10/21 0541 57.4 kg (126 lb 8.7 oz)   07/09/21 0500 60 kg (132 lb 4.4 oz)   07/08/21 0500 60.7 kg (133 lb 13.1 oz)   07/07/21 0449 63 kg (138 lb 14.2 oz)   07/06/21 0500 73.8 kg (162 lb 11.2 oz)   07/05/21 0500 73.5 kg (162 lb 0.6 oz)   07/04/21 0500 73.4 kg (161 lb 13.1 oz)   07/03/21 0508 70.7 kg (155 lb 13.8 oz)   07/01/21 0500 74.2 kg (163 lb 9.3 oz)   06/29/21 0600 75.4 kg (166 lb 3.6 oz)   06/15/21 0431 84.3 kg (185 lb 13.6 " oz)   06/14/21 0958 88.6 kg (195 lb 5.2 oz)   06/12/21 0518 80.1 kg (176 lb 9.4 oz)   06/10/21 0503 80.1 kg (176 lb 9.4 oz)   06/04/21 1231 78.8 kg (173 lb 11.6 oz)   03/24/21 0000 83.5 kg (184 lb)   07/10/18 0000 82.6 kg (182 lb)            Wt Change Observation stable     Estimated/Assessed Needs       Energy Requirements 25-30 kcal/kg    EST Needs (kcal/day) 8469-5996 kcal        Protein Requirements 1.2-1.3 g/kg   EST Daily Needs (g/day) 91-98 g       Fluid Requirements 25 ml/kg     Estimated Needs (mL/day) 1890 ml      Labs/Medications         Pertinent Labs Reviewed.   Results from last 7 days   Lab Units 10/04/23  1041   SODIUM mmol/L 136   POTASSIUM mmol/L 4.5   CHLORIDE mmol/L 104   CO2 mmol/L 25.7   BUN mg/dL 12   CREATININE mg/dL 0.62   CALCIUM mg/dL 7.8*   BILIRUBIN mg/dL 0.4   ALK PHOS U/L 193*   ALT (SGPT) U/L 7   AST (SGOT) U/L 23   GLUCOSE mg/dL 208*     Results from last 7 days   Lab Units 10/04/23  1041   HEMOGLOBIN g/dL 8.7*   HEMATOCRIT % 28.7*     COVID19   Date Value Ref Range Status   04/24/2023 Detected (C) Not Detected - Ref. Range Final     Lab Results   Component Value Date    HGBA1C 7.10 (H) 02/21/2022         Pertinent Medications Reviewed.     Current Nutrition Orders & Evaluation of Intake       Oral Nutrition     Current PO Diet Diet: Cardiac Diets, Diabetic Diets; Healthy Heart (2-3 Na+); Consistent Carbohydrate; Texture: Regular Texture (IDDSI 7); Fluid Consistency: Thin (IDDSI 0)   Supplement Orders Placed This Encounter      DIET MESSAGE Patient would like eggs and pancakes with syrup, and milk for breakfast today, 10/5       Malnutrition Severity Assessment                Nutrition Diagnosis         Nutrition Dx Problem 1 Inadequate energy Intake related to decreased ability to consume sufficient energy as evidenced by report of minimal PO intake.       Nutrition Intervention         Boost Pudding BID  540 kcal, 12 g pro per day      Medical Nutrition Therapy/Nutrition  Education          Learner     Readiness Patient  Acceptance     Method     Response Explanation  Verbalizes understanding     Monitor/Evaluation        Monitor Per protocol, PO intake, Supplement intake, Pertinent labs, Weight, Skin status       Nutrition Discharge Plan         To be determined       Electronically signed by:  Jennifer Mon RD  10/05/23 16:12 EDT

## 2023-10-05 NOTE — PLAN OF CARE
Goal Outcome Evaluation:  Plan of Care Reviewed With: patient        Progress: no change  Outcome Evaluation: bp elevated tonight-apresoline iv given x 2 doses. pt is very anxious and has not slept tonight. she has called out Q5-20 min repeatedly asking for pain meds, despite receiving prn iv dilaudid q2h fairly routinely as well as a dose of norco. continuous pulse ox in use. pt placed on her home o2 at 3l nc--was desatting to 87% at rest. wound pics taken and wound redressed. pt wants all of her home meds to help her sleep re-ordered.

## 2023-10-05 NOTE — PROGRESS NOTES
[facility]     Progress Note    Patient Name: Italia Olvera  : 1960  MRN: [id number]  Primary Care Physician:  Carloz Shoemaker MD  Date of admission: 10/4/2023      Subjective   Brief summary.  Patient admitted with cellulitis and wound infection of the left leg.      HPI:  Patient feels slightly better, pain slightly better still requiring Dilaudid every 2 hours with Percocets.  Patient reports palpitations, heart rate and blood pressure running high.  Home meds were reconciled and not started yet.  Patient had episodes of incontinence and urine was in her boot/Unna boot and dressing for the leg.    Review of Systems     Complains of fatigue and anxiety.  Increased pain.  No chest pain.  Burning in the foot.  No shortness of breath        Objective     Vitals:   Temp:  [97.3 øF (36.3 øC)-99.8 øF (37.7 øC)] 98.7 øF (37.1 øC)  Heart Rate:  [] 116  Resp:  [18-22] 18  BP: (154-213)/() 163/89  Flow (L/min):  [0-3] 0    Physical Exam :     Elderly female not in acute distress.    Slightly tachycardic  Heart regular and tacky  Lungs diminished breath sounds.  Extremity left lower extremity with redness and cellulitic changes surgical wound healing..  Edema noted.      Result Review:  I have personally reviewed the results from the time of this admission to 10/5/2023 17:19 EDT and agree with these findings:  []  Laboratory  []  Microbiology  []  Radiology  []  EKG/Telemetry   []  Cardiology/Vascular   []  Pathology  []  Old records  []  Other:    Venous Doppler ordered to rule out DVT      Assessment / Plan       Active Hospital Problems:  Active Hospital Problems    Diagnosis     **Cellulitis, leg     Fracture of left lower leg     Cellulitis of leg     Paroxysmal atrial fibrillation     Anxiety disorder     Anemia     T2DM (type 2 diabetes mellitus)        Plan:   Continue IV antibiotics, blood pressure and heart rate high resume home meds, add Toprol, discontinue Norvasc from Lotrel due to  edema.  Continue wound care  Monitor labs.    DVT prophylaxis:  Medical DVT prophylaxis orders are present.    CODE STATUS:   Code Status (Patient has no pulse and is not breathing): CPR (Attempt to Resuscitate)  Medical Interventions (Patient has pulse or is breathing): Full Support            Electronically signed by Rudy Garnett MD, 10/05/23, 5:19 PM EDT.

## 2023-10-06 LAB
GLUCOSE BLDC GLUCOMTR-MCNC: 127 MG/DL (ref 70–99)
GLUCOSE BLDC GLUCOMTR-MCNC: 77 MG/DL (ref 70–99)
GLUCOSE BLDC GLUCOMTR-MCNC: 82 MG/DL (ref 70–99)
GLUCOSE BLDC GLUCOMTR-MCNC: 92 MG/DL (ref 70–99)

## 2023-10-06 PROCEDURE — 25010000002 NA FERRIC GLUC CPLX PER 12.5 MG: Performed by: INTERNAL MEDICINE

## 2023-10-06 PROCEDURE — 25010000002 CEFAZOLIN 1-4 GM/50ML-% SOLUTION: Performed by: INTERNAL MEDICINE

## 2023-10-06 PROCEDURE — 97166 OT EVAL MOD COMPLEX 45 MIN: CPT

## 2023-10-06 PROCEDURE — 25010000002 HYDROMORPHONE 1 MG/ML SOLUTION: Performed by: INTERNAL MEDICINE

## 2023-10-06 PROCEDURE — 97161 PT EVAL LOW COMPLEX 20 MIN: CPT

## 2023-10-06 PROCEDURE — 63710000001 INSULIN LISPRO (HUMAN) PER 5 UNITS: Performed by: INTERNAL MEDICINE

## 2023-10-06 PROCEDURE — 82948 REAGENT STRIP/BLOOD GLUCOSE: CPT

## 2023-10-06 RX ADMIN — HYDROCODONE BITARTRATE AND ACETAMINOPHEN 1 TABLET: 10; 325 TABLET ORAL at 04:05

## 2023-10-06 RX ADMIN — APIXABAN 5 MG: 5 TABLET, FILM COATED ORAL at 08:22

## 2023-10-06 RX ADMIN — HYDROMORPHONE HYDROCHLORIDE 0.5 MG: 1 INJECTION, SOLUTION INTRAMUSCULAR; INTRAVENOUS; SUBCUTANEOUS at 14:55

## 2023-10-06 RX ADMIN — HYDROCODONE BITARTRATE AND ACETAMINOPHEN 1 TABLET: 10; 325 TABLET ORAL at 13:59

## 2023-10-06 RX ADMIN — CEFAZOLIN SODIUM 1000 MG: 1 INJECTION, SOLUTION INTRAVENOUS at 12:29

## 2023-10-06 RX ADMIN — INSULIN LISPRO 10 UNITS: 100 INJECTION, SOLUTION INTRAVENOUS; SUBCUTANEOUS at 12:29

## 2023-10-06 RX ADMIN — CEFAZOLIN SODIUM 1000 MG: 1 INJECTION, SOLUTION INTRAVENOUS at 04:14

## 2023-10-06 RX ADMIN — CARVEDILOL 3.12 MG: 3.12 TABLET, FILM COATED ORAL at 17:23

## 2023-10-06 RX ADMIN — HYDROMORPHONE HYDROCHLORIDE 0.5 MG: 1 INJECTION, SOLUTION INTRAMUSCULAR; INTRAVENOUS; SUBCUTANEOUS at 12:29

## 2023-10-06 RX ADMIN — HYDROCODONE BITARTRATE AND ACETAMINOPHEN 1 TABLET: 10; 325 TABLET ORAL at 23:04

## 2023-10-06 RX ADMIN — INSULIN LISPRO 10 UNITS: 100 INJECTION, SOLUTION INTRAVENOUS; SUBCUTANEOUS at 08:22

## 2023-10-06 RX ADMIN — APIXABAN 5 MG: 5 TABLET, FILM COATED ORAL at 21:21

## 2023-10-06 RX ADMIN — HYDROMORPHONE HYDROCHLORIDE 0.5 MG: 1 INJECTION, SOLUTION INTRAMUSCULAR; INTRAVENOUS; SUBCUTANEOUS at 00:17

## 2023-10-06 RX ADMIN — HYDROMORPHONE HYDROCHLORIDE 0.5 MG: 1 INJECTION, SOLUTION INTRAMUSCULAR; INTRAVENOUS; SUBCUTANEOUS at 17:23

## 2023-10-06 RX ADMIN — HYDROMORPHONE HYDROCHLORIDE 0.5 MG: 1 INJECTION, SOLUTION INTRAMUSCULAR; INTRAVENOUS; SUBCUTANEOUS at 06:05

## 2023-10-06 RX ADMIN — SODIUM CHLORIDE 125 MG: 9 INJECTION, SOLUTION INTRAVENOUS at 09:33

## 2023-10-06 RX ADMIN — HYDROMORPHONE HYDROCHLORIDE 0.5 MG: 1 INJECTION, SOLUTION INTRAMUSCULAR; INTRAVENOUS; SUBCUTANEOUS at 21:24

## 2023-10-06 RX ADMIN — SERTRALINE HYDROCHLORIDE 50 MG: 25 TABLET ORAL at 08:22

## 2023-10-06 RX ADMIN — HYDROMORPHONE HYDROCHLORIDE 0.5 MG: 1 INJECTION, SOLUTION INTRAMUSCULAR; INTRAVENOUS; SUBCUTANEOUS at 10:32

## 2023-10-06 RX ADMIN — MIRTAZAPINE 15 MG: 15 TABLET, FILM COATED ORAL at 21:21

## 2023-10-06 RX ADMIN — CEFAZOLIN SODIUM 1000 MG: 1 INJECTION, SOLUTION INTRAVENOUS at 21:21

## 2023-10-06 RX ADMIN — HYDROMORPHONE HYDROCHLORIDE 0.5 MG: 1 INJECTION, SOLUTION INTRAMUSCULAR; INTRAVENOUS; SUBCUTANEOUS at 08:22

## 2023-10-06 RX ADMIN — ATORVASTATIN CALCIUM 10 MG: 10 TABLET, FILM COATED ORAL at 08:22

## 2023-10-06 NOTE — THERAPY EVALUATION
Acute Care - Physical Therapy Initial Evaluation   [facility]     Patient Name: Italia Olvera  : 1960  MRN: [id number]  Today's Date: 10/7/2023      Visit Dx:     ICD-10-CM ICD-9-CM   1. Cellulitis of left lower extremity  L03.116 682.6   2. Uncontrolled hypertension  I10 401.9   3. Difficulty in walking  R26.2 719.7   4. Decreased activities of daily living (ADL)  Z78.9 V49.89     Patient Active Problem List   Diagnosis    Bladder disorder    Depression    Hypertension    Peripheral neuropathy    Osteoarthritis of knee    COPD with acute exacerbation    Chronic back pain    Multifocal pneumonia    T2DM (type 2 diabetes mellitus)    Bipolar disorder    Chronic respiratory failure with hypoxia    Acute on chronic respiratory failure with hypoxia    Chronic respiratory failure with hypoxia, on home oxygen therapy    Overweight (BMI 25.0-29.9)    Acute on chronic systolic CHF (congestive heart failure)    Essential hypertension    DM (diabetes mellitus), type 1    Acute UTI (urinary tract infection)    Anemia    Sepsis    COPD with acute exacerbation    Hypokalemia    Moderate malnutrition    Decubitus ulcer of back, stage 3    Acute osteomyelitis of toe of right foot    Anxiety disorder    Diabetes mellitus with peripheral vascular disease    Decubitus ulcer, unstageable    Atherosclerosis of native artery of extremity    Paroxysmal atrial fibrillation    Intertrigo    Cellulitis, leg    Fracture of left lower leg    Cellulitis of leg     Past Medical History:   Diagnosis Date    Acid reflux     ACL tear 2015    PCL/ACL TEAR/RUPTURE    Acute shoulder bursitis, right 2015    Anxiety     Arthritis     Asthma     Bipolar 1 disorder     untreated    Bladder disorder     Cancer     CERVICAL CANCER    CHF (congestive heart failure)     ASYMPTOMATIC- NO CARDIOLOGIST- SEE'S DR ARRIAZA (PCP)- DENIED CP/SOB    Chronic back pain     Chronic pain     CHRONIC BACK, NECK, LEG, FOOT, & FACE PAIN    COPD (chronic  obstructive pulmonary disease)     USES INHALERS    Depression     Diabetes mellitus     BG RUND AROUND 140 IN AM    DJD (degenerative joint disease)     Gangrene     RT SECOND AND FIFTH TOES    GERD (gastroesophageal reflux disease)     HBP (high blood pressure)     Hip pain 09/15/2015    Hypertension     Knee injury 08/19/2015    Knee pain, right 09/15/2015    Limb swelling     Neuropathy     On home O2     2-3L/NC PRN    Osteoarthritis, knee 09/01/2015    Osteoporosis     Peripheral neuropathy     Renal failure 1994    NO CURRENT PROBLEMS    Seasonal allergies     Shoulder tendonitis 08/23/2015    Sleep apnea     Smoker     SOB (shortness of breath)     NONE CURENTLY    Tendinitis of right rotator cuff 08/23/2015     Past Surgical History:   Procedure Laterality Date    ABOVE KNEE AMPUTATION Right 3/11/2023    Procedure: AMPUTATION ABOVE KNEE;  Surgeon: Zacarias Haywood MD;  Location: [facility] MAIN OR;  Service: Vascular;  Laterality: Right;    AMPUTATION DIGIT Right 12/6/2022    Procedure: Amputation of right second and fifth toes;  Surgeon: Gabion Russell MD;  Location: [facility] MAIN OR;  Service: Vascular;  Laterality: Right;    BACK SURGERY      BLADDER REPAIR      BRONCHOSCOPY N/A 07/10/2021    Procedure: BRONCHOSCOPY WITH BRONCHOALVEOLAR LAVAGE, POSSIBLE BIOPSY, BRUSHING, WASHING, AIRWAY INSPECTION;  Surgeon: Rodrigo Reyes MD;  Location: [facility] MAIN OR;  Service: Pulmonary;  Laterality: N/A;    BRONCHOSCOPY N/A 07/10/2021    Procedure: BRONCHOSCOPY;  Surgeon: Rodrigo Reyes MD;  Location: [facility] ENDOSCOPY;  Service: Pulmonary;  Laterality: N/A;    CARDIAC CATHETERIZATION Right 11/03/2022    Procedure: Aortogram with right leg angiogram, possible angioplasty or stenting ;  Surgeon: Gabino Russell MD;  Location: [facility] CATH INVASIVE LOCATION;  Service: Vascular;  Laterality: Right;    CARDIAC CATHETERIZATION Right 3/8/2023    Procedure: Right leg angiogram, possible angioplasty or stenting;  Surgeon: Yvonne  MD Gabino;  Location: [facility] CATH INVASIVE LOCATION;  Service: Vascular;  Laterality: Right;    CHOLECYSTECTOMY      ENDOSCOPY      FRACTURE SURGERY      SKULL FRACTURE    GALLBLADDER SURGERY      HERNIA REPAIR      HYSTERECTOMY      INTERVENTIONAL RADIOLOGY PROCEDURE Right 03/03/2022    Procedure: Abdominal Aortagram with Runoff;  Surgeon: Marleny Yoon MD;  Location: [facility] CATH INVASIVE LOCATION;  Service: Peripheral Vascular;  Laterality: Right;    JOINT REPLACEMENT      OTHER SURGICAL HISTORY      ARTIFICIAL JOINTS/LIMBS    OTHER SURGICAL HISTORY      FACE SURGERY, UNSPECIFIED    TOTAL HIP ARTHROPLASTY Right     TOTAL KNEE ARTHROPLASTY Left      PT Assessment (last 12 hours)       PT Evaluation and Treatment       Row Name             Residual Limb Assessment 03/14/23 1140 transfemoral (above knee), right    Residual Limb Assessment - Properties Group Placement Date: 03/14/23  -ANUPAMA Placement Time: 1140  -ANUPAMA Amputation Date: 03/11/23  - Location: transfemoral (above knee), right  -ANUPAMA    Retired Residual Limb Assessment - Properties Group Placement Date: 03/14/23  - Placement Time: 1140  -ANUPAMA Amputation Date: 03/11/23  - Location: transfemoral (above knee), right  -ANUPAMA    Retired Residual Limb Assessment - Properties Group Date first assessed: 03/14/23  - Time first assessed: 1140  -ANUPAMA Amputation Date: 03/11/23  - Location: transfemoral (above knee), right  -ANUPAMA      Row Name             Wound 02/04/23 0100 Bilateral coccyx Pressure Injury    Wound - Properties Group Placement Date: 02/04/23  - Placement Time: 0100  -LH Present on Original Admission: Y  -LH Side: Bilateral  -LH Location: coccyx  -LH Primary Wound Type: Pressure inj  -LH    Retired Wound - Properties Group Placement Date: 02/04/23  - Placement Time: 0100  -LH Present on Original Admission: Y  -LH Side: Bilateral  -LH Location: coccyx  -LH Primary Wound Type: Pressure inj  -LH    Retired Wound - Properties Group Date first  assessed: 02/04/23  -LH Time first assessed: 0100  -LH Present on Original Admission: Y  -LH Side: Bilateral  -LH Location: coccyx  -LH Primary Wound Type: Pressure inj  -LH      Row Name             Wound 10/04/23 2300 Left proximal leg Incision    Wound - Properties Group Placement Date: 10/04/23  -TF Placement Time: 2300  -TF Present on Original Admission: Y  -TF Side: Left  -TF Orientation: proximal  -TF Location: leg  -TF Primary Wound Type: Incision  -TF Additional Comments: surgical incision--7 total sutures counted  -TF    Retired Wound - Properties Group Placement Date: 10/04/23  -TF Placement Time: 2300  -TF Present on Original Admission: Y  -TF Side: Left  -TF Orientation: proximal  -TF Location: leg  -TF Primary Wound Type: Incision  -TF Additional Comments: surgical incision--7 total sutures counted  -TF    Retired Wound - Properties Group Date first assessed: 10/04/23  -TF Time first assessed: 2300  -TF Present on Original Admission: Y  -TF Side: Left  -TF Location: leg  -TF Primary Wound Type: Incision  -TF Additional Comments: surgical incision--7 total sutures counted  -TF      Row Name             Wound 10/04/23 2300 Left medial leg Traumatic    Wound - Properties Group Placement Date: 10/04/23  -TF Placement Time: 2300  -TF Side: Left  -TF Orientation: medial  -TF Location: leg  -TF Primary Wound Type: Traumatic  -TF    Retired Wound - Properties Group Placement Date: 10/04/23  -TF Placement Time: 2300  -TF Side: Left  -TF Orientation: medial  -TF Location: leg  -TF Primary Wound Type: Traumatic  -TF    Retired Wound - Properties Group Date first assessed: 10/04/23  -TF Time first assessed: 2300  -TF Side: Left  -TF Location: leg  -TF Primary Wound Type: Traumatic  -TF      Row Name             Wound 10/04/23 2300 Left posterior calf Traumatic    Wound - Properties Group Placement Date: 10/04/23  -TF Placement Time: 2300  -TF Present on Original Admission: Y  -TF Side: Left  -TF Orientation:  posterior  -TF Location: calf  -TF Primary Wound Type: Traumatic  -TF    Retired Wound - Properties Group Placement Date: 10/04/23  -TF Placement Time: 2300  -TF Present on Original Admission: Y  -TF Side: Left  -TF Orientation: posterior  -TF Location: calf  -TF Primary Wound Type: Traumatic  -TF    Retired Wound - Properties Group Date first assessed: 10/04/23  -TF Time first assessed: 2300  -TF Present on Original Admission: Y  -TF Side: Left  -TF Location: calf  -TF Primary Wound Type: Traumatic  -TF      Row Name             Wound 10/04/23 2300 Left anterior ankle Blisters    Wound - Properties Group Placement Date: 10/04/23  -TF Placement Time: 2300  -TF Present on Original Admission: Y  -TF Side: Left  -TF Orientation: anterior  -TF Location: ankle  -TF Primary Wound Type: Blisters  -TF    Retired Wound - Properties Group Placement Date: 10/04/23  -TF Placement Time: 2300  -TF Present on Original Admission: Y  -TF Side: Left  -TF Orientation: anterior  -TF Location: ankle  -TF Primary Wound Type: Blisters  -TF    Retired Wound - Properties Group Date first assessed: 10/04/23  -TF Time first assessed: 2300  -TF Present on Original Admission: Y  -TF Side: Left  -TF Location: ankle  -TF Primary Wound Type: Blisters  -TF      Row Name             Wound 10/04/23 2300 Left posterior plantar Blisters    Wound - Properties Group Placement Date: 10/04/23  -TF Placement Time: 2300  -TF Present on Original Admission: Y  -TF Side: Left  -TF Orientation: posterior  -TF Location: plantar  -TF Primary Wound Type: Blisters  -TF    Retired Wound - Properties Group Placement Date: 10/04/23  -TF Placement Time: 2300  -TF Present on Original Admission: Y  -TF Side: Left  -TF Orientation: posterior  -TF Location: plantar  -TF Primary Wound Type: Blisters  -TF    Retired Wound - Properties Group Date first assessed: 10/04/23  -TF Time first assessed: 2300  -TF Present on Original Admission: Y  -TF Side: Left  -TF Location: plantar   -TF Primary Wound Type: Blisters  -TF      Row Name             Wound 10/04/23 2300 Left posterior great toe Pressure Injury    Wound - Properties Group Placement Date: 10/04/23  -TF Placement Time: 2300  -TF Present on Original Admission: Y  -TF Side: Left  -TF Orientation: posterior  -TF Location: great toe  -TF Primary Wound Type: Pressure inj  -TF    Retired Wound - Properties Group Placement Date: 10/04/23  -TF Placement Time: 2300  -TF Present on Original Admission: Y  -TF Side: Left  -TF Orientation: posterior  -TF Location: great toe  -TF Primary Wound Type: Pressure inj  -TF    Retired Wound - Properties Group Date first assessed: 10/04/23  -TF Time first assessed: 2300  -TF Present on Original Admission: Y  -TF Side: Left  -TF Location: great toe  -TF Primary Wound Type: Pressure inj  -TF      Row Name             Wound 10/04/23 2300 Left posterior heel Pressure Injury    Wound - Properties Group Placement Date: 10/04/23  -TF Placement Time: 2300  -TF Present on Original Admission: Y  -TF Side: Left  -TF Orientation: posterior  -TF Location: heel  -TF Primary Wound Type: Pressure inj  -TF    Retired Wound - Properties Group Placement Date: 10/04/23  -TF Placement Time: 2300  -TF Present on Original Admission: Y  -TF Side: Left  -TF Orientation: posterior  -TF Location: heel  -TF Primary Wound Type: Pressure inj  -TF    Retired Wound - Properties Group Date first assessed: 10/04/23  -TF Time first assessed: 2300  -TF Present on Original Admission: Y  -TF Side: Left  -TF Location: heel  -TF Primary Wound Type: Pressure inj  -TF      Row Name             Wound 10/05/23 1254 Left posterior fifth toe Pressure Injury    Wound - Properties Group Placement Date: 10/05/23  -ANUPAMA Placement Time: 1254  -ANUPAMA Present on Original Admission: Y  -ANUPAMA Side: Left  -ANUPAMA Orientation: posterior  -ANUPAMA Location: fifth toe  -ANUPAMA Primary Wound Type: Pressure inj  -ANUPAMA    Retired Wound - Properties Group Placement Date: 10/05/23  -ANUPAMA  Placement Time: 1254  -ANUPAMA Present on Original Admission: Y  -ANUPAMA Side: Left  -ANUPAMA Orientation: posterior  -ANUPAMA Location: fifth toe  -ANUPAMA Primary Wound Type: Pressure inj  -ANUPAMA    Retired Wound - Properties Group Date first assessed: 10/05/23  -ANUPAMA Time first assessed: 1254  -ANUPAMA Present on Original Admission: Y  -ANUPAMA Side: Left  -ANUPAMA Location: fifth toe  -ANUPAMA Primary Wound Type: Pressure inj  -ANUPAMA      Row Name             Wound 10/05/23 1256 Left posterior ankle Pressure Injury    Wound - Properties Group Placement Date: 10/05/23  -JP Placement Time: 1256  -ANUPAMA Present on Original Admission: Y  -ANUPAMA Side: Left  -ANUPAMA Orientation: posterior  -ANUPAMA Location: ankle  -ANUPAMA Primary Wound Type: Pressure inj  -ANUPAMA    Retired Wound - Properties Group Placement Date: 10/05/23  -JP Placement Time: 1256  -ANUPAMA Present on Original Admission: Y  -ANUPAMA Side: Left  -ANUPAMA Orientation: posterior  -ANUPAMA Location: ankle  -ANUPAMA Primary Wound Type: Pressure inj  -ANUPAMA    Retired Wound - Properties Group Date first assessed: 10/05/23  -ANUPAMA Time first assessed: 1256  -ANUPAMA Present on Original Admission: Y  -ANUPAMA Side: Left  -ANUPAMA Location: ankle  -ANUPAMA Primary Wound Type: Pressure inj  -ANUPAMA              User Key  (r) = Recorded By, (t) = Taken By, (c) = Cosigned By      Initials Name Provider Type    Tequila Saavedra, RN Registered Nurse    Nikki Rodney, RN Registered Nurse    Audrey Mcgowan RN Registered Nurse                    Physical Therapy Education       Title: PT OT SLP Therapies (Done)       Topic: Physical Therapy (Done)       Point: Mobility training (Done)       Learning Progress Summary             Patient Acceptance, E, VU by AV at 10/6/2023 1108    Acceptance, E,TB, VU by ZT at 10/6/2023 1014   Significant Other Acceptance, E, VU by AV at 10/6/2023 1108                         Point: Body mechanics (Done)       Learning Progress Summary             Patient Acceptance, E, VU by AV at 10/6/2023 1108    Acceptance, E,TB, VU by ZT at 10/6/2023 1014    Significant Other Acceptance, E, VU by AV at 10/6/2023 1108                         Point: Precautions (Done)       Learning Progress Summary             Patient Acceptance, E, VU by AV at 10/6/2023 1108    Acceptance, E,TB, VU by ZT at 10/6/2023 1014   Significant Other Acceptance, E, VU by AV at 10/6/2023 1108                                         User Key       Initials Effective Dates Name Provider Type Discipline    AV 06/16/21 -  Easton Marks, OT Occupational Therapist OT    ZT 09/05/23 -  Johnny Koehler, PT Student PT Student PT                  PT Recommendation and Plan  Anticipated Discharge Disposition (PT): sub acute care setting      Outcome Measures       Row Name 10/06/23 1000             How much help from another person do you currently need...    Turning from your back to your side while in flat bed without using bedrails? 2  -AV (r) ZT (t) AV (c)      Moving from lying on back to sitting on the side of a flat bed without bedrails? 2  -AV (r) ZT (t) AV (c)      Moving to and from a bed to a chair (including a wheelchair)? 1  -AV (r) ZT (t) AV (c)      Standing up from a chair using your arms (e.g., wheelchair, bedside chair)? 1  -AV (r) ZT (t) AV (c)      Climbing 3-5 steps with a railing? 1  -AV (r) ZT (t) AV (c)      To walk in hospital room? 1  -AV (r) ZT (t) AV (c)      AM-PAC 6 Clicks Score (PT) 8  -AV (r) ZT (t)                User Key  (r) = Recorded By, (t) = Taken By, (c) = Cosigned By      Initials Name Provider Type    AV Mj Stewart, PT Physical Therapist    ZT Johnny Koehler, PT Student PT Student                     Time Calculation:           PT G-Codes  Outcome Measure Options: AM-PAC 6 Clicks Daily Activity (OT), Optimal Instrument  AM-PAC 6 Clicks Score (PT): 8  AM-PAC 6 Clicks Score (OT): 15    Mj Stewart, PT  10/7/2023

## 2023-10-06 NOTE — THERAPY EVALUATION
Patient Name: Italia Olvera  : 1960    MRN: [id number]                              Today's Date: 10/6/2023       Admit Date: 10/4/2023    Visit Dx:     ICD-10-CM ICD-9-CM   1. Cellulitis of left lower extremity  L03.116 682.6   2. Uncontrolled hypertension  I10 401.9   3. Difficulty in walking  R26.2 719.7   4. Decreased activities of daily living (ADL)  Z78.9 V49.89     Patient Active Problem List   Diagnosis    Bladder disorder    Depression    Hypertension    Peripheral neuropathy    Osteoarthritis of knee    COPD with acute exacerbation    Chronic back pain    Multifocal pneumonia    T2DM (type 2 diabetes mellitus)    Bipolar disorder    Chronic respiratory failure with hypoxia    Acute on chronic respiratory failure with hypoxia    Chronic respiratory failure with hypoxia, on home oxygen therapy    Overweight (BMI 25.0-29.9)    Acute on chronic systolic CHF (congestive heart failure)    Essential hypertension    DM (diabetes mellitus), type 1    Acute UTI (urinary tract infection)    Anemia    Sepsis    COPD with acute exacerbation    Hypokalemia    Moderate malnutrition    Decubitus ulcer of back, stage 3    Acute osteomyelitis of toe of right foot    Anxiety disorder    Diabetes mellitus with peripheral vascular disease    Decubitus ulcer, unstageable    Atherosclerosis of native artery of extremity    Paroxysmal atrial fibrillation    Intertrigo    Cellulitis, leg    Fracture of left lower leg    Cellulitis of leg     Past Medical History:   Diagnosis Date    Acid reflux     ACL tear 2015    PCL/ACL TEAR/RUPTURE    Acute shoulder bursitis, right 2015    Anxiety     Arthritis     Asthma     Bipolar 1 disorder     untreated    Bladder disorder     Cancer     CERVICAL CANCER    CHF (congestive heart failure)     ASYMPTOMATIC- NO CARDIOLOGIST- SEE'S DR ARRIAZA (PCP)- DENIED CP/SOB    Chronic back pain     Chronic pain     CHRONIC BACK, NECK, LEG, FOOT, & FACE PAIN    COPD (chronic  obstructive pulmonary disease)     USES INHALERS    Depression     Diabetes mellitus     BG RUND AROUND 140 IN AM    DJD (degenerative joint disease)     Gangrene     RT SECOND AND FIFTH TOES    GERD (gastroesophageal reflux disease)     HBP (high blood pressure)     Hip pain 09/15/2015    Hypertension     Knee injury 08/19/2015    Knee pain, right 09/15/2015    Limb swelling     Neuropathy     On home O2     2-3L/NC PRN    Osteoarthritis, knee 09/01/2015    Osteoporosis     Peripheral neuropathy     Renal failure 1994    NO CURRENT PROBLEMS    Seasonal allergies     Shoulder tendonitis 08/23/2015    Sleep apnea     Smoker     SOB (shortness of breath)     NONE CURENTLY    Tendinitis of right rotator cuff 08/23/2015     Past Surgical History:   Procedure Laterality Date    ABOVE KNEE AMPUTATION Right 3/11/2023    Procedure: AMPUTATION ABOVE KNEE;  Surgeon: Zacarias Haywood MD;  Location: [facility] MAIN OR;  Service: Vascular;  Laterality: Right;    AMPUTATION DIGIT Right 12/6/2022    Procedure: Amputation of right second and fifth toes;  Surgeon: Gabino Russell MD;  Location: [facility] MAIN OR;  Service: Vascular;  Laterality: Right;    BACK SURGERY      BLADDER REPAIR      BRONCHOSCOPY N/A 07/10/2021    Procedure: BRONCHOSCOPY WITH BRONCHOALVEOLAR LAVAGE, POSSIBLE BIOPSY, BRUSHING, WASHING, AIRWAY INSPECTION;  Surgeon: Rodrigo Reyes MD;  Location: [facility] MAIN OR;  Service: Pulmonary;  Laterality: N/A;    BRONCHOSCOPY N/A 07/10/2021    Procedure: BRONCHOSCOPY;  Surgeon: Rodrigo Reyes MD;  Location: [facility] ENDOSCOPY;  Service: Pulmonary;  Laterality: N/A;    CARDIAC CATHETERIZATION Right 11/03/2022    Procedure: Aortogram with right leg angiogram, possible angioplasty or stenting ;  Surgeon: Gabino Russell MD;  Location: [facility] CATH INVASIVE LOCATION;  Service: Vascular;  Laterality: Right;    CARDIAC CATHETERIZATION Right 3/8/2023    Procedure: Right leg angiogram, possible angioplasty or stenting;  Surgeon: Yvonne  MD Gabino;  Location: [facility] INVASIVE LOCATION;  Service: Vascular;  Laterality: Right;    CHOLECYSTECTOMY      ENDOSCOPY      FRACTURE SURGERY      SKULL FRACTURE    GALLBLADDER SURGERY      HERNIA REPAIR      HYSTERECTOMY      INTERVENTIONAL RADIOLOGY PROCEDURE Right 03/03/2022    Procedure: Abdominal Aortagram with Runoff;  Surgeon: Marleny Yoon MD;  Location: [facility] INVASIVE LOCATION;  Service: Peripheral Vascular;  Laterality: Right;    JOINT REPLACEMENT      OTHER SURGICAL HISTORY      ARTIFICIAL JOINTS/LIMBS    OTHER SURGICAL HISTORY      FACE SURGERY, UNSPECIFIED    TOTAL HIP ARTHROPLASTY Right     TOTAL KNEE ARTHROPLASTY Left       General Information       Row Name 10/06/23 1059          OT Time and Intention    Document Type evaluation  -AV     Mode of Treatment individual therapy;occupational therapy  -AV       Row Name 10/06/23 1059          General Information    Patient Profile Reviewed yes  -AV     Prior Level of Function --  (I) feeding & grooming w set up (seated).  Mod assist bathing/dressing.  Stand pivot transfers with boot ( assists).  Has tub bench & elevated commode. No  prosthesis yet.  Primary mobility via wheelchair.  3 L supplemental oxygen as needed.  -AV     Existing Precautions/Restrictions fall;oxygen therapy device and L/min  Right AKA 3/11/2023.  Impaired skin integrity  -AV     Barriers to Rehab none identified  -AV       Row Name 10/06/23 1059          Occupational Profile    Reason for Services/Referral (Occupational Profile) Patient is a 62 year old female admitted to [facility] on October 4, 2023 with left leg infection.  She is currently on [address] [place]/room air with sats 93%.   OT consulted due to recent decline in ADL/transfer independence.  No previous OT services for current condition.  -AV       Row Name 10/06/23 1059          Living Environment    People in Home spouse  Home health therapy services  -AV       Row Name 10/06/23 1059           Home Main Entrance    Number of Stairs, Main Entrance none  -AV       Row Name 10/06/23 1059          Cognition    Orientation Status (Cognition) --  Patient is alert, pleasant and cooperative- able to retain information and follow commands.  -AV       Row Name 10/06/23 1059          Safety Issues, Functional Mobility    Impairments Affecting Function (Mobility) balance;endurance/activity tolerance;strength  -AV               User Key  (r) = Recorded By, (t) = Taken By, (c) = Cosigned By      Initials Name Provider Type    aEston Dowd OT Occupational Therapist                     Mobility/ADL's       Row Name 10/06/23 1103          Transfers    Comment, (Transfers) Deferred due to pain with slight attempted bed mobility  -AV       Row Name 10/06/23 1103          Activities of Daily Living    BADL Assessment/Intervention --  Independent feeding and grooming with set up in bed.  Max assist bathing/dressing.  Dependent toilet hygiene: pure wick catheter/bedpan.  -AV               User Key  (r) = Recorded By, (t) = Taken By, (c) = Cosigned By      Initials Name Provider Type    Easton Dowd OT Occupational Therapist                   Obj/Interventions       Row Name 10/06/23 1104          Sensory Assessment (Somatosensory)    Sensory Assessment (Somatosensory) UE sensation intact  -AV     Sensory Assessment Sensory awareness to light touch intact at this time.  -AV       Row Name 10/06/23 1104          Vision Assessment/Intervention    Visual Impairment/Limitations WFL  -AV     Vision Assessment Comment Glasses  -AV       Row Name 10/06/23 1104          Range of Motion Comprehensive    General Range of Motion upper extremity range of motion deficits identified  -AV     Comment, General Range of Motion Left shoulder active flexion <45.  -AV       Row Name 10/06/23 1104          Strength Comprehensive (MMT)    Comment, General Manual Muscle Testing (MMT) Assessment 4 -/5 bilateral biceps, triceps and    -AV       Row Name 10/06/23 1104          Motor Skills    Motor Skills coordination;functional endurance  -AV     Coordination --  Right dominant-diminished for the past year due to tremors  -AV     Functional Endurance Poor  -AV       Row Name 10/06/23 1104          Balance    Comment, Balance Impaired  -AV               User Key  (r) = Recorded By, (t) = Taken By, (c) = Cosigned By      Initials Name Provider Type    AV Easton Marks OT Occupational Therapist                   Goals/Plan       Row Name 10/06/23 1106          Transfer Goal 1 (OT)    Activity/Assistive Device (Transfer Goal 1, OT) transfers, all  -AV     [place] Level/Cues Needed (Transfer Goal 1, OT) contact guard required;minimum assist (75% or more patient effort)  -AV     Time Frame (Transfer Goal 1, OT) long term goal (LTG);10 days  -AV       Row Name 10/06/23 1106          Bathing Goal 1 (OT)    Activity/Device (Bathing Goal 1, OT) bathing skills, all  -AV     [place] Level/Cues Needed (Bathing Goal 1, OT) minimum assist (75% or more patient effort);set-up required;verbal cues required  -AV     Time Frame (Bathing Goal 1, OT) long term goal (LTG);10 days  -AV       Row Name 10/06/23 1106          Dressing Goal 1 (OT)    Activity/Device (Dressing Goal 1, OT) dressing skills, all  -AV     [place]/Cues Needed (Dressing Goal 1, OT) minimum assist (75% or more patient effort);set-up required;verbal cues required  -AV     Time Frame (Dressing Goal 1, OT) long term goal (LTG);10 days  -AV       Row Name 10/06/23 1106          Toileting Goal 1 (OT)    Activity/Device (Toileting Goal 1, OT) toileting skills, all  -AV     [place] Level/Cues Needed (Toileting Goal 1, OT) minimum assist (75% or more patient effort);set-up required;verbal cues required  -AV     Time Frame (Toileting Goal 1, OT) long term goal (LTG);10 days  -AV       Row Name 10/06/23 1106          Strength Goal 1 (OT)    Strength Goal 1 (OT) Patient will  demonstrate 4/5 bilateral upper extremities to increase ADL and transfer independence.  -AV     Time Frame (Strength Goal 1, OT) long term goal (LTG);10 days  -AV       Row Name 10/06/23 1106          Problem Specific Goal 1 (OT)    Problem Specific Goal 1 (OT) Patient will demonstrate fair endurance/activity tolerance needed to support ADLs.  -AV     Time Frame (Problem Specific Goal 1, OT) long term goal (LTG);10 days  -AV       Row Name 10/06/23 1102          Therapy Assessment/Plan (OT)    Planned Therapy Interventions (OT) activity tolerance training;BADL retraining;functional balance retraining;occupation/activity based interventions;patient/caregiver education/training;strengthening exercise;transfer/mobility retraining  -AV               User Key  (r) = Recorded By, (t) = Taken By, (c) = Cosigned By      Initials Name Provider Type    Easton Dowd, OT Occupational Therapist                   Clinical Impression       Row Name 10/06/23 1109          Pain Assessment    Additional Documentation Pain Scale: FACES Pre/Post-Treatment (Group)  -AV       Row Name 10/06/23 1100          Pain Scale: FACES Pre/Post-Treatment    Pain: FACES Scale, Pretreatment 4-->hurts little more  -AV     Posttreatment Pain Rating 4-->hurts little more  -AV     Pain Location - Side/Orientation Left  -AV     Pain Location lower  -AV     Pain Location - extremity  -AV       Row Name 10/06/23 1104          Plan of Care Review    Plan of Care Reviewed With patient;spouse  -AV     Progress no change  First session for evaluation  -AV     Outcome Evaluation Patient presents with limitations of balance, endurance/activity tolerance and strength which impede her ability to perform ADL and transfers as prior.  The skills of a therapist will be required to safely and effectively implement treatment plan to restore maximal level of function.  -AV       Row Name 10/06/23 1102          Therapy Assessment/Plan (OT)    Patient/Family Therapy  Goal Statement (OT) Regain independence  -AV     Rehab Potential (OT) good, to achieve stated therapy goals  -AV     Criteria for Skilled Therapeutic Interventions Met (OT) yes;meets criteria;skilled treatment is necessary  -AV     Therapy Frequency (OT) 5 times/wk  -AV       Row Name 10/06/23 1105          Therapy Plan Review/Discharge Plan (OT)    Equipment Needs Upon Discharge (OT) commode chair  -AV     Anticipated Discharge Disposition (OT) sub acute care setting  -AV       Row Name 10/06/23 1105          Vital Signs    O2 Delivery Pre Treatment room air  -AV     O2 Delivery Intra Treatment room air  -AV     O2 Delivery Post Treatment room air  -AV               User Key  (r) = Recorded By, (t) = Taken By, (c) = Cosigned By      Initials Name Provider Type    Easton Dowd, OT Occupational Therapist                   Outcome Measures       Row Name 10/06/23 1108          How much help from another is currently needed...    Putting on and taking off regular lower body clothing? 2  -AV     Bathing (including washing, rinsing, and drying) 2  -AV     Toileting (which includes using toilet bed pan or urinal) 1  -AV     Putting on and taking off regular upper body clothing 2  -AV     Taking care of personal grooming (such as brushing teeth) 4  -AV     Eating meals 4  -AV     AM-PAC 6 Clicks Score (OT) 15  -AV       Row Name 10/06/23 1000 10/06/23 0732       How much help from another person do you currently need...    Turning from your back to your side while in flat bed without using bedrails? 2 (P)   -ZT 2  -AS    Moving from lying on back to sitting on the side of a flat bed without bedrails? 2 (P)   -ZT 2  -AS    Moving to and from a bed to a chair (including a wheelchair)? 1 (P)   -ZT 1  -AS    Standing up from a chair using your arms (e.g., wheelchair, bedside chair)? 1 (P)   -ZT 1  -AS    Climbing 3-5 steps with a railing? 1 (P)   -ZT 1  -AS    To walk in hospital room? 1 (P)   -ZT 1  -AS    AM-PAC 6  Clicks Score (PT) 8 (P)   -ZT 8  -AS    Highest level of mobility 3 --> Sat at edge of bed (P)   -ZT 3 --> Sat at edge of bed  -AS      Row Name 10/06/23 1108          Functional Assessment    Outcome Measure Options AM-PAC 6 Clicks Daily Activity (OT);Optimal Instrument  -AV       Row Name 10/06/23 1108          Optimal Instrument    Optimal Instrument Optimal - 3  -AV     Bending/Stooping 5  -AV     Standing 5  -AV     Reaching 2  -AV     From the list, choose the 3 activities you would most like to be able to do without any difficulty Bending/stooping;Standing;Reaching  -AV     Total Score Optimal - 3 12  -AV               User Key  (r) = Recorded By, (t) = Taken By, (c) = Cosigned By      Initials Name Provider Type    AV Easton Marks, OT Occupational Therapist    AS Briana Maya, RN Registered Nurse    ZT Johnny Koehler, PT Student PT Student                    Occupational Therapy Education       Title: PT OT SLP Therapies (Done)       Topic: Occupational Therapy (Done)       Point: ADL training (Done)       Description:   Instruct learner(s) on proper safety adaptation and remediation techniques during self care or transfers.   Instruct in proper use of assistive devices.                  Learning Progress Summary             Patient Acceptance, E, VU by AV at 10/6/2023 1108   Significant Other Acceptance, E, VU by AV at 10/6/2023 1108                         Point: Home exercise program (Done)       Description:   Instruct learner(s) on appropriate technique for monitoring, assisting and/or progressing therapeutic exercises/activities.                  Learning Progress Summary             Patient Acceptance, E, VU by AV at 10/6/2023 1108   Significant Other Acceptance, E, VU by AV at 10/6/2023 1108                         Point: Precautions (Done)       Description:   Instruct learner(s) on prescribed precautions during self-care and functional transfers.                  Learning Progress Summary              Patient Acceptance, E, VU by AV at 10/6/2023 1108   Significant Other Acceptance, E, VU by AV at 10/6/2023 1108                         Point: Body mechanics (Done)       Description:   Instruct learner(s) on proper positioning and spine alignment during self-care, functional mobility activities and/or exercises.                  Learning Progress Summary             Patient Acceptance, E, VU by AV at 10/6/2023 1108   Significant Other Acceptance, E, VU by AV at 10/6/2023 1108                                         User Key       Initials Effective Dates Name Provider Type Discipline     06/16/21 -  Easton Marks, POLLY Occupational Therapist OT                  OT Recommendation and Plan  Planned Therapy Interventions (OT): activity tolerance training, BADL retraining, functional balance retraining, occupation/activity based interventions, patient/caregiver education/training, strengthening exercise, transfer/mobility retraining  Therapy Frequency (OT): 5 times/wk  Plan of Care Review  Plan of Care Reviewed With: patient, spouse  Progress: no change (First session for evaluation)  Outcome Evaluation: Patient presents with limitations of balance, endurance/activity tolerance and strength which impede her ability to perform ADL and transfers as prior.  The skills of a therapist will be required to safely and effectively implement treatment plan to restore maximal level of function.     Time Calculation:   Evaluation Complexity (OT)  Review Occupational Profile/Medical/Therapy History Complexity: expanded/moderate complexity  Assessment, Occupational Performance/Identification of Deficit Complexity: 3-5 performance deficits  Clinical Decision Making Complexity (OT): detailed assessment/moderate complexity  Overall Complexity of Evaluation (OT): moderate complexity     Time Calculation- OT       Row Name 10/06/23 1109             Time Calculation- OT    OT Received On 10/06/23  -AV      OT Goal Re-Cert Due Date  10/15/23  -AV         Untimed Charges    OT Eval/Re-eval Minutes 35  -AV         Total Minutes    Untimed Charges Total Minutes 35  -AV       Total Minutes 35  -AV                User Key  (r) = Recorded By, (t) = Taken By, (c) = Cosigned By      Initials Name Provider Type    Easton Dowd OT Occupational Therapist                  Therapy Charges for Today       Code Description Service Date Service Provider Modifiers Qty    [id number] HC OT EVAL MOD COMPLEXITY 3 10/6/2023 Easton Marks OT GO 1                 Easton Marks OT  10/6/2023

## 2023-10-06 NOTE — PLAN OF CARE
Goal Outcome Evaluation:  Patient alert and oriented. Patient with pain needs, see emar.  Patient with low blood glucose this shift of 53, patient stated that she did not eat supper, in documentation patient had 10 units of insulin 1713, patient remained alert and oriented, asked for ice cream, pudding, sprite, oj, and patient  blood glucose came up to 70, 105, 134. Continue plan of care.

## 2023-10-06 NOTE — PLAN OF CARE
Goal Outcome Evaluation:   Pt presents in a deconditioned state. She was unable to tolerate any functional activity or MMT or ROM due to LLE pain. She uses a wheelchair at home and does not have a prosthesis for her R AKA at this time. She requires skilled PT services to address her activity limitations so that she can safely perform ADL's and tolerate funcitonal activity.               Anticipated Discharge Disposition (PT): sub acute care setting

## 2023-10-06 NOTE — PLAN OF CARE
Problem: Adult Inpatient Plan of Care  Goal: Plan of Care Review  Outcome: Ongoing, Progressing  Flowsheets (Taken 10/6/2023 1732)  Plan of Care Reviewed With: patient  Goal: Patient-Specific Goal (Individualized)  Outcome: Ongoing, Progressing  Goal: Absence of Hospital-Acquired Illness or Injury  Outcome: Ongoing, Progressing  Intervention: Identify and Manage Fall Risk  Recent Flowsheet Documentation  Taken 10/6/2023 1700 by Briana Maya RN  Safety Promotion/Fall Prevention: safety round/check completed  Taken 10/6/2023 1500 by Briana Maya RN  Safety Promotion/Fall Prevention: safety round/check completed  Taken 10/6/2023 1300 by Briana Maya RN  Safety Promotion/Fall Prevention: safety round/check completed  Taken 10/6/2023 1100 by Briana Maya RN  Safety Promotion/Fall Prevention: safety round/check completed  Taken 10/6/2023 0900 by Briana Maya RN  Safety Promotion/Fall Prevention: safety round/check completed  Taken 10/6/2023 0732 by Briana Maya RN  Safety Promotion/Fall Prevention: safety round/check completed  Intervention: Prevent Skin Injury  Recent Flowsheet Documentation  Taken 10/6/2023 0732 by Briana Maya RN  Body Position: sitting up in bed  Intervention: Prevent and Manage VTE (Venous Thromboembolism) Risk  Recent Flowsheet Documentation  Taken 10/6/2023 0732 by Briana Maya RN  Activity Management: bedrest  Intervention: Prevent Infection  Recent Flowsheet Documentation  Taken 10/6/2023 1700 by Briana Maya RN  Infection Prevention:   hand hygiene promoted   single patient room provided  Taken 10/6/2023 1500 by Briana Maya RN  Infection Prevention:   hand hygiene promoted   single patient room provided  Taken 10/6/2023 1300 by Briana Maya RN  Infection Prevention:   hand hygiene promoted   single patient room provided  Taken 10/6/2023 1100 by Briana Maya RN  Infection Prevention:   hand hygiene promoted   single patient room provided  Taken 10/6/2023 0900 by Briana Maya RN  Infection  Prevention:   hand hygiene promoted   single patient room provided  Taken 10/6/2023 0732 by Briana Maya RN  Infection Prevention:   hand hygiene promoted   single patient room provided  Goal: Optimal Comfort and Wellbeing  Outcome: Ongoing, Progressing  Intervention: Monitor Pain and Promote Comfort  Recent Flowsheet Documentation  Taken 10/6/2023 0732 by Briana Maya RN  Pain Management Interventions:   see MAR   quiet environment facilitated   care clustered   pain management plan reviewed with patient/caregiver  Intervention: Provide Person-Centered Care  Recent Flowsheet Documentation  Taken 10/6/2023 0732 by Briana Maya RN  Trust Relationship/Rapport:   care explained   choices provided   emotional support provided   empathic listening provided   questions answered   questions encouraged   reassurance provided   thoughts/feelings acknowledged  Goal: Readiness for Transition of Care  Outcome: Ongoing, Progressing     Problem: Fall Injury Risk  Goal: Absence of Fall and Fall-Related Injury  Outcome: Ongoing, Progressing  Intervention: Promote Injury-Free Environment  Recent Flowsheet Documentation  Taken 10/6/2023 1700 by Briana Maya RN  Safety Promotion/Fall Prevention: safety round/check completed  Taken 10/6/2023 1500 by Briana Maya RN  Safety Promotion/Fall Prevention: safety round/check completed  Taken 10/6/2023 1300 by Briana Maya RN  Safety Promotion/Fall Prevention: safety round/check completed  Taken 10/6/2023 1100 by Briana Maya RN  Safety Promotion/Fall Prevention: safety round/check completed  Taken 10/6/2023 0900 by Briana Maya RN  Safety Promotion/Fall Prevention: safety round/check completed  Taken 10/6/2023 0732 by Briana Maya RN  Safety Promotion/Fall Prevention: safety round/check completed     Problem: Asthma Comorbidity  Goal: Maintenance of Asthma Control  Outcome: Ongoing, Progressing     Problem: Behavioral Health Comorbidity  Goal: Maintenance of Behavioral Health Symptom  Control  Outcome: Ongoing, Progressing     Problem: COPD (Chronic Obstructive Pulmonary Disease) Comorbidity  Goal: Maintenance of COPD Symptom Control  Outcome: Ongoing, Progressing  Intervention: Maintain COPD-Symptom Control  Recent Flowsheet Documentation  Taken 10/6/2023 0732 by Briana Maya RN  Supportive Measures: active listening utilized     Problem: Diabetes Comorbidity  Goal: Blood Glucose Level Within Targeted Range  Outcome: Ongoing, Progressing     Problem: Heart Failure Comorbidity  Goal: Maintenance of Heart Failure Symptom Control  Outcome: Ongoing, Progressing     Problem: Hypertension Comorbidity  Goal: Blood Pressure in Desired Range  Outcome: Ongoing, Progressing     Problem: Obstructive Sleep Apnea Risk or Actual Comorbidity Management  Goal: Unobstructed Breathing During Sleep  Outcome: Ongoing, Progressing     Problem: Osteoarthritis Comorbidity  Goal: Maintenance of Osteoarthritis Symptom Control  Outcome: Ongoing, Progressing  Intervention: Maintain Osteoarthritis Symptom Control  Recent Flowsheet Documentation  Taken 10/6/2023 0732 by Briana Maya RN  Activity Management: bedrest     Problem: Pain Chronic (Persistent) (Comorbidity Management)  Goal: Acceptable Pain Control and Functional Ability  Outcome: Ongoing, Progressing  Intervention: Develop Pain Management Plan  Recent Flowsheet Documentation  Taken 10/6/2023 0732 by Briana Maya RN  Pain Management Interventions:   see MAR   quiet environment facilitated   care clustered   pain management plan reviewed with patient/caregiver  Intervention: Optimize Psychosocial Wellbeing  Recent Flowsheet Documentation  Taken 10/6/2023 0732 by Briana Maya RN  Supportive Measures: active listening utilized  Diversional Activities: television  Family/Support System Care: support provided     Problem: Seizure Disorder Comorbidity  Goal: Maintenance of Seizure Control  Outcome: Ongoing, Progressing     Problem: Skin Injury Risk Increased  Goal: Skin  Health and Integrity  Outcome: Ongoing, Progressing  Intervention: Optimize Skin Protection  Recent Flowsheet Documentation  Taken 10/6/2023 0732 by Briana Maya, RN  Head of Bed (HOB) Positioning: HOB elevated   Goal Outcome Evaluation:  Plan of Care Reviewed With: patient      Pt is alert and oriented x4.  Pt pain managed with prn pain medication.  Pt VS wNL for pt.  Insulin adm as ordered

## 2023-10-06 NOTE — PROGRESS NOTES
[facility]     Progress Note    Patient Name: Italia Olvera  : 1960  MRN: [id number]  Primary Care Physician:  Carloz Shoemaker MD  Date of admission: 10/4/2023      Subjective   Brief summary.  Patient admitted with cellulitis and wound infection of the left leg.      HPI:  Patient feeling somewhat better, heart rate and blood pressure better pain persist.  Cannot sleep all night.  No chest pain.  Leg wound persist.  Drainage improved.   at bedside    Review of Systems     Complains of fatigue and anxiety.  No fever chills last night  No chest pain.  Burning in the foot.  No shortness of breath  Decreased sleep        Objective     Vitals:   Temp:  [98 øF (36.7 øC)-99.4 øF (37.4 øC)] 98 øF (36.7 øC)  Heart Rate:  [73-96] 73  Resp:  [12-20] 12  BP: (107-158)/(56-79) 128/56  Flow (L/min):  [0-2] 2    Physical Exam :     Elderly female not in acute distress.    HEENT with oral mucosa slightly dry  Heart regular and tacky  Lungs diminished breath sounds.  Extremity left lower extremity with redness and cellulitic changes surgical wound healing..  Edema noted.      Result Review:  I have personally reviewed the results from the time of this admission to 10/6/2023 16:20 EDT and agree with these findings:  []  Laboratory  []  Microbiology  []  Radiology  []  EKG/Telemetry   []  Cardiology/Vascular   []  Pathology  []  Old records  []  Other:    Venous Doppler negative      Assessment / Plan       Active Hospital Problems:  Active Hospital Problems    Diagnosis     **Cellulitis, leg     Fracture of left lower leg     Cellulitis of leg     Paroxysmal atrial fibrillation     Anxiety disorder     Anemia     T2DM (type 2 diabetes mellitus)        Plan:   Continue IV antibiotics,   Wound care.  Keep leg elevated.  Doppler negative.  Blood pressure and heart rate improved.  PT OT efforts.  Offered inpatient rehab but patient and  both willing to go home.  Possible discharge to home next week    DVT  prophylaxis:  Medical DVT prophylaxis orders are present.    CODE STATUS:   Code Status (Patient has no pulse and is not breathing): CPR (Attempt to Resuscitate)  Medical Interventions (Patient has pulse or is breathing): Full Support            Electronically signed by Rudy Garnett MD, 10/06/23, 4:24 PM EDT.

## 2023-10-06 NOTE — PLAN OF CARE
Goal Outcome Evaluation:  Plan of Care Reviewed With: patient, spouse        Progress: no change (First session for evaluation)  Outcome Evaluation: Patient presents with limitations of balance, endurance/activity tolerance and strength which impede her ability to perform ADL and transfers as prior.  The skills of a therapist will be required to safely and effectively implement treatment plan to restore maximal level of function.      Anticipated Discharge Disposition (OT): sub acute care setting

## 2023-10-07 LAB
BACTERIA SPEC AEROBE CULT: NORMAL
GLUCOSE BLDC GLUCOMTR-MCNC: 128 MG/DL (ref 70–99)
GLUCOSE BLDC GLUCOMTR-MCNC: 130 MG/DL (ref 70–99)
GLUCOSE BLDC GLUCOMTR-MCNC: 148 MG/DL (ref 70–99)
GLUCOSE BLDC GLUCOMTR-MCNC: 163 MG/DL (ref 70–99)
GRAM STN SPEC: NORMAL
GRAM STN SPEC: NORMAL

## 2023-10-07 PROCEDURE — 63710000001 INSULIN LISPRO (HUMAN) PER 5 UNITS: Performed by: INTERNAL MEDICINE

## 2023-10-07 PROCEDURE — 25010000002 CEFAZOLIN 1-4 GM/50ML-% SOLUTION: Performed by: INTERNAL MEDICINE

## 2023-10-07 PROCEDURE — 82948 REAGENT STRIP/BLOOD GLUCOSE: CPT

## 2023-10-07 PROCEDURE — 25010000002 HYDROMORPHONE 1 MG/ML SOLUTION: Performed by: INTERNAL MEDICINE

## 2023-10-07 RX ORDER — ASPIRIN 81 MG/1
81 TABLET ORAL DAILY
Status: DISCONTINUED | OUTPATIENT
Start: 2023-10-07 | End: 2023-10-10 | Stop reason: HOSPADM

## 2023-10-07 RX ORDER — BISACODYL 5 MG/1
5 TABLET, DELAYED RELEASE ORAL DAILY PRN
Status: DISCONTINUED | OUTPATIENT
Start: 2023-10-07 | End: 2023-10-10 | Stop reason: HOSPADM

## 2023-10-07 RX ORDER — POLYETHYLENE GLYCOL 3350 17 G/17G
17 POWDER, FOR SOLUTION ORAL DAILY PRN
Status: DISCONTINUED | OUTPATIENT
Start: 2023-10-07 | End: 2023-10-10 | Stop reason: HOSPADM

## 2023-10-07 RX ORDER — AMOXICILLIN 250 MG
2 CAPSULE ORAL 2 TIMES DAILY
Status: DISCONTINUED | OUTPATIENT
Start: 2023-10-07 | End: 2023-10-10 | Stop reason: HOSPADM

## 2023-10-07 RX ORDER — FAMOTIDINE 20 MG/1
20 TABLET, FILM COATED ORAL NIGHTLY
Status: DISCONTINUED | OUTPATIENT
Start: 2023-10-07 | End: 2023-10-07

## 2023-10-07 RX ORDER — BISACODYL 10 MG
10 SUPPOSITORY, RECTAL RECTAL DAILY PRN
Status: DISCONTINUED | OUTPATIENT
Start: 2023-10-07 | End: 2023-10-10 | Stop reason: HOSPADM

## 2023-10-07 RX ORDER — PANTOPRAZOLE SODIUM 40 MG/1
40 TABLET, DELAYED RELEASE ORAL DAILY
Status: DISCONTINUED | OUTPATIENT
Start: 2023-10-07 | End: 2023-10-10 | Stop reason: HOSPADM

## 2023-10-07 RX ORDER — NICOTINE 21 MG/24HR
1 PATCH, TRANSDERMAL 24 HOURS TRANSDERMAL
Status: DISCONTINUED | OUTPATIENT
Start: 2023-10-07 | End: 2023-10-10 | Stop reason: HOSPADM

## 2023-10-07 RX ADMIN — SENNOSIDES AND DOCUSATE SODIUM 2 TABLET: 50; 8.6 TABLET ORAL at 21:21

## 2023-10-07 RX ADMIN — PANTOPRAZOLE SODIUM 40 MG: 40 TABLET, DELAYED RELEASE ORAL at 16:25

## 2023-10-07 RX ADMIN — HYDROMORPHONE HYDROCHLORIDE 0.5 MG: 1 INJECTION, SOLUTION INTRAMUSCULAR; INTRAVENOUS; SUBCUTANEOUS at 07:38

## 2023-10-07 RX ADMIN — CARVEDILOL 3.12 MG: 3.12 TABLET, FILM COATED ORAL at 18:20

## 2023-10-07 RX ADMIN — Medication 10 ML: at 07:38

## 2023-10-07 RX ADMIN — HYDROMORPHONE HYDROCHLORIDE 0.5 MG: 1 INJECTION, SOLUTION INTRAMUSCULAR; INTRAVENOUS; SUBCUTANEOUS at 05:23

## 2023-10-07 RX ADMIN — HYDROMORPHONE HYDROCHLORIDE 0.5 MG: 1 INJECTION, SOLUTION INTRAMUSCULAR; INTRAVENOUS; SUBCUTANEOUS at 03:13

## 2023-10-07 RX ADMIN — TRAZODONE HYDROCHLORIDE 100 MG: 50 TABLET ORAL at 21:22

## 2023-10-07 RX ADMIN — HYDROMORPHONE HYDROCHLORIDE 0.5 MG: 1 INJECTION, SOLUTION INTRAMUSCULAR; INTRAVENOUS; SUBCUTANEOUS at 01:07

## 2023-10-07 RX ADMIN — MIRTAZAPINE 15 MG: 15 TABLET, FILM COATED ORAL at 21:21

## 2023-10-07 RX ADMIN — CEFAZOLIN SODIUM 1000 MG: 1 INJECTION, SOLUTION INTRAVENOUS at 21:21

## 2023-10-07 RX ADMIN — SERTRALINE HYDROCHLORIDE 50 MG: 25 TABLET ORAL at 09:02

## 2023-10-07 RX ADMIN — HYDROMORPHONE HYDROCHLORIDE 0.5 MG: 1 INJECTION, SOLUTION INTRAMUSCULAR; INTRAVENOUS; SUBCUTANEOUS at 18:21

## 2023-10-07 RX ADMIN — NICOTINE 1 PATCH: 21 PATCH, EXTENDED RELEASE TRANSDERMAL at 16:25

## 2023-10-07 RX ADMIN — LISINOPRIL 20 MG: 20 TABLET ORAL at 09:02

## 2023-10-07 RX ADMIN — HYDROMORPHONE HYDROCHLORIDE 0.5 MG: 1 INJECTION, SOLUTION INTRAMUSCULAR; INTRAVENOUS; SUBCUTANEOUS at 13:05

## 2023-10-07 RX ADMIN — SENNOSIDES AND DOCUSATE SODIUM 2 TABLET: 50; 8.6 TABLET ORAL at 12:24

## 2023-10-07 RX ADMIN — HYDROMORPHONE HYDROCHLORIDE 0.5 MG: 1 INJECTION, SOLUTION INTRAMUSCULAR; INTRAVENOUS; SUBCUTANEOUS at 09:54

## 2023-10-07 RX ADMIN — APIXABAN 5 MG: 5 TABLET, FILM COATED ORAL at 21:20

## 2023-10-07 RX ADMIN — HYDROMORPHONE HYDROCHLORIDE 0.5 MG: 1 INJECTION, SOLUTION INTRAMUSCULAR; INTRAVENOUS; SUBCUTANEOUS at 22:42

## 2023-10-07 RX ADMIN — CARVEDILOL 3.12 MG: 3.12 TABLET, FILM COATED ORAL at 09:03

## 2023-10-07 RX ADMIN — ATORVASTATIN CALCIUM 10 MG: 10 TABLET, FILM COATED ORAL at 09:02

## 2023-10-07 RX ADMIN — HYDROMORPHONE HYDROCHLORIDE 0.5 MG: 1 INJECTION, SOLUTION INTRAMUSCULAR; INTRAVENOUS; SUBCUTANEOUS at 20:32

## 2023-10-07 RX ADMIN — APIXABAN 5 MG: 5 TABLET, FILM COATED ORAL at 09:03

## 2023-10-07 RX ADMIN — ASPIRIN 81 MG: 81 TABLET, COATED ORAL at 16:25

## 2023-10-07 RX ADMIN — CEFAZOLIN SODIUM 1000 MG: 1 INJECTION, SOLUTION INTRAVENOUS at 12:24

## 2023-10-07 RX ADMIN — INSULIN LISPRO 10 UNITS: 100 INJECTION, SOLUTION INTRAVENOUS; SUBCUTANEOUS at 12:24

## 2023-10-07 RX ADMIN — CEFAZOLIN SODIUM 1000 MG: 1 INJECTION, SOLUTION INTRAVENOUS at 03:15

## 2023-10-07 NOTE — PLAN OF CARE
Goal Outcome Evaluation:  Plan of Care Reviewed With: patient        Progress: no change  Outcome Evaluation: Patient had complaints of pain, medicated per MAR. VSS. Patient rested some during shift. Will continue plan of care.

## 2023-10-07 NOTE — PROGRESS NOTES
[facility]     Progress Note    Patient Name: Italia Olvera  : 1960  MRN: [id number]  Primary Care Physician:  Carloz Shoemaker MD  Date of admission: 10/4/2023      Subjective   Brief summary.  Patient admitted with cellulitis and wound infection of the left leg.      HPI:  Patient feeling somewhat better, heart rate and blood pressure better  Lower extremity pain tolerable  No chest pain.  Leg wound persist.  Drainage improved.  Having chronic cough with brownish mucus.  Still smokes    Review of Systems     Complains of fatigue and anxiety.  No fever chills last night  No chest pain.  Burning in the foot.  No shortness of breath  Decreased sleep        Objective     Vitals:   Temp:  [98 øF (36.7 øC)-99.5 øF (37.5 øC)] 99.5 øF (37.5 øC)  Heart Rate:  [73-90] 82  Resp:  [12-22] 18  BP: (128-179)/(49-72) 159/60  Flow (L/min):  [2] 2    Physical Exam :     Elderly female not in acute distress.    HEENT with oral mucosa slightly dry  Heart regular and tacky  Lungs diminished breath sounds.  Extremity left lower extremity with redness and cellulitic changes surgical wound healing..  Edema noted.  Lower extremity dressed.  S/p right above-knee amputation      Result Review:  I have personally reviewed the results from the time of this admission to 10/7/2023 15:16 EDT and agree with these findings:  []  Laboratory  []  Microbiology  []  Radiology  []  EKG/Telemetry   []  Cardiology/Vascular   []  Pathology  []  Old records  []  Other:    Venous Doppler negative      Assessment / Plan       Active Hospital Problems:  Active Hospital Problems    Diagnosis     **Cellulitis, leg     Fracture of left lower leg     Cellulitis of leg     Paroxysmal atrial fibrillation     Anxiety disorder     Anemia     T2DM (type 2 diabetes mellitus)    S/p right AKA.  Tobacco abuse disorder  COPD.  Not in exacerbation    Plan:   Continue IV antibiotics, Ancef  Wound care.  Wound culture negative  Keep leg elevated.  Doppler  negative.  Blood pressure and heart rate improved.  Bronchodilator and bronchopulmonary hygiene protocol  Sputum culture  Nicotine patch  Continue aspirin, Protonix, Coreg and lisinopril  Bowel regimen  PT OT efforts.  Offered inpatient rehab but patient and  both willing to go home.  Possible discharge to home next week    DVT prophylaxis:  Medical DVT prophylaxis orders are present.  Eliquis    CODE STATUS:   Code Status (Patient has no pulse and is not breathing): CPR (Attempt to Resuscitate)  Medical Interventions (Patient has pulse or is breathing): Full Support        Electronically signed by Sami Caba MD, 10/07/23, 3:18 PM EDT.

## 2023-10-07 NOTE — PLAN OF CARE
Goal Outcome Evaluation:  Plan of Care Reviewed With: patient   Pt requesting PRN dilaudid Q2 hours for left leg pain. Blood glucose WNL throughout shift. Wounds redressed during shift and tolerated well.

## 2023-10-07 NOTE — PROGRESS NOTES
Respiratory Therapist Broncho-Pulmonary Hygiene Progress Note      Patient Name:  Italia Olvera  YOB: 1960    Italia Olvera meets the qualification for Level 2 of the Bronco-Pulmonary Hygiene Protocol. This was based on my daily patient assessment and includes review of chest x-ray results, cough ability and quality, oxygenation, secretions or risk for secretion development and patient mobility.     Broncho-Pulmonary Hygiene Assessment:    Level of Movement: Actively changing positions-requires assistance  Disoriented/Follows Commands    Breath Sounds: Diminished and/or coarse rhonchi    Cough: Strong, effective    Chest X-Ray: No CXR available    Sputum Productions: None or small amount of thin or watery secretions with effective cough    History and Physical: Home use of oxygen  and Chronic condition    SpO2 to Oxygen Need: greater than 92% on room air or  less than 3L nasal canula    Current SpO2 is: 98 on 2l    Based on this information, I have completed the following interventions: Aerobika with bronchodialtor medication or TID      Electronically signed by Liss Rosario RRT, 10/07/23, 5:31 PM EDT.

## 2023-10-08 LAB
GLUCOSE BLDC GLUCOMTR-MCNC: 117 MG/DL (ref 70–99)
GLUCOSE BLDC GLUCOMTR-MCNC: 121 MG/DL (ref 70–99)
GLUCOSE BLDC GLUCOMTR-MCNC: 151 MG/DL (ref 70–99)
GLUCOSE BLDC GLUCOMTR-MCNC: 162 MG/DL (ref 70–99)
GLUCOSE BLDC GLUCOMTR-MCNC: 68 MG/DL (ref 70–99)

## 2023-10-08 PROCEDURE — 63710000001 INSULIN LISPRO (HUMAN) PER 5 UNITS: Performed by: INTERNAL MEDICINE

## 2023-10-08 PROCEDURE — 94799 UNLISTED PULMONARY SVC/PX: CPT

## 2023-10-08 PROCEDURE — 82948 REAGENT STRIP/BLOOD GLUCOSE: CPT

## 2023-10-08 PROCEDURE — 25010000002 HYDROMORPHONE 1 MG/ML SOLUTION: Performed by: INTERNAL MEDICINE

## 2023-10-08 PROCEDURE — 25010000002 CEFAZOLIN 1-4 GM/50ML-% SOLUTION: Performed by: INTERNAL MEDICINE

## 2023-10-08 RX ORDER — AMLODIPINE BESYLATE 5 MG/1
5 TABLET ORAL
Status: DISCONTINUED | OUTPATIENT
Start: 2023-10-08 | End: 2023-10-10 | Stop reason: HOSPADM

## 2023-10-08 RX ADMIN — HYDROMORPHONE HYDROCHLORIDE 0.5 MG: 1 INJECTION, SOLUTION INTRAMUSCULAR; INTRAVENOUS; SUBCUTANEOUS at 00:57

## 2023-10-08 RX ADMIN — AMLODIPINE BESYLATE 5 MG: 5 TABLET ORAL at 08:56

## 2023-10-08 RX ADMIN — HYDROMORPHONE HYDROCHLORIDE 0.5 MG: 1 INJECTION, SOLUTION INTRAMUSCULAR; INTRAVENOUS; SUBCUTANEOUS at 02:44

## 2023-10-08 RX ADMIN — INSULIN LISPRO 10 UNITS: 100 INJECTION, SOLUTION INTRAVENOUS; SUBCUTANEOUS at 08:57

## 2023-10-08 RX ADMIN — MIRTAZAPINE 15 MG: 15 TABLET, FILM COATED ORAL at 21:00

## 2023-10-08 RX ADMIN — NICOTINE 1 PATCH: 21 PATCH, EXTENDED RELEASE TRANSDERMAL at 08:57

## 2023-10-08 RX ADMIN — SENNOSIDES AND DOCUSATE SODIUM 2 TABLET: 50; 8.6 TABLET ORAL at 21:00

## 2023-10-08 RX ADMIN — HYDROMORPHONE HYDROCHLORIDE 0.5 MG: 1 INJECTION, SOLUTION INTRAMUSCULAR; INTRAVENOUS; SUBCUTANEOUS at 09:00

## 2023-10-08 RX ADMIN — APIXABAN 5 MG: 5 TABLET, FILM COATED ORAL at 21:00

## 2023-10-08 RX ADMIN — CEFAZOLIN SODIUM 1000 MG: 1 INJECTION, SOLUTION INTRAVENOUS at 12:23

## 2023-10-08 RX ADMIN — APIXABAN 5 MG: 5 TABLET, FILM COATED ORAL at 08:57

## 2023-10-08 RX ADMIN — ASPIRIN 81 MG: 81 TABLET, COATED ORAL at 08:56

## 2023-10-08 RX ADMIN — LISINOPRIL 20 MG: 20 TABLET ORAL at 08:56

## 2023-10-08 RX ADMIN — HYDROMORPHONE HYDROCHLORIDE 0.5 MG: 1 INJECTION, SOLUTION INTRAMUSCULAR; INTRAVENOUS; SUBCUTANEOUS at 11:37

## 2023-10-08 RX ADMIN — HYDROMORPHONE HYDROCHLORIDE 0.5 MG: 1 INJECTION, SOLUTION INTRAMUSCULAR; INTRAVENOUS; SUBCUTANEOUS at 14:39

## 2023-10-08 RX ADMIN — Medication 10 ML: at 08:57

## 2023-10-08 RX ADMIN — HYDROMORPHONE HYDROCHLORIDE 0.5 MG: 1 INJECTION, SOLUTION INTRAMUSCULAR; INTRAVENOUS; SUBCUTANEOUS at 18:52

## 2023-10-08 RX ADMIN — CEFAZOLIN SODIUM 1000 MG: 1 INJECTION, SOLUTION INTRAVENOUS at 04:36

## 2023-10-08 RX ADMIN — Medication 10 ML: at 21:00

## 2023-10-08 RX ADMIN — HYDROMORPHONE HYDROCHLORIDE 0.5 MG: 1 INJECTION, SOLUTION INTRAMUSCULAR; INTRAVENOUS; SUBCUTANEOUS at 04:36

## 2023-10-08 RX ADMIN — HYDROMORPHONE HYDROCHLORIDE 0.5 MG: 1 INJECTION, SOLUTION INTRAMUSCULAR; INTRAVENOUS; SUBCUTANEOUS at 06:58

## 2023-10-08 RX ADMIN — CEFAZOLIN SODIUM 1000 MG: 1 INJECTION, SOLUTION INTRAVENOUS at 20:59

## 2023-10-08 RX ADMIN — ATORVASTATIN CALCIUM 10 MG: 10 TABLET, FILM COATED ORAL at 08:57

## 2023-10-08 RX ADMIN — HYDROMORPHONE HYDROCHLORIDE 0.5 MG: 1 INJECTION, SOLUTION INTRAMUSCULAR; INTRAVENOUS; SUBCUTANEOUS at 20:59

## 2023-10-08 RX ADMIN — CARVEDILOL 3.12 MG: 3.12 TABLET, FILM COATED ORAL at 16:51

## 2023-10-08 RX ADMIN — PANTOPRAZOLE SODIUM 40 MG: 40 TABLET, DELAYED RELEASE ORAL at 08:56

## 2023-10-08 RX ADMIN — SERTRALINE HYDROCHLORIDE 50 MG: 25 TABLET ORAL at 08:56

## 2023-10-08 RX ADMIN — CARVEDILOL 3.12 MG: 3.12 TABLET, FILM COATED ORAL at 08:56

## 2023-10-08 RX ADMIN — HYDROMORPHONE HYDROCHLORIDE 0.5 MG: 1 INJECTION, SOLUTION INTRAMUSCULAR; INTRAVENOUS; SUBCUTANEOUS at 16:52

## 2023-10-08 NOTE — PLAN OF CARE
Goal Outcome Evaluation:              Outcome Evaluation: Complained of severe pain all over.  Medicated for pain as ordered.  Wound care done as ordered.  New wound consult placed for open spot on bottom.  Call light within reach. Continue plan of care.

## 2023-10-08 NOTE — PROGRESS NOTES
[facility]     Progress Note    Patient Name: Italia Olvera  : 1960  MRN: [id number]  Primary Care Physician:  Carloz Shoemaker MD  Date of admission: 10/4/2023      Subjective   Brief summary.  Patient admitted with cellulitis and wound infection of the left leg.      HPI:  Patient feeling somewhat better, heart rate better, blood pressure trending up  Lower extremity pain tolerable  No chest pain.  Leg wound persist.  Drainage improved.  Having chronic cough with brownish mucus.  Still smokes    Review of Systems     Complains of fatigue and anxiety.  No fever chills last night  No chest pain.  Burning in the foot.  No shortness of breath  Decreased sleep        Objective     Vitals:   Temp:  [98.4 øF (36.9 øC)-99.3 øF (37.4 øC)] 98.4 øF (36.9 øC)  Heart Rate:  [75-88] 77  Resp:  [18] 18  BP: (142-186)/(52-76) 142/52  Flow (L/min):  [2] 2    Physical Exam :     Elderly female not in acute distress.    HEENT with oral mucosa slightly dry  Heart regular and tacky  Lungs diminished breath sounds.  Extremity left lower extremity with redness and cellulitic changes surgical wound healing..  Edema noted.  Lower extremity dressed.  S/p right above-knee amputation      Result Review:  I have personally reviewed the results from the time of this admission to 10/8/2023 12:59 EDT and agree with these findings:  []  Laboratory  []  Microbiology  []  Radiology  []  EKG/Telemetry   []  Cardiology/Vascular   []  Pathology  []  Old records  []  Other:    Venous Doppler negative      Assessment / Plan       Active Hospital Problems:  Active Hospital Problems    Diagnosis     **Cellulitis, leg     Fracture of left lower leg     Cellulitis of leg     Paroxysmal atrial fibrillation     Anxiety disorder     Anemia     T2DM (type 2 diabetes mellitus)    S/p right AKA.  Tobacco abuse disorder  COPD.  Not in exacerbation    Plan:   Continue IV antibiotics, Ancef  Wound care.  Wound culture negative  Keep leg  elevated.  Doppler negative.  heart rate improved.  Bronchodilator and bronchopulmonary hygiene protocol  Sputum culture  Nicotine patch  Continue aspirin, Protonix, Coreg and lisinopril.  Resume home Norvasc  Bowel regimen  PT OT efforts.  Discussed with nursing  Offered inpatient rehab but patient and  both willing to go home.  Possible discharge to home next week    DVT prophylaxis:  Medical DVT prophylaxis orders are present.  Eliquis    CODE STATUS:   Code Status (Patient has no pulse and is not breathing): CPR (Attempt to Resuscitate)  Medical Interventions (Patient has pulse or is breathing): Full Support        Electronically signed by Sami Caba MD, 10/08/23, 1:00 PM EDT.

## 2023-10-09 LAB
GLUCOSE BLDC GLUCOMTR-MCNC: 113 MG/DL (ref 70–99)
GLUCOSE BLDC GLUCOMTR-MCNC: 114 MG/DL (ref 70–99)
GLUCOSE BLDC GLUCOMTR-MCNC: 151 MG/DL (ref 70–99)
GLUCOSE BLDC GLUCOMTR-MCNC: 164 MG/DL (ref 70–99)

## 2023-10-09 PROCEDURE — 63710000001 INSULIN LISPRO (HUMAN) PER 5 UNITS: Performed by: INTERNAL MEDICINE

## 2023-10-09 PROCEDURE — 25010000002 HYDROMORPHONE 1 MG/ML SOLUTION: Performed by: INTERNAL MEDICINE

## 2023-10-09 PROCEDURE — 25010000002 CEFAZOLIN 1-4 GM/50ML-% SOLUTION: Performed by: INTERNAL MEDICINE

## 2023-10-09 PROCEDURE — 25010000002 HYDRALAZINE PER 20 MG: Performed by: INTERNAL MEDICINE

## 2023-10-09 PROCEDURE — 82948 REAGENT STRIP/BLOOD GLUCOSE: CPT

## 2023-10-09 RX ORDER — OXYCODONE AND ACETAMINOPHEN 10; 325 MG/1; MG/1
1 TABLET ORAL EVERY 6 HOURS PRN
Status: DISCONTINUED | OUTPATIENT
Start: 2023-10-09 | End: 2023-10-10 | Stop reason: HOSPADM

## 2023-10-09 RX ORDER — ZOLPIDEM TARTRATE 5 MG/1
5 TABLET ORAL NIGHTLY PRN
Status: DISCONTINUED | OUTPATIENT
Start: 2023-10-09 | End: 2023-10-10 | Stop reason: HOSPADM

## 2023-10-09 RX ORDER — HYDROXYZINE PAMOATE 25 MG/1
25 CAPSULE ORAL 3 TIMES DAILY PRN
Status: DISCONTINUED | OUTPATIENT
Start: 2023-10-09 | End: 2023-10-10 | Stop reason: HOSPADM

## 2023-10-09 RX ADMIN — INSULIN LISPRO 10 UNITS: 100 INJECTION, SOLUTION INTRAVENOUS; SUBCUTANEOUS at 17:22

## 2023-10-09 RX ADMIN — CARVEDILOL 3.12 MG: 3.12 TABLET, FILM COATED ORAL at 10:03

## 2023-10-09 RX ADMIN — APIXABAN 5 MG: 5 TABLET, FILM COATED ORAL at 22:55

## 2023-10-09 RX ADMIN — OXYCODONE HYDROCHLORIDE AND ACETAMINOPHEN 1 TABLET: 10; 325 TABLET ORAL at 22:55

## 2023-10-09 RX ADMIN — HYDROMORPHONE HYDROCHLORIDE 0.5 MG: 1 INJECTION, SOLUTION INTRAMUSCULAR; INTRAVENOUS; SUBCUTANEOUS at 08:46

## 2023-10-09 RX ADMIN — ASPIRIN 81 MG: 81 TABLET, COATED ORAL at 08:46

## 2023-10-09 RX ADMIN — SENNOSIDES AND DOCUSATE SODIUM 2 TABLET: 50; 8.6 TABLET ORAL at 08:46

## 2023-10-09 RX ADMIN — HYDROMORPHONE HYDROCHLORIDE 0.5 MG: 1 INJECTION, SOLUTION INTRAMUSCULAR; INTRAVENOUS; SUBCUTANEOUS at 02:07

## 2023-10-09 RX ADMIN — APIXABAN 5 MG: 5 TABLET, FILM COATED ORAL at 08:46

## 2023-10-09 RX ADMIN — CEFAZOLIN SODIUM 1000 MG: 1 INJECTION, SOLUTION INTRAVENOUS at 04:08

## 2023-10-09 RX ADMIN — MIRTAZAPINE 15 MG: 15 TABLET, FILM COATED ORAL at 22:55

## 2023-10-09 RX ADMIN — HYDROMORPHONE HYDROCHLORIDE 0.5 MG: 1 INJECTION, SOLUTION INTRAMUSCULAR; INTRAVENOUS; SUBCUTANEOUS at 04:08

## 2023-10-09 RX ADMIN — NICOTINE 1 PATCH: 21 PATCH, EXTENDED RELEASE TRANSDERMAL at 08:53

## 2023-10-09 RX ADMIN — OXYCODONE HYDROCHLORIDE AND ACETAMINOPHEN 1 TABLET: 10; 325 TABLET ORAL at 10:03

## 2023-10-09 RX ADMIN — SERTRALINE HYDROCHLORIDE 50 MG: 25 TABLET ORAL at 08:46

## 2023-10-09 RX ADMIN — OXYCODONE HYDROCHLORIDE AND ACETAMINOPHEN 1 TABLET: 10; 325 TABLET ORAL at 16:07

## 2023-10-09 RX ADMIN — CARVEDILOL 3.12 MG: 3.12 TABLET, FILM COATED ORAL at 17:22

## 2023-10-09 RX ADMIN — SENNOSIDES AND DOCUSATE SODIUM 2 TABLET: 50; 8.6 TABLET ORAL at 22:55

## 2023-10-09 RX ADMIN — LISINOPRIL 20 MG: 20 TABLET ORAL at 08:46

## 2023-10-09 RX ADMIN — CEFAZOLIN SODIUM 1000 MG: 1 INJECTION, SOLUTION INTRAVENOUS at 12:55

## 2023-10-09 RX ADMIN — ATORVASTATIN CALCIUM 10 MG: 10 TABLET, FILM COATED ORAL at 08:48

## 2023-10-09 RX ADMIN — HYDROXYZINE PAMOATE 25 MG: 25 CAPSULE ORAL at 23:36

## 2023-10-09 RX ADMIN — ZOLPIDEM TARTRATE 5 MG: 5 TABLET ORAL at 22:55

## 2023-10-09 RX ADMIN — INSULIN LISPRO 10 UNITS: 100 INJECTION, SOLUTION INTRAVENOUS; SUBCUTANEOUS at 08:46

## 2023-10-09 RX ADMIN — AMLODIPINE BESYLATE 5 MG: 5 TABLET ORAL at 08:46

## 2023-10-09 RX ADMIN — HYDROMORPHONE HYDROCHLORIDE 0.5 MG: 1 INJECTION, SOLUTION INTRAMUSCULAR; INTRAVENOUS; SUBCUTANEOUS at 06:17

## 2023-10-09 RX ADMIN — INSULIN LISPRO 10 UNITS: 100 INJECTION, SOLUTION INTRAVENOUS; SUBCUTANEOUS at 12:07

## 2023-10-09 RX ADMIN — HYDRALAZINE HYDROCHLORIDE 20 MG: 20 INJECTION, SOLUTION INTRAMUSCULAR; INTRAVENOUS at 23:36

## 2023-10-09 RX ADMIN — HYDROMORPHONE HYDROCHLORIDE 0.5 MG: 1 INJECTION, SOLUTION INTRAMUSCULAR; INTRAVENOUS; SUBCUTANEOUS at 00:07

## 2023-10-09 RX ADMIN — PANTOPRAZOLE SODIUM 40 MG: 40 TABLET, DELAYED RELEASE ORAL at 08:46

## 2023-10-09 NOTE — PLAN OF CARE
Problem: Adult Inpatient Plan of Care  Goal: Plan of Care Review  Outcome: Ongoing, Progressing  Flowsheets (Taken 10/9/2023 1821)  Plan of Care Reviewed With: patient  Goal: Patient-Specific Goal (Individualized)  Outcome: Ongoing, Progressing  Goal: Absence of Hospital-Acquired Illness or Injury  Outcome: Ongoing, Progressing  Intervention: Identify and Manage Fall Risk  Recent Flowsheet Documentation  Taken 10/9/2023 1700 by Briana Maya RN  Safety Promotion/Fall Prevention: safety round/check completed  Taken 10/9/2023 1500 by Briana Maya RN  Safety Promotion/Fall Prevention: safety round/check completed  Taken 10/9/2023 1300 by Briana Maya RN  Safety Promotion/Fall Prevention: safety round/check completed  Taken 10/9/2023 1100 by Briana Maya RN  Safety Promotion/Fall Prevention: safety round/check completed  Taken 10/9/2023 0900 by Briana Maya RN  Safety Promotion/Fall Prevention: safety round/check completed  Taken 10/9/2023 0733 by Briana Maya RN  Safety Promotion/Fall Prevention: safety round/check completed  Intervention: Prevent Skin Injury  Recent Flowsheet Documentation  Taken 10/9/2023 0733 by Briana Maya RN  Skin Protection:   adhesive use limited   tubing/devices free from skin contact  Intervention: Prevent and Manage VTE (Venous Thromboembolism) Risk  Recent Flowsheet Documentation  Taken 10/9/2023 0733 by Briana Maya RN  Activity Management: bedrest  Intervention: Prevent Infection  Recent Flowsheet Documentation  Taken 10/9/2023 1700 by Briana Maya RN  Infection Prevention:   hand hygiene promoted   single patient room provided  Taken 10/9/2023 1500 by Briana Maya RN  Infection Prevention:   hand hygiene promoted   single patient room provided  Taken 10/9/2023 1300 by Briana Maya RN  Infection Prevention:   hand hygiene promoted   single patient room provided  Taken 10/9/2023 1100 by Briana Maya RN  Infection Prevention:   hand hygiene promoted   single patient room provided  Taken  10/9/2023 0900 by Briana Maya RN  Infection Prevention:   hand hygiene promoted   single patient room provided  Taken 10/9/2023 0733 by Briana Maya RN  Infection Prevention:   hand hygiene promoted   single patient room provided  Goal: Optimal Comfort and Wellbeing  Outcome: Ongoing, Progressing  Intervention: Provide Person-Centered Care  Recent Flowsheet Documentation  Taken 10/9/2023 0733 by Briana Maya RN  Trust Relationship/Rapport:   care explained   choices provided   emotional support provided   empathic listening provided   questions answered   questions encouraged   reassurance provided   thoughts/feelings acknowledged  Goal: Readiness for Transition of Care  Outcome: Ongoing, Progressing     Problem: Fall Injury Risk  Goal: Absence of Fall and Fall-Related Injury  Outcome: Ongoing, Progressing  Intervention: Promote Injury-Free Environment  Recent Flowsheet Documentation  Taken 10/9/2023 1700 by Briana Maya RN  Safety Promotion/Fall Prevention: safety round/check completed  Taken 10/9/2023 1500 by Briana Maya RN  Safety Promotion/Fall Prevention: safety round/check completed  Taken 10/9/2023 1300 by Briana Maya RN  Safety Promotion/Fall Prevention: safety round/check completed  Taken 10/9/2023 1100 by Briana Maya RN  Safety Promotion/Fall Prevention: safety round/check completed  Taken 10/9/2023 0900 by Briana Maya RN  Safety Promotion/Fall Prevention: safety round/check completed  Taken 10/9/2023 0733 by Briana Maya RN  Safety Promotion/Fall Prevention: safety round/check completed     Problem: Asthma Comorbidity  Goal: Maintenance of Asthma Control  Outcome: Ongoing, Progressing     Problem: Behavioral Health Comorbidity  Goal: Maintenance of Behavioral Health Symptom Control  Outcome: Ongoing, Progressing     Problem: COPD (Chronic Obstructive Pulmonary Disease) Comorbidity  Goal: Maintenance of COPD Symptom Control  Outcome: Ongoing, Progressing  Intervention: Maintain COPD-Symptom  Control  Recent Flowsheet Documentation  Taken 10/9/2023 0733 by Briana Maya RN  Supportive Measures: active listening utilized     Problem: Diabetes Comorbidity  Goal: Blood Glucose Level Within Targeted Range  Outcome: Ongoing, Progressing     Problem: Heart Failure Comorbidity  Goal: Maintenance of Heart Failure Symptom Control  Outcome: Ongoing, Progressing     Problem: Hypertension Comorbidity  Goal: Blood Pressure in Desired Range  Outcome: Ongoing, Progressing     Problem: Obstructive Sleep Apnea Risk or Actual Comorbidity Management  Goal: Unobstructed Breathing During Sleep  Outcome: Ongoing, Progressing     Problem: Osteoarthritis Comorbidity  Goal: Maintenance of Osteoarthritis Symptom Control  Outcome: Ongoing, Progressing  Intervention: Maintain Osteoarthritis Symptom Control  Recent Flowsheet Documentation  Taken 10/9/2023 0733 by Briana Maya RN  Activity Management: bedrest     Problem: Pain Chronic (Persistent) (Comorbidity Management)  Goal: Acceptable Pain Control and Functional Ability  Outcome: Ongoing, Progressing  Intervention: Optimize Psychosocial Wellbeing  Recent Flowsheet Documentation  Taken 10/9/2023 0733 by Briana Maya RN  Supportive Measures: active listening utilized  Diversional Activities: television  Family/Support System Care: support provided     Problem: Seizure Disorder Comorbidity  Goal: Maintenance of Seizure Control  Outcome: Ongoing, Progressing     Problem: Skin Injury Risk Increased  Goal: Skin Health and Integrity  Outcome: Ongoing, Progressing  Intervention: Optimize Skin Protection  Recent Flowsheet Documentation  Taken 10/9/2023 0733 by Briana Maya RN  Pressure Reduction Techniques: frequent weight shift encouraged  Head of Bed (HOB) Positioning: HOB elevated  Pressure Reduction Devices: specialty bed utilized  Skin Protection:   adhesive use limited   tubing/devices free from skin contact   Goal Outcome Evaluation:  Plan of Care Reviewed With: patient         Pt  is alert and orient.  Becomes confused on time.  Pt pain managed with prn pain medication.  VS WNL for pt.  Pt dressing changed to left left.  Wound care nurse completed wound treatment to [place].  Possible discharge tomorrow

## 2023-10-09 NOTE — PROGRESS NOTES
[facility]     Progress Note    Patient Name: Italia Olvera  : 1960  MRN: [id number]  Primary Care Physician:  Carloz Shoemaker MD  Date of admission: 10/4/2023      Subjective   Brief summary.  Patient admitted with cellulitis and wound infection of the left leg.      HPI:  Patient feeling better today.  Relaxed and comfortable.  Taking Dilaudid every 2 hours.  Requesting Xanax and sleeping pills.  Patient constantly asking about more narcotics and sleeping pills every time I have seen her.  There is no fever chills.  Patient does not want to go to rehab her  is at bedside wants to take her home when she is ready.    Review of Systems     Complains of fatigue and anxiety.  No fever chills .  No shortness of breath  Decreased sleep        Objective     Vitals:   Temp:  [97.9 øF (36.6 øC)-98.8 øF (37.1 øC)] 98 øF (36.7 øC)  Heart Rate:  [70-83] 70  Resp:  [18-20] 18  BP: (119-180)/(42-95) 119/42  Flow (L/min):  [0-2] 2    Physical Exam :     Elderly female not in acute distress.    Neck supple  Heart regular and tacky  Lungs diminished breath sounds.  Extremity left lower extremity with redness and cellulitic changes surgical wound healing..  Edema noted.      Result Review:  I have personally reviewed the results from the time of this admission to 10/9/2023 16:49 EDT and agree with these findings:  []  Laboratory  []  Microbiology  []  Radiology  []  EKG/Telemetry   []  Cardiology/Vascular   []  Pathology  []  Old records  []  Other:    Venous Doppler negative      Assessment / Plan       Active Hospital Problems:  Active Hospital Problems    Diagnosis     **Cellulitis, leg     Fracture of left lower leg     Cellulitis of leg     Paroxysmal atrial fibrillation     Anxiety disorder     Anemia     T2DM (type 2 diabetes mellitus)        Plan:   Continue IV antibiotics,   Continue wound care, DC trazodone is not helping with sleep.  We will add Ambien at bedtime.  Also Vistaril as needed for  anxiety.  Check labs in a.m.  Possible discharge tomorrow    DVT prophylaxis:  Medical DVT prophylaxis orders are present.    CODE STATUS:   Code Status (Patient has no pulse and is not breathing): CPR (Attempt to Resuscitate)  Medical Interventions (Patient has pulse or is breathing): Full Support              Electronically signed by Rudy Garnett MD, 10/09/23, 4:51 PM EDT.

## 2023-10-09 NOTE — SIGNIFICANT NOTE
Wound Eval / Progress Noted    JUNG Palomo     Patient Name: Italia Olvera  : 1960  MRN: [id number]  Today's Date: 10/9/2023                 Admit Date: 10/4/2023    Visit Dx:    ICD-10-CM ICD-9-CM   1. Cellulitis of left lower extremity  L03.116 682.6   2. Uncontrolled hypertension  I10 401.9   3. Difficulty in walking  R26.2 719.7   4. Decreased activities of daily living (ADL)  Z78.9 V49.89         Cellulitis, leg    T2DM (type 2 diabetes mellitus)    Anemia    Anxiety disorder    Paroxysmal atrial fibrillation    Fracture of left lower leg    Cellulitis of leg        Past Medical History:   Diagnosis Date    Acid reflux     ACL tear 2015    PCL/ACL TEAR/RUPTURE    Acute shoulder bursitis, right 2015    Anxiety     Arthritis     Asthma     Bipolar 1 disorder     untreated    Bladder disorder     Cancer     CERVICAL CANCER    CHF (congestive heart failure)     ASYMPTOMATIC- NO CARDIOLOGIST- SEE'S DR ARRIAZA (PCP)- DENIED CP/SOB    Chronic back pain     Chronic pain     CHRONIC BACK, NECK, LEG, FOOT, & FACE PAIN    COPD (chronic obstructive pulmonary disease)     USES INHALERS    Depression     Diabetes mellitus     BG RUND AROUND 140 IN AM    DJD (degenerative joint disease)     Gangrene     RT SECOND AND FIFTH TOES    GERD (gastroesophageal reflux disease)     HBP (high blood pressure)     Hip pain 09/15/2015    Hypertension     Knee injury 2015    Knee pain, right 09/15/2015    Limb swelling     Neuropathy     On home O2     2-3L/NC PRN    Osteoarthritis, knee 2015    Osteoporosis     Peripheral neuropathy     Renal failure 1994    NO CURRENT PROBLEMS    Seasonal allergies     Shoulder tendonitis 2015    Sleep apnea     Smoker     SOB (shortness of breath)     NONE CURENTLY    Tendinitis of right rotator cuff 2015        Past Surgical History:   Procedure Laterality Date    ABOVE KNEE AMPUTATION Right 3/11/2023    Procedure: AMPUTATION ABOVE KNEE;  Surgeon: Yobany  MD Zacarias;  Location: [facility] MAIN OR;  Service: Vascular;  Laterality: Right;    AMPUTATION DIGIT Right 12/6/2022    Procedure: Amputation of right second and fifth toes;  Surgeon: Gabino Russell MD;  Location: [facility] MAIN OR;  Service: Vascular;  Laterality: Right;    BACK SURGERY      BLADDER REPAIR      BRONCHOSCOPY N/A 07/10/2021    Procedure: BRONCHOSCOPY WITH BRONCHOALVEOLAR LAVAGE, POSSIBLE BIOPSY, BRUSHING, WASHING, AIRWAY INSPECTION;  Surgeon: Rodrigo Reyes MD;  Location: [facility] MAIN OR;  Service: Pulmonary;  Laterality: N/A;    BRONCHOSCOPY N/A 07/10/2021    Procedure: BRONCHOSCOPY;  Surgeon: Rodrigo Reyes MD;  Location: [facility] ENDOSCOPY;  Service: Pulmonary;  Laterality: N/A;    CARDIAC CATHETERIZATION Right 11/03/2022    Procedure: Aortogram with right leg angiogram, possible angioplasty or stenting ;  Surgeon: Gabino Russell MD;  Location: [facility] CATH INVASIVE LOCATION;  Service: Vascular;  Laterality: Right;    CARDIAC CATHETERIZATION Right 3/8/2023    Procedure: Right leg angiogram, possible angioplasty or stenting;  Surgeon: Gabino Russell MD;  Location: [facility] CATH INVASIVE LOCATION;  Service: Vascular;  Laterality: Right;    CHOLECYSTECTOMY      ENDOSCOPY      FRACTURE SURGERY      SKULL FRACTURE    GALLBLADDER SURGERY      HERNIA REPAIR      HYSTERECTOMY      INTERVENTIONAL RADIOLOGY PROCEDURE Right 03/03/2022    Procedure: Abdominal Aortagram with Runoff;  Surgeon: Marleny Yoon MD;  Location: [facility] CATH INVASIVE LOCATION;  Service: Peripheral Vascular;  Laterality: Right;    JOINT REPLACEMENT      OTHER SURGICAL HISTORY      ARTIFICIAL JOINTS/LIMBS    OTHER SURGICAL HISTORY      FACE SURGERY, UNSPECIFIED    TOTAL HIP ARTHROPLASTY Right     TOTAL KNEE ARTHROPLASTY Left          Physical Assessment:  Wound 10/09/23 Left lower gluteal Pressure Injury (Active)   Pressure Injury Stage U 10/09/23 1039   Dressing Appearance open to air 10/09/23 1039   Closure None 10/09/23 1039   Base  moist;yellow;red 10/09/23 1039   Periwound dry;intact;redness;blanchable 10/09/23 1039   Periwound Temperature warm 10/09/23 1039   Periwound Skin Turgor soft 10/09/23 1039   Edges open 10/09/23 1039   Wound Length (cm) 0.5 cm 10/09/23 1039   Wound Width (cm) 0.7 cm 10/09/23 1039   Wound Depth (cm) 0.1 cm 10/09/23 1039   Wound Surface Area (cm^2) 0.35 cm^2 10/09/23 1039   Wound Volume (cm^3) 0.035 cm^3 10/09/23 1039   Drainage Characteristics/Odor serous 10/09/23 1039   Drainage Amount scant 10/09/23 1039   Care, Wound cleansed with;sterile normal saline 10/09/23 1039   Dressing Care dressing applied;hydrofiber;silver impregnated;hydrocolloid 10/09/23 1039   Periwound Care absorptive dressing applied 10/09/23 1039      Wound Check / Follow-up:  Patient seen today for wound re-consult regarding new wound to left lower gluteal aspect/perineum. Pressure injury present, likely related to external female catheter. Wound base with minimal red moist tissue and mostly yellow sloughing tissue present obscuring full view of wound base. Cleansed with normal saline and gauze. Periwound tissue with blanchable redness. Recommending every other day dressing changes with silver impregnated hydrofiber and hydrocolloid dressing. Continue all other wound care at this time. Continue every two hour turns, offload heels, keep patient free from moisture/moisture managed.      Impression: Pressure injury to left lower gluteal aspect.      Short term goals: Regain skin integrity, skin protection, pressure reduction, moisture prevention, every other day dressing changes.      Merced Portillo RN    10/9/2023    18:25 EDT

## 2023-10-09 NOTE — PLAN OF CARE
Problem: Adult Inpatient Plan of Care  Goal: Plan of Care Review  Outcome: Ongoing, Progressing  Flowsheets (Taken 10/9/2023 0635)  Progress: no change  Plan of Care Reviewed With:   patient   spouse  Goal: Patient-Specific Goal (Individualized)  Outcome: Ongoing, Progressing  Goal: Absence of Hospital-Acquired Illness or Injury  Outcome: Ongoing, Progressing  Intervention: Identify and Manage Fall Risk  Recent Flowsheet Documentation  Taken 10/9/2023 0504 by Shefali Simpson LPN  Safety Promotion/Fall Prevention: safety round/check completed  Taken 10/9/2023 0309 by Shefali Simpson LPN  Safety Promotion/Fall Prevention: safety round/check completed  Taken 10/9/2023 0111 by Shefali Simpson LPN  Safety Promotion/Fall Prevention: safety round/check completed  Taken 10/8/2023 2317 by Shefali Simpson LPN  Safety Promotion/Fall Prevention: safety round/check completed  Taken 10/8/2023 2119 by Shefali Simpson LPN  Safety Promotion/Fall Prevention: safety round/check completed  Taken 10/8/2023 1951 by Shefali Simpson LPN  Safety Promotion/Fall Prevention: safety round/check completed  Intervention: Prevent Skin Injury  Recent Flowsheet Documentation  Taken 10/9/2023 0117 by Shefali Simpson LPN  Skin Protection:   adhesive use limited   transparent dressing maintained   tubing/devices free from skin contact  Intervention: Prevent and Manage VTE (Venous Thromboembolism) Risk  Recent Flowsheet Documentation  Taken 10/9/2023 0117 by Shefali Simpson LPN  VTE Prevention/Management: (Eliquis) other (see comments)  Range of Motion: active ROM (range of motion) encouraged  Intervention: Prevent Infection  Recent Flowsheet Documentation  Taken 10/9/2023 0117 by Shefali Simpson LPN  Infection Prevention:   equipment surfaces disinfected   hand hygiene promoted  Goal: Optimal Comfort and Wellbeing  Outcome: Ongoing, Progressing  Intervention: Monitor Pain and Promote Comfort  Recent Flowsheet Documentation  Taken 10/9/2023 0117 by  [place], Shefali, LPN  Pain Management Interventions: see MAR  Intervention: Provide Person-Centered Care  Recent Flowsheet Documentation  Taken 10/9/2023 0117 by Shefali Simpson LPN  Trust Relationship/Rapport:   care explained   choices provided   questions answered   thoughts/feelings acknowledged  Goal: Readiness for Transition of Care  Outcome: Ongoing, Progressing   Goal Outcome Evaluation:  Plan of Care Reviewed With: patient, spouse        Progress: no change     Vitals stable. Pt had multiple complaints of pain treated per eMAR.  at bedside. Will continue to monitor progress.

## 2023-10-10 ENCOUNTER — READMISSION MANAGEMENT (OUTPATIENT)
Dept: CALL CENTER | Facility: HOSPITAL | Age: 63
End: 2023-10-10
Payer: MEDICARE

## 2023-10-10 VITALS
HEIGHT: 66 IN | OXYGEN SATURATION: 90 % | WEIGHT: 166.67 LBS | DIASTOLIC BLOOD PRESSURE: 58 MMHG | SYSTOLIC BLOOD PRESSURE: 150 MMHG | RESPIRATION RATE: 18 BRPM | HEART RATE: 73 BPM | TEMPERATURE: 98.2 F | BODY MASS INDEX: 26.79 KG/M2

## 2023-10-10 PROBLEM — S82.92XA FRACTURE OF LEFT LOWER LEG: Status: RESOLVED | Noted: 2023-10-04 | Resolved: 2023-10-10

## 2023-10-10 LAB
ANION GAP SERPL CALCULATED.3IONS-SCNC: 6.5 MMOL/L (ref 5–15)
BUN SERPL-MCNC: 24 MG/DL (ref 8–23)
BUN/CREAT SERPL: 24.7 (ref 7–25)
CALCIUM SPEC-SCNC: 7.8 MG/DL (ref 8.6–10.5)
CHLORIDE SERPL-SCNC: 110 MMOL/L (ref 98–107)
CO2 SERPL-SCNC: 21.5 MMOL/L (ref 22–29)
CREAT SERPL-MCNC: 0.97 MG/DL (ref 0.57–1)
DEPRECATED RDW RBC AUTO: 65.7 FL (ref 37–54)
EGFRCR SERPLBLD CKD-EPI 2021: 65.8 ML/MIN/1.73
ERYTHROCYTE [DISTWIDTH] IN BLOOD BY AUTOMATED COUNT: 18.5 % (ref 12.3–15.4)
GLUCOSE BLDC GLUCOMTR-MCNC: 107 MG/DL (ref 70–99)
GLUCOSE BLDC GLUCOMTR-MCNC: 148 MG/DL (ref 70–99)
GLUCOSE BLDC GLUCOMTR-MCNC: 160 MG/DL (ref 70–99)
GLUCOSE SERPL-MCNC: 186 MG/DL (ref 65–99)
HCT VFR BLD AUTO: 27.4 % (ref 34–46.6)
HGB BLD-MCNC: 8.1 G/DL (ref 12–15.9)
MCH RBC QN AUTO: 28.7 PG (ref 26.6–33)
MCHC RBC AUTO-ENTMCNC: 29.6 G/DL (ref 31.5–35.7)
MCV RBC AUTO: 97.2 FL (ref 79–97)
PLATELET # BLD AUTO: 160 10*3/MM3 (ref 140–450)
PMV BLD AUTO: 11 FL (ref 6–12)
POTASSIUM SERPL-SCNC: 4.6 MMOL/L (ref 3.5–5.2)
RBC # BLD AUTO: 2.82 10*6/MM3 (ref 3.77–5.28)
SODIUM SERPL-SCNC: 138 MMOL/L (ref 136–145)
WBC NRBC COR # BLD: 5.8 10*3/MM3 (ref 3.4–10.8)

## 2023-10-10 PROCEDURE — 85027 COMPLETE CBC AUTOMATED: CPT | Performed by: INTERNAL MEDICINE

## 2023-10-10 PROCEDURE — 82948 REAGENT STRIP/BLOOD GLUCOSE: CPT

## 2023-10-10 PROCEDURE — 94799 UNLISTED PULMONARY SVC/PX: CPT

## 2023-10-10 PROCEDURE — 80048 BASIC METABOLIC PNL TOTAL CA: CPT | Performed by: INTERNAL MEDICINE

## 2023-10-10 PROCEDURE — 87070 CULTURE OTHR SPECIMN AEROBIC: CPT | Performed by: INTERNAL MEDICINE

## 2023-10-10 PROCEDURE — 87205 SMEAR GRAM STAIN: CPT | Performed by: INTERNAL MEDICINE

## 2023-10-10 PROCEDURE — 63710000001 INSULIN LISPRO (HUMAN) PER 5 UNITS: Performed by: INTERNAL MEDICINE

## 2023-10-10 RX ORDER — OXYCODONE AND ACETAMINOPHEN 10; 325 MG/1; MG/1
1 TABLET ORAL EVERY 6 HOURS PRN
Qty: 20 TABLET | Refills: 0 | Status: SHIPPED | OUTPATIENT
Start: 2023-10-10

## 2023-10-10 RX ORDER — FLUCONAZOLE 100 MG/1
100 TABLET ORAL EVERY 24 HOURS
Status: DISCONTINUED | OUTPATIENT
Start: 2023-10-10 | End: 2023-10-10 | Stop reason: DRUGHIGH

## 2023-10-10 RX ORDER — CEPHALEXIN 500 MG/1
500 CAPSULE ORAL EVERY 8 HOURS SCHEDULED
Status: DISCONTINUED | OUTPATIENT
Start: 2023-10-10 | End: 2023-10-10 | Stop reason: HOSPADM

## 2023-10-10 RX ORDER — FLUCONAZOLE 150 MG/1
150 TABLET ORAL
Status: DISCONTINUED | OUTPATIENT
Start: 2023-10-10 | End: 2023-10-10 | Stop reason: HOSPADM

## 2023-10-10 RX ORDER — FLUCONAZOLE 100 MG/1
100 TABLET ORAL DAILY
Qty: 5 TABLET | Refills: 0 | Status: SHIPPED | OUTPATIENT
Start: 2023-10-10 | End: 2023-10-15

## 2023-10-10 RX ORDER — CEPHALEXIN 500 MG/1
500 CAPSULE ORAL EVERY 8 HOURS SCHEDULED
Qty: 21 CAPSULE | Refills: 0 | Status: SHIPPED | OUTPATIENT
Start: 2023-10-10 | End: 2023-10-17

## 2023-10-10 RX ORDER — NALOXONE HYDROCHLORIDE 4 MG/.1ML
SPRAY NASAL
Qty: 2 EACH | Refills: 0 | Status: SHIPPED | OUTPATIENT
Start: 2023-10-10

## 2023-10-10 RX ORDER — CARVEDILOL 3.12 MG/1
3.12 TABLET ORAL 2 TIMES DAILY WITH MEALS
Qty: 60 TABLET | Refills: 0 | Status: SHIPPED | OUTPATIENT
Start: 2023-10-10 | End: 2023-11-09

## 2023-10-10 RX ORDER — LISINOPRIL 20 MG/1
40 TABLET ORAL
Qty: 60 TABLET | Refills: 0 | Status: SHIPPED | OUTPATIENT
Start: 2023-10-11 | End: 2023-11-10

## 2023-10-10 RX ADMIN — NICOTINE 1 PATCH: 21 PATCH, EXTENDED RELEASE TRANSDERMAL at 09:02

## 2023-10-10 RX ADMIN — OXYCODONE HYDROCHLORIDE AND ACETAMINOPHEN 1 TABLET: 10; 325 TABLET ORAL at 06:07

## 2023-10-10 RX ADMIN — Medication 10 ML: at 08:54

## 2023-10-10 RX ADMIN — CARVEDILOL 3.12 MG: 3.12 TABLET, FILM COATED ORAL at 17:45

## 2023-10-10 RX ADMIN — SERTRALINE HYDROCHLORIDE 50 MG: 25 TABLET ORAL at 08:54

## 2023-10-10 RX ADMIN — APIXABAN 5 MG: 5 TABLET, FILM COATED ORAL at 08:54

## 2023-10-10 RX ADMIN — PANTOPRAZOLE SODIUM 40 MG: 40 TABLET, DELAYED RELEASE ORAL at 08:53

## 2023-10-10 RX ADMIN — FLUCONAZOLE 150 MG: 150 TABLET ORAL at 11:07

## 2023-10-10 RX ADMIN — CARVEDILOL 3.12 MG: 3.12 TABLET, FILM COATED ORAL at 08:53

## 2023-10-10 RX ADMIN — MICONAZOLE NITRATE 1 APPLICATION: 2 POWDER TOPICAL at 11:06

## 2023-10-10 RX ADMIN — LISINOPRIL 20 MG: 20 TABLET ORAL at 08:53

## 2023-10-10 RX ADMIN — AMLODIPINE BESYLATE 5 MG: 5 TABLET ORAL at 08:53

## 2023-10-10 RX ADMIN — OXYCODONE HYDROCHLORIDE AND ACETAMINOPHEN 1 TABLET: 10; 325 TABLET ORAL at 13:04

## 2023-10-10 RX ADMIN — INSULIN LISPRO 10 UNITS: 100 INJECTION, SOLUTION INTRAVENOUS; SUBCUTANEOUS at 17:45

## 2023-10-10 RX ADMIN — INSULIN LISPRO 10 UNITS: 100 INJECTION, SOLUTION INTRAVENOUS; SUBCUTANEOUS at 08:53

## 2023-10-10 RX ADMIN — HYDROXYZINE PAMOATE 25 MG: 25 CAPSULE ORAL at 18:17

## 2023-10-10 RX ADMIN — HYDROXYZINE PAMOATE 25 MG: 25 CAPSULE ORAL at 06:51

## 2023-10-10 RX ADMIN — ASPIRIN 81 MG: 81 TABLET, COATED ORAL at 08:54

## 2023-10-10 RX ADMIN — ATORVASTATIN CALCIUM 10 MG: 10 TABLET, FILM COATED ORAL at 08:54

## 2023-10-10 NOTE — DISCHARGE SUMMARY
[facility]         DISCHARGE SUMMARY    Patient Name: Italia Olvera  : 1960  MRN: [id number]    Date of Admission: 10/4/2023  Date of Discharge: October 10  Primary Care Physician: Carloz Shoemaker MD    Consults       No orders found from 2023 to 10/5/2023.            Presenting Problem:   Cellulitis, leg [L03.119]  Cellulitis of leg [L03.119]    Active and Resolved Hospital Problems:  Active Hospital Problems    Diagnosis POA    **Cellulitis, leg [L03.119] Yes    Cellulitis of leg [L03.119] Yes    Paroxysmal atrial fibrillation [I48.0] Yes    Anxiety disorder [F41.9] Yes    Anemia [D64.9] Yes    T2DM (type 2 diabetes mellitus) [E11.9] Yes      Resolved Hospital Problems    Diagnosis POA    Fracture of left lower leg [S82.92XA] Yes         Hospital Course     Hospital Course:  Italia Olvera is a 62 y.o. female well-known to our practice, admitted for cellulitis of the left leg.  Please see H&P for details.  Briefly patient had a fall and fracture of her left leg.  She was transferred to [facility] where she was seen by orthopedic surgeon Dr. Desiree Lopez as well as neurologist.  Suspected stroke.  Patient had surgery for fracture of the left leg.  Further she was home with home health but developed extensive cellulitis as well brought into ER.  Patient is living in an hygienic condition with urine in her Unna boot.  Patient was admitted to hospital started on IV antibiotic wound care was initiated.    Patient was improving with this measures her white cell count improved her pain improved.  Cultures remain negative.  She was recommended to being admitted into inpatient rehab for further wound care and PT OT but patient and her  both refused.  This has been recommended on several occasions by myself and nurse as well as social service team and discharge planner.  Patient refused and wanted to go home.    Patient has been insistent on getting Dilaudid  she received Dilaudid for several days every 2 hours and subsequently she was switched to Percocet but she patient reports she is out of Percocet at home although she was given 4-month supply prior to her admissions.  She has been in [facility] for several days and here almost 6 days but she says she is out of medications.  She is also requesting Xanax and Ambien at discharge.  I reviewed her home meds she has not been on those medicines.    After discussion with patient and her  they wanted to go home rather than going to rehab and they will continue with home health.  She will be discharged home today with outpatient follow-up recommendations.      DISCHARGE Follow Up Recommendations for labs and diagnostics:   Discharge to home, monitor labs and clinical status closely, continue PT OT      Day of Discharge     Vital Signs:  Temp:  [98.2 øF (36.8 øC)-99.1 øF (37.3 øC)] 98.2 øF (36.8 øC)  Heart Rate:  [71-88] 73  Resp:  [16-20] 18  BP: (123-182)/() 150/58    Physical Exam:    Elderly female not in acute distress.  Heart regular.  Lungs diminished breath sounds but clear.  Abdomen soft.  Obese.  Extremity with extensive edema and cellulitic changes of left lower extremity significantly improved since admission.  Right leg with amputation      Pertinent  and/or Most Recent Results     LAB RESULTS:      Lab 10/10/23  0854 10/04/23  1846 10/04/23  1128 10/04/23  1041   WBC 5.80  --   --  5.44   HEMOGLOBIN 8.1*  --   --  8.7*   HEMATOCRIT 27.4*  --   --  28.7*   PLATELETS 160  --   --  180   NEUTROS ABS  --   --   --  3.81   IMMATURE GRANS (ABS)  --   --   --  0.01   LYMPHS ABS  --   --   --  0.97   MONOS ABS  --   --   --  0.38   EOS ABS  --   --   --  0.23   MCV 97.2*  --   --  94.4   LACTATE  --  1.7 2.1*  --    LDH  --   --   --  405*         Lab 10/10/23  0854 10/04/23  1041   SODIUM 138 136   POTASSIUM 4.6 4.5   CHLORIDE 110* 104   CO2 21.5* 25.7   ANION GAP 6.5 6.3   BUN 24* 12    CREATININE 0.97 0.62   EGFR 65.8 100.2   GLUCOSE 186* 208*   CALCIUM 7.8* 7.8*         Lab 10/04/23  1041   TOTAL PROTEIN 6.4   ALBUMIN 1.8*   GLOBULIN 4.6   ALT (SGPT) 7   AST (SGOT) 23   BILIRUBIN 0.4   ALK PHOS 193*                 Lab 10/04/23  1041   IRON 26*   IRON SATURATION (TSAT) 12*   TIBC 210*   TRANSFERRIN 141*   FERRITIN 253.90*   FOLATE 5.64   VITAMIN B 12 978*         Brief Urine Lab Results  (Last result in the past 365 days)        Color   Clarity   Blood   Leuk Est   Nitrite   Protein   CREAT   Urine HCG        09/11/23 1911 Yellow   Turbid   Large (3+)   Large (3+)   Negative   >=300 mg/dL (3+)                 Microbiology Results (last 10 days)       Procedure Component Value - Date/Time    Respiratory Culture - Sputum, Cough [[id number]] Collected: 10/10/23 0636    Lab Status: Preliminary result Specimen: Sputum from Cough Updated: 10/10/23 1026     Gram Stain Many (4+) WBCs per low power field      Few (2+) Epithelial cells per low power field      Moderate (3+) Gram negative bacilli      Few (2+) Gram positive cocci in pairs, chains and clusters    Wound Culture - Wound, Leg, Left [[id number]] Collected: 10/04/23 1042    Lab Status: Final result Specimen: Wound from Leg, Left Updated: 10/07/23 0835     Wound Culture No growth at 3 days     Gram Stain Few (2+) WBCs seen      No organisms seen            PROCEDURES:    CT Cervical Spine Without Contrast    Result Date: 9/11/2023  Impression:   1. Dextroconvex scoliosis at the thoracolumbar junction.  2. No acute fracture or malalignment of the cervical spine.     KIYA POLANCO MD       Electronically Signed and Approved By: KIYA POLANCO MD on 9/11/2023 at 16:25             CT Head Without Contrast    Result Date: 9/11/2023  Impression:   1. Despite repeating a portion of the exam, images are limited by motion artifact.  2. Allowing for this limitation, no acute intracranial abnormality is identified.     DINAH CORBIN MD        Electronically Signed and Approved By: DINAH CORBIN MD on 9/11/2023 at 16:23              Results for orders placed during the hospital encounter of 10/04/23    Duplex Venous Lower Extremity - Left CV-READ    Interpretation Summary    Tibial level veins not scanned due to presence of nonremovable bandage.    All other left sided veins appeared normal.      Results for orders placed during the hospital encounter of 10/04/23    Duplex Venous Lower Extremity - Left CV-READ    Interpretation Summary    Tibial level veins not scanned due to presence of nonremovable bandage.    All other left sided veins appeared normal.      Results for orders placed during the hospital encounter of 02/03/23    Adult Transthoracic Echo Complete W/ Cont if Necessary Per Protocol    Interpretation Summary  Borderline dilated left ventricle.  Moderately reduced left ventricular systolic function with an estimated ejection fraction of 30-35% and moderate global hypokinesis.  Left ventricular diastolic function is consistent with pseudonormalization (grade II diastolic dysfunction with high LAP).  Severe concentric left ventricular hypertrophy.  Mildly reduced right ventricular systolic function.  Severely dilated left atrium.  Moderately dilated right atrium.  Moderate posterior mitral annular calcification with mild-moderate mitral regurgitation.  Trileaflet aortic valve with moderate sclerosis/calcification.  Mild to moderate aortic stenosis was observed with peak and mean gradients across the valve of 27 and 14 mmHg, respectively.  Maximum velocity across the aortic valve was 260 cm/s.  Calculated dimensionless index = 0.32.  Mild aortic regurgitation.  Mild tricuspid regurgitation.  Estimated right ventricular systolic pressure was severely elevated at 64 mmHg.  Mildly dilated aortic root and ascending aorta, measuring up to 3.9 cm in diameter.  Dilated IVC with blunted respirophasic changes, consistent with significantly  elevated central venous pressures.  A moderate to large circumferential pericardial effusion was observed.  No echocardiographic evidence of pericardial tamponade.      Labs Pending at Discharge:  Pending Labs       Order Current Status    Respiratory Culture - Sputum, Cough Preliminary result              Discharge Details        Discharge Medications        New Medications        Instructions Start Date   carvedilol 3.125 MG tablet  Commonly known as: COREG   3.125 mg, Oral, 2 Times Daily With Meals      cephalexin 500 MG capsule  Commonly known as: KEFLEX   500 mg, Oral, Every 8 Hours Scheduled      fluconazole 100 MG tablet  Commonly known as: DIFLUCAN   100 mg, Oral, Daily      lisinopril 20 MG tablet  Commonly known as: PRINIVIL,ZESTRIL   40 mg, Oral, Every 24 Hours Scheduled   Start Date: October 11, 2023     miconazole 2 % powder  Commonly known as: MICOTIN   1 application , Topical, Every 12 Hours Scheduled      naloxone 4 MG/0.1ML nasal spray  Commonly known as: NARCAN   Call 911. Don't prime. [place] in 1 nostril for overdose. Repeat in 2-3 minutes in other nostril if no or minimal breathing/responsiveness.             Changes to Medications        Instructions Start Date   oxyCODONE-acetaminophen  MG per tablet  Commonly known as: PERCOCET  What changed:   when to take this  additional instructions   1 tablet, Oral, Every 6 Hours PRN, GIVEN PER PCP             Continue These Medications        Instructions Start Date   atorvastatin 10 MG tablet  Commonly known as: LIPITOR   1 tablet, Oral, Daily      benzonatate 100 MG capsule  Commonly known as: TESSALON   100 mg, Oral, 3 Times Daily PRN      Eliquis 5 MG tablet tablet  Generic drug: apixaban   5 mg, Oral, Every 12 Hours Scheduled      hydrOXYzine pamoate 50 MG capsule  Commonly known as: VISTARIL   50 mg, Oral, 2 Times Daily PRN      mirtazapine 45 MG tablet  Commonly known as: REMERON   45 mg, Oral, Nightly PRN      nortriptyline 50 MG  capsule  Commonly known as: PAMELOR   50 mg, Oral, 2 Times Daily      pantoprazole 40 MG EC tablet  Commonly known as: PROTONIX   40 mg, Oral, Daily      sertraline 50 MG tablet  Commonly known as: ZOLOFT   50 mg, Oral, Daily      sertraline 100 MG tablet  Commonly known as: ZOLOFT   100 mg, Oral, Daily      tiZANidine 4 MG tablet  Commonly known as: ZANAFLEX   4 mg, Oral, Every 8 Hours PRN      traZODone 300 MG tablet  Commonly known as: DESYREL   300 mg, Oral, Nightly PRN             Stop These Medications      amLODIPine-benazepril 10-40 MG per capsule  Commonly known as: LOTREL     aspirin 81 MG EC tablet              No Known Allergies      Discharge Disposition:    Home-Health Care [facility]    Diet:    Heart healthy    Discharge Activity:     Activity Instructions       Activity as Tolerated              No future appointments.    Additional Instructions for the Follow-ups that You Need to Schedule       Discharge Follow-up with PCP   As directed       Currently Documented PCP:    Carloz Shoemaker MD    PCP Phone Number:    [phone]     Follow Up Details: Monday as scheduled        Discharge Follow-up with Specified Provider: Ortho surgeon at [facility]   As directed      To: Ortho surgeon at [facility]                Time spent on Discharge including face to face service: 39 minutes.            I have dictated this note utilizing Dragon Dictation.             Please note that portions of this note were completed with a voice recognition program.             Part of this note may be an electronic transcription/translation of spoken language to printed text         using the Dragon Dictation System.       Electronically signed by Rudy Garnett MD, 10/10/23, 5:58 PM EDT.

## 2023-10-10 NOTE — PLAN OF CARE
Problem: Adult Inpatient Plan of Care  Goal: Plan of Care Review  Outcome: Ongoing, Progressing  Flowsheets (Taken 10/10/2023 1403)  Progress: no change  Plan of Care Reviewed With: patient  Outcome Evaluation: VSS. Patient complains of pain. Medicated for pain as ordered. Patient resting in room right now. Expected to discharge home this afternoon.  Goal: Patient-Specific Goal (Individualized)  Outcome: Ongoing, Progressing  Goal: Absence of Hospital-Acquired Illness or Injury  Outcome: Ongoing, Progressing  Intervention: Identify and Manage Fall Risk  Recent Flowsheet Documentation  Taken 10/10/2023 1330 by Anna Barrientos RN  Safety Promotion/Fall Prevention:   nonskid shoes/slippers when out of bed   safety round/check completed  Taken 10/10/2023 1304 by Anna Barrientos RN  Safety Promotion/Fall Prevention: safety round/check completed  Taken 10/10/2023 1219 by Anna Barrientos RN  Safety Promotion/Fall Prevention: safety round/check completed  Taken 10/10/2023 1215 by Anna Barrientos RN  Safety Promotion/Fall Prevention: safety round/check completed  Taken 10/10/2023 1107 by Anna Barrientos RN  Safety Promotion/Fall Prevention:   nonskid shoes/slippers when out of bed   safety round/check completed  Taken 10/10/2023 0902 by Anna Barrientos RN  Safety Promotion/Fall Prevention: safety round/check completed  Taken 10/10/2023 0853 by Anna Barrientos RN  Safety Promotion/Fall Prevention: safety round/check completed  Taken 10/10/2023 0744 by Anna Barrientos RN  Safety Promotion/Fall Prevention: safety round/check completed  Goal: Optimal Comfort and Wellbeing  Outcome: Ongoing, Progressing  Goal: Readiness for Transition of Care  Outcome: Ongoing, Progressing     Problem: Fall Injury Risk  Goal: Absence of Fall and Fall-Related Injury  Outcome: Ongoing, Progressing  Intervention: Promote Injury-Free Environment  Recent Flowsheet Documentation  Taken 10/10/2023 1330 by Anna Barrientos RN  Safety  Promotion/Fall Prevention:   nonskid shoes/slippers when out of bed   safety round/check completed  Taken 10/10/2023 1304 by Anna Barrientos RN  Safety Promotion/Fall Prevention: safety round/check completed  Taken 10/10/2023 1219 by Anna Barrientos RN  Safety Promotion/Fall Prevention: safety round/check completed  Taken 10/10/2023 1215 by Anna Barrientos RN  Safety Promotion/Fall Prevention: safety round/check completed  Taken 10/10/2023 1107 by Anna Barrientos RN  Safety Promotion/Fall Prevention:   nonskid shoes/slippers when out of bed   safety round/check completed  Taken 10/10/2023 0902 by Anna Barrientos RN  Safety Promotion/Fall Prevention: safety round/check completed  Taken 10/10/2023 0853 by Anna Barrientos RN  Safety Promotion/Fall Prevention: safety round/check completed  Taken 10/10/2023 0744 by Anna Barrientos RN  Safety Promotion/Fall Prevention: safety round/check completed     Problem: Asthma Comorbidity  Goal: Maintenance of Asthma Control  Outcome: Ongoing, Progressing     Problem: Behavioral Health Comorbidity  Goal: Maintenance of Behavioral Health Symptom Control  Outcome: Ongoing, Progressing     Problem: COPD (Chronic Obstructive Pulmonary Disease) Comorbidity  Goal: Maintenance of COPD Symptom Control  Outcome: Ongoing, Progressing     Problem: Diabetes Comorbidity  Goal: Blood Glucose Level Within Targeted Range  Outcome: Ongoing, Progressing     Problem: Heart Failure Comorbidity  Goal: Maintenance of Heart Failure Symptom Control  Outcome: Ongoing, Progressing     Problem: Hypertension Comorbidity  Goal: Blood Pressure in Desired Range  Outcome: Ongoing, Progressing     Problem: Obstructive Sleep Apnea Risk or Actual Comorbidity Management  Goal: Unobstructed Breathing During Sleep  Outcome: Ongoing, Progressing     Problem: Osteoarthritis Comorbidity  Goal: Maintenance of Osteoarthritis Symptom Control  Outcome: Ongoing, Progressing     Problem: Pain Chronic (Persistent)  (Comorbidity Management)  Goal: Acceptable Pain Control and Functional Ability  Outcome: Ongoing, Progressing     Problem: Seizure Disorder Comorbidity  Goal: Maintenance of Seizure Control  Outcome: Ongoing, Progressing     Problem: Skin Injury Risk Increased  Goal: Skin Health and Integrity  Outcome: Ongoing, Progressing  Intervention: Optimize Skin Protection  Recent Flowsheet Documentation  Taken 10/10/2023 1330 by Anna Barrientos, RN  Head of Bed (HOB) Positioning: HOB elevated  Taken 10/10/2023 1107 by Anna Barrientos, RN  Head of Bed (HOB) Positioning: HOB elevated   Goal Outcome Evaluation:  Plan of Care Reviewed With: patient        Progress: no change  Outcome Evaluation: VSS. Patient complains of pain. Medicated for pain as ordered. Patient resting in room right now. Expected to discharge home this afternoon.

## 2023-10-10 NOTE — PLAN OF CARE
Problem: Adult Inpatient Plan of Care  Goal: Plan of Care Review  Outcome: Ongoing, Progressing  Flowsheets (Taken 10/10/2023 0623)  Progress: no change  Plan of Care Reviewed With:   patient   spouse  Goal: Patient-Specific Goal (Individualized)  Outcome: Ongoing, Progressing  Goal: Absence of Hospital-Acquired Illness or Injury  Outcome: Ongoing, Progressing  Intervention: Identify and Manage Fall Risk  Recent Flowsheet Documentation  Taken 10/10/2023 0515 by Shefali Simpson LPN  Safety Promotion/Fall Prevention: safety round/check completed  Taken 10/10/2023 0310 by Shefali Simpson LPN  Safety Promotion/Fall Prevention: safety round/check completed  Taken 10/10/2023 0112 by Shefali Simpson LPN  Safety Promotion/Fall Prevention: safety round/check completed  Taken 10/9/2023 2323 by Shefali Simpson LPN  Safety Promotion/Fall Prevention: safety round/check completed  Taken 10/9/2023 2105 by Shefali Simpson LPN  Safety Promotion/Fall Prevention: safety round/check completed  Taken 10/9/2023 1931 by Shefali Simpson LPN  Safety Promotion/Fall Prevention: safety round/check completed  Intervention: Prevent and Manage VTE (Venous Thromboembolism) Risk  Recent Flowsheet Documentation  Taken 10/10/2023 0428 by Shefali Simpson LPN  VTE Prevention/Management: (Eliquis) other (see comments)  Intervention: Prevent Infection  Recent Flowsheet Documentation  Taken 10/10/2023 0428 by Shefali Simpson LPN  Infection Prevention:   equipment surfaces disinfected   hand hygiene promoted  Goal: Optimal Comfort and Wellbeing  Outcome: Ongoing, Progressing  Intervention: Monitor Pain and Promote Comfort  Recent Flowsheet Documentation  Taken 10/10/2023 0428 by Shefali Simpson LPN  Pain Management Interventions:   see MAR   pillow support provided   position adjusted  Intervention: Provide Person-Centered Care  Recent Flowsheet Documentation  Taken 10/10/2023 0428 by Shefali Simpson LPN  Trust Relationship/Rapport:   care explained    choices provided   questions answered   thoughts/feelings acknowledged  Goal: Readiness for Transition of Care  Outcome: Ongoing, Progressing   Goal Outcome Evaluation:  Plan of Care Reviewed With: patient, spouse        Progress: no change       Pt requesting PRN medication for anxiety and pain frequently.  at bedside. Will likely discharge home today. Will continue to monitor.

## 2023-10-10 NOTE — PROGRESS NOTES
Respiratory Therapist Broncho-Pulmonary Hygiene Progress Note      Patient Name:  Italia Olvera  YOB: 1960    Italia Olvera meets the qualification for Level 1 of the Bronco-Pulmonary Hygiene Protocol. This was based on my daily patient assessment and includes review of chest x-ray results, cough ability and quality, oxygenation, secretions or risk for secretion development and patient mobility.     Broncho-Pulmonary Hygiene Assessment:    Level of Movement: Actively changing positions-requires assistance  Disoriented/Follows Commands    Breath Sounds: Clear to slightly diminished    Cough: Strong, effective    Chest X-Ray: No CXR available    Sputum Productions: None or small amount of thin or watery secretions with effective cough    History and Physical: Chronic condition    SpO2 to Oxygen Need: greater than 92% on room air or  less than 3L nasal canula    Current SpO2 is: 96% on Room air    Based on this information, I have completed the following interventions: Teach/Instruct patient on cough and deep breathe      Electronically signed by Gabriella Pool RRT, 10/10/23, 9:09 AM EDT.

## 2023-10-11 NOTE — OUTREACH NOTE
Prep Survey      Flowsheet Row Responses   [facility] facility patient discharged from? [place]   Is LACE score < 7 ? No   Eligibility Readm Mgmt   Discharge diagnosis cellulitis left leg   Does the patient have one of the following disease processes/diagnoses(primary or secondary)? Other   Does the patient have Home health ordered? Yes   What is the Home health agency?  [facility]   Is there a DME ordered? No   Prep survey completed? Yes            Kell FULTON - Registered Nurse

## 2023-10-12 LAB
BACTERIA SPEC RESP CULT: NORMAL
GRAM STN SPEC: NORMAL

## 2023-10-18 ENCOUNTER — READMISSION MANAGEMENT (OUTPATIENT)
Dept: CALL CENTER | Facility: HOSPITAL | Age: 63
End: 2023-10-18
Payer: MEDICARE

## 2023-10-18 NOTE — OUTREACH NOTE
Medical Week 2 Survey      Flowsheet Row Responses   [facility] facility patient discharged from? [place]   Does the patient have one of the following disease processes/diagnoses(primary or secondary)? Other   Week 2 attempt successful? No   Unsuccessful attempts Attempt 1            GRANT HURTADO - Registered Nurse

## 2023-10-25 ENCOUNTER — READMISSION MANAGEMENT (OUTPATIENT)
Dept: CALL CENTER | Facility: HOSPITAL | Age: 63
End: 2023-10-25
Payer: MEDICARE

## 2023-10-25 NOTE — OUTREACH NOTE
Medical Week 3 Survey      Flowsheet Row Responses   [facility] patient discharged from? [place]   Does the patient have one of the following disease processes/diagnoses(primary or secondary)? Other   Week 3 attempt successful? Yes   Call start time 1735   Call end time 1739   Discharge diagnosis cellulitis left leg   Person spoke with today (if not patient) and relationship Lloyd/spouse   Is the patient taking all medications as directed (includes completed medication regime)? Yes   Medication comments States her dr has her on two antibiotics   Does the patient have a primary care provider?  Yes   Has the patient kept scheduled appointments due by today? Yes   Comments Pt was on a virtual visit with PCP   What is the Home health agency?  [facility]   Has home health visited the patient within 72 hours of discharge? Yes   Psychosocial issues? No   What is the patient's perception of their health status since discharge? New symptoms unrelated to diagnosis   Is the patient/caregiver able to teach back signs and symptoms related to disease process for when to call PCP? Yes   Is the patient/caregiver able to teach back signs and symptoms related to disease process for when to call 911? Yes   Is the patient/caregiver able to teach back the hierarchy of who to call/visit for symptoms/problems? PCP, Specialist, Home health nurse, Urgent Care, ED, 911 Yes   Additional teach back comments  states he was concerned about the fluid build up.  Pt is keeping leg elevated mostly.  Advised to discuss with PCP.   Week 3 Call Completed? Yes   Graduated/Revoked comments Pt to discuss swelling to legs with PCP.   Call end time 1739            Jocelyne VELASQUEZ - Licensed Nurse

## 2023-10-26 ENCOUNTER — HOSPITAL ENCOUNTER (INPATIENT)
Facility: HOSPITAL | Age: 63
LOS: 3 days | Discharge: ANOTHER HOSPITAL | DRG: 602 | End: 2023-10-29
Attending: EMERGENCY MEDICINE | Admitting: INTERNAL MEDICINE
Payer: MEDICARE

## 2023-10-26 ENCOUNTER — APPOINTMENT (OUTPATIENT)
Dept: GENERAL RADIOLOGY | Facility: HOSPITAL | Age: 63
DRG: 602 | End: 2023-10-26
Payer: MEDICARE

## 2023-10-26 DIAGNOSIS — I50.9 ACUTE ON CHRONIC CONGESTIVE HEART FAILURE, UNSPECIFIED HEART FAILURE TYPE: Primary | ICD-10-CM

## 2023-10-26 DIAGNOSIS — L03.116 CELLULITIS OF LEFT LOWER EXTREMITY: ICD-10-CM

## 2023-10-26 LAB
ALBUMIN SERPL-MCNC: 2.2 G/DL (ref 3.5–5.2)
ALBUMIN/GLOB SERPL: 0.5 G/DL
ALP SERPL-CCNC: 160 U/L (ref 39–117)
ALT SERPL W P-5'-P-CCNC: 15 U/L (ref 1–33)
ANION GAP SERPL CALCULATED.3IONS-SCNC: 9.2 MMOL/L (ref 5–15)
AST SERPL-CCNC: 22 U/L (ref 1–32)
BACTERIA UR QL AUTO: ABNORMAL /HPF
BASOPHILS # BLD AUTO: 0.02 10*3/MM3 (ref 0–0.2)
BASOPHILS NFR BLD AUTO: 0.2 % (ref 0–1.5)
BILIRUB SERPL-MCNC: 0.6 MG/DL (ref 0–1.2)
BILIRUB UR QL STRIP: NEGATIVE
BUN SERPL-MCNC: 20 MG/DL (ref 8–23)
BUN/CREAT SERPL: 25.3 (ref 7–25)
CALCIUM SPEC-SCNC: 7.6 MG/DL (ref 8.6–10.5)
CHLORIDE SERPL-SCNC: 104 MMOL/L (ref 98–107)
CLARITY UR: CLEAR
CO2 SERPL-SCNC: 20.8 MMOL/L (ref 22–29)
COLOR UR: YELLOW
CREAT SERPL-MCNC: 0.79 MG/DL (ref 0.57–1)
D-LACTATE SERPL-SCNC: 1.5 MMOL/L (ref 0.5–2)
DEPRECATED RDW RBC AUTO: 70.5 FL (ref 37–54)
EGFRCR SERPLBLD CKD-EPI 2021: 84.2 ML/MIN/1.73
EOSINOPHIL # BLD AUTO: 0.11 10*3/MM3 (ref 0–0.4)
EOSINOPHIL NFR BLD AUTO: 1.2 % (ref 0.3–6.2)
ERYTHROCYTE [DISTWIDTH] IN BLOOD BY AUTOMATED COUNT: 19.8 % (ref 12.3–15.4)
GEN 5 2HR TROPONIN T REFLEX: 64 NG/L
GLOBULIN UR ELPH-MCNC: 4.5 GM/DL
GLUCOSE SERPL-MCNC: 122 MG/DL (ref 65–99)
GLUCOSE UR STRIP-MCNC: NEGATIVE MG/DL
HCT VFR BLD AUTO: 29.5 % (ref 34–46.6)
HGB BLD-MCNC: 8.7 G/DL (ref 12–15.9)
HGB UR QL STRIP.AUTO: ABNORMAL
HOLD SPECIMEN: NORMAL
HOLD SPECIMEN: NORMAL
HYALINE CASTS UR QL AUTO: ABNORMAL /LPF
IMM GRANULOCYTES # BLD AUTO: 0.03 10*3/MM3 (ref 0–0.05)
IMM GRANULOCYTES NFR BLD AUTO: 0.3 % (ref 0–0.5)
INR PPP: 1.34 (ref 0.86–1.15)
KETONES UR QL STRIP: NEGATIVE
LEUKOCYTE ESTERASE UR QL STRIP.AUTO: ABNORMAL
LYMPHOCYTES # BLD AUTO: 0.98 10*3/MM3 (ref 0.7–3.1)
LYMPHOCYTES NFR BLD AUTO: 10.9 % (ref 19.6–45.3)
MCH RBC QN AUTO: 28.7 PG (ref 26.6–33)
MCHC RBC AUTO-ENTMCNC: 29.5 G/DL (ref 31.5–35.7)
MCV RBC AUTO: 97.4 FL (ref 79–97)
MONOCYTES # BLD AUTO: 0.54 10*3/MM3 (ref 0.1–0.9)
MONOCYTES NFR BLD AUTO: 6 % (ref 5–12)
NEUTROPHILS NFR BLD AUTO: 7.28 10*3/MM3 (ref 1.7–7)
NEUTROPHILS NFR BLD AUTO: 81.4 % (ref 42.7–76)
NITRITE UR QL STRIP: NEGATIVE
NRBC BLD AUTO-RTO: 0 /100 WBC (ref 0–0.2)
NT-PROBNP SERPL-MCNC: ABNORMAL PG/ML (ref 0–900)
PH UR STRIP.AUTO: 5.5 [PH] (ref 5–8)
PLATELET # BLD AUTO: 159 10*3/MM3 (ref 140–450)
PMV BLD AUTO: 10.8 FL (ref 6–12)
POTASSIUM SERPL-SCNC: 4 MMOL/L (ref 3.5–5.2)
PROT SERPL-MCNC: 6.7 G/DL (ref 6–8.5)
PROT UR QL STRIP: ABNORMAL
PROTHROMBIN TIME: 16.6 SECONDS (ref 11.8–14.9)
RBC # BLD AUTO: 3.03 10*6/MM3 (ref 3.77–5.28)
RBC # UR STRIP: ABNORMAL /HPF
REF LAB TEST METHOD: ABNORMAL
SODIUM SERPL-SCNC: 134 MMOL/L (ref 136–145)
SP GR UR STRIP: 1.02 (ref 1–1.03)
SQUAMOUS #/AREA URNS HPF: ABNORMAL /HPF
TROPONIN T DELTA: -8 NG/L
TROPONIN T SERPL HS-MCNC: 72 NG/L
UROBILINOGEN UR QL STRIP: ABNORMAL
WBC # UR STRIP: ABNORMAL /HPF
WBC NRBC COR # BLD: 8.96 10*3/MM3 (ref 3.4–10.8)
WHOLE BLOOD HOLD COAG: NORMAL
WHOLE BLOOD HOLD SPECIMEN: NORMAL

## 2023-10-26 PROCEDURE — 85610 PROTHROMBIN TIME: CPT

## 2023-10-26 PROCEDURE — 83880 ASSAY OF NATRIURETIC PEPTIDE: CPT

## 2023-10-26 PROCEDURE — 99285 EMERGENCY DEPT VISIT HI MDM: CPT

## 2023-10-26 PROCEDURE — 25010000002 FUROSEMIDE PER 20 MG

## 2023-10-26 PROCEDURE — 81001 URINALYSIS AUTO W/SCOPE: CPT

## 2023-10-26 PROCEDURE — 36415 COLL VENOUS BLD VENIPUNCTURE: CPT

## 2023-10-26 PROCEDURE — 87040 BLOOD CULTURE FOR BACTERIA: CPT

## 2023-10-26 PROCEDURE — 71045 X-RAY EXAM CHEST 1 VIEW: CPT

## 2023-10-26 PROCEDURE — 93010 ELECTROCARDIOGRAM REPORT: CPT | Performed by: INTERNAL MEDICINE

## 2023-10-26 PROCEDURE — 25010000002 MORPHINE PER 10 MG: Performed by: EMERGENCY MEDICINE

## 2023-10-26 PROCEDURE — 25010000002 PIPERACILLIN SOD-TAZOBACTAM PER 1 G

## 2023-10-26 PROCEDURE — 83605 ASSAY OF LACTIC ACID: CPT

## 2023-10-26 PROCEDURE — 80053 COMPREHEN METABOLIC PANEL: CPT

## 2023-10-26 PROCEDURE — 25010000002 HYDRALAZINE PER 20 MG

## 2023-10-26 PROCEDURE — 93005 ELECTROCARDIOGRAM TRACING: CPT

## 2023-10-26 PROCEDURE — 85025 COMPLETE CBC W/AUTO DIFF WBC: CPT

## 2023-10-26 PROCEDURE — 84484 ASSAY OF TROPONIN QUANT: CPT

## 2023-10-26 RX ORDER — DIPHENHYDRAMINE HYDROCHLORIDE 50 MG/ML
25 INJECTION INTRAMUSCULAR; INTRAVENOUS ONCE
Status: DISCONTINUED | OUTPATIENT
Start: 2023-10-26 | End: 2023-10-26

## 2023-10-26 RX ORDER — CARVEDILOL 3.12 MG/1
3.12 TABLET ORAL ONCE
Status: DISCONTINUED | OUTPATIENT
Start: 2023-10-26 | End: 2023-10-26

## 2023-10-26 RX ORDER — ACETAMINOPHEN 500 MG
1000 TABLET ORAL ONCE
Status: DISCONTINUED | OUTPATIENT
Start: 2023-10-26 | End: 2023-10-26

## 2023-10-26 RX ORDER — OXYCODONE HYDROCHLORIDE AND ACETAMINOPHEN 5; 325 MG/1; MG/1
2 TABLET ORAL ONCE
Status: COMPLETED | OUTPATIENT
Start: 2023-10-26 | End: 2023-10-26

## 2023-10-26 RX ORDER — HYDRALAZINE HYDROCHLORIDE 20 MG/ML
20 INJECTION INTRAMUSCULAR; INTRAVENOUS ONCE
Status: COMPLETED | OUTPATIENT
Start: 2023-10-26 | End: 2023-10-26

## 2023-10-26 RX ORDER — FUROSEMIDE 10 MG/ML
60 INJECTION INTRAMUSCULAR; INTRAVENOUS ONCE
Status: COMPLETED | OUTPATIENT
Start: 2023-10-26 | End: 2023-10-26

## 2023-10-26 RX ORDER — SODIUM CHLORIDE 0.9 % (FLUSH) 0.9 %
10 SYRINGE (ML) INJECTION AS NEEDED
Status: DISCONTINUED | OUTPATIENT
Start: 2023-10-26 | End: 2023-10-29 | Stop reason: HOSPADM

## 2023-10-26 RX ADMIN — MORPHINE SULFATE 4 MG: 4 INJECTION, SOLUTION INTRAMUSCULAR; INTRAVENOUS at 19:17

## 2023-10-26 RX ADMIN — PIPERACILLIN AND TAZOBACTAM 3.38 G: 3; .375 INJECTION, POWDER, LYOPHILIZED, FOR SOLUTION INTRAVENOUS at 20:08

## 2023-10-26 RX ADMIN — OXYCODONE AND ACETAMINOPHEN 2 TABLET: 5; 325 TABLET ORAL at 22:03

## 2023-10-26 RX ADMIN — FUROSEMIDE 60 MG: 10 INJECTION, SOLUTION INTRAMUSCULAR; INTRAVENOUS at 20:37

## 2023-10-26 RX ADMIN — HYDRALAZINE HYDROCHLORIDE 20 MG: 20 INJECTION, SOLUTION INTRAMUSCULAR; INTRAVENOUS at 22:43

## 2023-10-26 NOTE — ED PROVIDER NOTES
Time: 6:50 PM EDT  Date of encounter:  10/26/2023  Independent Historian/Clinical History and Information was obtained by:   Patient    History is limited by: N/A    Chief Complaint: Generalized edema      History of Present Illness:  Patient is a 62 y.o. year old female who presents to the emergency department for evaluation of generalized edema, increased swelling to the left lower extremity, bilateral hand swelling, facial swelling.  Patient states she woke up this morning with increased swelling, denies fevers, states home health came and changed her dressing to her left surgical site yesterday.  Patient reports she had surgery 3 weeks ago at [facility] for fracture of her left lower extremity.  According to the chart patient was admitted here for cellulitis of the left leg on 10/4/2023 and discharged on 10/10/2023.  Patient states she uses 3 L of oxygen via nasal cannula at home while sleeping.    HPI    Patient Care Team  Primary Care Provider: Carloz Shoemaker MD    Past Medical History:     No Known Allergies  Past Medical History:   Diagnosis Date    Acid reflux     ACL tear 09/01/2015    PCL/ACL TEAR/RUPTURE    Acute shoulder bursitis, right 08/23/2015    Anxiety     Arthritis     Asthma     Bipolar 1 disorder     untreated    Bladder disorder     Cancer     CERVICAL CANCER    CHF (congestive heart failure)     ASYMPTOMATIC- NO CARDIOLOGIST- SEE'S DR SHOEMAKER (PCP)- DENIED CP/SOB    Chronic back pain     Chronic pain     CHRONIC BACK, NECK, LEG, FOOT, & FACE PAIN    COPD (chronic obstructive pulmonary disease)     USES INHALERS    Depression     Diabetes mellitus     BG RUND AROUND 140 IN AM    DJD (degenerative joint disease)     Gangrene     RT SECOND AND FIFTH TOES    GERD (gastroesophageal reflux disease)     HBP (high blood pressure)     Hip pain 09/15/2015    Hypertension     Knee injury 08/19/2015    Knee pain, right 09/15/2015    Limb swelling     Neuropathy     On home O2     2-3L/NC PRN     Osteoarthritis, knee 09/01/2015    Osteoporosis     Peripheral neuropathy     Renal failure 1994    NO CURRENT PROBLEMS    Seasonal allergies     Shoulder tendonitis 08/23/2015    Sleep apnea     Smoker     SOB (shortness of breath)     NONE CURENTLY    Tendinitis of right rotator cuff 08/23/2015     Past Surgical History:   Procedure Laterality Date    ABOVE KNEE AMPUTATION Right 3/11/2023    Procedure: AMPUTATION ABOVE KNEE;  Surgeon: Zacarias Haywood MD;  Location: [facility] MAIN OR;  Service: Vascular;  Laterality: Right;    AMPUTATION DIGIT Right 12/6/2022    Procedure: Amputation of right second and fifth toes;  Surgeon: Gabino Russell MD;  Location: [facility] MAIN OR;  Service: Vascular;  Laterality: Right;    BACK SURGERY      BLADDER REPAIR      BRONCHOSCOPY N/A 07/10/2021    Procedure: BRONCHOSCOPY WITH BRONCHOALVEOLAR LAVAGE, POSSIBLE BIOPSY, BRUSHING, WASHING, AIRWAY INSPECTION;  Surgeon: Rodrigo Reyes MD;  Location: [facility] MAIN OR;  Service: Pulmonary;  Laterality: N/A;    BRONCHOSCOPY N/A 07/10/2021    Procedure: BRONCHOSCOPY;  Surgeon: Rodrigo Reyes MD;  Location: [facility] ENDOSCOPY;  Service: Pulmonary;  Laterality: N/A;    CARDIAC CATHETERIZATION Right 11/03/2022    Procedure: Aortogram with right leg angiogram, possible angioplasty or stenting ;  Surgeon: Gabino Russell MD;  Location: [facility] CATH INVASIVE LOCATION;  Service: Vascular;  Laterality: Right;    CARDIAC CATHETERIZATION Right 3/8/2023    Procedure: Right leg angiogram, possible angioplasty or stenting;  Surgeon: Gabino Russell MD;  Location: [facility] CATH INVASIVE LOCATION;  Service: Vascular;  Laterality: Right;    CHOLECYSTECTOMY      ENDOSCOPY      FRACTURE SURGERY      SKULL FRACTURE    GALLBLADDER SURGERY      HERNIA REPAIR      HYSTERECTOMY      INTERVENTIONAL RADIOLOGY PROCEDURE Right 03/03/2022    Procedure: Abdominal Aortagram with Runoff;  Surgeon: Marleny Yoon MD;  Location: [facility] CATH INVASIVE LOCATION;  Service:  Peripheral Vascular;  Laterality: Right;    JOINT REPLACEMENT      OTHER SURGICAL HISTORY      ARTIFICIAL JOINTS/LIMBS    OTHER SURGICAL HISTORY      FACE SURGERY, UNSPECIFIED    TOTAL HIP ARTHROPLASTY Right     TOTAL KNEE ARTHROPLASTY Left      Family History   Problem Relation Age of Onset    Stroke Mother     Throat cancer Mother     Arthritis Mother     Osteoporosis Mother     Heart disease Father     Breast cancer Sister     Colon cancer Sister     Lung cancer Sister     Arthritis Sister     Osteoporosis Sister     Heart disease Brother     Diabetes Brother     Arthritis Brother     Arthritis Daughter        Home Medications:  Prior to Admission medications    Medication Sig Start Date End Date Taking? Authorizing Provider   atorvastatin (LIPITOR) 10 MG tablet Take 1 tablet by mouth Daily. 2/10/23   Patria Hanson MD   benzonatate (TESSALON) 100 MG capsule Take 1 capsule by mouth 3 (Three) Times a Day As Needed for Cough.    Patria Hanson MD   carvedilol (COREG) 3.125 MG tablet Take 1 tablet by mouth 2 (Two) Times a Day With Meals for 30 days. 10/10/23 11/9/23  Rudy Garnett MD   Eliquis 5 MG tablet tablet Take 1 tablet by mouth Every 12 (Twelve) Hours. 10/3/23   Patria Hanson MD   hydrOXYzine pamoate (VISTARIL) 50 MG capsule Take 1 capsule by mouth 2 (Two) Times a Day As Needed for Itching, Allergies or Anxiety.    Patria Hanson MD   lisinopril (PRINIVIL,ZESTRIL) 20 MG tablet Take 2 tablets by mouth Daily for 30 days. 10/11/23 11/10/23  Rudy Garnett MD   miconazole (MICOTIN) 2 % powder Apply 1 application  topically to the appropriate area as directed Every 12 (Twelve) Hours for 15 days. 10/10/23 10/25/23  Rudy Garnett MD   mirtazapine (REMERON) 45 MG tablet Take 1 tablet by mouth At Night As Needed. 1/8/23   Patria Hanson MD   naloxone (NARCAN) 4 MG/0.1ML nasal spray Call 911. Don't prime. [place] in 1 nostril for overdose. Repeat in 2-3 minutes in other nostril if no or  minimal breathing/responsiveness. 10/10/23   Rudy Garnett MD   nortriptyline (PAMELOR) 50 MG capsule Take 1 capsule by mouth 2 (Two) Times a Day. 2/15/23   Patria Hanson MD   oxyCODONE-acetaminophen (PERCOCET)  MG per tablet Take 1 tablet by mouth Every 6 (Six) Hours As Needed for Severe Pain. GIVEN PER PCP 10/10/23   Rudy Garnett MD   pantoprazole (PROTONIX) 40 MG EC tablet Take 1 tablet by mouth Daily.    Patria Hanson MD   sertraline (ZOLOFT) 100 MG tablet Take 1 tablet by mouth Daily. 7/27/21   Patria Hanson MD   sertraline (ZOLOFT) 50 MG tablet Take 1 tablet by mouth Daily.    Patria Hanson MD   tiZANidine (ZANAFLEX) 4 MG tablet Take 1 tablet by mouth Every 8 (Eight) Hours As Needed for Muscle Spasms. 9/30/21   Patria Hanson MD   traZODone (DESYREL) 300 MG tablet Take 1 tablet by mouth At Night As Needed for Sleep. 12/30/21   Patria Hanson MD        Social History:   Social History     Tobacco Use    Smoking status: Every Day     Packs/day: 0.50     Years: 50.00     Additional pack years: 0.00     Total pack years: 25.00     Types: Cigarettes     Start date: 6/15/1979    Smokeless tobacco: Never    Tobacco comments:     SMOKED FOR 31 PLUS YEARS     INSTRUCTED NO SMOKING 24 HR PRIOR TO SURGERY    Vaping Use    Vaping Use: Never used   Substance Use Topics    Alcohol use: Never    Drug use: Never         Review of Systems:  Review of Systems   Constitutional:  Negative for fever.   HENT:  Negative for sore throat.    Eyes:  Positive for itching.        Eye swelling   Respiratory:  Positive for shortness of breath. Negative for cough.    Cardiovascular:  Positive for leg swelling. Negative for chest pain.   Gastrointestinal:  Negative for abdominal pain, diarrhea and vomiting.   Genitourinary:  Negative for dysuria.   Musculoskeletal:  Positive for arthralgias. Negative for neck pain.   Skin:  Negative for rash.   Allergic/Immunologic: Negative.   "  Neurological:  Negative for weakness, numbness and headaches.   Hematological: Negative.    Psychiatric/Behavioral: Negative.     All other systems reviewed and are negative.       Physical Exam:  /78 (BP Location: Right arm, Patient Position: Lying)   Pulse 98   Temp 98.2 °F (36.8 °C) (Oral)   Resp 24   Ht 167.6 cm (66\")   Wt 88.2 kg (194 lb 7.1 oz)   SpO2 96%   BMI 31.38 kg/m²     Physical Exam  Vitals and nursing note reviewed.   Constitutional:       General: She is not in acute distress.     Appearance: Normal appearance. She is obese. She is ill-appearing. She is not toxic-appearing.   HENT:      Head: Normocephalic and atraumatic.      Jaw: There is normal jaw occlusion.   Eyes:      General: Lids are normal.         Right eye: Discharge present.         Left eye: Discharge present.     Extraocular Movements: Extraocular movements intact.      Pupils: Pupils are equal, round, and reactive to light.      Comments: Facial swelling including soft tissue around bilateral eyes   Cardiovascular:      Rate and Rhythm: Normal rate and regular rhythm.      Pulses: Normal pulses.      Heart sounds: Normal heart sounds.   Pulmonary:      Effort: Pulmonary effort is normal. No respiratory distress.      Breath sounds: Rales present. No wheezing or rhonchi.   Abdominal:      General: Abdomen is flat. There is no distension.      Palpations: Abdomen is soft.      Tenderness: There is no abdominal tenderness. There is no guarding or rebound.   Musculoskeletal:         General: Swelling present. Normal range of motion.      Cervical back: Normal range of motion and neck supple.      Right lower leg: No edema.      Left lower leg: Swelling present. Edema present.   Skin:     General: Skin is warm and dry.      Findings: Erythema present.      Comments: Erythema noted to the left lower leg, slough, purulent drainage to incision site of left lower leg.  Upon assessment dressing appeared clean, dry, intact. "   Neurological:      Mental Status: She is alert and oriented to person, place, and time. Mental status is at baseline.   Psychiatric:         Mood and Affect: Mood normal.               Procedures:  Procedures      Medical Decision Making:      Comorbidities that affect care:    Asthma, Atrial Fibrillation, Congestive Heart Failure, COPD, Diabetes, Hypertension    External Notes reviewed:    Hospital Discharge Summary: Hospital discharge summary from 10/10/2023 from Dr. Rudy Garnett      The following orders were placed and all results were independently analyzed by me:  Orders Placed This Encounter   Procedures    Blood Culture - Blood,    Blood Culture - Blood,    XR Chest 1 View    [place] Draw    Comprehensive Metabolic Panel    BNP    Single High Sensitivity Troponin T    Protime-INR    CBC Auto Differential    Lactic Acid, Plasma    Urinalysis With Microscopic If Indicated (No Culture) - Urine, Clean Catch    High Sensitivity Troponin T 2Hr    Urinalysis, Microscopic Only - Urine, Clean Catch    Continuous Pulse Oximetry    Vital Signs    Inpatient Hospitalist Consult    Oxygen Therapy- Nasal Cannula; Titrate 1-6 LPM Per SpO2; 90 - 95%    ECG 12 Lead Dyspnea    Insert Peripheral IV    Inpatient Admission    CBC & Differential    Green Top (Gel)    Lavender Top    Gold Top - SST    Light Blue Top       Medications Given in the Emergency Department:  Medications   sodium chloride 0.9 % flush 10 mL (has no administration in time range)   morphine injection 4 mg (4 mg Intravenous Given 10/26/23 1917)   piperacillin-tazobactam (ZOSYN) 3.375 g/100 mL 0.9% NS IVPB (mbp) (0 g Intravenous Stopped 10/26/23 2120)   furosemide (LASIX) injection 60 mg (60 mg Intravenous Given 10/26/23 2037)   oxyCODONE-acetaminophen (PERCOCET) 5-325 MG per tablet 2 tablet (2 tablets Oral Given 10/26/23 2203)   hydrALAZINE (APRESOLINE) injection 20 mg (20 mg Intravenous Given 10/26/23 2243)        ED Course:    ED Course as of 10/26/23 9462    Thu Oct 26, 2023   2010 HS Troponin T(!!): 72 [RM]   2010 proBNP(!): 25,010.0 [RM]   2019 Albumin(!): 2.2 [RM]   2230 Patient is hypertensive, reports she took her lisinopril this morning but has not taken her Coreg since yesterday.  [RM]   2231 Positive sepsis screen noted for presence of possible wound infection the left lower leg, mildly elevated heart rate and respirations.  Blood cultures and lactic acid have been ordered, Zosyn has been given, fluids are not appropriate at this time due to presence of fluid overload. [RM]      ED Course User Index  [RM] Giselle Cruz APRN       Labs:    Lab Results (last 24 hours)       Procedure Component Value Units Date/Time    CBC & Differential [[id number]]  (Abnormal) Collected: 10/26/23 1913    Specimen: Blood Updated: 10/26/23 1928    Narrative:      The following orders were created for panel order CBC & Differential.  Procedure                               Abnormality         Status                     ---------                               -----------         ------                     CBC Auto Differential[[id number]]        Abnormal            Final result                 Please view results for these tests on the individual orders.    Comprehensive Metabolic Panel [[id number]]  (Abnormal) Collected: 10/26/23 1913    Specimen: Blood Updated: 10/26/23 1950     Glucose 122 mg/dL      BUN 20 mg/dL      Creatinine 0.79 mg/dL      Sodium 134 mmol/L      Potassium 4.0 mmol/L      Chloride 104 mmol/L      CO2 20.8 mmol/L      Calcium 7.6 mg/dL      Total Protein 6.7 g/dL      Albumin 2.2 g/dL      ALT (SGPT) 15 U/L      AST (SGOT) 22 U/L      Alkaline Phosphatase 160 U/L      Total Bilirubin 0.6 mg/dL      Globulin 4.5 gm/dL      A/G Ratio 0.5 g/dL      BUN/Creatinine Ratio 25.3     Anion Gap 9.2 mmol/L      eGFR 84.2 mL/min/1.73     Narrative:      GFR Normal >60  Chronic Kidney Disease <60  Kidney Failure <15      BNP [[id number]]  (Abnormal) Collected: 10/26/23  1913    Specimen: Blood Updated: 10/26/23 1946     proBNP 25,010.0 pg/mL     Narrative:      This assay is used as an aid in the diagnosis of individuals suspected of having heart failure. It can be used as an aid in the diagnosis of acute decompensated heart failure (ADHF) in patients presenting with signs and symptoms of ADHF to the emergency department (ED). In addition, NT-proBNP of <300 pg/mL indicates ADHF is not likely.    Age Range Result Interpretation  NT-proBNP Concentration (pg/mL:      <50             Positive            >450                   Gray                 300-450                    Negative             <300    50-75           Positive            >900                  Gray                300-900                  Negative            <300      >75             Positive            >1800                  Gray                300-1800                  Negative            <300    Single High Sensitivity Troponin T [[id number]]  (Abnormal) Collected: 10/26/23 1913    Specimen: Blood Updated: 10/26/23 2004     HS Troponin T 72 ng/L     Narrative:      High Sensitive Troponin T Reference Range:  <10.0 ng/L- Negative Female for AMI  <15.0 ng/L- Negative Male for AMI  >=10 - Abnormal Female indicating possible myocardial injury.  >=15 - Abnormal Male indicating possible myocardial injury.   Clinicians would have to utilize clinical acumen, EKG, Troponin, and serial changes to determine if it is an Acute Myocardial Infarction or myocardial injury due to an underlying chronic condition.         Protime-INR [[id number]]  (Abnormal) Collected: 10/26/23 1913    Specimen: Blood Updated: 10/26/23 1942     Protime 16.6 Seconds      INR 1.34    Narrative:      Suggested Therapeutic Ranges For Oral Anticoagulant Therapy:  Level of Therapy                      INR Target Range  Standard Dose                            2.0-3.0  High Dose                                2.5-3.5  Patients not receiving anticoagulant  Therapy  Normal Range                     0.86-1.15    CBC Auto Differential [[id number]]  (Abnormal) Collected: 10/26/23 1913    Specimen: Blood Updated: 10/26/23 1928     WBC 8.96 10*3/mm3      RBC 3.03 10*6/mm3      Hemoglobin 8.7 g/dL      Hematocrit 29.5 %      MCV 97.4 fL      MCH 28.7 pg      MCHC 29.5 g/dL      RDW 19.8 %      RDW-SD 70.5 fl      MPV 10.8 fL      Platelets 159 10*3/mm3      Neutrophil % 81.4 %      Lymphocyte % 10.9 %      Monocyte % 6.0 %      Eosinophil % 1.2 %      Basophil % 0.2 %      Immature Grans % 0.3 %      Neutrophils, Absolute 7.28 10*3/mm3      Lymphocytes, Absolute 0.98 10*3/mm3      Monocytes, Absolute 0.54 10*3/mm3      Eosinophils, Absolute 0.11 10*3/mm3      Basophils, Absolute 0.02 10*3/mm3      Immature Grans, Absolute 0.03 10*3/mm3      nRBC 0.0 /100 WBC     Lactic Acid, Plasma [[id number]]  (Normal) Collected: 10/26/23 1913    Specimen: Blood Updated: 10/26/23 1948     Lactate 1.5 mmol/L     Blood Culture - Blood, Arm, Left [[id number]] Collected: 10/26/23 1913    Specimen: Blood from Arm, Left Updated: 10/26/23 1922    Blood Culture - Blood, Arm, Right [[id number]] Collected: 10/26/23 1942    Specimen: Blood from Arm, Right Updated: 10/26/23 1946    High Sensitivity Troponin T 2Hr [[id number]]  (Abnormal) Collected: 10/26/23 2226    Specimen: Blood Updated: 10/26/23 2304     HS Troponin T 64 ng/L      Troponin T Delta -8 ng/L     Narrative:      High Sensitive Troponin T Reference Range:  <10.0 ng/L- Negative Female for AMI  <15.0 ng/L- Negative Male for AMI  >=10 - Abnormal Female indicating possible myocardial injury.  >=15 - Abnormal Male indicating possible myocardial injury.   Clinicians would have to utilize clinical acumen, EKG, Troponin, and serial changes to determine if it is an Acute Myocardial Infarction or myocardial injury due to an underlying chronic condition.         Urinalysis With Microscopic If Indicated (No Culture) - Urine, Clean Catch [[id number]]  (Abnormal)  Collected: 10/26/23 2243    Specimen: Urine, Clean Catch Updated: 10/26/23 2304     Color, UA Yellow     Appearance, UA Clear     pH, UA 5.5     Specific Gravity, UA 1.022     Glucose, UA Negative     Ketones, UA Negative     Bilirubin, UA Negative     Blood, UA Trace     Protein, UA >=300 mg/dL (3+)     Leuk Esterase, UA Small (1+)     Nitrite, UA Negative     Urobilinogen, UA 1.0 E.U./dL    Urinalysis, Microscopic Only - Urine, Clean Catch [[id number]]  (Abnormal) Collected: 10/26/23 2243    Specimen: Urine, Clean Catch Updated: 10/26/23 2314     RBC, UA 0-2 /HPF      WBC, UA 11-20 /HPF      Bacteria, UA None Seen /HPF      Squamous Epithelial Cells, UA 0-2 /HPF      Hyaline Casts, UA None Seen /LPF      Methodology Manual Light Microscopy             Imaging:    XR Chest 1 View    Result Date: 10/26/2023  PROCEDURE: XR CHEST 1 VW  COMPARISON: [facility], , XR CHEST 1 VW, 4/24/2023, 15:28.  INDICATIONS: CHF/COPD Protocol  FINDINGS:  The heart remains enlarged.  The pulmonary vascularity is prominent centrally.  Low lung volumes are evident, with patchy airspace disease at the lung bases, worsened in comparison to 4/24/2023.  The costophrenic angles may be slightly blunted.  Bony structures appear intact.        Low lung volumes with patchy airspace disease at the lung bases, worsened in comparison to 4/24/2023.  The costophrenic angles may be blunted.       LETY TONY MD       Electronically Signed and Approved By: LETY TONY MD on 10/26/2023 at 19:45                Differential Diagnosis and Discussion:    Dyspnea: Differential diagnosis includes but is not limited to metabolic acidosis, neurological disorders, psychogenic, asthma, pneumothorax, upper airway obstruction, COPD, pneumonia, noncardiogenic pulmonary edema, interstitial lung disease, anemia, congestive heart failure, and pulmonary embolism  Edema: Based on the patient's history, signs, and symptoms, the differential diagnosis  includes but is not limited to heart failure, kidney disease, liver disease, venous insufficiency, lymphedema, pregnancy, medications, allergic reactions.    All labs were reviewed and interpreted by me.  All X-rays impressions were independently interpreted by me.  EKG was interpreted by supervising attending.    MDM     Generalized edema is likely caused by fluid overload, BNP is over 25,000, troponins were also elevated at 72 and 64 with a delta of -8.  Dose of IV Lasix given.  Patient became hypertensive while here in the ED, blood pressure improved after IV hydralazine administration.  Patient triggered a positive sepsis screen due to possibly worsening infection in the left lower extremity.  Patient states she just completed her oral antibiotics for cellulitis today and has worsening erythema and swelling.  WBC is normal and lactic acid is normal, blood cultures pending and Zosyn was given, not appropriate for IV fluid administration due to fluid overload.  Urinalysis shows no bacteriuria.  Will call hospitalist for admission.      Patient Care Considerations:          Consultants/Shared Management Plan:    Hospitalist: I have discussed the case with Dr. Avalos who agrees to accept the patient for admission.    Social Determinants of Health:    Patient is independent, reliable, and has access to care.       Disposition and Care Coordination:    Admit:   Through independent evaluation of the patient's history, physical, and imperical data, the patient meets criteria for observation/admission to the hospital.        Final diagnoses:   Acute on chronic congestive heart failure, unspecified heart failure type   Cellulitis of left lower extremity        ED Disposition       ED Disposition   Decision to Admit    Condition   --    Comment   Level of Care: Telemetry [5]   Diagnosis: CHF exacerbation [788645]   Admitting Physician: MARIAH AVALOS [[id number]]   Attending Physician: MARIAH AVALOS [[id number]]    Certification: I Certify That Inpatient Hospital Services Are Medically Necessary For Greater Than 2 Midnights                 This medical record created using voice recognition software.             Giselle Cruz, APRN  10/26/23 3203

## 2023-10-27 ENCOUNTER — APPOINTMENT (OUTPATIENT)
Dept: CT IMAGING | Facility: HOSPITAL | Age: 63
DRG: 602 | End: 2023-10-27
Payer: MEDICARE

## 2023-10-27 ENCOUNTER — APPOINTMENT (OUTPATIENT)
Dept: GENERAL RADIOLOGY | Facility: HOSPITAL | Age: 63
DRG: 602 | End: 2023-10-27
Payer: MEDICARE

## 2023-10-27 LAB
ALBUMIN SERPL-MCNC: 2 G/DL (ref 3.5–5.2)
ALBUMIN/GLOB SERPL: 0.5 G/DL
ALP SERPL-CCNC: 139 U/L (ref 39–117)
ALT SERPL W P-5'-P-CCNC: 12 U/L (ref 1–33)
ANION GAP SERPL CALCULATED.3IONS-SCNC: 8.2 MMOL/L (ref 5–15)
AST SERPL-CCNC: 18 U/L (ref 1–32)
BILIRUB SERPL-MCNC: 0.6 MG/DL (ref 0–1.2)
BUN SERPL-MCNC: 18 MG/DL (ref 8–23)
BUN/CREAT SERPL: 23.1 (ref 7–25)
CALCIUM SPEC-SCNC: 7.5 MG/DL (ref 8.6–10.5)
CHLORIDE SERPL-SCNC: 105 MMOL/L (ref 98–107)
CO2 SERPL-SCNC: 22.8 MMOL/L (ref 22–29)
CREAT SERPL-MCNC: 0.78 MG/DL (ref 0.57–1)
D-LACTATE SERPL-SCNC: 1.5 MMOL/L (ref 0.5–2)
DEPRECATED RDW RBC AUTO: 68.9 FL (ref 37–54)
EGFRCR SERPLBLD CKD-EPI 2021: 85.5 ML/MIN/1.73
ERYTHROCYTE [DISTWIDTH] IN BLOOD BY AUTOMATED COUNT: 20.1 % (ref 12.3–15.4)
GLOBULIN UR ELPH-MCNC: 3.9 GM/DL
GLUCOSE BLDC GLUCOMTR-MCNC: 129 MG/DL (ref 70–99)
GLUCOSE BLDC GLUCOMTR-MCNC: 138 MG/DL (ref 70–99)
GLUCOSE BLDC GLUCOMTR-MCNC: 151 MG/DL (ref 70–99)
GLUCOSE SERPL-MCNC: 142 MG/DL (ref 65–99)
HAV IGM SERPL QL IA: NORMAL
HBV CORE IGM SERPL QL IA: NORMAL
HBV SURFACE AG SERPL QL IA: NORMAL
HCT VFR BLD AUTO: 28.3 % (ref 34–46.6)
HCV AB SER DONR QL: NORMAL
HGB BLD-MCNC: 8.7 G/DL (ref 12–15.9)
MCH RBC QN AUTO: 29.2 PG (ref 26.6–33)
MCHC RBC AUTO-ENTMCNC: 30.7 G/DL (ref 31.5–35.7)
MCV RBC AUTO: 95 FL (ref 79–97)
PLATELET # BLD AUTO: 157 10*3/MM3 (ref 140–450)
PMV BLD AUTO: 10.5 FL (ref 6–12)
POTASSIUM SERPL-SCNC: 3.7 MMOL/L (ref 3.5–5.2)
PROT SERPL-MCNC: 5.9 G/DL (ref 6–8.5)
RBC # BLD AUTO: 2.98 10*6/MM3 (ref 3.77–5.28)
SODIUM SERPL-SCNC: 136 MMOL/L (ref 136–145)
WBC NRBC COR # BLD: 7.43 10*3/MM3 (ref 3.4–10.8)

## 2023-10-27 PROCEDURE — 87205 SMEAR GRAM STAIN: CPT | Performed by: INTERNAL MEDICINE

## 2023-10-27 PROCEDURE — 25010000002 HYDROMORPHONE 1 MG/ML SOLUTION: Performed by: INTERNAL MEDICINE

## 2023-10-27 PROCEDURE — 80074 ACUTE HEPATITIS PANEL: CPT | Performed by: INTERNAL MEDICINE

## 2023-10-27 PROCEDURE — 94761 N-INVAS EAR/PLS OXIMETRY MLT: CPT

## 2023-10-27 PROCEDURE — 83605 ASSAY OF LACTIC ACID: CPT | Performed by: INTERNAL MEDICINE

## 2023-10-27 PROCEDURE — 94799 UNLISTED PULMONARY SVC/PX: CPT

## 2023-10-27 PROCEDURE — 87186 SC STD MICRODIL/AGAR DIL: CPT | Performed by: INTERNAL MEDICINE

## 2023-10-27 PROCEDURE — 85027 COMPLETE CBC AUTOMATED: CPT | Performed by: INTERNAL MEDICINE

## 2023-10-27 PROCEDURE — 82948 REAGENT STRIP/BLOOD GLUCOSE: CPT

## 2023-10-27 PROCEDURE — 25010000002 PIPERACILLIN SOD-TAZOBACTAM PER 1 G: Performed by: INTERNAL MEDICINE

## 2023-10-27 PROCEDURE — 87077 CULTURE AEROBIC IDENTIFY: CPT | Performed by: INTERNAL MEDICINE

## 2023-10-27 PROCEDURE — 25810000003 SODIUM CHLORIDE 0.9 % SOLUTION: Performed by: INTERNAL MEDICINE

## 2023-10-27 PROCEDURE — 87070 CULTURE OTHR SPECIMN AEROBIC: CPT | Performed by: INTERNAL MEDICINE

## 2023-10-27 PROCEDURE — 25010000002 ONDANSETRON PER 1 MG: Performed by: INTERNAL MEDICINE

## 2023-10-27 PROCEDURE — 63710000001 INSULIN LISPRO (HUMAN) PER 5 UNITS: Performed by: INTERNAL MEDICINE

## 2023-10-27 PROCEDURE — 80053 COMPREHEN METABOLIC PANEL: CPT | Performed by: INTERNAL MEDICINE

## 2023-10-27 PROCEDURE — 25010000002 VANCOMYCIN 5 G RECONSTITUTED SOLUTION: Performed by: INTERNAL MEDICINE

## 2023-10-27 RX ORDER — LISINOPRIL 20 MG/1
40 TABLET ORAL
Status: DISCONTINUED | OUTPATIENT
Start: 2023-10-27 | End: 2023-10-27

## 2023-10-27 RX ORDER — PANTOPRAZOLE SODIUM 40 MG/1
40 TABLET, DELAYED RELEASE ORAL
Status: DISCONTINUED | OUTPATIENT
Start: 2023-10-27 | End: 2023-10-29 | Stop reason: HOSPADM

## 2023-10-27 RX ORDER — POLYETHYLENE GLYCOL 3350 17 G/17G
17 POWDER, FOR SOLUTION ORAL DAILY PRN
Status: DISCONTINUED | OUTPATIENT
Start: 2023-10-27 | End: 2023-10-29 | Stop reason: HOSPADM

## 2023-10-27 RX ORDER — ATORVASTATIN CALCIUM 10 MG/1
10 TABLET, FILM COATED ORAL DAILY
Status: DISCONTINUED | OUTPATIENT
Start: 2023-10-27 | End: 2023-10-29 | Stop reason: HOSPADM

## 2023-10-27 RX ORDER — OXYCODONE AND ACETAMINOPHEN 10; 325 MG/1; MG/1
1 TABLET ORAL EVERY 6 HOURS PRN
Status: DISCONTINUED | OUTPATIENT
Start: 2023-10-27 | End: 2023-10-29 | Stop reason: HOSPADM

## 2023-10-27 RX ORDER — CARVEDILOL 3.12 MG/1
3.12 TABLET ORAL 2 TIMES DAILY WITH MEALS
Status: DISCONTINUED | OUTPATIENT
Start: 2023-10-27 | End: 2023-10-27 | Stop reason: SDUPTHER

## 2023-10-27 RX ORDER — NORTRIPTYLINE HYDROCHLORIDE 25 MG/1
50 CAPSULE ORAL 2 TIMES DAILY
Status: DISCONTINUED | OUTPATIENT
Start: 2023-10-27 | End: 2023-10-29 | Stop reason: HOSPADM

## 2023-10-27 RX ORDER — LISINOPRIL 20 MG/1
20 TABLET ORAL EVERY MORNING
Status: DISCONTINUED | OUTPATIENT
Start: 2023-10-27 | End: 2023-10-27 | Stop reason: SDUPTHER

## 2023-10-27 RX ORDER — FLUCONAZOLE 100 MG/1
100 TABLET ORAL DAILY
COMMUNITY
Start: 2023-10-10

## 2023-10-27 RX ORDER — SODIUM CHLORIDE 0.9 % (FLUSH) 0.9 %
10 SYRINGE (ML) INJECTION EVERY 12 HOURS SCHEDULED
Status: DISCONTINUED | OUTPATIENT
Start: 2023-10-27 | End: 2023-10-29 | Stop reason: HOSPADM

## 2023-10-27 RX ORDER — INSULIN LISPRO 100 [IU]/ML
2-7 INJECTION, SOLUTION INTRAVENOUS; SUBCUTANEOUS
Status: DISCONTINUED | OUTPATIENT
Start: 2023-10-27 | End: 2023-10-29 | Stop reason: HOSPADM

## 2023-10-27 RX ORDER — ALUMINA, MAGNESIA, AND SIMETHICONE 2400; 2400; 240 MG/30ML; MG/30ML; MG/30ML
15 SUSPENSION ORAL EVERY 6 HOURS PRN
Status: DISCONTINUED | OUTPATIENT
Start: 2023-10-27 | End: 2023-10-29 | Stop reason: HOSPADM

## 2023-10-27 RX ORDER — IBUPROFEN 600 MG/1
1 TABLET ORAL
Status: DISCONTINUED | OUTPATIENT
Start: 2023-10-27 | End: 2023-10-29 | Stop reason: HOSPADM

## 2023-10-27 RX ORDER — CEPHALEXIN 500 MG/1
500 CAPSULE ORAL 3 TIMES DAILY
COMMUNITY
Start: 2023-10-10

## 2023-10-27 RX ORDER — BENZONATATE 100 MG/1
100 CAPSULE ORAL 3 TIMES DAILY PRN
Status: DISCONTINUED | OUTPATIENT
Start: 2023-10-27 | End: 2023-10-29 | Stop reason: HOSPADM

## 2023-10-27 RX ORDER — MIRTAZAPINE 15 MG/1
45 TABLET, FILM COATED ORAL NIGHTLY PRN
Status: DISCONTINUED | OUTPATIENT
Start: 2023-10-27 | End: 2023-10-29 | Stop reason: HOSPADM

## 2023-10-27 RX ORDER — BISACODYL 5 MG/1
5 TABLET, DELAYED RELEASE ORAL DAILY PRN
Status: DISCONTINUED | OUTPATIENT
Start: 2023-10-27 | End: 2023-10-29 | Stop reason: HOSPADM

## 2023-10-27 RX ORDER — SERTRALINE HYDROCHLORIDE 25 MG/1
50 TABLET, FILM COATED ORAL DAILY
Status: DISCONTINUED | OUTPATIENT
Start: 2023-10-27 | End: 2023-10-27

## 2023-10-27 RX ORDER — NICOTINE POLACRILEX 4 MG
15 LOZENGE BUCCAL
Status: DISCONTINUED | OUTPATIENT
Start: 2023-10-27 | End: 2023-10-29 | Stop reason: HOSPADM

## 2023-10-27 RX ORDER — HYDROXYZINE PAMOATE 50 MG/1
50 CAPSULE ORAL 2 TIMES DAILY PRN
Status: DISCONTINUED | OUTPATIENT
Start: 2023-10-27 | End: 2023-10-27 | Stop reason: SDUPTHER

## 2023-10-27 RX ORDER — SODIUM CHLORIDE 0.9 % (FLUSH) 0.9 %
10 SYRINGE (ML) INJECTION AS NEEDED
Status: DISCONTINUED | OUTPATIENT
Start: 2023-10-27 | End: 2023-10-29 | Stop reason: HOSPADM

## 2023-10-27 RX ORDER — ACETAMINOPHEN 325 MG/1
650 TABLET ORAL EVERY 4 HOURS PRN
Status: DISCONTINUED | OUTPATIENT
Start: 2023-10-27 | End: 2023-10-29 | Stop reason: HOSPADM

## 2023-10-27 RX ORDER — DEXTROSE MONOHYDRATE 25 G/50ML
25 INJECTION, SOLUTION INTRAVENOUS
Status: DISCONTINUED | OUTPATIENT
Start: 2023-10-27 | End: 2023-10-29 | Stop reason: HOSPADM

## 2023-10-27 RX ORDER — TIZANIDINE 4 MG/1
4 TABLET ORAL EVERY 8 HOURS PRN
Status: DISCONTINUED | OUTPATIENT
Start: 2023-10-27 | End: 2023-10-29 | Stop reason: HOSPADM

## 2023-10-27 RX ORDER — LISINOPRIL 20 MG/1
20 TABLET ORAL
Status: DISCONTINUED | OUTPATIENT
Start: 2023-10-28 | End: 2023-10-28

## 2023-10-27 RX ORDER — ONDANSETRON 2 MG/ML
4 INJECTION INTRAMUSCULAR; INTRAVENOUS EVERY 6 HOURS PRN
Status: DISCONTINUED | OUTPATIENT
Start: 2023-10-27 | End: 2023-10-29 | Stop reason: HOSPADM

## 2023-10-27 RX ORDER — HYDROXYZINE PAMOATE 50 MG/1
50 CAPSULE ORAL 2 TIMES DAILY PRN
Status: DISCONTINUED | OUTPATIENT
Start: 2023-10-27 | End: 2023-10-29 | Stop reason: HOSPADM

## 2023-10-27 RX ORDER — SODIUM CHLORIDE 9 MG/ML
40 INJECTION, SOLUTION INTRAVENOUS AS NEEDED
Status: DISCONTINUED | OUTPATIENT
Start: 2023-10-27 | End: 2023-10-29 | Stop reason: HOSPADM

## 2023-10-27 RX ORDER — CARVEDILOL 3.12 MG/1
3.12 TABLET ORAL 2 TIMES DAILY WITH MEALS
Status: DISCONTINUED | OUTPATIENT
Start: 2023-10-27 | End: 2023-10-28

## 2023-10-27 RX ORDER — INSULIN DETEMIR 100 [IU]/ML
25 INJECTION, SOLUTION SUBCUTANEOUS NIGHTLY
COMMUNITY

## 2023-10-27 RX ORDER — TRAZODONE HYDROCHLORIDE 100 MG/1
300 TABLET ORAL NIGHTLY PRN
Status: DISCONTINUED | OUTPATIENT
Start: 2023-10-27 | End: 2023-10-29 | Stop reason: HOSPADM

## 2023-10-27 RX ORDER — AMOXICILLIN 250 MG
2 CAPSULE ORAL 2 TIMES DAILY
Status: DISCONTINUED | OUTPATIENT
Start: 2023-10-27 | End: 2023-10-29 | Stop reason: HOSPADM

## 2023-10-27 RX ORDER — BISACODYL 10 MG
10 SUPPOSITORY, RECTAL RECTAL DAILY PRN
Status: DISCONTINUED | OUTPATIENT
Start: 2023-10-27 | End: 2023-10-29 | Stop reason: HOSPADM

## 2023-10-27 RX ORDER — LISINOPRIL 20 MG/1
20 TABLET ORAL DAILY
COMMUNITY

## 2023-10-27 RX ADMIN — NORTRIPTYLINE HYDROCHLORIDE 50 MG: 25 CAPSULE ORAL at 20:39

## 2023-10-27 RX ADMIN — LISINOPRIL 40 MG: 20 TABLET ORAL at 12:38

## 2023-10-27 RX ADMIN — CARVEDILOL 3.12 MG: 3.12 TABLET, FILM COATED ORAL at 18:47

## 2023-10-27 RX ADMIN — OXYCODONE HYDROCHLORIDE AND ACETAMINOPHEN 1 TABLET: 10; 325 TABLET ORAL at 11:55

## 2023-10-27 RX ADMIN — INSULIN LISPRO 2 UNITS: 100 INJECTION, SOLUTION INTRAVENOUS; SUBCUTANEOUS at 12:37

## 2023-10-27 RX ADMIN — HYDROXYZINE PAMOATE 50 MG: 50 CAPSULE ORAL at 20:39

## 2023-10-27 RX ADMIN — TRAZODONE HYDROCHLORIDE 300 MG: 100 TABLET ORAL at 23:42

## 2023-10-27 RX ADMIN — LISINOPRIL 20 MG: 20 TABLET ORAL at 06:23

## 2023-10-27 RX ADMIN — NORTRIPTYLINE HYDROCHLORIDE 50 MG: 25 CAPSULE ORAL at 13:42

## 2023-10-27 RX ADMIN — TIZANIDINE 4 MG: 4 TABLET ORAL at 23:42

## 2023-10-27 RX ADMIN — ONDANSETRON 4 MG: 2 INJECTION INTRAMUSCULAR; INTRAVENOUS at 18:47

## 2023-10-27 RX ADMIN — PANTOPRAZOLE SODIUM 40 MG: 40 TABLET, DELAYED RELEASE ORAL at 12:38

## 2023-10-27 RX ADMIN — VANCOMYCIN HYDROCHLORIDE 1000 MG: 5 INJECTION, POWDER, LYOPHILIZED, FOR SOLUTION INTRAVENOUS at 20:39

## 2023-10-27 RX ADMIN — OXYCODONE HYDROCHLORIDE AND ACETAMINOPHEN 1 TABLET: 10; 325 TABLET ORAL at 18:47

## 2023-10-27 RX ADMIN — HYDROMORPHONE HYDROCHLORIDE 0.25 MG: 1 INJECTION, SOLUTION INTRAMUSCULAR; INTRAVENOUS; SUBCUTANEOUS at 06:22

## 2023-10-27 RX ADMIN — Medication 10 ML: at 12:39

## 2023-10-27 RX ADMIN — PIPERACILLIN AND TAZOBACTAM 3.38 G: 3; .375 INJECTION, POWDER, LYOPHILIZED, FOR SOLUTION INTRAVENOUS at 12:37

## 2023-10-27 RX ADMIN — APIXABAN 5 MG: 5 TABLET, FILM COATED ORAL at 12:38

## 2023-10-27 RX ADMIN — HYDROMORPHONE HYDROCHLORIDE 0.25 MG: 1 INJECTION, SOLUTION INTRAMUSCULAR; INTRAVENOUS; SUBCUTANEOUS at 20:39

## 2023-10-27 RX ADMIN — SERTRALINE HYDROCHLORIDE 150 MG: 100 TABLET ORAL at 12:38

## 2023-10-27 RX ADMIN — HYDROMORPHONE HYDROCHLORIDE 0.25 MG: 1 INJECTION, SOLUTION INTRAMUSCULAR; INTRAVENOUS; SUBCUTANEOUS at 02:24

## 2023-10-27 RX ADMIN — PIPERACILLIN AND TAZOBACTAM 3.38 G: 3; .375 INJECTION, POWDER, LYOPHILIZED, FOR SOLUTION INTRAVENOUS at 18:47

## 2023-10-27 RX ADMIN — APIXABAN 5 MG: 5 TABLET, FILM COATED ORAL at 20:39

## 2023-10-27 RX ADMIN — CARVEDILOL 3.12 MG: 3.12 TABLET, FILM COATED ORAL at 08:00

## 2023-10-27 RX ADMIN — HYDROXYZINE PAMOATE 50 MG: 50 CAPSULE ORAL at 02:24

## 2023-10-27 RX ADMIN — HYDROMORPHONE HYDROCHLORIDE 0.25 MG: 1 INJECTION, SOLUTION INTRAMUSCULAR; INTRAVENOUS; SUBCUTANEOUS at 14:42

## 2023-10-27 RX ADMIN — VANCOMYCIN HYDROCHLORIDE 1750 MG: 500 INJECTION, POWDER, LYOPHILIZED, FOR SOLUTION INTRAVENOUS at 13:43

## 2023-10-27 RX ADMIN — ATORVASTATIN CALCIUM 10 MG: 10 TABLET, FILM COATED ORAL at 12:38

## 2023-10-27 RX ADMIN — HYDROMORPHONE HYDROCHLORIDE 0.25 MG: 1 INJECTION, SOLUTION INTRAMUSCULAR; INTRAVENOUS; SUBCUTANEOUS at 10:31

## 2023-10-27 NOTE — PLAN OF CARE
Goal Outcome Evaluation:                      Patient A&Ox4. VSS. Patient complained of pain this shift and was medicated per MAR. IV antibiotics complete per MAR. Patient refused  a CT scan as well as an Xray this shift. Patient stated that she had already had these scans in the past and did not want anymore scans completed at this time. Education provided.  No new orders at this time.

## 2023-10-27 NOTE — PROGRESS NOTES
"[facility] Clinical Pharmacy Services: Vancomycin Pharmacokinetic Initial Consult Note    Italia Olvera is a 62 y.o. female who is on day 1 of pharmacy to dose vancomycin for Skin and Soft Tissue.    Consult Information  Consulting Provider: Arthur  Planned Duration of Therapy: 7 days  Was Patient Receiving Prior to Admission/Consult?: No  Loading Dose Ordered or Given: 1750 mg ordered for 10/27  PK/PD Target: -600 mg/L.hr   Relevant ID History:  3/8/23: Urine culture- enterococcus faecalis  Other Antimicrobials: Piperacillin/Tazobactam    Imaging Reviewed?: Yes    Microbiology Data  MRSA PCR performed: No; Result: Not ordered due to excluded indication or presence of suspected abscess  Culture/Source: Ordered     Vitals/Labs  Ht: 167.6 cm (66\"); Wt: 88.2 kg (194 lb 7.1 oz)  Temp (24hrs), Av °F (36.7 °C), Min:97.7 °F (36.5 °C), Max:98.2 °F (36.8 °C)   Estimated Creatinine Clearance: 81.6 mL/min (by C-G formula based on SCr of 0.79 mg/dL).     Results from last 7 days   Lab Units 10/26/23  1913   CREATININE mg/dL 0.79   WBC 10*3/mm3 8.96     Assessment/Plan:  Vancomycin Dose:  1000 mg IV every 12 hours; which provides the following predicted parameters:    Regimen: 1000 mg IV every 12 hours.  Start time: 10:31 on 10/27/2023  Exposure target: AUC24 (range)400-600 mg/L.hr   AUC24,ss: 492 mg/L.hr  PAUC*: 73 %  Ctrough,ss: 15.8 mg/L  Pconc*: 28 %  Tox.: 11 %    Vanc Random ordered for tomorrow AM  Patient has order for Basic Metabolic Panel    Pharmacy will follow patient's kidney function and will adjust doses and obtain levels as necessary. Thank you for involving pharmacy in this patient's care. Please contact pharmacy with any questions or concerns.                           Raphael Min  Clinical Pharmacist  "

## 2023-10-27 NOTE — PLAN OF CARE
Problem: Adult Inpatient Plan of Care  Goal: Absence of Hospital-Acquired Illness or Injury  Intervention: Identify and Manage Fall Risk  Recent Flowsheet Documentation  Taken 10/27/2023 0200 by Lisa Bone RN  Safety Promotion/Fall Prevention: safety round/check completed  Taken 10/27/2023 0100 by Lisa Bone RN  Safety Promotion/Fall Prevention: safety round/check completed  Taken 10/27/2023 0044 by Lisa Bone RN  Safety Promotion/Fall Prevention: safety round/check completed  Intervention: Prevent Skin Injury  Recent Flowsheet Documentation  Taken 10/27/2023 0200 by Lisa Bone RN  Body Position:   weight shifting   patient/family refused  Taken 10/27/2023 0100 by Lisa Bone RN  Body Position: position changed independently  Taken 10/27/2023 0044 by Lisa Bone RN  Body Position:   weight shifting   right  Intervention: Prevent and Manage VTE (Venous Thromboembolism) Risk  Recent Flowsheet Documentation  Taken 10/27/2023 0100 by Lisa Bone RN  Activity Management: bedrest  VTE Prevention/Management: (SEE MAR)   bilateral   patient refused intervention   sequential compression devices off   other (see comments)  Range of Motion: active ROM (range of motion) encouraged  Intervention: Prevent Infection  Recent Flowsheet Documentation  Taken 10/27/2023 0100 by Lisa Bone RN  Infection Prevention:   visitors restricted/screened   single patient room provided   rest/sleep promoted   personal protective equipment utilized   hand hygiene promoted  Goal: Optimal Comfort and Wellbeing  Intervention: Monitor Pain and Promote Comfort  Recent Flowsheet Documentation  Taken 10/27/2023 0100 by Lisa Bone RN  Pain Management Interventions:   see MAR   relaxation techniques promoted   position adjusted   pillow support provided   pain management plan reviewed with patient/caregiver  Intervention: Provide Person-Centered Care  Recent Flowsheet Documentation  Taken 10/27/2023 0100 by Lisa Bone RN  Trust  Relationship/Rapport:   care explained   choices provided   emotional support provided   empathic listening provided   questions answered   questions encouraged   reassurance provided   thoughts/feelings acknowledged     Problem: Skin Injury Risk Increased  Goal: Skin Health and Integrity  Intervention: Optimize Skin Protection  Recent Flowsheet Documentation  Taken 10/27/2023 0200 by Lisa Bone, JAN  Head of Bed ([facility]) Positioning: HOB elevated  Taken 10/27/2023 0100 by Lisa Bone, JAN  Head of Bed ([facility]) Positioning: HOB elevated  Taken 10/27/2023 0044 by Lisa Bone RN  Head of Bed ([facility]) Positioning: HOB elevated   Goal Outcome Evaluation:      NEW ADMISSION COMPLAINTS OF PAIN RN CALLED AND RECEIVED ORDER FOR PAIN MED AND RELIEVED HER PAIN

## 2023-10-28 ENCOUNTER — APPOINTMENT (OUTPATIENT)
Dept: GENERAL RADIOLOGY | Facility: HOSPITAL | Age: 63
DRG: 602 | End: 2023-10-28
Payer: MEDICARE

## 2023-10-28 LAB
ABO GROUP BLD: NORMAL
ALBUMIN SERPL-MCNC: 1.7 G/DL (ref 3.5–5.2)
ALBUMIN/GLOB SERPL: 0.5 G/DL
ALP SERPL-CCNC: 108 U/L (ref 39–117)
ALT SERPL W P-5'-P-CCNC: 10 U/L (ref 1–33)
ANION GAP SERPL CALCULATED.3IONS-SCNC: 10.1 MMOL/L (ref 5–15)
ARTERIAL PATENCY WRIST A: POSITIVE
AST SERPL-CCNC: 14 U/L (ref 1–32)
BASE EXCESS BLDA CALC-SCNC: -5.4 MMOL/L (ref -2–2)
BASOPHILS # BLD AUTO: 0.06 10*3/MM3 (ref 0–0.2)
BASOPHILS NFR BLD AUTO: 0.4 % (ref 0–1.5)
BDY SITE: ABNORMAL
BILIRUB SERPL-MCNC: 0.5 MG/DL (ref 0–1.2)
BLD GP AB SCN SERPL QL: NEGATIVE
BUN SERPL-MCNC: 21 MG/DL (ref 8–23)
BUN/CREAT SERPL: 25.3 (ref 7–25)
CA-I BLDA-SCNC: 1 MMOL/L (ref 1.13–1.32)
CALCIUM SPEC-SCNC: 7 MG/DL (ref 8.6–10.5)
CHLORIDE BLDA-SCNC: 109 MMOL/L (ref 98–106)
CHLORIDE SERPL-SCNC: 106 MMOL/L (ref 98–107)
CO2 SERPL-SCNC: 18.9 MMOL/L (ref 22–29)
COHGB MFR BLD: 0.9 % (ref 0–1.5)
CREAT SERPL-MCNC: 0.83 MG/DL (ref 0.57–1)
D-LACTATE SERPL-SCNC: 1.2 MMOL/L (ref 0.5–2)
DEPRECATED RDW RBC AUTO: 71.5 FL (ref 37–54)
EGFRCR SERPLBLD CKD-EPI 2021: 79.3 ML/MIN/1.73
EOSINOPHIL # BLD AUTO: 0.02 10*3/MM3 (ref 0–0.4)
EOSINOPHIL NFR BLD AUTO: 0.1 % (ref 0.3–6.2)
ERYTHROCYTE [DISTWIDTH] IN BLOOD BY AUTOMATED COUNT: 20 % (ref 12.3–15.4)
FERRITIN SERPL-MCNC: 216.6 NG/ML (ref 13–150)
FHHB: 33.5 % (ref 0–5)
FOLATE SERPL-MCNC: 2.07 NG/ML (ref 4.78–24.2)
GAS FLOW AIRWAY: 4 LPM
GLOBULIN UR ELPH-MCNC: 3.5 GM/DL
GLUCOSE BLDA-MCNC: 108 MG/DL (ref 65–99)
GLUCOSE BLDC GLUCOMTR-MCNC: 112 MG/DL (ref 70–99)
GLUCOSE BLDC GLUCOMTR-MCNC: 113 MG/DL (ref 70–99)
GLUCOSE BLDC GLUCOMTR-MCNC: 128 MG/DL (ref 70–99)
GLUCOSE BLDC GLUCOMTR-MCNC: 92 MG/DL (ref 70–99)
GLUCOSE SERPL-MCNC: 120 MG/DL (ref 65–99)
HCO3 BLDA-SCNC: 19.8 MMOL/L (ref 22–26)
HCT VFR BLD AUTO: 24.9 % (ref 34–46.6)
HCYS SERPL-MCNC: 11.9 UMOL/L (ref 0–15)
HGB BLD-MCNC: 7.3 G/DL (ref 12–15.9)
HGB BLDA-MCNC: 8.9 G/DL (ref 11.7–14.6)
HOLD SPECIMEN: NORMAL
IMM GRANULOCYTES # BLD AUTO: 0.08 10*3/MM3 (ref 0–0.05)
IMM GRANULOCYTES NFR BLD AUTO: 0.6 % (ref 0–0.5)
INHALED O2 CONCENTRATION: 36 %
INR PPP: 1.77 (ref 0.86–1.15)
IRON 24H UR-MRATE: 13 MCG/DL (ref 37–145)
IRON SATN MFR SERPL: 7 % (ref 20–50)
LACTATE BLDA-SCNC: 1.69 MMOL/L (ref 0.5–2)
LYMPHOCYTES # BLD AUTO: 0.27 10*3/MM3 (ref 0.7–3.1)
LYMPHOCYTES NFR BLD AUTO: 2 % (ref 19.6–45.3)
MCH RBC QN AUTO: 28.9 PG (ref 26.6–33)
MCHC RBC AUTO-ENTMCNC: 29.3 G/DL (ref 31.5–35.7)
MCV RBC AUTO: 98.4 FL (ref 79–97)
METHGB BLD QL: 0.1 % (ref 0–1.5)
MODALITY: ABNORMAL
MONOCYTES # BLD AUTO: 0.28 10*3/MM3 (ref 0.1–0.9)
MONOCYTES NFR BLD AUTO: 2 % (ref 5–12)
NEUTROPHILS NFR BLD AUTO: 12.95 10*3/MM3 (ref 1.7–7)
NEUTROPHILS NFR BLD AUTO: 94.9 % (ref 42.7–76)
NOTE: ABNORMAL
NRBC BLD AUTO-RTO: 0 /100 WBC (ref 0–0.2)
OXYHGB MFR BLDV: 65.5 % (ref 94–99)
PCO2 BLDA: 37 MM HG (ref 35–45)
PH BLDA: 7.35 PH UNITS (ref 7.35–7.45)
PLATELET # BLD AUTO: 120 10*3/MM3 (ref 140–450)
PMV BLD AUTO: 11.4 FL (ref 6–12)
PO2 BLD: 101 MM[HG] (ref 0–500)
PO2 BLDA: 36.4 MM HG (ref 80–100)
POTASSIUM BLDA-SCNC: 3.62 MMOL/L (ref 3.5–5)
POTASSIUM SERPL-SCNC: 3.6 MMOL/L (ref 3.5–5.2)
PROCALCITONIN SERPL-MCNC: 17.68 NG/ML (ref 0–0.25)
PROT SERPL-MCNC: 5.2 G/DL (ref 6–8.5)
PROTHROMBIN TIME: 20.5 SECONDS (ref 11.8–14.9)
RBC # BLD AUTO: 2.53 10*6/MM3 (ref 3.77–5.28)
RETICS # AUTO: 0.09 10*6/MM3 (ref 0.02–0.13)
RETICS/RBC NFR AUTO: 3.57 % (ref 0.7–1.9)
RH BLD: POSITIVE
SAO2 % BLDCOA: 66.2 % (ref 95–99)
SODIUM BLDA-SCNC: 135.8 MMOL/L (ref 136–146)
SODIUM SERPL-SCNC: 135 MMOL/L (ref 136–145)
T&S EXPIRATION DATE: NORMAL
TIBC SERPL-MCNC: 182 MCG/DL (ref 298–536)
TRANSFERRIN SERPL-MCNC: 122 MG/DL (ref 200–360)
VANCOMYCIN SERPL-MCNC: 22.94 MCG/ML (ref 5–40)
VIT B12 BLD-MCNC: 1112 PG/ML (ref 211–946)
WBC NRBC COR # BLD: 13.66 10*3/MM3 (ref 3.4–10.8)
WHOLE BLOOD HOLD COAG: NORMAL
WHOLE BLOOD HOLD SPECIMEN: NORMAL

## 2023-10-28 PROCEDURE — 82607 VITAMIN B-12: CPT | Performed by: INTERNAL MEDICINE

## 2023-10-28 PROCEDURE — 83090 ASSAY OF HOMOCYSTEINE: CPT | Performed by: INTERNAL MEDICINE

## 2023-10-28 PROCEDURE — 86850 RBC ANTIBODY SCREEN: CPT | Performed by: NURSE PRACTITIONER

## 2023-10-28 PROCEDURE — 85045 AUTOMATED RETICULOCYTE COUNT: CPT | Performed by: INTERNAL MEDICINE

## 2023-10-28 PROCEDURE — 85610 PROTHROMBIN TIME: CPT | Performed by: INTERNAL MEDICINE

## 2023-10-28 PROCEDURE — 25010000002 PIPERACILLIN SOD-TAZOBACTAM PER 1 G: Performed by: INTERNAL MEDICINE

## 2023-10-28 PROCEDURE — 94761 N-INVAS EAR/PLS OXIMETRY MLT: CPT

## 2023-10-28 PROCEDURE — 94799 UNLISTED PULMONARY SVC/PX: CPT

## 2023-10-28 PROCEDURE — 82746 ASSAY OF FOLIC ACID SERUM: CPT | Performed by: INTERNAL MEDICINE

## 2023-10-28 PROCEDURE — 25010000002 VANCOMYCIN 5 G RECONSTITUTED SOLUTION: Performed by: INTERNAL MEDICINE

## 2023-10-28 PROCEDURE — 84145 PROCALCITONIN (PCT): CPT | Performed by: NURSE PRACTITIONER

## 2023-10-28 PROCEDURE — 93010 ELECTROCARDIOGRAM REPORT: CPT | Performed by: INTERNAL MEDICINE

## 2023-10-28 PROCEDURE — 86901 BLOOD TYPING SEROLOGIC RH(D): CPT | Performed by: NURSE PRACTITIONER

## 2023-10-28 PROCEDURE — 71045 X-RAY EXAM CHEST 1 VIEW: CPT

## 2023-10-28 PROCEDURE — 82784 ASSAY IGA/IGD/IGG/IGM EACH: CPT | Performed by: INTERNAL MEDICINE

## 2023-10-28 PROCEDURE — 93005 ELECTROCARDIOGRAM TRACING: CPT | Performed by: INTERNAL MEDICINE

## 2023-10-28 PROCEDURE — 85025 COMPLETE CBC W/AUTO DIFF WBC: CPT | Performed by: INTERNAL MEDICINE

## 2023-10-28 PROCEDURE — 82375 ASSAY CARBOXYHB QUANT: CPT | Performed by: INTERNAL MEDICINE

## 2023-10-28 PROCEDURE — 84466 ASSAY OF TRANSFERRIN: CPT | Performed by: INTERNAL MEDICINE

## 2023-10-28 PROCEDURE — 73590 X-RAY EXAM OF LOWER LEG: CPT

## 2023-10-28 PROCEDURE — 25810000003 SODIUM CHLORIDE 0.9 % SOLUTION: Performed by: NURSE PRACTITIONER

## 2023-10-28 PROCEDURE — 83605 ASSAY OF LACTIC ACID: CPT | Performed by: NURSE PRACTITIONER

## 2023-10-28 PROCEDURE — 86900 BLOOD TYPING SEROLOGIC ABO: CPT | Performed by: NURSE PRACTITIONER

## 2023-10-28 PROCEDURE — 82728 ASSAY OF FERRITIN: CPT | Performed by: INTERNAL MEDICINE

## 2023-10-28 PROCEDURE — 25810000003 SODIUM CHLORIDE 0.9 % SOLUTION: Performed by: INTERNAL MEDICINE

## 2023-10-28 PROCEDURE — 83521 IG LIGHT CHAINS FREE EACH: CPT | Performed by: INTERNAL MEDICINE

## 2023-10-28 PROCEDURE — 82948 REAGENT STRIP/BLOOD GLUCOSE: CPT

## 2023-10-28 PROCEDURE — 83540 ASSAY OF IRON: CPT | Performed by: INTERNAL MEDICINE

## 2023-10-28 PROCEDURE — 25010000002 HYDROMORPHONE 1 MG/ML SOLUTION: Performed by: INTERNAL MEDICINE

## 2023-10-28 PROCEDURE — 87040 BLOOD CULTURE FOR BACTERIA: CPT | Performed by: NURSE PRACTITIONER

## 2023-10-28 PROCEDURE — 99221 1ST HOSP IP/OBS SF/LOW 40: CPT | Performed by: ORTHOPAEDIC SURGERY

## 2023-10-28 PROCEDURE — 83050 HGB METHEMOGLOBIN QUAN: CPT | Performed by: INTERNAL MEDICINE

## 2023-10-28 PROCEDURE — 82805 BLOOD GASES W/O2 SATURATION: CPT | Performed by: INTERNAL MEDICINE

## 2023-10-28 PROCEDURE — 80202 ASSAY OF VANCOMYCIN: CPT | Performed by: INTERNAL MEDICINE

## 2023-10-28 PROCEDURE — 86334 IMMUNOFIX E-PHORESIS SERUM: CPT | Performed by: INTERNAL MEDICINE

## 2023-10-28 PROCEDURE — 36600 WITHDRAWAL OF ARTERIAL BLOOD: CPT | Performed by: INTERNAL MEDICINE

## 2023-10-28 PROCEDURE — 80053 COMPREHEN METABOLIC PANEL: CPT | Performed by: INTERNAL MEDICINE

## 2023-10-28 RX ORDER — NOREPINEPHRINE BITARTRATE 0.03 MG/ML
.02-.3 INJECTION, SOLUTION INTRAVENOUS
Status: DISCONTINUED | OUTPATIENT
Start: 2023-10-28 | End: 2023-10-29 | Stop reason: HOSPADM

## 2023-10-28 RX ADMIN — APIXABAN 5 MG: 5 TABLET, FILM COATED ORAL at 09:57

## 2023-10-28 RX ADMIN — APIXABAN 5 MG: 5 TABLET, FILM COATED ORAL at 20:51

## 2023-10-28 RX ADMIN — NORTRIPTYLINE HYDROCHLORIDE 50 MG: 25 CAPSULE ORAL at 20:51

## 2023-10-28 RX ADMIN — VANCOMYCIN HYDROCHLORIDE 750 MG: 500 INJECTION, POWDER, LYOPHILIZED, FOR SOLUTION INTRAVENOUS at 19:50

## 2023-10-28 RX ADMIN — HYDROMORPHONE HYDROCHLORIDE 0.25 MG: 1 INJECTION, SOLUTION INTRAMUSCULAR; INTRAVENOUS; SUBCUTANEOUS at 21:43

## 2023-10-28 RX ADMIN — TIZANIDINE 4 MG: 4 TABLET ORAL at 20:51

## 2023-10-28 RX ADMIN — PIPERACILLIN AND TAZOBACTAM 3.38 G: 3; .375 INJECTION, POWDER, LYOPHILIZED, FOR SOLUTION INTRAVENOUS at 12:17

## 2023-10-28 RX ADMIN — PIPERACILLIN AND TAZOBACTAM 3.38 G: 3; .375 INJECTION, POWDER, LYOPHILIZED, FOR SOLUTION INTRAVENOUS at 01:53

## 2023-10-28 RX ADMIN — OXYCODONE HYDROCHLORIDE AND ACETAMINOPHEN 1 TABLET: 10; 325 TABLET ORAL at 05:15

## 2023-10-28 RX ADMIN — VANCOMYCIN HYDROCHLORIDE 1000 MG: 5 INJECTION, POWDER, LYOPHILIZED, FOR SOLUTION INTRAVENOUS at 10:01

## 2023-10-28 RX ADMIN — PANTOPRAZOLE SODIUM 40 MG: 40 TABLET, DELAYED RELEASE ORAL at 05:15

## 2023-10-28 RX ADMIN — HYDROMORPHONE HYDROCHLORIDE 0.25 MG: 1 INJECTION, SOLUTION INTRAMUSCULAR; INTRAVENOUS; SUBCUTANEOUS at 03:26

## 2023-10-28 RX ADMIN — MICONAZOLE NITRATE 1 APPLICATION: 2 POWDER TOPICAL at 09:57

## 2023-10-28 RX ADMIN — SERTRALINE HYDROCHLORIDE 150 MG: 100 TABLET ORAL at 10:00

## 2023-10-28 RX ADMIN — SODIUM CHLORIDE 500 ML: 9 INJECTION, SOLUTION INTRAVENOUS at 19:00

## 2023-10-28 RX ADMIN — NORTRIPTYLINE HYDROCHLORIDE 50 MG: 25 CAPSULE ORAL at 09:57

## 2023-10-28 RX ADMIN — DOCUSATE SODIUM 50MG AND SENNOSIDES 8.6MG 2 TABLET: 8.6; 5 TABLET, FILM COATED ORAL at 09:57

## 2023-10-28 RX ADMIN — HYDROXYZINE PAMOATE 50 MG: 50 CAPSULE ORAL at 20:51

## 2023-10-28 RX ADMIN — Medication 10 ML: at 20:51

## 2023-10-28 RX ADMIN — ATORVASTATIN CALCIUM 10 MG: 10 TABLET, FILM COATED ORAL at 09:57

## 2023-10-28 RX ADMIN — PIPERACILLIN AND TAZOBACTAM 3.38 G: 3; .375 INJECTION, POWDER, LYOPHILIZED, FOR SOLUTION INTRAVENOUS at 19:50

## 2023-10-28 RX ADMIN — OXYCODONE HYDROCHLORIDE AND ACETAMINOPHEN 1 TABLET: 10; 325 TABLET ORAL at 19:33

## 2023-10-28 NOTE — CONSULTS
[facility]   Consult Note    Patient Name: Italia Olvera  : 1960  MRN: [id number]  Primary Care Physician:  Carloz Shoemaker MD  Referring Physician: No ref. provider found  Date of admission: 10/26/2023    Inpatient Orthopedic Surgery Consult  Consult performed by: Jessica Fenton MD  Consult ordered by: Cruz Gibson MD        Subjective   Subjective     Reason for Consult/ Chief Complaint: Left leg wound infection    History of Present Illness  Italia Olvera is a 62 y.o. female with a remote history of a total knee done by myself.  She had a recent fall in the past month with a periprosthetic proximal tibia fracture.  Subsequently transferred to [facility] for treatment.  She is admitted for CHF exacerbation.  She has an obvious wound infection.  Patient refused x-rays, unable to determine what has been done at this time.  Current cultures are pending    Review of Systems     Personal History     Past Medical History:   Diagnosis Date    Acid reflux     ACL tear 2015    PCL/ACL TEAR/RUPTURE    Acute shoulder bursitis, right 2015    Anxiety     Arthritis     Asthma     Bipolar 1 disorder     untreated    Bladder disorder     Cancer     CERVICAL CANCER    CHF (congestive heart failure)     ASYMPTOMATIC- NO CARDIOLOGIST- SEE'S DR SHOEMAKER (PCP)- DENIED CP/SOB    Chronic back pain     Chronic pain     CHRONIC BACK, NECK, LEG, FOOT, & FACE PAIN    COPD (chronic obstructive pulmonary disease)     USES INHALERS    Depression     Diabetes mellitus     BG RUND AROUND 140 IN AM    DJD (degenerative joint disease)     Gangrene     RT SECOND AND FIFTH TOES    GERD (gastroesophageal reflux disease)     HBP (high blood pressure)     Hip pain 09/15/2015    Hypertension     Knee injury 2015    Knee pain, right 09/15/2015    Limb swelling     Neuropathy     On home O2     2-3L/NC PRN    Osteoarthritis, knee 2015    Osteoporosis     Peripheral neuropathy     Renal failure      NO CURRENT PROBLEMS    Seasonal allergies     Shoulder tendonitis 08/23/2015    Sleep apnea     Smoker     SOB (shortness of breath)     NONE CURENTLY    Tendinitis of right rotator cuff 08/23/2015       Past Surgical History:   Procedure Laterality Date    ABOVE KNEE AMPUTATION Right 3/11/2023    Procedure: AMPUTATION ABOVE KNEE;  Surgeon: Zacarias Haywood MD;  Location: [facility] MAIN OR;  Service: Vascular;  Laterality: Right;    AMPUTATION DIGIT Right 12/6/2022    Procedure: Amputation of right second and fifth toes;  Surgeon: Gabino Russell MD;  Location: [facility] MAIN OR;  Service: Vascular;  Laterality: Right;    BACK SURGERY      BLADDER REPAIR      BRONCHOSCOPY N/A 07/10/2021    Procedure: BRONCHOSCOPY WITH BRONCHOALVEOLAR LAVAGE, POSSIBLE BIOPSY, BRUSHING, WASHING, AIRWAY INSPECTION;  Surgeon: Rodrigo Reyes MD;  Location: [facility] MAIN OR;  Service: Pulmonary;  Laterality: N/A;    BRONCHOSCOPY N/A 07/10/2021    Procedure: BRONCHOSCOPY;  Surgeon: Rodrigo Reyes MD;  Location: [facility] ENDOSCOPY;  Service: Pulmonary;  Laterality: N/A;    CARDIAC CATHETERIZATION Right 11/03/2022    Procedure: Aortogram with right leg angiogram, possible angioplasty or stenting ;  Surgeon: Gabino Russell MD;  Location: [facility] CATH INVASIVE LOCATION;  Service: Vascular;  Laterality: Right;    CARDIAC CATHETERIZATION Right 3/8/2023    Procedure: Right leg angiogram, possible angioplasty or stenting;  Surgeon: Gabino Russell MD;  Location: [facility] CATH INVASIVE LOCATION;  Service: Vascular;  Laterality: Right;    CHOLECYSTECTOMY      ENDOSCOPY      FRACTURE SURGERY      SKULL FRACTURE    GALLBLADDER SURGERY      HERNIA REPAIR      HYSTERECTOMY      INTERVENTIONAL RADIOLOGY PROCEDURE Right 03/03/2022    Procedure: Abdominal Aortagram with Runoff;  Surgeon: Marleny Yoon MD;  Location: [facility] CATH INVASIVE LOCATION;  Service: Peripheral Vascular;  Laterality: Right;    JOINT REPLACEMENT      OTHER SURGICAL HISTORY       ARTIFICIAL JOINTS/LIMBS    OTHER SURGICAL HISTORY      FACE SURGERY, UNSPECIFIED    TOTAL HIP ARTHROPLASTY Right     TOTAL KNEE ARTHROPLASTY Left        Family History: Her family history includes Arthritis in her brother, daughter, mother, and sister; Breast cancer in her sister; Colon cancer in her sister; Diabetes in her brother; Heart disease in her brother and father; Lung cancer in her sister; Osteoporosis in her mother and sister; Stroke in her mother; Throat cancer in her mother.     Social History: She  reports that she has been smoking cigarettes. She started smoking about 44 years ago. She has a 25.00 pack-year smoking history. She has never used smokeless tobacco. She reports that she does not drink alcohol and does not use drugs.    Home Medications:  apixaban, atorvastatin, benzonatate, carvedilol, cephalexin, fluconazole, hydrOXYzine pamoate, insulin NPH-insulin regular, insulin detemir, lisinopril, miconazole, mirtazapine, nortriptyline, oxyCODONE-acetaminophen, pantoprazole, sertraline, tiZANidine, and traZODone    Allergies:  She has No Known Allergies.    Objective    Objective     Vitals:    Temp:  [98 °F (36.7 °C)-98.8 °F (37.1 °C)] 98.8 °F (37.1 °C)  Heart Rate:  [61-90] 61  Resp:  [17-22] 17  BP: ()/(53-78) 88/56  Flow (L/min):  [2-5] 5    Physical Exam  On oxygen, answers most questions, complains of left leg pain.  Has an obvious nonhealing, infected incision in her proximal tibia.  She also large dressing on her foot.  Result Review    Result Review:  I have personally reviewed the results from the time of this admission to 10/28/2023 09:12 EDT and agree with these findings:  [x]  Laboratory list / accordion  [x]  Microbiology  []  Radiology  []  EKG/Telemetry   []  Cardiology/Vascular   []  Pathology  []  Old records  []  Other:  Most notable findings include: Infection left proximal tibia and foot      Assessment & Plan   Assessment / Plan     Brief Patient Summary:  Italia HURTADO  Wade is a 62 y.o. female with previous periprosthetic fracture.  Wound infection.  Cultures pending    Active Hospital Problems:  Active Hospital Problems    Diagnosis     **CHF exacerbation        Plan:   Recommended transfer back to [facility].  Patient refused her x-rays last night, will try again today.  Broad-spectrum antibiotics.  Wound care consult.    Jessica Fenton MD

## 2023-10-28 NOTE — PROGRESS NOTES
"[facility] Clinical Pharmacy Services: Vancomycin Pharmacokinetic Initial Consult Note    Italia Olvera is a 62 y.o. female who is on day 2 of pharmacy to dose vancomycin for Skin and Soft Tissue. Patient seen at [facility]  -  for Left periprosthetic tibial plateau fracture and fibular neck fracture 2/2 fall. Hospital course was complicated by, \"mucous plugging and collapsed left lung. Bronchoscopy was done on , pt left lung improved for one day but returned to fully collapsed. Pt denied  any further treatment such as chest tube or repeat bronchoscopy after many discussions with her status. Ortho was not able to operate on leg due to pt status during hospital course.\" Patient prescribed cephalexin by primary care on 10/10, and presented to the ER with \"increased swelling to the left lower extremity, bilateral hand swelling, facial swelling.\" Ortho has recommended transfer back of [facility].    Microbiology Data  MRSA PCR performed: No; Result: Not ordered due to excluded indication or presence of suspected abscess  Imaging Reviewed?: Yes - patient refusing imaging at this time  Culture/Source:   10/27 Wound Cx: scant GNB  10/27 Blood Cx: NGTD  10/10 Respiratory Cx: Moderate growth (3+) Normal respiratory kwame. No S. aureus or Pseudomonas aeruginosa detected.     Vitals/Labs  Ht: 167.6 cm (66\"); Wt: 88.2 kg (194 lb 7.1 oz)  Temp (24hrs), Av.4 °F (36.9 °C), Min:98 °F (36.7 °C), Max:98.8 °F (37.1 °C)   Estimated Creatinine Clearance: 77.7 mL/min (by C-G formula based on SCr of 0.83 mg/dL).     Results from last 7 days   Lab Units 10/28/23  0451 10/27/23  1115 10/26/23  1913   VANCOMYCIN RM mcg/mL 22.94  --   --    CREATININE mg/dL 0.83 0.78 0.79   WBC 10*3/mm3 13.66* 7.43 8.96     Assessment/Plan:  Vancomycin Dose: vancomycin level resulted at 22.94 correlating with an AUC of 638 on vancomycin 1000 mg q 12 hours. Adjust dosing to vancomycin 750 mg IV every 12 hours; which provides the following " predicted parameters:  AUC24,ss: 483 mg/L.hr  PAUC*: 82 %  Ctrough,ss: 15.4 mg/L  Pconc*: 17 %  Tox.: 11 %  Vanc Random in 2 - 3 days pending stable renal function and continuation of vancomycin  Patient has order for Basic Metabolic Panel    Pharmacy will follow patient's kidney function and will adjust doses and obtain levels as necessary. Thank you for involving pharmacy in this patient's care. Please contact pharmacy with any questions or concerns.                           Josephine Chowdary [facility]  Clinical Pharmacist

## 2023-10-28 NOTE — NURSING NOTE
Received patient as transfer from 3 to 5STU at 1803. VS were as follows: 98.1 oral, 64 HR, 18 RR, 72/34 BP, 99%/4L/NC/ETCO2 29. Patient awake but lethargic and crying in pain of LLE. RRT called at 1815 for low BP. Upon reviewing VS on flowchart while the patient was on 3W, she was running 80s/40s and no pain medication had been given since 0515. 3W RN that gave me report stated that she called Dr. Gibson at 1152 when BP remained low and no new orders were given. RRT arrived to 5STU and patient was transferred to CCU2. Report was called to JAN Ramos in CCU and spouse, Lloyd Olvera, was notified of transfer to CCU2.

## 2023-10-28 NOTE — PROGRESS NOTES
[facility]     Progress Note    Patient Name: Italia Olvera  : 1960  MRN: [id number]  Primary Care Physician:  Carloz Shoemaker MD  Date of admission: 10/26/2023    Subjective   Subjective     Chief Complaint: Patient continues to have the swelling and pain has been evaluated by Dr. Fenton who has recommended transfer to [facility] however there was a back-and-forth argument between the medicine department and orthopedic department who to accept the patient and therefore the patient could not be transferred had asked me to call back today again, and if the bed is available then they would accept the patient but I have not heard I have left a message with the transfer center again patient in the meantime continues to be on antibiotics, she has refused any x-rays or a scan patient was also had some congestive heart failure with swelling and generalized edema, chronic problems with the lungs COPD as well as CHF    HPI: Patient with generalized edema but the main problem at this time is infected wound    Review of Systems   All systems were reviewed and negative except for: Reviewed    Objective   Objective     Vitals:   Temp:  [98 °F (36.7 °C)-98.8 °F (37.1 °C)] 98.8 °F (37.1 °C)  Heart Rate:  [61-90] 61  Resp:  [17-22] 17  BP: ()/(53-78) 88/56  Flow (L/min):  [2-5] 5    Physical Exam    Constitutional: Awake, alert   Eyes: PERRLA, sclerae anicteric, no conjunctival injection   HENT: NCAT, mucous membranes moist   Neck: Supple, no thyromegaly, no lymphadenopathy, trachea midline   Respiratory: Clear to auscultation bilaterally, nonlabored respirations    Cardiovascular: RRR, no murmurs, rubs, or gallops, palpable pedal pulses bilaterally   Gastrointestinal: Positive bowel sounds, soft, nontender, nondistended   Musculoskeletal: No bilateral ankle edema, no clubbing or cyanosis to extremities   Psychiatric: Appropriate affect, cooperative   Neurologic: Oriented x 3, strength symmetric  in all extremities, Cranial Nerves grossly intact to confrontation, speech clear   Skin: No rashes   Bilateral rhonchi but patient also has infected wound  Result Review    Result Review:  I have personally reviewed the results from the time of this admission to 10/28/2023 10:11 EDT and agree with these findings:  [x]  Laboratory  []  Microbiology  []  Radiology  []  EKG/Telemetry   []  Cardiology/Vascular   []  Pathology  []  Old records  []  Other:  Most notable findings include: Refused any x-rays    Assessment & Plan   Assessment / Plan     Brief Patient Summary:  Italia Olvera is a 62 y.o. female who patient with infected wound cellulitis and needs surgical intervention    Active Hospital Problems:  Active Hospital Problems    Diagnosis     **CHF exacerbation        Plan:   Continue antibiotics I have already called the transfer center if he could transfer her to [place] have discussed with the  as well and told him that so far I have not succeeded in getting her to [place] she was supposed to have been evaluated by orthopedic surgeons as an outpatient but they had postponed her appointment    DVT prophylaxis:  Medical and mechanical DVT prophylaxis orders are present.    CODE STATUS:   Level Of Support Discussed With: Patient  Code Status (Patient has no pulse and is not breathing): CPR (Attempt to Resuscitate)  Medical Interventions (Patient has pulse or is breathing): Full Support    Disposition:  I expect patient to be discharged waiting to [facility] from [facility] in the meantime we will continue with antibiotic.    Electronically signed by Cruz Gibson MD, 10/28/23, 10:11 AM EDT.      Part of this note may be an electronic transcription/translation of spoken language to printed text using the Dragon Dictation System.

## 2023-10-29 VITALS
SYSTOLIC BLOOD PRESSURE: 97 MMHG | DIASTOLIC BLOOD PRESSURE: 47 MMHG | TEMPERATURE: 98.3 F | OXYGEN SATURATION: 99 % | HEIGHT: 66 IN | BODY MASS INDEX: 31.25 KG/M2 | HEART RATE: 67 BPM | WEIGHT: 194.45 LBS | RESPIRATION RATE: 18 BRPM

## 2023-10-29 PROBLEM — I50.21 ACUTE HFREF (HEART FAILURE WITH REDUCED EJECTION FRACTION): Status: ACTIVE | Noted: 2023-10-29

## 2023-10-29 LAB
ANION GAP SERPL CALCULATED.3IONS-SCNC: 9.5 MMOL/L (ref 5–15)
BASOPHILS # BLD AUTO: 0.03 10*3/MM3 (ref 0–0.2)
BASOPHILS NFR BLD AUTO: 0.4 % (ref 0–1.5)
BUN SERPL-MCNC: 25 MG/DL (ref 8–23)
BUN/CREAT SERPL: 23.1 (ref 7–25)
CALCIUM SPEC-SCNC: 6.8 MG/DL (ref 8.6–10.5)
CHLORIDE SERPL-SCNC: 106 MMOL/L (ref 98–107)
CO2 SERPL-SCNC: 17.5 MMOL/L (ref 22–29)
CREAT SERPL-MCNC: 1.08 MG/DL (ref 0.57–1)
DEPRECATED RDW RBC AUTO: 74.4 FL (ref 37–54)
EGFRCR SERPLBLD CKD-EPI 2021: 57.8 ML/MIN/1.73
EOSINOPHIL # BLD AUTO: 0.11 10*3/MM3 (ref 0–0.4)
EOSINOPHIL NFR BLD AUTO: 1.3 % (ref 0.3–6.2)
ERYTHROCYTE [DISTWIDTH] IN BLOOD BY AUTOMATED COUNT: 19.9 % (ref 12.3–15.4)
GLUCOSE BLDC GLUCOMTR-MCNC: 118 MG/DL (ref 70–99)
GLUCOSE SERPL-MCNC: 148 MG/DL (ref 65–99)
HCT VFR BLD AUTO: 27.1 % (ref 34–46.6)
HGB BLD-MCNC: 7.6 G/DL (ref 12–15.9)
IMM GRANULOCYTES # BLD AUTO: 0.02 10*3/MM3 (ref 0–0.05)
IMM GRANULOCYTES NFR BLD AUTO: 0.2 % (ref 0–0.5)
LYMPHOCYTES # BLD AUTO: 0.73 10*3/MM3 (ref 0.7–3.1)
LYMPHOCYTES NFR BLD AUTO: 8.8 % (ref 19.6–45.3)
MCH RBC QN AUTO: 28.7 PG (ref 26.6–33)
MCHC RBC AUTO-ENTMCNC: 28 G/DL (ref 31.5–35.7)
MCV RBC AUTO: 102.3 FL (ref 79–97)
MONOCYTES # BLD AUTO: 0.53 10*3/MM3 (ref 0.1–0.9)
MONOCYTES NFR BLD AUTO: 6.4 % (ref 5–12)
NEUTROPHILS NFR BLD AUTO: 6.85 10*3/MM3 (ref 1.7–7)
NEUTROPHILS NFR BLD AUTO: 82.9 % (ref 42.7–76)
NRBC BLD AUTO-RTO: 0 /100 WBC (ref 0–0.2)
PLATELET # BLD AUTO: 114 10*3/MM3 (ref 140–450)
PMV BLD AUTO: 11.2 FL (ref 6–12)
POTASSIUM SERPL-SCNC: 3.8 MMOL/L (ref 3.5–5.2)
RBC # BLD AUTO: 2.65 10*6/MM3 (ref 3.77–5.28)
SODIUM SERPL-SCNC: 133 MMOL/L (ref 136–145)
WBC NRBC COR # BLD: 8.27 10*3/MM3 (ref 3.4–10.8)
WHOLE BLOOD HOLD SPECIMEN: NORMAL

## 2023-10-29 PROCEDURE — 94799 UNLISTED PULMONARY SVC/PX: CPT

## 2023-10-29 PROCEDURE — 80048 BASIC METABOLIC PNL TOTAL CA: CPT | Performed by: INTERNAL MEDICINE

## 2023-10-29 PROCEDURE — 25010000002 PIPERACILLIN SOD-TAZOBACTAM PER 1 G: Performed by: INTERNAL MEDICINE

## 2023-10-29 PROCEDURE — 25010000002 HYDROMORPHONE 1 MG/ML SOLUTION: Performed by: INTERNAL MEDICINE

## 2023-10-29 PROCEDURE — 94761 N-INVAS EAR/PLS OXIMETRY MLT: CPT

## 2023-10-29 PROCEDURE — 25810000003 SODIUM CHLORIDE 0.9 % SOLUTION: Performed by: INTERNAL MEDICINE

## 2023-10-29 PROCEDURE — 99291 CRITICAL CARE FIRST HOUR: CPT | Performed by: STUDENT IN AN ORGANIZED HEALTH CARE EDUCATION/TRAINING PROGRAM

## 2023-10-29 PROCEDURE — 82948 REAGENT STRIP/BLOOD GLUCOSE: CPT

## 2023-10-29 PROCEDURE — 85025 COMPLETE CBC W/AUTO DIFF WBC: CPT | Performed by: NURSE PRACTITIONER

## 2023-10-29 PROCEDURE — 25010000002 VANCOMYCIN 5 G RECONSTITUTED SOLUTION: Performed by: INTERNAL MEDICINE

## 2023-10-29 RX ADMIN — NORTRIPTYLINE HYDROCHLORIDE 50 MG: 25 CAPSULE ORAL at 08:44

## 2023-10-29 RX ADMIN — OXYCODONE HYDROCHLORIDE AND ACETAMINOPHEN 1 TABLET: 10; 325 TABLET ORAL at 01:27

## 2023-10-29 RX ADMIN — PIPERACILLIN AND TAZOBACTAM 3.38 G: 3; .375 INJECTION, POWDER, LYOPHILIZED, FOR SOLUTION INTRAVENOUS at 01:27

## 2023-10-29 RX ADMIN — VANCOMYCIN HYDROCHLORIDE 750 MG: 500 INJECTION, POWDER, LYOPHILIZED, FOR SOLUTION INTRAVENOUS at 08:44

## 2023-10-29 RX ADMIN — ATORVASTATIN CALCIUM 10 MG: 10 TABLET, FILM COATED ORAL at 08:44

## 2023-10-29 RX ADMIN — SERTRALINE HYDROCHLORIDE 150 MG: 100 TABLET ORAL at 08:49

## 2023-10-29 RX ADMIN — HYDROMORPHONE HYDROCHLORIDE 0.25 MG: 1 INJECTION, SOLUTION INTRAMUSCULAR; INTRAVENOUS; SUBCUTANEOUS at 09:17

## 2023-10-29 RX ADMIN — Medication 0.02 MCG/KG/MIN: at 02:06

## 2023-10-29 RX ADMIN — APIXABAN 5 MG: 5 TABLET, FILM COATED ORAL at 08:44

## 2023-10-29 RX ADMIN — DOCUSATE SODIUM 50MG AND SENNOSIDES 8.6MG 2 TABLET: 8.6; 5 TABLET, FILM COATED ORAL at 08:44

## 2023-10-29 NOTE — DISCHARGE SUMMARY
[facility]         DISCHARGE SUMMARY    Patient Name: Italia Olvera  : 1960  MRN: [id number]    Date of Admission: 10/26/2023  Date of Discharge:  10/29/2023  Primary Care Physician: Carloz hSoemaker MD    Consults       Date and Time Order Name Status Description    10/27/2023 10:50 AM Inpatient Orthopedic Surgery Consult Completed     10/27/2023 10:20 AM Inpatient Orthopedic Surgery Consult      10/26/2023 11:33 PM Inpatient Hospitalist Consult              Presenting Problem:   CHF exacerbation [I50.9]  Cellulitis of left lower extremity [L03.116]  Acute on chronic congestive heart failure, unspecified heart failure type [I50.9]  Acute HFrEF (heart failure with reduced ejection fraction) [I50.21]    Active and Resolved Hospital Problems:  Active Hospital Problems    Diagnosis POA   • **CHF exacerbation [I50.9] Yes   • Acute HFrEF (heart failure with reduced ejection fraction) [I50.21] Yes      Resolved Hospital Problems   No resolved problems to display.         Hospital Course     Hospital Course:  Italia Olvera is a 62 y.o. female cystic areas or white was admitted to because of increasing swelling of the left leg patient appeared to have infection is swollen leg she previously about 2-3 weeks ago had had fracture of the tibia and fibula was at the [facility] at that time some procedures for tendon details of those are not available at that time patient was treated with antibiotics with some dressing was discharged home after she came home she again was found to have infection and came to the hospital was here for a few days treated with antibiotics at that time the recommendation was made to send her to rehabilitation which patient and the family refused and different home she then came back last Friday to the hospital again with increasing swelling and was found to have infected port and consultation was made with Dr. Eliceo jansen to previously perform her  surgery on the knee and he recommended that patient needs to be transferred to the [facility] we started her on IV antibiotics with Zosyn and vancomycin patient refused to have any  xrays or scans she was also followed by Dr. Fenton recommended again transfer to [facility] I discussed the case with the transfer center they did not have any bed available or could not make any decision for  orthopedic surgery or internal medicine to admit the patient I was told by the transfer center that there was no bed available in the meantime we continued with antibiotics and supportive care patient started having drop in the blood pressure initially we monitored her she had a low ejection fraction about 20% as  done at the [facility] patient then started getting more lethargic and our RRT was called and patient was transferred to the unit where she was started on Levophed at this time patient is stable blood pressures are more than 100 being followed by intensivist patient now was going to be transferred to [facility] as I got a call from them that they have a bed available and we are planning to discharge her if the family is agreeable in the beginning they have been concerned about patient's  is refusing to discharge her and we have explained to him that in case the not possible to do the surgery here as per Dr. Fenton,patient seems to be stable enough for discharge and they now have a bed available so I'm trying to see if I can discharge her to the [facility] [place], [place],her  knows hat the prognosis is very poor because of the very poor cardiac condition      DISCHARGE Follow Up Recommendations for labs and diagnostics: patient with infected wound from the fractures of the tibia and fibula hypotensive with the congestive heart failure      Pertinent  and/or Most Recent Results     LAB RESULTS:      Lab 10/29/23  0312 10/28/23  1907 10/28/23  1841 10/28/23  1373  10/27/23  1115 10/26/23  1913   WBC 8.27  --   --  13.66* 7.43 8.96   HEMOGLOBIN 7.6*  --   --  7.3* 8.7* 8.7*   HEMATOCRIT 27.1*  --   --  24.9* 28.3* 29.5*   PLATELETS 114*  --   --  120* 157 159   NEUTROS ABS 6.85  --   --  12.95*  --  7.28*   IMMATURE GRANS (ABS) 0.02  --   --  0.08*  --  0.03   LYMPHS ABS 0.73  --   --  0.27*  --  0.98   MONOS ABS 0.53  --   --  0.28  --  0.54   EOS ABS 0.11  --   --  0.02  --  0.11   .3*  --   --  98.4* 95.0 97.4*   PROCALCITONIN  --   --  17.68*  --   --   --    LACTATE  --   --  1.2  --  1.5 1.5   LACTATE, ARTERIAL  --  1.69  --   --   --   --    PROTIME  --   --   --  20.5*  --  16.6*         Lab 10/29/23  0312 10/28/23  1907 10/28/23  0451 10/27/23  1115 10/26/23  1913   SODIUM 133*  --  135* 136 134*   SODIUM, ARTERIAL  --  135.8*  --   --   --    POTASSIUM 3.8  --  3.6 3.7 4.0   CHLORIDE 106  --  106 105 104   CO2 17.5*  --  18.9* 22.8 20.8*   ANION GAP 9.5  --  10.1 8.2 9.2   BUN 25*  --  21 18 20   CREATININE 1.08*  --  0.83 0.78 0.79   EGFR 57.8*  --  79.3 85.5 84.2   GLUCOSE 148*  --  120* 142* 122*   GLUCOSE, ARTERIAL  --  108*  --   --   --    CALCIUM 6.8*  --  7.0* 7.5* 7.6*   IONIZED CALCIUM  --  1.00*  --   --   --          Lab 10/28/23  0451 10/27/23  1115 10/26/23  1913   TOTAL PROTEIN 5.2* 5.9* 6.7   ALBUMIN 1.7* 2.0* 2.2*   GLOBULIN 3.5 3.9 4.5   ALT (SGPT) 10 12 15   AST (SGOT) 14 18 22   BILIRUBIN 0.5 0.6 0.6   ALK PHOS 108 139* 160*         Lab 10/28/23  0451 10/26/23  2226 10/26/23  1913   PROBNP  --   --  25,010.0*   HSTROP T  --  64* 72*   PROTIME 20.5*  --  16.6*   INR 1.77*  --  1.34*             Lab 10/28/23  1841 10/28/23  0451   IRON  --  13*   IRON SATURATION (TSAT)  --  7*   TIBC  --  182*   TRANSFERRIN  --  122*   FERRITIN  --  216.60*   FOLATE  --  2.07*   VITAMIN B 12  --  1,112*   ABO TYPING O  --    RH TYPING Positive  --    ANTIBODY SCREEN Negative  --          Lab 10/28/23  1907   PH, ARTERIAL 7.346*   PCO2, ARTERIAL 37.0   PO2 ART  36.4*   O2 SATURATION ART 66.2*   FIO2 36   HCO3 ART 19.8*   BASE EXCESS ART -5.4*   CARBOXYHEMOGLOBIN 0.9     Brief Urine Lab Results  (Last result in the past 365 days)        Color   Clarity   Blood   Leuk Est   Nitrite   Protein   CREAT   Urine HCG        10/26/23 2243 Yellow   Clear   Trace   Small (1+)   Negative   >=300 mg/dL (3+)                 Microbiology Results (last 10 days)       Procedure Component Value - Date/Time    Wound Culture - Wound, Leg [[id number]]  (Abnormal) Collected: 10/27/23 1353    Lab Status: Preliminary result Specimen: Wound from Leg Updated: 10/28/23 0922     Wound Culture Scant growth (1+) Gram Negative Bacilli      Scant growth (1+) Gram Negative Bacilli     Gram Stain Many (4+) WBCs seen      No organisms seen    Blood Culture - Blood, Arm, Right [[id number]]  (Normal) Collected: 10/26/23 1942    Lab Status: Preliminary result Specimen: Blood from Arm, Right Updated: 10/28/23 1946     Blood Culture No growth at 2 days    Blood Culture - Blood, Arm, Left [[id number]]  (Normal) Collected: 10/26/23 1913    Lab Status: Preliminary result Specimen: Blood from Arm, Left Updated: 10/28/23 1931     Blood Culture No growth at 2 days            XR Chest 1 View    Result Date: 10/28/2023  Impression:   1. Enlargement of the cardiac silhouette, increased in the interval which may be secondary to pericardial effusion. 2. Worsening bibasilar airspace disease which may be secondary to pneumonia or possibly aspiration.  Clinical correlation recommended.  Underlying left pleural effusion cannot be excluded.       KIYA POLANCO MD       Electronically Signed and Approved By: KIYA POLANCO MD on 10/28/2023 at 19:07             XR Tibia Fibula 2 View Left    Result Date: 10/28/2023  Impression:   1. Previous knee arthroplasty 2. Stable fractures of the proximal tibia and fibula, as above 3. Calf soft tissue swelling      Willi Sierra M.D.       Electronically Signed and Approved By: Willi Sierra  M.D. on 10/28/2023 at 10:43             XR Chest 1 View    Result Date: 10/26/2023  Impression:   Low lung volumes with patchy airspace disease at the lung bases, worsened in comparison to 4/24/2023.  The costophrenic angles may be blunted.       LETY TONY MD       Electronically Signed and Approved By: LETY TONY MD on 10/26/2023 at 19:45              Results for orders placed during the hospital encounter of 10/04/23    Duplex Venous Lower Extremity - Left CV-READ    Interpretation Summary  •  Tibial level veins not scanned due to presence of nonremovable bandage.  •  All other left sided veins appeared normal.      Results for orders placed during the hospital encounter of 10/04/23    Duplex Venous Lower Extremity - Left CV-READ    Interpretation Summary  •  Tibial level veins not scanned due to presence of nonremovable bandage.  •  All other left sided veins appeared normal.      Results for orders placed during the hospital encounter of 02/03/23    Adult Transthoracic Echo Complete W/ Cont if Necessary Per Protocol    Interpretation Summary  Borderline dilated left ventricle.  Moderately reduced left ventricular systolic function with an estimated ejection fraction of 30-35% and moderate global hypokinesis.  Left ventricular diastolic function is consistent with pseudonormalization (grade II diastolic dysfunction with high LAP).  Severe concentric left ventricular hypertrophy.  Mildly reduced right ventricular systolic function.  Severely dilated left atrium.  Moderately dilated right atrium.  Moderate posterior mitral annular calcification with mild-moderate mitral regurgitation.  Trileaflet aortic valve with moderate sclerosis/calcification.  Mild to moderate aortic stenosis was observed with peak and mean gradients across the valve of 27 and 14 mmHg, respectively.  Maximum velocity across the aortic valve was 260 cm/s.  Calculated dimensionless index = 0.32.  Mild aortic regurgitation.  Mild  tricuspid regurgitation.  Estimated right ventricular systolic pressure was severely elevated at 64 mmHg.  Mildly dilated aortic root and ascending aorta, measuring up to 3.9 cm in diameter.  Dilated IVC with blunted respirophasic changes, consistent with significantly elevated central venous pressures.  A moderate to large circumferential pericardial effusion was observed.  No echocardiographic evidence of pericardial tamponade.      Labs Pending at Discharge:  Pending Labs       Order Current Status    Blood Culture - Blood, Arm, Right In process    Immunofixation, Serum In process    Immunoglobulin Free LT Chains Blood In process    Blood Culture - Blood, Arm, Left Preliminary result    Blood Culture - Blood, Arm, Right Preliminary result    Wound Culture - Wound, Leg Preliminary result              Discharge Details        Discharge Medications        Continue These Medications        Instructions Start Date   atorvastatin 10 MG tablet  Commonly known as: LIPITOR   1 tablet, Oral, Daily      benzonatate 100 MG capsule  Commonly known as: TESSALON   100 mg, Oral, 3 Times Daily PRN      carvedilol 3.125 MG tablet  Commonly known as: COREG   3.125 mg, Oral, 2 Times Daily With Meals      cephalexin 500 MG capsule  Commonly known as: KEFLEX   500 mg, Oral, 3 Times Daily      Eliquis 5 MG tablet tablet  Generic drug: apixaban   5 mg, Oral, Every 12 Hours Scheduled      fluconazole 100 MG tablet  Commonly known as: DIFLUCAN   100 mg, Oral, Daily      hydrOXYzine pamoate 50 MG capsule  Commonly known as: VISTARIL   50 mg, Oral, 2 Times Daily PRN      insulin NPH-insulin regular (70-30) 100 UNIT/ML injection  Commonly known as: humuLIN 70/30,novoLIN 70/30   0-25 Units, Subcutaneous, 2 Times Daily PRN      Levemir FlexPen 100 UNIT/ML injection  Generic drug: insulin detemir   25 Units, Subcutaneous, Nightly      lisinopril 20 MG tablet  Commonly known as: PRINIVIL,ZESTRIL   20 mg, Oral, Daily      miconazole 2 %  powder  Commonly known as: MICOTIN   1 application , Topical, 2 Times Daily PRN      mirtazapine 45 MG tablet  Commonly known as: REMERON   45 mg, Oral, Nightly PRN      nortriptyline 50 MG capsule  Commonly known as: PAMELOR   50 mg, Oral, 2 Times Daily      oxyCODONE-acetaminophen  MG per tablet  Commonly known as: PERCOCET   1 tablet, Oral, Every 6 Hours PRN, GIVEN PER PCP      pantoprazole 40 MG EC tablet  Commonly known as: PROTONIX   40 mg, Oral, Daily      sertraline 50 MG tablet  Commonly known as: ZOLOFT   50 mg, Oral, Daily      sertraline 100 MG tablet  Commonly known as: ZOLOFT   100 mg, Oral, Daily      tiZANidine 4 MG tablet  Commonly known as: ZANAFLEX   4 mg, Oral, Every 8 Hours PRN      traZODone 300 MG tablet  Commonly known as: DESYREL   300 mg, Oral, Nightly PRN               No Known Allergies      Discharge Disposition:  Short Term Hospital ([place] - External)    Diet:  Hospital:  Diet Order   Procedures   • Diet: Regular/House Diet; Texture: Regular Texture (IDDSI 7); Fluid Consistency: Thin (IDDSI 0)         Discharge Activity:         CODE STATUS:  Code Status and Medical Interventions:   Ordered at: 10/27/23 1022     Level Of Support Discussed With:    Patient     Code Status (Patient has no pulse and is not breathing):    CPR (Attempt to Resuscitate)     Medical Interventions (Patient has pulse or is breathing):    Full Support         No future appointments.        Time spent on Discharge including face to face service:  45 minutes    Electronically signed by Cruz Gibson MD, 10/29/23, 8:42 AM EDT.    Part of this note may be an electronic transcription/translation of spoken language to printed text using the Dragon Dictation System.

## 2023-10-30 LAB
BACTERIA SPEC AEROBE CULT: ABNORMAL
GRAM STN SPEC: ABNORMAL
GRAM STN SPEC: ABNORMAL

## 2023-10-30 NOTE — SIGNIFICANT NOTE
Wound Eval / Progress Noted     [place]     Patient Name: Italia Olvera  : 1960  MRN: [id number]  Today's Date: 10/30/2023                 Admit Date: 10/26/2023    Visit Dx:    ICD-10-CM ICD-9-CM   1. Acute on chronic congestive heart failure, unspecified heart failure type  I50.9 428.0   2. Cellulitis of left lower extremity  L03.116 682.6         CHF exacerbation    Acute HFrEF (heart failure with reduced ejection fraction)        Past Medical History:   Diagnosis Date    Acid reflux     ACL tear 2015    PCL/ACL TEAR/RUPTURE    Acute shoulder bursitis, right 2015    Anxiety     Arthritis     Asthma     Bipolar 1 disorder     untreated    Bladder disorder     Cancer     CERVICAL CANCER    CHF (congestive heart failure)     ASYMPTOMATIC- NO CARDIOLOGIST- SEE'S DR ARRIAZA (PCP)- DENIED CP/SOB    Chronic back pain     Chronic pain     CHRONIC BACK, NECK, LEG, FOOT, & FACE PAIN    COPD (chronic obstructive pulmonary disease)     USES INHALERS    Depression     Diabetes mellitus     BG RUND AROUND 140 IN AM    DJD (degenerative joint disease)     Gangrene     RT SECOND AND FIFTH TOES    GERD (gastroesophageal reflux disease)     HBP (high blood pressure)     Hip pain 09/15/2015    Hypertension     Knee injury 2015    Knee pain, right 09/15/2015    Limb swelling     Neuropathy     On home O2     2-3L/NC PRN    Osteoarthritis, knee 2015    Osteoporosis     Peripheral neuropathy     Renal failure 1994    NO CURRENT PROBLEMS    Seasonal allergies     Shoulder tendonitis 2015    Sleep apnea     Smoker     SOB (shortness of breath)     NONE CURENTLY    Tendinitis of right rotator cuff 2015        Past Surgical History:   Procedure Laterality Date    ABOVE KNEE AMPUTATION Right 3/11/2023    Procedure: AMPUTATION ABOVE KNEE;  Surgeon: Zacarias Haywood MD;  Location: [facility] MAIN OR;  Service: Vascular;  Laterality: Right;    AMPUTATION DIGIT Right 2022    Procedure:  Amputation of right second and fifth toes;  Surgeon: Gabino Russell MD;  Location: [facility] MAIN OR;  Service: Vascular;  Laterality: Right;    BACK SURGERY      BLADDER REPAIR      BRONCHOSCOPY N/A 07/10/2021    Procedure: BRONCHOSCOPY WITH BRONCHOALVEOLAR LAVAGE, POSSIBLE BIOPSY, BRUSHING, WASHING, AIRWAY INSPECTION;  Surgeon: Rodrigo Reyes MD;  Location: [facility] MAIN OR;  Service: Pulmonary;  Laterality: N/A;    BRONCHOSCOPY N/A 07/10/2021    Procedure: BRONCHOSCOPY;  Surgeon: Rodrigo Reyes MD;  Location: [facility] ENDOSCOPY;  Service: Pulmonary;  Laterality: N/A;    CARDIAC CATHETERIZATION Right 11/03/2022    Procedure: Aortogram with right leg angiogram, possible angioplasty or stenting ;  Surgeon: Gabino Russell MD;  Location: [facility] CATH INVASIVE LOCATION;  Service: Vascular;  Laterality: Right;    CARDIAC CATHETERIZATION Right 3/8/2023    Procedure: Right leg angiogram, possible angioplasty or stenting;  Surgeon: Gabino Russell MD;  Location: [facility] CATH INVASIVE LOCATION;  Service: Vascular;  Laterality: Right;    CHOLECYSTECTOMY      ENDOSCOPY      FRACTURE SURGERY      SKULL FRACTURE    GALLBLADDER SURGERY      HERNIA REPAIR      HYSTERECTOMY      INTERVENTIONAL RADIOLOGY PROCEDURE Right 03/03/2022    Procedure: Abdominal Aortagram with Runoff;  Surgeon: Marleny Yoon MD;  Location: [facility] CATH INVASIVE LOCATION;  Service: Peripheral Vascular;  Laterality: Right;    JOINT REPLACEMENT      OTHER SURGICAL HISTORY      ARTIFICIAL JOINTS/LIMBS    OTHER SURGICAL HISTORY      FACE SURGERY, UNSPECIFIED    TOTAL HIP ARTHROPLASTY Right     TOTAL KNEE ARTHROPLASTY Left          Physical Assessment:     10/27/23 1405   Wound 10/04/23 2300 Left proximal leg Incision   Placement Date/Time: 10/04/23 2300   Present on Hospital Admission: Yes  Side: Left  Orientation: proximal  Location: leg  Primary Wound Type: Incision  Additional Comments: surgical incision--7 total sutures counted   Wound Image    Dressing  Appearance intact;moist drainage   Closure Surface sutures   Base slough;moist;red   Red (%), Wound Tissue Color 50   Yellow (%), Wound Tissue Color 50   Periwound redness;swelling;warm   Periwound Temperature warm   Periwound Skin Turgor soft   Edges open;rolled/closed   Wound Length (cm) 6 cm   Wound Width (cm) 1.5 cm   Wound Depth (cm) 0.1 cm   Wound Surface Area (cm^2) 9 cm^2   Wound Volume (cm^3) 0.9 cm^3   Drainage Characteristics/Odor serosanguineous;purulent   Drainage Amount small   Care, Wound cleansed with;sterile normal saline   Dressing Care dressing applied;border dressing;hydrofiber;silver impregnated;silicone   Periwound Care absorptive dressing applied   Wound 10/09/23 Left lower gluteal Pressure Injury   Placement Date: 10/09/23   Present on Original Admission: No  Side: Left  Orientation: lower  Location: gluteal  Primary Wound Type: Pressure Injury   Wound Image    Pressure Injury Stage 3   Dressing Appearance open to air   Closure None   Base red;yellow;moist   Red (%), Wound Tissue Color 25   Yellow (%), Wound Tissue Color 75   Periwound blanchable;redness   Periwound Temperature warm   Periwound Skin Turgor soft   Edges open   Wound Length (cm) 1 cm   Wound Width (cm) 0.8 cm   Wound Depth (cm) 0.1 cm   Wound Surface Area (cm^2) 0.8 cm^2   Wound Volume (cm^3) 0.08 cm^3   Drainage Characteristics/Odor serous   Drainage Amount scant   Care, Wound cleansed with;sterile normal saline   Wound 02/04/23 0100 Bilateral coccyx Pressure Injury   Placement Date/Time: 02/04/23 0100   Present on Hospital Admission: Yes  Side: Bilateral  Location: coccyx  Primary Wound Type: Pressure Injury   Wound Image    Pressure Injury Stage 3   Dressing Appearance intact;no drainage   Closure None   Base yellow;red;moist   Red (%), Wound Tissue Color 25   Yellow (%), Wound Tissue Color 75   Periwound blanchable;redness   Periwound Temperature warm   Periwound Skin Turgor soft   Edges open   Wound Length (cm) 0.8  cm  (left gluteal 1.0)   Wound Width (cm) 0.4 cm  (left gluteal 0.2cm)   Wound Depth (cm) 0.1 cm  (left gluteal 0.1cm)   Wound Surface Area (cm^2) 0.32 cm^2   Wound Volume (cm^3) 0.032 cm^3   Drainage Characteristics/Odor serous   Drainage Amount scant   Care, Wound cleansed with;sterile normal saline   Dressing Care dressing applied;border dressing;silicone;hydrofiber;silver impregnated   Periwound Care absorptive dressing applied   Wound 10/04/23 2300 Left posterior great toe Pressure Injury   Placement Date/Time: 10/04/23 2300   Present on Hospital Admission: Yes  Side: Left  Orientation: posterior  Location: great toe  Primary Wound Type: Pressure Injury   Wound Image    Dressing Appearance intact;no drainage   Closure None   Base black eschar;dry   Black (%), Wound Tissue Color 100   Periwound dry;intact   Periwound Temperature warm   Periwound Skin Turgor firm   Edges rolled/closed   Wound Length (cm) 1.3 cm   Wound Width (cm) 1.4 cm   Wound Surface Area (cm^2) 1.82 cm^2   Drainage Amount none   Care, Wound cleansed with;sterile normal saline   Dressing Care gauze, dry;non-adherent;petroleum-based  (painted with betadine)   Wound 10/04/23 2300 Left posterior heel Pressure Injury   Placement Date/Time: 10/04/23 2300   Present on Hospital Admission: Yes  Side: Left  Orientation: posterior  Location: heel  Primary Wound Type: Pressure Injury   Wound Image    Pressure Injury Stage U   Closure None   Base black eschar;dry   Black (%), Wound Tissue Color 100   Periwound intact;dry   Periwound Temperature warm   Periwound Skin Turgor firm   Edges rolled/closed   Wound Length (cm) 1.2 cm   Wound Width (cm) 1.4 cm   Wound Surface Area (cm^2) 1.68 cm^2   Drainage Amount none   Care, Wound cleansed with;sterile normal saline   Dressing Care dressing applied;gauze, dry;non-adherent;petroleum-based  (painted with Betadine)   Wound 10/05/23 1254 Left posterior fifth toe Pressure Injury   Placement Date/Time: 10/05/23 1254    Present on Hospital Admission: Yes  Side: Left  Orientation: posterior  Location: fifth toe  Primary Wound Type: Pressure Injury   Wound Image    Pressure Injury Stage DTPI   Dressing Appearance intact;no drainage   Base non-blanchable;red;maroon/purple   Periwound intact;dry   Periwound Temperature warm   Periwound Skin Turgor soft   Edges rolled/closed   Wound Length (cm) 1.2 cm  (second area 0.3cm)   Wound Width (cm) 0.4 cm  (second area 0.2cm)   Wound Surface Area (cm^2) 0.48 cm^2   Drainage Amount none   Care, Wound cleansed with;sterile normal saline   Dressing Care dressing applied;gauze, dry;non-adherent;petroleum-based  (painted with Betadine)   Wound 10/05/23 1256 Left posterior ankle Pressure Injury   Placement Date/Time: 10/05/23 1256   Present on Hospital Admission: Yes  Side: Left  Orientation: posterior  Location: ankle  Primary Wound Type: Pressure Injury   Wound Image    Pressure Injury Stage DTPI   Dressing Appearance intact;no drainage   Base non-blanchable;maroon/purple   Periwound intact;dry   Periwound Temperature warm   Periwound Skin Turgor soft   Edges rolled/closed   Wound Length (cm) 1.8 cm   Wound Width (cm) 0.3 cm   Wound Surface Area (cm^2) 0.54 cm^2   Drainage Amount none   Care, Wound cleansed with;sterile normal saline   Dressing Care dressing applied;gauze, dry;non-adherent;petroleum-based  (painted with Betadine)    3.4cm x 0.4cm x 0.1 dried yellow tissue    DTI 3.5cm x 0.4cm and 2.2cm x 0.5cm dried maroon / purple tissue non-blanchable     DTI 7cm x 5cm with non-blanchable maroon / purple tissue         Two additional wounds to plantar aspect of foot measuring 1.3cm x1.3cm x 0.2cm and then 1.8cm x 0.3cm with no depth  Wound Check / Follow-up:  Patient seen today for wound consult. Patient is incontinent of stool and urine. She has en external female catheter in place. Patient is difficult to turn as she is resisting turns and trying to turn away from staff during assessments.    She has significant MASD noted within skin folds with fungal presentation. Topical treatment is already ordered. Recommending BID application.   She has pressure injuries as described above to medial coccyx and left gluteal aspect, left ischium and right posterior thigh. Recommending daily dressings and skin care.  Patient with incision to left knee that is partially open with sutures in tact. There is drainage noted from incision with erythema and edema surrounding. Culture obtained for primary RN and then wound care performed, recommending daily dressing with silver impregnated hydrofiber and silicone border dressing.   Patient is right AKA.   She has multiple wounds to her left lower leg and foot, mostly DTI and stable black eschar. The DTI to foot and ankle are linear in form, inquired with patient about possible wraps in past. She states the wounds to her foot in the linear form are from a boot she was wearing. Recommending daily dressings to assist with keeping eschar stable.       Impression: Left knee incision, Pressure injuries, Multiple wounds to left foot, incontinence    Short term goals:  Regain skin integrity. Daily dressing changes. Skin protection, moisture prevention, pressure reduction.     Maxine Thapa RN    10/30/2023    07:12 EDT

## 2023-10-30 NOTE — PAYOR COMM NOTE
SUBJECT:     HOSPITAL TO  HOSPITAL  -  AMBULANCE TRANSPORTATION AUTHORIZATION REQUEST      PATIENT'S NAME:     Italia [facility]URTADO [facility] MRN:     [id number]     DOS:     10/29/2023    INSURANCE MEMBER ID:     [id number]        REQUESTING PROVIDER  Provider/NPI       Address            [address] [place] [address]  Phone               [phone]  Fax                    [phone]        SERVICE / RENDERING PROVIDER  Company [facility]  NPI  [id number]   Tax ID  [id number]  Taxonomy        [id number] [facility]  [place] [address] [place] [address]  Phone  [phone]  Fax  [phone]         CASE MANAGEMENT CONTACT INFORMATION  Name  Primary contact:          Deb Ratliff                           Secondary contact:     Audrey Hartman RN             Phone  [phone]  Fax  [phone]        SERVICING INFORMATION  Type of Service:      Medically Related Transportation    Place of Service:     [facility]    Diagnosis #1  I50.9 (ICD-10-CM) - CHF exacerbation               Diagnosis #2   L03.116 (ICD-10-CM) - Cellulitis of left lower extremity              Diagnosis #3    I50.9 (ICD-10-CM) - Acute on chronic congestive heart failure, unspecified heart failure type             Diagnosis #4                Diagnosis #5                  Dates of Service Service Codes Requested Service #  Units   Start Stop         Basic Life Support, Non-emergent    10/29/23 10/29/23  Advanced Life Support, Non-emergent, Level 1 1      Advanced Life Support, Level 2       Specialty Care Transport    10/29/23 10/29/23  Ground Mileage 45      Ambulance oxygen, O2 supplies, life sustaining situation        Leaving From:     [facility], [address] [place] [address]    Destination:  [facility], [address] [place] [address]        "      ++++++++++++++++++++++++++++++++++++++++++++++++++++++++++++++++++++++++++++++++++    Italia Olvera (62 y.o. Female)       Date of Birth   1960    Social Security Number       Address    [address] [facility] [address]    Home Phone   [phone]    MRN   [id number]       Sikh   [facility] [place]    Marital Status                               Admission Date   10/26/23    Admission Type   Emergency    Admitting Provider   Cruz Gibson MD    Attending Provider       Department, Room/Bed   [facility] CORONARY CARE UNIT, C02/1       Discharge Date   10/29/2023    Discharge Disposition   Short Term Hospital (DC - External)    Discharge Destination                                 Attending Provider: (none)   Allergies: No Known Allergies    Isolation: None   Infection: None   Code Status: Prior    Ht: 167.6 cm (66\")   Wt: 88.2 kg (194 lb 7.1 oz)    Admission Cmt: None   Principal Problem: CHF exacerbation [I50.9]                   Active Insurance as of 10/26/2023       Primary Coverage       Payor Plan Insurance Group Employer/Plan Group    ANTHEM MEDICARE REPLACEMENT ANTHEM MEDICARE ADVANTAGE [id number]       Payor Plan Address Payor Plan Phone Number Payor Plan Fax Number Effective Dates    [address] [phone]  2020 - None Entered    [facility] [address]         Subscriber Name Subscriber Birth Date Member ID       ITALIA OLVERA 1960 [id number]                     Emergency Contacts        (Rel.) Home Phone Work Phone Mobile Phone    HANNA OLVERA (Spouse) [phone] -- [phone]    Darrin Olvera (Son) [phone] -- [phone]    KASSANDRA OLVERA (Daughter) [phone] -- --                 Discharge Summary        Cruz Gibson MD at 10/29/23 0841                         [facility]         DISCHARGE SUMMARY    Patient Name: Italia Olvera  : 1960  MRN: [id number]    Date of Admission: 10/26/2023  Date of " Discharge:  10/29/2023  Primary Care Physician: Carloz Shoemaker MD    Consults       Date and Time Order Name Status Description    10/27/2023 10:50 AM Inpatient Orthopedic Surgery Consult Completed     10/27/2023 10:20 AM Inpatient Orthopedic Surgery Consult      10/26/2023 11:33 PM Inpatient Hospitalist Consult              Presenting Problem:   CHF exacerbation [I50.9]  Cellulitis of left lower extremity [L03.116]  Acute on chronic congestive heart failure, unspecified heart failure type [I50.9]  Acute HFrEF (heart failure with reduced ejection fraction) [I50.21]    Active and Resolved Hospital Problems:  Active Hospital Problems    Diagnosis POA    **CHF exacerbation [I50.9] Yes    Acute HFrEF (heart failure with reduced ejection fraction) [I50.21] Yes      Resolved Hospital Problems   No resolved problems to display.         Hospital Course     Hospital Course:  Italia Olvera is a 62 y.o. female cystic areas or white was admitted to because of increasing swelling of the left leg patient appeared to have infection is swollen leg she previously about 2-3 weeks ago had had fracture of the tibia and fibula was at the [facility] at that time some procedures for tendon details of those are not available at that time patient was treated with antibiotics with some dressing was discharged home after she came home she again was found to have infection and came to the hospital was here for a few days treated with antibiotics at that time the recommendation was made to send her to rehabilitation which patient and the family refused and different home she then came back last Friday to the hospital again with increasing swelling and was found to have infected port and consultation was made with Dr. Eliceo navarroed to previously perform her surgery on the knee and he recommended that patient needs to be transferred to the [facility] we started her on IV antibiotics with Zosyn and vancomycin  patient refused to have any  xrays or scans she was also followed by Dr. Fenton recommended again transfer to [facility] I discussed the case with the transfer center they did not have any bed available or could not make any decision for  orthopedic surgery or internal medicine to admit the patient I was told by the transfer center that there was no bed available in the meantime we continued with antibiotics and supportive care patient started having drop in the blood pressure initially we monitored her she had a low ejection fraction about 20% as  done at the [facility] patient then started getting more lethargic and our RRT was called and patient was transferred to the unit where she was started on Levophed at this time patient is stable blood pressures are more than 100 being followed by intensivist patient now was going to be transferred to [facility] as I got a call from them that they have a bed available and we are planning to discharge her if the family is agreeable in the beginning they have been concerned about patient's  is refusing to discharge her and we have explained to him that in case the not possible to do the surgery here as per Dr. Fenton,patient seems to be stable enough for discharge and they now have a bed available so I'm trying to see if I can discharge her to the [facility],her  knows hat the prognosis is very poor because of the very poor cardiac condition      DISCHARGE Follow Up Recommendations for labs and diagnostics: patient with infected wound from the fractures of the tibia and fibula hypotensive with the congestive heart failure      Pertinent  and/or Most Recent Results     LAB RESULTS:      Lab 10/29/23  0312 10/28/23  1907 10/28/23  1841 10/28/23  0451 10/27/23  1115 10/26/23  1913   WBC 8.27  --   --  13.66* 7.43 8.96   HEMOGLOBIN 7.6*  --   --  7.3* 8.7* 8.7*   HEMATOCRIT 27.1*  --   --  24.9* 28.3* 29.5*    PLATELETS 114*  --   --  120* 157 159   NEUTROS ABS 6.85  --   --  12.95*  --  7.28*   IMMATURE GRANS (ABS) 0.02  --   --  0.08*  --  0.03   LYMPHS ABS 0.73  --   --  0.27*  --  0.98   MONOS ABS 0.53  --   --  0.28  --  0.54   EOS ABS 0.11  --   --  0.02  --  0.11   .3*  --   --  98.4* 95.0 97.4*   PROCALCITONIN  --   --  17.68*  --   --   --    LACTATE  --   --  1.2  --  1.5 1.5   LACTATE, ARTERIAL  --  1.69  --   --   --   --    PROTIME  --   --   --  20.5*  --  16.6*         Lab 10/29/23  0312 10/28/23  1907 10/28/23  0451 10/27/23  1115 10/26/23  1913   SODIUM 133*  --  135* 136 134*   SODIUM, ARTERIAL  --  135.8*  --   --   --    POTASSIUM 3.8  --  3.6 3.7 4.0   CHLORIDE 106  --  106 105 104   CO2 17.5*  --  18.9* 22.8 20.8*   ANION GAP 9.5  --  10.1 8.2 9.2   BUN 25*  --  21 18 20   CREATININE 1.08*  --  0.83 0.78 0.79   EGFR 57.8*  --  79.3 85.5 84.2   GLUCOSE 148*  --  120* 142* 122*   GLUCOSE, ARTERIAL  --  108*  --   --   --    CALCIUM 6.8*  --  7.0* 7.5* 7.6*   IONIZED CALCIUM  --  1.00*  --   --   --          Lab 10/28/23  0451 10/27/23  1115 10/26/23  1913   TOTAL PROTEIN 5.2* 5.9* 6.7   ALBUMIN 1.7* 2.0* 2.2*   GLOBULIN 3.5 3.9 4.5   ALT (SGPT) 10 12 15   AST (SGOT) 14 18 22   BILIRUBIN 0.5 0.6 0.6   ALK PHOS 108 139* 160*         Lab 10/28/23  0451 10/26/23  2226 10/26/23  1913   PROBNP  --   --  25,010.0*   HSTROP T  --  64* 72*   PROTIME 20.5*  --  16.6*   INR 1.77*  --  1.34*             Lab 10/28/23  1841 10/28/23  0451   IRON  --  13*   IRON SATURATION (TSAT)  --  7*   TIBC  --  182*   TRANSFERRIN  --  122*   FERRITIN  --  216.60*   FOLATE  --  2.07*   VITAMIN B 12  --  1,112*   ABO TYPING O  --    RH TYPING Positive  --    ANTIBODY SCREEN Negative  --          Lab 10/28/23  1907   PH, ARTERIAL 7.346*   PCO2, ARTERIAL 37.0   PO2 ART 36.4*   O2 SATURATION ART 66.2*   FIO2 36   HCO3 ART 19.8*   BASE EXCESS ART -5.4*   CARBOXYHEMOGLOBIN 0.9     Brief Urine Lab Results  (Last result in the  past 365 days)        Color   Clarity   Blood   Leuk Est   Nitrite   Protein   CREAT   Urine HCG        10/26/23 2243 Yellow   Clear   Trace   Small (1+)   Negative   >=300 mg/dL (3+)                 Microbiology Results (last 10 days)       Procedure Component Value - Date/Time    Wound Culture - Wound, Leg [[id number]]  (Abnormal) Collected: 10/27/23 1353    Lab Status: Preliminary result Specimen: Wound from Leg Updated: 10/28/23 0922     Wound Culture Scant growth (1+) Gram Negative Bacilli      Scant growth (1+) Gram Negative Bacilli     Gram Stain Many (4+) WBCs seen      No organisms seen    Blood Culture - Blood, Arm, Right [[id number]]  (Normal) Collected: 10/26/23 1942    Lab Status: Preliminary result Specimen: Blood from Arm, Right Updated: 10/28/23 1946     Blood Culture No growth at 2 days    Blood Culture - Blood, Arm, Left [[id number]]  (Normal) Collected: 10/26/23 1913    Lab Status: Preliminary result Specimen: Blood from Arm, Left Updated: 10/28/23 1931     Blood Culture No growth at 2 days            XR Chest 1 View    Result Date: 10/28/2023  Impression:   1. Enlargement of the cardiac silhouette, increased in the interval which may be secondary to pericardial effusion. 2. Worsening bibasilar airspace disease which may be secondary to pneumonia or possibly aspiration.  Clinical correlation recommended.  Underlying left pleural effusion cannot be excluded.       KIYA POLANCO MD       Electronically Signed and Approved By: KIYA POLANCO MD on 10/28/2023 at 19:07             XR Tibia Fibula 2 View Left    Result Date: 10/28/2023  Impression:   1. Previous knee arthroplasty 2. Stable fractures of the proximal tibia and fibula, as above 3. Calf soft tissue swelling      Willi Sierra M.D.       Electronically Signed and Approved By: Willi Sierra M.D. on 10/28/2023 at 10:43             XR Chest 1 View    Result Date: 10/26/2023  Impression:   Low lung volumes with patchy airspace disease at the lung  bases, worsened in comparison to 4/24/2023.  The costophrenic angles may be blunted.       LETY TONY MD       Electronically Signed and Approved By: LETY TONY MD on 10/26/2023 at 19:45              Results for orders placed during the hospital encounter of 10/04/23    Duplex Venous Lower Extremity - Left CV-READ    Interpretation Summary    Tibial level veins not scanned due to presence of nonremovable bandage.    All other left sided veins appeared normal.      Results for orders placed during the hospital encounter of 10/04/23    Duplex Venous Lower Extremity - Left CV-READ    Interpretation Summary    Tibial level veins not scanned due to presence of nonremovable bandage.    All other left sided veins appeared normal.      Results for orders placed during the hospital encounter of 02/03/23    Adult Transthoracic Echo Complete W/ Cont if Necessary Per Protocol    Interpretation Summary  Borderline dilated left ventricle.  Moderately reduced left ventricular systolic function with an estimated ejection fraction of 30-35% and moderate global hypokinesis.  Left ventricular diastolic function is consistent with pseudonormalization (grade II diastolic dysfunction with high LAP).  Severe concentric left ventricular hypertrophy.  Mildly reduced right ventricular systolic function.  Severely dilated left atrium.  Moderately dilated right atrium.  Moderate posterior mitral annular calcification with mild-moderate mitral regurgitation.  Trileaflet aortic valve with moderate sclerosis/calcification.  Mild to moderate aortic stenosis was observed with peak and mean gradients across the valve of 27 and 14 mmHg, respectively.  Maximum velocity across the aortic valve was 260 cm/s.  Calculated dimensionless index = 0.32.  Mild aortic regurgitation.  Mild tricuspid regurgitation.  Estimated right ventricular systolic pressure was severely elevated at 64 mmHg.  Mildly dilated aortic root and ascending aorta, measuring up  to 3.9 cm in diameter.  Dilated IVC with blunted respirophasic changes, consistent with significantly elevated central venous pressures.  A moderate to large circumferential pericardial effusion was observed.  No echocardiographic evidence of pericardial tamponade.      Labs Pending at Discharge:  Pending Labs       Order Current Status    Blood Culture - Blood, Arm, Right In process    Immunofixation, Serum In process    Immunoglobulin Free LT Chains Blood In process    Blood Culture - Blood, Arm, Left Preliminary result    Blood Culture - Blood, Arm, Right Preliminary result    Wound Culture - Wound, Leg Preliminary result              Discharge Details        Discharge Medications        Continue These Medications        Instructions Start Date   atorvastatin 10 MG tablet  Commonly known as: LIPITOR   1 tablet, Oral, Daily      benzonatate 100 MG capsule  Commonly known as: TESSALON   100 mg, Oral, 3 Times Daily PRN      carvedilol 3.125 MG tablet  Commonly known as: COREG   3.125 mg, Oral, 2 Times Daily With Meals      cephalexin 500 MG capsule  Commonly known as: KEFLEX   500 mg, Oral, 3 Times Daily      Eliquis 5 MG tablet tablet  Generic drug: apixaban   5 mg, Oral, Every 12 Hours Scheduled      fluconazole 100 MG tablet  Commonly known as: DIFLUCAN   100 mg, Oral, Daily      hydrOXYzine pamoate 50 MG capsule  Commonly known as: VISTARIL   50 mg, Oral, 2 Times Daily PRN      insulin NPH-insulin regular (70-30) 100 UNIT/ML injection  Commonly known as: humuLIN 70/30,novoLIN 70/30   0-25 Units, Subcutaneous, 2 Times Daily PRN      Levemir FlexPen 100 UNIT/ML injection  Generic drug: insulin detemir   25 Units, Subcutaneous, Nightly      lisinopril 20 MG tablet  Commonly known as: PRINIVIL,ZESTRIL   20 mg, Oral, Daily      miconazole 2 % powder  Commonly known as: MICOTIN   1 application , Topical, 2 Times Daily PRN      mirtazapine 45 MG tablet  Commonly known as: REMERON   45 mg, Oral, Nightly PRN       nortriptyline 50 MG capsule  Commonly known as: PAMELOR   50 mg, Oral, 2 Times Daily      oxyCODONE-acetaminophen  MG per tablet  Commonly known as: PERCOCET   1 tablet, Oral, Every 6 Hours PRN, GIVEN PER PCP      pantoprazole 40 MG EC tablet  Commonly known as: PROTONIX   40 mg, Oral, Daily      sertraline 50 MG tablet  Commonly known as: ZOLOFT   50 mg, Oral, Daily      sertraline 100 MG tablet  Commonly known as: ZOLOFT   100 mg, Oral, Daily      tiZANidine 4 MG tablet  Commonly known as: ZANAFLEX   4 mg, Oral, Every 8 Hours PRN      traZODone 300 MG tablet  Commonly known as: DESYREL   300 mg, Oral, Nightly PRN               No Known Allergies      Discharge Disposition:  Short Term Hospital (DC - External)    Diet:  Hospital:  Diet Order   Procedures    Diet: Regular/House Diet; Texture: Regular Texture (IDDSI 7); Fluid Consistency: Thin (IDDSI 0)         Discharge Activity:         CODE STATUS:  Code Status and Medical Interventions:   Ordered at: 10/27/23 1022     Level Of Support Discussed With:    Patient     Code Status (Patient has no pulse and is not breathing):    CPR (Attempt to Resuscitate)     Medical Interventions (Patient has pulse or is breathing):    Full Support         No future appointments.        Time spent on Discharge including face to face service:  45 minutes    Electronically signed by Cruz Gibson MD, 10/29/23, 8:42 AM EDT.    Part of this note may be an electronic transcription/translation of spoken language to printed text using the Dragon Dictation System.      Electronically signed by Cruz Gibson MD at 10/29/23 0857

## 2023-10-31 LAB
BACTERIA SPEC AEROBE CULT: NORMAL
BACTERIA SPEC AEROBE CULT: NORMAL
IGA SERPL-MCNC: 665 MG/DL (ref 87–352)
IGG SERPL-MCNC: 1274 MG/DL (ref 586–1602)
IGM SERPL-MCNC: 92 MG/DL (ref 26–217)
KAPPA LC FREE SER-MCNC: 106.8 MG/L (ref 3.3–19.4)
KAPPA LC FREE/LAMBDA FREE SER: 0.83 {RATIO} (ref 0.26–1.65)
LAMBDA LC FREE SERPL-MCNC: 128.8 MG/L (ref 5.7–26.3)
PROT PATTERN SERPL IFE-IMP: ABNORMAL

## 2023-11-02 LAB — BACTERIA SPEC AEROBE CULT: NORMAL

## 2023-11-05 LAB
QT INTERVAL: 381 MS
QT INTERVAL: 432 MS
QTC INTERVAL: 461 MS
QTC INTERVAL: 462 MS

## 2023-11-07 NOTE — PROGRESS NOTES
"Enter Query Response Below      Query Response: septic shock present on admission if patient is on levophed             If applicable, please update the problem list.         Patient: Italia Olevra        : 1960  Account: [id number]           Admit Date: 10/26/2023        How to Respond to this query:       a. Click New Note     b. Answer query within the yellow box.                c. Update the Problem List, if applicable.      If you have any questions about this query contact me at: [email or web address]    Dr. Padilla:        Per Pulmonology Consult 10/29/2023, \"Septic shock.\"  ED Provider Notes note \"Positive sepsis screen noted for presence of possible wound infection of the left lower leg, mildly elevated heart rate and respirations.\"   No other documentation of sepsis, septic shock noted prior to transfer.   Medications included IV zosyn, IV Vancomycin, Sodium Chloride 0.9% bolus, Levophed.  Labs resulted 10/26/2023: WBC 8.96, Lactate 1.5. 10/27/2023: WBC 7.43, Lactate 1.5. 10/28/2023: WBC 13.66, Lactate 1.2, Procalcitonin 17.68.     Please clarify the following:    septic shock present on admission  septic shock developed during admission  Other- specify______  Unable to determine    By submitting this query, we are merely seeking further clarification of documentation to accurately reflect all conditions that you are monitoring, evaluating, treating or that extend the hospitalization or utilize additional resources of care. Please utilize your independent clinical judgment when addressing the question(s) above.     This query and your response, once completed, will be entered into the legal medical record.    Sincerely,  Pura Dawson  Clinical Documentation Integrity Program   "

## 2023-11-08 NOTE — PROGRESS NOTES
"Enter Query Response Below      Query Response: Unable to decide  Electronically signed by Cruz Gibson MD, 23, 8:09 AM EST.               If applicable, please update the problem list.         Patient: Italia Olvera        : 1960  Account: [id number]           Admit Date: 10/26/2023        How to Respond to this query:       a. Click New Note     b. Answer query within the yellow box.                c. Update the Problem List, if applicable.      If you have any questions about this query contact me at: [email or web address]    Dr. Gibson:          62 year old admitted with noted extensive cellulitis left leg.  Patient noted to have fracture of tibia and fibula about 2-3 weeks ago and was admitted at [facility]. Patient noted to have had some procedures for tendon...details not available.  Orthopedics consulted and noted \"She has an obvious wound infection,\" and recommended transfer to [facility].  Medications included IV zosyn, IV vancomycin.  Wound Culture - Wound, Leg resulted 10/30/2023: Scant growth (1+) Klebsiella pneumoniae, Scant growth (1+) Enterobacter cloacae complex.      Please clarify if patient treated/monitored for one or more of the following:    left leg cellulitis due to prior procedure  Other- specify______  Unable to determine    By submitting this query, we are merely seeking further clarification of documentation to accurately reflect all conditions that you are monitoring, evaluating, treating or that extend the hospitalization or utilize additional resources of care. Please utilize your independent clinical judgment when addressing the question(s) above.     This query and your response, once completed, will be entered into the legal medical record.    Sincerely,  Pura Dawson  Clinical Documentation Integrity Program   "

## 2024-04-20 NOTE — PROGRESS NOTES
[facility]   Hospitalist Progress Note  Date: 2021  Patient Name: Italia Olvera  : 1960  MRN: [id number]  Date of admission: 2021      Subjective   Subjective     Chief Complaint: Weakness and confusion    Summary:   Patient admitted with weakness and confusion and hypoglycemia.  Work-up revealed sepsis and UTI      Awake alert oriented does not want to speak.  Refusing sugar check per nurse  Feels weak.  Denies any diarrhea having soft BM.  No other complaints.  Denies urinary complaints.        Review of Systems  No fever chills.  Fatigue.  Agitated.  Denies chest pain      Objective   Objective     Vitals:   Temp:  [97.9 °F (36.6 °C)-98.5 °F (36.9 °C)] 98.2 °F (36.8 °C)  Heart Rate:  [61-75] 75  Resp:  [16-18] 18  BP: (122-190)/(51-77) 190/74  Physical Exam     Awake and alert  Neck supple.  Heart regular.  Lungs diminished breath sounds with bibasilar crackles.  Abdomen is obese and soft nontender.  Extremities no edema.  Neurologically awake alert and oriented .  Moving all extremities equally.        Result Review    Result Review:  I have personally reviewed the results from the time of this admission to 2021 09:30 EDT and agree with these findings:  [x]  Laboratory  [x]  Microbiology  [x]  Radiology  [x]  EKG/Telemetry   []  Cardiology/Vascular   []  Pathology  [x]  Old records  [x]  Other: Medications      Assessment/Plan   Assessment / Plan     Assessment:  Acute metabolic encephalopathy.  Resolved  Sepsis  Bacteremia due to Pseudomonas.  Likely UTI.  Multifocal pneumonia involving right upper lobe and left lower lobe.  Chronic ongoing tobacco abuse.  Anemia.  Iron deficient.  S/p IV iron transfusion  Thrombocytopenia.  History of COPD.  Bipolar disorder.  Diabetes mellitus.  CHF EF not known.  Hypertension.  Protein calorie malnutrition with albumin of 1.9.  Paroxysmal A. fib on chronic anticoagulation with Coumadin.  Subtherapeutic INR on admission  Chronic hypoxemic  respiratory patient home oxygen.  Her O2 sat on room air is adequate.       Plan;  Continue antibiotics.  Improving.  Increase activity.  PT and OT consult.  Social service consult for discharge planning.  Recheck labs in a.m.    DVT prophylaxis:  Medical and mechanical DVT prophylaxis orders are present.    CODE STATUS:   Level Of Support Discussed With: Patient  Code Status: CPR  Medical Interventions (Level of Support Prior to Arrest): Full      Part of this note may be an electronic transcription/translation of spoken language to printed text using the Dragon Dictation System.       Electronically signed by Sami Caba MD, 08/15/21, 4:08 PM EDT.                Yes

## 2024-09-24 NOTE — HOME HEALTH
JOSE M PFS 90MG/1ML     Last Written Prescription Date:  10/11/23  Last Fill Quantity: 1 ml ,   # refills: 3  Last Office Visit : 3/15/24 Rikki, 3/22/24 [facility]/ [facility]  Future Office visit:  6/20/25 RIKKI    Routing refill request to provider for review/approval because:   STELARA not on the [facility] refill protocol    PATIENT IS 61 Y/O F WHO LIVES WITH HER SPOUSE.  SPOUSE REPORTS PREVIOUS CONCUSSIONS WHICH HAVE LEFT HER IMPAIRED.  SHE IS DIABETIC.  SPOUSE AND DAUGHTER ARE PRESENT DURING EVAL AND TRYING TO BE HELPFUL.   PATIENT DEVELOPED UTI WITH SEPSIS AND BILATERAL PNEUMONIA AND WAS HOSPITALIZED FOR TREATMENT.  SHE WAS THEN ADMITTED TO AN ACUTE CARE REHAB FACILITY FOR 18 DAYS.  FAMILY REPORTED THAT SHE DID NOT PROGRESS WHILE IN REHAB.  SHE IS BEING SEEN FOR ONGOING WEAKNESS AND LACK OF MOBILITY OR BALANCE BY HOME HEALTH P.T.
